# Patient Record
Sex: FEMALE | Race: WHITE | Employment: OTHER | URBAN - METROPOLITAN AREA
[De-identification: names, ages, dates, MRNs, and addresses within clinical notes are randomized per-mention and may not be internally consistent; named-entity substitution may affect disease eponyms.]

---

## 2017-01-04 ENCOUNTER — TELEPHONE (OUTPATIENT)
Dept: GASTROENTEROLOGY | Facility: CLINIC | Age: 72
End: 2017-01-04

## 2017-01-04 ENCOUNTER — NURSE ONLY (OUTPATIENT)
Dept: WOUND CARE | Facility: HOSPITAL | Age: 72
End: 2017-01-04
Attending: NURSE PRACTITIONER
Payer: MEDICARE

## 2017-01-04 DIAGNOSIS — L89.153 DECUBITUS ULCER OF COCCYGEAL REGION, STAGE 3 (HCC): Primary | ICD-10-CM

## 2017-01-04 PROCEDURE — 99212 OFFICE O/P EST SF 10 MIN: CPT

## 2017-01-04 NOTE — TELEPHONE ENCOUNTER
Pt is contacted and made aware of below results as well as need for F/U appt in 1-2 months with Dr. Italia Ragsdale; she verbalized understanding of results and states that she is feeling better on daily PPI; she will call back for appt.

## 2017-01-04 NOTE — TELEPHONE ENCOUNTER
OK to tell pt biopsy results as below. Please have her make routine appt for OV, 1-2 months, per Dr. Kerri Melgar.      Notes Recorded by Zhane Sutton MD on 12/27/2016 at 7:09 PM  Please inform the patient that her stomach biopsies show inflammation withou

## 2017-01-04 NOTE — PROGRESS NOTES
Chief Complaint  This information was obtained from the patient  The patient was seen today for follow up and management of difficult to heal wound(s). patient has had diarrhea since 5 am. she is here for her stockings.  11/30/16 patient has no complaints f and has received a status of Not Healed. Subsequent wound encounter measurements are 0.3cm length x 0.2cm width x 0.5cm depth, with an area of 0.06 sq cm and a volume of 0.03 cubic cm. There is a scant amount of serous drainage noted which has no odor.  The to hydroferra blue ready packed into the wound. Plan      Follow-Up Appointments:  A follow-up appointment should be scheduled.           Entered By: Catrina Ann on 01/04/2017 10:29:23 AM    Signature(s):  Date(s):    Treatment Notes Summary    Wou saline (1)   Applied Primary Wound Dressing. using Mepilex border 4x4 (1), Xeroform 2x2 (1)   Applied Secondary Wound Dressing.  using Gauze (sterile) 2x2 (1)   Dressing secured with non-allergenic tape/stockinet/wrap. using Kerlix (1), Silk tape (1)   Appl

## 2017-01-11 ENCOUNTER — NURSE ONLY (OUTPATIENT)
Dept: WOUND CARE | Facility: HOSPITAL | Age: 72
End: 2017-01-11
Attending: NURSE PRACTITIONER
Payer: MEDICARE

## 2017-01-11 DIAGNOSIS — L89.153 DECUBITUS ULCER OF COCCYGEAL REGION, STAGE 3 (HCC): Primary | ICD-10-CM

## 2017-01-11 PROCEDURE — 99212 OFFICE O/P EST SF 10 MIN: CPT

## 2017-01-11 NOTE — PROGRESS NOTES
Subjective    Allergies  Cymbalta (Reaction: itching, swelling), gabapentin (Reaction: rash), Gnp iodides decolorized, shellfish anaphylaxis, tetracycline base (Reaction: rash), Aleve (Reaction: rash), aspirin, bactrin (Reaction: rash), peanuts (Reaction: Wound #3 Sacral is a Stage 3 Pressure Injury Pressure Ulcer and has received a status of Not Healed. Subsequent wound encounter measurements are 0.5cm length x 0.3cm width x 0.5cm depth, with an area of 0.15 sq cm and a volume of 0.075 cubic cm.  No tunneli Continue wound care visits, advanced dressing and offloading until transitioned to her new shoes and inserts. Follow-Up Appointments:  A follow-up appointment is scheduled next week.             Entered By: Kayy Cowan on 01/11/2017 10:14:38 AM   Beny

## 2017-01-17 ENCOUNTER — APPOINTMENT (OUTPATIENT)
Dept: WOUND CARE | Facility: HOSPITAL | Age: 72
End: 2017-01-17
Payer: MEDICARE

## 2017-01-19 ENCOUNTER — NURSE ONLY (OUTPATIENT)
Dept: WOUND CARE | Facility: HOSPITAL | Age: 72
End: 2017-01-19
Attending: NURSE PRACTITIONER
Payer: MEDICARE

## 2017-01-19 DIAGNOSIS — L89.133 STAGE III PRESSURE ULCER OF RIGHT LOWER BACK (HCC): Primary | ICD-10-CM

## 2017-01-19 PROCEDURE — 99212 OFFICE O/P EST SF 10 MIN: CPT

## 2017-01-19 NOTE — PROGRESS NOTES
Header Image    Progress Note Details  Patient Name: Farhat Brown   Patient Number: [id number]  Patient YOB: 1945  Date: 1/19/2017  RN: Bubba Fernandez  Physician / Danitza Buenrostro: Magdalena Nguyen, [facility]: [facility]patient  Subjective    Chief Com bpm, Respiratory Rate: 14 breaths/min, Blood Pressure: 143/59 mmHg, Pulse Oximetry: 99 %. Integumentary (Hair, Skin)  Wound #3 Sacral is a Stage 3 Pressure Injury Pressure Ulcer and has received a status of Not Healed.  Subsequent wound encounter measur applied to ankle and legs bilaterally. Offloading shoes per  were applied. Per patient request offloading pads were applied to bilateral feet. Instructed may remove at any time due to the shoe offloading now.   Instructed to call with any questions hydrofera blue moistened with normal saline applied  Wound #8 (Right Foot)  . Wound Treatment Note  Limb cleansed using Soap and water (1)  Applied Primary Wound Dressing. using Xeroform 2x2 (1)  Applied Secondary Wound Dressing.  using Mepilex border 4x4 (1

## 2017-01-21 PROCEDURE — 87077 CULTURE AEROBIC IDENTIFY: CPT | Performed by: INTERNAL MEDICINE

## 2017-01-21 PROCEDURE — 87086 URINE CULTURE/COLONY COUNT: CPT | Performed by: INTERNAL MEDICINE

## 2017-01-21 PROCEDURE — 81001 URINALYSIS AUTO W/SCOPE: CPT | Performed by: INTERNAL MEDICINE

## 2017-01-21 PROCEDURE — 87186 SC STD MICRODIL/AGAR DIL: CPT | Performed by: INTERNAL MEDICINE

## 2017-01-24 ENCOUNTER — NURSE ONLY (OUTPATIENT)
Dept: WOUND CARE | Facility: HOSPITAL | Age: 72
End: 2017-01-24
Attending: NURSE PRACTITIONER
Payer: MEDICARE

## 2017-01-24 DIAGNOSIS — L97.511 RIGHT FOOT ULCER, LIMITED TO BREAKDOWN OF SKIN (HCC): ICD-10-CM

## 2017-01-24 DIAGNOSIS — L89.133 STAGE III PRESSURE ULCER OF RIGHT LOWER BACK (HCC): Primary | ICD-10-CM

## 2017-01-24 PROCEDURE — 99212 OFFICE O/P EST SF 10 MIN: CPT

## 2017-01-24 NOTE — PROGRESS NOTES
Subjective    Chief Complaint  This information was obtained from the patient  The patient was seen today for follow up and management of difficult to heal wound(s). no concerns for today's visit. she has her new shoes which are working out well.     Cleatrice Plate Wound #8 Right Foot is a Stage 2 Pressure Injury Pressure Ulcer and has received a status of Not Healed. Subsequent wound encounter measurements are 1cm length x 1cm width x 0.1cm depth, with an area of 1 sq cm and a volume of 0.1 cubic cm.  No tunneling ha Entered By: Ron Diaz on 01/24/2017 4:36:35 PM   Signature(s): Date(s):  Treatment Notes Summary  Wound #3 (Sacral)  . Wound Treatment Note  Assessed patient’s pain status and effectiveness of pain management plan.   Cleansed wound and periwound with non-

## 2017-01-25 ENCOUNTER — TELEPHONE (OUTPATIENT)
Dept: GASTROENTEROLOGY | Facility: CLINIC | Age: 72
End: 2017-01-25

## 2017-01-25 RX ORDER — BUDESONIDE 3 MG/1
3 CAPSULE, COATED PELLETS ORAL EVERY MORNING
Qty: 30 CAPSULE | Refills: 1 | Status: SHIPPED | OUTPATIENT
Start: 2017-01-25 | End: 2017-02-24

## 2017-01-26 ENCOUNTER — HOSPITAL ENCOUNTER (OUTPATIENT)
Facility: HOSPITAL | Age: 72
Setting detail: OBSERVATION
Discharge: HOME HEALTH | End: 2017-01-28
Attending: EMERGENCY MEDICINE | Admitting: INTERNAL MEDICINE
Payer: MEDICARE

## 2017-01-26 ENCOUNTER — APPOINTMENT (OUTPATIENT)
Dept: GENERAL RADIOLOGY | Facility: HOSPITAL | Age: 72
End: 2017-01-26
Attending: EMERGENCY MEDICINE
Payer: MEDICARE

## 2017-01-26 DIAGNOSIS — I49.1 PAC (PREMATURE ATRIAL CONTRACTION): ICD-10-CM

## 2017-01-26 DIAGNOSIS — J40 BRONCHITIS: Primary | ICD-10-CM

## 2017-01-26 DIAGNOSIS — E87.6 HYPOKALEMIA: ICD-10-CM

## 2017-01-26 LAB
ANION GAP SERPL CALC-SCNC: 8 MMOL/L (ref 0–18)
BASOPHILS # BLD: 0 K/UL (ref 0–0.2)
BASOPHILS NFR BLD: 0 %
BNP SERPL-MCNC: 308 PG/ML (ref 0–100)
BUN SERPL-MCNC: 20 MG/DL (ref 8–20)
BUN/CREAT SERPL: 32.8 (ref 10–20)
CALCIUM SERPL-MCNC: 8.8 MG/DL (ref 8.5–10.5)
CHLORIDE SERPL-SCNC: 106 MMOL/L (ref 95–110)
CO2 SERPL-SCNC: 25 MMOL/L (ref 22–32)
CREAT SERPL-MCNC: 0.61 MG/DL (ref 0.5–1.5)
EOSINOPHIL # BLD: 0 K/UL (ref 0–0.7)
EOSINOPHIL NFR BLD: 1 %
ERYTHROCYTE [DISTWIDTH] IN BLOOD BY AUTOMATED COUNT: 15.7 % (ref 11–15)
GLUCOSE SERPL-MCNC: 90 MG/DL (ref 70–99)
HCT VFR BLD AUTO: 38.3 % (ref 35–48)
HGB BLD-MCNC: 12.5 G/DL (ref 12–16)
LYMPHOCYTES # BLD: 1 K/UL (ref 1–4)
LYMPHOCYTES NFR BLD: 14 %
MAGNESIUM SERPL-MCNC: 1.5 MG/DL (ref 1.8–2.5)
MCH RBC QN AUTO: 26.2 PG (ref 27–32)
MCHC RBC AUTO-ENTMCNC: 32.6 G/DL (ref 32–37)
MCV RBC AUTO: 80.5 FL (ref 80–100)
MONOCYTES # BLD: 0.4 K/UL (ref 0–1)
MONOCYTES NFR BLD: 5 %
NEUTROPHILS # BLD AUTO: 5.7 K/UL (ref 1.8–7.7)
NEUTROPHILS NFR BLD: 80 %
OSMOLALITY UR CALC.SUM OF ELEC: 290 MOSM/KG (ref 275–295)
PLATELET # BLD AUTO: 167 K/UL (ref 140–400)
PMV BLD AUTO: 9.1 FL (ref 7.4–10.3)
POTASSIUM SERPL-SCNC: 2.5 MMOL/L (ref 3.3–5.1)
PROCALCITONIN SERPL-MCNC: <0.05 NG/ML (ref ?–0.11)
RBC # BLD AUTO: 4.76 M/UL (ref 3.7–5.4)
SODIUM SERPL-SCNC: 139 MMOL/L (ref 136–144)
TROPONIN I SERPL-MCNC: 0.03 NG/ML (ref ?–0.03)
WBC # BLD AUTO: 7.2 K/UL (ref 4–11)

## 2017-01-26 PROCEDURE — 93010 ELECTROCARDIOGRAM REPORT: CPT | Performed by: EMERGENCY MEDICINE

## 2017-01-26 PROCEDURE — 96375 TX/PRO/DX INJ NEW DRUG ADDON: CPT

## 2017-01-26 PROCEDURE — 96365 THER/PROPH/DIAG IV INF INIT: CPT

## 2017-01-26 PROCEDURE — 80048 BASIC METABOLIC PNL TOTAL CA: CPT | Performed by: EMERGENCY MEDICINE

## 2017-01-26 PROCEDURE — 93005 ELECTROCARDIOGRAM TRACING: CPT

## 2017-01-26 PROCEDURE — 96367 TX/PROPH/DG ADDL SEQ IV INF: CPT

## 2017-01-26 PROCEDURE — 87631 RESP VIRUS 3-5 TARGETS: CPT | Performed by: EMERGENCY MEDICINE

## 2017-01-26 PROCEDURE — 84145 PROCALCITONIN (PCT): CPT | Performed by: EMERGENCY MEDICINE

## 2017-01-26 PROCEDURE — 99285 EMERGENCY DEPT VISIT HI MDM: CPT

## 2017-01-26 PROCEDURE — 83735 ASSAY OF MAGNESIUM: CPT | Performed by: EMERGENCY MEDICINE

## 2017-01-26 PROCEDURE — 94640 AIRWAY INHALATION TREATMENT: CPT

## 2017-01-26 PROCEDURE — 85025 COMPLETE CBC W/AUTO DIFF WBC: CPT | Performed by: EMERGENCY MEDICINE

## 2017-01-26 PROCEDURE — 84484 ASSAY OF TROPONIN QUANT: CPT | Performed by: EMERGENCY MEDICINE

## 2017-01-26 PROCEDURE — 71010 XR CHEST AP PORTABLE  (CPT=71010): CPT

## 2017-01-26 PROCEDURE — 83880 ASSAY OF NATRIURETIC PEPTIDE: CPT | Performed by: EMERGENCY MEDICINE

## 2017-01-26 RX ORDER — IPRATROPIUM BROMIDE AND ALBUTEROL SULFATE 2.5; .5 MG/3ML; MG/3ML
3 SOLUTION RESPIRATORY (INHALATION) ONCE
Status: COMPLETED | OUTPATIENT
Start: 2017-01-26 | End: 2017-01-26

## 2017-01-26 RX ORDER — METHYLPREDNISOLONE SODIUM SUCCINATE 40 MG/ML
40 INJECTION, POWDER, LYOPHILIZED, FOR SOLUTION INTRAMUSCULAR; INTRAVENOUS ONCE
Status: COMPLETED | OUTPATIENT
Start: 2017-01-26 | End: 2017-01-26

## 2017-01-26 RX ORDER — POTASSIUM CHLORIDE 20 MEQ/1
40 TABLET, EXTENDED RELEASE ORAL ONCE
Status: COMPLETED | OUTPATIENT
Start: 2017-01-26 | End: 2017-01-26

## 2017-01-27 PROBLEM — E87.6 HYPOKALEMIA: Status: ACTIVE | Noted: 2017-01-27

## 2017-01-27 PROBLEM — I49.1 PAC (PREMATURE ATRIAL CONTRACTION): Status: ACTIVE | Noted: 2017-01-27

## 2017-01-27 LAB
FLUAV + FLUBV RNA SPEC NAA+PROBE: NEGATIVE
FLUAV + FLUBV RNA SPEC NAA+PROBE: NEGATIVE
FLUAV + FLUBV RNA SPEC NAA+PROBE: POSITIVE
MAGNESIUM SERPL-MCNC: 1.9 MG/DL (ref 1.8–2.5)
POTASSIUM SERPL-SCNC: 4.3 MMOL/L (ref 3.3–5.1)

## 2017-01-27 PROCEDURE — 84132 ASSAY OF SERUM POTASSIUM: CPT | Performed by: INTERNAL MEDICINE

## 2017-01-27 PROCEDURE — 83735 ASSAY OF MAGNESIUM: CPT | Performed by: INTERNAL MEDICINE

## 2017-01-27 PROCEDURE — 96372 THER/PROPH/DIAG INJ SC/IM: CPT

## 2017-01-27 PROCEDURE — 94640 AIRWAY INHALATION TREATMENT: CPT

## 2017-01-27 PROCEDURE — 96366 THER/PROPH/DIAG IV INF ADDON: CPT

## 2017-01-27 PROCEDURE — 96376 TX/PRO/DX INJ SAME DRUG ADON: CPT

## 2017-01-27 RX ORDER — METHYLPREDNISOLONE SODIUM SUCCINATE 40 MG/ML
40 INJECTION, POWDER, LYOPHILIZED, FOR SOLUTION INTRAMUSCULAR; INTRAVENOUS EVERY 12 HOURS
Status: DISCONTINUED | OUTPATIENT
Start: 2017-01-27 | End: 2017-01-28

## 2017-01-27 RX ORDER — ACETAMINOPHEN 325 MG/1
650 TABLET ORAL EVERY 4 HOURS PRN
Status: DISCONTINUED | OUTPATIENT
Start: 2017-01-27 | End: 2017-01-28

## 2017-01-27 RX ORDER — ENOXAPARIN SODIUM 100 MG/ML
40 INJECTION SUBCUTANEOUS DAILY
Status: DISCONTINUED | OUTPATIENT
Start: 2017-01-27 | End: 2017-01-28

## 2017-01-27 RX ORDER — OSELTAMIVIR PHOSPHATE 75 MG/1
75 CAPSULE ORAL 2 TIMES DAILY
Status: DISCONTINUED | OUTPATIENT
Start: 2017-01-27 | End: 2017-01-28

## 2017-01-27 RX ORDER — LOPERAMIDE HYDROCHLORIDE 2 MG/1
4 CAPSULE ORAL 4 TIMES DAILY PRN
Status: DISCONTINUED | OUTPATIENT
Start: 2017-01-27 | End: 2017-01-28

## 2017-01-27 RX ORDER — METOPROLOL SUCCINATE 50 MG/1
50 TABLET, EXTENDED RELEASE ORAL DAILY
Status: DISCONTINUED | OUTPATIENT
Start: 2017-01-27 | End: 2017-01-28

## 2017-01-27 RX ORDER — METRONIDAZOLE 250 MG/1
TABLET ORAL 3 TIMES DAILY
Status: ON HOLD | COMMUNITY
End: 2017-01-27

## 2017-01-27 RX ORDER — IPRATROPIUM BROMIDE AND ALBUTEROL SULFATE 2.5; .5 MG/3ML; MG/3ML
3 SOLUTION RESPIRATORY (INHALATION) EVERY 6 HOURS PRN
Status: DISCONTINUED | OUTPATIENT
Start: 2017-01-27 | End: 2017-01-28

## 2017-01-27 NOTE — ED INITIAL ASSESSMENT (HPI)
Pt c/o increasing SOB starting 2 days ago with left sided chest pressure. Pt admitted during ben for pneumonia, thinks it is back.

## 2017-01-27 NOTE — DISCHARGE PLANNING
1/27/17 CM Discharge planning   Pt resides with , family home, w/c bound, independent with transfers. Pt plans to return home when medically stable, tentative referral made to [facility], pending final orders.    Tereso Galvin X [phone]

## 2017-01-27 NOTE — ED PROVIDER NOTES
Patient Seen in: [facility] AND [facility] Emergency Department    History   Patient presents with:  Dyspnea TIMO SOB (respiratory)    Stated Complaint: SOB     HPI    65 yo F with PMH lymphocytic colitis, ulcerative colitis on chronic prednisone/mesalamine, HL, sp in office    INJECTION, W/WO CONTRAST, DX/THERAPEUTIC SUBSTANCE, EPIDURAL/SUBARACHNOID; CERVICAL/THORACIC N/A 10/27/2014    Comment Procedure: CERVICAL EPIDURAL;  Surgeon: Jia Mason MD;  Location: [address] & [facility] PROPHYLAXIS.  N/A 1/5/2015    Comment Procedure: ESOPHAGOGASTRODUODENOSCOPY W/ DILATION;  Surgeon: Levy Vasquez MD;  Location: [address] [place] [address]    PATIENT DOCUMENTED NOT TO HAVE EXPERIENCED ANY OF THE FOLLOWING EVENTS N/A 1/5/2015    Comment Proc breakfast.   Metoprolol Succinate ER 50 MG Oral Tablet 24 Hr,  Take 1 tablet (50 mg total) by mouth daily. furosemide 20 MG Oral Tab,  Take 1 tablet (20 mg total) by mouth daily.  As needed for LEs swelling   Venlafaxine HCl ER (EFFEXOR-XR) 75 MG Oral Cap Soft.   Musculoskeletal: No gross deformity. BLE with 3+ edema without calf tenderness or palpable cord. Neurological: Alert. Skin: Skin is warm. Psychiatric: Cooperative. Nursing note and vitals reviewed.         ROMAINE De La Rosa. INDICATIONS: Cough, shortness of breath anf left side chest pain X2 days. TECHNIQUE:   Single view. FINDINGS: CARDIAC/VASC: The heart is mildly enlarged. Unremarkable pulmonary vasculature.   MEDIAST/YENI: The aorta is elongated and tortuous with atheros contraction)    Disposition:  Admit    Follow-up:  No follow-up provider specified.     Medications Prescribed:  Current Discharge Medication List

## 2017-01-27 NOTE — CONSULTS
Pulmonary Consult     Assessment / Plan:  1. Hypoxia: due to influenza and bronchospasm  - wean O2 as able  - bronchodilator protocol  - IV steroids  2. Influenza:  - continue Tamiflu x5 days  3. PPx:  - lovenox  4.  Dispo:  - possible DC tomorrow    Thanks jamal filter in place           Past Surgical History    COLONOSCOPY  7/11/08    Comment 7/11/08 at [address] [place] [address] and negative for polyps, UC was quiet    CYSTOURETHROSCOPY  1/9/09    Comment in office    INJECTION, W/WO CONTRAST, DX/THERAPEUTIC SUBSTANCE, EPID ESOPHAGOGASTRODUODENOSCOPY W/ DILATION;  Surgeon: Ryan Dougherty MD;  Location: [address] [place] [address] IV ANTIBIOTIC SURGICAL SITE INFECTION PROPHYLAXIS.  N/A 1/5/2015    Comment Procedure: ESOPHAGOGASTRODUODENO MG Oral Cap DR Particles Take 1 capsule (3 mg total) by mouth every morning.  Take 3 capsules (9mg) every day in the morning for up to 8 weeks Disp: 30 capsule Rfl: 1   mesalamine (DELZICOL) 400 MG Oral Capsule Delayed Release Take 2 capsules (800 mg total) Daughter      spastic paraplegia         Exam:   01/27/17  0040 01/27/17  0127 01/27/17  0610 01/27/17  0939   BP: 113/56 107/46  117/51   Pulse: 75 73  58   Temp:  99.2 °F (37.3 °C)  98.5 °F (36.9 °C)   TempSrc:  Oral  Oral   Resp: 20 20  18   Height:   5

## 2017-01-27 NOTE — H&P
[facility]    PATIENT'S NAME: Alfreda Pierre   ATTENDING PHYSICIAN: Caridad Dodson MD   PATIENT ACCOUNT#:   [id number]    LOCATION:  [address] [place] [address] #:   [id number]       YOB: 1945  ADMISSION DATE:       01/ Normal S1, S2, no S3 or S4. ABDOMEN:  Soft, nontender. No guarding or rigidity. No mass. EXTREMITIES:  Mild chronic lymphedema. No significant pedal edema. No evidence of fluid retention. There is minimal onychomycosis.   NEUROLOGIC:  Cranial nerves

## 2017-01-27 NOTE — PLAN OF CARE
RESPIRATORY - ADULT    • Achieves optimal ventilation and oxygenation Progressing          Arrived to unit this shift. No c/o pain or SOB.

## 2017-01-28 VITALS
OXYGEN SATURATION: 96 % | SYSTOLIC BLOOD PRESSURE: 106 MMHG | RESPIRATION RATE: 18 BRPM | WEIGHT: 141.13 LBS | TEMPERATURE: 98 F | BODY MASS INDEX: 24.1 KG/M2 | HEIGHT: 64 IN | DIASTOLIC BLOOD PRESSURE: 44 MMHG | HEART RATE: 66 BPM

## 2017-01-28 LAB
ANION GAP SERPL CALC-SCNC: 6 MMOL/L (ref 0–18)
BUN SERPL-MCNC: 22 MG/DL (ref 8–20)
BUN/CREAT SERPL: 33.8 (ref 10–20)
CALCIUM SERPL-MCNC: 8.5 MG/DL (ref 8.5–10.5)
CHLORIDE SERPL-SCNC: 111 MMOL/L (ref 95–110)
CO2 SERPL-SCNC: 24 MMOL/L (ref 22–32)
CREAT SERPL-MCNC: 0.65 MG/DL (ref 0.5–1.5)
GLUCOSE SERPL-MCNC: 129 MG/DL (ref 70–99)
MAGNESIUM SERPL-MCNC: 2 MG/DL (ref 1.8–2.5)
OSMOLALITY UR CALC.SUM OF ELEC: 297 MOSM/KG (ref 275–295)
POTASSIUM SERPL-SCNC: 4.2 MMOL/L (ref 3.3–5.1)
SODIUM SERPL-SCNC: 141 MMOL/L (ref 136–144)

## 2017-01-28 PROCEDURE — 96372 THER/PROPH/DIAG INJ SC/IM: CPT

## 2017-01-28 PROCEDURE — 96376 TX/PRO/DX INJ SAME DRUG ADON: CPT

## 2017-01-28 PROCEDURE — 80048 BASIC METABOLIC PNL TOTAL CA: CPT | Performed by: INTERNAL MEDICINE

## 2017-01-28 PROCEDURE — 83735 ASSAY OF MAGNESIUM: CPT | Performed by: INTERNAL MEDICINE

## 2017-01-28 RX ORDER — ALBUTEROL SULFATE 90 UG/1
2 AEROSOL, METERED RESPIRATORY (INHALATION) EVERY 6 HOURS PRN
Qty: 1 INHALER | Refills: 0 | Status: SHIPPED | OUTPATIENT
Start: 2017-01-28 | End: 2017-11-03

## 2017-01-28 RX ORDER — PREDNISONE 20 MG/1
40 TABLET ORAL
Qty: 8 TABLET | Refills: 0 | Status: SHIPPED | OUTPATIENT
Start: 2017-01-28 | End: 2017-03-09

## 2017-01-28 RX ORDER — PREDNISONE 20 MG/1
40 TABLET ORAL
Status: DISCONTINUED | OUTPATIENT
Start: 2017-01-28 | End: 2017-01-28

## 2017-01-28 RX ORDER — SODIUM CHLORIDE 0.9 % (FLUSH) 0.9 %
SYRINGE (ML) INJECTION
Status: COMPLETED
Start: 2017-01-28 | End: 2017-01-28

## 2017-01-28 RX ORDER — OSELTAMIVIR PHOSPHATE 75 MG/1
75 CAPSULE ORAL 2 TIMES DAILY
Qty: 6 CAPSULE | Refills: 0 | Status: SHIPPED | OUTPATIENT
Start: 2017-01-28 | End: 2017-04-13

## 2017-01-28 NOTE — PLAN OF CARE
Patient/Family Goals    • Patient/Family Long Term Goal Adequate for Discharge    • Patient/Family Short Term Goal Adequate for Discharge        RESPIRATORY - ADULT    • Achieves optimal ventilation and oxygenation Adequate for Discharge        SKIN/TISSUE

## 2017-01-28 NOTE — PROGRESS NOTES
Pulmonary Progress Note     Assessment / Plan:  1. Hypoxia: due to influenza and bronchospasm  - placed on 1L O2 for comfort last night. Will place on room air this morning  - bronchodilator protocol  - IV steroids to po today  2.  Influenza:  - continue Ta

## 2017-01-28 NOTE — DISCHARGE PLANNING
1/28/17 CM Discharge planning /MDO [facility]    Advised Lucila NEIL [facility] [address] 76 pt will be discharging home later today, transport home arranged via [facility]. Medicar costs discussed with pt. Maged Yung X [id number].

## 2017-01-29 ENCOUNTER — TELEPHONE (OUTPATIENT)
Dept: CARDIOLOGY UNIT | Facility: HOSPITAL | Age: 72
End: 2017-01-29

## 2017-01-31 ENCOUNTER — NURSE ONLY (OUTPATIENT)
Dept: WOUND CARE | Facility: HOSPITAL | Age: 72
End: 2017-01-31
Attending: NURSE PRACTITIONER
Payer: MEDICARE

## 2017-01-31 DIAGNOSIS — L89.133 STAGE III PRESSURE ULCER OF RIGHT LOWER BACK (HCC): ICD-10-CM

## 2017-01-31 DIAGNOSIS — L97.511 RIGHT FOOT ULCER, LIMITED TO BREAKDOWN OF SKIN (HCC): Primary | ICD-10-CM

## 2017-01-31 PROCEDURE — 99212 OFFICE O/P EST SF 10 MIN: CPT

## 2017-01-31 NOTE — PROGRESS NOTES
Header Image    Progress Note Details  Patient Name: Ramsey Brannon Patient Number: [id number]  Patient YOB: 1945  Date: 1/31/2017  RN: Luis Manuel Dumont CNA/ANSELMO/ADINA:  Terence Clement  Physician / Extender: Waylon Palomino, [facility] [address] [facility] Corner: [facility] Skin)  Wound #3 Sacral is a Stage 3 Pressure Injury Pressure Ulcer and has received a status of Not Healed. Subsequent wound encounter measurements are 0.5cm length x 0.4cm width x 0.4cm depth, with an area of 0.2 sq cm and a volume of 0.08 cubic cm.  There Non-pressure chronic ulcer of other part of right foot limited to breakdown of skin  L97.521: Non-pressure chronic ulcer of other part of left foot limited to breakdown of skin    Wounds are stable and improved.   Patient states has been in hospital recentl

## 2017-02-02 ENCOUNTER — PRIOR ORIGINAL RECORDS (OUTPATIENT)
Dept: OTHER | Age: 72
End: 2017-02-02

## 2017-02-03 ENCOUNTER — PRIOR ORIGINAL RECORDS (OUTPATIENT)
Dept: OTHER | Age: 72
End: 2017-02-03

## 2017-02-07 ENCOUNTER — NURSE ONLY (OUTPATIENT)
Dept: WOUND CARE | Facility: HOSPITAL | Age: 72
End: 2017-02-07
Attending: NURSE PRACTITIONER
Payer: MEDICARE

## 2017-02-07 DIAGNOSIS — L89.133 STAGE III PRESSURE ULCER OF RIGHT LOWER BACK (HCC): ICD-10-CM

## 2017-02-07 DIAGNOSIS — L97.511 RIGHT FOOT ULCER, LIMITED TO BREAKDOWN OF SKIN (HCC): Primary | ICD-10-CM

## 2017-02-07 PROCEDURE — 99212 OFFICE O/P EST SF 10 MIN: CPT

## 2017-02-07 PROCEDURE — 99211 OFF/OP EST MAY X REQ PHY/QHP: CPT | Performed by: NURSE PRACTITIONER

## 2017-02-07 NOTE — PROGRESS NOTES
Chief Complaint  This information was obtained from the patient  The patient was seen today for follow up and management of difficult to heal wound(s). no concerns for today's visit. she has her new shoes which are working out well.     General Notes:  2/ Pressure Ulcer and has received a status of Not Healed. Subsequent wound encounter measurements are 0.5cm length x 0.3cm width x 0.3cm depth, with an area of 0.15 sq cm and a volume of 0.045 cubic cm. No tunneling has been noted.  No sinus tract has been no Non-pressure chronic ulcer of other part of right foot limited to breakdown of skin  (Encounter Diagnosis) L97.521 - Non-pressure chronic ulcer of other part of left foot limited to breakdown of skin    Diagnoses    ICD-10  L89.133: Pressure ulcer of right needs to implement. We will communicate with PCP and patient to assess their goal. We may consider more realistic Conservative goals such as managing wounds’ drainage, preventing local infection.   Plan      Follow-Up Appointments:  A follow-up appointment

## 2017-02-10 ENCOUNTER — MYAURORA ACCOUNT LINK (OUTPATIENT)
Dept: OTHER | Age: 72
End: 2017-02-10

## 2017-02-14 ENCOUNTER — NURSE ONLY (OUTPATIENT)
Dept: WOUND CARE | Facility: HOSPITAL | Age: 72
End: 2017-02-14
Attending: NURSE PRACTITIONER
Payer: MEDICARE

## 2017-02-14 ENCOUNTER — APPOINTMENT (OUTPATIENT)
Dept: WOUND CARE | Facility: HOSPITAL | Age: 72
End: 2017-02-14
Payer: MEDICARE

## 2017-02-14 DIAGNOSIS — L89.133 STAGE III PRESSURE ULCER OF RIGHT LOWER BACK (HCC): ICD-10-CM

## 2017-02-14 DIAGNOSIS — L97.521 ULCER OF TOE OF LEFT FOOT, LIMITED TO BREAKDOWN OF SKIN (HCC): Primary | ICD-10-CM

## 2017-02-14 DIAGNOSIS — L97.511 RIGHT FOOT ULCER, LIMITED TO BREAKDOWN OF SKIN (HCC): ICD-10-CM

## 2017-02-14 PROCEDURE — 99215 OFFICE O/P EST HI 40 MIN: CPT

## 2017-02-14 NOTE — PROGRESS NOTES
Chief Complaint  This information was obtained from the patient  The patient is new to the [address] [facility] here for an initial visit for the evaluation and management of non-healing wound.      Allergies  penicillin    HPI  This information was obtained from t Description & Treatment Plan is: X-ray / Scan. Local Pulse is Doppler. Wound #2 Right, Medial Fifth Toe is an acute Diabetic Ulcer and has received an outcome of Resolved. There is a scant amount of sero-sanguineous drainage noted which has no odor.  The cirilo-wound area avoiding wound base. using Zinc Paste (1)   Applied topical agent to base of wound bed. using Medihoney gel (1)   Applied antiseptic dressing:   Applied Primary Wound Dressing.  using Mepilex Foam 4x4 (1), Xeroform 2x2 (1)   Applied [employer]

## 2017-02-15 ENCOUNTER — PRIOR ORIGINAL RECORDS (OUTPATIENT)
Dept: OTHER | Age: 72
End: 2017-02-15

## 2017-02-21 ENCOUNTER — OFFICE VISIT (OUTPATIENT)
Dept: GASTROENTEROLOGY | Facility: CLINIC | Age: 72
End: 2017-02-21

## 2017-02-21 VITALS — HEART RATE: 62 BPM | SYSTOLIC BLOOD PRESSURE: 122 MMHG | DIASTOLIC BLOOD PRESSURE: 74 MMHG

## 2017-02-21 DIAGNOSIS — R15.9 FECAL INCONTINENCE DUE TO ANORECTAL DISORDER: ICD-10-CM

## 2017-02-21 DIAGNOSIS — G82.20 PARAPARESIS (HCC): ICD-10-CM

## 2017-02-21 DIAGNOSIS — K52.9 CHRONIC DIARRHEA: ICD-10-CM

## 2017-02-21 DIAGNOSIS — K62.9 FECAL INCONTINENCE DUE TO ANORECTAL DISORDER: ICD-10-CM

## 2017-02-21 DIAGNOSIS — K52.832 LYMPHOCYTIC COLITIS: Primary | ICD-10-CM

## 2017-02-21 PROCEDURE — 99214 OFFICE O/P EST MOD 30 MIN: CPT | Performed by: INTERNAL MEDICINE

## 2017-02-21 PROCEDURE — G0463 HOSPITAL OUTPT CLINIC VISIT: HCPCS | Performed by: INTERNAL MEDICINE

## 2017-02-21 RX ORDER — PANTOPRAZOLE SODIUM 40 MG/1
40 TABLET, DELAYED RELEASE ORAL DAILY
Status: ON HOLD | COMMUNITY
Start: 2017-02-05 | End: 2017-09-20 | Stop reason: ALTCHOICE

## 2017-02-21 RX ORDER — DIPHENOXYLATE HYDROCHLORIDE AND ATROPINE SULFATE 2.5; .025 MG/1; MG/1
2.5 TABLET ORAL DAILY
COMMUNITY
Start: 2017-01-29 | End: 2017-04-16

## 2017-02-21 RX ORDER — ATORVASTATIN CALCIUM 40 MG/1
40 TABLET, FILM COATED ORAL NIGHTLY
COMMUNITY
Start: 2017-02-05

## 2017-02-21 RX ORDER — CHOLESTYRAMINE LIGHT 4 G/5.7G
4 POWDER, FOR SUSPENSION ORAL 3 TIMES DAILY
Qty: 90 EACH | Refills: 0 | Status: SHIPPED | OUTPATIENT
Start: 2017-02-21 | End: 2017-03-23

## 2017-02-21 RX ORDER — LISINOPRIL 5 MG/1
5 TABLET ORAL DAILY
Status: ON HOLD | COMMUNITY
Start: 2017-02-06 | End: 2017-08-18

## 2017-02-21 RX ORDER — METOPROLOL SUCCINATE 25 MG/1
25 TABLET, EXTENDED RELEASE ORAL DAILY
Status: ON HOLD | COMMUNITY
Start: 2017-02-03 | End: 2017-08-18

## 2017-02-21 NOTE — PATIENT INSTRUCTIONS
1.  Cholestyramine as ordered    2. Schedule an appointment with Dr. Rosalva Ryan at [facility], consider colostomy or other options    3. Finish current course of budesonide, then stop.

## 2017-02-21 NOTE — H&P
History of present illness: This is a 26-year-old female well-known to me with a history of lymphocytic colitis, chronic diarrhea, spastic paraparesis, fecal incontinence, GERD who presents for follow-up.   The patient has been on medications including b

## 2017-02-21 NOTE — PROGRESS NOTES
HPI:    Patient ID: Salma Quintero is a 66year old female. HPI    Review of Systems   Constitutional: Negative for fever, chills, diaphoresis, activity change, appetite change, fatigue and unexpected weight change.    HENT: Negative for congestion, mouth 2 (two) times daily.  Disp: 6 capsule Rfl: 0   predniSONE 20 MG Oral Tab Take 2 tablets (40 mg total) by mouth daily with breakfast. Disp: 8 tablet Rfl: 0   Albuterol Sulfate  (90 Base) MCG/ACT Inhalation Aero Soln Inhale 2 puffs into the lungs light.   Neck: Normal range of motion. Neck supple. No JVD present. No tracheal deviation present. No thyromegaly present. Cardiovascular: Normal rate, regular rhythm and normal heart sounds.     Pulmonary/Chest: Effort normal and breath sounds normal. No

## 2017-02-22 ENCOUNTER — PRIOR ORIGINAL RECORDS (OUTPATIENT)
Dept: OTHER | Age: 72
End: 2017-02-22

## 2017-02-27 ENCOUNTER — PRIOR ORIGINAL RECORDS (OUTPATIENT)
Dept: OTHER | Age: 72
End: 2017-02-27

## 2017-02-28 ENCOUNTER — MYAURORA ACCOUNT LINK (OUTPATIENT)
Dept: OTHER | Age: 72
End: 2017-02-28

## 2017-02-28 ENCOUNTER — APPOINTMENT (OUTPATIENT)
Dept: WOUND CARE | Facility: HOSPITAL | Age: 72
End: 2017-02-28
Payer: MEDICARE

## 2017-03-02 ENCOUNTER — NURSE ONLY (OUTPATIENT)
Dept: WOUND CARE | Facility: HOSPITAL | Age: 72
End: 2017-03-02
Attending: NURSE PRACTITIONER
Payer: MEDICARE

## 2017-03-02 ENCOUNTER — PRIOR ORIGINAL RECORDS (OUTPATIENT)
Dept: OTHER | Age: 72
End: 2017-03-02

## 2017-03-02 DIAGNOSIS — L89.153 DECUBITUS ULCER OF COCCYGEAL REGION, STAGE 3 (HCC): ICD-10-CM

## 2017-03-02 DIAGNOSIS — L97.511 RIGHT FOOT ULCER, LIMITED TO BREAKDOWN OF SKIN (HCC): Primary | ICD-10-CM

## 2017-03-02 PROCEDURE — 99214 OFFICE O/P EST MOD 30 MIN: CPT

## 2017-03-02 NOTE — PROGRESS NOTES
Header Image    Progress Note Details  Patient Name: Pierre Petties   Patient Number: [id number]  Patient YOB: 1945  Date: 3/2/2017  RN: Radha Jackson CNA/ANSELMO/ADINA: Tia Cabral  Physician / Varun Ott: Colletta Leach: Aziza Back and has received a status of Not Healed. Subsequent wound encounter measurements are 0.5cm length x 0.3cm width x 0.5cm depth, with an area of 0.15 sq cm and a volume of 0.075 cubic cm. There is a scant amount of serous drainage noted which has no odor.  Courtney Mondragon Y01.813 - Non-pressure chronic ulcer of other part of left foot limited to breakdown of skin    Diagnoses    ICD-10  L89.133: Pressure ulcer of right lower back, stage 3  L97.511: Non-pressure chronic ulcer of other part of right foot limited to breakdown (1)  Applied Secondary Wound Dressing. using Gauze (sterile) 2x2 (1)  Applied Offloading device. using Other - see notes (1)  Offloading  Applied Offloading device.  using 1/4\" thick adhesive felt applied to periwound (3)  Notes  own velcro compression gar

## 2017-03-06 ENCOUNTER — TELEPHONE (OUTPATIENT)
Dept: GASTROENTEROLOGY | Facility: CLINIC | Age: 72
End: 2017-03-06

## 2017-03-06 NOTE — TELEPHONE ENCOUNTER
Pt called and stated that dr Fredie Bumpers had given her a name of a surgeon and she has misplaced the information. Please call thank you

## 2017-03-06 NOTE — TELEPHONE ENCOUNTER
Pt is contacted and provided with Dr. Nilam Dean phone no at [facility] for appt.; she will call back if clinical information needs to be faxed there first so that appt can be booked; pt is agreeable with plan.

## 2017-03-07 ENCOUNTER — NURSE ONLY (OUTPATIENT)
Dept: WOUND CARE | Facility: HOSPITAL | Age: 72
End: 2017-03-07
Attending: NURSE PRACTITIONER
Payer: MEDICARE

## 2017-03-07 DIAGNOSIS — L03.116 CELLULITIS OF LEFT LEG: Primary | ICD-10-CM

## 2017-03-07 DIAGNOSIS — L89.153 DECUBITUS ULCER OF COCCYGEAL REGION, STAGE 3 (HCC): ICD-10-CM

## 2017-03-07 PROCEDURE — 99214 OFFICE O/P EST MOD 30 MIN: CPT

## 2017-03-07 PROCEDURE — 99212 OFFICE O/P EST SF 10 MIN: CPT | Performed by: NURSE PRACTITIONER

## 2017-03-07 RX ORDER — DOXYCYCLINE HYCLATE 100 MG/1
100 CAPSULE ORAL 2 TIMES DAILY
Qty: 20 CAPSULE | Refills: 0 | Status: SHIPPED | OUTPATIENT
Start: 2017-03-07 | End: 2017-03-17

## 2017-03-07 NOTE — PROGRESS NOTES
Subjective    Chief Complaint  This information was obtained from the patient  The patient was seen today for follow up and management of difficult to heal wound(s). no concerns for today's visit. she has her new shoes which are working out well.  No comp Wound #3 Sacral is a Stage 3 Pressure Injury Pressure Ulcer and has received a status of Not Healed. Subsequent wound encounter measurements are 0.5cm length x 0.2cm width x 0.7cm depth, with an area of 0.1 sq cm and a volume of 0.07 cubic cm.  Undermining Left lower leg noted with erythema and is warm to touch, no open sore. OCTAVIANO Love examined the patient. See notes under physician documentation. Bilateral feet offloaded with adhesive felt pads to prevent re-ulceration.  Reminded patient to call Todd vela Applied topical product to cirilo-wound area avoiding wound base.  using Moisturizing Lotion (1)   Compression wrapping  Compression used: using Farrow wraps - ankle to tibial tubercle (1), Farrow wraps - foot piece (1)   Offloading  Applied Offloading device

## 2017-03-09 ENCOUNTER — LAB ENCOUNTER (OUTPATIENT)
Dept: LAB | Age: 72
End: 2017-03-09
Attending: INTERNAL MEDICINE
Payer: MEDICARE

## 2017-03-09 ENCOUNTER — PRIOR ORIGINAL RECORDS (OUTPATIENT)
Dept: OTHER | Age: 72
End: 2017-03-09

## 2017-03-09 DIAGNOSIS — E78.00 PURE HYPERCHOLESTEROLEMIA: Primary | ICD-10-CM

## 2017-03-09 LAB
ALT SERPL-CCNC: 23 U/L (ref 14–54)
AST SERPL-CCNC: 22 U/L (ref 15–41)
CHOLEST SMN-MCNC: 123 MG/DL (ref ?–200)
CK: 39 IU/L (ref 26–192)
HDLC SERPL-MCNC: 44 MG/DL (ref 45–?)
HDLC SERPL: 2.8 {RATIO} (ref ?–4.44)
LDLC SERPL CALC-MCNC: 54 MG/DL (ref ?–130)
NONHDLC SERPL-MCNC: 79 MG/DL (ref ?–130)
TRIGLYCERIDES: 123 MG/DL (ref ?–150)
VLDL: 25 MG/DL (ref 5–40)

## 2017-03-09 PROCEDURE — 84460 ALANINE AMINO (ALT) (SGPT): CPT

## 2017-03-09 PROCEDURE — 84450 TRANSFERASE (AST) (SGOT): CPT

## 2017-03-09 PROCEDURE — 82550 ASSAY OF CK (CPK): CPT

## 2017-03-09 PROCEDURE — 80061 LIPID PANEL: CPT

## 2017-03-10 LAB
ALT (SGPT): 23 U/L
AST (SGOT): 22 U/L
CHOLESTEROL, TOTAL: 123 MG/DL
CREATININE KINASE: 39 U/L
HDL CHOLESTEROL: 44 MG/DL
LDL CHOLESTEROL: 54 MG/DL
TRIGLYCERIDES: 123 MG/DL

## 2017-03-15 ENCOUNTER — TELEPHONE (OUTPATIENT)
Dept: GASTROENTEROLOGY | Facility: CLINIC | Age: 72
End: 2017-03-15

## 2017-03-15 ENCOUNTER — PRIOR ORIGINAL RECORDS (OUTPATIENT)
Dept: OTHER | Age: 72
End: 2017-03-15

## 2017-03-15 ENCOUNTER — APPOINTMENT (OUTPATIENT)
Dept: WOUND CARE | Facility: HOSPITAL | Age: 72
End: 2017-03-15
Payer: MEDICARE

## 2017-03-15 NOTE — TELEPHONE ENCOUNTER
Dr. Ernesto Blas- please see faxed office visit notes from Dr. Jennifer Ballesteros on your desk for review; thanks!

## 2017-03-16 ENCOUNTER — NURSE ONLY (OUTPATIENT)
Dept: WOUND CARE | Facility: HOSPITAL | Age: 72
End: 2017-03-16
Attending: NURSE PRACTITIONER
Payer: MEDICARE

## 2017-03-16 DIAGNOSIS — L89.133 STAGE III PRESSURE ULCER OF RIGHT LOWER BACK (HCC): ICD-10-CM

## 2017-03-16 DIAGNOSIS — L97.511 RIGHT FOOT ULCER, LIMITED TO BREAKDOWN OF SKIN (HCC): Primary | ICD-10-CM

## 2017-03-16 PROCEDURE — 99213 OFFICE O/P EST LOW 20 MIN: CPT

## 2017-03-16 NOTE — PROGRESS NOTES
Header Image    Progress Note Details  Patient Name: Grace Marin Patient Number: [id number]  Patient YOB: 1945  Date: 3/16/2017  RN: Eb Schrader CNA/ANSELMO/CMA:  Jeni Marques  Physician / Aleksandra Newby: Nayla Vasquez: Out patient reports a wound pain of level 0/10. The wound margin is attached to wound base. Wound bed is 1-25% slough, % bright red granulation. There is no change noted in the wound progression.    The periwound skin texture is normal. The periwound skin in 1 week. - 1-2 weeks    Wound Cleansing & Dressings  Hydrofera blue  Dry gauze  Other: - Apply offloading pads to bilateral plantar feet and apply compression of Farrow Wraps  Change dressing three times per week.           Electronic Signature(s)  Signed

## 2017-03-20 NOTE — TELEPHONE ENCOUNTER
Dr. Misha Yoder consultation from the department of surgery at [facility] was reviewed. He discussed the possibility of colostomy with the patient. Recommended per stool of correctable cause of her diarrhea to avoid a stoma.   Port sent for scanning

## 2017-03-21 ENCOUNTER — APPOINTMENT (OUTPATIENT)
Dept: WOUND CARE | Facility: HOSPITAL | Age: 72
End: 2017-03-21
Payer: MEDICARE

## 2017-03-23 ENCOUNTER — APPOINTMENT (OUTPATIENT)
Dept: WOUND CARE | Facility: HOSPITAL | Age: 72
End: 2017-03-23
Payer: MEDICARE

## 2017-03-28 ENCOUNTER — APPOINTMENT (OUTPATIENT)
Dept: WOUND CARE | Facility: HOSPITAL | Age: 72
End: 2017-03-28
Payer: MEDICARE

## 2017-03-30 ENCOUNTER — NURSE ONLY (OUTPATIENT)
Dept: WOUND CARE | Facility: HOSPITAL | Age: 72
End: 2017-03-30
Attending: NURSE PRACTITIONER
Payer: MEDICARE

## 2017-03-30 ENCOUNTER — APPOINTMENT (OUTPATIENT)
Dept: WOUND CARE | Facility: HOSPITAL | Age: 72
End: 2017-03-30
Payer: MEDICARE

## 2017-03-30 DIAGNOSIS — L97.511 RIGHT FOOT ULCER, LIMITED TO BREAKDOWN OF SKIN (HCC): Primary | ICD-10-CM

## 2017-03-30 DIAGNOSIS — L89.133 STAGE III PRESSURE ULCER OF RIGHT LOWER BACK (HCC): ICD-10-CM

## 2017-03-30 PROCEDURE — 99212 OFFICE O/P EST SF 10 MIN: CPT

## 2017-03-30 NOTE — PROGRESS NOTES
Header Image    Progress Note Details  Patient Name: Mayelin Mckeon Patient Number: [id number]  Patient YOB: 1945  Date: 3/30/2017  RN: Murray Khan CNA/CHT/CMA:  Jeanie Trimble  Physician / Extender: Montserrat Kline, [facility] [address] [facility] Corner: [facility] and has received a status of Not Healed. Subsequent wound encounter measurements are 0.5cm length x 0.3cm width x 0.7cm depth, with an area of 0.15 sq cm and a volume of 0.105 cubic cm. There is a scant amount of serous drainage noted which has no odor.  Floreen Babinski By: Date:  Kendal Tellez 03/30/2017 10:21:00 AM       Entered By: Kendal Tellez on 03/30/2017 10:20:44 AM   Treatment Notes Summary  Wound #1 (Right, Plantar Foot)  . Wound Treatment Note  Assessed patient’s pain status and effectiveness of pain managemen

## 2017-04-04 ENCOUNTER — APPOINTMENT (OUTPATIENT)
Dept: WOUND CARE | Facility: HOSPITAL | Age: 72
End: 2017-04-04
Payer: MEDICARE

## 2017-04-11 ENCOUNTER — NURSE ONLY (OUTPATIENT)
Dept: WOUND CARE | Facility: HOSPITAL | Age: 72
End: 2017-04-11
Attending: NURSE PRACTITIONER
Payer: MEDICARE

## 2017-04-11 DIAGNOSIS — L89.153 DECUBITUS ULCER OF COCCYGEAL REGION, STAGE 3 (HCC): Primary | ICD-10-CM

## 2017-04-11 PROCEDURE — 99212 OFFICE O/P EST SF 10 MIN: CPT

## 2017-04-11 NOTE — PROGRESS NOTES
Chief Complaint  This information was obtained from the patient  The patient was seen today for follow up and management of difficult to heal wound(s). no concerns for today's visit. she has her new shoes which are working out well.  No complaints for tod Wound #3 Sacral is a Stage 3 Pressure Injury Pressure Ulcer and has received a status of Not Healed. Subsequent wound encounter measurements are 0.5cm length x 0.2cm width x 0.5cm depth, with an area of 0.1 sq cm and a volume of 0.05 cubic cm.  Undermining Hay Mejia RN, BSN, [address] [facility] , [place], CCN 04/11/2017 11:23:03 AM      4/11/2017 11:21:04 AM Version Signed By: Date:   Hay Mejia 4/11/2017 11:21:29 AM     Entered By: Hay Mejia on 04/11/2017 11:22:45 AM      Treatment Notes Summary    Wound #3 (Sacr

## 2017-07-11 ENCOUNTER — APPOINTMENT (OUTPATIENT)
Dept: WOUND CARE | Facility: HOSPITAL | Age: 72
End: 2017-07-11
Attending: NURSE PRACTITIONER
Payer: MEDICARE

## 2017-07-11 DIAGNOSIS — L97.511 RIGHT FOOT ULCER, LIMITED TO BREAKDOWN OF SKIN (HCC): Primary | ICD-10-CM

## 2017-07-11 DIAGNOSIS — L89.153 DECUBITUS ULCER OF COCCYGEAL REGION, STAGE 3 (HCC): ICD-10-CM

## 2017-07-11 PROCEDURE — 97597 DBRDMT OPN WND 1ST 20 CM/<: CPT | Performed by: NURSE PRACTITIONER

## 2017-07-11 PROCEDURE — 99214 OFFICE O/P EST MOD 30 MIN: CPT | Performed by: CLINICAL NURSE SPECIALIST

## 2017-07-11 PROCEDURE — 97597 DBRDMT OPN WND 1ST 20 CM/<: CPT | Performed by: CLINICAL NURSE SPECIALIST

## 2017-07-11 NOTE — PROGRESS NOTES
Subjective    Chief Complaint  This information was obtained from the patient  The patient is new to the [address] [facility] here for an initial visit for the evaluation and management of non-healing wound(s).   1. sacral wound has not healed, patient states she h Cancer - Father, Diabetes - Mother, Sibling, Heart Disease - Father    Social History  This information was obtained from the patient  Never smoker, Marital Status - , Alcohol Use - no, Lives in - house, Lives with - , No Signs/Symptoms of Ab Temperature: 97.5 °F (36.39 °C), Pulse: 109 bpm, Respiratory Rate: 16 breaths/min, Blood Pressure: 98/46 mmHg, Pulse Oximetry: 98 %.      Integumentary (Hair, Skin)  Wound #3a Sacral is a Stage 3 Pressure Injury Pressure Ulcer and has received a status of N The periwound skin texture is normal. The periwound skin moisture is normal. The periwound skin color is normal. The temperature of the periwound skin is WNL. Periwound skin does not exhibit signs or symptoms of infection.  Local Pulse is Normal.    Lower E Patient last seen in outpatient wound clinic on 4/11/17. HX of spinal stenosis and paraplegic, scolosis, hyperlipidemia and mitral valve prolapse. No current labs or imaging noted.  Patient presents to the outpatient wound with reopening wounds to sacral a Wound #5a (Pressure Ulcer) is located on the right, lateral foot plantar. A selective debridement with a total area debrided of 0.3 sq cm was performed by Saud Campbell RN. to remove devitalized tissue: callus.  The following instrument(s) were used: blade Cleansed wound and periwound with non-cytotoxic agent. using Wound Cleanser Spray (1)  Applied topical product to cirilo-wound area avoiding wound base. using Zinc Paste (1)  Applied Primary Wound Dressing.  using Alginate sheet (1), Fibracol (1)  [employer]

## 2017-07-18 ENCOUNTER — NURSE ONLY (OUTPATIENT)
Dept: WOUND CARE | Facility: HOSPITAL | Age: 72
End: 2017-07-18
Attending: CLINICAL NURSE SPECIALIST
Payer: MEDICARE

## 2017-07-18 ENCOUNTER — HOSPITAL ENCOUNTER (OUTPATIENT)
Dept: GENERAL RADIOLOGY | Facility: HOSPITAL | Age: 72
Discharge: HOME | End: 2017-07-18
Attending: CLINICAL NURSE SPECIALIST
Payer: MEDICARE

## 2017-07-18 DIAGNOSIS — M86.60 OSTEOMYELITIS, CHRONIC (HCC): ICD-10-CM

## 2017-07-18 DIAGNOSIS — L89.153 DECUBITUS ULCER OF COCCYGEAL REGION, STAGE 3 (HCC): ICD-10-CM

## 2017-07-18 DIAGNOSIS — L97.511 RIGHT FOOT ULCER, LIMITED TO BREAKDOWN OF SKIN (HCC): ICD-10-CM

## 2017-07-18 DIAGNOSIS — M86.60 OSTEOMYELITIS, CHRONIC (HCC): Primary | ICD-10-CM

## 2017-07-18 PROCEDURE — 99212 OFFICE O/P EST SF 10 MIN: CPT | Performed by: CLINICAL NURSE SPECIALIST

## 2017-07-18 PROCEDURE — 73630 X-RAY EXAM OF FOOT: CPT | Performed by: CLINICAL NURSE SPECIALIST

## 2017-07-18 PROCEDURE — 99214 OFFICE O/P EST MOD 30 MIN: CPT

## 2017-07-18 PROCEDURE — 72220 X-RAY EXAM SACRUM TAILBONE: CPT | Performed by: CLINICAL NURSE SPECIALIST

## 2017-07-18 NOTE — PROGRESS NOTES
Subjective    Chief Complaint  This information was obtained from the patient  The patient was seen today for follow up and management of difficult to heal wound on her sacral area and right lateral foot.     Allergies  Cymbalta (Reaction: itching, swelling Wound #3a Sacral is a Stage 3 Pressure Injury Pressure Ulcer and has received a status of Not Healed. Subsequent wound encounter measurements are 1.5cm length x 2.5cm width x 1cm depth, with an area of 3.75 sq cm and a volume of 3.75 cubic cm.  No tunneling Wound #10 Right, Medial, Plantar Foot is an acute Stage 2 Pressure Injury Pressure Ulcer and has received a status of Not Healed.  Initial wound encounter measurements are 0.3cm length x 0.4cm width x 0.1cm depth, with an area of 0.12 sq cm and a volume of Wound #13 Proximal Sacral is an acute Deep Tissue Pressure Injury Persistent non-blanchable deep red, maroon or purple discoloration Pressure Ulcer and has received a status of Not Healed.  Initial wound encounter measurements are 1.5cm length x 0.5cm width Patient developed new wounds: DTI to right ischium, proximal sacral area and right foot. Patient had a donut cushion on her wheelchair. Instructed patient do not use Donut cushion due to it will increase pressure to sacro-coccygeal area.  Patient verbalized Wound #5a Right, Lateral Foot plantar    Wound Cleansing & Dressings  Clean wound with Normal Saline or Wound Cleanser. Honey gel - medihoney  Thick foam - mepilex foam  Non-adherent to wound bed. - xeroform  Cover dressing and wrap with kaylee.  Do not put

## 2017-07-26 PROBLEM — R15.9 FECAL INCONTINENCE DUE TO ANORECTAL DISORDER: Status: RESOLVED | Noted: 2017-02-21 | Resolved: 2017-07-26

## 2017-07-26 PROBLEM — J40 BRONCHITIS: Status: RESOLVED | Noted: 2017-01-26 | Resolved: 2017-07-26

## 2017-07-26 PROBLEM — G82.20 PARAPARESIS (HCC): Status: RESOLVED | Noted: 2017-02-21 | Resolved: 2017-07-26

## 2017-07-26 PROBLEM — E87.6 HYPOKALEMIA: Status: RESOLVED | Noted: 2017-01-27 | Resolved: 2017-07-26

## 2017-07-26 PROBLEM — K62.9 FECAL INCONTINENCE DUE TO ANORECTAL DISORDER: Status: RESOLVED | Noted: 2017-02-21 | Resolved: 2017-07-26

## 2017-07-27 ENCOUNTER — TELEPHONE (OUTPATIENT)
Dept: GASTROENTEROLOGY | Facility: CLINIC | Age: 72
End: 2017-07-27

## 2017-07-27 ENCOUNTER — NURSE ONLY (OUTPATIENT)
Dept: WOUND CARE | Facility: HOSPITAL | Age: 72
End: 2017-07-27
Attending: CLINICAL NURSE SPECIALIST
Payer: MEDICARE

## 2017-07-27 DIAGNOSIS — L89.153 DECUBITUS ULCER OF COCCYGEAL REGION, STAGE 3 (HCC): ICD-10-CM

## 2017-07-27 DIAGNOSIS — L97.511 RIGHT FOOT ULCER, LIMITED TO BREAKDOWN OF SKIN (HCC): Primary | ICD-10-CM

## 2017-07-27 DIAGNOSIS — L97.921 ULCER OF LEFT LOWER LEG, LIMITED TO BREAKDOWN OF SKIN (HCC): ICD-10-CM

## 2017-07-27 PROCEDURE — 97605 NEG PRS WND THER DME<=50SQCM: CPT

## 2017-07-27 PROCEDURE — 99214 OFFICE O/P EST MOD 30 MIN: CPT

## 2017-07-27 PROCEDURE — 99212 OFFICE O/P EST SF 10 MIN: CPT | Performed by: CLINICAL NURSE SPECIALIST

## 2017-07-27 NOTE — TELEPHONE ENCOUNTER
Yael/Jeromy(GI) is requesting clinical notes from [facility] regarding pts GI history to be faxed to [phone] ATTN: [place] for Samuel Guevara Thank You

## 2017-07-27 NOTE — PROGRESS NOTES
Subjective    Chief Complaint  This information was obtained from the patient  The patient was seen today for follow up and management of difficult to heal wound on her sacral area and right lateral foot.  Pt is here to have wound vac dressing     Allergies Wound #3a Sacral is a chronic Stage 3 Pressure Injury Pressure Ulcer and has received a status of Not Healed. Subsequent wound encounter measurements are 2cm length x 2cm width x 1.5cm depth, with an area of 4 sq cm and a volume of 6 cubic cm.  No tunneling Wound #10 Right, Medial, Plantar Foot is an acute Pressure Ulcer and has received an outcome of Resolved. Measurements are 0cm length x 0cm width x 0cm depth, with an area of 0 sq cm and a volume of 0 cubic cm. No tunneling has been noted.  No sinus tract h The periwound skin texture is normal. The periwound skin moisture is normal. The periwound skin color is normal. The temperature of the periwound skin is WNL. Periwound skin does not exhibit signs or symptoms of infection.     Wound #14 Left, Lateral Lower Plan: Patient to follow up in outpatient wound clinic weekly for wound vac dressing changes /wound care and monitoring of wound healing. [facility] to follow up with patient 2 x per week for wound vac dressing changes and wound care.    Plan    Wound O NPWT dressing cut to fit wound size: using Granuofoam black (1)   Applied periwound skin protectant(s) using Skin prep (1)   Dressing secured with drape / transparent film.    TRAC pad bridged to following: using Lateral hip (1)   Drain tubing attached: usi

## 2017-07-27 NOTE — TELEPHONE ENCOUNTER
Records faxed today to Dr Charissa Cornelius    Pt previously referred to Dr Johnny Gaytan and now seeing Dr Charissa Cornelius

## 2017-08-02 ENCOUNTER — NURSE ONLY (OUTPATIENT)
Dept: WOUND CARE | Facility: HOSPITAL | Age: 72
End: 2017-08-02
Attending: CLINICAL NURSE SPECIALIST
Payer: MEDICARE

## 2017-08-02 DIAGNOSIS — L97.511 RIGHT FOOT ULCER, LIMITED TO BREAKDOWN OF SKIN (HCC): ICD-10-CM

## 2017-08-02 DIAGNOSIS — L24.A9 DRAINAGE FROM WOUND: Primary | ICD-10-CM

## 2017-08-02 DIAGNOSIS — L89.153 DECUBITUS ULCER OF COCCYGEAL REGION, STAGE 3 (HCC): ICD-10-CM

## 2017-08-02 PROCEDURE — 99211 OFF/OP EST MAY X REQ PHY/QHP: CPT | Performed by: NURSE PRACTITIONER

## 2017-08-02 PROCEDURE — 99211 OFF/OP EST MAY X REQ PHY/QHP: CPT | Performed by: CLINICAL NURSE SPECIALIST

## 2017-08-02 PROCEDURE — 87070 CULTURE OTHR SPECIMN AEROBIC: CPT

## 2017-08-02 PROCEDURE — 99212 OFFICE O/P EST SF 10 MIN: CPT

## 2017-08-02 PROCEDURE — 87186 SC STD MICRODIL/AGAR DIL: CPT

## 2017-08-02 PROCEDURE — 97605 NEG PRS WND THER DME<=50SQCM: CPT

## 2017-08-02 PROCEDURE — 87205 SMEAR GRAM STAIN: CPT

## 2017-08-03 ENCOUNTER — TELEPHONE (OUTPATIENT)
Dept: GASTROENTEROLOGY | Facility: CLINIC | Age: 72
End: 2017-08-03

## 2017-08-03 NOTE — TELEPHONE ENCOUNTER
Pt is requesting a call from [facility] regarding new GI doctor Dr. Alyssa Power refusing to do CLN wants pt to take medication instead pt wants EBS opinion.  Pt also has additional questions Please Call Thank You

## 2017-08-03 NOTE — TELEPHONE ENCOUNTER
Pt aware Dr. Linda Rao is out of office for 2 weeks-    Spoke with patient and she wants to speak directly to Dr. Linda Rao b/c she was referred to [facility] to have a colostomy done by Dr. Lyudmila Rios.    Dr. Lyudmila Rios told her that she needed a colonoscopy and then th care.    Please call patient upon arrival to discuss plan of care, thank you.

## 2017-08-04 NOTE — PROGRESS NOTES
8/4/2017:   AEROBIC BACTERIAL CULTURE   Collected: 8/2/2017 11:47   Status: Preliminary result   Dx: Drainage from wound   Specimen Information: Sacrum;  Other, culture    Mixture of organisms suggestive of normal skin vikki  4+ growth Streptococcus milleri

## 2017-08-07 NOTE — TELEPHONE ENCOUNTER
I recommend that she follow up with Dr. Georgia Lynn at [facility]. . She needs tertiary referral. He has lymphocytic colitis, history of colon adenoma. She is on chronic steroid medications. She has paraparasis. Please have her make appt with him at [facility].

## 2017-08-08 ENCOUNTER — LAB REQUISITION (OUTPATIENT)
Dept: LAB | Facility: HOSPITAL | Age: 72
End: 2017-08-08
Payer: MEDICARE

## 2017-08-08 DIAGNOSIS — E11.40 TYPE 2 DIABETES MELLITUS WITH DIABETIC NEUROPATHY (HCC): ICD-10-CM

## 2017-08-08 LAB
BILIRUB UR QL: NEGATIVE
COLOR UR: YELLOW
GLUCOSE UR-MCNC: NEGATIVE MG/DL
NITRITE UR QL STRIP.AUTO: NEGATIVE
PH UR: 5 [PH] (ref 5–8)
PROT UR-MCNC: 30 MG/DL
RBC #/AREA URNS AUTO: 7 /HPF
SP GR UR STRIP: 1.03 (ref 1–1.03)
UROBILINOGEN UR STRIP-ACNC: 2
VIT C UR-MCNC: 40 MG/DL
WBC #/AREA URNS AUTO: 11 /HPF

## 2017-08-08 PROCEDURE — 87186 SC STD MICRODIL/AGAR DIL: CPT | Performed by: FAMILY MEDICINE

## 2017-08-08 PROCEDURE — 87086 URINE CULTURE/COLONY COUNT: CPT | Performed by: FAMILY MEDICINE

## 2017-08-08 PROCEDURE — 81001 URINALYSIS AUTO W/SCOPE: CPT | Performed by: FAMILY MEDICINE

## 2017-08-08 PROCEDURE — 87088 URINE BACTERIA CULTURE: CPT | Performed by: FAMILY MEDICINE

## 2017-08-09 ENCOUNTER — NURSE ONLY (OUTPATIENT)
Dept: WOUND CARE | Facility: HOSPITAL | Age: 72
End: 2017-08-09
Attending: CLINICAL NURSE SPECIALIST
Payer: MEDICARE

## 2017-08-09 DIAGNOSIS — L97.511 RIGHT FOOT ULCER, LIMITED TO BREAKDOWN OF SKIN (HCC): Primary | ICD-10-CM

## 2017-08-09 DIAGNOSIS — L89.153 DECUBITUS ULCER OF COCCYGEAL REGION, STAGE 3 (HCC): ICD-10-CM

## 2017-08-09 PROCEDURE — 99213 OFFICE O/P EST LOW 20 MIN: CPT

## 2017-08-09 PROCEDURE — 99211 OFF/OP EST MAY X REQ PHY/QHP: CPT | Performed by: NURSE PRACTITIONER

## 2017-08-09 NOTE — TELEPHONE ENCOUNTER
Contacted pt and reviewed Dr. Carlos Eduardo Zavaleta message below with the patient, she verbalized understanding. I provided her Dr. Johnny Gaytan contact information at [facility].      Phone:    [phone]  Address: [address] [facility], [address] [place] [address], [address] [facility]

## 2017-08-09 NOTE — TELEPHONE ENCOUNTER
Routed to on-call MD as FYI    Pt contacted and reviewed Dr. Merry Barber message below, she verbalized understanding and will f/u after seeing Dr. Pili Goodrich.     She states that since we last spoke she feels very sick with \"horrible gas pains\", unable to have

## 2017-08-09 NOTE — PROGRESS NOTES
Subjective    Chief Complaint  This information was obtained from the patient  The patient was seen today for follow up and management of difficult to heal wound on her sacral area and right lateral foot.  Patient states that her wound vac has started beepi Wound #5a Right, Lateral Foot plantar is a chronic Stage 3 Pressure Injury Pressure Ulcer and has received a status of Not Healed.  Subsequent wound encounter measurements are 0.5cm length x 0.5cm width x 0.7cm depth, with an area of 0.25 sq cm and a volume Sacral wound: The wound bed has dark stain slough. Lat week, we ordered packing the wound with Dakin's sloution mosit gauze. Patient stated she has had only one day of the twice a day Dakin packed gauze starting yesterday.   we will hold negative pressure w Applied Secondary Wound Dressing. using Mepilex Border 3x3 (1), Xeroform 2x2 (1)   Applied Offloading device.  using 1/4\" thick adhesive felt applied to periwound (1

## 2017-08-10 NOTE — TELEPHONE ENCOUNTER
Pt spoke to Dr Araceli Cifuentes this morning. She states she is feeling better and didn't have to go to the Er. The gas pain is gone and she has more energy now.      Pt has an appt with Dr Fito De La Paz on 08/24/17

## 2017-08-12 ENCOUNTER — HOSPITAL ENCOUNTER (INPATIENT)
Facility: HOSPITAL | Age: 72
LOS: 6 days | Discharge: NURSING, REHAB OR LONG-TERM CARE | DRG: 871 | End: 2017-08-18
Attending: EMERGENCY MEDICINE | Admitting: HOSPITALIST
Payer: MEDICARE

## 2017-08-12 DIAGNOSIS — L89.154 DECUBITUS ULCER OF SACRAL REGION, STAGE 4 (HCC): ICD-10-CM

## 2017-08-12 DIAGNOSIS — R50.9 FEVER, UNSPECIFIED FEVER CAUSE: ICD-10-CM

## 2017-08-12 DIAGNOSIS — N39.0 URINARY TRACT INFECTION WITHOUT HEMATURIA, SITE UNSPECIFIED: Primary | ICD-10-CM

## 2017-08-12 LAB
ANION GAP SERPL CALC-SCNC: 10 MMOL/L (ref 0–18)
BASOPHILS # BLD: 0 K/UL (ref 0–0.2)
BASOPHILS NFR BLD: 0 %
BILIRUB UR QL: NEGATIVE
BUN SERPL-MCNC: 15 MG/DL (ref 8–20)
BUN/CREAT SERPL: 30 (ref 10–20)
CALCIUM SERPL-MCNC: 8.7 MG/DL (ref 8.5–10.5)
CHLORIDE SERPL-SCNC: 101 MMOL/L (ref 95–110)
CO2 SERPL-SCNC: 23 MMOL/L (ref 22–32)
COLOR UR: YELLOW
CREAT SERPL-MCNC: 0.5 MG/DL (ref 0.5–1.5)
EOSINOPHIL # BLD: 0 K/UL (ref 0–0.7)
EOSINOPHIL NFR BLD: 0 %
ERYTHROCYTE [DISTWIDTH] IN BLOOD BY AUTOMATED COUNT: 16.2 % (ref 11–15)
GLUCOSE BLDC GLUCOMTR-MCNC: 94 MG/DL (ref 70–99)
GLUCOSE SERPL-MCNC: 84 MG/DL (ref 70–99)
GLUCOSE UR-MCNC: NEGATIVE MG/DL
HCT VFR BLD AUTO: 37.3 % (ref 35–48)
HGB BLD-MCNC: 12.2 G/DL (ref 12–16)
HGB UR QL STRIP.AUTO: NEGATIVE
KETONES UR-MCNC: NEGATIVE MG/DL
LYMPHOCYTES # BLD: 2.4 K/UL (ref 1–4)
LYMPHOCYTES NFR BLD: 21 %
MCH RBC QN AUTO: 25.7 PG (ref 27–32)
MCHC RBC AUTO-ENTMCNC: 32.8 G/DL (ref 32–37)
MCV RBC AUTO: 78.5 FL (ref 80–100)
MONOCYTES # BLD: 1 K/UL (ref 0–1)
MONOCYTES NFR BLD: 9 %
MRSA DNA SPEC QL NAA+PROBE: NEGATIVE
NEUTROPHILS # BLD AUTO: 8 K/UL (ref 1.8–7.7)
NEUTROPHILS NFR BLD: 70 %
NITRITE UR QL STRIP.AUTO: POSITIVE
OSMOLALITY UR CALC.SUM OF ELEC: 278 MOSM/KG (ref 275–295)
PH UR: 7 [PH] (ref 5–8)
PLATELET # BLD AUTO: 281 K/UL (ref 140–400)
PMV BLD AUTO: 8.1 FL (ref 7.4–10.3)
POTASSIUM SERPL-SCNC: 2.9 MMOL/L (ref 3.3–5.1)
PROT UR-MCNC: 30 MG/DL
RBC # BLD AUTO: 4.75 M/UL (ref 3.7–5.4)
RBC #/AREA URNS AUTO: 4 /HPF
SODIUM SERPL-SCNC: 134 MMOL/L (ref 136–144)
SP GR UR STRIP: 1.02 (ref 1–1.03)
UROBILINOGEN UR STRIP-ACNC: 2
VIT C UR-MCNC: 40 MG/DL
WBC # BLD AUTO: 11.5 K/UL (ref 4–11)
WBC #/AREA URNS AUTO: 84 /HPF

## 2017-08-12 PROCEDURE — 81001 URINALYSIS AUTO W/SCOPE: CPT | Performed by: EMERGENCY MEDICINE

## 2017-08-12 PROCEDURE — 87186 SC STD MICRODIL/AGAR DIL: CPT | Performed by: EMERGENCY MEDICINE

## 2017-08-12 PROCEDURE — 96360 HYDRATION IV INFUSION INIT: CPT

## 2017-08-12 PROCEDURE — 80048 BASIC METABOLIC PNL TOTAL CA: CPT | Performed by: EMERGENCY MEDICINE

## 2017-08-12 PROCEDURE — 85025 COMPLETE CBC W/AUTO DIFF WBC: CPT | Performed by: EMERGENCY MEDICINE

## 2017-08-12 PROCEDURE — 99285 EMERGENCY DEPT VISIT HI MDM: CPT

## 2017-08-12 PROCEDURE — 82962 GLUCOSE BLOOD TEST: CPT

## 2017-08-12 PROCEDURE — 36415 COLL VENOUS BLD VENIPUNCTURE: CPT

## 2017-08-12 PROCEDURE — 87040 BLOOD CULTURE FOR BACTERIA: CPT | Performed by: EMERGENCY MEDICINE

## 2017-08-12 PROCEDURE — 87641 MR-STAPH DNA AMP PROBE: CPT | Performed by: EMERGENCY MEDICINE

## 2017-08-12 PROCEDURE — 87086 URINE CULTURE/COLONY COUNT: CPT | Performed by: EMERGENCY MEDICINE

## 2017-08-12 PROCEDURE — 87077 CULTURE AEROBIC IDENTIFY: CPT | Performed by: EMERGENCY MEDICINE

## 2017-08-12 PROCEDURE — 87070 CULTURE OTHR SPECIMN AEROBIC: CPT | Performed by: EMERGENCY MEDICINE

## 2017-08-12 PROCEDURE — 87205 SMEAR GRAM STAIN: CPT | Performed by: EMERGENCY MEDICINE

## 2017-08-12 PROCEDURE — 96361 HYDRATE IV INFUSION ADD-ON: CPT

## 2017-08-12 PROCEDURE — 87147 CULTURE TYPE IMMUNOLOGIC: CPT | Performed by: EMERGENCY MEDICINE

## 2017-08-12 RX ORDER — SODIUM CHLORIDE 0.9 % (FLUSH) 0.9 %
3 SYRINGE (ML) INJECTION AS NEEDED
Status: DISCONTINUED | OUTPATIENT
Start: 2017-08-12 | End: 2017-08-18

## 2017-08-12 RX ORDER — VENLAFAXINE HYDROCHLORIDE 75 MG/1
75 CAPSULE, EXTENDED RELEASE ORAL
Status: DISCONTINUED | OUTPATIENT
Start: 2017-08-13 | End: 2017-08-18

## 2017-08-12 RX ORDER — LORATADINE 10 MG/1
10 TABLET ORAL DAILY
COMMUNITY
End: 2019-08-02

## 2017-08-12 RX ORDER — BISACODYL 10 MG
10 SUPPOSITORY, RECTAL RECTAL
Status: DISCONTINUED | OUTPATIENT
Start: 2017-08-12 | End: 2017-08-18

## 2017-08-12 RX ORDER — ONDANSETRON 2 MG/ML
4 INJECTION INTRAMUSCULAR; INTRAVENOUS EVERY 6 HOURS PRN
Status: DISCONTINUED | OUTPATIENT
Start: 2017-08-12 | End: 2017-08-18

## 2017-08-12 RX ORDER — ACETAMINOPHEN 500 MG
1000 TABLET ORAL ONCE
Status: COMPLETED | OUTPATIENT
Start: 2017-08-12 | End: 2017-08-12

## 2017-08-12 RX ORDER — ENOXAPARIN SODIUM 100 MG/ML
40 INJECTION SUBCUTANEOUS DAILY
Status: DISCONTINUED | OUTPATIENT
Start: 2017-08-12 | End: 2017-08-18

## 2017-08-12 RX ORDER — CHOLESTYRAMINE LIGHT 4 G/5.7G
4 POWDER, FOR SUSPENSION ORAL 4 TIMES DAILY PRN
Status: DISCONTINUED | OUTPATIENT
Start: 2017-08-12 | End: 2017-08-18

## 2017-08-12 RX ORDER — PREDNISONE 1 MG/1
5 TABLET ORAL
Status: DISCONTINUED | OUTPATIENT
Start: 2017-08-13 | End: 2017-08-18

## 2017-08-12 RX ORDER — CHOLESTYRAMINE LIGHT 4 G/5.7G
4 POWDER, FOR SUSPENSION ORAL 4 TIMES DAILY PRN
Status: ON HOLD | COMMUNITY
End: 2017-09-20 | Stop reason: ALTCHOICE

## 2017-08-12 RX ORDER — LISINOPRIL 5 MG/1
5 TABLET ORAL DAILY
Status: DISCONTINUED | OUTPATIENT
Start: 2017-08-13 | End: 2017-08-14

## 2017-08-12 RX ORDER — PANTOPRAZOLE SODIUM 40 MG/1
40 TABLET, DELAYED RELEASE ORAL DAILY
Status: DISCONTINUED | OUTPATIENT
Start: 2017-08-12 | End: 2017-08-18

## 2017-08-12 RX ORDER — DIPHENOXYLATE HYDROCHLORIDE AND ATROPINE SULFATE 2.5; .025 MG/1; MG/1
2 TABLET ORAL 4 TIMES DAILY PRN
Status: DISCONTINUED | OUTPATIENT
Start: 2017-08-12 | End: 2017-08-13

## 2017-08-12 RX ORDER — HYDROCODONE BITARTRATE AND ACETAMINOPHEN 5; 325 MG/1; MG/1
1 TABLET ORAL EVERY 4 HOURS PRN
Status: DISCONTINUED | OUTPATIENT
Start: 2017-08-12 | End: 2017-08-18

## 2017-08-12 RX ORDER — MESALAMINE 400 MG/1
800 CAPSULE, DELAYED RELEASE ORAL
Status: DISCONTINUED | OUTPATIENT
Start: 2017-08-13 | End: 2017-08-18

## 2017-08-12 RX ORDER — ACETAMINOPHEN 500 MG
TABLET ORAL
Status: DISPENSED
Start: 2017-08-12 | End: 2017-08-13

## 2017-08-12 RX ORDER — CETIRIZINE HYDROCHLORIDE 10 MG/1
10 TABLET ORAL DAILY
Status: DISCONTINUED | OUTPATIENT
Start: 2017-08-13 | End: 2017-08-18

## 2017-08-12 RX ORDER — FUROSEMIDE 20 MG/1
20 TABLET ORAL AS NEEDED
COMMUNITY
End: 2017-09-21

## 2017-08-12 RX ORDER — ASPIRIN 325 MG
325 TABLET ORAL
Status: DISCONTINUED | OUTPATIENT
Start: 2017-08-13 | End: 2017-08-18

## 2017-08-12 RX ORDER — SODIUM CHLORIDE 9 MG/ML
INJECTION, SOLUTION INTRAVENOUS CONTINUOUS
Status: DISCONTINUED | OUTPATIENT
Start: 2017-08-12 | End: 2017-08-17

## 2017-08-12 RX ORDER — POLYETHYLENE GLYCOL 3350 17 G/17G
17 POWDER, FOR SOLUTION ORAL DAILY PRN
Status: DISCONTINUED | OUTPATIENT
Start: 2017-08-12 | End: 2017-08-18

## 2017-08-12 RX ORDER — SPIRONOLACTONE 25 MG/1
25 TABLET ORAL DAILY
Status: DISCONTINUED | OUTPATIENT
Start: 2017-08-13 | End: 2017-08-14

## 2017-08-12 RX ORDER — POTASSIUM CHLORIDE 14.9 MG/ML
20 INJECTION INTRAVENOUS ONCE
Status: COMPLETED | OUTPATIENT
Start: 2017-08-13 | End: 2017-08-13

## 2017-08-12 RX ORDER — ATORVASTATIN CALCIUM 40 MG/1
40 TABLET, FILM COATED ORAL DAILY
Status: DISCONTINUED | OUTPATIENT
Start: 2017-08-12 | End: 2017-08-18

## 2017-08-12 RX ORDER — EZETIMIBE 10 MG/1
10 TABLET ORAL NIGHTLY
Status: DISCONTINUED | OUTPATIENT
Start: 2017-08-13 | End: 2017-08-18

## 2017-08-12 RX ORDER — MELATONIN
325
Status: DISCONTINUED | OUTPATIENT
Start: 2017-08-13 | End: 2017-08-18

## 2017-08-12 RX ORDER — ACETAMINOPHEN 325 MG/1
650 TABLET ORAL EVERY 4 HOURS PRN
Status: DISCONTINUED | OUTPATIENT
Start: 2017-08-12 | End: 2017-08-18

## 2017-08-12 RX ORDER — SODIUM CHLORIDE 9 MG/ML
INJECTION, SOLUTION INTRAVENOUS ONCE
Status: COMPLETED | OUTPATIENT
Start: 2017-08-12 | End: 2017-08-12

## 2017-08-12 RX ORDER — METOPROLOL SUCCINATE 25 MG/1
25 TABLET, EXTENDED RELEASE ORAL DAILY
Status: DISCONTINUED | OUTPATIENT
Start: 2017-08-13 | End: 2017-08-14

## 2017-08-12 RX ORDER — MELATONIN
325
COMMUNITY

## 2017-08-12 RX ORDER — HYDROCODONE BITARTRATE AND ACETAMINOPHEN 5; 325 MG/1; MG/1
2 TABLET ORAL EVERY 4 HOURS PRN
Status: DISCONTINUED | OUTPATIENT
Start: 2017-08-12 | End: 2017-08-18

## 2017-08-12 NOTE — ED NOTES
Per pt, does not want to be given abx until \"I speak to Dr. Hermelinda Akers". Informed Dr Coughlin.

## 2017-08-12 NOTE — ED NOTES
Patient complains of bilateral leg pain that has been \"going in for years\". Pt complains of coccyx pain from wound that has been there for about a year. Denies CP/SOB.

## 2017-08-13 LAB
ANION GAP SERPL CALC-SCNC: 3 MMOL/L (ref 0–18)
BASOPHILS # BLD: 0 K/UL (ref 0–0.2)
BASOPHILS NFR BLD: 0 %
BUN SERPL-MCNC: 12 MG/DL (ref 8–20)
BUN/CREAT SERPL: 31.6 (ref 10–20)
C DIFF TOX B STL QL: POSITIVE
CALCIUM SERPL-MCNC: 8.3 MG/DL (ref 8.5–10.5)
CHLORIDE SERPL-SCNC: 109 MMOL/L (ref 95–110)
CO2 SERPL-SCNC: 24 MMOL/L (ref 22–32)
CREAT SERPL-MCNC: 0.38 MG/DL (ref 0.5–1.5)
EOSINOPHIL # BLD: 0 K/UL (ref 0–0.7)
EOSINOPHIL NFR BLD: 0 %
ERYTHROCYTE [DISTWIDTH] IN BLOOD BY AUTOMATED COUNT: 16.5 % (ref 11–15)
GLUCOSE SERPL-MCNC: 93 MG/DL (ref 70–99)
HCT VFR BLD AUTO: 32.6 % (ref 35–48)
HGB BLD-MCNC: 10.6 G/DL (ref 12–16)
LYMPHOCYTES # BLD: 1.4 K/UL (ref 1–4)
LYMPHOCYTES NFR BLD: 19 %
MAGNESIUM SERPL-MCNC: 1.6 MG/DL (ref 1.8–2.5)
MCH RBC QN AUTO: 25.9 PG (ref 27–32)
MCHC RBC AUTO-ENTMCNC: 32.5 G/DL (ref 32–37)
MCV RBC AUTO: 79.6 FL (ref 80–100)
MONOCYTES # BLD: 0.6 K/UL (ref 0–1)
MONOCYTES NFR BLD: 9 %
NEUTROPHILS # BLD AUTO: 5.4 K/UL (ref 1.8–7.7)
NEUTROPHILS NFR BLD: 72 %
OSMOLALITY UR CALC.SUM OF ELEC: 281 MOSM/KG (ref 275–295)
PLATELET # BLD AUTO: 233 K/UL (ref 140–400)
PMV BLD AUTO: 7.7 FL (ref 7.4–10.3)
POTASSIUM SERPL-SCNC: 4.3 MMOL/L (ref 3.3–5.1)
POTASSIUM SERPL-SCNC: 4.3 MMOL/L (ref 3.3–5.1)
RBC # BLD AUTO: 4.1 M/UL (ref 3.7–5.4)
SODIUM SERPL-SCNC: 136 MMOL/L (ref 136–144)
WBC # BLD AUTO: 7.5 K/UL (ref 4–11)

## 2017-08-13 PROCEDURE — 80048 BASIC METABOLIC PNL TOTAL CA: CPT | Performed by: HOSPITALIST

## 2017-08-13 PROCEDURE — 83735 ASSAY OF MAGNESIUM: CPT | Performed by: HOSPITALIST

## 2017-08-13 PROCEDURE — 84132 ASSAY OF SERUM POTASSIUM: CPT | Performed by: HOSPITALIST

## 2017-08-13 PROCEDURE — 85025 COMPLETE CBC W/AUTO DIFF WBC: CPT | Performed by: HOSPITALIST

## 2017-08-13 PROCEDURE — 87493 C DIFF AMPLIFIED PROBE: CPT | Performed by: HOSPITALIST

## 2017-08-13 RX ORDER — MAGNESIUM OXIDE 400 MG (241.3 MG MAGNESIUM) TABLET
400 TABLET ONCE
Status: DISCONTINUED | OUTPATIENT
Start: 2017-08-13 | End: 2017-08-18

## 2017-08-13 NOTE — PLAN OF CARE
Problem: Patient/Family Goals  Goal: Patient/Family Long Term Goal  Patient's Long Term Goal: return home    Interventions:  - wound care  -antibiotics  -fluids    - See additional Care Plan goals for specific interventions   Outcome: Progressing  From ED Identify cognitive and physical deficits and behaviors that affect risk of falls.   - [place] fall precautions as indicated by assessment.  - Educate pt/family on patient safety including physical limitations  - Instruct pt to call for assistance with act

## 2017-08-13 NOTE — CONSULTS
Salvador Booker is a 74year old female.    Patient presents with:  Musculoskeletal Problem      HPI:    Diarrhea with hx microscopic colitis  Denies antibiotics but now c.diff positive and blood culture positive    REVIEW OF SYSTEMS:   A comprehensive renal collecting system   • OTHER DISEASES     uterine fibroids   • OTHER DISEASES     ulcerative colitis   • OTHER DISEASES     uti   • Pneumonia, organism unspecified(486)    • Reflux    • Self-catheterizes urinary bladder 1/11/2016   • Ulcerative coliti Location: [place] [address] MANAGEMENT  12/8/2014: PATIENT DOCUMENTED NOT TO HAVE EXPERIENCED ANY* N/A      Comment: Procedure: CERVICAL EPIDURAL;  Surgeon:                Balwinder Siddiqi MD;  Location: [place] [address] no masses, no lymphadenopathy. LUNGS:  Clear to auscultation b/l, no rhonchi, rales, or wheezes. CARDIO: RRR Y9/G4, no rubs, clicks, heaves, or murmurs. GI:  Soft NT/mild distension, BS present, No masses , rebound, no HSM.   EXTREMITIES:  No edema, no c 1.035   pH Urine 7.0 5.0 - 8.0   Protein Urine 30  (A) Negative mg/dL   Glucose Urine Negative Negative mg/dL   Ketones Urine Negative Negative mg/dL   Bilirubin Urine Negative Negative   Blood Urine Negative Negative   Nitrite Urine Positive (A) Negative Aerobic Smear No organisms seen    -ED/MRSA SCREEN BY PCR-CC   Result Value Ref Range   MRSA Screen By PCR Negative Negative   -C. DIFFICILE(TOXIGENIC)PCR   Result Value Ref Range   C.  Difficile Toxin B Gene Positive (A) Negative   -CBC W/ DIFFERENTIAL

## 2017-08-13 NOTE — ED PROVIDER NOTES
Patient Seen in: [facility] AND [facility] Emergency Department    History   Patient presents with:  Musculoskeletal Problem    Stated Complaint: bilateral leg pain    HPI    Patient is a 64-year-old female with an extensive and complex medical history who has h impairment     reading       Past Surgical History:  No date: BACK SURGERY      Comment: spinal fusion L1-5  7/11/08: COLONOSCOPY      Comment: 7/11/08 at [facility] and negative for polyps, UC was               quiet  5/12: COLONOSCOPY,DIAGNOSTIC      Comment: n PATIENT DOCUMENTED NOT TO HAVE EXPERIENCED ANY* N/A      Comment: Procedure: ESOPHAGOGASTRODUODENOSCOPY W/                DILATION;  Surgeon: Brendon Montenegro MD;                 Location: [address] [place] [address]  10/27/2014: PATIENT [facility] daily.   Metoprolol Succinate ER 25 MG Oral Tablet 24 Hr,  Take 25 mg by mouth daily. Atorvastatin Calcium 40 MG Oral Tab,  Take 40 mg by mouth daily.    Albuterol Sulfate  (90 Base) MCG/ACT Inhalation Aero Soln,  Inhale 2 puffs into the lungs ever 97.7 °F (36.5 °C) (Oral)   Resp 16   Ht 162.6 cm (5' 4\")   Wt 59 kg   SpO2 95%   BMI 22.31 kg/m²         Physical Exam    Somewhat cachectic 67-year-old female who is primarily bedbound.   HEENT: Normal  Neck: Supple and nontender  Heart: S1-S2 no S3-S4 mu CBC W/ DIFFERENTIAL - Abnormal; Notable for the following:     WBC 11.5 (*)     MCV 78.5 (*)     MCH 25.7 (*)     RDW 16.2 (*)     Neutrophil Absolute 8.0 (*)     All other components within normal limits   CBC W/ DIFFERENTIAL - Abnormal; Notable for the -----------         ------                     ED/MRSA SCREEN BY CUE-LM[[id number]]     Normal              Final result                 Please view results for these tests on the individual orders.       ====================================================

## 2017-08-13 NOTE — H&P
[facility] Hospitalist H&P       CC: Patient presents with:  Musculoskeletal Problem       PCP: Hilary Zhang MD    ASSESSMENT / PLAN:   Patient is a 74year old female with PMH sig for depression/anxiety, anemia, prior severe c.diff episode, dvt s/p IVC sacrum and l foot, both appeared non infected    Patient and/or patient's family given opportunity to ask questions and note understanding and agree with therapeutic plan as outlined    Flash Gonzalez DO    [facility] Hospitalist  Answering Service number: [phone] HYPERLIPIDEMIA    • Irritable bowel syndrome    • Lymphocytic colitis Colon= 5/7/12   • MENOPAUSE    • MITRAL VALVE PROLAPSE    • Neuropathy (HCC)     bilateral legs   • Osteoarthritis     legs, back, shoulders and knees   • OSTEOPENIA    • Other and unspe MANAGEMENT  12/8/2014: INJECTION, W/WO CONTRAST, DX/THERAPEUTIC SUBST* N/A      Comment: Procedure: CERVICAL EPIDURAL;  Surgeon:                Balwinder Siddiqi MD;  Location: [facility] FOR                PAIN MANAGEMENT  No date: KNEE REPLACEMENT SURGERY mitral valve prolapse     ALL:    Cymbalta [Duloxetin*    Itching, Swelling    Comment:Throat swelling and difficulty breathing  Gabapentin              Rash  Gnp Iodides Decolor*        Comment:Iodine based dyes  Cant swallow or breath             And hiv Breath. Disp: 1 Inhaler Rfl: 0   mesalamine (DELZICOL) 400 MG Oral Capsule Delayed Release Take 2 capsules (800 mg total) by mouth 3 (three) times daily before meals.  Disp: 540 capsule Rfl: 3   Loperamide HCl 2 MG Oral Cap  Disp:  Rfl:    ASPIRIN 325 MG OR no purulent drainage, L foot with stage 2 healing ulcer 1cm in diameter, b/l heals stage 1 ulceration   Neurologic: Hemiparesis of all extremities with mild contracture b/l ue and LE, speech wnl, AAOx3, able to move all extremities, gen weakness in all ext

## 2017-08-13 NOTE — CONSULTS
[facility]    PATIENT'S NAME: Ángel Garciaano   ATTENDING PHYSICIAN: Dustin Rivera. Jasbir Fuchs MD   CONSULTING PHYSICIAN: Oxana Kent MD   PATIENT ACCOUNT#:   [id number]    LOCATION:  [address] [place] [address] #:   [id number]       [facility] 1/6 to 2/6 systolic ejection murmur, upper right sternal border. LUNGS:  Diminished breath sounds, but clear. ABDOMEN:  Mild periumbilical discomfort with distention. No anna masses, rebound, or organomegaly appreciated. BACK:  Without CVA discomfort.

## 2017-08-13 NOTE — PLAN OF CARE
Notified doctor Adam Ulloa of patient refusal of antibiotics until she speaks with doctor. Also notified doctor of self cath, ok to do. Patient  to bring in DNR papers.  At the moment patient will remain full code per MD.

## 2017-08-13 NOTE — PLAN OF CARE
Problem: Patient/Family Goals  Goal: Patient/Family Long Term Goal  Patient's Long Term Goal: return home    Interventions:  - wound care  -antibiotics  -fluids    - See additional Care Plan goals for specific interventions    Outcome: Progressing    Goal: Consider OT/PT consult to assist with strengthening/mobility  - Encourage toileting schedule   Outcome: Progressing  Patient is wheelchair bound at home and makes no attempt to get out of bed unassisted.     Problem: DISCHARGE PLANNING  Goal: Discharge to h

## 2017-08-13 NOTE — PROGRESS NOTES
[facility] dosing service    Initial Pharmacokinetic Consult for Vancomycin Dosing     Eva Ruiz is a 74year old female admitted on 8/12 who is being treated for bacteremia. Pharmacy has been asked to dose Vancomycin by Dr. Ingrid Coker.     She is a PM   Result Value Ref Range   Blood Culture Result Pending (A) N/A   Blood Smear Positive Blood Culture N/A   Blood Smear Gram positive cocci in clusters N/A   2.  AEROBIC BACTERIAL CULTURE Status: None (Preliminary result)   Collection Time: 08/12/17 5:48

## 2017-08-14 LAB
ANION GAP SERPL CALC-SCNC: 4 MMOL/L (ref 0–18)
BASOPHILS # BLD: 0 K/UL (ref 0–0.2)
BASOPHILS NFR BLD: 0 %
BUN SERPL-MCNC: 10 MG/DL (ref 8–20)
BUN/CREAT SERPL: 31.3 (ref 10–20)
CALCIUM SERPL-MCNC: 8 MG/DL (ref 8.5–10.5)
CHLORIDE SERPL-SCNC: 111 MMOL/L (ref 95–110)
CO2 SERPL-SCNC: 22 MMOL/L (ref 22–32)
CREAT SERPL-MCNC: 0.32 MG/DL (ref 0.5–1.5)
EOSINOPHIL # BLD: 0.1 K/UL (ref 0–0.7)
EOSINOPHIL NFR BLD: 1 %
ERYTHROCYTE [DISTWIDTH] IN BLOOD BY AUTOMATED COUNT: 16.4 % (ref 11–15)
GLUCOSE SERPL-MCNC: 86 MG/DL (ref 70–99)
HCT VFR BLD AUTO: 29.1 % (ref 35–48)
HGB BLD-MCNC: 9.5 G/DL (ref 12–16)
LYMPHOCYTES # BLD: 1.5 K/UL (ref 1–4)
LYMPHOCYTES NFR BLD: 25 %
MAGNESIUM SERPL-MCNC: 1.5 MG/DL (ref 1.8–2.5)
MCH RBC QN AUTO: 25.8 PG (ref 27–32)
MCHC RBC AUTO-ENTMCNC: 32.8 G/DL (ref 32–37)
MCV RBC AUTO: 78.7 FL (ref 80–100)
MONOCYTES # BLD: 0.5 K/UL (ref 0–1)
MONOCYTES NFR BLD: 8 %
NEUTROPHILS # BLD AUTO: 4 K/UL (ref 1.8–7.7)
NEUTROPHILS NFR BLD: 65 %
OSMOLALITY UR CALC.SUM OF ELEC: 282 MOSM/KG (ref 275–295)
PLATELET # BLD AUTO: 203 K/UL (ref 140–400)
PMV BLD AUTO: 7.8 FL (ref 7.4–10.3)
POTASSIUM SERPL-SCNC: 3.1 MMOL/L (ref 3.3–5.1)
RBC # BLD AUTO: 3.7 M/UL (ref 3.7–5.4)
SODIUM SERPL-SCNC: 137 MMOL/L (ref 136–144)
WBC # BLD AUTO: 6.1 K/UL (ref 4–11)

## 2017-08-14 PROCEDURE — 99213 OFFICE O/P EST LOW 20 MIN: CPT

## 2017-08-14 PROCEDURE — 85025 COMPLETE CBC W/AUTO DIFF WBC: CPT | Performed by: HOSPITALIST

## 2017-08-14 PROCEDURE — 80048 BASIC METABOLIC PNL TOTAL CA: CPT | Performed by: HOSPITALIST

## 2017-08-14 PROCEDURE — 83735 ASSAY OF MAGNESIUM: CPT | Performed by: HOSPITALIST

## 2017-08-14 RX ORDER — POTASSIUM CHLORIDE 14.9 MG/ML
20 INJECTION INTRAVENOUS ONCE
Status: COMPLETED | OUTPATIENT
Start: 2017-08-14 | End: 2017-08-14

## 2017-08-14 RX ORDER — ZINC SULFATE 50(220)MG
220 CAPSULE ORAL DAILY
Status: DISCONTINUED | OUTPATIENT
Start: 2017-08-14 | End: 2017-08-18

## 2017-08-14 RX ORDER — DOXEPIN HYDROCHLORIDE 50 MG/1
1 CAPSULE ORAL DAILY
Status: DISCONTINUED | OUTPATIENT
Start: 2017-08-14 | End: 2017-08-18

## 2017-08-14 RX ORDER — SODIUM CHLORIDE 9 MG/ML
INJECTION, SOLUTION INTRAVENOUS CONTINUOUS
Status: DISCONTINUED | OUTPATIENT
Start: 2017-08-14 | End: 2017-08-17

## 2017-08-14 NOTE — CM/SW NOTE
Met with patient at bedside to explain the BPCI/Medicare program. Patient agreed with phone follow up for 3 months from [address] [place] [address] after discharge from [address] [place] [address]. Patient was enrolled under DRG   871  . BPCI/Medicare letter and brochure provided.

## 2017-08-14 NOTE — DIETARY NOTE
ADULT NUTRITION INITIAL ASSESSMENT    Pt is at high nutrition risk. Pt does not meet malnutrition criteria. RECOMMENDATIONS TO MD:   RD initiated Oral Nutritional Supplements to maximize po.      CRITERIA FOR MALNUTRITION DIAGNOSIS:  Criteria for gamaliel collaboration with other providers  - Discharge and transfer of nutrition care to new setting or provider:   Monitor Plans    ADMITTING DIAGNOSIS: UTI  PERTINENT PAST MEDICAL HISTORY: DM, , others see H&P.     ANTHROPOMETRICS:  HT: 162.6 cm (5' 4\")       W Monitor: wt and wt change  - Nutrition Goals: halt wt loss, gradual wt gain as able, PO and supplement greater than 75% of needs, intake to meet needs, labs WNL, support wound healing, compliance with diet and compliance with medical plan    DIETITIAN FOLL

## 2017-08-14 NOTE — WOUND PROGRESS NOTE
WOUND CARE NOTE      PLAN   Recommendations:  May consider surgical evaluation of the coccyx ulcer  Dr Ankush Temple is following the pt.    Dietary consult for recommendations for nutrition to optimize wound healing  Turn schedules  Specialty bed: 1st Step ove are intact, right medial 1st toe is red and blanching, Right lateral, plantar foot with small ulcer, Coccyx with unstageable pressure injury, Left lower leg with wound from trauma with her wheelchair.  See Wound documentation, The pt's nurse is at the bedsi

## 2017-08-14 NOTE — PROGRESS NOTES
[facility] AND [facility] Infectious Disease  Progress Note    Giovanna Skaggs Patient Status:  Inpatient    1945 MRN [id number]   Location [facility] 5SW/SE Attending Pino Hampton, DO   Hosp Day # 2 PCP Dave Torrez, MD Christopher Farley acknowledges frequent contamination issues with stool  - Hygiene habits reinforced  - Will not \"cheikh\" this current e.coli isolate    3. Single positive blood culture for CoNS  - No treatment indicated, c/w contaminant    4. Disposition - inpatient.   C

## 2017-08-14 NOTE — PLAN OF CARE
Problem: Patient/Family Goals  Goal: Patient/Family Long Term Goal  Patient's Long Term Goal: return home    Interventions:  - wound care  -antibiotics  -fluids    - See additional Care Plan goals for specific interventions    Outcome: Progressing    Goal: to assist with strengthening/mobility  - Encourage toileting schedule   Outcome: Progressing      Problem: DISCHARGE PLANNING  Goal: Discharge to home or other facility with appropriate resources  INTERVENTIONS:  - Identify barriers to discharge w/pt and c

## 2017-08-14 NOTE — DIABETES ED
[facility]    Diabetes Education  Note    Daksha Olson Patient Status:  Inpatient   1945 MRN [id number]  Location [facility] 5SW/SE Attending Sylvia Pablo, DO  Hosp Day # 2 PCP Edna Miller, Osmani Bowen MD    Message Margo Parker

## 2017-08-14 NOTE — PROGRESS NOTES
[facility] Hospitalist Progress Note     CC: Hospital Follow up    PCP: Byron Pitts MD       Assessment/Plan:   Patient is a 74year old female with PMH sig for depression/anxiety, anemia, prior severe c.diff episode, dvt s/p IVC filter,GERD, gout, her better, slightly less weak. Diarrhea and fecal incontinence persisting. Minimal appetite, but no n/v. No abd pain. +moderate pain at decub site. No CP, SOB.     OBJECTIVE:    Blood pressure 113/41, pulse 64, temperature 98 °F (36.7 °C), temperature source O 109  111*   CO2  23  24  22       No results for input(s): ALT, AST, ALB, AMYLASE, LIPASE, LDH in the last 72 hours.     Invalid input(s): ALPHOS, TBIL, DBIL, TPROT      Imaging:          Meds:     • potassium chloride  20 mEq Intravenous Once   • multivita

## 2017-08-15 LAB
ANION GAP SERPL CALC-SCNC: 2 MMOL/L (ref 0–18)
BASOPHILS # BLD: 0 K/UL (ref 0–0.2)
BASOPHILS NFR BLD: 0 %
BUN SERPL-MCNC: 8 MG/DL (ref 8–20)
BUN/CREAT SERPL: 19.5 (ref 10–20)
CALCIUM SERPL-MCNC: 7.8 MG/DL (ref 8.5–10.5)
CHLORIDE SERPL-SCNC: 113 MMOL/L (ref 95–110)
CO2 SERPL-SCNC: 24 MMOL/L (ref 22–32)
CREAT SERPL-MCNC: 0.41 MG/DL (ref 0.5–1.5)
EOSINOPHIL # BLD: 0 K/UL (ref 0–0.7)
EOSINOPHIL NFR BLD: 1 %
ERYTHROCYTE [DISTWIDTH] IN BLOOD BY AUTOMATED COUNT: 16.7 % (ref 11–15)
GLUCOSE SERPL-MCNC: 78 MG/DL (ref 70–99)
HCT VFR BLD AUTO: 29.3 % (ref 35–48)
HGB BLD-MCNC: 9.6 G/DL (ref 12–16)
LYMPHOCYTES # BLD: 1.7 K/UL (ref 1–4)
LYMPHOCYTES NFR BLD: 31 %
MAGNESIUM SERPL-MCNC: 1.8 MG/DL (ref 1.8–2.5)
MCH RBC QN AUTO: 25.8 PG (ref 27–32)
MCHC RBC AUTO-ENTMCNC: 32.7 G/DL (ref 32–37)
MCV RBC AUTO: 78.8 FL (ref 80–100)
MONOCYTES # BLD: 0.6 K/UL (ref 0–1)
MONOCYTES NFR BLD: 10 %
NEUTROPHILS # BLD AUTO: 3.2 K/UL (ref 1.8–7.7)
NEUTROPHILS NFR BLD: 58 %
OSMOLALITY UR CALC.SUM OF ELEC: 285 MOSM/KG (ref 275–295)
PLATELET # BLD AUTO: 221 K/UL (ref 140–400)
PMV BLD AUTO: 7.9 FL (ref 7.4–10.3)
POTASSIUM SERPL-SCNC: 3.7 MMOL/L (ref 3.3–5.1)
POTASSIUM SERPL-SCNC: 4.1 MMOL/L (ref 3.3–5.1)
RBC # BLD AUTO: 3.72 M/UL (ref 3.7–5.4)
SODIUM SERPL-SCNC: 139 MMOL/L (ref 136–144)
WBC # BLD AUTO: 5.6 K/UL (ref 4–11)

## 2017-08-15 PROCEDURE — 84132 ASSAY OF SERUM POTASSIUM: CPT | Performed by: HOSPITALIST

## 2017-08-15 PROCEDURE — 85025 COMPLETE CBC W/AUTO DIFF WBC: CPT | Performed by: HOSPITALIST

## 2017-08-15 PROCEDURE — 97163 PT EVAL HIGH COMPLEX 45 MIN: CPT

## 2017-08-15 PROCEDURE — 80048 BASIC METABOLIC PNL TOTAL CA: CPT | Performed by: HOSPITALIST

## 2017-08-15 PROCEDURE — 83735 ASSAY OF MAGNESIUM: CPT | Performed by: HOSPITALIST

## 2017-08-15 RX ORDER — MAGNESIUM SULFATE HEPTAHYDRATE 40 MG/ML
2 INJECTION, SOLUTION INTRAVENOUS ONCE
Status: COMPLETED | OUTPATIENT
Start: 2017-08-15 | End: 2017-08-15

## 2017-08-15 RX ORDER — POTASSIUM CHLORIDE 20 MEQ/1
40 TABLET, EXTENDED RELEASE ORAL ONCE
Status: COMPLETED | OUTPATIENT
Start: 2017-08-15 | End: 2017-08-15

## 2017-08-15 NOTE — PROGRESS NOTES
[facility] AND [facility]  [address] Infectious Disease  Progress Note    Ana Paula Sánchez Patient Status:  Inpatient    1945 MRN [id number]   Location [facility] 5SW/SE Attending Trino Cruz,    Hosp Day # 3 PCP Jr Eli, MD Herib[facility] \"cheikh\" this current e.coli isolate     3. Single positive blood culture for CoNS  - No treatment indicated, c/w contaminant     4. Disposition - inpatient. Continue p.o. Vancomycin alone as Rx.   Anticipate a taper will be needed given background eosi

## 2017-08-15 NOTE — PLAN OF CARE
Problem: Patient/Family Goals  Goal: Patient/Family Long Term Goal  Patient's Long Term Goal: return home    Interventions:  - wound care  -antibiotics  -fluids    - See additional Care Plan goals for specific interventions    Outcome: Progressing    Goal: assist with strengthening/mobility  - Encourage toileting schedule   Outcome: Progressing      Problem: DISCHARGE PLANNING  Goal: Discharge to home or other facility with appropriate resources  INTERVENTIONS:  - Identify barriers to discharge w/pt and care

## 2017-08-15 NOTE — PLAN OF CARE
Problem: Patient/Family Goals  Goal: Patient/Family Long Term Goal  Patient's Long Term Goal: return home    Interventions:  - wound care  -antibiotics  -fluids    - See additional Care Plan goals for specific interventions    Outcome: Progressing    Goal: risk of injury  - Provide assistive devices as appropriate  - Consider OT/PT consult to assist with strengthening/mobility  - Encourage toileting schedule   Outcome: Progressing  Pt calls appropriatety as needed, call light in reach.     Problem: DISCHARGE

## 2017-08-15 NOTE — PROGRESS NOTES
[facility] Hospitalist Progress Note     CC: Hospital Follow up    PCP: Ben Carranza MD       Assessment/Plan:   Patient is a 74year old female with PMH sig for depression/anxiety, anemia, prior severe c.diff episode, dvt s/p IVC filter,GERD, gout, her questions and note understanding and agree with therapeutic plan as outlined     Elkin Zavala DO     [facility] Hospitalist  Answering Service number: [phone]     Subjective:     Feeling better, slightly less weak. Diarrhea and fecal incontinence persisting. PLT  233  203  221         Recent Labs   Lab  08/13/17   0555  08/14/17   0618  08/15/17   0159  08/15/17   0625   GLU  93  86   --   78   BUN  12  10   --   8   CREATSERUM  0.38*  0.32*   --   0.41*   GFRAA  >60  >60   --   >60   GFRNAA  >60  >60   --

## 2017-08-15 NOTE — PHYSICAL THERAPY NOTE
PHYSICAL THERAPY EVALUATION - INPATIENT     Room Number: 627/214-L  Evaluation Date: 8/15/2017  Type of Evaluation: Initial  Physician Order: PT Eval and Treat    Presenting Problem: UTI, hypertonicity  Reason for Therapy: Mobility Dysfunction and Dis address these deficits in preparation for discharge. DISCHARGE RECOMMENDATIONS  PT Discharge Recommendations: Sub-acute rehabilitation (pt reports she may be transfered to [facility] )    PLAN  PT Treatment Plan: Bed mobility; Patient education;Range of unspecified(486)    • Reflux    • Self-catheterizes urinary bladder 1/11/2016   • Ulcerative colitis (RUSTca 75.)    • Visual impairment     reading       Past Surgical History  Past Surgical History:  No date: BACK SURGERY      Comment: spinal fusion L1-5  7/11/ Procedure: CERVICAL EPIDURAL;  Surgeon:                Ji Hernandez MD;  Location: [address] [place] [address] Drive MANAGEMENT  1/5/2015: PATIENT DOCUMENTED NOT TO HAVE EXPERIENCED ANY* N/A      Comment: Procedure: ESOPHAGOGASTRODUODENOSCOPY W/ transfer to me w/c and eventually return home.      PHYSICAL THERAPY EXAMINATION     OBJECTIVE  Precautions:  (stage III sacral decubiti with incontinence)  Fall Risk: High fall risk    WEIGHT BEARING RESTRICTION                   PAIN ASSESSMENT  Ratin of Impairment Score: 100%   Standardized Score (AM-PAC Scale): 23.55   CMS Modifier (G-Code): CN    FUNCTIONAL ABILITY STATUS  Gait Assessment   Gait Assistance: Not tested (not ambulatory used W/C per PLOF)  Distance (ft): 0  Assistive Device:  (w/c)

## 2017-08-15 NOTE — DISCHARGE PLANNING
SHERRELL confirmed the pt is current with [facility]. Pt will need orders to resume services at [facility]. PT recommendation is for subacute rehab. SW will f/u with the pt/family about options.     ALO santiago [id number]

## 2017-08-16 ENCOUNTER — APPOINTMENT (OUTPATIENT)
Dept: ULTRASOUND IMAGING | Facility: HOSPITAL | Age: 72
DRG: 871 | End: 2017-08-16
Attending: HOSPITALIST
Payer: MEDICARE

## 2017-08-16 ENCOUNTER — APPOINTMENT (OUTPATIENT)
Dept: WOUND CARE | Facility: HOSPITAL | Age: 72
End: 2017-08-16
Attending: CLINICAL NURSE SPECIALIST
Payer: MEDICARE

## 2017-08-16 LAB
ANION GAP SERPL CALC-SCNC: 4 MMOL/L (ref 0–18)
BASOPHILS # BLD: 0 K/UL (ref 0–0.2)
BASOPHILS NFR BLD: 0 %
BUN SERPL-MCNC: 7 MG/DL (ref 8–20)
BUN/CREAT SERPL: 16.3 (ref 10–20)
CALCIUM SERPL-MCNC: 7.8 MG/DL (ref 8.5–10.5)
CHLORIDE SERPL-SCNC: 109 MMOL/L (ref 95–110)
CO2 SERPL-SCNC: 24 MMOL/L (ref 22–32)
CREAT SERPL-MCNC: 0.43 MG/DL (ref 0.5–1.5)
EOSINOPHIL # BLD: 0.1 K/UL (ref 0–0.7)
EOSINOPHIL NFR BLD: 1 %
ERYTHROCYTE [DISTWIDTH] IN BLOOD BY AUTOMATED COUNT: 16.7 % (ref 11–15)
GLUCOSE SERPL-MCNC: 92 MG/DL (ref 70–99)
HCT VFR BLD AUTO: 29.6 % (ref 35–48)
HGB BLD-MCNC: 9.6 G/DL (ref 12–16)
LYMPHOCYTES # BLD: 1.5 K/UL (ref 1–4)
LYMPHOCYTES NFR BLD: 30 %
MAGNESIUM SERPL-MCNC: 1.7 MG/DL (ref 1.8–2.5)
MCH RBC QN AUTO: 25.8 PG (ref 27–32)
MCHC RBC AUTO-ENTMCNC: 32.4 G/DL (ref 32–37)
MCV RBC AUTO: 79.6 FL (ref 80–100)
MONOCYTES # BLD: 0.4 K/UL (ref 0–1)
MONOCYTES NFR BLD: 8 %
NEUTROPHILS # BLD AUTO: 3.2 K/UL (ref 1.8–7.7)
NEUTROPHILS NFR BLD: 61 %
OSMOLALITY UR CALC.SUM OF ELEC: 282 MOSM/KG (ref 275–295)
PLATELET # BLD AUTO: 229 K/UL (ref 140–400)
PMV BLD AUTO: 7.9 FL (ref 7.4–10.3)
POTASSIUM SERPL-SCNC: 3.7 MMOL/L (ref 3.3–5.1)
RBC # BLD AUTO: 3.72 M/UL (ref 3.7–5.4)
SODIUM SERPL-SCNC: 137 MMOL/L (ref 136–144)
WBC # BLD AUTO: 5.2 K/UL (ref 4–11)

## 2017-08-16 PROCEDURE — 93970 EXTREMITY STUDY: CPT | Performed by: HOSPITALIST

## 2017-08-16 PROCEDURE — 97166 OT EVAL MOD COMPLEX 45 MIN: CPT

## 2017-08-16 PROCEDURE — 85025 COMPLETE CBC W/AUTO DIFF WBC: CPT | Performed by: HOSPITALIST

## 2017-08-16 PROCEDURE — 83735 ASSAY OF MAGNESIUM: CPT | Performed by: HOSPITALIST

## 2017-08-16 PROCEDURE — 80048 BASIC METABOLIC PNL TOTAL CA: CPT | Performed by: HOSPITALIST

## 2017-08-16 RX ORDER — POTASSIUM CHLORIDE 20 MEQ/1
40 TABLET, EXTENDED RELEASE ORAL ONCE
Status: COMPLETED | OUTPATIENT
Start: 2017-08-16 | End: 2017-08-16

## 2017-08-16 RX ORDER — MAGNESIUM OXIDE 400 MG (241.3 MG MAGNESIUM) TABLET
400 TABLET ONCE
Status: COMPLETED | OUTPATIENT
Start: 2017-08-16 | End: 2017-08-16

## 2017-08-16 NOTE — CM/SW NOTE
Spoke with patient at bedside and given IM. Patients plan at time of dc is to go home with her . Patient states she has a caregiver and her  to help her at home. Per patient her house is handicapped accessible and she has equipment at home.  P

## 2017-08-16 NOTE — PLAN OF CARE
Problem: Patient/Family Goals  Goal: Patient/Family Long Term Goal  Patient's Long Term Goal: return home    Interventions:  - wound care  -antibiotics  -fluids    - See additional Care Plan goals for specific interventions    Outcome: Progressing    Goal: assist with strengthening/mobility  - Encourage toileting schedule   Outcome: Progressing      Problem: Patient Centered Care  Goal: Patient preferences are identified and integrated in the patient's plan of care  Interventions:  - What would you like us t

## 2017-08-16 NOTE — PROGRESS NOTES
[facility] Hospitalist Progress Note     CC: Hospital Follow up    PCP: Alton Wagner MD       Assessment/Plan:   Patient is a 74year old female with PMH sig for depression/anxiety, anemia, prior severe c.diff episode, dvt s/p IVC filter,GERD, gout, her site  -[place] prn    Hereditary spastic paraplegia  -PT/OT- recommend ASIA on d/c     FEN:  - IVF:NS @ 83/hr  - Diet: general  - Lytes:in am     DVT Prophy:lovenox   Dispo: pending clinical course,likely d/c in 1-2 days poss ASIA on d/c     Patient and/or pat 3. 70  3.72  3.72   HGB  9.5*  9.6*  9.6*   HCT  29.1*  29.3*  29.6*   MCV  78.7*  78.8*  79.6*   MCH  25.8*  25.8*  25.8*   MCHC  32.8  32.7  32.4   RDW  16.4*  16.7*  16.7*   WBC  6.1  5.6  5.2   PLT  203  221  229         Recent Labs   Lab  08/14/17   06

## 2017-08-16 NOTE — PROGRESS NOTES
[facility] AND [facility] Infectious Disease  Progress Note    Berna Cisneros Patient Status:  Inpatient    1945 MRN [id number]   Location [facility] 5SW/SE Attending Fili Prado, DO   Hosp Day # 4 PCP Hiren Ray MD     Jaxson Russian not \"cheikh\" this current e.coli isolate     3.  Single positive blood culture for CoNS  - No treatment indicated, c/w contaminant    4.   Sacral decubitus  - Stable, no gross s/s of infection  - Offloading, nutritional support     5.  Disposition - inpati

## 2017-08-16 NOTE — OCCUPATIONAL THERAPY NOTE
OCCUPATIONAL THERAPY EVALUATION - INPATIENT     Room Number: 935/087-Q  Evaluation Date: 8/16/2017  Type of Evaluation: Initial  Presenting Problem:  (Stage IV Sacral Wound)    Physician Order: IP Consult to Occupational Therapy  Reason for Therapy: ADL/IA Hypercholesterolemia    • HYPERLIPIDEMIA    • Irritable bowel syndrome    • Lymphocytic colitis Colon= 5/7/12   • MENOPAUSE    • MITRAL VALVE PROLAPSE    • Neuropathy (HCC)     bilateral legs   • Osteoarthritis     legs, back, shoulders and knees   • [facility]  12/8/2014: INJECTION, W/WO CONTRAST, DX/THERAPEUTIC SUBST* N/A      Comment: Procedure: CERVICAL EPIDURAL;  Surgeon:                Kirsty Sheikh MD;  Location: [address] date:  VALVE REPAIR      Comment: mitral valve prolapse    HOME SITUATION  Type of Home: House  Home Layout: One level  Lives With: Spouse     Toilet and Equipment: Standard height toilet;Grab bar  Shower/Tub and Equipment: Tub-shower combo;Grab bar;Tub tra clothing?: A Lot  -   Taking care of personal grooming such as brushing teeth?: A Little  -   Eating meals?: A Little    AM-PAC Score:  Score: 14  Approx Degree of Impairment: 59.67%  Standardized Score (AM-PAC Scale): 33.39  CMS Modifier (G-Code): ELIANE MEJIA

## 2017-08-16 NOTE — HOME CARE LIAISON
PATIENT IS ACTIVE WITH [facility] AND ORDERS RECEIVED TO [facility] UPON DISCHARGE. MET WITH PATIENT, WHO VERBALIZED AGREEMENT WITH PLANS. [facility] WILL FOLLOW UP WITH PATIENT AFTER DISCHARGE.

## 2017-08-17 LAB
ANION GAP SERPL CALC-SCNC: 3 MMOL/L (ref 0–18)
BASOPHILS # BLD: 0 K/UL (ref 0–0.2)
BASOPHILS NFR BLD: 0 %
BUN SERPL-MCNC: 6 MG/DL (ref 8–20)
BUN/CREAT SERPL: 15 (ref 10–20)
CALCIUM SERPL-MCNC: 8 MG/DL (ref 8.5–10.5)
CHLORIDE SERPL-SCNC: 109 MMOL/L (ref 95–110)
CO2 SERPL-SCNC: 27 MMOL/L (ref 22–32)
CREAT SERPL-MCNC: 0.4 MG/DL (ref 0.5–1.5)
EOSINOPHIL # BLD: 0.1 K/UL (ref 0–0.7)
EOSINOPHIL NFR BLD: 1 %
ERYTHROCYTE [DISTWIDTH] IN BLOOD BY AUTOMATED COUNT: 16.7 % (ref 11–15)
GLUCOSE SERPL-MCNC: 103 MG/DL (ref 70–99)
HCT VFR BLD AUTO: 31.3 % (ref 35–48)
HGB BLD-MCNC: 10.1 G/DL (ref 12–16)
LYMPHOCYTES # BLD: 1.8 K/UL (ref 1–4)
LYMPHOCYTES NFR BLD: 31 %
MAGNESIUM SERPL-MCNC: 1.6 MG/DL (ref 1.8–2.5)
MCH RBC QN AUTO: 25.7 PG (ref 27–32)
MCHC RBC AUTO-ENTMCNC: 32.2 G/DL (ref 32–37)
MCV RBC AUTO: 79.9 FL (ref 80–100)
MONOCYTES # BLD: 0.4 K/UL (ref 0–1)
MONOCYTES NFR BLD: 7 %
NEUTROPHILS # BLD AUTO: 3.6 K/UL (ref 1.8–7.7)
NEUTROPHILS NFR BLD: 60 %
OSMOLALITY UR CALC.SUM OF ELEC: 286 MOSM/KG (ref 275–295)
PLATELET # BLD AUTO: 238 K/UL (ref 140–400)
PMV BLD AUTO: 8.1 FL (ref 7.4–10.3)
POTASSIUM SERPL-SCNC: 4 MMOL/L (ref 3.3–5.1)
POTASSIUM SERPL-SCNC: 4 MMOL/L (ref 3.3–5.1)
RBC # BLD AUTO: 3.91 M/UL (ref 3.7–5.4)
SODIUM SERPL-SCNC: 139 MMOL/L (ref 136–144)
WBC # BLD AUTO: 5.9 K/UL (ref 4–11)

## 2017-08-17 PROCEDURE — 84132 ASSAY OF SERUM POTASSIUM: CPT | Performed by: HOSPITALIST

## 2017-08-17 PROCEDURE — 83735 ASSAY OF MAGNESIUM: CPT | Performed by: HOSPITALIST

## 2017-08-17 PROCEDURE — 97110 THERAPEUTIC EXERCISES: CPT

## 2017-08-17 PROCEDURE — 80048 BASIC METABOLIC PNL TOTAL CA: CPT | Performed by: HOSPITALIST

## 2017-08-17 PROCEDURE — 97530 THERAPEUTIC ACTIVITIES: CPT

## 2017-08-17 PROCEDURE — 85025 COMPLETE CBC W/AUTO DIFF WBC: CPT | Performed by: HOSPITALIST

## 2017-08-17 RX ORDER — MAGNESIUM OXIDE 400 MG (241.3 MG MAGNESIUM) TABLET
400 TABLET ONCE
Status: COMPLETED | OUTPATIENT
Start: 2017-08-17 | End: 2017-08-17

## 2017-08-17 NOTE — PHYSICAL THERAPY NOTE
PHYSICAL THERAPY TREATMENT NOTE - INPATIENT    Room Number: 001/380-W            Presenting Problem: UTI, hypertonicity    Problem List  Principal Problem:    Urinary tract infection without hematuria, site unspecified  Active Problems:    Urinary tract i colitis  1/9/09: CYSTOURETHROSCOPY      Comment: in office  12/24/2016: EGD N/A      Comment: Procedure: ESOPHAGOGASTRODUODENOSCOPY (EGD);                  Surgeon: Tanna Liriano MD;  Location:                [address] [place] [address] ENDOSCOPY  10/27/2014: Karen VALENZUELA & L Kassie Santos MD;  Location: [place] [address] MANAGEMENT  [facility]T* N/A      Comment: Procedure: CERVICAL EPIDURAL;  Surgeon:                Kassie Santos MD;  Location: help from another person does the patient currently need. ..   -   Moving to and from a bed to a chair (including a wheelchair)?: Total   -   Need to walk in hospital room?: Total   -   Climbing 3-5 steps with a railing?: Total       AM-PAC Score:  Raw Scor

## 2017-08-17 NOTE — PROGRESS NOTES
[facility] Hospitalist Progress Note     CC: Hospital Follow up    PCP: Juan Pierce MD       Assessment/Plan:   Patient is a 74year old female with PMH sig for depression/anxiety, anemia, prior severe c.diff episode, dvt s/p IVC filter,GERD, gout, her prn    Hereditary spastic paraplegia  -PT/OT- recommend ASIA on d/c, however, pt refuses this, wants to go home     FEN:  - IVF:d/c IVF as diarrhea slowing down  - Diet: general  - Lytes:in am     DVT Prophy:lovenox   Dispo: pending clinical course, probabl 08/15/17   0625  08/16/17   0635  08/17/17   0612   RBC  3.72  3.72  3.91   HGB  9.6*  9.6*  10.1*   HCT  29.3*  29.6*  31.3*   MCV  78.8*  79.6*  79.9*   MCH  25.8*  25.8*  25.7*   MCHC  32.7  32.4  32.2   RDW  16.7*  16.7*  16.7*   WBC  5.6  5.2  5.9   P

## 2017-08-17 NOTE — DISCHARGE PLANNING
Pt discussed during interdisciplinary rounds. RN states the pt may be ready for dc today. Pt is refusing rehab, and is requesting to continue home health with [facility]. [facility] liaison Charanjit Arthur is aware.  Pt is contracted and will need an ambulance

## 2017-08-17 NOTE — PROGRESS NOTES
Harjinder Dukes is a 74year old female. Patient presents with:  Musculoskeletal Problem      HPI:    Nontoxic without complaints; intermittent loose stools per pt.  But much improved    REVIEW OF SYSTEMS:   A comprehensive 11 point review of systems fibroids   • OTHER DISEASES     ulcerative colitis   • OTHER DISEASES     uti   • Pneumonia, organism unspecified(486)    • Reflux    • Self-catheterizes urinary bladder 1/11/2016   • Ulcerative [facility] ([facility]    • Visual impairment     reading      [facility] MANAGEMENT  12/8/2014: PATIENT DOCUMENTED NOT TO HAVE EXPERIENCED ANY* N/A      Comment: Procedure: CERVICAL EPIDURAL;  Surgeon:                Kirsty Sheikh MD;  Location: [place] [address] MANAGEMENT  1/5/2015: PATIENT DOCUMENTED NOT T auscultation b/l, no rhonchi, rales, or wheezes. CARDIO: RRR F8/I7, no rubs, clicks, heaves, or murmurs. GI:  Soft NT/ND, BS present, No masses , rebound, no HSM. EXTREMITIES:  No edema, no clubbing, no cyanosis. NEURO:  No focal neurologic deficits. BUN 15 8 - 20 mg/dL   Creatinine 0.50 0.50 - 1.50 mg/dL   Calcium, Total 8.7 8.5 - 10.5 mg/dL   BUN/CREA Ratio 30.0 (H) 10.0 - 20.0   Anion Gap 10 0 - 18 mmol/L   Calculated Osmolality 278 275 - 295 mOsm/kg   GFR, Non-African American >60 >=60   GFR, Afr mg/dL   BUN/CREA Ratio 31.3 (H) 10.0 - 20.0   Anion Gap 4 0 - 18 mmol/L   Calculated Osmolality 282 275 - 295 mOsm/kg   GFR, Non-African American >60 >=60   GFR, -American >60 >=60   -MAGNESIUM   Result Value Ref Range   Magnesium 1.8 1.8 - 2.5 mg/d Result Value Ref Range   Potassium 4.0 3.3 - 5.1 mmol/L   -POCT GLUCOSE   Result Value Ref Range   POC Glucose  94 70 - 99   -RAINBOW DRAW BLUE   Result Value Ref Range   Hold Blue Auto Resulted    -RAINBOW DRAW GOLD   Result Value Ref Range   Hold Gold Neutrophil Absolute 8.0 (H) 1.8 - 7.7 K/UL   Lymphocyte Absolute 2.4 1.0 - 4.0 K/UL   Monocyte Absolute 1.0 0.0 - 1.0 K/UL   Eosinophil Absolute 0.0 0.0 - 0.7 K/UL   Basophil Absolute 0.0 0.0 - 0.2 K/UL   -CBC W/ DIFFERENTIAL   Result Value Ref Range   W - 4.0 K/UL   Monocyte Absolute 0.6 0.0 - 1.0 K/UL   Eosinophil Absolute 0.0 0.0 - 0.7 K/UL   Basophil Absolute 0.0 0.0 - 0.2 K/UL   -CBC W/ DIFFERENTIAL   Result Value Ref Range   WBC 5.2 4.0 - 11.0 K/UL   RBC 3.72 3.70 - 5.40 M/UL   HGB 9.6 (L) 12.0 - 16.

## 2017-08-18 ENCOUNTER — APPOINTMENT (OUTPATIENT)
Dept: GENERAL RADIOLOGY | Facility: HOSPITAL | Age: 72
DRG: 871 | End: 2017-08-18
Attending: HOSPITALIST
Payer: MEDICARE

## 2017-08-18 VITALS
OXYGEN SATURATION: 99 % | BODY MASS INDEX: 22.2 KG/M2 | HEIGHT: 64 IN | RESPIRATION RATE: 18 BRPM | HEART RATE: 80 BPM | TEMPERATURE: 98 F | DIASTOLIC BLOOD PRESSURE: 65 MMHG | WEIGHT: 130 LBS | SYSTOLIC BLOOD PRESSURE: 129 MMHG

## 2017-08-18 LAB
ANION GAP SERPL CALC-SCNC: 7 MMOL/L (ref 0–18)
BASOPHILS # BLD: 0 K/UL (ref 0–0.2)
BASOPHILS NFR BLD: 0 %
BUN SERPL-MCNC: 9 MG/DL (ref 8–20)
BUN/CREAT SERPL: 20.5 (ref 10–20)
CALCIUM SERPL-MCNC: 8.4 MG/DL (ref 8.5–10.5)
CHLORIDE SERPL-SCNC: 103 MMOL/L (ref 95–110)
CO2 SERPL-SCNC: 25 MMOL/L (ref 22–32)
CREAT SERPL-MCNC: 0.44 MG/DL (ref 0.5–1.5)
EOSINOPHIL # BLD: 0 K/UL (ref 0–0.7)
EOSINOPHIL NFR BLD: 1 %
ERYTHROCYTE [DISTWIDTH] IN BLOOD BY AUTOMATED COUNT: 16.3 % (ref 11–15)
GLUCOSE SERPL-MCNC: 109 MG/DL (ref 70–99)
HCT VFR BLD AUTO: 33.9 % (ref 35–48)
HGB BLD-MCNC: 10.9 G/DL (ref 12–16)
LYMPHOCYTES # BLD: 1.9 K/UL (ref 1–4)
LYMPHOCYTES NFR BLD: 28 %
MAGNESIUM SERPL-MCNC: 1.4 MG/DL (ref 1.8–2.5)
MCH RBC QN AUTO: 25.8 PG (ref 27–32)
MCHC RBC AUTO-ENTMCNC: 32.2 G/DL (ref 32–37)
MCV RBC AUTO: 80.1 FL (ref 80–100)
MONOCYTES # BLD: 0.4 K/UL (ref 0–1)
MONOCYTES NFR BLD: 6 %
NEUTROPHILS # BLD AUTO: 4.6 K/UL (ref 1.8–7.7)
NEUTROPHILS NFR BLD: 65 %
OSMOLALITY UR CALC.SUM OF ELEC: 279 MOSM/KG (ref 275–295)
PLATELET # BLD AUTO: 272 K/UL (ref 140–400)
PMV BLD AUTO: 7.8 FL (ref 7.4–10.3)
POTASSIUM SERPL-SCNC: 3.3 MMOL/L (ref 3.3–5.1)
RBC # BLD AUTO: 4.23 M/UL (ref 3.7–5.4)
SODIUM SERPL-SCNC: 135 MMOL/L (ref 136–144)
WBC # BLD AUTO: 7 K/UL (ref 4–11)

## 2017-08-18 PROCEDURE — 85025 COMPLETE CBC W/AUTO DIFF WBC: CPT | Performed by: HOSPITALIST

## 2017-08-18 PROCEDURE — 80048 BASIC METABOLIC PNL TOTAL CA: CPT | Performed by: HOSPITALIST

## 2017-08-18 PROCEDURE — 74000 XR ABDOMEN (1 VIEW) (CPT=74000): CPT | Performed by: HOSPITALIST

## 2017-08-18 PROCEDURE — 83735 ASSAY OF MAGNESIUM: CPT | Performed by: HOSPITALIST

## 2017-08-18 PROCEDURE — 97110 THERAPEUTIC EXERCISES: CPT

## 2017-08-18 RX ORDER — HYDROCODONE BITARTRATE AND ACETAMINOPHEN 5; 325 MG/1; MG/1
1 TABLET ORAL EVERY 4 HOURS PRN
Qty: 40 TABLET | Refills: 0 | Status: SHIPPED | OUTPATIENT
Start: 2017-08-18 | End: 2017-09-21

## 2017-08-18 RX ORDER — POTASSIUM CHLORIDE 20 MEQ/1
40 TABLET, EXTENDED RELEASE ORAL EVERY 4 HOURS
Status: COMPLETED | OUTPATIENT
Start: 2017-08-18 | End: 2017-08-18

## 2017-08-18 RX ORDER — MAGNESIUM OXIDE 400 MG (241.3 MG MAGNESIUM) TABLET
800 TABLET ONCE
Status: COMPLETED | OUTPATIENT
Start: 2017-08-18 | End: 2017-08-18

## 2017-08-18 RX ORDER — ONDANSETRON 4 MG/1
4 TABLET, FILM COATED ORAL EVERY 8 HOURS PRN
Qty: 14 TABLET | Refills: 0 | Status: SHIPPED | OUTPATIENT
Start: 2017-08-18 | End: 2017-11-03

## 2017-08-18 RX ORDER — ZINC SULFATE 50(220)MG
220 CAPSULE ORAL DAILY
Qty: 30 CAPSULE | Refills: 0 | Status: SHIPPED | OUTPATIENT
Start: 2017-08-18 | End: 2017-11-03

## 2017-08-18 NOTE — PHYSICAL THERAPY NOTE
PHYSICAL THERAPY TREATMENT NOTE - INPATIENT    Room Number: 998/030-S       Presenting Problem: UTI, hypertonicity    Problem List  Principal Problem:    Urinary tract infection without hematuria, site unspecified  Active Problems:    Urinary tract infect '6-Clicks' INPATIENT SHORT FORM - BASIC MOBILITY  How much difficulty does the patient currently have. ..  -   Turning over in bed (including adjusting bedclothes, sheets and blankets)?: A Lot   -   Sitting down on and standing up from a chair with arms (e. activity/exercise instructions provided to patient in preparation for discharge.  LE therex and w/c mobility HEP program

## 2017-08-18 NOTE — PLAN OF CARE
Problem: Patient/Family Goals  Goal: Patient/Family Long Term Goal  Patient's Long Term Goal: return home    Interventions:  - wound care  -antibiotics  -fluids    - See additional Care Plan goals for specific interventions    Outcome: Progressing  Patient call for assistance with activity based on assessment  - Modify environment to reduce risk of injury  - Provide assistive devices as appropriate  - Consider OT/PT consult to assist with strengthening/mobility  - Encourage toileting schedule   Outcome: Prog kept up to date on plan of care.      Problem: SKIN/TISSUE INTEGRITY - ADULT  Goal: Skin integrity remains intact  INTERVENTIONS  - Assess and document risk factors for pressure ulcer development  - Assess and document skin integrity  - Monitor for areas of hydration with IV or PO as ordered and tolerated  - Evaluate effectiveness of GI medications  - Encourage mobilization and activity  - Obtain nutritional consult as needed  - Establish a toileting routine/schedule  - Consider collaborating with pharmacy to

## 2017-08-18 NOTE — DIETARY NOTE
ADULT NUTRITION  REASSESSMENT    Pt is at medium nutrition risk. Pt does not meet malnutrition criteria. RECOMMENDATIONS TO MD:   RD initiated Oral Nutritional Supplements to maximize po.      NUTRITION DIAGNOSIS/PROBLEM:  1) Increased nutrient needs, MEDICAL HISTORY: DM, , others see H&P. ANTHROPOMETRICS:  HT: 162.6 cm (5' 4\")       WT: 59 kg (130 lb)- 8/12/17  BMI: Body mass index is 22.31 kg/m².          BMI Classification: 19-24.9 kg/m2 - WNL  IBW: 120#         108% IBW       Usual Body Wt:  155# protein per kg/IBW)    MONITOR AND EVALUATE/NUTRITION GOALS:  - Food and Nutrient Intake:   Monitor: adequacy of PO intake and adequacy of supplement intake.   - Anthropometric Measurement:   Monitor: wt and wt change  - Nutrition Goals: halt wt loss, gradu

## 2017-08-18 NOTE — PROGRESS NOTES
[facility] AND CLINICS  [facility] Infectious Disease Progress Note    Cherie Silverio Patient Status:  Inpatient    1945 MRN [id number]   Location [facility] 5SW/SE Attending Nadia Preciado, [address] [facility] Day # 6 PCP Del Mackay [place], MD Riley Castillo ondansetron HCl (ZOFRAN) injection 4 mg, 4 mg, Intravenous, Q6H PRN  •  aspirin tab 325 mg, 325 mg, Oral, Daily  •  atorvastatin (LIPITOR) tab 40 mg, 40 mg, Oral, Daily  •  cholestyramine light (PREVALITE) powder packet 4 g, 4 g, Oral, QID PRN  •  ezetimib 33.9 08/18/2017    08/18/2017   CREATSERUM 0.44 08/18/2017   BUN 9 08/18/2017    08/18/2017   K 3.3 08/18/2017    08/18/2017   CO2 25 08/18/2017    08/18/2017   CA 8.4 08/18/2017   MG 1.4 08/18/2017       Radiology:  Reviewed

## 2017-08-18 NOTE — CONSULTS
[facility]    PATIENT'S NAME: Micheline Mccullough   ATTENDING PHYSICIAN: Fior Hwang DO   CONSULTING PHYSICIAN: Bong Pérez MD   PATIENT ACCOUNT#:   [id number]    LOCATION:  [address] [place] [address] RECORD #:   [id number]        or melena. She has developed a stage III sacral decubitus, and the patient is being planned for discharge to rehab, possibly today. The patient has a history of urinary retention and is on straight cath at home. She is currently on an indwelling.     PAS auscultation posteriorly. BACK:  No spinal or CVA tenderness. HEART:  S1, S2, nontachycardic. No S3, S4, or murmur. ABDOMEN:  Flat, soft, nondistended, nontender. No mass or hepatosplenomegaly.   RECTAL:  Decreased sphincter tone noted in the past.  No should sit up in chair for meal.  If she tolerates lunch, then I would approve her for transfer to rehab today. 2.   She should follow up with Dr. Coco Rose at [facility]. She has already met him while he was at [facility] during her last admission.

## 2017-08-18 NOTE — CONSULTS
GI CONSULTATION:  Available medical records reviewed. Patient interviewed and examined. Please see orders and transcription. ( Dictated [id number] ). D/W Dr. Mariza Britton. Thank you.

## 2017-08-18 NOTE — DISCHARGE SUMMARY
[facility] Hospitalist Discharge Summary   Patient ID:  Faisal Lyon  [id number]  18 year old  5/4/1945    Admit date: 8/12/2017  Discharge date: 8/18/2017  Primary Care Physician: Sneha Gill MD   Attending Physician: No att. providers found improving today  -on admit: fever to 38.2, tachycardia, fevers now resolved  -c.diff ++  -UA+, recent urine cx 8/8 with e.coli resistant to nitrofurantoin, repeat 8/12 with e.coli pans sens, per ID did not need to treat, likely colonization  -blood cx +1/2 Us Venous Doppler Leg Bilat - Diag Img (cpt=93970)    Result Date: 8/16/2017  CONCLUSION: There is no evidence of occlusive or nonocclusive thrombosis in the visualized portions  of the deep venous systems in both lower extremities.            Discharge 75 MG Cp24  Commonly known as:  EFFEXOR-XR  TAKE 1 CAPSULE BY MOUTH DAILY        STOP taking these medications    diphenoxylate-atropine 2.5-0.025 MG Tabs  Commonly known as:  LOMOTIL     lisinopril 5 MG Tabs  Commonly known as:  PRINIVIL,ZESTRIL     Ruben [phone]  8/18/2017

## 2017-08-18 NOTE — DISCHARGE PLANNING
Sw received MDO stating that pt is agreeable w/ SNF    SW met w/ pt who stated she is agreeable. Pt reported that she would like to stay in [place] area, in case she would need to come back to hospital. Pt requesting referral to 1) [facility] 2) [facility].  [facility] req

## 2017-08-23 ENCOUNTER — APPOINTMENT (OUTPATIENT)
Dept: WOUND CARE | Facility: HOSPITAL | Age: 72
End: 2017-08-23
Attending: CLINICAL NURSE SPECIALIST
Payer: MEDICARE

## 2017-08-23 DIAGNOSIS — L97.511 RIGHT FOOT ULCER, LIMITED TO BREAKDOWN OF SKIN (HCC): Primary | ICD-10-CM

## 2017-08-23 DIAGNOSIS — L89.153 DECUBITUS ULCER OF COCCYGEAL REGION, STAGE 3 (HCC): ICD-10-CM

## 2017-08-23 PROCEDURE — 99212 OFFICE O/P EST SF 10 MIN: CPT

## 2017-08-23 PROCEDURE — 99213 OFFICE O/P EST LOW 20 MIN: CPT

## 2017-08-23 PROCEDURE — 99212 OFFICE O/P EST SF 10 MIN: CPT | Performed by: CLINICAL NURSE SPECIALIST

## 2017-08-23 NOTE — PROGRESS NOTES
Subjective    Chief Complaint  This information was obtained from the patient  The patient was seen today for follow up and management of difficult to heal wound on her sacral area and right lateral foot.  Patient states that her wound vac has started beepi 8-14-17: Patient was admitted to the hospital for UTI, fever and sacral pressure wound/diarrhea. Patient diagnosed with Clostridium difficile and treated with antibiotics.  Patient [facility] The periwound skin texture is normal. The periwound skin color is normal. The periwound skin exhibited: Dry/Scaly. The temperature of the periwound skin is WNL. Periwound skin does not exhibit signs or symptoms of infection.  Local Pulse is Normal.        A Wound Orders:  Wound #3a Sacral     Wound Cleansing & Dressings  Clean wound with Normal Saline or Wound Cleanser. - Continue Dakin's solution to as previously  Dry gauze  ABD pad  Change dressing two times per day.  - then discontinue when wound vac can

## 2017-08-24 ENCOUNTER — PRIOR ORIGINAL RECORDS (OUTPATIENT)
Dept: OTHER | Age: 72
End: 2017-08-24

## 2017-08-29 ENCOUNTER — PRIOR ORIGINAL RECORDS (OUTPATIENT)
Dept: OTHER | Age: 72
End: 2017-08-29

## 2017-08-30 ENCOUNTER — NURSE ONLY (OUTPATIENT)
Dept: WOUND CARE | Facility: HOSPITAL | Age: 72
End: 2017-08-30
Attending: CLINICAL NURSE SPECIALIST
Payer: MEDICARE

## 2017-08-30 DIAGNOSIS — L89.133 STAGE III PRESSURE ULCER OF RIGHT LOWER BACK (HCC): ICD-10-CM

## 2017-08-30 DIAGNOSIS — L97.511 RIGHT FOOT ULCER, LIMITED TO BREAKDOWN OF SKIN (HCC): Primary | ICD-10-CM

## 2017-08-30 PROCEDURE — 99211 OFF/OP EST MAY X REQ PHY/QHP: CPT

## 2017-08-30 PROCEDURE — 97605 NEG PRS WND THER DME<=50SQCM: CPT

## 2017-08-30 NOTE — PROGRESS NOTES
Subjective    Chief Complaint  This information was obtained from the patient  The patient was seen today for follow up and management of difficult to heal wound on her sacral area and right lateral foot.  Patient states that her wound vac has started beepi 8-14-17: Patient was admitted to the hospital for UTI, fever and sacral pressure wound/diarrhea. Patient diagnosed with Clostridium difficile and treated with antibiotics.  Patient currently resides at [facility] and [facility] Wound #5a Right, Lateral Foot plantar is a chronic Stage 3 Pressure Injury Pressure Ulcer and has received a status of Not Healed.  Subsequent wound encounter measurements are 0.4cm length x 0.5cm width x 0.1cm depth, with an area of 0.2 sq cm and a volume Continue weekly wound care visits, NPWT dressing changes 3x/week, once/week at the wound center and twice a week at the nursing facility,debridement by the APN as indicated, and patient education.        Electronic Signature(s)   Signed By:  Date:    Nakia Lange, Total number of sponges used for this application:  2    Date Initiated:  7/27/2017    Dressing Change:  Yes    Canister Changed:  Yes    Days On NPWT:  35    Notes  [employer] vac applied   Electronic Signature(s)   Signed By:  Date:    Tiki Dang RN 0

## 2017-09-06 ENCOUNTER — APPOINTMENT (OUTPATIENT)
Dept: WOUND CARE | Facility: HOSPITAL | Age: 72
End: 2017-09-06
Attending: CLINICAL NURSE SPECIALIST
Payer: MEDICARE

## 2017-09-06 DIAGNOSIS — L97.511 RIGHT FOOT ULCER, LIMITED TO BREAKDOWN OF SKIN (HCC): Primary | ICD-10-CM

## 2017-09-06 DIAGNOSIS — L89.153 DECUBITUS ULCER OF COCCYGEAL REGION, STAGE 3 (HCC): ICD-10-CM

## 2017-09-06 PROCEDURE — 97605 NEG PRS WND THER DME<=50SQCM: CPT | Performed by: CLINICAL NURSE SPECIALIST

## 2017-09-06 PROCEDURE — 99211 OFF/OP EST MAY X REQ PHY/QHP: CPT | Performed by: CLINICAL NURSE SPECIALIST

## 2017-09-06 NOTE — PROGRESS NOTES
Subjective    Chief Complaint  This information was obtained from the patient  The patient was seen today for follow up and management of difficult to heal wound on her sacral area and right lateral foot.  Patient states that her wound vac has started beepi 7/11/17: Patient returns to the outpatient wound clinic with reopening of  sacral wound and right foot wound. Sacral stage 3 pressure ulcer, right foot ulcer with callus and blancheable erythema noted to metatarsal plantar area.     She has been wearing he Wound #5a Right, Lateral Foot plantar is a chronic Stage 3 Pressure Injury Pressure Ulcer and has received a status of Not Healed.  Subsequent wound encounter measurements are 0.3cm length x 0.3cm width x 0.1cm depth, with an area of 0.09 sq cm and a volume Patient's sacral wound granular and larger in comparison to previous wound measurements. Periwound intact. No odor noted.  Will continue wound vac as previously ordered using black granufoam. Wound vac maintained at 125mmHG negative pressure therapy to janina Patient tolerated treatment well without abnormal reactions  Notes  9/6/17: TRAC pad bridged to left lateral hip  Wound #5a (Right, Lateral Foot plantar)  . Wound Treatment Note  Assessed patient’s pain status and effectiveness of pain management plan.   Emiliano Clark

## 2017-09-12 ENCOUNTER — HOSPITAL ENCOUNTER (EMERGENCY)
Facility: HOSPITAL | Age: 72
Discharge: HOME | End: 2017-09-12
Attending: EMERGENCY MEDICINE
Payer: MEDICARE

## 2017-09-12 VITALS
TEMPERATURE: 98 F | BODY MASS INDEX: 21 KG/M2 | SYSTOLIC BLOOD PRESSURE: 128 MMHG | DIASTOLIC BLOOD PRESSURE: 65 MMHG | HEART RATE: 74 BPM | WEIGHT: 123 LBS | RESPIRATION RATE: 18 BRPM | OXYGEN SATURATION: 98 %

## 2017-09-12 DIAGNOSIS — T83.9XXA FOLEY CATHETER PROBLEM, INITIAL ENCOUNTER (HCC): ICD-10-CM

## 2017-09-12 DIAGNOSIS — M54.50 BACK PAIN, LUMBOSACRAL: Primary | ICD-10-CM

## 2017-09-12 LAB
BILIRUB UR QL: NEGATIVE
COLOR UR: YELLOW
GLUCOSE UR-MCNC: NEGATIVE MG/DL
HGB UR QL STRIP.AUTO: NEGATIVE
KETONES UR-MCNC: NEGATIVE MG/DL
NITRITE UR QL STRIP.AUTO: POSITIVE
PH UR: 8 [PH] (ref 5–8)
PROT UR-MCNC: 30 MG/DL
RBC #/AREA URNS AUTO: 2 /HPF
SP GR UR STRIP: 1.01 (ref 1–1.03)
UROBILINOGEN UR STRIP-ACNC: <2
VIT C UR-MCNC: NEGATIVE MG/DL
WBC #/AREA URNS AUTO: 29 /HPF

## 2017-09-12 PROCEDURE — 87077 CULTURE AEROBIC IDENTIFY: CPT | Performed by: EMERGENCY MEDICINE

## 2017-09-12 PROCEDURE — 51702 INSERT TEMP BLADDER CATH: CPT

## 2017-09-12 PROCEDURE — 87088 URINE BACTERIA CULTURE: CPT | Performed by: EMERGENCY MEDICINE

## 2017-09-12 PROCEDURE — 87186 SC STD MICRODIL/AGAR DIL: CPT | Performed by: EMERGENCY MEDICINE

## 2017-09-12 PROCEDURE — 81001 URINALYSIS AUTO W/SCOPE: CPT | Performed by: EMERGENCY MEDICINE

## 2017-09-12 PROCEDURE — 87086 URINE CULTURE/COLONY COUNT: CPT | Performed by: EMERGENCY MEDICINE

## 2017-09-12 PROCEDURE — 96372 THER/PROPH/DIAG INJ SC/IM: CPT

## 2017-09-12 PROCEDURE — 99283 EMERGENCY DEPT VISIT LOW MDM: CPT

## 2017-09-12 RX ORDER — PREDNISONE 10 MG/1
40 TABLET ORAL DAILY
Qty: 20 TABLET | Refills: 0 | Status: SHIPPED | OUTPATIENT
Start: 2017-09-12 | End: 2017-09-21

## 2017-09-12 RX ORDER — HYDROMORPHONE HYDROCHLORIDE 1 MG/ML
1 INJECTION, SOLUTION INTRAMUSCULAR; INTRAVENOUS; SUBCUTANEOUS ONCE
Status: COMPLETED | OUTPATIENT
Start: 2017-09-12 | End: 2017-09-12

## 2017-09-12 RX ORDER — DIAZEPAM 5 MG/1
5 TABLET ORAL NIGHTLY PRN
Qty: 10 TABLET | Refills: 0 | Status: SHIPPED | OUTPATIENT
Start: 2017-09-12 | End: 2017-11-03

## 2017-09-13 ENCOUNTER — APPOINTMENT (OUTPATIENT)
Dept: WOUND CARE | Facility: HOSPITAL | Age: 72
End: 2017-09-13
Attending: CLINICAL NURSE SPECIALIST
Payer: MEDICARE

## 2017-09-13 DIAGNOSIS — L89.153 SACRAL DECUBITUS ULCER, STAGE III (HCC): Primary | ICD-10-CM

## 2017-09-13 DIAGNOSIS — L97.511 SKIN ULCER OF RIGHT FOOT, LIMITED TO BREAKDOWN OF SKIN (HCC): ICD-10-CM

## 2017-09-13 PROCEDURE — 97605 NEG PRS WND THER DME<=50SQCM: CPT | Performed by: CLINICAL NURSE SPECIALIST

## 2017-09-13 NOTE — ED NOTES
Mayorga continuing to drain. Patient denies any pain or discomfort w/ new placement. Her pain has improved and she is comfortable going home. Urine culture in process. Patient has appt w/ Dr. Genevieve Hinds to FU on this. Prescriptions provided w/ education.  Patient

## 2017-09-13 NOTE — ED NOTES
Patients vitals stable. Dilaudid administered for pain. Patient toelrated well. She is already feeling relief of pain in both legs and back. Cowan catheter removed and new cowan placed. Patients urine collected and sent from port of new drain.  Awaiting res

## 2017-09-13 NOTE — PROGRESS NOTES
Subjective    Chief Complaint  This information was obtained from the patient  The patient was seen today for follow up and management of difficult to heal wound on her sacral area and right lateral foot.  Patient states that her wound vac has started beepi 7/11/17: Patient returns to the outpatient wound clinic with reopening of  sacral wound and right foot wound. Sacral stage 3 pressure ulcer, right foot ulcer with callus and blancheable erythema noted to metatarsal plantar area.     She has been wearing he Wound #5a Right, Lateral Foot plantar is a chronic Stage 3 Pressure Injury Pressure Ulcer and has received a status of Not Healed.  Subsequent wound encounter measurements are 0.5cm length x 0.5cm width x 0.1cm depth, with an area of 0.25 sq cm and a volume (Encounter Diagnosis) L97.519 - Non-pressure chronic ulcer of other part of right foot with unspecified severity  L89.159 - Pressure ulcer of sacral region, unspecified stage    Diagnoses    ICD-10  L89.153: Pressure ulcer of sacral region, stage 3  L97.51 Clean wound with Normal Saline or Wound Cleanser. Collagen - fibracol moisten with saline  Non-adherent to wound bed. - xeroform  Dry gauze  Cover dressing and wrap with kaylee. Do not put tape directly on the skin. Change dressing three times per week. Assessed patient’s pain status and effectiveness of pain management plan. Cleansed wound and periwound with non-cytotoxic agent. using 0.9% normal saline (1)  Applied Primary Wound Dressing.  using Fibracol (1), Xeroform 2x2 (1)  Applied Secondary Wound Dr

## 2017-09-15 PROBLEM — R50.9 FEVER, UNSPECIFIED FEVER CAUSE: Status: RESOLVED | Noted: 2017-08-12 | Resolved: 2017-09-15

## 2017-09-15 PROBLEM — I49.1 PAC (PREMATURE ATRIAL CONTRACTION): Status: RESOLVED | Noted: 2017-01-27 | Resolved: 2017-09-15

## 2017-09-16 NOTE — ED PROVIDER NOTES
Patient Seen in: [facility] AND [facility] Emergency Department    History   Patient presents with:  Back Pain (musculoskeletal)      HPI    Patient presents complaining of acute on chronic low back pain that radiates into the tops of both of her legs.   She also History:  No date: BACK SURGERY      Comment: spinal fusion L1-5  7/11/08: COLONOSCOPY      Comment: 7/11/08 at [facility] and negative for polyps, UC was               quiet  5/12: COLONOSCOPY,DIAGNOSTIC      Comment: normal, random colon bx showed findings c/w N/A      Comment: Procedure: ESOPHAGOGASTRODUODENOSCOPY W/                DILATION;  Surgeon: Jimmy Bush MD;                 Location: [address] [place] [address]  10/27/2014: PATIENT [facility] PREOPERATIVE ORDER FOR IV ANT* N/A      Comment: Procedure: C tobacco: Never Used                        Comment: pt smoked until 23 yrs old    Alcohol use: No              Drug use: No                Review of Systems   Constitutional: Negative for chills and fever. HENT: Negative for congestion and sore throat. patient's lower back, no overlying skin changes to the lower spine, no spinal tenderness or deformity. Stage IV decubitus ulcer with no starting erythema or foul drainage. Neurological: She is alert and oriented to person, place, and time.    Skin: [facility] back ([facility] Diabetes mellitus type 2 without retinopathy ([facility]  Drainage from wound; Urinary tract infection without hematuria; Urinary tract infection without hematuria, site unspecified; and Decubitus ulcer of sacral region, stage 4 ([facility] on her problem

## 2017-09-20 ENCOUNTER — APPOINTMENT (OUTPATIENT)
Dept: NUCLEAR MEDICINE | Facility: HOSPITAL | Age: 72
End: 2017-09-20
Attending: EMERGENCY MEDICINE
Payer: MEDICARE

## 2017-09-20 ENCOUNTER — APPOINTMENT (OUTPATIENT)
Dept: CT IMAGING | Facility: HOSPITAL | Age: 72
End: 2017-09-20
Attending: EMERGENCY MEDICINE
Payer: MEDICARE

## 2017-09-20 ENCOUNTER — APPOINTMENT (OUTPATIENT)
Dept: WOUND CARE | Facility: HOSPITAL | Age: 72
End: 2017-09-20
Attending: CLINICAL NURSE SPECIALIST
Payer: MEDICARE

## 2017-09-20 ENCOUNTER — APPOINTMENT (OUTPATIENT)
Dept: GENERAL RADIOLOGY | Facility: HOSPITAL | Age: 72
End: 2017-09-20
Attending: EMERGENCY MEDICINE
Payer: MEDICARE

## 2017-09-20 ENCOUNTER — HOSPITAL ENCOUNTER (OUTPATIENT)
Facility: HOSPITAL | Age: 72
Setting detail: OBSERVATION
Discharge: HOME HEALTH | End: 2017-09-21
Attending: EMERGENCY MEDICINE | Admitting: HOSPITALIST
Payer: MEDICARE

## 2017-09-20 DIAGNOSIS — R55 SYNCOPE, NEAR: Primary | ICD-10-CM

## 2017-09-20 DIAGNOSIS — L89.133 STAGE III PRESSURE ULCER OF RIGHT LOWER BACK (HCC): Primary | ICD-10-CM

## 2017-09-20 PROCEDURE — 99213 OFFICE O/P EST LOW 20 MIN: CPT | Performed by: CLINICAL NURSE SPECIALIST

## 2017-09-20 PROCEDURE — 99285 EMERGENCY DEPT VISIT HI MDM: CPT

## 2017-09-20 PROCEDURE — 85730 THROMBOPLASTIN TIME PARTIAL: CPT | Performed by: EMERGENCY MEDICINE

## 2017-09-20 PROCEDURE — 85025 COMPLETE CBC W/AUTO DIFF WBC: CPT | Performed by: EMERGENCY MEDICINE

## 2017-09-20 PROCEDURE — 80048 BASIC METABOLIC PNL TOTAL CA: CPT | Performed by: EMERGENCY MEDICINE

## 2017-09-20 PROCEDURE — 96360 HYDRATION IV INFUSION INIT: CPT

## 2017-09-20 PROCEDURE — 78582 LUNG VENTILAT&PERFUS IMAGING: CPT | Performed by: EMERGENCY MEDICINE

## 2017-09-20 PROCEDURE — 93010 ELECTROCARDIOGRAM REPORT: CPT | Performed by: EMERGENCY MEDICINE

## 2017-09-20 PROCEDURE — 96372 THER/PROPH/DIAG INJ SC/IM: CPT

## 2017-09-20 PROCEDURE — 71010 XR CHEST AP PORTABLE  (CPT=71010): CPT | Performed by: EMERGENCY MEDICINE

## 2017-09-20 PROCEDURE — 99211 OFF/OP EST MAY X REQ PHY/QHP: CPT | Performed by: CLINICAL NURSE SPECIALIST

## 2017-09-20 PROCEDURE — 87086 URINE CULTURE/COLONY COUNT: CPT | Performed by: EMERGENCY MEDICINE

## 2017-09-20 PROCEDURE — 84484 ASSAY OF TROPONIN QUANT: CPT | Performed by: HOSPITALIST

## 2017-09-20 PROCEDURE — 85610 PROTHROMBIN TIME: CPT | Performed by: EMERGENCY MEDICINE

## 2017-09-20 PROCEDURE — 96361 HYDRATE IV INFUSION ADD-ON: CPT

## 2017-09-20 PROCEDURE — 81001 URINALYSIS AUTO W/SCOPE: CPT | Performed by: EMERGENCY MEDICINE

## 2017-09-20 PROCEDURE — 85379 FIBRIN DEGRADATION QUANT: CPT | Performed by: EMERGENCY MEDICINE

## 2017-09-20 PROCEDURE — 93005 ELECTROCARDIOGRAM TRACING: CPT

## 2017-09-20 RX ORDER — VENLAFAXINE HYDROCHLORIDE 75 MG/1
75 CAPSULE, EXTENDED RELEASE ORAL
Status: DISCONTINUED | OUTPATIENT
Start: 2017-09-20 | End: 2017-09-21

## 2017-09-20 RX ORDER — ATORVASTATIN CALCIUM 40 MG/1
40 TABLET, FILM COATED ORAL NIGHTLY
Status: DISCONTINUED | OUTPATIENT
Start: 2017-09-20 | End: 2017-09-21

## 2017-09-20 RX ORDER — LOPERAMIDE HYDROCHLORIDE 2 MG/1
2 CAPSULE ORAL 4 TIMES DAILY PRN
Status: DISCONTINUED | OUTPATIENT
Start: 2017-09-20 | End: 2017-09-21

## 2017-09-20 RX ORDER — POTASSIUM CHLORIDE 20 MEQ/1
40 TABLET, EXTENDED RELEASE ORAL EVERY 4 HOURS
Status: COMPLETED | OUTPATIENT
Start: 2017-09-20 | End: 2017-09-20

## 2017-09-20 RX ORDER — ONDANSETRON 2 MG/ML
4 INJECTION INTRAMUSCULAR; INTRAVENOUS EVERY 6 HOURS PRN
Status: DISCONTINUED | OUTPATIENT
Start: 2017-09-20 | End: 2017-09-21

## 2017-09-20 RX ORDER — DIAZEPAM 5 MG/1
5 TABLET ORAL NIGHTLY PRN
Status: DISCONTINUED | OUTPATIENT
Start: 2017-09-20 | End: 2017-09-21

## 2017-09-20 RX ORDER — HEPARIN SODIUM 5000 [USP'U]/ML
5000 INJECTION, SOLUTION INTRAVENOUS; SUBCUTANEOUS EVERY 8 HOURS SCHEDULED
Status: DISCONTINUED | OUTPATIENT
Start: 2017-09-20 | End: 2017-09-21

## 2017-09-20 RX ORDER — METRONIDAZOLE 500 MG/1
500 TABLET ORAL 3 TIMES DAILY
Status: DISCONTINUED | OUTPATIENT
Start: 2017-09-20 | End: 2017-09-21

## 2017-09-20 RX ORDER — HYDROCODONE BITARTRATE AND ACETAMINOPHEN 5; 325 MG/1; MG/1
1 TABLET ORAL EVERY 4 HOURS PRN
Status: DISCONTINUED | OUTPATIENT
Start: 2017-09-20 | End: 2017-09-21

## 2017-09-20 RX ORDER — ALBUTEROL SULFATE 90 UG/1
2 AEROSOL, METERED RESPIRATORY (INHALATION) EVERY 6 HOURS PRN
Status: DISCONTINUED | OUTPATIENT
Start: 2017-09-20 | End: 2017-09-21

## 2017-09-20 RX ORDER — CETIRIZINE HYDROCHLORIDE 10 MG/1
10 TABLET ORAL DAILY
Status: DISCONTINUED | OUTPATIENT
Start: 2017-09-21 | End: 2017-09-21

## 2017-09-20 RX ORDER — SODIUM CHLORIDE 9 MG/ML
INJECTION, SOLUTION INTRAVENOUS CONTINUOUS
Status: DISCONTINUED | OUTPATIENT
Start: 2017-09-20 | End: 2017-09-20

## 2017-09-20 RX ORDER — METOCLOPRAMIDE HYDROCHLORIDE 5 MG/ML
10 INJECTION INTRAMUSCULAR; INTRAVENOUS EVERY 8 HOURS PRN
Status: DISCONTINUED | OUTPATIENT
Start: 2017-09-20 | End: 2017-09-21

## 2017-09-20 RX ORDER — SODIUM CHLORIDE 9 MG/ML
INJECTION, SOLUTION INTRAVENOUS CONTINUOUS
Status: ACTIVE | OUTPATIENT
Start: 2017-09-20 | End: 2017-09-21

## 2017-09-20 RX ORDER — MELATONIN
325
Status: DISCONTINUED | OUTPATIENT
Start: 2017-09-21 | End: 2017-09-21

## 2017-09-20 RX ORDER — PREDNISONE 10 MG/1
10 TABLET ORAL DAILY
Status: DISCONTINUED | OUTPATIENT
Start: 2017-09-21 | End: 2017-09-21

## 2017-09-20 RX ORDER — LISINOPRIL 5 MG/1
5 TABLET ORAL DAILY
COMMUNITY
End: 2017-09-21

## 2017-09-20 RX ORDER — SODIUM CHLORIDE 0.9 % (FLUSH) 0.9 %
3 SYRINGE (ML) INJECTION AS NEEDED
Status: DISCONTINUED | OUTPATIENT
Start: 2017-09-20 | End: 2017-09-21

## 2017-09-20 RX ORDER — ZINC SULFATE 50(220)MG
220 CAPSULE ORAL DAILY
Status: DISCONTINUED | OUTPATIENT
Start: 2017-09-20 | End: 2017-09-21

## 2017-09-20 RX ORDER — ACETAMINOPHEN 325 MG/1
650 TABLET ORAL EVERY 6 HOURS PRN
Status: DISCONTINUED | OUTPATIENT
Start: 2017-09-20 | End: 2017-09-21

## 2017-09-20 RX ORDER — EZETIMIBE 10 MG/1
10 TABLET ORAL NIGHTLY
Status: DISCONTINUED | OUTPATIENT
Start: 2017-09-20 | End: 2017-09-21

## 2017-09-20 RX ORDER — CIPROFLOXACIN 500 MG/1
500 TABLET, FILM COATED ORAL 2 TIMES DAILY
Status: DISCONTINUED | OUTPATIENT
Start: 2017-09-20 | End: 2017-09-21

## 2017-09-20 NOTE — ED INITIAL ASSESSMENT (HPI)
Pt was here for wound care at wound clinic and became weak and hypotensive pt had over medicated herself with her meds

## 2017-09-20 NOTE — H&P
[facility] Hospitalist H&P     CC: Patient presents with:  Hypotension (cardiovascular)       PCP: Cash Cooley MD      Assessment and Plan     Linda Alfaro is a 74year old female with history of hereditary spastic paraplegia, chronic straight HSQ    Dispo  Full code, anticipate d/c in AM     Outpatient records reviewed confirming patient's medical history and medications. Further recommendations pending patient's clinical course.   [facility] hospitalist to continue to follow patient while in house Esophageal reflux    • GERD    • GOUT    • Hereditary spastic paraplegia (HCC)    • High blood pressure    • High cholesterol    • Hypercholesterolemia    • HYPERLIPIDEMIA    • Irritable bowel syndrome    • Lymphocytic colitis Colon= 5/7/12   • MENOPAUSE MANAGEMENT  10/27/2014: INJECTION, W/WO CONTRAST, DX/THERAPEUTIC SUBST* N/A      Comment: Procedure: CERVICAL EPIDURAL;  Surgeon:                Chaitanya Tejeda MD;  Location: [address] [place] [address] Drive MANAGEMENT  12/8/2014: INJECTION, W/WO CONTRA gastric/SB Bx,                Procedure: ESOPHAGOGASTRODUODENOSCOPY W/                DILATION;  Surgeon: Jimmy Bush MD;                 Location: [address] [place] [address]  No date: VALVE REPAIR      Comment: mitral valve prolapse     ALL:    Cymbalta cooperative  Neuro: No focal deficits    Diagnostic Data:    CBC/Chem  Recent Labs   Lab  09/20/17   1112  09/20/17   1159   WBC  10.2   --    HGB  12.4   --    MCV  79.7*   --    PLT  287   --    INR   --   1.1       Recent Labs   Lab  09/20/17   1112   N

## 2017-09-20 NOTE — ED PROVIDER NOTES
Patient Seen in: [facility] AND [facility] Emergency Department     History   Patient presents with:  Hypotension (cardiovascular)    Stated Complaint: weakness     HPI    74year old female with a history of hereditary spastic paraplegia and chronic ongoing mild [facility] and negative for polyps, UC was               quiet  5/12: COLONOSCOPY,DIAGNOSTIC      Comment: normal, random colon bx showed findings c/w                lymphocytic colitis  1/9/09: CYSTOURETHROSCOPY      Comment: in office  12/24/2016: EGD N/A MD;                 Location: [address] [place] [address]  10/27/2014: PATIENT WITHOUGH PREOPERATIVE ORDER FOR IV ANT* N/A      Comment: Procedure: CERVICAL EPIDURAL;  Surgeon:                Sarai Parada MD;  Location: [place] [address] Ondansetron HCl (ZOFRAN) 4 mg tablet,  Take 1 tablet (4 mg total) by mouth every 8 (eight) hours as needed for Nausea.   loratadine 10 MG Oral Tab,  Take 10 mg by mouth daily.    ferrous sulfate 325 (65 FE) MG Oral Tab EC,  Take 325 mg by mouth daily with b Disorder Father    • Eye Problems Father      glaucoma   • Diabetes Mother    • Diabetes Sister    • Eye Problems Sister      glaucoma   • Other[other] Lina Tenorio Sister    • Eye Problems Other      aunts/ uncles glaucoma   • Other[other] [OTHER] Other    • O Cardiovascular: Normal rate and regular rhythm. Pulmonary/Chest: Effort normal. No accessory muscle usage or stridor. No apnea and no tachypnea. No respiratory distress. She exhibits no retraction. Abdominal: Soft. There is no tenderness.  There is n ---------                               -----------         ------                     CBC W/ DIFFERENTIAL[[id number]]          Abnormal            Final result                 Please view results for these tests on the individual orders bolus 1,000 mL (0 mL Intravenous Stopped 9/20/17 1409)         Procedures: None    Re-Evaluation: 230p- remains hemodynamically stable     09/20/17  1248 09/20/17  1315 09/20/17  1326 09/20/17  1353   BP: 90/53 94/47  100/50   Pulse: 62 63  59   Resp: 20 1 workup, the patient was found to have Syncope, near  (primary encounter diagnosis)    Plan: Admit for observation, V/Q to exclude PE since d-dimer elevated. Further Inpatient evaluation and treatment will be required.  I personally discussed the results of

## 2017-09-20 NOTE — DISCHARGE PLANNING
[facility] contacted and notified of patient's admission- patient had appointment for tomorrow that was cancelled by the writer. RN to call with discharge date to re-start services.  [phone]

## 2017-09-20 NOTE — PROGRESS NOTES
Subjective    Chief Complaint  This information was obtained from the patient  The patient was seen today for follow up and management of difficult to heal wound on her sacral area and right lateral foot.  Patient states that her wound vac has started beepi stage 3 pressure ulcer, right foot ulcer with callus and blancheable erythema noted to metatarsal plantar area. She has been wearing her Juxta velcro garments and bilateral foot braces. Patient does transfer to wheelchair with 2 assists.    [id number] Terrell Diagnosis) L89.153 - Pressure ulcer of sacral region, stage 3  (Encounter Diagnosis) L97.519 - Non-pressure chronic ulcer of other part of right foot with unspecified severity  L89.159 - Pressure ulcer of sacral region, unspecified stage    Diagnoses    IC

## 2017-09-21 VITALS
TEMPERATURE: 97 F | RESPIRATION RATE: 18 BRPM | HEART RATE: 63 BPM | DIASTOLIC BLOOD PRESSURE: 61 MMHG | BODY MASS INDEX: 20.04 KG/M2 | SYSTOLIC BLOOD PRESSURE: 119 MMHG | WEIGHT: 117.38 LBS | OXYGEN SATURATION: 100 % | HEIGHT: 64 IN

## 2017-09-21 PROCEDURE — 85025 COMPLETE CBC W/AUTO DIFF WBC: CPT | Performed by: HOSPITALIST

## 2017-09-21 PROCEDURE — 96372 THER/PROPH/DIAG INJ SC/IM: CPT

## 2017-09-21 PROCEDURE — 84484 ASSAY OF TROPONIN QUANT: CPT | Performed by: HOSPITALIST

## 2017-09-21 PROCEDURE — 96361 HYDRATE IV INFUSION ADD-ON: CPT

## 2017-09-21 PROCEDURE — 84132 ASSAY OF SERUM POTASSIUM: CPT | Performed by: HOSPITALIST

## 2017-09-21 PROCEDURE — 80048 BASIC METABOLIC PNL TOTAL CA: CPT | Performed by: HOSPITALIST

## 2017-09-21 RX ORDER — LOPERAMIDE HYDROCHLORIDE 2 MG/1
2 CAPSULE ORAL 4 TIMES DAILY PRN
Qty: 30 CAPSULE | Refills: 0 | Status: SHIPPED | OUTPATIENT
Start: 2017-09-21 | End: 2017-11-03

## 2017-09-21 RX ORDER — PREDNISONE 1 MG/1
5 TABLET ORAL DAILY
Qty: 30 TABLET | Refills: 0 | Status: SHIPPED | OUTPATIENT
Start: 2017-09-21 | End: 2018-06-07 | Stop reason: ALTCHOICE

## 2017-09-21 NOTE — OCCUPATIONAL THERAPY NOTE
Per RNDebbie, pt scheduled to discharge home later today and has no current therapy needs.  Eval deferred

## 2017-09-21 NOTE — PLAN OF CARE
Problem: Patient/Family Goals  Goal: Patient/Family Long Term Goal  Patient's Long Term Goal: to go home  Interventions:  - plan to go home today  - See additional Care Plan goals for specific interventions   Outcome: Adequate for Discharge    Goal: Alejandra

## 2017-09-21 NOTE — DISCHARGE PLANNING
Per notes, pt is current w/ [facility]. Pt is anticipated to discharge from hospital shortly. Pt was here less then 24hrs, does not need HHC order to resume services.     Jaswinder Estes, [address]

## 2017-09-21 NOTE — DISCHARGE SUMMARY
[facility] Internal Medicine Discharge Summary   Patient ID:  Harjinder Dukes  [id number]  86 year old  5/4/1945    Admit date: 9/20/2017    Discharge date and time: 9/21/17    Attending Physician: Moriah Casey MD     Primary Care Physician: JUAN Noel as: CIPRO     Dakins 0.25 % Soln  Pour 10ml of the Dakin's solution to the gauze and apply over the wound as wet to moist dressing for 20 min twice a day for one week.      diazepam 5 MG Tabs  Commonly known as:  VALIUM  Take 1 tablet (5 mg total) by mouth back pain, history of DVT s/p IVC filter, anxiety/depression, GERD, gout, HTN, HL, and healing sacral decub who was sent over from wound clinic after becoming weak and hypotensive after taking lomotil. symtoms now resolved s/p fluids.   She feels at [facility] blood pressure today  TECHNIQUE:   Single AP view was performed. FINDINGS:   CARDIAC/MEDIASTINUM:   The cardiac silhouette is enlarged and unchanged. There is atherosclerotic calcification of the thoracic aorta.  LUNGS:    Increased reticular markings are significant change has occurred.              Operative Procedures:      Day of discharge feels well, no more episodes of weakness, no syncope, no CP or SOB    Exam   09/21/17  0429   BP: 107/55   Pulse: 59   Resp: 18   Temp: (!) 96.7 °F (35.9 °C)     No ac

## 2017-09-21 NOTE — PROGRESS NOTES
09/20/17 2007   Clinical Encounter Type   Visited With Patient and family together   Routine Visit Follow-up   Continue Visiting Yes   Referral From Patient   Jain Encounters   Spiritual Requests During Visit / [facility]

## 2017-09-22 ENCOUNTER — TELEPHONE (OUTPATIENT)
Dept: MEDSURG UNIT | Facility: HOSPITAL | Age: 72
End: 2017-09-22

## 2017-10-03 ENCOUNTER — APPOINTMENT (OUTPATIENT)
Dept: WOUND CARE | Facility: HOSPITAL | Age: 72
End: 2017-10-03
Attending: CLINICAL NURSE SPECIALIST
Payer: MEDICARE

## 2017-10-04 ENCOUNTER — APPOINTMENT (OUTPATIENT)
Dept: WOUND CARE | Facility: HOSPITAL | Age: 72
End: 2017-10-04
Attending: CLINICAL NURSE SPECIALIST
Payer: MEDICARE

## 2017-10-05 ENCOUNTER — APPOINTMENT (OUTPATIENT)
Dept: WOUND CARE | Facility: HOSPITAL | Age: 72
End: 2017-10-05
Attending: CLINICAL NURSE SPECIALIST
Payer: MEDICARE

## 2017-10-05 DIAGNOSIS — L97.511 RIGHT FOOT ULCER, LIMITED TO BREAKDOWN OF SKIN (HCC): ICD-10-CM

## 2017-10-05 DIAGNOSIS — L89.153 DECUBITUS ULCER OF COCCYGEAL REGION, STAGE 3 (HCC): Primary | ICD-10-CM

## 2017-10-05 PROCEDURE — 97605 NEG PRS WND THER DME<=50SQCM: CPT | Performed by: CLINICAL NURSE SPECIALIST

## 2017-10-10 PROBLEM — M54.16 LUMBAR RADICULITIS: Status: ACTIVE | Noted: 2017-10-10

## 2017-10-10 PROBLEM — Z98.1 HISTORY OF LUMBAR FUSION: Status: ACTIVE | Noted: 2017-10-10

## 2017-10-11 ENCOUNTER — APPOINTMENT (OUTPATIENT)
Dept: WOUND CARE | Facility: HOSPITAL | Age: 72
End: 2017-10-11
Attending: CLINICAL NURSE SPECIALIST
Payer: MEDICARE

## 2017-10-16 ENCOUNTER — LAB REQUISITION (OUTPATIENT)
Dept: LAB | Facility: HOSPITAL | Age: 72
End: 2017-10-16
Payer: MEDICARE

## 2017-10-16 DIAGNOSIS — Z93.6: ICD-10-CM

## 2017-10-16 PROCEDURE — 87077 CULTURE AEROBIC IDENTIFY: CPT | Performed by: INTERNAL MEDICINE

## 2017-10-16 PROCEDURE — 87086 URINE CULTURE/COLONY COUNT: CPT | Performed by: INTERNAL MEDICINE

## 2017-10-16 PROCEDURE — 81001 URINALYSIS AUTO W/SCOPE: CPT | Performed by: INTERNAL MEDICINE

## 2017-10-16 PROCEDURE — 87186 SC STD MICRODIL/AGAR DIL: CPT | Performed by: INTERNAL MEDICINE

## 2017-10-18 ENCOUNTER — NURSE ONLY (OUTPATIENT)
Dept: WOUND CARE | Facility: HOSPITAL | Age: 72
End: 2017-10-18
Attending: CLINICAL NURSE SPECIALIST
Payer: MEDICARE

## 2017-10-18 DIAGNOSIS — L89.153 DECUBITUS ULCER OF COCCYGEAL REGION, STAGE 3 (HCC): Primary | ICD-10-CM

## 2017-10-18 PROCEDURE — 97605 NEG PRS WND THER DME<=50SQCM: CPT

## 2017-10-18 NOTE — PROGRESS NOTES
Subjective    Chief Complaint  This information was obtained from the patient  The patient was seen today for follow up and management of difficult to heal wound on her sacral area and right lateral foot.    10/5/17 patient is here for a wound vac applicati 8-14-17:  Patient was admitted to the hospital for UTI, fever and sacral pressure wound/diarrhea. Patient diagnosed with Clostridium difficile and treated with antibiotics.    Patient currently resides at [facility] and transported to  Z51.89 - Encounter for other specified aftercare  (Encounter Diagnosis) L89.153 - Pressure ulcer of sacral region, stage 3  (Encounter Diagnosis) L97.519 - Non-pressure chronic ulcer of other part of right foot with unspecified severity  L89.159 - Pressure Coverage Size (sq cm): 7.5  Pressure Type: Constant  Pressure Settin mmHG  Drain Type: Channel  Sponge Type: Black  Sponge Size: Small  Total number of sponges used for this application: 1  Date Initiated: 2017  Date Suspended: 2017  Date Re

## 2017-10-19 ENCOUNTER — TELEPHONE (OUTPATIENT)
Dept: PAIN CLINIC | Facility: HOSPITAL | Age: 72
End: 2017-10-19

## 2017-10-25 ENCOUNTER — APPOINTMENT (OUTPATIENT)
Dept: WOUND CARE | Facility: HOSPITAL | Age: 72
End: 2017-10-25
Attending: CLINICAL NURSE SPECIALIST
Payer: MEDICARE

## 2017-10-25 DIAGNOSIS — L89.153 DECUBITUS ULCER OF COCCYGEAL REGION, STAGE 3 (HCC): Primary | ICD-10-CM

## 2017-10-25 PROCEDURE — 97605 NEG PRS WND THER DME<=50SQCM: CPT | Performed by: CLINICAL NURSE SPECIALIST

## 2017-10-25 NOTE — PROGRESS NOTES
Subjective    Chief Complaint  This information was obtained from the patient  The patient was seen today for follow up and management of difficult to heal wound on her sacral area and right lateral foot.    10/5/17 patient is here for a wound vac applicati 8-14-17:  Patient was admitted to the hospital for UTI, fever and sacral pressure wound/diarrhea. Patient diagnosed with Clostridium difficile and treated with antibiotics.    Patient currently resides at [facility] and transported to  Patient's wound granular with depth healing and smaller in comparison to previous wound measurements. Maceration noted to wound edges. Skin prep to protect skin and repel moisture off skin.   Continue wound vac at 125mmHG continous negative presure therapy Negative Pressure Wound Therapy Maintenance (NPWT) Details  Patient Name: Arlette Oppenheim   Patient Number: [id number]  Patient YOB: 1945  Date: 10/25/2017  RN: Nasra Muñoz CNA/ANSELMO/CMA: [place]  Physician / Ashley Motley: Nasra Muñoz

## 2017-11-01 ENCOUNTER — APPOINTMENT (OUTPATIENT)
Dept: WOUND CARE | Facility: HOSPITAL | Age: 72
End: 2017-11-01
Attending: CLINICAL NURSE SPECIALIST
Payer: MEDICARE

## 2017-11-01 DIAGNOSIS — L89.153 DECUBITUS ULCER OF COCCYGEAL REGION, STAGE 3 (HCC): Primary | ICD-10-CM

## 2017-11-01 PROCEDURE — 97605 NEG PRS WND THER DME<=50SQCM: CPT | Performed by: CLINICAL NURSE SPECIALIST

## 2017-11-01 NOTE — PROGRESS NOTES
Subjective    Chief Complaint  This information was obtained from the patient  The patient was seen today for follow up and management of difficult to heal wound on her sacral area and right lateral foot.    10/5/17 patient is here for a wound vac applicati 8-14-17:  Patient was admitted to the hospital for UTI, fever and sacral pressure wound/diarrhea. Patient diagnosed with Clostridium difficile and treated with antibiotics.    Patient currently resides at [facility] and transported to  L97.519: Non-pressure chronic ulcer of other part of right foot with unspecified severity        Patient's sacral wound granular. Periwound macerated. Undermining stalled.  Fibracol to enhance collagen  production and  placed at 12 0'clock  with black granu Dressing secured with drape / transparent film.   TRAC pad bridged to following: using Lateral hip (1)  Drain tubing attached: using TRAC pad (1)  Negative pressure setting: using 125 mmHg (1)  NPWT pressure type: using Continuous (1)  Patient tolerated eduardo

## 2017-11-02 ENCOUNTER — TELEPHONE (OUTPATIENT)
Dept: GASTROENTEROLOGY | Facility: CLINIC | Age: 72
End: 2017-11-02

## 2017-11-02 NOTE — TELEPHONE ENCOUNTER
Pt had colostomy done at [facility] by Dr. Sandra Pierson on 9/24/17 and was told that for the first 2 weeks she was supposed to be on a low residue diet. She states during her last check up, her colostomy \"was good\" and she hasn't had any problems with it at all.

## 2017-11-02 NOTE — TELEPHONE ENCOUNTER
Pt requesting to speak to EBS regarding what foods she can eat. Pt states she used to have UC and recently had CLN done in August at [place]. Pt would also like to know about medications as well.  Please call thank you

## 2017-11-03 NOTE — TELEPHONE ENCOUNTER
I left a message for patient to call back. I left a message on her voicemail per her request.  She should resume a general diet. She should not need budesonide, loperamide, or Delzicol anymore.   If she has further questions she should call me back tomorr

## 2017-11-09 ENCOUNTER — HOSPITAL ENCOUNTER (INPATIENT)
Facility: HOSPITAL | Age: 72
LOS: 5 days | Discharge: HOME HEALTH | DRG: 698 | End: 2017-11-14
Attending: EMERGENCY MEDICINE | Admitting: HOSPITALIST
Payer: MEDICARE

## 2017-11-09 DIAGNOSIS — L89.153 DECUBITUS ULCER OF SACRAL REGION, STAGE 3 (HCC): ICD-10-CM

## 2017-11-09 DIAGNOSIS — E83.42 HYPOMAGNESEMIA: ICD-10-CM

## 2017-11-09 DIAGNOSIS — A41.9 SEPSIS, DUE TO UNSPECIFIED ORGANISM: ICD-10-CM

## 2017-11-09 DIAGNOSIS — N39.0 URINARY TRACT INFECTION ASSOCIATED WITH CATHETERIZATION OF URINARY TRACT, UNSPECIFIED INDWELLING URINARY CATHETER TYPE, INITIAL ENCOUNTER (HCC): Primary | ICD-10-CM

## 2017-11-09 DIAGNOSIS — T83.511A URINARY TRACT INFECTION ASSOCIATED WITH CATHETERIZATION OF URINARY TRACT, UNSPECIFIED INDWELLING URINARY CATHETER TYPE, INITIAL ENCOUNTER (HCC): Primary | ICD-10-CM

## 2017-11-09 PROBLEM — T83.518A INFECTION DUE TO URETHRAL CATHETER (HCC): Status: ACTIVE | Noted: 2017-11-09

## 2017-11-09 PROBLEM — T83.518A INFECTION DUE TO URETHRAL CATHETER: Status: ACTIVE | Noted: 2017-11-09

## 2017-11-09 PROCEDURE — 87086 URINE CULTURE/COLONY COUNT: CPT | Performed by: EMERGENCY MEDICINE

## 2017-11-09 PROCEDURE — 87040 BLOOD CULTURE FOR BACTERIA: CPT | Performed by: EMERGENCY MEDICINE

## 2017-11-09 PROCEDURE — 96365 THER/PROPH/DIAG IV INF INIT: CPT

## 2017-11-09 PROCEDURE — 84145 PROCALCITONIN (PCT): CPT | Performed by: INTERNAL MEDICINE

## 2017-11-09 PROCEDURE — 83605 ASSAY OF LACTIC ACID: CPT | Performed by: EMERGENCY MEDICINE

## 2017-11-09 PROCEDURE — 87077 CULTURE AEROBIC IDENTIFY: CPT | Performed by: EMERGENCY MEDICINE

## 2017-11-09 PROCEDURE — 83735 ASSAY OF MAGNESIUM: CPT | Performed by: EMERGENCY MEDICINE

## 2017-11-09 PROCEDURE — 87147 CULTURE TYPE IMMUNOLOGIC: CPT | Performed by: EMERGENCY MEDICINE

## 2017-11-09 PROCEDURE — 87186 SC STD MICRODIL/AGAR DIL: CPT | Performed by: EMERGENCY MEDICINE

## 2017-11-09 PROCEDURE — 99285 EMERGENCY DEPT VISIT HI MDM: CPT

## 2017-11-09 PROCEDURE — 80048 BASIC METABOLIC PNL TOTAL CA: CPT | Performed by: EMERGENCY MEDICINE

## 2017-11-09 PROCEDURE — 85025 COMPLETE CBC W/AUTO DIFF WBC: CPT | Performed by: EMERGENCY MEDICINE

## 2017-11-09 PROCEDURE — 87070 CULTURE OTHR SPECIMN AEROBIC: CPT | Performed by: EMERGENCY MEDICINE

## 2017-11-09 PROCEDURE — 81001 URINALYSIS AUTO W/SCOPE: CPT | Performed by: EMERGENCY MEDICINE

## 2017-11-09 PROCEDURE — 36415 COLL VENOUS BLD VENIPUNCTURE: CPT

## 2017-11-09 RX ORDER — PREGABALIN 50 MG/1
50 CAPSULE ORAL 2 TIMES DAILY
Status: DISCONTINUED | OUTPATIENT
Start: 2017-11-09 | End: 2017-11-14

## 2017-11-09 RX ORDER — ACETAMINOPHEN 325 MG/1
650 TABLET ORAL EVERY 6 HOURS PRN
Status: DISCONTINUED | OUTPATIENT
Start: 2017-11-09 | End: 2017-11-14

## 2017-11-09 RX ORDER — POTASSIUM CHLORIDE 20 MEQ/1
40 TABLET, EXTENDED RELEASE ORAL ONCE
Status: COMPLETED | OUTPATIENT
Start: 2017-11-09 | End: 2017-11-09

## 2017-11-09 RX ORDER — ONDANSETRON 2 MG/ML
4 INJECTION INTRAMUSCULAR; INTRAVENOUS EVERY 6 HOURS PRN
Status: DISCONTINUED | OUTPATIENT
Start: 2017-11-09 | End: 2017-11-14

## 2017-11-09 RX ORDER — SODIUM CHLORIDE 9 MG/ML
INJECTION, SOLUTION INTRAVENOUS CONTINUOUS
Status: DISCONTINUED | OUTPATIENT
Start: 2017-11-09 | End: 2017-11-12

## 2017-11-09 RX ORDER — HEPARIN SODIUM 5000 [USP'U]/ML
5000 INJECTION, SOLUTION INTRAVENOUS; SUBCUTANEOUS EVERY 12 HOURS SCHEDULED
Status: DISCONTINUED | OUTPATIENT
Start: 2017-11-09 | End: 2017-11-14

## 2017-11-09 RX ORDER — METOPROLOL SUCCINATE 25 MG/1
25 TABLET, EXTENDED RELEASE ORAL
Status: DISCONTINUED | OUTPATIENT
Start: 2017-11-10 | End: 2017-11-14

## 2017-11-09 RX ORDER — VENLAFAXINE HYDROCHLORIDE 75 MG/1
75 CAPSULE, EXTENDED RELEASE ORAL
Status: DISCONTINUED | OUTPATIENT
Start: 2017-11-10 | End: 2017-11-14

## 2017-11-09 RX ORDER — HYDROCODONE BITARTRATE AND ACETAMINOPHEN 5; 325 MG/1; MG/1
1 TABLET ORAL EVERY 6 HOURS PRN
Status: DISCONTINUED | OUTPATIENT
Start: 2017-11-09 | End: 2017-11-14

## 2017-11-09 RX ORDER — EZETIMIBE 10 MG/1
10 TABLET ORAL NIGHTLY
Status: DISCONTINUED | OUTPATIENT
Start: 2017-11-09 | End: 2017-11-14

## 2017-11-09 RX ORDER — ATORVASTATIN CALCIUM 40 MG/1
40 TABLET, FILM COATED ORAL NIGHTLY
Status: DISCONTINUED | OUTPATIENT
Start: 2017-11-09 | End: 2017-11-14

## 2017-11-09 RX ORDER — MAGNESIUM SULFATE HEPTAHYDRATE 40 MG/ML
2 INJECTION, SOLUTION INTRAVENOUS ONCE
Status: COMPLETED | OUTPATIENT
Start: 2017-11-09 | End: 2017-11-09

## 2017-11-09 RX ORDER — PREDNISONE 1 MG/1
5 TABLET ORAL DAILY
Status: DISCONTINUED | OUTPATIENT
Start: 2017-11-10 | End: 2017-11-14

## 2017-11-09 RX ORDER — SODIUM CHLORIDE 0.9 % (FLUSH) 0.9 %
3 SYRINGE (ML) INJECTION AS NEEDED
Status: DISCONTINUED | OUTPATIENT
Start: 2017-11-09 | End: 2017-11-14

## 2017-11-09 RX ORDER — FLUTICASONE PROPIONATE 50 MCG
1 SPRAY, SUSPENSION (ML) NASAL DAILY
Status: DISCONTINUED | OUTPATIENT
Start: 2017-11-09 | End: 2017-11-14

## 2017-11-09 RX ORDER — MELATONIN
325
Status: DISCONTINUED | OUTPATIENT
Start: 2017-11-10 | End: 2017-11-14

## 2017-11-09 NOTE — PROGRESS NOTES
Pharmacy was consulted to dose Merrem (meropenem) for treatment of UTI by Dr. Jesus Alberto Byrd. Body mass index is 19.97 kg/m².   Wt Readings from Last 6 Encounters:  11/09/17 : 120 lb  09/22/17 : 118 lb  09/21/17 : 117 lb 6.4 oz  09/18/17 : 123 lb  09/15/17 :

## 2017-11-09 NOTE — ED PROVIDER NOTES
Patient Seen in: [facility] AND [facility] Emergency Department    History   Patient presents with:  Wound    Stated Complaint: wound     HPI    66-year-old female presents for wound evaluation. Patient has a sacral decubitus ulcer.   She had an appointment with • Reflux    • Self-catheterizes urinary bladder 1/11/2016   • Ulcerative colitis ([address]    • Visual impairment     reading       Past Surgical History:  No date: BACK SURGERY      Comment: spinal fusion L1-5  7/11/08: COLONOSCOPY      Comment: 7/11/08 at EM Efe Garvin MD;  Location: [place] [address] MANAGEMENT  1/5/2015: PATIENT DOCUMENTED NOT TO HAVE EXPERIENCED ANY* N/A      Comment: Procedure: ESOPHAGOGASTRODUODENOSCOPY W/                DILATION;  Surgeon: Luis Orellana Other systems are as noted in HPI. Constitutional and vital signs reviewed. All other systems reviewed and negative except as noted above.     Physical Exam   ED Triage Vitals [11/09/17 1124]  BP: 112/70  Pulse: 120  Resp: 20  Temp: 98.3 °F (36.8 °C) All other components within normal limits   CBC W/ DIFFERENTIAL - Abnormal; Notable for the following:     HGB 11.3 (*)     MCV 79.6 (*)     MCH 25.6 (*)     RDW 17.2 (*)     All other components within normal limits   LACTIC ACID, PLASMA - Normal   CBC W Total Critical Care Time: 35 minutes including time spent examining and re-evaluating the patient, ordering and reviewing laboratory tests, documenting, reviewing previous records, obtaining information from the family, and speaking with consultants, admit

## 2017-11-09 NOTE — ED INITIAL ASSESSMENT (HPI)
Pt stats chronic wound to sacral area- went to wound clinic apt and was late and was unable to be seen. Pt states Adolfo ISBELL told her to come to ED for evaluation.

## 2017-11-09 NOTE — ED NOTES
Patient with wound to sacral area, appears deep. Patient usually wears wound vac, sees wound clinic but missed her appt today by 15 minutes so was told to come to ED. Patient feels chills, no fever in ED. IV established to left AC, #20, labs sent.  Awaiting

## 2017-11-09 NOTE — SEPSIS REASSESSMENT
No fluid resuscitation given because patient does not meet severe sepsis criteria.       [facility]      Sepsis Reassessment Note    /75   Pulse 108   Temp 98.3 °F (36.8 °C) (Oral)   Resp 18   Ht 165.1 cm (5' 5\")   Wt 54.4 kg   SpO2 9

## 2017-11-09 NOTE — H&P
[facility] Hospitalist H&P       CC: Patient presents with:  Wound       PCP: Ariel Solomon MD      ASSESSMENT / PLAN:       Marylen Shorter is a 74year old female with history of hereditary spastic paraplegia, chronic straight cathing, baclofen p patient while in house    Patient and/or patient's family given opportunity to ask questions and note understanding and agreeing with therapeutic plan as outlined    Mika Fontanez MD  [facility] Hospitalist  Answering Service number: [phone]    Date of s ulcerative colitis   • OTHER DISEASES     uti   • Pneumonia, organism unspecified(486)    • Reflux    • Self-catheterizes urinary bladder 1/11/2016   • Ulcerative colitis ([facility]    • Visual impairment     reading        [address]  Past Surgical History:  No date: NOT TO HAVE EXPERIENCED ANY* N/A      Comment: Procedure: CERVICAL EPIDURAL;  Surgeon:                Daysi Christian MD;  Location: [place] [address] MANAGEMENT  1/5/2015: PATIENT DOCUMENTED NOT TO HAVE EXPERIENCED ANY* N/A      Comment: Medications:    Outpatient Prescriptions Marked as Taking for the 11/9/17 encounter [facility] Encounter):   HYDROcodone-acetaminophen 5-325 MG Oral Tab Take one tablet twice daily Disp: 60 tablet Rfl: 0   Metoprolol Succinate ER 25 MG Oral Tablet 24 Hr  Dis Grossly normal, CN intact, sensory intact  Psych: Affect- normal  SKIN: warm, dry, sacral decubitus ulcer, midline, circular, about 2.4cm x 2.4cm; no erythema or purulent discharge  EXT: no edema    Diagnostic Data:    CBC/Chem    Recent Labs   Lab  11/09/

## 2017-11-09 NOTE — ED NOTES
Wet to dry applied to sacral wound. Bacterial swab obtained. Patient straight cathed for urine specimen.

## 2017-11-10 PROCEDURE — 87070 CULTURE OTHR SPECIMN AEROBIC: CPT | Performed by: HOSPITALIST

## 2017-11-10 PROCEDURE — 87040 BLOOD CULTURE FOR BACTERIA: CPT | Performed by: HOSPITALIST

## 2017-11-10 PROCEDURE — 80048 BASIC METABOLIC PNL TOTAL CA: CPT | Performed by: HOSPITALIST

## 2017-11-10 PROCEDURE — 84132 ASSAY OF SERUM POTASSIUM: CPT | Performed by: HOSPITALIST

## 2017-11-10 PROCEDURE — 87486 CHLMYD PNEUM DNA AMP PROBE: CPT | Performed by: INTERNAL MEDICINE

## 2017-11-10 PROCEDURE — 87075 CULTR BACTERIA EXCEPT BLOOD: CPT | Performed by: HOSPITALIST

## 2017-11-10 PROCEDURE — 87798 DETECT AGENT NOS DNA AMP: CPT | Performed by: INTERNAL MEDICINE

## 2017-11-10 PROCEDURE — 87205 SMEAR GRAM STAIN: CPT | Performed by: HOSPITALIST

## 2017-11-10 PROCEDURE — 87633 RESP VIRUS 12-25 TARGETS: CPT | Performed by: INTERNAL MEDICINE

## 2017-11-10 PROCEDURE — 85025 COMPLETE CBC W/AUTO DIFF WBC: CPT | Performed by: HOSPITALIST

## 2017-11-10 PROCEDURE — 97607 NEG PRS WND THR NDME<=50SQCM: CPT

## 2017-11-10 PROCEDURE — 87581 M.PNEUMON DNA AMP PROBE: CPT | Performed by: INTERNAL MEDICINE

## 2017-11-10 PROCEDURE — 87147 CULTURE TYPE IMMUNOLOGIC: CPT | Performed by: HOSPITALIST

## 2017-11-10 PROCEDURE — 87186 SC STD MICRODIL/AGAR DIL: CPT | Performed by: HOSPITALIST

## 2017-11-10 NOTE — CM/SW NOTE
Patient is currently a [address] readmission. Pt active   8/18/17   Thru  11/15/17   Under   . Pt with     5    Days left.

## 2017-11-10 NOTE — CM/SW NOTE
SW attempted to meet w/ pt to discuss discharge planning - pt was on the phone during this time. SW confirmed that pt is current w/ [facility]. Pt goes to Wound Clinic 1x/wk due to pt having wound vac.  If plan is to return home, will need HHC order prior to d/

## 2017-11-10 NOTE — CONSULTS
[facility] AND [facility] Infectious Disease  Report of Consultation    Millie Bamberger Patient Status:  Inpatient    1945 MRN [id number]   Location [facility] 5SW/SE Attending Calos Kelly MD   Hosp Day # 0 PCP Claudean Palmer bilateral legs   • Osteoarthritis     legs, back, shoulders and knees   • OSTEOPENIA    • Other and unspecified hyperlipidemia    • OTHER DISEASES     spastic paraparesis   • OTHER DISEASES     cataracts   • OTHER DISEASES     secondary poycythemia   • Location: [facility] FOR                PAIN MANAGEMENT  No date: KNEE REPLACEMENT SURGERY      Comment: left  12/05 8-24-12: OTHER SURGICAL HISTORY      Comment: subhash Stevenson  10/27/2014: PATIENT DOCUMENTED NOT TO HAVE EXPERIENCED ANY* N/A      Comment: Diabetes Mother    • Diabetes Sister    • Eye Problems Sister      glaucoma   • Other Wilhemena Fuel Sister    • Other Wilhemena Fuel Daughter      spastic paraplegia   • Eye Problems Other      aunts/ uncles glaucoma   • Other [OTHER] Other       reports that she has q 24 hr cap 75 mg, 75 mg, Oral, Daily  •  [START ON 11/10/2017] predniSONE (DELTASONE) tab 5 mg, 5 mg, Oral, Daily  •  Fluticasone Propionate (FLONASE) 50 MCG/ACT nasal spray 1 spray, 1 spray, Each Nare, Daily  •  meropenem (MERREM) IVPB 500 mg/100 ml in 0.9 hemorrhage. Nose: Nares normal.   Throat:  Oropharynx clear, MMs moist.   Neck: Trachea ML, no masses. Lungs: CTA b/l no rhonchi, rales, wheezes. Chest wall: No tenderness or deformity. Heart: Regular rate and rhythm, normal S1S2, no murmurs. Kavya Lange  [facility] Infectious Disease  [phone]    11/9/2017  7:29 PM

## 2017-11-10 NOTE — PLAN OF CARE
Problem: Patient/Family Goals  Goal: Patient/Family Long Term Goal  Patient's Long Term Goal: To return home  Interventions:  - monitor vs  -administer meds  -follow poc and inform pt/  - See additional Care Plan goals for specific interventions monitor pain and request assistance  - Assess pain using appropriate pain scale  - Administer analgesics based on type and severity of pain and evaluate response  - Implement non-pharmacological measures as appropriate and evaluate response  - Consider cul precautions for hx of Cdiff and hx of ESBL of urine, continues on IV fluids/vanco po/Meropenem IV, aerobic/anaerobic wound cultures swabbed today and wound vac placed by wound care RN this morning, on remote tele-no calls at this time, LLQ colostomy in [facility]

## 2017-11-10 NOTE — PROGRESS NOTES
[facility] dosing service    Initial Pharmacokinetic Consult for Vancomycin Dosing     Norma Scott is a 74year old female admitted on 11/9 who is being treated for bacteremia. Pharmacy has been asked to dose Vancomycin by Dr. Jose Alejandro Aragon.      She 11/10/17 8:19 AM   Result Value Ref Range   Aerobic Smear 1+ Gram positive cocci in clusters N/A   Aerobic Smear 2+ WBCs seen N/A   2.  BLOOD CULTURE Status: None (Preliminary result)   Collection Time: 11/09/17 12:54 PM   Result Value Ref Range   Blood Cul

## 2017-11-10 NOTE — CDS QUERY
Clarification – Condition Associated with Device, Implant or Graft  CLINICAL DOCUMENTATION CLARIFICATION FORM  Dear Doctor: Jazzy Alcantara information (provided below) suggests involvement of a device.  For accurate ICD-10-CM code assignment to reflect

## 2017-11-10 NOTE — WOUND PROGRESS NOTE
WOUND CARE NOTE      PLAN   Recommendations:  Dietary consult for recommendations for nutrition to optimize wound healing  VAC standing orders  VAC troubleshooting instructions on the machine  Staff to monitor VAC seal and repair seal if indicated.   Jethro Saldaña

## 2017-11-10 NOTE — PROGRESS NOTES
[facility] Hospitalist Progress Note     CC: Hospital Follow up    PCP: Fatimah Pruitt MD       Assessment/Plan:     Principal Problem:    Urinary tract infection associated with catheterization of urinary tract, unspecified indwelling urinary catheter ty 2011, moderate MR ,HTN, HL,  -50-60% in LAD  -cont statin, zetia  -appears off all BP meds  -monitor      Chronic back pain  -follow up with ortho spine     Depression/anxiety  -Venlafaxine ER 75 daily     FN:  - IVF: none  - Diet: general     DVT Prophy: 2.4cm x 2.4cm; no erythema or purulent discharge  EXT: no edema    Data Review:       Labs:     Recent Labs   Lab  11/09/17   1141  11/10/17   0528   RBC  4.44  3.65*   HGB  11.3*  9.5*   HCT  35.3  29.0*   MCV  79.6*  79.5*   MCH  25.6*  25.9*   MCHC  32.

## 2017-11-10 NOTE — PLAN OF CARE
Patient/Family Goals    • Patient/Family Long Term Goal Not Progressing    • Patient/Family Short Term Goal Not Progressing          Patient admitted for ED. Sacral dressing changed. Potassium covered. Fall and Enteric precautions maintained.  Patient self

## 2017-11-10 NOTE — PROGRESS NOTES
Eva Ruiz is a 74year old female. Patient presents with:  Wound      HPI:    No new complaints  REVIEW OF SYSTEMS:   A comprehensive 11 point review of systems was completed. Pertinent positives and negatives noted in the the HPI.     Allergie organism unspecified(486)    • Reflux    • Self-catheterizes urinary bladder 1/11/2016   • Ul[facility] ([facility]    • Visual impairment     reading      Past Surgical History:  No date: BACK SURGERY      Comment: spinal fusion L1-5  7/11/08: COLONOSCOPY EPIDURAL;  Surgeon:                Bi Cox MD;  Location:  [address] MANAGEMENT  1/5/2015: PATIENT DOCUMENTED NOT TO HAVE EXPERIENCED ANY* N/A      Comment: Procedure: ESOPHAGOGASTRODUODENOSCOPY W/                DILATION; rebound, no HSM. EXTREMITIES:  No edema, no clubbing, no cyanosis. NEURO:  No focal neurologic deficits. DERM:  Warm, dry, no rashes. wound not viewed todayIV Site: ok      IMPRESSION/PLAN:      1.   Acute fevers with numerous possible sources  - Concern Result Value Ref Range   Glucose 155 (H) 70 - 99 mg/dL   Sodium 139 136 - 144 mmol/L   Potassium 3.9 3.3 - 5.1 mmol/L   Chloride 105 95 - 110 mmol/L   CO2 23 22 - 32 mmol/L   BUN 22 (H) 8 - 20 mg/dL   Creatinine 0.41 (L) 0.50 - 1.50 mg/dL   Calcium, Tota - 32 mmol/L   BUN 14 8 - 20 mg/dL   Creatinine 0.37 (L) 0.50 - 1.50 mg/dL   Calcium, Total 8.2 (L) 8.5 - 10.5 mg/dL   BUN/CREA Ratio 37.8 (H) 10.0 - 20.0   Anion Gap 5 0 - 18 mmol/L   Calculated Osmolality 288 275 - 295 mOsm/kg   GFR, Non-African American (L) 80.0 - 100.0 fL   MCH 25.6 (L) 27.0 - 32.0 pg   MCHC 32.1 32.0 - 37.0 g/dl   RDW 17.2 (H) 11.0 - 15.0 %    140 - 400 K/UL   MPV 9.6 7.4 - 10.3 fL   Neutrophil % 74 %   Lymphocyte % 18 %   Monocyte % 5 %   Eosinophil % 3 %   Basophil % 0 %   Neut

## 2017-11-10 NOTE — PLAN OF CARE
Problem: Patient/Family Goals  Goal: Patient/Family Long Term Goal  Patient's Long Term Goal: To return home  Interventions:  - monitor vs  -administer meds  -follow poc and inform pt/  - See additional Care Plan goals for specific interventions to sacral area w/ dressing intact, wound care to see pt, turned/repositioned pt q 2hrs, heels elevated off bed    Problem: PAIN - ADULT  Goal: Verbalizes/displays adequate comfort level or patient's stated pain goal  INTERVENTIONS:  - Encourage pt to monit beliefs, and cultural backgrounds into the planning and delivery of care  - Encourage patient/family to participate in care and decision-making at the level they choose  - Honor patient and family perspectives and choices   Outcome: Progressing  Comments:

## 2017-11-11 PROCEDURE — 85027 COMPLETE CBC AUTOMATED: CPT | Performed by: HOSPITALIST

## 2017-11-11 PROCEDURE — 80048 BASIC METABOLIC PNL TOTAL CA: CPT | Performed by: HOSPITALIST

## 2017-11-11 RX ORDER — DIPHENHYDRAMINE HCL 25 MG
25 CAPSULE ORAL EVERY 6 HOURS PRN
Status: DISCONTINUED | OUTPATIENT
Start: 2017-11-11 | End: 2017-11-11

## 2017-11-11 RX ORDER — POTASSIUM CHLORIDE 20 MEQ/1
40 TABLET, EXTENDED RELEASE ORAL ONCE
Status: COMPLETED | OUTPATIENT
Start: 2017-11-11 | End: 2017-11-11

## 2017-11-11 RX ORDER — DIPHENHYDRAMINE HCL 50 MG
50 CAPSULE ORAL EVERY 6 HOURS PRN
Status: DISCONTINUED | OUTPATIENT
Start: 2017-11-11 | End: 2017-11-14

## 2017-11-11 RX ORDER — DIAPER,BRIEF,INFANT-TODD,DISP
EACH MISCELLANEOUS 2 TIMES DAILY
Status: DISCONTINUED | OUTPATIENT
Start: 2017-11-11 | End: 2017-11-14

## 2017-11-11 RX ORDER — CETIRIZINE HYDROCHLORIDE 10 MG/1
10 TABLET ORAL DAILY
Status: DISCONTINUED | OUTPATIENT
Start: 2017-11-11 | End: 2017-11-14

## 2017-11-11 RX ORDER — POLYVINYL ALCOHOL 14 MG/ML
1 SOLUTION/ DROPS OPHTHALMIC 4 TIMES DAILY PRN
Status: DISCONTINUED | OUTPATIENT
Start: 2017-11-11 | End: 2017-11-14

## 2017-11-11 NOTE — PLAN OF CARE
Problem: Patient/Family Goals  Goal: Patient/Family Long Term Goal  Patient's Long Term Goal: To return home  Interventions:  - monitor vs  -administer meds  -follow poc and inform pt/  - See additional Care Plan goals for specific interventions monitor pain and request assistance  - Assess pain using appropriate pain scale  - Administer analgesics based on type and severity of pain and evaluate response  - Implement non-pharmacological measures as appropriate and evaluate response  - Consider cul sacrum with continuous suction, on enteric contact precautions for hx of Cdiff and current ESBL of urine, on remote tele-no calls, pt still straight caths self with staff assistance, colostomy to LLQ abdomen, potassium of 3.8 covered per PO electrolyte pro

## 2017-11-11 NOTE — PROGRESS NOTES
[facility] Hospitalist Progress Note     CC: Hospital Follow up    PCP: Robin Bates MD       Assessment/Plan:     Principal Problem:    Urinary tract infection associated with catheterization of urinary tract, unspecified indwelling urinary catheter ty pt at baseline     Known CAD per cath in 2011, moderate MR ,HTN, HL,  -50-60% in LAD  -cont statin, zetia  -appears off all BP meds  -monitor      Chronic back pain  -follow up with ortho spine     Depression/anxiety  -Venlafaxine ER 75 daily     FN:  - IV warm, dry, sacral decubitus ulcer, midline, circular, about 2.4cm x 2.4cm; no erythema or purulent discharge  EXT: no edema    Data Review:       Labs:     Recent Labs   Lab  11/09/17   1141  11/10/17   0528  11/11/17   0728   RBC  4.44  3.65*  3.98   HGB

## 2017-11-11 NOTE — PROGRESS NOTES
Berna Cisneros is a 74year old female. Patient presents with:  Wound      HPI:    Good spirits; asking about going home    REVIEW OF SYSTEMS:   A comprehensive 11 point review of systems was completed.   Pertinent positives and negatives noted in th uti   • Pneumonia, organism unspecified(486)    • Reflux    • Self-catheterizes urinary bladder 1/11/2016   • Ulcerative colitis ([facility] [address]    • Visual impairment     reading      Past Surgical History:  No date: BACK SURGERY      Comment: spinal fusion L1-5  7 Procedure: CERVICAL EPIDURAL;  Surgeon:                Bi Cox MD;  Location: [address] [place] [address] Drive MANAGEMENT  1/5/2015: PATIENT DOCUMENTED NOT TO HAVE EXPERIENCED ANY* N/A      Comment: Procedure: ESOPHAGOGASTRODUODENOSCOPY W/ Soft NT/ND, BS present, No masses , rebound, no HSM. EXTREMITIES:  No edema, no clubbing, no cyanosis. NEURO:  No focal neurologic deficits. DERM:  Warm, dry, no rashes. wounds not viewed  IV Site: ok      IMPRESSION/PLAN:   1.  Acute fevers with numerou of 11/09/17  -LACTIC ACID, PLASMA   Result Value Ref Range   Lactic Acid 1.6 0.5 - 2.2 mmol/L   -BASIC METABOLIC PANEL (8)   Result Value Ref Range   Glucose 155 (H) 70 - 99 mg/dL   Sodium 139 136 - 144 mmol/L   Potassium 3.9 3.3 - 5.1 mmol/L   Chloride 10 85 70 - 99 mg/dL   Sodium 139 136 - 144 mmol/L   Potassium 4.1 3.3 - 5.1 mmol/L   Chloride 110 95 - 110 mmol/L   CO2 24 22 - 32 mmol/L   BUN 14 8 - 20 mg/dL   Creatinine 0.37 (L) 0.50 - 1.50 mg/dL   Calcium, Total 8.2 (L) 8.5 - 10.5 mg/dL   BUN/CREA Ratio clusters    -BLOOD CULTURE   Result Value Ref Range   Blood Culture Result No Growth 2 Days    -URINE CULTURE, ROUTINE   Result Value Ref Range   Urine Culture >100,000 CFU/ML Klebsiella pneumoniae ESBL Pos (A)    Urine Culture >100,000 CFU/ML Klebsiella p Value Ref Range   WBC 5.6 4.0 - 11.0 K/UL   RBC 3.65 (L) 3.70 - 5.40 M/UL   HGB 9.5 (L) 12.0 - 16.0 g/dL   HCT 29.0 (L) 35.0 - 48.0 %   MCV 79.5 (L) 80.0 - 100.0 fL   MCH 25.9 (L) 27.0 - 32.0 pg   MCHC 32.6 32.0 - 37.0 g/dl   RDW 17.0 (H) 11.0 - 15.0 %   P

## 2017-11-11 NOTE — PLAN OF CARE
Problem: Patient/Family Goals  Goal: Patient/Family Long Term Goal  Patient's Long Term Goal: To return home  Interventions:  - monitor vs  -administer meds  -follow poc and inform pt/  - See additional Care Plan goals for specific interventions wound care per orders  - Initiate isolation precautions as appropriate  - Initiate Pressure Ulcer prevention bundle as indicated   Outcome: Progressing  Wound vac dressing to sacrum intact.     Problem: PAIN - ADULT  Goal: Verbalizes/displays adequate comfo accurate information to patient/family  - Incorporate patient and family knowledge, values, beliefs, and cultural backgrounds into the planning and delivery of care  - Encourage patient/family to participate in care and decision-making at the level they ch

## 2017-11-12 PROCEDURE — 80048 BASIC METABOLIC PNL TOTAL CA: CPT | Performed by: HOSPITALIST

## 2017-11-12 PROCEDURE — 80202 ASSAY OF VANCOMYCIN: CPT | Performed by: HOSPITALIST

## 2017-11-12 PROCEDURE — 84132 ASSAY OF SERUM POTASSIUM: CPT | Performed by: HOSPITALIST

## 2017-11-12 PROCEDURE — 83735 ASSAY OF MAGNESIUM: CPT | Performed by: INTERNAL MEDICINE

## 2017-11-12 PROCEDURE — A4216 STERILE WATER/SALINE, 10 ML: HCPCS | Performed by: INTERNAL MEDICINE

## 2017-11-12 PROCEDURE — 85027 COMPLETE CBC AUTOMATED: CPT | Performed by: HOSPITALIST

## 2017-11-12 RX ORDER — MAGNESIUM OXIDE 400 MG (241.3 MG MAGNESIUM) TABLET
400 TABLET ONCE
Status: COMPLETED | OUTPATIENT
Start: 2017-11-12 | End: 2017-11-12

## 2017-11-12 RX ORDER — KETOTIFEN FUMARATE 0.35 MG/ML
1 SOLUTION/ DROPS OPHTHALMIC 2 TIMES DAILY
Status: DISCONTINUED | OUTPATIENT
Start: 2017-11-12 | End: 2017-11-14

## 2017-11-12 NOTE — PLAN OF CARE
Absence of urinary retention Progressing    Patient continues to self cath q 4 hours prn, urine output approx 400cc clear yellow with each self cath. Iv meropenum, iv daptomycin, ivf at 75 cc/h. Continue to monitor  status.

## 2017-11-12 NOTE — PROGRESS NOTES
[facility] Hospitalist Progress Note     CC: Hospital Follow up    PCP: Thom Boggs MD       Assessment/Plan:     Principal Problem:    Urinary tract infection associated with catheterization of urinary tract, unspecified indwelling urinary catheter ty fecal incontinence  -baseline minimal movements of LE and poor tone  - s/p colostomy at [facility] 10/2017  -per pt at baseline     Known CAD per cath in 2011, moderate MR ,HTN, HL,  -50-60% in LAD  -cont statin, zetia  -appears off all BP meds  -monitor      Ch stool in ostomy bag  Neuro: Grossly normal, CN intact, sensory intact  Psych: Affect- normal  SKIN: warm, dry, sacral decubitus ulcer, midline, circular, about 2.4cm x 2.4cm; no erythema or purulent discharge  EXT: no edema    Data Review:       Labs:

## 2017-11-12 NOTE — PROGRESS NOTES
Valentina Rodriguez is a 74year old female. Patient presents with:  Wound    HPI:    Pruritis eye swelling better with vanc d/c    REVIEW OF SYSTEMS:   A comprehensive 10 point review of systems was completed.   Pertinent positives and negatives noted in uti   • Pneumonia, organism unspecified(486)    • Reflux    • Self-catheterizes urinary bladder 1/11/2016   • Ulcerative colitis ([facility] [address]    • Visual impairment     reading      Past Surgical History:  No date: BACK SURGERY      Comment: spinal fusion L1-5 Procedure: CERVICAL EPIDURAL;  Surgeon:                Balwinder Siddiqi MD;  Location: [address] [place] [address] Drive MANAGEMENT  1/5/2015: PATIENT DOCUMENTED NOT TO HAVE EXPERIENCED ANY* N/A      Comment: Procedure: ESOPHAGOGASTRODUODENOSCOPY W/ American >60 >=60   GFR, -American >60 >=72   -BASIC METABOLIC PANEL (8)   Result Value Ref Range   Glucose 158 (H) 70 - 99 mg/dL   Sodium 138 136 - 144 mmol/L   Potassium 3.7 3.3 - 5.1 mmol/L   Chloride 106 95 - 110 mmol/L   CO2 22 22 - 32 mmol/L Value Ref Range   WBC 5.2 4.0 - 11.0 K/UL   RBC 3.98 3.70 - 5.40 M/UL   HGB 10.2 (L) 12.0 - 16.0 g/dL   HCT 31.8 (L) 35.0 - 48.0 %   MCV 79.9 (L) 80.0 - 100.0 fL   MCH 25.6 (L) 27.0 - 32.0 pg   MCHC 32.1 32.0 - 37.0 g/dl   RDW 17.1 (H) 11.0 - 15.0 %   PLT Range   Hold Lt Green Auto Resulted    -RAINBOW DRAW LIGHT GREEN   Result Value Ref Range   Hold Lt Green Auto Resulted    -RAINBOW DRAW GOLD   Result Value Ref Range   Hold Gold Auto Resulted    -RAINBOW DRAW LAVENDER TALL (BNP)   Result Value Ref Range W/ DIFFERENTIAL   Result Value Ref Range   WBC 8.0 4.0 - 11.0 K/UL   RBC 4.44 3.70 - 5.40 M/UL   HGB 11.3 (L) 12.0 - 16.0 g/dL   HCT 35.3 35.0 - 48.0 %   MCV 79.6 (L) 80.0 - 100.0 fL   MCH 25.6 (L) 27.0 - 32.0 pg   MCHC 32.1 32.0 - 37.0 g/dl   RDW 17.2 (H) no rhonchi, rales, or wheezes. CARDIO: RRR O1/T6, no rubs, clicks, heaves, or murmurs. GI:  Soft NT/ND, BS present x4 quadrants, no HSM. EXTREMITIES:  No edema, no clubbing, no cyanosis. NEURO:  No focal neurologic deficits.   DERM:  Warm, dry, no rashe

## 2017-11-13 ENCOUNTER — APPOINTMENT (OUTPATIENT)
Dept: CV DIAGNOSTICS | Facility: HOSPITAL | Age: 72
DRG: 698 | End: 2017-11-13
Attending: INTERNAL MEDICINE
Payer: MEDICARE

## 2017-11-13 PROCEDURE — 85027 COMPLETE CBC AUTOMATED: CPT | Performed by: HOSPITALIST

## 2017-11-13 PROCEDURE — 93306 TTE W/DOPPLER COMPLETE: CPT | Performed by: INTERNAL MEDICINE

## 2017-11-13 PROCEDURE — 80048 BASIC METABOLIC PNL TOTAL CA: CPT | Performed by: HOSPITALIST

## 2017-11-13 PROCEDURE — A4216 STERILE WATER/SALINE, 10 ML: HCPCS | Performed by: INTERNAL MEDICINE

## 2017-11-13 PROCEDURE — 97607 NEG PRS WND THR NDME<=50SQCM: CPT

## 2017-11-13 PROCEDURE — 83735 ASSAY OF MAGNESIUM: CPT | Performed by: HOSPITALIST

## 2017-11-13 RX ORDER — MAGNESIUM OXIDE 400 MG (241.3 MG MAGNESIUM) TABLET
400 TABLET ONCE
Status: COMPLETED | OUTPATIENT
Start: 2017-11-13 | End: 2017-11-13

## 2017-11-13 RX ORDER — SODIUM CHLORIDE 9 MG/ML
INJECTION, SOLUTION INTRAVENOUS
Status: COMPLETED
Start: 2017-11-13 | End: 2017-11-13

## 2017-11-13 NOTE — CM/SW NOTE
SW attempted to meet w/ pt to follow up on discharge plans. Pt down for echo at this time. SW to follow up when appropriate.      Jaswinder Estes, [address]

## 2017-11-13 NOTE — PROGRESS NOTES
[facility] AND [facility] Infectious Disease  Progress Note    Ursual Jacinto Patient Status:  Inpatient    1945 MRN [id number]   Location [facility] 5SW/SE Attending Edu Sequeira MD   Hosp Day # 4 PCP MD Ira Lynne in a patient that straight caths herself - urine more clear but cultures with ESBL klebsiella  - Possible viral issue - RVP negative  - Single positive blood culture for CoNS, repeats negative - c/w contaminant  - IV meropenem ongoing, will d/c cubicin

## 2017-11-13 NOTE — PLAN OF CARE
Problem: Patient/Family Goals  Goal: Patient/Family Long Term Goal  Patient's Long Term Goal: To return home  Interventions:  - monitor vs  -administer meds  -follow poc and inform pt/  - See additional Care Plan goals for specific interventions goal  INTERVENTIONS:  - Encourage pt to monitor pain and request assistance  - Assess pain using appropriate pain scale  - Administer analgesics based on type and severity of pain and evaluate response  - Implement non-pharmacological measures as appropria

## 2017-11-13 NOTE — PLAN OF CARE
Problem: Patient/Family Goals  Goal: Patient/Family Long Term Goal  Patient's Long Term Goal: To return home  Interventions:  - monitor vs  -administer meds  -follow poc and inform pt/  - See additional Care Plan goals for specific interventions monitor pain and request assistance  - Assess pain using appropriate pain scale  - Administer analgesics based on type and severity of pain and evaluate response  - Implement non-pharmacological measures as appropriate and evaluate response  - Consider cul

## 2017-11-13 NOTE — PROGRESS NOTES
[facility] Hospitalist Progress Note     CC: Hospital Follow up    PCP: Hilary Zhang MD       Assessment/Plan:     Principal Problem:    Urinary tract infection associated with catheterization of urinary tract, unspecified indwelling urinary catheter ty done     Hereditary spastic paraplegia, chronic straight cathing, baclofen pump, and fecal incontinence  -baseline minimal movements of LE and poor tone  - s/p colostomy at [facility] 10/2017  -per pt at baseline     Known CAD per cath in 2011, moderate MR ,HTN, ABD: Soft, non-tender, non-distended, +BS, stool in ostomy bag  Neuro: Grossly normal, CN intact, sensory intact  Psych: Affect- normal  SKIN: warm, dry, sacral decubitus ulcer, midline, circular, about 2.4cm x 2.4cm; no erythema or purulent discharge  E

## 2017-11-13 NOTE — WOUND PROGRESS NOTE
WOUND CARE NOTE      PLAN   Recommendations:  Dietary consult for recommendations for nutrition to optimize wound healing  VAC standing orders  VAC troubleshooting instructions on the machine  Staff to monitor VAC seal and repair seal if indicated.   Griffin Saleem 11/03/2017   ALKPHO 98 11/03/2017   BILT 0.43 11/03/2017   TP 6.4 11/03/2017   AST 20 11/03/2017   ALT 26 11/03/2017   LIP 48 (L) 12/19/2016   MG 1.6 (L) 11/13/2017

## 2017-11-14 VITALS
DIASTOLIC BLOOD PRESSURE: 59 MMHG | WEIGHT: 120 LBS | HEIGHT: 65 IN | HEART RATE: 84 BPM | BODY MASS INDEX: 19.99 KG/M2 | RESPIRATION RATE: 20 BRPM | TEMPERATURE: 98 F | SYSTOLIC BLOOD PRESSURE: 104 MMHG | OXYGEN SATURATION: 92 %

## 2017-11-14 PROCEDURE — 83735 ASSAY OF MAGNESIUM: CPT | Performed by: HOSPITALIST

## 2017-11-14 PROCEDURE — 99211 OFF/OP EST MAY X REQ PHY/QHP: CPT

## 2017-11-14 PROCEDURE — 80048 BASIC METABOLIC PNL TOTAL CA: CPT | Performed by: HOSPITALIST

## 2017-11-14 PROCEDURE — 85027 COMPLETE CBC AUTOMATED: CPT | Performed by: HOSPITALIST

## 2017-11-14 RX ORDER — LEVOFLOXACIN 500 MG/1
500 TABLET, FILM COATED ORAL DAILY
Qty: 7 TABLET | Refills: 0 | Status: SHIPPED | OUTPATIENT
Start: 2017-11-14 | End: 2018-06-07 | Stop reason: ALTCHOICE

## 2017-11-14 RX ORDER — MAGNESIUM OXIDE 400 MG (241.3 MG MAGNESIUM) TABLET
400 TABLET ONCE
Status: COMPLETED | OUTPATIENT
Start: 2017-11-14 | End: 2017-11-14

## 2017-11-14 NOTE — PROGRESS NOTES
[facility] AND [facility] Infectious Disease  Progress Note    Berna Cisneros Patient Status:  Inpatient    1945 MRN [id number]   Location [facility] 5SW/SE Attending Jacob Kayser, MD   Hosp Day # 5 PCP MD Shweta Hernandez more clear but cultures with ESBL klebsiella  - Possible viral issue - RVP negative  - Single positive blood culture for CoNS, repeats negative - c/w contaminant  - IV meropenem ongoing     2.  Deep sacral decubitus at baseline  - Some increased output fro

## 2017-11-14 NOTE — DISCHARGE SUMMARY
General Medicine Discharge Summary     Patient ID:  Mini Rush  74year old  5/4/1945    Admit date: 11/9/2017    Discharge date and time: 11/14/17    Attending Physician: Evelyn Whalen MD     Primary Care Physician: Rachel Contreras MD 500 mg daily x 7 days with PO vancomycin BID for c.diff prophylaxis.  Follow-up PCP     UTI, hx ESBL  - straight caths due to spastic paraplegia  - continue meropenem  - ID consulted, appreciate recs     Bacteremia  - blood culture from admission 11/9, 1/2 Medication List      START taking these medications    levofloxacin 500 MG Tabs  Commonly known as:  LEVAQUIN  Take 1 tablet (500 mg total) by mouth daily. vancomycin 50 MG/ML Soln  Take 2.5 mL (125 mg total) by mouth 2 (two) times daily.         June Garcia

## 2017-11-14 NOTE — WOUND PROGRESS NOTE
Wound Care Services  Follow up on the pt's sacral vac dressing, Enteric Isolation maintained, she is resting in bed, vac dressing is on with a seal at 125 mmhg cont suction , Next vac dressing change is tomorrow.  Per the pt. her family will bring the home

## 2017-11-14 NOTE — PLAN OF CARE
Problem: Patient/Family Goals  Goal: Patient/Family Long Term Goal  Patient's Long Term Goal: To return home  Interventions:  - monitor vs  -administer meds  -follow poc and inform pt/  - See additional Care Plan goals for specific interventions as indicated   Outcome: Adequate for Discharge      Problem: PAIN - ADULT  Goal: Verbalizes/displays adequate comfort level or patient's stated pain goal  INTERVENTIONS:  - Encourage pt to monitor pain and request assistance  - Assess pain using appropriat the planning and delivery of care  - Encourage patient/family to participate in care and decision-making at the level they choose  - Honor patient and family perspectives and choices   Outcome: Adequate for Discharge  Lives at home with .  Able to do

## 2017-11-14 NOTE — PLAN OF CARE
Problem: GENITOURINARY - ADULT  Goal: Absence of urinary retention  INTERVENTIONS:  - Assess patient’s ability to void and empty bladder  - Monitor intake/output and perform bladder scan as needed  - Follow urinary retention protocol/standard of care  - Co assistance with activity based on assessment  - Modify environment to reduce risk of injury  - Provide assistive devices as appropriate  - Consider OT/PT consult to assist with strengthening/mobility  - Encourage toileting schedule   Outcome: Not Progressi

## 2017-11-14 NOTE — CM/SW NOTE
[facility] ambulance arranged for 5 pm today.  Pt bed bound unable to sit in medi car- ambulance will transport  Rn notified    Damien Anna [id number]

## 2017-11-14 NOTE — HOME CARE LIAISON
ORDERS RECEIVED TO [address] [place] [address]. PATIENT IS ACTIVE WITH [facility]. MET WITH PATIENT, WHO VERBALIZED AGREEMENT WITH PLANS.

## 2017-11-14 NOTE — CM/SW NOTE
SW was informed that pt is anticipated to d/c today. [facility] aware of d/c and [facility] order is in. Per RN, family to bring pt's wound vac in today. Pt may need transportation set up for home.     [facility] # [phone]    Nlelie Share, [address]

## 2017-11-15 ENCOUNTER — APPOINTMENT (OUTPATIENT)
Dept: WOUND CARE | Facility: HOSPITAL | Age: 72
End: 2017-11-15
Attending: CLINICAL NURSE SPECIALIST
Payer: MEDICARE

## 2017-11-22 ENCOUNTER — APPOINTMENT (OUTPATIENT)
Dept: WOUND CARE | Facility: HOSPITAL | Age: 72
End: 2017-11-22
Attending: CLINICAL NURSE SPECIALIST
Payer: MEDICARE

## 2017-11-29 ENCOUNTER — APPOINTMENT (OUTPATIENT)
Dept: WOUND CARE | Facility: HOSPITAL | Age: 72
End: 2017-11-29
Attending: CLINICAL NURSE SPECIALIST
Payer: MEDICARE

## 2017-12-09 ENCOUNTER — TELEPHONE (OUTPATIENT)
Dept: GASTROENTEROLOGY | Facility: CLINIC | Age: 72
End: 2017-12-09

## 2017-12-09 NOTE — TELEPHONE ENCOUNTER
Ms Camilo Gonzalez called and had me paged as the physician on call today Saturday to discuss problems with her colostomy. She states that the colostomy is \"going into the hole\" and she has had to \"pull it out. \"    Still \"working\", still normal stool ou renal insufficiency, stroke, or DM. She was taking 5mg prednisone daily prior to admission, having recently tapered down from 40mg daily for back pain. She has been on steroids chronically for many years.  She also takes Norco for lower extremity pain assoc

## 2017-12-13 ENCOUNTER — APPOINTMENT (OUTPATIENT)
Dept: WOUND CARE | Facility: HOSPITAL | Age: 72
End: 2017-12-13
Attending: CLINICAL NURSE SPECIALIST
Payer: MEDICARE

## 2017-12-20 ENCOUNTER — NURSE ONLY (OUTPATIENT)
Dept: WOUND CARE | Facility: HOSPITAL | Age: 72
End: 2017-12-20
Attending: CLINICAL NURSE SPECIALIST
Payer: MEDICARE

## 2017-12-20 DIAGNOSIS — L89.153 DECUBITUS ULCER OF COCCYGEAL REGION, STAGE 3 (HCC): Primary | ICD-10-CM

## 2017-12-20 PROCEDURE — 97605 NEG PRS WND THER DME<=50SQCM: CPT

## 2017-12-20 NOTE — PROGRESS NOTES
Subjective    Chief Complaint  This information was obtained from the patient  The patient was seen today for follow up and management of difficult to heal wound on her sacral area. Patient states that she is being seen by a home health nurse 3x/week.     A 8-14-17:  Patient was admitted to the hospital for UTI, fever and sacral pressure wound/diarrhea. Patient diagnosed with Clostridium difficile and treated with antibiotics.    Patient currently resides at [facility] and transported to  L97.519: Non-pressure chronic ulcer of other part of right foot with unspecified severity       Patient came in via wheelchair, alert and oriented accompanied by her . Sacral wound is noted with red granulation tissue, no signs of infection.  Equilla Olszewski

## 2018-01-03 ENCOUNTER — APPOINTMENT (OUTPATIENT)
Dept: WOUND CARE | Facility: HOSPITAL | Age: 73
End: 2018-01-03
Attending: CLINICAL NURSE SPECIALIST
Payer: MEDICARE

## 2018-01-05 ENCOUNTER — APPOINTMENT (OUTPATIENT)
Dept: CT IMAGING | Facility: HOSPITAL | Age: 73
End: 2018-01-05
Attending: EMERGENCY MEDICINE
Payer: MEDICARE

## 2018-01-05 ENCOUNTER — HOSPITAL ENCOUNTER (EMERGENCY)
Facility: HOSPITAL | Age: 73
Discharge: HOME | End: 2018-01-05
Attending: EMERGENCY MEDICINE
Payer: MEDICARE

## 2018-01-05 VITALS
RESPIRATION RATE: 16 BRPM | SYSTOLIC BLOOD PRESSURE: 113 MMHG | DIASTOLIC BLOOD PRESSURE: 93 MMHG | WEIGHT: 125 LBS | HEART RATE: 88 BPM | HEIGHT: 64 IN | OXYGEN SATURATION: 95 % | BODY MASS INDEX: 21.34 KG/M2 | TEMPERATURE: 98 F

## 2018-01-05 DIAGNOSIS — K56.41 FECAL IMPACTION (HCC): ICD-10-CM

## 2018-01-05 DIAGNOSIS — Z43.3 ENCOUNTER FOR ATTENTION TO COLOSTOMY (HCC): Primary | ICD-10-CM

## 2018-01-05 LAB
ANION GAP SERPL CALC-SCNC: 8 MMOL/L (ref 0–18)
BASOPHILS # BLD: 0 K/UL (ref 0–0.2)
BASOPHILS NFR BLD: 1 %
BILIRUB UR QL: NEGATIVE
BUN SERPL-MCNC: 29 MG/DL (ref 8–20)
BUN/CREAT SERPL: 59.2 (ref 10–20)
CALCIUM SERPL-MCNC: 8.8 MG/DL (ref 8.5–10.5)
CHLORIDE SERPL-SCNC: 104 MMOL/L (ref 95–110)
CO2 SERPL-SCNC: 24 MMOL/L (ref 22–32)
COLOR UR: YELLOW
CREAT SERPL-MCNC: 0.49 MG/DL (ref 0.5–1.5)
EOSINOPHIL # BLD: 0.3 K/UL (ref 0–0.7)
EOSINOPHIL NFR BLD: 4 %
ERYTHROCYTE [DISTWIDTH] IN BLOOD BY AUTOMATED COUNT: 17.2 % (ref 11–15)
GLUCOSE SERPL-MCNC: 96 MG/DL (ref 70–99)
GLUCOSE UR-MCNC: NEGATIVE MG/DL
HCT VFR BLD AUTO: 39.9 % (ref 35–48)
HGB BLD-MCNC: 12.9 G/DL (ref 12–16)
KETONES UR-MCNC: NEGATIVE MG/DL
LYMPHOCYTES # BLD: 2.1 K/UL (ref 1–4)
LYMPHOCYTES NFR BLD: 27 %
MCH RBC QN AUTO: 25.1 PG (ref 27–32)
MCHC RBC AUTO-ENTMCNC: 32.3 G/DL (ref 32–37)
MCV RBC AUTO: 77.7 FL (ref 80–100)
MONOCYTES # BLD: 0.6 K/UL (ref 0–1)
MONOCYTES NFR BLD: 8 %
NEUTROPHILS # BLD AUTO: 4.6 K/UL (ref 1.8–7.7)
NEUTROPHILS NFR BLD: 60 %
NITRITE UR QL STRIP.AUTO: NEGATIVE
OSMOLALITY UR CALC.SUM OF ELEC: 288 MOSM/KG (ref 275–295)
PH UR: 7 [PH] (ref 5–8)
PLATELET # BLD AUTO: 206 K/UL (ref 140–400)
PMV BLD AUTO: 9.3 FL (ref 7.4–10.3)
POTASSIUM SERPL-SCNC: 4.1 MMOL/L (ref 3.3–5.1)
PROT UR-MCNC: NEGATIVE MG/DL
RBC # BLD AUTO: 5.13 M/UL (ref 3.7–5.4)
RBC #/AREA URNS AUTO: 1 /HPF
SODIUM SERPL-SCNC: 136 MMOL/L (ref 136–144)
SP GR UR STRIP: 1.02 (ref 1–1.03)
UROBILINOGEN UR STRIP-ACNC: 2
VIT C UR-MCNC: NEGATIVE MG/DL
WBC # BLD AUTO: 7.6 K/UL (ref 4–11)
WBC #/AREA URNS AUTO: 10 /HPF

## 2018-01-05 PROCEDURE — 87086 URINE CULTURE/COLONY COUNT: CPT | Performed by: EMERGENCY MEDICINE

## 2018-01-05 PROCEDURE — 87186 SC STD MICRODIL/AGAR DIL: CPT | Performed by: EMERGENCY MEDICINE

## 2018-01-05 PROCEDURE — 96360 HYDRATION IV INFUSION INIT: CPT

## 2018-01-05 PROCEDURE — 74176 CT ABD & PELVIS W/O CONTRAST: CPT | Performed by: EMERGENCY MEDICINE

## 2018-01-05 PROCEDURE — 85025 COMPLETE CBC W/AUTO DIFF WBC: CPT | Performed by: EMERGENCY MEDICINE

## 2018-01-05 PROCEDURE — 80048 BASIC METABOLIC PNL TOTAL CA: CPT | Performed by: EMERGENCY MEDICINE

## 2018-01-05 PROCEDURE — 99284 EMERGENCY DEPT VISIT MOD MDM: CPT

## 2018-01-05 PROCEDURE — 81001 URINALYSIS AUTO W/SCOPE: CPT | Performed by: EMERGENCY MEDICINE

## 2018-01-05 PROCEDURE — 87184 SC STD DISK METHOD PER PLATE: CPT | Performed by: EMERGENCY MEDICINE

## 2018-01-05 PROCEDURE — 96361 HYDRATE IV INFUSION ADD-ON: CPT

## 2018-01-05 PROCEDURE — 87088 URINE BACTERIA CULTURE: CPT | Performed by: EMERGENCY MEDICINE

## 2018-01-05 NOTE — ED INITIAL ASSESSMENT (HPI)
Presents from home with fever In addition states + loose stool via rectum with no colostomy output Pt was initially having hard stool and took miralax single episode of vomiting

## 2018-01-06 NOTE — ED NOTES
Care assumed from EMS presents to ED on referral from home health RN secondary to fever Pt verbalizes 3 day hx minimal colostomy output Took Miralax/Colace with no stool via colostomy + smear rectal stool X several episodes + nausea and emesis X2 48 hrs ag

## 2018-01-06 NOTE — ED PROVIDER NOTES
Patient Seen in: [facility] AND [facility] Emergency Department    History   Patient presents with:  Fever (infectious)    Stated Complaint: Fever and colostomy issues    HPI    38-year-old female with history of anemia, anxiety, C. difficile, DVT, hypertension, uterine fibroids   • OTHER DISEASES     ulcerative colitis   • OTHER DISEASES     uti   • Pneumonia, organism unspecified(486)    • Reflux    • Self-catheterizes urinary bladder 1/11/2016   • [facility]    • Visual impairment     reading MANAGEMENT  12/8/2014: PATIENT DOCUMENTED NOT TO HAVE EXPERIENCED ANY* N/A      Comment: Procedure: CERVICAL EPIDURAL;  Surgeon:                Lennice Felty, MD;  Location: [place] [address] MANAGEMENT  1/5/2015: PATIENT DOCUMENTED NOT T wheezing. Cardiovascular: Negative for chest pain. Gastrointestinal: Positive for constipation. Negative for diarrhea, nausea and vomiting. Genitourinary: Negative for dysuria, flank pain and frequency. Musculoskeletal: Negative for back pain.    Christina Sas time.   5/5 strength in b/l UEs and LEs, normal sensation in all extremities, normal finger to nose b/l, normal gait, no facial asymmetry, normal speech     Skin: Skin is warm and dry. No rash noted. She is not diaphoretic.    Psychiatric: She has a normal Negative Negative   Blood Urine Small (A) Negative   Nitrite Urine Negative Negative   Urobilinogen Urine 2.0 (A) <2.0   Leukocyte Esterase Urine Small (A) Negative   Ascorbic Acid Urine Negative Negative mg/dL   Squamous Epi.  Cells Few /HPF   WBC Urine 10 PCP to have their blood pressure re-checked within 1 week was provided. Medical Record Review: I personally reviewed available prior medical records for any recent pertinent discharge summaries, testing, and procedures, and reviewed those reports.     C appointment as soon as possible for a visit in 2 days  As needed    We recommend that you schedule follow up care with a primary care provider within the next three months to obtain basic health screening including reassessment of your blood pressure.     Antoine Lmoas

## 2018-01-31 ENCOUNTER — NURSE ONLY (OUTPATIENT)
Dept: WOUND CARE | Facility: HOSPITAL | Age: 73
End: 2018-01-31
Attending: CLINICAL NURSE SPECIALIST
Payer: MEDICARE

## 2018-01-31 DIAGNOSIS — L89.153 DECUBITUS ULCER OF COCCYGEAL REGION, STAGE 3 (HCC): Primary | ICD-10-CM

## 2018-01-31 PROCEDURE — 99212 OFFICE O/P EST SF 10 MIN: CPT | Performed by: CLINICAL NURSE SPECIALIST

## 2018-01-31 PROCEDURE — 97605 NEG PRS WND THER DME<=50SQCM: CPT

## 2018-01-31 NOTE — PROGRESS NOTES
Subjective    Chief Complaint  This information was obtained from the patient  The patient was seen today for follow up and management of difficult to heal wound on her sacral area. Patient states that she is being seen by a home health nurse 3x/week.     A 8-14-17: Patient was admitted to the hospital for UTI, fever and sacral pressure wound/diarrhea. Patient diagnosed with Clostridium difficile and treated with antibiotics.  Patient currently resides at [facility] Patient's sacral wound granular and healing well with depth of wound smaller than previous wound measurements. Fibracol to enhance collagen production to promote wound healing.  Tegaderm hydrocolloid dressing to apply to wound edges to help secure seal for Cleansed wound and periwound with non-cytotoxic agent. using 0.9% normal saline (1)   Applied Primary Wound Dressing. using Fibracol (1)   Applied Secondary Wound Dressing.  using Tegaderm hydrocolloid (1)   Negative pressure wound therapy  Negative pressur

## 2018-02-17 ENCOUNTER — PRIOR ORIGINAL RECORDS (OUTPATIENT)
Dept: OTHER | Age: 73
End: 2018-02-17

## 2018-02-17 ENCOUNTER — LAB REQUISITION (OUTPATIENT)
Dept: LAB | Facility: HOSPITAL | Age: 73
End: 2018-02-17
Payer: MEDICARE

## 2018-02-17 DIAGNOSIS — E78.00 PURE HYPERCHOLESTEROLEMIA: ICD-10-CM

## 2018-02-17 LAB
ALT SERPL-CCNC: 16 U/L (ref 14–54)
ANION GAP SERPL CALC-SCNC: 6 MMOL/L (ref 0–18)
AST SERPL-CCNC: 21 U/L (ref 15–41)
BUN SERPL-MCNC: 15 MG/DL (ref 8–20)
BUN/CREAT SERPL: 32.6 (ref 10–20)
CALCIUM SERPL-MCNC: 9.2 MG/DL (ref 8.5–10.5)
CHLORIDE SERPL-SCNC: 110 MMOL/L (ref 95–110)
CHOLEST SERPL-MCNC: 154 MG/DL (ref 110–200)
CK SERPL-CCNC: 34 U/L (ref 38–234)
CO2 SERPL-SCNC: 24 MMOL/L (ref 22–32)
CREAT SERPL-MCNC: 0.46 MG/DL (ref 0.5–1.5)
GLUCOSE SERPL-MCNC: 78 MG/DL (ref 70–99)
HDLC SERPL-MCNC: 36 MG/DL
LDLC SERPL CALC-MCNC: 97 MG/DL (ref 0–99)
NONHDLC SERPL-MCNC: 118 MG/DL
OSMOLALITY UR CALC.SUM OF ELEC: 290 MOSM/KG (ref 275–295)
POTASSIUM SERPL-SCNC: 3.7 MMOL/L (ref 3.3–5.1)
SODIUM SERPL-SCNC: 140 MMOL/L (ref 136–144)
TRIGL SERPL-MCNC: 104 MG/DL (ref 1–149)

## 2018-02-17 PROCEDURE — 80048 BASIC METABOLIC PNL TOTAL CA: CPT | Performed by: INTERNAL MEDICINE

## 2018-02-17 PROCEDURE — 80061 LIPID PANEL: CPT | Performed by: INTERNAL MEDICINE

## 2018-02-17 PROCEDURE — 84460 ALANINE AMINO (ALT) (SGPT): CPT | Performed by: INTERNAL MEDICINE

## 2018-02-17 PROCEDURE — 82550 ASSAY OF CK (CPK): CPT | Performed by: INTERNAL MEDICINE

## 2018-02-17 PROCEDURE — 84450 TRANSFERASE (AST) (SGOT): CPT | Performed by: INTERNAL MEDICINE

## 2018-02-19 LAB
ALT (SGPT): 16 U/L
AST (SGOT): 21 U/L
BUN: 15 MG/DL
CALCIUM: 9.2 MG/DL
CHLORIDE: 110 MEQ/L
CHOLESTEROL, TOTAL: 154 MG/DL
CREATININE KINASE: 34 U/L
CREATININE, SERUM: 0.46 MG/DL
GLUCOSE: 78 MG/DL
GLUCOSE: 78 MG/DL
HDL CHOLESTEROL: 36 MG/DL
LDL CHOLESTEROL: 97 MG/DL
POTASSIUM, SERUM: 3.7 MEQ/L
SGOT (AST): 21 IU/L
SGPT (ALT): 16 IU/L
SODIUM: 140 MEQ/L
TRIGLYCERIDES: 104 MG/DL

## 2018-02-20 ENCOUNTER — PRIOR ORIGINAL RECORDS (OUTPATIENT)
Dept: OTHER | Age: 73
End: 2018-02-20

## 2018-02-23 ENCOUNTER — PRIOR ORIGINAL RECORDS (OUTPATIENT)
Dept: OTHER | Age: 73
End: 2018-02-23

## 2018-02-27 ENCOUNTER — TELEPHONE (OUTPATIENT)
Dept: GASTROENTEROLOGY | Facility: CLINIC | Age: 73
End: 2018-02-27

## 2018-02-27 DIAGNOSIS — R19.7 ACUTE DIARRHEA: Primary | ICD-10-CM

## 2018-02-27 NOTE — TELEPHONE ENCOUNTER
Spoke to pt. She has had diarrhea on and off for the past 3 weeks. She states the stool is filling her colostomy bag and coming out her rectum. No bloody stool    She is experiencing abdominal cramping    No N/V, fever or chills.     She feels full q

## 2018-02-27 NOTE — TELEPHONE ENCOUNTER
Pt states that she had colostomy done in September and she is now having problems with diarrhea (for last 3 wks). She states stool is coming from rectum. She would like to know if she can take Imodium. Please call.

## 2018-02-28 ENCOUNTER — APPOINTMENT (OUTPATIENT)
Dept: WOUND CARE | Facility: HOSPITAL | Age: 73
End: 2018-02-28
Attending: CLINICAL NURSE SPECIALIST
Payer: MEDICARE

## 2018-02-28 DIAGNOSIS — L89.153 DECUBITUS ULCER OF COCCYGEAL REGION, STAGE 3 (HCC): Primary | ICD-10-CM

## 2018-02-28 PROCEDURE — 99212 OFFICE O/P EST SF 10 MIN: CPT | Performed by: CLINICAL NURSE SPECIALIST

## 2018-02-28 PROCEDURE — 97605 NEG PRS WND THER DME<=50SQCM: CPT

## 2018-02-28 PROCEDURE — 97607 NEG PRS WND THR NDME<=50SQCM: CPT | Performed by: CLINICAL NURSE SPECIALIST

## 2018-02-28 NOTE — TELEPHONE ENCOUNTER
I spoke to the pt again to tell her about the c diff. She advised me she has been taking NSAIDs 2 tablets 3 times a day for the past 3 months. She stopped taking them yesterday. Her stool is not bloody but is does experience abdominal pain.     I told

## 2018-02-28 NOTE — PROGRESS NOTES
Subjective    Chief Complaint  This information was obtained from the patient  The patient was seen today for follow up and management of difficult to heal wound on her sacral area. Patient states that she is being seen by a home health nurse 3x/week.  02/2 8-14-17:  Patient was admitted to the hospital for UTI, fever and sacral pressure wound/diarrhea. Patient diagnosed with Clostridium difficile and treated with antibiotics.    Patient currently resides at [facility] and transported to  Patient's sacral wound granular and healing well in comparison to previous wound measurements. Fibracol to enhance collagen production to promote wound healing. Hydrocolloid strips to protect periwound area from skin breakdown.  Wound vac applied and set at 2/28/18: TRAC pad bridged to left lateral hip. Applied a thin hydrocolloid (a sample) over the wound edges to protect the skin. Header Image    Negative Pressure Wound Therapy Maintenance (NPWT) Details  Patient Name: Sherlyn Dunne   Patient Numb

## 2018-03-01 ENCOUNTER — APPOINTMENT (OUTPATIENT)
Dept: LAB | Facility: HOSPITAL | Age: 73
End: 2018-03-01
Attending: INTERNAL MEDICINE
Payer: MEDICARE

## 2018-03-01 DIAGNOSIS — R19.7 ACUTE DIARRHEA: ICD-10-CM

## 2018-03-01 LAB — C DIFF TOX B STL QL: NEGATIVE

## 2018-03-01 PROCEDURE — 87493 C DIFF AMPLIFIED PROBE: CPT

## 2018-03-01 NOTE — TELEPHONE ENCOUNTER
Pt contacted and given Dr Dale Expose message, including orders about NSAIDS and imodium below. I instructed that if symptoms did not improve to call back.

## 2018-03-28 ENCOUNTER — APPOINTMENT (OUTPATIENT)
Dept: WOUND CARE | Facility: HOSPITAL | Age: 73
End: 2018-03-28
Attending: CLINICAL NURSE SPECIALIST
Payer: MEDICARE

## 2018-03-30 ENCOUNTER — LAB REQUISITION (OUTPATIENT)
Dept: LAB | Facility: HOSPITAL | Age: 73
End: 2018-03-30
Payer: MEDICARE

## 2018-03-30 DIAGNOSIS — K52.831 COLLAGENOUS COLITIS: ICD-10-CM

## 2018-03-30 PROCEDURE — 87493 C DIFF AMPLIFIED PROBE: CPT | Performed by: INTERNAL MEDICINE

## 2018-04-11 ENCOUNTER — APPOINTMENT (OUTPATIENT)
Dept: WOUND CARE | Facility: HOSPITAL | Age: 73
End: 2018-04-11
Attending: CLINICAL NURSE SPECIALIST
Payer: MEDICARE

## 2018-04-11 DIAGNOSIS — L89.153 DECUBITUS ULCER OF COCCYGEAL REGION, STAGE 3 (HCC): Primary | ICD-10-CM

## 2018-04-11 PROCEDURE — 99212 OFFICE O/P EST SF 10 MIN: CPT | Performed by: CLINICAL NURSE SPECIALIST

## 2018-04-11 NOTE — PROGRESS NOTES
Subjective    Chief Complaint  This information was obtained from the patient  The patient was seen today for follow up and management of difficult to heal wound on her sacral area. Patient states that she is being seen by a home health nurse 3x/week.  02/2 8-14-17:  Patient was admitted to the hospital for UTI, fever and sacral pressure wound/diarrhea. Patient diagnosed with Clostridium difficile and treated with antibiotics.    [facility]  Patient sacral wound Pressure ulcer stage III 75% wound healing noted. Tunneling at 12:00 has decreased. Visible wound granular. Wound VAC to be placed on hold due to opening of wound smaller. Fibracol to enhance collagen production for wound healing. Entered By: Jazzy Smith on 04/11/2018 4:47:19 PM   Treatment Notes Summary  Wound #3a (Sacral)  . Wound Treatment Note  Assessed patient’s pain status and effectiveness of pain management plan. Cleansed wound and periwound with non-cytotoxic agent.  using

## 2018-04-13 PROCEDURE — 87086 URINE CULTURE/COLONY COUNT: CPT | Performed by: INTERNAL MEDICINE

## 2018-04-13 PROCEDURE — 87077 CULTURE AEROBIC IDENTIFY: CPT | Performed by: INTERNAL MEDICINE

## 2018-04-13 PROCEDURE — 81001 URINALYSIS AUTO W/SCOPE: CPT | Performed by: INTERNAL MEDICINE

## 2018-04-13 PROCEDURE — 87186 SC STD MICRODIL/AGAR DIL: CPT | Performed by: INTERNAL MEDICINE

## 2018-04-25 ENCOUNTER — APPOINTMENT (OUTPATIENT)
Dept: WOUND CARE | Facility: HOSPITAL | Age: 73
End: 2018-04-25
Attending: CLINICAL NURSE SPECIALIST
Payer: MEDICARE

## 2018-04-25 DIAGNOSIS — L89.153 DECUBITUS ULCER OF COCCYGEAL REGION, STAGE 3 (HCC): Primary | ICD-10-CM

## 2018-04-25 PROCEDURE — 99212 OFFICE O/P EST SF 10 MIN: CPT | Performed by: CLINICAL NURSE SPECIALIST

## 2018-04-25 NOTE — PROGRESS NOTES
Subjective    Chief Complaint  This information was obtained from the patient  The patient was seen today for follow up and management of difficult to heal wound on her sacral area. Patient states that she is being seen by a home health nurse 3x/week.  02/2 8-14-17:  Patient was admitted to the hospital for UTI, fever and sacral pressure wound/diarrhea. Patient diagnosed with Clostridium difficile and treated with antibiotics.    Patient currently resides at [facility] and transported to  Patient sacral wound tunneling has increased to 2 cm. Small amount of bleeding noted. Applied pressure to wound site with bleeding resolved.   Instructed patient and spouse that Esme Naval should be applied at each dressing change 2 times per week by home alexandro Patient to follow-up in outpatient wound clinic in 2 weeks for wound care dressing changes, monitoring for signs and symptoms of infection, monitoring for wound healing.   Patient verbalized she did not send wound VAC back to [facility]  and wound VAC was still pl

## 2018-05-17 ENCOUNTER — NURSE ONLY (OUTPATIENT)
Dept: WOUND CARE | Facility: HOSPITAL | Age: 73
End: 2018-05-17
Attending: CLINICAL NURSE SPECIALIST
Payer: MEDICARE

## 2018-05-17 DIAGNOSIS — L89.153 DECUBITUS ULCER OF COCCYGEAL REGION, STAGE 3 (HCC): Primary | ICD-10-CM

## 2018-05-17 PROCEDURE — 99212 OFFICE O/P EST SF 10 MIN: CPT | Performed by: CLINICAL NURSE SPECIALIST

## 2018-05-17 PROCEDURE — 97605 NEG PRS WND THER DME<=50SQCM: CPT

## 2018-05-17 NOTE — PROGRESS NOTES
Subjective    Chief Complaint  This information was obtained from the patient  The patient was seen today for follow up and management of difficult to heal wound on her sacral area.  05/17/2018 no concerns for today    Allergies  Cymbalta (Reaction: itching 8-14-17: Patient was admitted to the hospital for UTI, fever and sacral pressure wound/diarrhea. Patient diagnosed with Clostridium difficile and treated with antibiotics.  Patient currently resides at [facility] and [facility] X75.376: Pressure ulcer of sacral region, stage 3  L97.519: Non-pressure chronic ulcer of other part of right foot with unspecified severity        Patient's sacral wound granular and tunneling. Fibracol to enhance collagen production for wound healing.  Pa Applied topical product to cirilo-wound area avoiding wound base. using Skin prep (1)   Applied Primary Wound Dressing.  using Fibracol (1)   Negative pressure wound therapy  Negative pressure system used: using [employer] Acti-VAC (1)   Number of dressings removed

## 2018-05-22 ENCOUNTER — TELEPHONE (OUTPATIENT)
Dept: GASTROENTEROLOGY | Facility: CLINIC | Age: 73
End: 2018-05-22

## 2018-05-22 NOTE — TELEPHONE ENCOUNTER
Please have patient increase MiraLAX twice daily for 1 week or until she has a good bowel movement. Thank you.

## 2018-05-22 NOTE — TELEPHONE ENCOUNTER
I spoke to the pt who is having issues with constipation    She states she has small pellets of stool in her colostomy bag which  the bag is mostly empty,  and large hard stool that is difficult to pass out of her rectum    She states she has been constipa

## 2018-05-22 NOTE — TELEPHONE ENCOUNTER
Pt states she is experiencing constipation, hard stool, burning sensation in the abdomen. Pt states miralax is not working.  Please call thank you [phone]

## 2018-05-23 NOTE — TELEPHONE ENCOUNTER
The pt was given Dr Ofelia Barnhart recommendation to increase the Miralax to BID for a week.  She will call me back to let me know how she is

## 2018-06-07 ENCOUNTER — APPOINTMENT (OUTPATIENT)
Dept: NUCLEAR MEDICINE | Facility: HOSPITAL | Age: 73
End: 2018-06-07
Attending: EMERGENCY MEDICINE
Payer: MEDICARE

## 2018-06-07 ENCOUNTER — APPOINTMENT (OUTPATIENT)
Dept: GENERAL RADIOLOGY | Facility: HOSPITAL | Age: 73
End: 2018-06-07
Attending: EMERGENCY MEDICINE
Payer: MEDICARE

## 2018-06-07 ENCOUNTER — APPOINTMENT (OUTPATIENT)
Dept: CT IMAGING | Facility: HOSPITAL | Age: 73
End: 2018-06-07
Attending: EMERGENCY MEDICINE
Payer: MEDICARE

## 2018-06-07 ENCOUNTER — HOSPITAL ENCOUNTER (OUTPATIENT)
Facility: HOSPITAL | Age: 73
Setting detail: OBSERVATION
LOS: 1 days | Discharge: HOME HEALTH | End: 2018-06-09
Attending: EMERGENCY MEDICINE | Admitting: INTERNAL MEDICINE
Payer: MEDICARE

## 2018-06-07 DIAGNOSIS — I48.91 ATRIAL FIBRILLATION, NEW ONSET (HCC): ICD-10-CM

## 2018-06-07 DIAGNOSIS — R06.00 DYSPNEA, UNSPECIFIED TYPE: Primary | ICD-10-CM

## 2018-06-07 PROBLEM — N39.0 INFECTION DUE TO URETHRAL CATHETER (HCC): Status: RESOLVED | Noted: 2017-11-09 | Resolved: 2018-06-07

## 2018-06-07 PROBLEM — T83.511A URINARY TRACT INFECTION ASSOCIATED WITH CATHETERIZATION OF URINARY TRACT, UNSPECIFIED INDWELLING URINARY CATHETER TYPE, INITIAL ENCOUNTER (HCC): Status: RESOLVED | Noted: 2017-11-09 | Resolved: 2018-06-07

## 2018-06-07 PROBLEM — N39.0 URINARY TRACT INFECTION ASSOCIATED WITH CATHETERIZATION OF URINARY TRACT, UNSPECIFIED INDWELLING URINARY CATHETER TYPE, INITIAL ENCOUNTER (HCC): Status: RESOLVED | Noted: 2017-11-09 | Resolved: 2018-06-07

## 2018-06-07 PROBLEM — L24.A9 DRAINAGE FROM WOUND: Status: RESOLVED | Noted: 2017-08-02 | Resolved: 2018-06-07

## 2018-06-07 PROBLEM — L89.154 DECUBITUS ULCER OF SACRAL REGION, STAGE 4 (HCC): Status: RESOLVED | Noted: 2017-08-12 | Resolved: 2018-06-07

## 2018-06-07 PROBLEM — E83.42 HYPOMAGNESEMIA: Status: RESOLVED | Noted: 2017-11-09 | Resolved: 2018-06-07

## 2018-06-07 PROBLEM — T83.518A INFECTION DUE TO URETHRAL CATHETER: Status: RESOLVED | Noted: 2017-11-09 | Resolved: 2018-06-07

## 2018-06-07 PROBLEM — R55 SYNCOPE, NEAR: Status: RESOLVED | Noted: 2017-09-20 | Resolved: 2018-06-07

## 2018-06-07 PROBLEM — A41.9 SEPSIS, DUE TO UNSPECIFIED ORGANISM: Status: RESOLVED | Noted: 2017-11-09 | Resolved: 2018-06-07

## 2018-06-07 PROBLEM — N39.0 URINARY TRACT INFECTION WITHOUT HEMATURIA: Status: RESOLVED | Noted: 2017-08-12 | Resolved: 2018-06-07

## 2018-06-07 PROBLEM — T83.511A URINARY TRACT INFECTION ASSOCIATED WITH CATHETERIZATION OF URINARY TRACT, UNSPECIFIED INDWELLING URINARY CATHETER TYPE, INITIAL ENCOUNTER: Status: RESOLVED | Noted: 2017-11-09 | Resolved: 2018-06-07

## 2018-06-07 PROBLEM — N39.0 URINARY TRACT INFECTION WITHOUT HEMATURIA, SITE UNSPECIFIED: Status: RESOLVED | Noted: 2017-08-12 | Resolved: 2018-06-07

## 2018-06-07 PROBLEM — M54.16 LUMBAR RADICULITIS: Status: RESOLVED | Noted: 2017-10-10 | Resolved: 2018-06-07

## 2018-06-07 PROBLEM — T83.518A INFECTION DUE TO URETHRAL CATHETER (HCC): Status: RESOLVED | Noted: 2017-11-09 | Resolved: 2018-06-07

## 2018-06-07 PROBLEM — N39.0 URINARY TRACT INFECTION ASSOCIATED WITH CATHETERIZATION OF URINARY TRACT, UNSPECIFIED INDWELLING URINARY CATHETER TYPE, INITIAL ENCOUNTER: Status: RESOLVED | Noted: 2017-11-09 | Resolved: 2018-06-07

## 2018-06-07 PROBLEM — L89.153 DECUBITUS ULCER OF SACRAL REGION, STAGE 3 (HCC): Status: RESOLVED | Noted: 2017-11-09 | Resolved: 2018-06-07

## 2018-06-07 PROBLEM — N39.0 INFECTION DUE TO URETHRAL CATHETER: Status: RESOLVED | Noted: 2017-11-09 | Resolved: 2018-06-07

## 2018-06-07 LAB
ANION GAP SERPL CALC-SCNC: 8 MMOL/L (ref 0–18)
BASOPHILS # BLD: 0 K/UL (ref 0–0.2)
BASOPHILS NFR BLD: 1 %
BILIRUB UR QL: NEGATIVE
BNP SERPL-MCNC: 118 PG/ML (ref 0–100)
BUN SERPL-MCNC: 23 MG/DL (ref 8–20)
BUN/CREAT SERPL: 34.8 (ref 10–20)
CALCIUM SERPL-MCNC: 9.5 MG/DL (ref 8.5–10.5)
CHLORIDE SERPL-SCNC: 108 MMOL/L (ref 95–110)
CO2 SERPL-SCNC: 26 MMOL/L (ref 22–32)
COLOR UR: YELLOW
CREAT SERPL-MCNC: 0.66 MG/DL (ref 0.5–1.5)
D DIMER PPP FEU-MCNC: 0.79 MCG/ML (ref ?–0.75)
EOSINOPHIL # BLD: 0.1 K/UL (ref 0–0.7)
EOSINOPHIL NFR BLD: 2 %
ERYTHROCYTE [DISTWIDTH] IN BLOOD BY AUTOMATED COUNT: 16.4 % (ref 11–15)
GLUCOSE SERPL-MCNC: 207 MG/DL (ref 70–99)
GLUCOSE UR-MCNC: NEGATIVE MG/DL
HCT VFR BLD AUTO: 42.4 % (ref 35–48)
HGB BLD-MCNC: 13.8 G/DL (ref 12–16)
LYMPHOCYTES # BLD: 1.6 K/UL (ref 1–4)
LYMPHOCYTES NFR BLD: 26 %
MCH RBC QN AUTO: 25.8 PG (ref 27–32)
MCHC RBC AUTO-ENTMCNC: 32.6 G/DL (ref 32–37)
MCV RBC AUTO: 79 FL (ref 80–100)
MONOCYTES # BLD: 0.2 K/UL (ref 0–1)
MONOCYTES NFR BLD: 4 %
NEUTROPHILS # BLD AUTO: 4.3 K/UL (ref 1.8–7.7)
NEUTROPHILS NFR BLD: 69 %
NITRITE UR QL STRIP.AUTO: POSITIVE
OSMOLALITY UR CALC.SUM OF ELEC: 304 MOSM/KG (ref 275–295)
PH UR: 6 [PH] (ref 5–8)
PLATELET # BLD AUTO: 194 K/UL (ref 140–400)
PMV BLD AUTO: 9.1 FL (ref 7.4–10.3)
POTASSIUM SERPL-SCNC: 4.8 MMOL/L (ref 3.3–5.1)
PROT UR-MCNC: 30 MG/DL
RBC # BLD AUTO: 5.36 M/UL (ref 3.7–5.4)
RBC #/AREA URNS AUTO: 4 /HPF
SODIUM SERPL-SCNC: 142 MMOL/L (ref 136–144)
SP GR UR STRIP: 1.02 (ref 1–1.03)
TROPONIN I SERPL-MCNC: 0.01 NG/ML (ref ?–0.03)
UROBILINOGEN UR STRIP-ACNC: <2
VIT C UR-MCNC: 20 MG/DL
WBC # BLD AUTO: 6.2 K/UL (ref 4–11)
WBC #/AREA URNS AUTO: 23 /HPF

## 2018-06-07 PROCEDURE — 85379 FIBRIN DEGRADATION QUANT: CPT | Performed by: EMERGENCY MEDICINE

## 2018-06-07 PROCEDURE — 81001 URINALYSIS AUTO W/SCOPE: CPT | Performed by: EMERGENCY MEDICINE

## 2018-06-07 PROCEDURE — 84484 ASSAY OF TROPONIN QUANT: CPT | Performed by: EMERGENCY MEDICINE

## 2018-06-07 PROCEDURE — 93005 ELECTROCARDIOGRAM TRACING: CPT

## 2018-06-07 PROCEDURE — 71045 X-RAY EXAM CHEST 1 VIEW: CPT | Performed by: EMERGENCY MEDICINE

## 2018-06-07 PROCEDURE — 80048 BASIC METABOLIC PNL TOTAL CA: CPT

## 2018-06-07 PROCEDURE — 85025 COMPLETE CBC W/AUTO DIFF WBC: CPT | Performed by: EMERGENCY MEDICINE

## 2018-06-07 PROCEDURE — 85025 COMPLETE CBC W/AUTO DIFF WBC: CPT

## 2018-06-07 PROCEDURE — 99285 EMERGENCY DEPT VISIT HI MDM: CPT

## 2018-06-07 PROCEDURE — 93010 ELECTROCARDIOGRAM REPORT: CPT | Performed by: EMERGENCY MEDICINE

## 2018-06-07 PROCEDURE — 36415 COLL VENOUS BLD VENIPUNCTURE: CPT

## 2018-06-07 PROCEDURE — 78582 LUNG VENTILAT&PERFUS IMAGING: CPT | Performed by: EMERGENCY MEDICINE

## 2018-06-07 PROCEDURE — 87086 URINE CULTURE/COLONY COUNT: CPT | Performed by: EMERGENCY MEDICINE

## 2018-06-07 PROCEDURE — 83880 ASSAY OF NATRIURETIC PEPTIDE: CPT | Performed by: EMERGENCY MEDICINE

## 2018-06-07 PROCEDURE — 84484 ASSAY OF TROPONIN QUANT: CPT

## 2018-06-07 PROCEDURE — 80048 BASIC METABOLIC PNL TOTAL CA: CPT | Performed by: EMERGENCY MEDICINE

## 2018-06-07 NOTE — ED INITIAL ASSESSMENT (HPI)
c/o some mild dyspnea and L sided chest discomfort for approx 2 months - examined by Dr Ace Ardon today and EKG noted new onset atrial fibrillation

## 2018-06-07 NOTE — ED PROVIDER NOTES
Patient Seen in: [facility] AND [facility] Emergency Department    History   Patient presents with:  Arrythmia/Palpitations (cardiovascular)    Stated Complaint: new onset afib    HPI    Patient presents to the emergency department with her  with signific OTHER DISEASES     cataracts   • OTHER DISEASES     secondary poycythemia   • OTHER DISEASES     lumbar spinal stenosis   • OTHER DISEASES     duplicated left renal collecting system   • OTHER DISEASES     uterine fibroids   • OTHER DISEASES     ulcerative Aline  10/27/2014: PATIENT DOCUMENTED NOT TO HAVE EXPERIENCED ANY* N/A      Comment: Procedure: CERVICAL EPIDURAL;  Surgeon:                Bre Chirinos MD;  Location: [place] [address] MANAGEMENT  12/8/2014: PATIENT DOCUMENTED NOT TO HAV Calcium 40 MG Oral Tab,  Take 40 mg by mouth nightly. betamethasone dipropionate 0.05 % External Cream,  Apply one application to lower back QD, BID to PRN. Only use for two weeks then take a one week break.  Reapply if neccessary   pregabalin (LYRICA) Device: None (Room air)    Current:BP 95/73   Pulse 79   Temp 97.9 °F (36.6 °C) (Oral)   Resp 18   Ht 162.6 cm (5' 4\")   Wt 59 kg   SpO2 95%   BMI 22.31 kg/m²         Physical Exam  Constitutional:  Alert, well nourished adult lying in bed in no distress. urine infection she states that actually she always has white cells in her urine and she was told by her urologist not to get antibiotics and this is clearly infected or if culture is positive. We will hold off on the antibiotics at this time.   She also t Abnormal            Final result                 Please view results for these tests on the individual orders.    RAINBOW DRAW BLUE   RAINBOW DRAW LAVENDER   RAINBOW DRAW DARK GREEN   RAINBOW DRAW LIGHT GREEN   RAINBOW DRAW GOLD   RAINBOW DRAW LAVENDER TALL

## 2018-06-07 NOTE — ED NOTES
Care assumed from triage. Patient presents with c/o SOB and L-sided chest discomfort x 2 months. Pt seen by PCP today and was told she was in afib, sent to ER. Cardiac monitoring and cont pulse ox initiated. MD at bedside.

## 2018-06-08 ENCOUNTER — APPOINTMENT (OUTPATIENT)
Dept: CV DIAGNOSTICS | Facility: HOSPITAL | Age: 73
End: 2018-06-08
Attending: INTERNAL MEDICINE
Payer: MEDICARE

## 2018-06-08 ENCOUNTER — PRIOR ORIGINAL RECORDS (OUTPATIENT)
Dept: OTHER | Age: 73
End: 2018-06-08

## 2018-06-08 PROBLEM — I48.91 ATRIAL FIBRILLATION, NEW ONSET (HCC): Status: ACTIVE | Noted: 2018-06-08

## 2018-06-08 PROBLEM — R06.00 DYSPNEA, UNSPECIFIED TYPE: Status: ACTIVE | Noted: 2018-06-08

## 2018-06-08 LAB
ANION GAP SERPL CALC-SCNC: 5 MMOL/L (ref 0–18)
BASOPHILS # BLD: 0 K/UL (ref 0–0.2)
BASOPHILS NFR BLD: 1 %
BUN SERPL-MCNC: 27 MG/DL (ref 8–20)
BUN/CREAT SERPL: 42.9 (ref 10–20)
CALCIUM SERPL-MCNC: 9 MG/DL (ref 8.5–10.5)
CHLORIDE SERPL-SCNC: 110 MMOL/L (ref 95–110)
CO2 SERPL-SCNC: 25 MMOL/L (ref 22–32)
CREAT SERPL-MCNC: 0.63 MG/DL (ref 0.5–1.5)
EOSINOPHIL # BLD: 0.3 K/UL (ref 0–0.7)
EOSINOPHIL NFR BLD: 5 %
ERYTHROCYTE [DISTWIDTH] IN BLOOD BY AUTOMATED COUNT: 16 % (ref 11–15)
GLUCOSE SERPL-MCNC: 72 MG/DL (ref 70–99)
HCT VFR BLD AUTO: 37.5 % (ref 35–48)
HGB BLD-MCNC: 12.4 G/DL (ref 12–16)
LYMPHOCYTES # BLD: 3 K/UL (ref 1–4)
LYMPHOCYTES NFR BLD: 45 %
MCH RBC QN AUTO: 25.8 PG (ref 27–32)
MCHC RBC AUTO-ENTMCNC: 33 G/DL (ref 32–37)
MCV RBC AUTO: 78.1 FL (ref 80–100)
MONOCYTES # BLD: 0.6 K/UL (ref 0–1)
MONOCYTES NFR BLD: 9 %
NEUTROPHILS # BLD AUTO: 2.8 K/UL (ref 1.8–7.7)
NEUTROPHILS NFR BLD: 41 %
OSMOLALITY UR CALC.SUM OF ELEC: 294 MOSM/KG (ref 275–295)
PLATELET # BLD AUTO: 173 K/UL (ref 140–400)
PMV BLD AUTO: 9.2 FL (ref 7.4–10.3)
POTASSIUM SERPL-SCNC: 4.3 MMOL/L (ref 3.3–5.1)
RBC # BLD AUTO: 4.81 M/UL (ref 3.7–5.4)
SODIUM SERPL-SCNC: 140 MMOL/L (ref 136–144)
TSH SERPL-ACNC: 2.55 UIU/ML (ref 0.45–5.33)
WBC # BLD AUTO: 6.8 K/UL (ref 4–11)

## 2018-06-08 PROCEDURE — 93306 TTE W/DOPPLER COMPLETE: CPT | Performed by: INTERNAL MEDICINE

## 2018-06-08 PROCEDURE — 85025 COMPLETE CBC W/AUTO DIFF WBC: CPT | Performed by: INTERNAL MEDICINE

## 2018-06-08 PROCEDURE — 97607 NEG PRS WND THR NDME<=50SQCM: CPT

## 2018-06-08 PROCEDURE — 84443 ASSAY THYROID STIM HORMONE: CPT | Performed by: INTERNAL MEDICINE

## 2018-06-08 PROCEDURE — 96365 THER/PROPH/DIAG IV INF INIT: CPT

## 2018-06-08 PROCEDURE — 80048 BASIC METABOLIC PNL TOTAL CA: CPT | Performed by: INTERNAL MEDICINE

## 2018-06-08 RX ORDER — IBUPROFEN 600 MG/1
600 TABLET ORAL EVERY 8 HOURS PRN
Status: ON HOLD | COMMUNITY
End: 2018-06-09

## 2018-06-08 RX ORDER — METOPROLOL SUCCINATE 25 MG/1
25 TABLET, EXTENDED RELEASE ORAL DAILY
Status: DISCONTINUED | OUTPATIENT
Start: 2018-06-08 | End: 2018-06-09

## 2018-06-08 RX ORDER — HYDROCODONE BITARTRATE AND ACETAMINOPHEN 5; 325 MG/1; MG/1
1 TABLET ORAL EVERY 4 HOURS PRN
Status: DISCONTINUED | OUTPATIENT
Start: 2018-06-08 | End: 2018-06-09

## 2018-06-08 RX ORDER — SODIUM CHLORIDE 0.9 % (FLUSH) 0.9 %
3 SYRINGE (ML) INJECTION AS NEEDED
Status: DISCONTINUED | OUTPATIENT
Start: 2018-06-08 | End: 2018-06-09

## 2018-06-08 RX ORDER — PREGABALIN 50 MG/1
50 CAPSULE ORAL 2 TIMES DAILY
Status: DISCONTINUED | OUTPATIENT
Start: 2018-06-08 | End: 2018-06-09

## 2018-06-08 RX ORDER — ONDANSETRON 2 MG/ML
4 INJECTION INTRAMUSCULAR; INTRAVENOUS EVERY 6 HOURS PRN
Status: DISCONTINUED | OUTPATIENT
Start: 2018-06-08 | End: 2018-06-09

## 2018-06-08 RX ORDER — CETIRIZINE HYDROCHLORIDE 10 MG/1
10 TABLET ORAL DAILY
Status: DISCONTINUED | OUTPATIENT
Start: 2018-06-08 | End: 2018-06-09

## 2018-06-08 RX ORDER — ASPIRIN 325 MG
325 TABLET ORAL DAILY
Status: ON HOLD | COMMUNITY
End: 2018-06-09

## 2018-06-08 RX ORDER — ACETAMINOPHEN 325 MG/1
650 TABLET ORAL EVERY 4 HOURS PRN
Status: DISCONTINUED | OUTPATIENT
Start: 2018-06-08 | End: 2018-06-09

## 2018-06-08 RX ORDER — PREGABALIN 75 MG/1
75 CAPSULE ORAL 2 TIMES DAILY
Status: DISCONTINUED | OUTPATIENT
Start: 2018-06-08 | End: 2018-06-09

## 2018-06-08 RX ORDER — NITROGLYCERIN 0.4 MG/1
0.4 TABLET SUBLINGUAL EVERY 5 MIN PRN
Status: DISCONTINUED | OUTPATIENT
Start: 2018-06-08 | End: 2018-06-09

## 2018-06-08 RX ORDER — METOCLOPRAMIDE HYDROCHLORIDE 5 MG/ML
10 INJECTION INTRAMUSCULAR; INTRAVENOUS EVERY 8 HOURS PRN
Status: DISCONTINUED | OUTPATIENT
Start: 2018-06-08 | End: 2018-06-09

## 2018-06-08 RX ORDER — IBUPROFEN 400 MG/1
600 TABLET ORAL EVERY 8 HOURS PRN
Status: DISCONTINUED | OUTPATIENT
Start: 2018-06-08 | End: 2018-06-08

## 2018-06-08 RX ORDER — SODIUM CHLORIDE 9 MG/ML
INJECTION, SOLUTION INTRAVENOUS
Status: COMPLETED
Start: 2018-06-08 | End: 2018-06-08

## 2018-06-08 RX ORDER — OXYBUTYNIN CHLORIDE 10 MG/1
10 TABLET, EXTENDED RELEASE ORAL DAILY
Status: DISCONTINUED | OUTPATIENT
Start: 2018-06-08 | End: 2018-06-08

## 2018-06-08 RX ORDER — MELATONIN
325
Status: DISCONTINUED | OUTPATIENT
Start: 2018-06-08 | End: 2018-06-09

## 2018-06-08 RX ORDER — ATORVASTATIN CALCIUM 40 MG/1
40 TABLET, FILM COATED ORAL NIGHTLY
Status: DISCONTINUED | OUTPATIENT
Start: 2018-06-08 | End: 2018-06-09

## 2018-06-08 RX ORDER — POLYETHYLENE GLYCOL 3350 17 G/17G
17 POWDER, FOR SOLUTION ORAL DAILY PRN
Status: DISCONTINUED | OUTPATIENT
Start: 2018-06-08 | End: 2018-06-09

## 2018-06-08 RX ORDER — VENLAFAXINE HYDROCHLORIDE 75 MG/1
75 CAPSULE, EXTENDED RELEASE ORAL
Status: DISCONTINUED | OUTPATIENT
Start: 2018-06-08 | End: 2018-06-09

## 2018-06-08 RX ORDER — ACETAMINOPHEN 325 MG/1
650 TABLET ORAL EVERY 6 HOURS PRN
Status: DISCONTINUED | OUTPATIENT
Start: 2018-06-08 | End: 2018-06-09

## 2018-06-08 RX ORDER — SODIUM PHOSPHATE, DIBASIC AND SODIUM PHOSPHATE, MONOBASIC 7; 19 G/133ML; G/133ML
1 ENEMA RECTAL ONCE AS NEEDED
Status: DISCONTINUED | OUTPATIENT
Start: 2018-06-08 | End: 2018-06-09

## 2018-06-08 RX ORDER — HYDROCODONE BITARTRATE AND ACETAMINOPHEN 5; 325 MG/1; MG/1
2 TABLET ORAL EVERY 4 HOURS PRN
Status: DISCONTINUED | OUTPATIENT
Start: 2018-06-08 | End: 2018-06-09

## 2018-06-08 RX ORDER — ASPIRIN 325 MG
325 TABLET ORAL DAILY
Status: DISCONTINUED | OUTPATIENT
Start: 2018-06-08 | End: 2018-06-08

## 2018-06-08 RX ORDER — ACETAMINOPHEN 500 MG
1000 TABLET ORAL EVERY 8 HOURS
Status: DISCONTINUED | OUTPATIENT
Start: 2018-06-08 | End: 2018-06-09

## 2018-06-08 RX ORDER — BISACODYL 10 MG
10 SUPPOSITORY, RECTAL RECTAL
Status: DISCONTINUED | OUTPATIENT
Start: 2018-06-08 | End: 2018-06-09

## 2018-06-08 RX ORDER — PREDNISONE 1 MG/1
5 TABLET ORAL
Status: DISCONTINUED | OUTPATIENT
Start: 2018-06-08 | End: 2018-06-09

## 2018-06-08 RX ORDER — EZETIMIBE 10 MG/1
10 TABLET ORAL NIGHTLY
Status: DISCONTINUED | OUTPATIENT
Start: 2018-06-08 | End: 2018-06-09

## 2018-06-08 RX ORDER — DOCUSATE SODIUM 100 MG/1
100 CAPSULE, LIQUID FILLED ORAL 2 TIMES DAILY
Status: DISCONTINUED | OUTPATIENT
Start: 2018-06-08 | End: 2018-06-09

## 2018-06-08 NOTE — PROGRESS NOTES
06/08/18 1331   Clinical Encounter Type   Visited With Patient   Routine Visit Introduction  ([facility])   Continue Visiting Yes   Referral From Patient   Referral To    [facility] Encounters   Spiritual Requests During Visit / Minh Melendez

## 2018-06-08 NOTE — HISTORICAL OFFICE NOTE
Masha Santos  : 1945  ACCOUNT:  [id number]  [phone]  PCP: Dr. Jah Alvarez) Saw Carlin DATE: 2018  DICTATED BY:  [Dr. Leighton Sanchez: [Followup of .  CAD, of native vessels, nonobstructive.]    HPI:    [On  but quit, years ago and occasionally. CAFFEINE: >5 cups daily and tea and soda. ALCOHOL: denies drinking. EXERCISE: active. DIET: no special diet. MARITAL STATUS: . RESIDENCE: lives in home with .      ALLERGIES: Iodides - CLASS and Penicillin 40MG      1 TABLET AT BEDTIME.                     02/03/17 *Metoprolol Succinate 25MG      1 TABLET DAILY.                          09/27/16 Ibuprofen             200MG     1 capsule as needed for pain             09/27/16 Lomotil               2.5-0.02

## 2018-06-08 NOTE — PLAN OF CARE
CARDIOVASCULAR - ADULT    • Maintains optimal cardiac output and hemodynamic stability Progressing    • Absence of cardiac arrhythmias or at baseline Progressing        GENITOURINARY - ADULT    • Absence of urinary retention Emery Nieto

## 2018-06-08 NOTE — HOME CARE LIAISON
Patient is current with [facility]. Met with patient at the bedside. Patient agreeable to resume home health services. Patient's  to bring home vac, to be applied upon discharge home.

## 2018-06-08 NOTE — CONSULTS
[facility]    Report of Consultation    Emery Baugh Patient Status:  Inpatient    1945 MRN [id number]   Location [facility] 3W/SW Attending Xuan Harding, DO   Hosp Day # 0 PCP Ricarda Landeros MD     Date of Admis LAD disease with the other coronaries normal.  Her troponin was normal.  VQ scan negative. Chest x-ray cardiomegaly with no heart failure. This morning she is feeling better. She also reports a history of bleeding and bruising easily.   Past Medical Hist lymphocytic colitis  1/9/09: CYSTOURETHROSCOPY      Comment: in office  12/24/2016: EGD N/A      Comment: Procedure: ESOPHAGOGASTRODUODENOSCOPY (EGD); Surgeon: Parish Pierce MD;  Location:                [address] [place] [address] ENDOSCOPY  10/27/2014:  Feli Taveras EPIDURAL;  Surgeon:                Vinh Galicia MD;  Location: [place] [address] MANAGEMENT  12/8/2014: PATIENT Chiqui Peed PREOPERATIVE ORDER FOR IV ANT* N/A      Comment: Procedure: CERVICAL EPIDURAL;  Surgeon:                Antoni Salvador Oral Q4H PRN   docusate sodium (COLACE) cap 100 mg 100 mg Oral BID   PEG 3350 (MIRALAX) powder packet 17 g 17 g Oral Daily PRN   bisacodyl (DULCOLAX) rectal suppository 10 mg 10 mg Rectal Daily PRN   FLEET ENEMA (FLEET) 7-19 GM/118ML enema 133 mL 1 enema R pregabalin (LYRICA) 50 MG Oral Cap Take 1 capsule (50 mg total) by mouth 2 (two) times daily.  for 30 days   EZETIMIBE 10 MG Oral Tab TAKE 1 TABLET ONCE DAILY (Patient taking differently: TAKE 1 TABLET ONCE nightly)       Allergies          Cymbalta [Dulo General:  Alert and oriented x 3. No apparent distress. No respiratory or constitutional distress. HEENT:  Normocephalic, anicteric sclera, neck supple, no thyromegaly or adenopathy. Neck:  No JVD, carotids 2+, no bruits.   Cardiac:  Regular rate and The study is grossly unchanged the previous study dated 9/20/2017     Dictated by (CST): Homero Aguilar MD on 6/07/2018 at 21:19     Approved by (CST): Homero Aguilar MD on 6/07/2018 at 21:44            Thank you for allowing me to participate in t

## 2018-06-08 NOTE — PLAN OF CARE
Problem: Patient Centered Care  Goal: Patient preferences are identified and integrated in the patient's plan of care  Interventions:  - What would you like us to know as we care for you?  I can do my own self cath  - Provide timely, complete, and accurate Cessation handout, if applicable  - Encourage broncho-pulmonary hygiene including cough, deep breathe, Incentive Spirometry  - Assess the need for suctioning and perform as needed  - Assess and instruct to report SOB or any respiratory difficulty  - Respir Patient/Family Short Term Goal  Patient's Short Term Goal: No SOB    Interventions:   - cough and deep breath  - See additional Care Plan goals for specific interventions   Outcome: Progressing  Patient in a fib, rates controlled, started on xarelto,  [place]

## 2018-06-08 NOTE — DIETARY NOTE
ADULT NUTRITION INITIAL ASSESSMENT    Pt is at moderate nutrition risk. Pt does not meet malnutrition criteria.       RECOMMENDATIONS TO MD:  See Nutrition Intervention     NUTRITION DIAGNOSIS/PROBLEM:  Increased nutrient needs related to increased demand • OTHER DISEASES     spastic paraparesis   • OTHER DISEASES     cataracts   • OTHER DISEASES     secondary poycythemia   • OTHER DISEASES     lumbar spinal stenosis   • OTHER DISEASES     duplicated left renal collecting system   • OTHER DISEASES     ut Accumulation: none per RN documentation   - Skin Integrity: breakdown and RN documentation reviewed.   - Roscoe score 14    NUTRITION PRESCRIPTION:  Diet: Cardiac  Oral Supplements: daily  Adds 350 kcal & 20 gm protein  ESTIMATED NUTRITION NEEDS:  Calories:

## 2018-06-08 NOTE — WOUND PROGRESS NOTE
WOUND CARE NOTE      PLAN   Recommendations:  Dietary consult for recommendations for nutrition to optimize wound healing  VAC standing orders  VAC troubleshooting instructions on the machine  Staff to monitor VAC seal and repair seal if indicated.   Sury Wilkinson 06/08/2018    06/08/2018   K 4.3 06/08/2018    06/08/2018   CO2 25 06/08/2018   GLU 72 06/08/2018   CA 9.0 06/08/2018   ALB 2.9 (L) 11/03/2017   ALKPHO 98 11/03/2017   BILT 0.43 11/03/2017   TP 6.4 11/03/2017   AST 21 02/17/2018   ALT 16 02/17/

## 2018-06-08 NOTE — CM/SW NOTE
06/08/18 1100   CM/SW Screening   Patient's [address]   Patient lives with Spouse   Patient Status Prior to Admission   Independent with ADLs and Mobility No   Services in place prior to admission [address] Provider Inf

## 2018-06-08 NOTE — CM/SW NOTE
6/8CM-MD orders received in regards to discharge planning-please verify coverage of NOAC for pt prior to d/c. This Writer spoke with Gretchen BRUMFIELD in regards to the above.  Gretchen Nelson informed this Writer that she has sent the prescriptions down to [facility] and doesn

## 2018-06-09 VITALS
OXYGEN SATURATION: 98 % | RESPIRATION RATE: 16 BRPM | TEMPERATURE: 98 F | HEIGHT: 64 IN | HEART RATE: 85 BPM | WEIGHT: 136.31 LBS | SYSTOLIC BLOOD PRESSURE: 105 MMHG | BODY MASS INDEX: 23.27 KG/M2 | DIASTOLIC BLOOD PRESSURE: 60 MMHG

## 2018-06-09 LAB
BACTERIA UR QL AUTO: NEGATIVE /HPF
BILIRUB UR QL: NEGATIVE
COLOR UR: YELLOW
GLUCOSE UR-MCNC: NEGATIVE MG/DL
KETONES UR-MCNC: NEGATIVE MG/DL
NITRITE UR QL STRIP.AUTO: NEGATIVE
PH UR: 5 [PH] (ref 5–8)
PROT UR-MCNC: NEGATIVE MG/DL
RBC #/AREA URNS AUTO: 35 /HPF
SP GR UR STRIP: 1.01 (ref 1–1.03)
UROBILINOGEN UR STRIP-ACNC: <2
VIT C UR-MCNC: NEGATIVE MG/DL
WBC #/AREA URNS AUTO: 2 /HPF

## 2018-06-09 PROCEDURE — 81001 URINALYSIS AUTO W/SCOPE: CPT | Performed by: NURSE PRACTITIONER

## 2018-06-09 NOTE — PROGRESS NOTES
[facility] Hospitalist Progress Note     PCP: Deidre Nicolas MD    Chief Complaint: follow-up    Overnight/Interim Events:      SUBJECTIVE:  Pt laying in bed, no cp/palpitations. About to straight cath. Blood in urine.     OBJECTIVE:  Temp:  [97.7 °F (36 Oral Daily   • pregabalin  75 mg Oral BID   • Venlafaxine HCl ER  75 mg Oral Daily   • cetirizine  10 mg Oral Daily   • atorvastatin  40 mg Oral Nightly   • pregabalin  50 mg Oral BID   • acetaminophen  1,000 mg Oral Q8H   • rivaroxaban  20 mg Oral Daily w -echo to assess MR and WM  -per cards if symptoms or echo is abnormal  a  Lexiscan stress test should be ordered. .     Abnl UA, hematuria  -UA with nitrite positive, mod LE, wbc23, few bacteria.   Cx pending  -per pt she was told by Dr Parish Gutierrez never to take a

## 2018-06-09 NOTE — PLAN OF CARE
Problem: Patient Centered Care  Goal: Patient preferences are identified and integrated in the patient's plan of care  Interventions:  - What would you like us to know as we care for you?  I can do my own self cath  - Provide timely, complete, and accurate Cessation handout, if applicable  - Encourage broncho-pulmonary hygiene including cough, deep breathe, Incentive Spirometry  - Assess the need for suctioning and perform as needed  - Assess and instruct to report SOB or any respiratory difficulty  - Respir Patient/Family Short Term Goal  Patient's Short Term Goal: No SOB    Interventions:   - cough and deep breath  - See additional Care Plan goals for specific interventions   Outcome: Progressing

## 2018-06-09 NOTE — PLAN OF CARE
CARDIOVASCULAR - ADULT    • Maintains optimal cardiac output and hemodynamic stability Adequate for Discharge    • Absence of cardiac arrhythmias or at baseline Adequate for Discharge        GENITOURINARY - ADULT    • Absence of urinary retention Adequate

## 2018-06-09 NOTE — PROGRESS NOTES
[facility]    Progress Note    Vickie Beltre Patient Status:  Observation    1945 MRN [id number]   Location [facility] 3W/SW Attending Dilcia Tobias MD   Hosp Day # 1 PCP Urban Snow MD     Subjective 2017. 2. Otherwise, negative chest. No acute congestive failure or pulmonary edema.  No acute cardiopulmonary disease     Dictated by (CST): Sandra Ash MD on 6/07/2018 at 17:51     Approved by (CST): Sandra Ash MD on 6/07/2018 at 17:54

## 2018-06-09 NOTE — PLAN OF CARE
CARDIOVASCULAR - ADULT    • Maintains optimal cardiac output and hemodynamic stability Progressing    • Absence of cardiac arrhythmias or at baseline Progressing        GENITOURINARY - ADULT    • Absence of urinary retention Jerry Alvarado

## 2018-06-11 ENCOUNTER — TELEPHONE (OUTPATIENT)
Dept: CARDIOLOGY UNIT | Facility: HOSPITAL | Age: 73
End: 2018-06-11

## 2018-06-11 NOTE — TRANSITION NOTE
Dyspnea, unspecified type           resolved   vq neg for pe/ cxr no acute findings       Atrial fibrillation, new onset ([address]                Rates controlled with metoprolol                xarelto started 6/8- pt self caths- states she has some blood in capsule  Refills:  0     Venlafaxine HCl ER 75 MG Cp24  Commonly known as:  EFFEXOR-XR      TAKE 1 CAPSULE BY MOUTH DAILY   Quantity:  30 capsule  Refills:  9        STOP taking these medications    aspirin 325 MG Tabs        ibuprofen 600 MG Tabs  Commonl

## 2018-06-12 ENCOUNTER — OFFICE VISIT (OUTPATIENT)
Dept: CARDIOLOGY CLINIC | Facility: HOSPITAL | Age: 73
End: 2018-06-12
Attending: INTERNAL MEDICINE
Payer: MEDICARE

## 2018-06-12 VITALS — SYSTOLIC BLOOD PRESSURE: 113 MMHG | OXYGEN SATURATION: 94 % | HEART RATE: 84 BPM | DIASTOLIC BLOOD PRESSURE: 70 MMHG

## 2018-06-12 DIAGNOSIS — I48.91 ATRIAL FIBRILLATION (HCC): Primary | ICD-10-CM

## 2018-06-12 DIAGNOSIS — I48.91 ATRIAL FIBRILLATION, NEW ONSET (HCC): ICD-10-CM

## 2018-06-12 PROBLEM — I34.0 NON-RHEUMATIC MITRAL REGURGITATION: Status: ACTIVE | Noted: 2018-06-12

## 2018-06-12 PROBLEM — I10 ESSENTIAL HYPERTENSION: Status: ACTIVE | Noted: 2018-06-12

## 2018-06-12 PROCEDURE — 93010 ELECTROCARDIOGRAM REPORT: CPT | Performed by: CLINICAL NURSE SPECIALIST

## 2018-06-12 PROCEDURE — 93005 ELECTROCARDIOGRAM TRACING: CPT

## 2018-06-12 PROCEDURE — 99211 OFF/OP EST MAY X REQ PHY/QHP: CPT | Performed by: CLINICAL NURSE SPECIALIST

## 2018-06-12 PROCEDURE — 99214 OFFICE O/P EST MOD 30 MIN: CPT | Performed by: CLINICAL NURSE SPECIALIST

## 2018-06-12 NOTE — PATIENT INSTRUCTIONS
Continue current medications. Please call Atrial Fibrillation clinic if you experience palpitations, shortness of breath or elevated heart rates. Less than 2000 mg sodium/salt diet.  Common high sodium foods include frozen dinners, soups (not homema

## 2018-06-12 NOTE — PROGRESS NOTES
[address] [facility] Patient Status:  Outpatient    1945 MRN [id number]   Location MD Dr Armando Simpson is a 74year old female who presents to NA    /70   Pulse 84   SpO2 94%     Clinic weights: 1) unable to weigh    General appearance: alert, appears stated age and cooperative  Neck: no adenopathy, no carotid bruit, no JVD, supple, symmetrical, trachea midline and thyroid not enlarged, sym hospital, however was started on Xarelto. Since discharge, she did experience hematuria briefly and bleeding to her wound, but that has since stopped. She denies blood in her stool.  Reviewed purpose of Xarelto, risks of bleeding, stroke prevention and dise

## 2018-06-20 ENCOUNTER — APPOINTMENT (OUTPATIENT)
Dept: WOUND CARE | Facility: HOSPITAL | Age: 73
End: 2018-06-20
Payer: MEDICARE

## 2018-06-22 ENCOUNTER — PRIOR ORIGINAL RECORDS (OUTPATIENT)
Dept: OTHER | Age: 73
End: 2018-06-22

## 2018-06-22 ENCOUNTER — MYAURORA ACCOUNT LINK (OUTPATIENT)
Dept: OTHER | Age: 73
End: 2018-06-22

## 2018-06-22 LAB
BNP: 118 PMOL/L
BUN: 23 MG/DL
BUN: 27 MG/DL
CALCIUM: 9 MG/DL
CALCIUM: 9.5 MG/DL
CHLORIDE: 108 MEQ/L
CHLORIDE: 110 MEQ/L
CREATININE, SERUM: 0.63 MG/DL
CREATININE, SERUM: 0.66 MG/DL
GLUCOSE: 207 MG/DL
GLUCOSE: 72 MG/DL
GLUCOSE: 72 MG/DL
POTASSIUM, SERUM: 4.3 MEQ/L
POTASSIUM, SERUM: 4.8 MEQ/L
SODIUM: 140 MEQ/L
SODIUM: 142 MEQ/L
THYROID STIMULATING HORMONE: 2.55 MLU/L

## 2018-06-27 ENCOUNTER — APPOINTMENT (OUTPATIENT)
Dept: WOUND CARE | Facility: HOSPITAL | Age: 73
End: 2018-06-27
Attending: CLINICAL NURSE SPECIALIST
Payer: MEDICARE

## 2018-07-03 ENCOUNTER — PRIOR ORIGINAL RECORDS (OUTPATIENT)
Dept: OTHER | Age: 73
End: 2018-07-03

## 2018-07-05 ENCOUNTER — LAB REQUISITION (OUTPATIENT)
Dept: LAB | Facility: HOSPITAL | Age: 73
End: 2018-07-05
Payer: MEDICARE

## 2018-07-05 DIAGNOSIS — Z87.440 PERSONAL HISTORY OF URINARY INFECTION: ICD-10-CM

## 2018-07-05 LAB
BILIRUB UR QL: NEGATIVE
GLUCOSE UR-MCNC: NEGATIVE MG/DL
HGB UR QL STRIP.AUTO: NEGATIVE
HYALINE CASTS #/AREA URNS AUTO: 2 /LPF
KETONES UR-MCNC: NEGATIVE MG/DL
NITRITE UR QL STRIP.AUTO: NEGATIVE
PH UR: 7 [PH] (ref 5–8)
PROT UR-MCNC: 30 MG/DL
RBC #/AREA URNS AUTO: 2 /HPF
SP GR UR STRIP: 1.02 (ref 1–1.03)
UROBILINOGEN UR STRIP-ACNC: <2
VIT C UR-MCNC: NEGATIVE MG/DL
WBC #/AREA URNS AUTO: 9 /HPF

## 2018-07-05 PROCEDURE — 87086 URINE CULTURE/COLONY COUNT: CPT | Performed by: INTERNAL MEDICINE

## 2018-07-05 PROCEDURE — 87077 CULTURE AEROBIC IDENTIFY: CPT | Performed by: INTERNAL MEDICINE

## 2018-07-05 PROCEDURE — 87088 URINE BACTERIA CULTURE: CPT | Performed by: INTERNAL MEDICINE

## 2018-07-05 PROCEDURE — 87186 SC STD MICRODIL/AGAR DIL: CPT | Performed by: INTERNAL MEDICINE

## 2018-07-05 PROCEDURE — 81001 URINALYSIS AUTO W/SCOPE: CPT | Performed by: INTERNAL MEDICINE

## 2018-07-06 ENCOUNTER — APPOINTMENT (OUTPATIENT)
Dept: WOUND CARE | Facility: HOSPITAL | Age: 73
End: 2018-07-06
Attending: CLINICAL NURSE SPECIALIST
Payer: MEDICARE

## 2018-07-06 DIAGNOSIS — L89.153 DECUBITUS ULCER OF COCCYGEAL REGION, STAGE 3 (HCC): Primary | ICD-10-CM

## 2018-07-06 PROCEDURE — 99212 OFFICE O/P EST SF 10 MIN: CPT | Performed by: CLINICAL NURSE SPECIALIST

## 2018-07-06 PROCEDURE — 99213 OFFICE O/P EST LOW 20 MIN: CPT

## 2018-07-06 NOTE — PROGRESS NOTES
Subjective    Chief Complaint  This information was obtained from the patient  The patient was seen today for follow up and management of difficult to heal wound on her sacral area.  07/06/2018 no concerns for today    Allergies  Cymbalta (Reaction: itching 8-14-17:  Patient was admitted to the hospital for UTI, fever and sacral pressure wound/diarrhea. Patient diagnosed with Clostridium difficile and treated with antibiotics.    Patient currently resides at [facility] and transported to  Wound #3a Sacral is a chronic Stage 3 Pressure Injury Pressure Ulcer and has received a status of Not Healed. Subsequent wound encounter measurements are 0.5cm length x 0.4cm width x 0.5cm depth, with an area of 0.2 sq cm and a volume of 0.1 cubic cm.  Ron Valle Continue protein in diet to enhance wound healing. Instructed patient and spouse to purchase antifungal cream to apply to patient's back wound every 12 hours to treat  fungal rash. Patient and spouse verbalized understanding of instructions.      Plan    Wo Entered By: Jade Morrow RN on 07/06/2018 2:41:26 PM

## 2018-07-24 ENCOUNTER — PRIOR ORIGINAL RECORDS (OUTPATIENT)
Dept: OTHER | Age: 73
End: 2018-07-24

## 2018-07-24 PROBLEM — I10 ESSENTIAL HYPERTENSION: Status: RESOLVED | Noted: 2018-06-12 | Resolved: 2018-07-24

## 2018-07-24 PROBLEM — R06.00 DYSPNEA: Status: RESOLVED | Noted: 2018-06-07 | Resolved: 2018-07-24

## 2018-07-24 PROBLEM — R06.00 DYSPNEA, UNSPECIFIED TYPE: Status: RESOLVED | Noted: 2018-06-08 | Resolved: 2018-07-24

## 2018-07-24 PROCEDURE — 87086 URINE CULTURE/COLONY COUNT: CPT | Performed by: INTERNAL MEDICINE

## 2018-07-24 PROCEDURE — 87077 CULTURE AEROBIC IDENTIFY: CPT | Performed by: INTERNAL MEDICINE

## 2018-07-24 PROCEDURE — 87186 SC STD MICRODIL/AGAR DIL: CPT | Performed by: INTERNAL MEDICINE

## 2018-08-01 ENCOUNTER — APPOINTMENT (OUTPATIENT)
Dept: WOUND CARE | Facility: HOSPITAL | Age: 73
End: 2018-08-01
Attending: CLINICAL NURSE SPECIALIST
Payer: MEDICARE

## 2018-08-01 DIAGNOSIS — L89.153 DECUBITUS ULCER OF COCCYGEAL REGION, STAGE 3 (HCC): Primary | ICD-10-CM

## 2018-08-01 PROCEDURE — 99213 OFFICE O/P EST LOW 20 MIN: CPT

## 2018-08-01 PROCEDURE — 99212 OFFICE O/P EST SF 10 MIN: CPT | Performed by: CLINICAL NURSE SPECIALIST

## 2018-08-06 ENCOUNTER — APPOINTMENT (OUTPATIENT)
Dept: GENERAL RADIOLOGY | Facility: HOSPITAL | Age: 73
End: 2018-08-06
Attending: EMERGENCY MEDICINE
Payer: MEDICARE

## 2018-08-06 ENCOUNTER — HOSPITAL ENCOUNTER (OUTPATIENT)
Facility: HOSPITAL | Age: 73
Setting detail: OBSERVATION
Discharge: HOME HEALTH | End: 2018-08-09
Attending: EMERGENCY MEDICINE | Admitting: HOSPITALIST
Payer: MEDICARE

## 2018-08-06 DIAGNOSIS — J81.0 ACUTE PULMONARY EDEMA (HCC): Primary | ICD-10-CM

## 2018-08-06 LAB
ANION GAP SERPL CALC-SCNC: 5 MMOL/L (ref 0–18)
BASE EXCESS BLD CALC-SCNC: -0.2 MMOL/L (ref ?–2)
BASOPHILS # BLD: 0 K/UL (ref 0–0.2)
BASOPHILS NFR BLD: 0 %
BILIRUB UR QL: NEGATIVE
BNP SERPL-MCNC: 229 PG/ML (ref 0–100)
BUN SERPL-MCNC: 15 MG/DL (ref 8–20)
BUN/CREAT SERPL: 31.3 (ref 10–20)
CALCIUM SERPL-MCNC: 8.5 MG/DL (ref 8.5–10.5)
CHLORIDE SERPL-SCNC: 107 MMOL/L (ref 95–110)
CLARITY UR: CLEAR
CO2 SERPL-SCNC: 25 MMOL/L (ref 22–32)
COLOR UR: YELLOW
CREAT SERPL-MCNC: 0.48 MG/DL (ref 0.5–1.5)
EOSINOPHIL # BLD: 0.1 K/UL (ref 0–0.7)
EOSINOPHIL NFR BLD: 2 %
ERYTHROCYTE [DISTWIDTH] IN BLOOD BY AUTOMATED COUNT: 17.3 % (ref 11–15)
GLUCOSE SERPL-MCNC: 86 MG/DL (ref 70–99)
GLUCOSE UR-MCNC: NEGATIVE MG/DL
HCO3 BLDV-SCNC: 23 MEQ/L (ref 22–26)
HCT VFR BLD AUTO: 40.2 % (ref 35–48)
HGB BLD-MCNC: 13.2 G/DL (ref 12–16)
KETONES UR-MCNC: NEGATIVE MG/DL
LEUKOCYTE ESTERASE UR QL STRIP.AUTO: NEGATIVE
LYMPHOCYTES # BLD: 1.9 K/UL (ref 1–4)
LYMPHOCYTES NFR BLD: 36 %
MAGNESIUM SERPL-MCNC: 1.7 MG/DL (ref 1.8–2.5)
MCH RBC QN AUTO: 26.3 PG (ref 27–32)
MCHC RBC AUTO-ENTMCNC: 32.7 G/DL (ref 32–37)
MCV RBC AUTO: 80.4 FL (ref 80–100)
MONOCYTES # BLD: 0.5 K/UL (ref 0–1)
MONOCYTES NFR BLD: 10 %
NEUTROPHILS # BLD AUTO: 2.8 K/UL (ref 1.8–7.7)
NEUTROPHILS NFR BLD: 52 %
NITRITE UR QL STRIP.AUTO: NEGATIVE
O2 CT BLD-SCNC: 16.2 VOL% (ref 15–23)
OSMOLALITY UR CALC.SUM OF ELEC: 284 MOSM/KG (ref 275–295)
PCO2 BLDV: 35 MM HG (ref 38–50)
PH BLDV: 7.44 [PH] (ref 7.32–7.43)
PH UR: 7 [PH] (ref 5–8)
PLATELET # BLD AUTO: 161 K/UL (ref 140–400)
PMV BLD AUTO: 9.9 FL (ref 7.4–10.3)
PO2 BLDV: 56 MM HG (ref 35–40)
PO2 SATN ADJ TO 0.5 BLD: 24 MMHG (ref 24–28)
POTASSIUM SERPL-SCNC: 4.1 MMOL/L (ref 3.3–5.1)
PROT UR-MCNC: NEGATIVE MG/DL
PUNCTURE CHARGE: NO
RBC # BLD AUTO: 5 M/UL (ref 3.7–5.4)
RBC #/AREA URNS AUTO: 1 /HPF
SAO2 % BLDV: 91 % (ref 60–85)
SODIUM SERPL-SCNC: 137 MMOL/L (ref 136–144)
SP GR UR STRIP: 1.01 (ref 1–1.03)
TROPONIN I SERPL-MCNC: 0.01 NG/ML (ref ?–0.03)
UROBILINOGEN UR STRIP-ACNC: <2
VIT C UR-MCNC: NEGATIVE MG/DL
WBC # BLD AUTO: 5.3 K/UL (ref 4–11)
WBC #/AREA URNS AUTO: 0 /HPF

## 2018-08-06 PROCEDURE — 99285 EMERGENCY DEPT VISIT HI MDM: CPT

## 2018-08-06 PROCEDURE — 93005 ELECTROCARDIOGRAM TRACING: CPT

## 2018-08-06 PROCEDURE — 80048 BASIC METABOLIC PNL TOTAL CA: CPT | Performed by: EMERGENCY MEDICINE

## 2018-08-06 PROCEDURE — 83880 ASSAY OF NATRIURETIC PEPTIDE: CPT | Performed by: EMERGENCY MEDICINE

## 2018-08-06 PROCEDURE — 93010 ELECTROCARDIOGRAM REPORT: CPT | Performed by: EMERGENCY MEDICINE

## 2018-08-06 PROCEDURE — 84484 ASSAY OF TROPONIN QUANT: CPT | Performed by: EMERGENCY MEDICINE

## 2018-08-06 PROCEDURE — 85025 COMPLETE CBC W/AUTO DIFF WBC: CPT | Performed by: EMERGENCY MEDICINE

## 2018-08-06 PROCEDURE — 96374 THER/PROPH/DIAG INJ IV PUSH: CPT

## 2018-08-06 PROCEDURE — 83735 ASSAY OF MAGNESIUM: CPT | Performed by: EMERGENCY MEDICINE

## 2018-08-06 PROCEDURE — 71045 X-RAY EXAM CHEST 1 VIEW: CPT | Performed by: EMERGENCY MEDICINE

## 2018-08-06 PROCEDURE — 82805 BLOOD GASES W/O2 SATURATION: CPT | Performed by: EMERGENCY MEDICINE

## 2018-08-06 PROCEDURE — 81001 URINALYSIS AUTO W/SCOPE: CPT | Performed by: EMERGENCY MEDICINE

## 2018-08-06 RX ORDER — FUROSEMIDE 10 MG/ML
20 INJECTION INTRAMUSCULAR; INTRAVENOUS ONCE
Status: COMPLETED | OUTPATIENT
Start: 2018-08-06 | End: 2018-08-06

## 2018-08-06 RX ORDER — ONDANSETRON 2 MG/ML
4 INJECTION INTRAMUSCULAR; INTRAVENOUS EVERY 6 HOURS PRN
Status: DISCONTINUED | OUTPATIENT
Start: 2018-08-06 | End: 2018-08-09

## 2018-08-06 RX ORDER — ACETAMINOPHEN 325 MG/1
650 TABLET ORAL EVERY 6 HOURS PRN
Status: DISCONTINUED | OUTPATIENT
Start: 2018-08-06 | End: 2018-08-09

## 2018-08-06 RX ORDER — SODIUM CHLORIDE 0.9 % (FLUSH) 0.9 %
3 SYRINGE (ML) INJECTION AS NEEDED
Status: DISCONTINUED | OUTPATIENT
Start: 2018-08-06 | End: 2018-08-09

## 2018-08-06 RX ORDER — DOCUSATE SODIUM 100 MG/1
100 CAPSULE, LIQUID FILLED ORAL 2 TIMES DAILY PRN
COMMUNITY
End: 2018-11-13

## 2018-08-06 RX ORDER — METOCLOPRAMIDE HYDROCHLORIDE 5 MG/ML
10 INJECTION INTRAMUSCULAR; INTRAVENOUS EVERY 8 HOURS PRN
Status: DISCONTINUED | OUTPATIENT
Start: 2018-08-06 | End: 2018-08-09

## 2018-08-06 RX ORDER — FAMOTIDINE 20 MG/1
20 TABLET ORAL 2 TIMES DAILY PRN
COMMUNITY
End: 2018-11-13

## 2018-08-07 ENCOUNTER — PRIOR ORIGINAL RECORDS (OUTPATIENT)
Dept: OTHER | Age: 73
End: 2018-08-07

## 2018-08-07 LAB
ANION GAP SERPL CALC-SCNC: 7 MMOL/L (ref 0–18)
BASOPHILS # BLD: 0 K/UL (ref 0–0.2)
BASOPHILS NFR BLD: 0 %
BUN SERPL-MCNC: 12 MG/DL (ref 8–20)
BUN/CREAT SERPL: 19 (ref 10–20)
CALCIUM SERPL-MCNC: 9 MG/DL (ref 8.5–10.5)
CHLORIDE SERPL-SCNC: 104 MMOL/L (ref 95–110)
CO2 SERPL-SCNC: 28 MMOL/L (ref 22–32)
CREAT SERPL-MCNC: 0.63 MG/DL (ref 0.5–1.5)
EOSINOPHIL # BLD: 0.2 K/UL (ref 0–0.7)
EOSINOPHIL NFR BLD: 3 %
ERYTHROCYTE [DISTWIDTH] IN BLOOD BY AUTOMATED COUNT: 16.8 % (ref 11–15)
GLUCOSE SERPL-MCNC: 81 MG/DL (ref 70–99)
HCT VFR BLD AUTO: 43.5 % (ref 35–48)
HGB BLD-MCNC: 14.2 G/DL (ref 12–16)
LYMPHOCYTES # BLD: 3 K/UL (ref 1–4)
LYMPHOCYTES NFR BLD: 47 %
MAGNESIUM SERPL-MCNC: 1.7 MG/DL (ref 1.8–2.5)
MCH RBC QN AUTO: 26.3 PG (ref 27–32)
MCHC RBC AUTO-ENTMCNC: 32.7 G/DL (ref 32–37)
MCV RBC AUTO: 80.5 FL (ref 80–100)
MONOCYTES # BLD: 0.7 K/UL (ref 0–1)
MONOCYTES NFR BLD: 11 %
NEUTROPHILS # BLD AUTO: 2.5 K/UL (ref 1.8–7.7)
NEUTROPHILS NFR BLD: 39 %
OSMOLALITY UR CALC.SUM OF ELEC: 287 MOSM/KG (ref 275–295)
PLATELET # BLD AUTO: 158 K/UL (ref 140–400)
PMV BLD AUTO: 9.6 FL (ref 7.4–10.3)
POTASSIUM SERPL-SCNC: 4 MMOL/L (ref 3.3–5.1)
RBC # BLD AUTO: 5.41 M/UL (ref 3.7–5.4)
SODIUM SERPL-SCNC: 139 MMOL/L (ref 136–144)
WBC # BLD AUTO: 6.4 K/UL (ref 4–11)

## 2018-08-07 PROCEDURE — 83735 ASSAY OF MAGNESIUM: CPT | Performed by: HOSPITALIST

## 2018-08-07 PROCEDURE — A4216 STERILE WATER/SALINE, 10 ML: HCPCS | Performed by: HOSPITALIST

## 2018-08-07 PROCEDURE — 80048 BASIC METABOLIC PNL TOTAL CA: CPT | Performed by: HOSPITALIST

## 2018-08-07 PROCEDURE — 96376 TX/PRO/DX INJ SAME DRUG ADON: CPT

## 2018-08-07 PROCEDURE — 99211 OFF/OP EST MAY X REQ PHY/QHP: CPT

## 2018-08-07 PROCEDURE — 97166 OT EVAL MOD COMPLEX 45 MIN: CPT

## 2018-08-07 PROCEDURE — 97162 PT EVAL MOD COMPLEX 30 MIN: CPT

## 2018-08-07 PROCEDURE — 85025 COMPLETE CBC W/AUTO DIFF WBC: CPT | Performed by: HOSPITALIST

## 2018-08-07 RX ORDER — FAMOTIDINE 20 MG/1
20 TABLET ORAL 2 TIMES DAILY PRN
Status: DISCONTINUED | OUTPATIENT
Start: 2018-08-07 | End: 2018-08-09

## 2018-08-07 RX ORDER — VENLAFAXINE HYDROCHLORIDE 75 MG/1
75 CAPSULE, EXTENDED RELEASE ORAL
Status: DISCONTINUED | OUTPATIENT
Start: 2018-08-07 | End: 2018-08-09

## 2018-08-07 RX ORDER — ATORVASTATIN CALCIUM 40 MG/1
40 TABLET, FILM COATED ORAL NIGHTLY
Status: DISCONTINUED | OUTPATIENT
Start: 2018-08-07 | End: 2018-08-09

## 2018-08-07 RX ORDER — MELATONIN
325
Status: DISCONTINUED | OUTPATIENT
Start: 2018-08-07 | End: 2018-08-09

## 2018-08-07 RX ORDER — CETIRIZINE HYDROCHLORIDE 10 MG/1
10 TABLET ORAL DAILY
Status: DISCONTINUED | OUTPATIENT
Start: 2018-08-08 | End: 2018-08-09

## 2018-08-07 RX ORDER — MAGNESIUM OXIDE 400 MG (241.3 MG MAGNESIUM) TABLET
400 TABLET ONCE
Status: COMPLETED | OUTPATIENT
Start: 2018-08-07 | End: 2018-08-07

## 2018-08-07 RX ORDER — EZETIMIBE 10 MG/1
10 TABLET ORAL NIGHTLY
Status: DISCONTINUED | OUTPATIENT
Start: 2018-08-07 | End: 2018-08-09

## 2018-08-07 RX ORDER — DOCUSATE SODIUM 100 MG/1
100 CAPSULE, LIQUID FILLED ORAL 2 TIMES DAILY PRN
Status: DISCONTINUED | OUTPATIENT
Start: 2018-08-07 | End: 2018-08-09

## 2018-08-07 RX ORDER — METOPROLOL SUCCINATE 25 MG/1
25 TABLET, EXTENDED RELEASE ORAL DAILY
Status: DISCONTINUED | OUTPATIENT
Start: 2018-08-08 | End: 2018-08-09

## 2018-08-07 RX ORDER — PREGABALIN 75 MG/1
75 CAPSULE ORAL 2 TIMES DAILY
Status: DISCONTINUED | OUTPATIENT
Start: 2018-08-07 | End: 2018-08-09

## 2018-08-07 RX ORDER — PREDNISONE 1 MG/1
5 TABLET ORAL
Status: DISCONTINUED | OUTPATIENT
Start: 2018-08-07 | End: 2018-08-09

## 2018-08-07 RX ORDER — FUROSEMIDE 10 MG/ML
20 INJECTION INTRAMUSCULAR; INTRAVENOUS ONCE
Status: COMPLETED | OUTPATIENT
Start: 2018-08-07 | End: 2018-08-07

## 2018-08-07 NOTE — ED PROVIDER NOTES
Patient Seen in: [facility] AND [facility] Emergency Department    History   Patient presents with:  Dyspnea TIMO SOB (respiratory)    Stated Complaint: sob     HPI    73 yo F with PMH DVT s/p IVC filter, recent afib diagnosis two months ago on xarelto therapy, HTN Surgical History:  No date: BACK SURGERY      Comment: spinal fusion L1-5  7/11/08: COLONOSCOPY      Comment: 7/11/08 at [address] [place] [address] and negative for polyps, UC was               quiet  5/12: COLONOSCOPY,DIAGNOSTIC      Comment: normal, random colon bx showed find EXPERIENCED ANY* N/A      Comment: Procedure: ESOPHAGOGASTRODUODENOSCOPY W/                DILATION;  Surgeon: Usman Kennedy MD;                 Location: [address] [place] [address]  10/27/2014: PATIENT [facility] PREOPERATIVE ORDER FOR IV ANT* N/A      Comm MG Oral Capsule SR 24 Hr,  TAKE 1 CAPSULE BY MOUTH DAILY   Metoprolol Succinate ER 25 MG Oral Tablet 24 Hr,  Take 25 mg by mouth daily. loratadine 10 MG Oral Tab,  Take 10 mg by mouth daily.    ferrous sulfate 325 (65 FE) MG Oral Tab EC,  Take 325 mg by Irregularly irregular. Pulmonary/Chest: Effort normal. Bibasilar crackles. Abdominal: Soft. Nontender. Musculoskeletal: No gross deformity. BLE with trace edema. Neurological: Alert. Skin: Skin is warm. Psychiatric: Cooperative.   Nursing note and cardiomediastinal silhouette is mildly enlarged. There is mild tortuosity of the thoracic aorta with peripheral atherosclerotic vascular calcification. The pulmonary vascularity is increased centrally. MEDIAST/YENI: No visible mass or adenopathy.  LUNGS/PLE

## 2018-08-07 NOTE — CONSULTS
Nba Tee  5/4/1945    Reason for consult: CHF      HPI:   73 yo F with PMH DVT s/p IVC filter, recent afib diagnosis two months ago on xarelto therapy, HTN, HL, hereditary spastic paraplegia c/b neurogenic bladder and chronic self-cath, ulcerati Valvular disease     mitral valve prolapse   • Visual impairment     reading         Family History   Problem Relation Age of Onset   • Cancer Father    • Heart Disorder Father    • Eye Problems Father      glaucoma   • DVT/VTE Father    • Diabetes Mother Weight:  135 lb 9.6 oz (61.5 kg)  134 lb 3.2 oz (60.9 kg)   Height:  162.6 cm (5' 4\")           Intake/Output Summary (Last 24 hours) at 08/07/18 0720  Last data filed at 08/06/18 2302   Gross per 24 hour   Intake              100 ml   Output increased increased in a pattern of mild to moderate LVH.    Hypertrophy was noted. Systolic function was normal. The     estimated ejection fraction was 65%. Wall motion was normal;     there were no regional wall motion abnormalities.  The study is     n MD, [facility]

## 2018-08-07 NOTE — PLAN OF CARE
Problem: Patient/Family Goals  Goal: Patient/Family Long Term Goal  Patient's Long Term Goal: I want to start moving around more like before    Interventions:  - physical/occupational therapy  - follow plan of care  - See additional Care Plan goals for spe effectiveness of antiarrhythmic and heart rate control medications as ordered  - Initiate emergency measures for life threatening arrhythmias  - Monitor electrolytes and administer replacement therapy as ordered  Outcome: Progressing      Problem: Alec Rivero or drain site(s) healing without S/S of infection  INTERVENTIONS:  - Assess and document risk factors for pressure ulcer development  - Assess and document skin integrity  - Assess and document dressing/incision, wound bed, drain sites and surrounding tiss

## 2018-08-07 NOTE — ED INITIAL ASSESSMENT (HPI)
Patient woke this morning feeling sob with dry cough, denies fever or chest pain.  Recent afib diagnosis

## 2018-08-07 NOTE — PHYSICAL THERAPY NOTE
PHYSICAL THERAPY EVALUATION - INPATIENT     Room Number: 313/313-A  Evaluation Date: 8/7/2018  Type of Evaluation: Initial   Physician Order: PT Eval and Treat    Presenting Problem: acute pulmonary edema  Reason for Therapy: Mobility Dysfunction and Disc History  Past Medical History:   Diagnosis Date   • Anemia    • Anxiety state, unspecified    • BACK PAIN     scoliosis   • Back problem    • C. difficile colitis 5/12   • Cataract    • Colitis    • Deep vein thrombosis (HCC)     jamal filter in place LOCLZJ NDL/CATH SPI DX/THER OKS N/A      Comment: Procedure: CERVICAL EPIDURAL;  Surgeon:                Chantell Jaimes MD;  Location: [address] [place] [address] Drive MANAGEMENT  12/8/2014: Danish VALENZUELA & Kelly Grover NDL/CATH SPI DX/THER SCI N/A      Comment: Location: [place] [address] MANAGEMENT  1/5/2015: PATIENT [facility] PREOPERATIVE ORDER FOR IV ANT* N/A      Comment: Procedure: ESOPHAGOGASTRODUODENOSCOPY W/                DILATION;  Surgeon: Margo Jennings MD;                 Location:  Moving from lying on back to sitting on the side of the bed?: Unable   How much help from another person does the patient currently need. ..   -   Moving to and from a bed to a chair (including a wheelchair)?: Total   -   Need to walk in hospital room?: Tot

## 2018-08-07 NOTE — WOUND PROGRESS NOTE
Pt was seen for sacral wound which had a TEJ pressure dressing applied in the outpatient wound clinic. Sacral area was assessed. The wound has completely epithelialized, no opening noted. A bordered foam dressing was applied for protection.  Pt's heels ass

## 2018-08-07 NOTE — OCCUPATIONAL THERAPY NOTE
OCCUPATIONAL THERAPY EVALUATION - INPATIENT     Room Number: 313/313-A  Evaluation Date: 8/7/2018  Type of Evaluation: Initial  Presenting Problem: acute on chronic diastolic CHF exacerbation    Physician Order: IP Consult to Occupational Therapy  Reason to hold off on PT services due to wound healing. Pt reports no concerns about return home. Recommend pt dc home to previous living environment. Egg crate cushion ordered for pt to sit on when sitting at bedside.        DISCHARGE RECOMMENDATIONS  OT Disch bladder 1/11/2016   • Ulcerative colitis ([facility] [address]    • Valvular disease     mitral valve prolapse   • Visual impairment     reading       Past Surgical History  Past Surgical History:  No date: BACK SURGERY      Comment: spinal fusion L1-5  7/11/08: Gopal Roberson EPIDURAL;  Surgeon:                Leno Nelson MD;  Location: [place] [address] MANAGEMENT  1/5/2015: PATIENT DOCUMENTED NOT TO HAVE EXPERIENCED ANY* N/A      Comment: Procedure: ESOPHAGOGASTRODUODENOSCOPY W/                DILATION; in [facility], pt propels self with UE. Prior to bedbound status, pt reports was vacuuming and sweeping; getting into bathtub with assist from ; completing slide board t/fs with husbands standing by.        SUBJECTIVE  Agreeable to session    OCCUPATIONAL TH in supported sitting  Bathing: NT  Toileting:  set-up in supine  Upper Extremity Dressing: set-up  Lower Extremity Dressing: total A    Education Provided: role of OT, activity promotion   Patient End of Session: Up in chair;Needs met;Call light within triston

## 2018-08-07 NOTE — ED NOTES
Patient has a wound vac to her coccyx which was replaced 1 week ago that she states is not working properly

## 2018-08-07 NOTE — PROGRESS NOTES
Follow up note from night call. Pt duriesing well. Symptoms of shortness of breath resolved. Renal function normal   Tele shows Afib with ventricular rates in the 80's. Echo from 6/12/18 results noted.   HFpEF    Two doses of IV lasix given  Reasses

## 2018-08-07 NOTE — HISTORICAL OFFICE NOTE
uSzan Ramirez  : 1945  ACCOUNT:  [id number]  [phone]  PCP: Dr. Kayce Brady Mention DATE: 2018  DICTATED BY:  [Dr. Bessie Marques: [Followup of .  CAD, of native vessels, nonobstructive.]    HPI:    [On  colitis    PAST CV HISTORY: dyslipidemia    FAMILY HISTORY: Negative for premature CAD. Negative for AAA. SOCIAL HISTORY: SMOKING: Former tobacco use. used to smoke but quit, years ago and occasionally. CAFFEINE: >5 cups daily and tea and soda.  ALCOHOL: d anticoagulation in the future then we refer her for a watchman device. []    ASSESSMENT:  1. Atrial fibrillation  2. Mitral regurgitation  3. . CAD, of native vessels, nonobstructive  4. DM, Type II  5. Hypercholesteremia, pure  6.  Hypertension, benign

## 2018-08-08 LAB
ANION GAP SERPL CALC-SCNC: 8 MMOL/L (ref 0–18)
BUN SERPL-MCNC: 14 MG/DL (ref 8–20)
BUN/CREAT SERPL: 31.8 (ref 10–20)
CALCIUM SERPL-MCNC: 9 MG/DL (ref 8.5–10.5)
CHLORIDE SERPL-SCNC: 107 MMOL/L (ref 95–110)
CO2 SERPL-SCNC: 24 MMOL/L (ref 22–32)
CREAT SERPL-MCNC: 0.44 MG/DL (ref 0.5–1.5)
GLUCOSE SERPL-MCNC: 89 MG/DL (ref 70–99)
MAGNESIUM SERPL-MCNC: 1.8 MG/DL (ref 1.8–2.5)
OSMOLALITY UR CALC.SUM OF ELEC: 288 MOSM/KG (ref 275–295)
POTASSIUM SERPL-SCNC: 3.8 MMOL/L (ref 3.3–5.1)
SODIUM SERPL-SCNC: 139 MMOL/L (ref 136–144)

## 2018-08-08 PROCEDURE — A4216 STERILE WATER/SALINE, 10 ML: HCPCS | Performed by: HOSPITALIST

## 2018-08-08 PROCEDURE — 80048 BASIC METABOLIC PNL TOTAL CA: CPT | Performed by: HOSPITALIST

## 2018-08-08 PROCEDURE — 83735 ASSAY OF MAGNESIUM: CPT | Performed by: HOSPITALIST

## 2018-08-08 PROCEDURE — 96376 TX/PRO/DX INJ SAME DRUG ADON: CPT

## 2018-08-08 RX ORDER — MAGNESIUM OXIDE 400 MG (241.3 MG MAGNESIUM) TABLET
400 TABLET ONCE
Status: COMPLETED | OUTPATIENT
Start: 2018-08-08 | End: 2018-08-08

## 2018-08-08 RX ORDER — POTASSIUM CHLORIDE 20 MEQ/1
40 TABLET, EXTENDED RELEASE ORAL ONCE
Status: COMPLETED | OUTPATIENT
Start: 2018-08-08 | End: 2018-08-08

## 2018-08-08 RX ORDER — FUROSEMIDE 10 MG/ML
20 INJECTION INTRAMUSCULAR; INTRAVENOUS ONCE
Status: COMPLETED | OUTPATIENT
Start: 2018-08-08 | End: 2018-08-08

## 2018-08-08 NOTE — PROGRESS NOTES
[facility] AND CLINICS  Progress Note    Emeryjoao Baugh Patient Status:  Observation    1945 MRN [id number]   Location [facility] 3W/SW Attending Hamilton Kelly MD   Hosp Day # 0 PCP Ricarda Landeros MD     Assessment:    1.   Acute CHF, imp Results  Component Value Date    08/08/2018   K 3.8 08/08/2018    08/08/2018   CO2 24 08/08/2018   BUN 14 08/08/2018   CREATSERUM 0.44 08/08/2018   GLU 89 08/08/2018   MG 1.8 08/08/2018   CA 9.0 08/08/2018          Lab Results  Component Value

## 2018-08-08 NOTE — HOME CARE LIAISON
Patient is current with [address] [place] [address]  Met with patient at the bedside. Patient agreeable to resume home health services.

## 2018-08-08 NOTE — PROGRESS NOTES
[facility] Hospitalist Progress Note     CC: Hospital Follow up    PCP: Ben Carranza MD       Assessment/Plan:     Principal Problem:    Acute pulmonary edema ([address]    Dmitriy Martin is a 74year old female with history of hereditary spastic parapl discussed with patient and/or family by bedside. Thank Donna Davis MD    [facility] Hospitalist     Subjective:     No CP, SOB, or N/V.   Feeling better  OBJECTIVE:    Blood pressure 119/56, pulse 104, temperature 98.2 °F (36.8 °C), temperature source Oral, input(s): ALT, AST, ALB, AMYLASE, LIPASE, LDH in the last 168 hours.     Invalid input(s): ALPHOS, TBIL, DBIL, TPROT      Imaging:  Xr Chest Ap Portable  (cpt=71045)    Result Date: 8/6/2018  CONCLUSION:   Cardiomegaly with mild pulmonary vascular congestio

## 2018-08-08 NOTE — PLAN OF CARE
Problem: Patient/Family Goals  Goal: Patient/Family Long Term Goal  Patient's Long Term Goal: I want to start moving around more like before    Interventions:  - physical/occupational therapy  - follow plan of care  - See additional Care Plan goals for spe effectiveness of antiarrhythmic and heart rate control medications as ordered  - Initiate emergency measures for life threatening arrhythmias  - Monitor electrolytes and administer replacement therapy as ordered   Outcome: Progressing      Problem: Zuri Tyler wounds(s) or drain site(s) healing without S/S of infection  INTERVENTIONS:  - Assess and document risk factors for pressure ulcer development  - Assess and document skin integrity  - Assess and document dressing/incision, wound bed, drain sites and surrou

## 2018-08-09 VITALS
DIASTOLIC BLOOD PRESSURE: 67 MMHG | WEIGHT: 132.13 LBS | SYSTOLIC BLOOD PRESSURE: 115 MMHG | HEART RATE: 87 BPM | RESPIRATION RATE: 18 BRPM | BODY MASS INDEX: 22.56 KG/M2 | OXYGEN SATURATION: 94 % | TEMPERATURE: 98 F | HEIGHT: 64 IN

## 2018-08-09 LAB
ANION GAP SERPL CALC-SCNC: 6 MMOL/L (ref 0–18)
BUN SERPL-MCNC: 23 MG/DL (ref 8–20)
BUN/CREAT SERPL: 44.2 (ref 10–20)
CALCIUM SERPL-MCNC: 9.1 MG/DL (ref 8.5–10.5)
CHLORIDE SERPL-SCNC: 107 MMOL/L (ref 95–110)
CO2 SERPL-SCNC: 24 MMOL/L (ref 22–32)
CREAT SERPL-MCNC: 0.52 MG/DL (ref 0.5–1.5)
GLUCOSE SERPL-MCNC: 91 MG/DL (ref 70–99)
MAGNESIUM SERPL-MCNC: 1.8 MG/DL (ref 1.8–2.5)
OSMOLALITY UR CALC.SUM OF ELEC: 287 MOSM/KG (ref 275–295)
POTASSIUM SERPL-SCNC: 3.7 MMOL/L (ref 3.3–5.1)
SODIUM SERPL-SCNC: 137 MMOL/L (ref 136–144)

## 2018-08-09 PROCEDURE — 80048 BASIC METABOLIC PNL TOTAL CA: CPT | Performed by: INTERNAL MEDICINE

## 2018-08-09 PROCEDURE — 83735 ASSAY OF MAGNESIUM: CPT | Performed by: HOSPITALIST

## 2018-08-09 RX ORDER — MAGNESIUM OXIDE 400 MG (241.3 MG MAGNESIUM) TABLET
400 TABLET ONCE
Status: COMPLETED | OUTPATIENT
Start: 2018-08-09 | End: 2018-08-09

## 2018-08-09 RX ORDER — FUROSEMIDE 20 MG/1
20 TABLET ORAL DAILY
Status: DISCONTINUED | OUTPATIENT
Start: 2018-08-10 | End: 2018-08-09

## 2018-08-09 RX ORDER — FUROSEMIDE 20 MG/1
20 TABLET ORAL DAILY
Qty: 30 TABLET | Refills: 0 | Status: SHIPPED | OUTPATIENT
Start: 2018-08-10 | End: 2018-08-09

## 2018-08-09 RX ORDER — FUROSEMIDE 20 MG/1
20 TABLET ORAL DAILY
Qty: 30 TABLET | Refills: 0 | Status: SHIPPED | OUTPATIENT
Start: 2018-08-10 | End: 2018-09-11

## 2018-08-09 RX ORDER — POTASSIUM CHLORIDE 20 MEQ/1
40 TABLET, EXTENDED RELEASE ORAL ONCE
Status: COMPLETED | OUTPATIENT
Start: 2018-08-09 | End: 2018-08-09

## 2018-08-09 NOTE — DISCHARGE SUMMARY
[address]    Discharge Summary    Eva Ruiz Patient Status:  Observation    1945 MRN [id number]   Location [facility] 3W/SW Attending Fabrizio Zee MD   Hosp Day # 0 PCP Courtney Donahue MD with 50-60% in LAD, moderate MR with MVP, chronic back pain, history of DVT s/p IVC filter, anxiety/depression, GERD, gout, HTN, HL, and sacral decub who presents with 24 hours of sob.  Patient states yesterday morning she began feeling sob, worse with layi colostomy at [facility] 10/2017  -per pt at baseline     Known CAD per cath in 2011, moderate MR ,HTN, HL,  -50-60% in LAD  -cont statin, zetia  -appears off all BP meds  -monitor      Chronic back pain  -follow up with ortho spine     Depression/anxiety  -Tanya Carvajal CLARITIN      Take 10 mg by mouth daily. Refills:  0     Metoprolol Succinate ER 25 MG Tb24  Commonly known as: Toprol XL      Take 25 mg by mouth daily.    Refills:  0     predniSONE 5 MG Tabs  Commonly known as:  DELTASONE      TAKE 1 TABLET BY MOUTH D

## 2018-08-09 NOTE — TRANSITION NOTE
55-year-old female admitted to [facility] on the evening of 8/6/2018 when she presented with dyspnea and congestive heart failure. She had a good response to IV Lasix, diuresis, and the CHF resolved.   She has a history of mitral regurgitation, whic pls approve the OT

## 2018-08-09 NOTE — PROGRESS NOTES
[facility]  Progress Note    Nba Tee Patient Status:  Observation    1945 MRN [id number]   Location [facility] 3W/SW Attending Lauryn Miller MD   Hosp Day # 0 PCP Celine Barrera MD     Assessment:    1.   Acute CHF, res Labs:       Lab Results  Component Value Date   INR 1.1 09/20/2017       Lab Results  Component Value Date    08/09/2018   K 3.7 08/09/2018    08/09/2018   CO2 24 08/09/2018   BUN 23 08/09/2018   CREATSERUM 0.52 08/09/2018   GLU 91 08/09/20

## 2018-08-10 ENCOUNTER — TELEPHONE (OUTPATIENT)
Dept: CARDIOLOGY UNIT | Facility: HOSPITAL | Age: 73
End: 2018-08-10

## 2018-08-16 ENCOUNTER — LAB REQUISITION (OUTPATIENT)
Dept: LAB | Facility: HOSPITAL | Age: 73
End: 2018-08-16
Payer: MEDICARE

## 2018-08-16 DIAGNOSIS — N39.0 URINARY TRACT INFECTION: ICD-10-CM

## 2018-08-16 LAB
BILIRUB UR QL: NEGATIVE
COLOR UR: YELLOW
GLUCOSE UR-MCNC: NEGATIVE MG/DL
HGB UR QL STRIP.AUTO: NEGATIVE
KETONES UR-MCNC: NEGATIVE MG/DL
LEUKOCYTE ESTERASE UR QL STRIP.AUTO: NEGATIVE
NITRITE UR QL STRIP.AUTO: POSITIVE
PH UR: 8 [PH] (ref 5–8)
PROT UR-MCNC: 100 MG/DL
RBC #/AREA URNS AUTO: 1 /HPF
SP GR UR STRIP: 1.02 (ref 1–1.03)
UROBILINOGEN UR STRIP-ACNC: <2
VIT C UR-MCNC: NEGATIVE MG/DL
WBC #/AREA URNS AUTO: 4 /HPF

## 2018-08-16 PROCEDURE — 87086 URINE CULTURE/COLONY COUNT: CPT | Performed by: INTERNAL MEDICINE

## 2018-08-16 PROCEDURE — 81001 URINALYSIS AUTO W/SCOPE: CPT | Performed by: INTERNAL MEDICINE

## 2018-08-22 ENCOUNTER — OFFICE VISIT (OUTPATIENT)
Dept: CARDIOLOGY CLINIC | Facility: HOSPITAL | Age: 73
End: 2018-08-22
Attending: CLINICAL NURSE SPECIALIST
Payer: MEDICARE

## 2018-08-22 VITALS — OXYGEN SATURATION: 97 % | DIASTOLIC BLOOD PRESSURE: 64 MMHG | HEART RATE: 50 BPM | SYSTOLIC BLOOD PRESSURE: 126 MMHG

## 2018-08-22 DIAGNOSIS — I50.9 HEART FAILURE, UNSPECIFIED (HCC): Primary | ICD-10-CM

## 2018-08-22 DIAGNOSIS — I50.30 (HFPEF) HEART FAILURE WITH PRESERVED EJECTION FRACTION (HCC): ICD-10-CM

## 2018-08-22 LAB
ANION GAP SERPL CALC-SCNC: 8 MMOL/L (ref 0–18)
BNP SERPL-MCNC: 128 PG/ML (ref 0–100)
BUN SERPL-MCNC: 16 MG/DL (ref 8–20)
BUN/CREAT SERPL: 30.2 (ref 10–20)
CALCIUM SERPL-MCNC: 9.1 MG/DL (ref 8.5–10.5)
CHLORIDE SERPL-SCNC: 105 MMOL/L (ref 95–110)
CO2 SERPL-SCNC: 25 MMOL/L (ref 22–32)
CREAT SERPL-MCNC: 0.53 MG/DL (ref 0.5–1.5)
GLUCOSE SERPL-MCNC: 178 MG/DL (ref 70–99)
OSMOLALITY UR CALC.SUM OF ELEC: 292 MOSM/KG (ref 275–295)
POTASSIUM SERPL-SCNC: 3 MMOL/L (ref 3.3–5.1)
SODIUM SERPL-SCNC: 138 MMOL/L (ref 136–144)

## 2018-08-22 PROCEDURE — 80048 BASIC METABOLIC PNL TOTAL CA: CPT | Performed by: CLINICAL NURSE SPECIALIST

## 2018-08-22 PROCEDURE — 83880 ASSAY OF NATRIURETIC PEPTIDE: CPT | Performed by: CLINICAL NURSE SPECIALIST

## 2018-08-22 PROCEDURE — 99214 OFFICE O/P EST MOD 30 MIN: CPT | Performed by: CLINICAL NURSE SPECIALIST

## 2018-08-22 PROCEDURE — 99212 OFFICE O/P EST SF 10 MIN: CPT | Performed by: CLINICAL NURSE SPECIALIST

## 2018-08-22 PROCEDURE — 36415 COLL VENOUS BLD VENIPUNCTURE: CPT | Performed by: CLINICAL NURSE SPECIALIST

## 2018-08-22 RX ORDER — POTASSIUM CHLORIDE 20 MEQ/1
TABLET, EXTENDED RELEASE ORAL
Status: COMPLETED
Start: 2018-08-22 | End: 2018-08-22

## 2018-08-22 RX ORDER — POTASSIUM CHLORIDE 20 MEQ/1
20 TABLET, EXTENDED RELEASE ORAL DAILY
Qty: 30 TABLET | Refills: 0 | Status: SHIPPED | OUTPATIENT
Start: 2018-08-22 | End: 2018-09-11

## 2018-08-22 RX ADMIN — POTASSIUM CHLORIDE 40 MEQ: 20 TABLET, EXTENDED RELEASE ORAL at 11:14:00

## 2018-08-22 NOTE — PROGRESS NOTES
[address] [facility] Patient Status:  Outpatient    1945 MRN [id number]   Location MD Dr Kenzie Kaminski is a 74year old female who presents to 10:15 AM   ALT 16 02/17/2018 10:15 AM   PTT 28.1 09/20/2017 11:59 AM   INR 1.1 09/20/2017 11:59 AM   PTP 13.5 09/20/2017 11:59 AM   T4F 0.88 02/10/2009 09:53 AM   TSH 2.55 06/08/2018 05:38 AM   LIP 48 (L) 12/19/2016 11:34 AM   DDIMER 0.79 (H) 06/07/2018 04 dilated. Compared to the previous study these findings represent  improvement. Compared to 2017 mitral regurgitation improved from  moderate to severe to now moderate.       Education:  Reviewed disease process, sodium/fluid restriction, and medications wi soy sauce, pre-packaged rice or potatoes. Please remember to read nutrition labels for sodium content.      Schedule hospital follow up with Dr Shawanda Hernandez within the next two weeks    Return to clinic 9/10        I spent greater than 50 minutes with this patien

## 2018-08-22 NOTE — PATIENT INSTRUCTIONS
Please begin potassium 20 meq daily    Home health to repeat labs on Monday     32-52 oz fluid/day    Less than 2000 mg sodium/salt diet.  Common high sodium foods include frozen dinners, soups (not homemade), some cereal, vegetable juice, canned vegetables

## 2018-08-27 ENCOUNTER — LAB REQUISITION (OUTPATIENT)
Dept: LAB | Facility: HOSPITAL | Age: 73
End: 2018-08-27
Payer: MEDICARE

## 2018-08-27 ENCOUNTER — PRIOR ORIGINAL RECORDS (OUTPATIENT)
Dept: OTHER | Age: 73
End: 2018-08-27

## 2018-08-27 DIAGNOSIS — I50.9 HEART FAILURE (HCC): ICD-10-CM

## 2018-08-27 LAB
ANION GAP SERPL CALC-SCNC: 9 MMOL/L (ref 0–18)
BUN SERPL-MCNC: 22 MG/DL (ref 8–20)
BUN/CREAT SERPL: 34.4 (ref 10–20)
BUN: 22 MG/DL
CALCIUM SERPL-MCNC: 8.7 MG/DL (ref 8.5–10.5)
CALCIUM: 8.7 MG/DL
CHLORIDE SERPL-SCNC: 101 MMOL/L (ref 95–110)
CHLORIDE: 101 MEQ/L
CHOLESTEROL, TOTAL: 155 MG/DL
CO2 SERPL-SCNC: 28 MMOL/L (ref 22–32)
CREAT SERPL-MCNC: 0.64 MG/DL (ref 0.5–1.5)
CREATININE, SERUM: 0.64 MG/DL
GLUCOSE SERPL-MCNC: 99 MG/DL (ref 70–99)
GLUCOSE: 99 MG/DL
HDL CHOLESTEROL: 45 MG/DL
HEMATOCRIT: 43.5 %
HEMOGLOBIN: 14.2 G/DL
LDL CHOLESTEROL: 89 MG/DL
OSMOLALITY UR CALC.SUM OF ELEC: 289 MOSM/KG (ref 275–295)
PLATELETS: 158 K/UL
POTASSIUM SERPL-SCNC: 2.8 MMOL/L (ref 3.3–5.1)
POTASSIUM, SERUM: 2.8 MEQ/L
RED BLOOD COUNT: 5.41 X 10-6/U
SODIUM SERPL-SCNC: 138 MMOL/L (ref 136–144)
SODIUM: 138 MEQ/L
TRIGLYCERIDES: 107 MG/DL
WHITE BLOOD COUNT: 6.4 X 10-3/U

## 2018-08-27 PROCEDURE — 80048 BASIC METABOLIC PNL TOTAL CA: CPT | Performed by: INTERNAL MEDICINE

## 2018-08-28 ENCOUNTER — PRIOR ORIGINAL RECORDS (OUTPATIENT)
Dept: OTHER | Age: 73
End: 2018-08-28

## 2018-08-28 ENCOUNTER — MYAURORA ACCOUNT LINK (OUTPATIENT)
Dept: OTHER | Age: 73
End: 2018-08-28

## 2018-08-28 ENCOUNTER — TELEPHONE (OUTPATIENT)
Dept: CARDIOLOGY CLINIC | Facility: HOSPITAL | Age: 73
End: 2018-08-28

## 2018-08-28 NOTE — TELEPHONE ENCOUNTER
Spoke with patient regarding BMP results - K+ 2.8. Per patient, she spoke with per PCP regarding these labs and was told she may need to increase her potassium and repeat labs soon, but no order for labs has been placed.  I recommend she increase her potass

## 2018-08-29 ENCOUNTER — APPOINTMENT (OUTPATIENT)
Dept: WOUND CARE | Facility: HOSPITAL | Age: 73
End: 2018-08-29
Attending: CLINICAL NURSE SPECIALIST
Payer: MEDICARE

## 2018-08-29 DIAGNOSIS — L89.153 DECUBITUS ULCER OF COCCYGEAL REGION, STAGE 3 (HCC): Primary | ICD-10-CM

## 2018-08-29 PROCEDURE — 99212 OFFICE O/P EST SF 10 MIN: CPT | Performed by: CLINICAL NURSE SPECIALIST

## 2018-08-29 PROCEDURE — 99212 OFFICE O/P EST SF 10 MIN: CPT

## 2018-08-29 NOTE — PROGRESS NOTES
Subjective    Chief Complaint  This information was obtained from the patient  The patient was seen today for follow up and management of difficult to heal wound on her sacral area. 8/29/2018 patient has no concerns for today.     Allergies  Cymbalta (React 8-14-17:  Patient was admitted to the hospital for UTI, fever and sacral pressure wound/diarrhea. Patient diagnosed with Clostridium difficile and treated with antibiotics.    Patient currently resides at [facility] and transported to  The periwound skin did not exhibit: Brawny Induration, Edema, Excoriation, Induration, Callus, Crepitus, Fluctuance, Friable, Rash, Dry/Scaly, Moist, Maceration, Atrophie Simin, Cyanosis, Ecchymosis, Erythema, Hemosiderosis, Pallor, Rubor.  The temperatur

## 2018-08-30 ENCOUNTER — PRIOR ORIGINAL RECORDS (OUTPATIENT)
Dept: OTHER | Age: 73
End: 2018-08-30

## 2018-09-05 ENCOUNTER — PRIOR ORIGINAL RECORDS (OUTPATIENT)
Dept: OTHER | Age: 73
End: 2018-09-05

## 2018-09-05 ENCOUNTER — HOSPITAL ENCOUNTER (OUTPATIENT)
Dept: INTERVENTIONAL RADIOLOGY/VASCULAR | Facility: HOSPITAL | Age: 73
Discharge: HOME | End: 2018-09-05
Attending: INTERNAL MEDICINE | Admitting: INTERNAL MEDICINE
Payer: MEDICARE

## 2018-09-05 ENCOUNTER — HOSPITAL ENCOUNTER (OUTPATIENT)
Dept: CV DIAGNOSTICS | Facility: HOSPITAL | Age: 73
Discharge: HOME | End: 2018-09-05
Attending: INTERNAL MEDICINE
Payer: MEDICARE

## 2018-09-05 VITALS
SYSTOLIC BLOOD PRESSURE: 129 MMHG | HEIGHT: 64 IN | WEIGHT: 139 LBS | BODY MASS INDEX: 23.73 KG/M2 | HEART RATE: 106 BPM | DIASTOLIC BLOOD PRESSURE: 74 MMHG | RESPIRATION RATE: 13 BRPM | OXYGEN SATURATION: 98 %

## 2018-09-05 DIAGNOSIS — I34.0 MITRAL REGURGITATION: ICD-10-CM

## 2018-09-05 LAB — POTASSIUM SERPL-SCNC: 3.8 MMOL/L (ref 3.3–5.1)

## 2018-09-05 PROCEDURE — 93325 DOPPLER ECHO COLOR FLOW MAPG: CPT | Performed by: INTERNAL MEDICINE

## 2018-09-05 PROCEDURE — 93312 ECHO TRANSESOPHAGEAL: CPT

## 2018-09-05 PROCEDURE — B246ZZ4 ULTRASONOGRAPHY OF RIGHT AND LEFT HEART, TRANSESOPHAGEAL: ICD-10-PCS | Performed by: INTERNAL MEDICINE

## 2018-09-05 PROCEDURE — 99152 MOD SED SAME PHYS/QHP 5/>YRS: CPT

## 2018-09-05 PROCEDURE — 93320 DOPPLER ECHO COMPLETE: CPT | Performed by: INTERNAL MEDICINE

## 2018-09-05 PROCEDURE — 84132 ASSAY OF SERUM POTASSIUM: CPT | Performed by: INTERNAL MEDICINE

## 2018-09-05 RX ORDER — MIDAZOLAM HYDROCHLORIDE 1 MG/ML
2 INJECTION INTRAMUSCULAR; INTRAVENOUS ONCE
Status: COMPLETED | OUTPATIENT
Start: 2018-09-05 | End: 2018-09-05

## 2018-09-05 RX ORDER — MIDAZOLAM HYDROCHLORIDE 1 MG/ML
INJECTION INTRAMUSCULAR; INTRAVENOUS
Status: COMPLETED
Start: 2018-09-05 | End: 2018-09-05

## 2018-09-05 RX ORDER — SODIUM CHLORIDE 9 MG/ML
INJECTION, SOLUTION INTRAVENOUS CONTINUOUS
Status: DISCONTINUED | OUTPATIENT
Start: 2018-09-05 | End: 2018-09-05

## 2018-09-05 RX ORDER — SODIUM CHLORIDE 9 MG/ML
INJECTION, SOLUTION INTRAVENOUS
Status: DISCONTINUED
Start: 2018-09-05 | End: 2018-09-05

## 2018-09-05 RX ADMIN — MIDAZOLAM HYDROCHLORIDE 2 MG: 1 INJECTION INTRAMUSCULAR; INTRAVENOUS at 08:00:00

## 2018-09-05 NOTE — PRE-SEDATION ASSESSMENT
Pre-Procedure Sedation Assessment    Chief Complaint/Indication for procedure: Mitral regurgitation    History of snoring or sleep or apnea?    No    History of previous problems with anesthesia or sedation  No    Physical Findings:  Neck: nl ROM  CV: HSM

## 2018-09-05 NOTE — DISCHARGE SUMMARY
[facility]    PATIENT'S NAME: [address] [place] [address] PHYSICIAN: Nallely Sow.  Buddy Skaggs MD   PATIENT ACCOUNT#:   [id number]    LOCATION:  [place] [address]  MEDICAL RECORD #:   [id number]       YOB: 1945  ADMISSION DATE:

## 2018-09-05 NOTE — INTERVAL H&P NOTE
Pre-op Diagnosis: * No pre-op diagnosis entered *    The above referenced H&P was reviewed by Kirsty Stone MD on 9/5/2018, the patient was examined and no significant changes have occurred in the patient's condition since the H&P was performed.   I discus

## 2018-09-05 NOTE — PROCEDURES
Pre-op: MR  Post-op: MR  Procedure: JYOTI    Findings: MVP with severe MR. Normal LV    Sedation: Versed and Fentanyl monitored for 15 minutes  EBL: 0 cc  Specimen: none  Complications: none  Condition: stable.

## 2018-09-05 NOTE — PROGRESS NOTES
Procedure hand off report given to [facility]. Pt's vital signs are stable.    Pt asleep but arousable

## 2018-09-05 NOTE — PROGRESS NOTES
Wileysilvestre Robledo  [id number]  9/5/2018    Pt is able to sit up without difficulty. She is awake and responsive. Pt tolerated fluids. Pt's vital signs are stable. Instructions provided and pt/family verbalized understanding.  Dr Laine Cheema spoke with pt and f

## 2018-09-05 NOTE — HISTORICAL OFFICE NOTE
ERIC ADAIR  : 1945  ACCOUNT:  [id number]  [phone]  PCP: Dr. David Momin) Ruba Montanez     TODAY'S DATE: 2018  DICTATED BY:  OCTAVIANO Alonso]      CHIEF COMPLAINT: [Followup of Atrial fibrillation, Followup of Heart failure, diastolic, acu Negative for premature CAD. Negative for AAA. SOCIAL HISTORY: SMOKING: Former tobacco use. used to smoke but quit, years ago and occasionally. CAFFEINE: >5 cups daily and tea and soda. ALCOHOL: denies drinking. EXERCISE: active. DIET: no special diet.  MAR Xarelto. []    ASSESSMENT:  1. Heart failure, diastolic, acute on chronic left  2. Mitral regurgitation  3. . CAD, of native vessels, nonobstructive  4. Atrial fibrillation  5. DM, Type II  6. Hypercholesteremia, pure  7.  Hypertension, benign      PLAN: nonobstructive.]    HPI:    [On 06/22/2018, Eldon Brown, a 74year old female, presented with no interim cardiac complaints.]  This patient is here for hospital discharge follow-up. We have been following her over the years for mitral regurgitation. and tea and soda. ALCOHOL: denies drinking. EXERCISE: active. DIET: no special diet. MARITAL STATUS: . RESIDENCE: lives in home with .      ALLERGIES: Iodides - CLASS and Penicillins - CLASS    MEDICATIONS: Selected prescriptions see below pure  6. Hypertension, benign      PLAN:  [1.  Continue same medicine for now  2.  24-hour Holter monitor to gauge heart rate control  3. Follow-up with me in 3 months]    PRESCRIPTIONS:   03/15/18 *Metoprolol Succinate 25MG      1 TABLET DAILY.

## 2018-09-05 NOTE — TRANSITION NOTE
This is a 68-year-old female who has had 2 admissions over the past 3 months for heart failure the first was involved with new atrial fibrillation and the second about a month ago was pulmonary edema.   She has recovered from these admissions and is stable

## 2018-09-07 ENCOUNTER — TELEPHONE (OUTPATIENT)
Dept: INTERVENTIONAL RADIOLOGY/VASCULAR | Facility: HOSPITAL | Age: 73
End: 2018-09-07

## 2018-09-10 ENCOUNTER — OFFICE VISIT (OUTPATIENT)
Dept: CARDIOLOGY CLINIC | Facility: HOSPITAL | Age: 73
End: 2018-09-10
Attending: INTERNAL MEDICINE
Payer: MEDICARE

## 2018-09-10 ENCOUNTER — HOSPITAL ENCOUNTER (OUTPATIENT)
Dept: ULTRASOUND IMAGING | Facility: HOSPITAL | Age: 73
Discharge: HOME | End: 2018-09-10
Attending: CLINICAL NURSE SPECIALIST
Payer: MEDICARE

## 2018-09-10 VITALS — SYSTOLIC BLOOD PRESSURE: 99 MMHG | HEART RATE: 91 BPM | OXYGEN SATURATION: 100 % | DIASTOLIC BLOOD PRESSURE: 56 MMHG

## 2018-09-10 DIAGNOSIS — I82.402 ACUTE EMBOLISM AND THROMBOSIS OF DEEP VEIN OF LEFT LOWER EXTREMITY (HCC): ICD-10-CM

## 2018-09-10 DIAGNOSIS — I50.9 HEART FAILURE, UNSPECIFIED (HCC): Primary | ICD-10-CM

## 2018-09-10 DIAGNOSIS — I50.30 (HFPEF) HEART FAILURE WITH PRESERVED EJECTION FRACTION (HCC): ICD-10-CM

## 2018-09-10 DIAGNOSIS — I50.9 HEART FAILURE, UNSPECIFIED (HCC): ICD-10-CM

## 2018-09-10 LAB
ANION GAP SERPL CALC-SCNC: 8 MMOL/L (ref 0–18)
BNP SERPL-MCNC: 128 PG/ML (ref 0–100)
BUN SERPL-MCNC: 18 MG/DL (ref 8–20)
BUN/CREAT SERPL: 31 (ref 10–20)
CALCIUM SERPL-MCNC: 9.3 MG/DL (ref 8.5–10.5)
CHLORIDE SERPL-SCNC: 108 MMOL/L (ref 95–110)
CO2 SERPL-SCNC: 25 MMOL/L (ref 22–32)
CREAT SERPL-MCNC: 0.58 MG/DL (ref 0.5–1.5)
GLUCOSE SERPL-MCNC: 144 MG/DL (ref 70–99)
OSMOLALITY UR CALC.SUM OF ELEC: 296 MOSM/KG (ref 275–295)
POTASSIUM SERPL-SCNC: 4 MMOL/L (ref 3.3–5.1)
SODIUM SERPL-SCNC: 141 MMOL/L (ref 136–144)

## 2018-09-10 PROCEDURE — 99214 OFFICE O/P EST MOD 30 MIN: CPT | Performed by: CLINICAL NURSE SPECIALIST

## 2018-09-10 PROCEDURE — 93971 EXTREMITY STUDY: CPT | Performed by: CLINICAL NURSE SPECIALIST

## 2018-09-10 PROCEDURE — 80048 BASIC METABOLIC PNL TOTAL CA: CPT | Performed by: CLINICAL NURSE SPECIALIST

## 2018-09-10 PROCEDURE — 99212 OFFICE O/P EST SF 10 MIN: CPT | Performed by: CLINICAL NURSE SPECIALIST

## 2018-09-10 PROCEDURE — 83880 ASSAY OF NATRIURETIC PEPTIDE: CPT | Performed by: CLINICAL NURSE SPECIALIST

## 2018-09-10 PROCEDURE — 36415 COLL VENOUS BLD VENIPUNCTURE: CPT | Performed by: CLINICAL NURSE SPECIALIST

## 2018-09-10 NOTE — PATIENT INSTRUCTIONS
Continue current medications    32-52 oz fluid/day    Less than 2000 mg sodium/salt diet.  Common high sodium foods include frozen dinners, soups (not homemade), some cereal, vegetable juice, canned vegetables, lunch meats, processed meats like hotdogs, peng

## 2018-09-10 NOTE — PROGRESS NOTES
[address] Patient Status:  Outpatient    1945 MRN [id number]   Location MD Dr Piyush Johnson is a 74year old female who presents to 11/03/2017 01:03 PM    BILT 0.43 11/03/2017 01:03 PM    TP 6.4 11/03/2017 01:03 PM    AST 21 02/17/2018 10:15 AM    ALT 16 02/17/2018 10:15 AM    PTT 28.1 09/20/2017 11:59 AM    INR 1.1 09/20/2017 11:59 AM    PTP 13.5 09/20/2017 11:59 AM    T4F 0.88 02/10/ Trivial regurgitation. 4. Mitral valve: Posterior leaflet Prolapse. Moderate     regurgitation. 5. Left atrium: The atrium was severely dilated. Compared to the previous study these findings represent  improvement.  Compared to 2017 mitral regurgitation been going on for a week. Pain 6/10. Does not think she bumped/bruised her leg. No signs of bruising. On Xarelto; compliant with medication. Will order ultrasound of leg to r/o DVT. Volume status stable. Renal function stable on BMP. K+ 4.0.  Return to c

## 2018-09-11 PROBLEM — J81.0 ACUTE PULMONARY EDEMA (HCC): Status: RESOLVED | Noted: 2018-08-06 | Resolved: 2018-09-11

## 2018-10-05 PROCEDURE — 87040 BLOOD CULTURE FOR BACTERIA: CPT | Performed by: EMERGENCY MEDICINE

## 2018-10-08 ENCOUNTER — APPOINTMENT (OUTPATIENT)
Dept: GENERAL RADIOLOGY | Facility: HOSPITAL | Age: 73
End: 2018-10-08
Attending: EMERGENCY MEDICINE
Payer: MEDICARE

## 2018-10-08 ENCOUNTER — HOSPITAL ENCOUNTER (EMERGENCY)
Facility: HOSPITAL | Age: 73
Discharge: HOME | End: 2018-10-08
Attending: EMERGENCY MEDICINE
Payer: MEDICARE

## 2018-10-08 VITALS
DIASTOLIC BLOOD PRESSURE: 79 MMHG | SYSTOLIC BLOOD PRESSURE: 128 MMHG | HEIGHT: 63 IN | OXYGEN SATURATION: 99 % | HEART RATE: 87 BPM | TEMPERATURE: 98 F | RESPIRATION RATE: 20 BRPM | WEIGHT: 130 LBS | BODY MASS INDEX: 23.04 KG/M2

## 2018-10-08 DIAGNOSIS — R19.7 DIARRHEA, UNSPECIFIED TYPE: Primary | ICD-10-CM

## 2018-10-08 DIAGNOSIS — I48.20 ATRIAL FIBRILLATION, CHRONIC (HCC): ICD-10-CM

## 2018-10-08 PROCEDURE — 85025 COMPLETE CBC W/AUTO DIFF WBC: CPT | Performed by: EMERGENCY MEDICINE

## 2018-10-08 PROCEDURE — 87186 SC STD MICRODIL/AGAR DIL: CPT | Performed by: EMERGENCY MEDICINE

## 2018-10-08 PROCEDURE — 87077 CULTURE AEROBIC IDENTIFY: CPT | Performed by: EMERGENCY MEDICINE

## 2018-10-08 PROCEDURE — 71045 X-RAY EXAM CHEST 1 VIEW: CPT | Performed by: EMERGENCY MEDICINE

## 2018-10-08 PROCEDURE — 96360 HYDRATION IV INFUSION INIT: CPT

## 2018-10-08 PROCEDURE — 93005 ELECTROCARDIOGRAM TRACING: CPT

## 2018-10-08 PROCEDURE — 81001 URINALYSIS AUTO W/SCOPE: CPT | Performed by: EMERGENCY MEDICINE

## 2018-10-08 PROCEDURE — 93010 ELECTROCARDIOGRAM REPORT: CPT | Performed by: EMERGENCY MEDICINE

## 2018-10-08 PROCEDURE — 87086 URINE CULTURE/COLONY COUNT: CPT | Performed by: EMERGENCY MEDICINE

## 2018-10-08 PROCEDURE — 80048 BASIC METABOLIC PNL TOTAL CA: CPT | Performed by: EMERGENCY MEDICINE

## 2018-10-08 PROCEDURE — 96361 HYDRATE IV INFUSION ADD-ON: CPT

## 2018-10-08 PROCEDURE — 99285 EMERGENCY DEPT VISIT HI MDM: CPT

## 2018-10-08 PROCEDURE — 83880 ASSAY OF NATRIURETIC PEPTIDE: CPT | Performed by: EMERGENCY MEDICINE

## 2018-10-08 PROCEDURE — 87493 C DIFF AMPLIFIED PROBE: CPT | Performed by: EMERGENCY MEDICINE

## 2018-10-08 PROCEDURE — 84484 ASSAY OF TROPONIN QUANT: CPT | Performed by: EMERGENCY MEDICINE

## 2018-10-08 RX ORDER — SODIUM CHLORIDE 9 MG/ML
125 INJECTION, SOLUTION INTRAVENOUS CONTINUOUS
Status: DISCONTINUED | OUTPATIENT
Start: 2018-10-08 | End: 2018-10-08

## 2018-10-08 RX ORDER — ACETAMINOPHEN 325 MG/1
650 TABLET ORAL ONCE
Status: COMPLETED | OUTPATIENT
Start: 2018-10-08 | End: 2018-10-08

## 2018-10-08 NOTE — ED INITIAL ASSESSMENT (HPI)
Pt presents to ED with a c/o feeling dehydrated. States noting diarrhea in colostomy since yesterday.

## 2018-10-08 NOTE — ED PROVIDER NOTES
Patient Seen in: [facility] AND [facility] Emergency Department    History   Patient presents with:  Dehydration (metabolic/constitutional)    Stated Complaint: weakness    HPI    74year old female with multiple medical problems including UC and c diff colitis Osteoarthritis     legs, back, shoulders and knees   • OSTEOPENIA    • Other and unspecified hyperlipidemia    • OTHER DISEASES     spastic paraparesis   • OTHER DISEASES     cataracts   • OTHER DISEASES     secondary poycythemia   • OTHER DISEASES     lum FOR PAIN MANAGEMENT   • FLUOR GID & Garrison Verónica NDL/CATH SPI DX/THER NJX N/A 12/8/2014    Procedure: CERVICAL EPIDURAL;  Surgeon: Maria Fernanda Flores MD;  Location: [facility] FOR PAIN MANAGEMENT   • INJECTION, W/WO CONTRAST, DX/THERAPEUTIC SUBSTANCE, EPIDURAL/SUBA [address] [place] [address]   • TONSILLECTOMY     • UPPER GI ENDOSCOPY,BIOPSY N/A 1/5/2015    Hiatal hernia.   Normal gastric/SB Bx, Procedure: ESOPHAGOGASTRODUODENOSCOPY W/ DILATION;  Surgeon: Alex Willingham MD;  Location: [address] [place] [address] Neurological: She is alert and oriented to person, place, and time. She is not disoriented. She displays no tremor. No cranial nerve deficit or sensory deficit. No facial droop. Minimal movement of BLE. Pt moving BUE well.     Skin: Skin is warm and dry RAINBOW DRAW DARK GREEN   RAINBOW DRAW LIGHT GREEN   RAINBOW DRAW GOLD   RAINBOW DRAW LAVENDER TALL (BNP)     EKG    Rate, intervals and axes as noted on EKG Report.   Rate: 131  Rhythm: Atrial Fibrillation  Reading: atrial fibrillation with RVR, + PVC within the next three months to obtain basic health screening including reassessment of your blood pressure.     Medications Prescribed:  Discharge Medication List as of 10/8/2018  5:59 PM

## 2018-10-16 ENCOUNTER — PRIOR ORIGINAL RECORDS (OUTPATIENT)
Dept: OTHER | Age: 73
End: 2018-10-16

## 2018-10-16 ENCOUNTER — MYAURORA ACCOUNT LINK (OUTPATIENT)
Dept: OTHER | Age: 73
End: 2018-10-16

## 2018-10-17 ENCOUNTER — TELEPHONE (OUTPATIENT)
Dept: INTERVENTIONAL RADIOLOGY/VASCULAR | Facility: HOSPITAL | Age: 73
End: 2018-10-17

## 2018-10-24 ENCOUNTER — APPOINTMENT (OUTPATIENT)
Dept: WOUND CARE | Facility: HOSPITAL | Age: 73
End: 2018-10-24
Attending: CLINICAL NURSE SPECIALIST
Payer: MEDICARE

## 2018-10-25 ENCOUNTER — HOSPITAL ENCOUNTER (OUTPATIENT)
Dept: INTERVENTIONAL RADIOLOGY/VASCULAR | Facility: HOSPITAL | Age: 73
Discharge: HOME | End: 2018-10-25
Attending: INTERNAL MEDICINE | Admitting: INTERNAL MEDICINE
Payer: MEDICARE

## 2018-10-25 ENCOUNTER — APPOINTMENT (OUTPATIENT)
Dept: GENERAL RADIOLOGY | Facility: HOSPITAL | Age: 73
End: 2018-10-25
Attending: INTERNAL MEDICINE
Payer: MEDICARE

## 2018-10-25 ENCOUNTER — PRIOR ORIGINAL RECORDS (OUTPATIENT)
Dept: OTHER | Age: 73
End: 2018-10-25

## 2018-10-25 PROCEDURE — 86850 RBC ANTIBODY SCREEN: CPT | Performed by: INTERNAL MEDICINE

## 2018-10-25 PROCEDURE — 93010 ELECTROCARDIOGRAM REPORT: CPT | Performed by: INTERNAL MEDICINE

## 2018-10-25 PROCEDURE — 80048 BASIC METABOLIC PNL TOTAL CA: CPT | Performed by: INTERNAL MEDICINE

## 2018-10-25 PROCEDURE — 86901 BLOOD TYPING SEROLOGIC RH(D): CPT | Performed by: INTERNAL MEDICINE

## 2018-10-25 PROCEDURE — 71045 X-RAY EXAM CHEST 1 VIEW: CPT | Performed by: INTERNAL MEDICINE

## 2018-10-25 PROCEDURE — 93005 ELECTROCARDIOGRAM TRACING: CPT

## 2018-10-25 PROCEDURE — 83880 ASSAY OF NATRIURETIC PEPTIDE: CPT | Performed by: INTERNAL MEDICINE

## 2018-10-25 PROCEDURE — 86900 BLOOD TYPING SEROLOGIC ABO: CPT | Performed by: INTERNAL MEDICINE

## 2018-10-25 PROCEDURE — 99212 OFFICE O/P EST SF 10 MIN: CPT | Performed by: NURSE PRACTITIONER

## 2018-10-25 PROCEDURE — 85027 COMPLETE CBC AUTOMATED: CPT | Performed by: INTERNAL MEDICINE

## 2018-10-25 NOTE — H&P
[facility]  [facility]    Simona Bravo Patient Status:  Outpatient in a Bed    1945 MRN [id number]   Location [address] [facility] Attending Booker Poe MD   [facility] Day # 0 PCP [address] [place], [place] OTHER DISEASES     uterine fibroids   • OTHER DISEASES     ulcerative colitis   • OTHER DISEASES     uti   • Pneumonia, organism unspecified(486)    • Reflux    • Self-catheterizes urinary bladder 1/11/2016   • Ulcerative colitis ([facility]    • Valvular diseas Jethro Malik MD;  Location: [address] [place] [address]   • INJECTION, W/WO CONTRAST, DX/THERAPEUTIC SUBSTANCE, EPIDURAL/SUBARACHNOID; CERVICAL/THORACIC N/A 12/8/2014    Procedure: CERVICAL EPIDURAL;  Surgeon: Jethro Malik MD;  Location: [facility] Spouse name: Not on file      Number of children: Not on file      Years of education: Not on file      Highest education level: Not on file    Social Needs      Financial resource strain: Not on file      Food insecurity - worry: Not on file      Food daily as needed for constipation. Disp:  Rfl:  Taking   famoTIDine 20 MG Oral Tab Take 20 mg by mouth 2 (two) times daily as needed for Heartburn.  Disp:  Rfl:  Taking   PREDNISONE 5 MG Oral Tab TAKE 1 TABLET BY MOUTH DAILY Disp: 90 tablet Rfl: 0 Taking   E involving the     anterior leaflet and the posterior leaflet. Severe     regurgitation. 2. Left ventricle: The cavity size was normal. Wall thickness was     normal. Systolic function was normal. The estimated ejection     fraction was 55-60%.  Wall motion

## 2018-10-25 NOTE — PROGRESS NOTES
Patient came for PAT visit for TMVR, vitals are stable, can't walk, labs drawn, ekg and chest X-ray done, Jacky Hernandez visited patient Dr Downey Shock is not available today, will see the patient on the day of procedure. pre admission discharge instruction

## 2018-10-26 ENCOUNTER — TELEPHONE (OUTPATIENT)
Dept: INTERVENTIONAL RADIOLOGY/VASCULAR | Facility: HOSPITAL | Age: 73
End: 2018-10-26

## 2018-10-29 LAB
BNP: 128 PMOL/L
BUN: 21 MG/DL
CALCIUM: 9.5 MG/DL
CHLORIDE: 110 MEQ/L
CREATININE, SERUM: 0.62 MG/DL
GLUCOSE: 81 MG/DL
HEMATOCRIT: 44.6 %
HEMOGLOBIN: 14.3 G/DL
PLATELETS: 200 K/UL
POTASSIUM, SERUM: 3.9 MEQ/L
RED BLOOD COUNT: 5.28 X 10-6/U
SODIUM: 144 MEQ/L
WHITE BLOOD COUNT: 6.5 X 10-3/U

## 2018-10-30 NOTE — HISTORICAL OFFICE NOTE
Maria Bravo  : 1945  ACCOUNT:  [id number]  [phone]  PCP: Dr. Barry Ren DATE: 10/16/2018  DICTATED BY:  [Dr. Ruiz Reveal: [Followup of Mitral regurgitation.]    HPI:    [On 10/16/2018, Miguel Vaughan jugular venous pressure not elevated. RESP: respirations with normal rate and rhythm, clear to auscultation. MS: pt. in wheel chair. 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 EXT: no edema. SKIN: no rashes, lesions, ulcers.   NEURO/PSYCH: jeanine 09/13/13 Zetia                 10MG      daily                                    05/20/11 Claritin              10MG      daily                                    05/20/11 Cranberry             300MG     3 daily

## 2018-10-30 NOTE — CONSULTS
Cardiothoracic Surgery  Mitraclip Consult       Referring: Dr Chay Mendoza   PCP: Dr Carlos Damon  Reason for consult: severe symptomatic mitral regurgitation  74year old with symptomatic mitral regurgitation with increased SOB, LIRIANO, fatigue and recent a-fib  PMH: HTN ventricle: The cavity size was normal. Wall thickness was     normal. Systolic function was normal. The estimated ejection     fraction was 55-60%. Wall motion was normal; there were no     regional wall motion abnormalities.   3. Aortic valve: Mild regurgi

## 2018-10-31 ENCOUNTER — APPOINTMENT (OUTPATIENT)
Dept: CV DIAGNOSTICS | Facility: HOSPITAL | Age: 73
DRG: 229 | End: 2018-10-31
Attending: NURSE PRACTITIONER
Payer: MEDICARE

## 2018-10-31 ENCOUNTER — APPOINTMENT (OUTPATIENT)
Dept: GENERAL RADIOLOGY | Facility: HOSPITAL | Age: 73
DRG: 229 | End: 2018-10-31
Attending: NURSE PRACTITIONER
Payer: MEDICARE

## 2018-10-31 ENCOUNTER — ANESTHESIA EVENT (OUTPATIENT)
Dept: CARDIAC SURGERY | Facility: HOSPITAL | Age: 73
DRG: 229 | End: 2018-10-31
Payer: MEDICARE

## 2018-10-31 ENCOUNTER — HOSPITAL ENCOUNTER (INPATIENT)
Facility: HOSPITAL | Age: 73
LOS: 1 days | Discharge: HOME HEALTH | DRG: 229 | End: 2018-11-01
Attending: INTERNAL MEDICINE | Admitting: INTERNAL MEDICINE
Payer: MEDICARE

## 2018-10-31 ENCOUNTER — ANESTHESIA (OUTPATIENT)
Dept: CARDIAC SURGERY | Facility: HOSPITAL | Age: 73
DRG: 229 | End: 2018-10-31
Payer: MEDICARE

## 2018-10-31 PROBLEM — Z95.818 STATUS POST IMPLANTATION OF MITRAL VALVE LEAFLET CLIP: Status: ACTIVE | Noted: 2018-10-31

## 2018-10-31 PROBLEM — I34.0 MITRAL REGURGITATION: Status: ACTIVE | Noted: 2018-10-31

## 2018-10-31 PROBLEM — Z98.890 STATUS POST IMPLANTATION OF MITRAL VALVE LEAFLET CLIP: Status: ACTIVE | Noted: 2018-10-31

## 2018-10-31 PROCEDURE — 86920 COMPATIBILITY TEST SPIN: CPT

## 2018-10-31 PROCEDURE — 85347 COAGULATION TIME ACTIVATED: CPT

## 2018-10-31 PROCEDURE — 82803 BLOOD GASES ANY COMBINATION: CPT

## 2018-10-31 PROCEDURE — 85014 HEMATOCRIT: CPT

## 2018-10-31 PROCEDURE — B519ZZZ FLUOROSCOPY OF INFERIOR VENA CAVA: ICD-10-PCS | Performed by: INTERNAL MEDICINE

## 2018-10-31 PROCEDURE — 84295 ASSAY OF SERUM SODIUM: CPT

## 2018-10-31 PROCEDURE — 93005 ELECTROCARDIOGRAM TRACING: CPT

## 2018-10-31 PROCEDURE — 82330 ASSAY OF CALCIUM: CPT

## 2018-10-31 PROCEDURE — 85025 COMPLETE CBC W/AUTO DIFF WBC: CPT | Performed by: NURSE PRACTITIONER

## 2018-10-31 PROCEDURE — 93010 ELECTROCARDIOGRAM REPORT: CPT | Performed by: INTERNAL MEDICINE

## 2018-10-31 PROCEDURE — 93355 ECHO TRANSESOPHAGEAL (TEE): CPT | Performed by: NURSE PRACTITIONER

## 2018-10-31 PROCEDURE — 84132 ASSAY OF SERUM POTASSIUM: CPT

## 2018-10-31 PROCEDURE — 85610 PROTHROMBIN TIME: CPT | Performed by: NURSE PRACTITIONER

## 2018-10-31 PROCEDURE — B245ZZ4 ULTRASONOGRAPHY OF LEFT HEART, TRANSESOPHAGEAL: ICD-10-PCS | Performed by: INTERNAL MEDICINE

## 2018-10-31 PROCEDURE — 87081 CULTURE SCREEN ONLY: CPT | Performed by: INTERNAL MEDICINE

## 2018-10-31 PROCEDURE — 02UG3JZ SUPPLEMENT MITRAL VALVE WITH SYNTHETIC SUBSTITUTE, PERCUTANEOUS APPROACH: ICD-10-PCS | Performed by: INTERNAL MEDICINE

## 2018-10-31 PROCEDURE — 85384 FIBRINOGEN ACTIVITY: CPT | Performed by: NURSE PRACTITIONER

## 2018-10-31 PROCEDURE — 71045 X-RAY EXAM CHEST 1 VIEW: CPT | Performed by: NURSE PRACTITIONER

## 2018-10-31 PROCEDURE — 83735 ASSAY OF MAGNESIUM: CPT | Performed by: NURSE PRACTITIONER

## 2018-10-31 PROCEDURE — 80048 BASIC METABOLIC PNL TOTAL CA: CPT | Performed by: NURSE PRACTITIONER

## 2018-10-31 PROCEDURE — 85730 THROMBOPLASTIN TIME PARTIAL: CPT | Performed by: NURSE PRACTITIONER

## 2018-10-31 RX ORDER — SODIUM CHLORIDE 9 MG/ML
INJECTION, SOLUTION INTRAVENOUS CONTINUOUS
Status: DISCONTINUED | OUTPATIENT
Start: 2018-10-31 | End: 2018-11-01

## 2018-10-31 RX ORDER — POTASSIUM CHLORIDE 20 MEQ/1
40 TABLET, EXTENDED RELEASE ORAL ONCE
Status: COMPLETED | OUTPATIENT
Start: 2018-10-31 | End: 2018-10-31

## 2018-10-31 RX ORDER — FAMOTIDINE 10 MG/ML
20 INJECTION, SOLUTION INTRAVENOUS 2 TIMES DAILY
Status: DISCONTINUED | OUTPATIENT
Start: 2018-10-31 | End: 2018-10-31

## 2018-10-31 RX ORDER — MAGNESIUM OXIDE 400 MG (241.3 MG MAGNESIUM) TABLET
400 TABLET ONCE
Status: COMPLETED | OUTPATIENT
Start: 2018-10-31 | End: 2018-10-31

## 2018-10-31 RX ORDER — DILTIAZEM HYDROCHLORIDE 5 MG/ML
10 INJECTION INTRAVENOUS EVERY 2 HOUR PRN
Status: DISCONTINUED | OUTPATIENT
Start: 2018-10-31 | End: 2018-11-01

## 2018-10-31 RX ORDER — FAMOTIDINE 20 MG/1
20 TABLET ORAL 2 TIMES DAILY
Status: DISCONTINUED | OUTPATIENT
Start: 2018-10-31 | End: 2018-11-01

## 2018-10-31 RX ORDER — SODIUM CHLORIDE 9 MG/ML
INJECTION, SOLUTION INTRAVENOUS CONTINUOUS
Status: DISCONTINUED | OUTPATIENT
Start: 2018-10-31 | End: 2018-10-31

## 2018-10-31 RX ORDER — DIPHENHYDRAMINE HCL 50 MG
CAPSULE ORAL
Status: COMPLETED
Start: 2018-10-31 | End: 2018-10-31

## 2018-10-31 RX ORDER — CETIRIZINE HYDROCHLORIDE 10 MG/1
10 TABLET ORAL DAILY
Status: DISCONTINUED | OUTPATIENT
Start: 2018-10-31 | End: 2018-11-01

## 2018-10-31 RX ORDER — METOPROLOL SUCCINATE 25 MG/1
25 TABLET, EXTENDED RELEASE ORAL DAILY
Status: DISCONTINUED | OUTPATIENT
Start: 2018-10-31 | End: 2018-11-01

## 2018-10-31 RX ORDER — CLINDAMYCIN PHOSPHATE 600 MG/50ML
INJECTION INTRAVENOUS
Status: DISCONTINUED | OUTPATIENT
Start: 2018-10-31 | End: 2018-11-01

## 2018-10-31 RX ORDER — SODIUM CHLORIDE, SODIUM LACTATE, POTASSIUM CHLORIDE, CALCIUM CHLORIDE 600; 310; 30; 20 MG/100ML; MG/100ML; MG/100ML; MG/100ML
INJECTION, SOLUTION INTRAVENOUS CONTINUOUS
Status: DISCONTINUED | OUTPATIENT
Start: 2018-10-31 | End: 2018-10-31

## 2018-10-31 RX ORDER — ACETAMINOPHEN 325 MG/1
650 TABLET ORAL EVERY 4 HOURS PRN
Status: DISCONTINUED | OUTPATIENT
Start: 2018-10-31 | End: 2018-11-01

## 2018-10-31 RX ORDER — ONDANSETRON 2 MG/ML
4 INJECTION INTRAMUSCULAR; INTRAVENOUS AS NEEDED
Status: DISCONTINUED | OUTPATIENT
Start: 2018-10-31 | End: 2018-10-31

## 2018-10-31 RX ORDER — PREGABALIN 75 MG/1
75 CAPSULE ORAL 2 TIMES DAILY
Status: DISCONTINUED | OUTPATIENT
Start: 2018-10-31 | End: 2018-11-01

## 2018-10-31 RX ORDER — CEFAZOLIN SODIUM/WATER 2 G/20 ML
2 SYRINGE (ML) INTRAVENOUS EVERY 8 HOURS
Status: COMPLETED | OUTPATIENT
Start: 2018-10-31 | End: 2018-11-01

## 2018-10-31 RX ORDER — ACETAMINOPHEN 10 MG/ML
INJECTION, SOLUTION INTRAVENOUS
Status: DISCONTINUED | OUTPATIENT
Start: 2018-10-31 | End: 2018-10-31 | Stop reason: ALTCHOICE

## 2018-10-31 RX ORDER — METOCLOPRAMIDE HYDROCHLORIDE 5 MG/ML
10 INJECTION INTRAMUSCULAR; INTRAVENOUS AS NEEDED
Status: ACTIVE | OUTPATIENT
Start: 2018-10-31 | End: 2018-10-31

## 2018-10-31 RX ORDER — PREDNISONE 1 MG/1
5 TABLET ORAL
Status: DISCONTINUED | OUTPATIENT
Start: 2018-10-31 | End: 2018-11-01

## 2018-10-31 RX ORDER — DOCUSATE SODIUM 100 MG/1
100 CAPSULE, LIQUID FILLED ORAL 2 TIMES DAILY
Status: DISCONTINUED | OUTPATIENT
Start: 2018-10-31 | End: 2018-11-01

## 2018-10-31 RX ORDER — ACETAMINOPHEN 325 MG/1
650 TABLET ORAL EVERY 4 HOURS PRN
Status: DISCONTINUED | OUTPATIENT
Start: 2018-10-31 | End: 2018-10-31 | Stop reason: HOSPADM

## 2018-10-31 RX ORDER — DIPHENHYDRAMINE HCL 50 MG
50 CAPSULE ORAL ONCE
Status: COMPLETED | OUTPATIENT
Start: 2018-10-31 | End: 2018-10-31

## 2018-10-31 RX ORDER — ATORVASTATIN CALCIUM 40 MG/1
40 TABLET, FILM COATED ORAL NIGHTLY
Status: DISCONTINUED | OUTPATIENT
Start: 2018-10-31 | End: 2018-11-01

## 2018-10-31 RX ORDER — CEFAZOLIN SODIUM/WATER 2 G/20 ML
2 SYRINGE (ML) INTRAVENOUS EVERY 8 HOURS
Status: DISCONTINUED | OUTPATIENT
Start: 2018-10-31 | End: 2018-10-31 | Stop reason: HOSPADM

## 2018-10-31 RX ORDER — DIPHENHYDRAMINE HYDROCHLORIDE 50 MG/ML
50 INJECTION INTRAMUSCULAR; INTRAVENOUS ONCE
Status: DISCONTINUED | OUTPATIENT
Start: 2018-10-31 | End: 2018-10-31

## 2018-10-31 RX ORDER — ONDANSETRON 2 MG/ML
4 INJECTION INTRAMUSCULAR; INTRAVENOUS EVERY 6 HOURS PRN
Status: DISCONTINUED | OUTPATIENT
Start: 2018-10-31 | End: 2018-11-01

## 2018-10-31 RX ORDER — ONDANSETRON 2 MG/ML
4 INJECTION INTRAMUSCULAR; INTRAVENOUS EVERY 6 HOURS PRN
Status: DISCONTINUED | OUTPATIENT
Start: 2018-10-31 | End: 2018-10-31 | Stop reason: HOSPADM

## 2018-10-31 RX ORDER — DOCUSATE SODIUM 100 MG/1
100 CAPSULE, LIQUID FILLED ORAL 2 TIMES DAILY
Status: DISCONTINUED | OUTPATIENT
Start: 2018-10-31 | End: 2018-10-31

## 2018-10-31 RX ORDER — NALOXONE HYDROCHLORIDE 0.4 MG/ML
80 INJECTION, SOLUTION INTRAMUSCULAR; INTRAVENOUS; SUBCUTANEOUS AS NEEDED
Status: ACTIVE | OUTPATIENT
Start: 2018-10-31 | End: 2018-10-31

## 2018-10-31 NOTE — ANESTHESIA POSTPROCEDURE EVALUATION
[address] Patient Status:  Inpatient   Age/Gender 74year old female MRN [id number]   [facility] 6NE-A Attending Nila Fonseca MD   Hosp Day # 0 PCP Guzman Krause MD       Anesthesia Post-op Note    Proce

## 2018-10-31 NOTE — PROCEDURES
Cardiology Transesophageal Echo Note    PRE and POST PROCEDURE DIAGNOSIS:   1.  Mitral regurgitation    PROCEDURE: Intra-operative transesophageal echocardiogram (JYOTI) for zyuv-wa-goen mitral clip procedure    The patient was already intubated and sedated b mitral regurgitation from moderate-severe to trivial-mild depending on blood pressure    Mitral clip deployment:  - Reduction of mitral insufficiency from moderate-severe to trivial-mild  - Mean gradient across mitral valve was 2 mmHg  - Blunted but no sys

## 2018-10-31 NOTE — PROGRESS NOTES
Patient stated she took her Prednisone as prescribed; 50 mg @ 1830 on 10/30/18; 50 mg @ 0030 this morning, and arrived with 50 mg that she took at 0615.   Benadryl 50 mg PO was also administered this morning as ordered by Winferd Goltz APN for pre-medication pr

## 2018-10-31 NOTE — PROGRESS NOTES
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

## 2018-10-31 NOTE — H&P
History & Physical Examination    Patient Name: Ursula Jacinto  MRN: [id number]  [facility]: [id number]  YOB: 1945    Diagnosis: Severe, symptomaticmitral regurgitation    Present Illness:   Ursula Jacinto is a pleasant 74 year at 0800   loratadine 10 MG Oral Tab Take 10 mg by mouth daily.  Disp:  Rfl:  10/30/2018 at 0800   ferrous sulfate 325 (65 FE) MG Oral Tab EC Take 325 mg by mouth daily with breakfast. Disp:  Rfl:  10/30/2018 at 0800   Atorvastatin Calcium 40 MG Oral Tab [facility] paraplegia (HCC)    • High blood pressure    • High cholesterol    • History of blood transfusion    • Hypercholesterolemia    • HYPERLIPIDEMIA    • Irritable bowel syndrome    • Lymphocytic colitis Colon= 5/7/12   • MENOPAUSE    • MITRAL VALVE PROLAPSE Omayra Echeverria MD at [address] [facility]   • EYE CATARACT WITH INTRAOCULAR LENS Right 2/8/2016    Performed by Sunday Dunaway MD at [address]   • FLUOR GID & Seblord Chiuet NDL/CATH SPI DX/THER [address] N/A 10/27/2014    Procedure: CERVICAL EPIDURAL;  Surgeon: Tal Knight, CERVICAL EPIDURAL;  Surgeon: Tri Liu MD;  Location: [facility] FOR PAIN MANAGEMENT   • PATIENT [address] FOR IV ANTIBIOTIC SURGICAL SITE INFECTION PROPHYLAXIS.  N/A 1/5/2015    Procedure: ESOPHAGOGASTRODUODENOSCOPY W/ DILATION;  Muniz AM    History, imaging and laboratories reviewed. Discussed with Dr. Asha Mayo. Will proceed with attempt at mitral valve repair as planned.

## 2018-10-31 NOTE — ANESTHESIA PREPROCEDURE EVALUATION
PRE-OP EVALUATION    Patient Name: Brodie Ortiz    Pre-op Diagnosis: mitral regurgitation    Procedure(s):  Mitraclip    Surgeon(s) and Role:     Melody Navarro MD - Primary    Pre-op vitals reviewed.   Temp: 97.6 °F (36.4 °C)  Pulse: 89  Resp: 13 Gabapentin; Gnp Iodides Decolorized; Shellfish; Shellfish-Derived Products; Tetracycline Base; Aleve [Naprelan]; Aspirin; Bactrim      Anesthesia Evaluation        Anesthetic Complications           GI/Hepatic/Renal    Negative GI/hepatic/renal ROS. Location: [facility] FOR PAIN MANAGEMENT   • FLUOR GID & Mar  NDL/CATH SPI DX/THER NJX N/A 12/8/2014    Procedure: CERVICAL EPIDURAL;  Surgeon: Daysi Christian MD;  Location: [facility] FOR PAIN MANAGEMENT   • INJECTION, W/WO CONTRAST, DX/THERAPEUTIC SUB Bronson Vasquez MD;  Location: [address] [place] [address]   • TONSILLECTOMY     • UPPER GI ENDOSCOPY,BIOPSY N/A 1/5/2015    Hiatal hernia.   Normal gastric/SB Bx, Procedure: ESOPHAGOGASTRODUODENOSCOPY W/ DILATION;  Surgeon: Sarai Jiang MD;  Location: [facility] SURGICAL spouse                Present on Admission:  **None**

## 2018-10-31 NOTE — OPERATIVE REPORT
[facility]    PATIENT'S NAME: Aman Martin   ATTENDING PHYSICIAN: Jose Luis Mosley M.D. OPERATING PHYSICIAN: Cass Velasco M.D.    PATIENT ACCOUNT#:   [de-identified]    LOCATION:  [address] [place] [address]  MEDICAL RECORD #:   [id number]       DATE OF BI Dr. Cheyenne Rodríguez. We placed a single MitraClip XTR device on the lateral aspect of A2/P2. This gave a beautiful acute result.   There was trivial regurgitation present with a blood pressure of 120 and mild regurgitation present with a blood pressure of approxima

## 2018-11-01 ENCOUNTER — APPOINTMENT (OUTPATIENT)
Dept: GENERAL RADIOLOGY | Facility: HOSPITAL | Age: 73
DRG: 229 | End: 2018-11-01
Attending: NURSE PRACTITIONER
Payer: MEDICARE

## 2018-11-01 ENCOUNTER — APPOINTMENT (OUTPATIENT)
Dept: CV DIAGNOSTICS | Facility: HOSPITAL | Age: 73
DRG: 229 | End: 2018-11-01
Attending: NURSE PRACTITIONER
Payer: MEDICARE

## 2018-11-01 VITALS
OXYGEN SATURATION: 90 % | TEMPERATURE: 99 F | SYSTOLIC BLOOD PRESSURE: 104 MMHG | HEART RATE: 80 BPM | DIASTOLIC BLOOD PRESSURE: 66 MMHG | RESPIRATION RATE: 20 BRPM | WEIGHT: 136.88 LBS | BODY MASS INDEX: 24 KG/M2

## 2018-11-01 PROCEDURE — 97535 SELF CARE MNGMENT TRAINING: CPT

## 2018-11-01 PROCEDURE — 85025 COMPLETE CBC W/AUTO DIFF WBC: CPT | Performed by: NURSE PRACTITIONER

## 2018-11-01 PROCEDURE — 80048 BASIC METABOLIC PNL TOTAL CA: CPT | Performed by: NURSE PRACTITIONER

## 2018-11-01 PROCEDURE — 97165 OT EVAL LOW COMPLEX 30 MIN: CPT

## 2018-11-01 PROCEDURE — 71045 X-RAY EXAM CHEST 1 VIEW: CPT | Performed by: NURSE PRACTITIONER

## 2018-11-01 PROCEDURE — 84132 ASSAY OF SERUM POTASSIUM: CPT | Performed by: NURSE PRACTITIONER

## 2018-11-01 PROCEDURE — 97530 THERAPEUTIC ACTIVITIES: CPT

## 2018-11-01 PROCEDURE — 93306 TTE W/DOPPLER COMPLETE: CPT | Performed by: NURSE PRACTITIONER

## 2018-11-01 PROCEDURE — 83735 ASSAY OF MAGNESIUM: CPT | Performed by: NURSE PRACTITIONER

## 2018-11-01 PROCEDURE — 97163 PT EVAL HIGH COMPLEX 45 MIN: CPT

## 2018-11-01 PROCEDURE — 85610 PROTHROMBIN TIME: CPT | Performed by: NURSE PRACTITIONER

## 2018-11-01 RX ORDER — ASPIRIN 81 MG/1
81 TABLET ORAL DAILY
Status: DISCONTINUED | OUTPATIENT
Start: 2018-11-01 | End: 2018-11-01

## 2018-11-01 RX ORDER — ASPIRIN 81 MG/1
81 TABLET ORAL DAILY
Refills: 0 | Status: SHIPPED | COMMUNITY
Start: 2018-11-02 | End: 2020-07-16

## 2018-11-01 NOTE — PROGRESS NOTES
11/01/18 1052   Clinical Encounter Type   Visited With Patient   Sacramental Encounters   Sacrament of Sick-Anointing Anointed   The patient was seen by Shari Ortiz. Received prayer, Scripture, support and Sacrament of the Sick.

## 2018-11-01 NOTE — DISCHARGE SUMMARY
[facility]  Discharge Summary    Wilmer Lara Patient Status:  Inpatient    1945 MRN [id number]   [facility] 6NE-A Attending No att. providers found   Hosp Day # 1 PCP Tyesha Levin MD         Admit date: 10/31/2018 Dr.'s James Barrera      Disposition:  Stable - D/c Home    Discharge Medications:   Discharge Medication List as of 11/1/2018  2:29 PM    START taking these medications    aspirin 81 MG Oral Tab EC  Take 1 tablet (81 mg total) by mouth daily. , Evan Egan 5-10 minutes at a time. Increase your activity gradually. · Keep legs elevated while sitting. · No tight fitting underwear. HYGIENE:  · Wash incisions with mild soap and water daily. Showering is allowed. No tub baths until totally healed.   · [facility] (Dr. Shira Chambers) [phone]  · [facility] (Dr. Cort Galeazzi and Dr. Bianca Corona) [phone]       Signed:  OCTAVIANO Palomino  11/1/2018

## 2018-11-01 NOTE — PLAN OF CARE
Assumed care of patient around 0730, alert and oriented X4, no c/o CP/SOB, SpO2 95 % on RA  Rhythm/HR: afib 80s- xarelto restarted today, ASA 81mg added    Right groin- soft, no hematoma- applied dressing- perclose was used, pedal pulse +2    Pt has heredi

## 2018-11-01 NOTE — PLAN OF CARE
Received pt from [facility] around 1200, drowsy but arousable. VSS, afebrile, colostomy intact, baclofen pump in RLQ of ABD. Right groin c/d/i, see flowsheet. Pt titrated to room air, Neuro intact. Remains on hover mat in bed, pt is a parapalegic.    in

## 2018-11-01 NOTE — PHYSICAL THERAPY NOTE
PHYSICAL THERAPY QUICK EVALUATION - INPATIENT    Room Number: 4940/7328-I  Evaluation Date: 11/1/2018  Presenting Problem: severe mitral regurgitation s/p mitraclip 10/31/18   Physician Order: PT Eval and Treat    Problem List  Principal Problem:    Stat Performed by Chantell Jaimes MD at [address] [facility] N/A 12/8/2014    Performed by Chantell Jaimes MD at [address] [facility] N/A 10/27/2014    Performed by Chantell Jaimes MD at [address] [facility] EVENTS N/A 10/27/2014    Procedure: CERVICAL EPIDURAL;  Surgeon: Simon Ivey MD;  Location: [address] [place] [address]   • PATIENT DOCUMENTED NOT TO HAVE EXPERIENCED ANY OF THE FOLLOWING EVENTS N/A 12/8/2014    Procedure: CERVICAL EPIDURAL;  Bonnie Hind risk    WEIGHT BEARING RESTRICTION  Weight Bearing Restriction: None                PAIN ASSESSMENT  Ratin         RANGE OF MOTION AND STRENGTH ASSESSMENT  Upper extremity ROM and strength are within functional limits; see OT eval for details     Lower close SBA. Max A for placement of slideboard, then min A x2 provided with slideboard transfer from bed to w/c- PT in front managing LEs, OT behind Pt assisting with balance and scooting. Pt remained sitting in chair at end of session.  Pearl sling placed un

## 2018-11-01 NOTE — PROGRESS NOTES
[facility]  Progress Note    Aletha Mathias Patient Status:  Inpatient    1945 MRN [id number]   [facility] 6NE-A Attending Femi Olivia MD   Hosp Day # 1 PCP Fatimah Pruitt MD       Subjective:  Sitting up, eating br rivaroxaban  20 mg Oral Daily with food   • docusate sodium  100 mg Oral BID     • sodium chloride     • Clindamycin Phosphate in D5W     • sodium chloride         Assessment:  · S/p Mitraclip x1 d/t Severe, symptomatic mitral regurgitation 10/31/18  ?  Imp

## 2018-11-01 NOTE — OCCUPATIONAL THERAPY NOTE
OCCUPATIONAL THERAPY QUICK EVALUATION - INPATIENT    Room Number: 3008/4460-U  Evaluation Date: 11/1/2018     Type of Evaluation: Quick Eval  Presenting Problem: 10/31 mitral valve clip procedure    Physician Order: IP Consult to Occupational Therapy  Reas left renal collecting system   • OTHER DISEASES     uterine fibroids   • OTHER DISEASES     ulcerative colitis   • OTHER DISEASES     uti   • Pneumonia, organism unspecified(486)    • Reflux    • Self-catheterizes urinary bladder 1/11/2016   • Ulcerative c 10/27/2014    Procedure: CERVICAL EPIDURAL;  Surgeon: Efe Garvin MD;  Location: [facility] FOR PAIN MANAG[facility]   • INJECTION, W/WO CONTRAST, DX/THERAPEUTIC SUBSTANCE, EPIDURAL/SUBARACHNOID; CERVICAL/THORACIC N/A 12/8/2014    Procedure: CERVICAL EPIDUR SITUATION  Type of Home: House  Home Layout: One level  Lives With: Spouse    Toilet and Equipment: (straight cath, ostomy pouch)     Other Equipment: (wheelchair, slide board, kathya lift)    Occupation/Status: retired nurse  Hand Dominance: Right  Drives: (G-Code): CK    FUNCTIONAL TRANSFER ASSESSMENT  Supine to Sit : Maximum assistance  Sit to Stand: Not tested    Skilled Therapy Provided: Pt is pleasant and motivated to work with OT. Educated the pt about cardiac precautions. Verbalized understanding.   Anthony Saavedra presents with no skilled Occupational Therapy needs at this time. Patient discharged from Occupational Therapy services. Please re-order if a new functional limitation presents during this admission.     Patient was able to achieve the following goals:  P

## 2018-11-01 NOTE — CM/SW NOTE
11/01/18 1300   CM/SW Referral Data   Referral Source Physician   Reason for Referral Discharge planning   Informant Patient;Spouse   Patient Info   Patient's Mental Status Alert;Oriented   Number of Levels in Home 1   Patient lives with Spouse   Andie

## 2018-11-05 ENCOUNTER — TELEPHONE (OUTPATIENT)
Dept: INTERVENTIONAL RADIOLOGY/VASCULAR | Facility: HOSPITAL | Age: 73
End: 2018-11-05

## 2018-11-08 ENCOUNTER — MYAURORA ACCOUNT LINK (OUTPATIENT)
Dept: OTHER | Age: 73
End: 2018-11-08

## 2018-11-08 ENCOUNTER — PRIOR ORIGINAL RECORDS (OUTPATIENT)
Dept: OTHER | Age: 73
End: 2018-11-08

## 2018-11-09 PROBLEM — H43.813 PVD (POSTERIOR VITREOUS DETACHMENT), BILATERAL: Status: ACTIVE | Noted: 2018-11-09

## 2018-11-13 ENCOUNTER — APPOINTMENT (OUTPATIENT)
Dept: CV DIAGNOSTICS | Facility: HOSPITAL | Age: 73
DRG: 292 | End: 2018-11-13
Attending: NURSE PRACTITIONER
Payer: MEDICARE

## 2018-11-13 ENCOUNTER — HOSPITAL ENCOUNTER (INPATIENT)
Facility: HOSPITAL | Age: 73
LOS: 4 days | Discharge: HOME HEALTH | DRG: 292 | End: 2018-11-17
Admitting: HOSPITALIST
Payer: MEDICARE

## 2018-11-13 ENCOUNTER — APPOINTMENT (OUTPATIENT)
Dept: GENERAL RADIOLOGY | Facility: HOSPITAL | Age: 73
DRG: 292 | End: 2018-11-13
Payer: MEDICARE

## 2018-11-13 DIAGNOSIS — N30.00 ACUTE CYSTITIS WITHOUT HEMATURIA: ICD-10-CM

## 2018-11-13 DIAGNOSIS — I50.9 ACUTE ON CHRONIC CONGESTIVE HEART FAILURE, UNSPECIFIED HEART FAILURE TYPE (HCC): Primary | ICD-10-CM

## 2018-11-13 PROCEDURE — 93010 ELECTROCARDIOGRAM REPORT: CPT | Performed by: INTERNAL MEDICINE

## 2018-11-13 PROCEDURE — 84484 ASSAY OF TROPONIN QUANT: CPT | Performed by: EMERGENCY MEDICINE

## 2018-11-13 PROCEDURE — 71046 X-RAY EXAM CHEST 2 VIEWS: CPT

## 2018-11-13 PROCEDURE — 96375 TX/PRO/DX INJ NEW DRUG ADDON: CPT

## 2018-11-13 PROCEDURE — 99291 CRITICAL CARE FIRST HOUR: CPT

## 2018-11-13 PROCEDURE — 87077 CULTURE AEROBIC IDENTIFY: CPT | Performed by: EMERGENCY MEDICINE

## 2018-11-13 PROCEDURE — 80048 BASIC METABOLIC PNL TOTAL CA: CPT | Performed by: EMERGENCY MEDICINE

## 2018-11-13 PROCEDURE — 83880 ASSAY OF NATRIURETIC PEPTIDE: CPT | Performed by: EMERGENCY MEDICINE

## 2018-11-13 PROCEDURE — 96365 THER/PROPH/DIAG IV INF INIT: CPT

## 2018-11-13 PROCEDURE — 51702 INSERT TEMP BLADDER CATH: CPT

## 2018-11-13 PROCEDURE — 85025 COMPLETE CBC W/AUTO DIFF WBC: CPT | Performed by: EMERGENCY MEDICINE

## 2018-11-13 PROCEDURE — 93306 TTE W/DOPPLER COMPLETE: CPT | Performed by: NURSE PRACTITIONER

## 2018-11-13 PROCEDURE — 81001 URINALYSIS AUTO W/SCOPE: CPT | Performed by: EMERGENCY MEDICINE

## 2018-11-13 PROCEDURE — 93005 ELECTROCARDIOGRAM TRACING: CPT

## 2018-11-13 PROCEDURE — 87086 URINE CULTURE/COLONY COUNT: CPT | Performed by: EMERGENCY MEDICINE

## 2018-11-13 PROCEDURE — 87088 URINE BACTERIA CULTURE: CPT | Performed by: EMERGENCY MEDICINE

## 2018-11-13 PROCEDURE — 87186 SC STD MICRODIL/AGAR DIL: CPT | Performed by: EMERGENCY MEDICINE

## 2018-11-13 RX ORDER — SODIUM CHLORIDE 0.9 % (FLUSH) 0.9 %
3 SYRINGE (ML) INJECTION AS NEEDED
Status: DISCONTINUED | OUTPATIENT
Start: 2018-11-13 | End: 2018-11-17

## 2018-11-13 RX ORDER — ACETAMINOPHEN 325 MG/1
650 TABLET ORAL EVERY 6 HOURS PRN
Status: DISCONTINUED | OUTPATIENT
Start: 2018-11-13 | End: 2018-11-17

## 2018-11-13 RX ORDER — POTASSIUM CHLORIDE 20 MEQ/1
40 TABLET, EXTENDED RELEASE ORAL EVERY 4 HOURS
Status: COMPLETED | OUTPATIENT
Start: 2018-11-13 | End: 2018-11-13

## 2018-11-13 RX ORDER — METOPROLOL SUCCINATE 25 MG/1
25 TABLET, EXTENDED RELEASE ORAL
Status: DISCONTINUED | OUTPATIENT
Start: 2018-11-13 | End: 2018-11-15

## 2018-11-13 RX ORDER — FUROSEMIDE 10 MG/ML
40 INJECTION INTRAMUSCULAR; INTRAVENOUS
Status: DISCONTINUED | OUTPATIENT
Start: 2018-11-14 | End: 2018-11-15

## 2018-11-13 RX ORDER — METOCLOPRAMIDE HYDROCHLORIDE 5 MG/ML
10 INJECTION INTRAMUSCULAR; INTRAVENOUS EVERY 8 HOURS PRN
Status: DISCONTINUED | OUTPATIENT
Start: 2018-11-13 | End: 2018-11-17

## 2018-11-13 RX ORDER — ASPIRIN 81 MG/1
81 TABLET ORAL DAILY
Status: DISCONTINUED | OUTPATIENT
Start: 2018-11-14 | End: 2018-11-17

## 2018-11-13 RX ORDER — FUROSEMIDE 10 MG/ML
40 INJECTION INTRAMUSCULAR; INTRAVENOUS ONCE
Status: COMPLETED | OUTPATIENT
Start: 2018-11-13 | End: 2018-11-13

## 2018-11-13 RX ORDER — ATORVASTATIN CALCIUM 40 MG/1
40 TABLET, FILM COATED ORAL NIGHTLY
Status: DISCONTINUED | OUTPATIENT
Start: 2018-11-13 | End: 2018-11-17

## 2018-11-13 RX ORDER — ONDANSETRON 2 MG/ML
4 INJECTION INTRAMUSCULAR; INTRAVENOUS EVERY 6 HOURS PRN
Status: DISCONTINUED | OUTPATIENT
Start: 2018-11-13 | End: 2018-11-17

## 2018-11-13 RX ORDER — EZETIMIBE 10 MG/1
10 TABLET ORAL NIGHTLY
Status: DISCONTINUED | OUTPATIENT
Start: 2018-11-13 | End: 2018-11-17

## 2018-11-13 NOTE — ED INITIAL ASSESSMENT (HPI)
Pt here with c/o productive cough with green sputum and weakness. Pt had MVP clipping last week.  Denies chest pain or sob

## 2018-11-13 NOTE — ED NOTES
Pt to ER with c/o moist productive cough and dyspnea with exertion. Pt states she had Mitral Valve Clipping done recently. Pt with increased work of breathing noted. Pt is alert and oriented x4. Pt denies chest pain.  Cardiac monitor and continuous pulse ox

## 2018-11-13 NOTE — ED NOTES
Pt with allergy to Iodine. 3 Castile soap towelettes used to prep. Sterile technique used to insert cowan catheter x1 attempt. Pt tolerated well. Pt repositioned in bed off buttocks.

## 2018-11-13 NOTE — HISTORICAL OFFICE NOTE
Yogi Munoz  : 1945  ACCOUNT:  [id number]  [phone]  PCP: Dr. Hunter Folds) Tejas Oropeza DATE: 2018  DICTATED BY:  OCTAVIANO Felix]      CHIEF COMPLAINT: [hospital follow up (Mitral Clip).]    HPI:    [On 2018, Francie Cooney and occasionally. CAFFEINE: >5 cups daily and tea and soda. ALCOHOL: denies drinking. EXERCISE: active. DIET: no special diet. MARITAL STATUS: . RESIDENCE: lives in home with .      ALLERGIES: Iodides - CLASS and Penicillins - CLASS    MEDICAT followed by Dr. Silvia Lugo in January 0]    PRESCRIPTIONS:   03/15/18 *Metoprolol Succinate 25MG      1 TABLET DAILY.                           12/07/17 *Atorvastatin Calcium 40MG      1 TABLET AT BEDTIME.                     11/08/18 Aspirin Adult Low [id number] hematuria. MS: no trauma and Hereditary spastic paraparesis. NEURO: denies lightheadedness or dizziness. HEM/LYMPH: denies easy bruising. ALL: no new food or enviornmental allergies.       PAST HISTORY: deep venous thrombosis (DVT) and colitis    PAST CV HI bleeding or falls related to her Xarelto that I would put her on duration for watchman. Risks benefits alternatives were discussed with patient and her . ASSESSMENT:  1. Heart failure, diastolic, acute on chronic left  2.  Mitral regurgitation  3 normal and essentially asymptomatic. She does have mild nonobstructive CAD found at a cardiac cath in 2011. She has had a normal nuclear stress test about 2-1/2 years ago and no cardiac ischemic symptoms. Her mitral regurgitation is due to prolapse.   PA 139, Height -   64 , BMI - 23.9 ]    CONS: well developed, well nourished. HEAD/FACE: no trauma and normocephalic. EYES: conjunctivae not injected and no xanthelasma. ENT: mucosa pink and moist. NECK: jugular venous pressure not elevated.  RESP: respiration 1 TABLET AT BEDTIME.                     06/22/18 Lyrica                75MG      two tablets daily                        06/22/18 Xarelto               20MG      1 tablet daily                           09/27/16 Lomotil               2.5-0.02  PRN

## 2018-11-13 NOTE — CONSULTS
[facility] AND [facility]  [facility] Cardiology Consult Note    Rennylin Pontiff Patient Status:  Emergency    1945 MRN [id number]   Location [address] Attending Angélica Cortez MD   [facility] Day # 0 PCP [facility] titrating BB    spastic paraplegia on baclofen  - per primary      Gerardo GarciarOCTAVIANO  S Cardiology  11/13/18       =======================================================  Patient seen and examined independently.   Note reviewed and labs revie left renal collecting system   • OTHER DISEASES     uterine fibroids   • OTHER DISEASES     ulcerative colitis   • OTHER DISEASES     uti   • Pneumonia, organism unspecified(486)    • Reflux    • Self-catheterizes urinary bladder 1/11/2016   • Ulcerative c CERVICAL EPIDURAL;  Surgeon: Lennice Felty, MD;  Location: [facility] FOR PAIN MANAGEMENT   • INJECTION, W/WO CONTRAST, DX/THERAPEUTIC SUBSTANCE, EPIDURAL/SUBARACHNOID; CERVICAL/THORACIC N/A 12/8/2014    Procedure: CERVICAL EPIDURAL;  Surgeon: Chetan Kilpatrick Miguel A     Family History   Problem Relation Age of Onset   • Cancer Father    • Heart Disorder Father    • Eye Problems Father         glaucoma   • DVT/VTE Father    • Diabetes Mother    • Diabetes Sister    • Eye Problems Sister         glaucoma   •

## 2018-11-13 NOTE — H&P
[facility] Hospitalist H&P     CC: Patient presents with:  Weakness  Cough/URI       PCP: Celine Barrera MD      Assessment and Plan     Nba Tee is a 74year old female with PMH sig for DVT s/p permanent IVC filter, recurrent cdif colitis an house    Patient and/or patient's family given opportunity to ask questions and note understanding and agreeing with therapeutic plan as outlined    Thank You,  Kimmy Luis MD  [facility] Hospitalist    Answering Service number: [phone]    TASHI Johnson DISEASES     spastic paraparesis   • OTHER DISEASES     cataracts   • OTHER DISEASES     secondary poycythemia   • OTHER DISEASES     lumbar spinal stenosis   • OTHER DISEASES     duplicated left renal collecting system   • OTHER DISEASES     uterine fibro Surgeon: Bre Chirinos MD;  Location: [facility] FOR PAIN MANAGEMENT   • INJECTION, W/WO CONTRAST, DX/THERAPEUTIC SUBSTANCE, EPIDURAL/SUBARACHNOID; CERVICAL/THORACIC N/A 10/27/2014    Procedure: CERVICAL EPIDURAL;  Surgeon: Bre Chirinos MD;  Location: • UPPER GI ENDOSCOPY,BIOPSY N/A 1/5/2015    Hiatal hernia.   Normal gastric/SB Bx, Procedure: ESOPHAGOGASTRODUODENOSCOPY W/ DILATION;  Surgeon: Eddi Arnold MD;  Location: [address] [place] [address]        ALL:    Cymbalta [Duloxetin*    Nahum Simmons or lesions, well perfused  Psych: mood stable, cooperative  Neuro: known  hereditary spastic paraplegia (wheel chair bound, baclofen pulm and straight cath at baseline)    Diagnostic Data:    CBC/Chem  Recent Labs   Lab  11/13/18   1406   WBC  10.4   HGB

## 2018-11-13 NOTE — ED NOTES
Ok per Md verbal order to insert cowan catheter. Pt states she self caths at home- pt with hx of hereditary spastic paraplegia.

## 2018-11-13 NOTE — ED PROVIDER NOTES
Patient Seen in: [facility] AND [facility] Emergency Department    History   Patient presents with:  Weakness  Cough/URI    Stated Complaint:     HPI    Patient presents to the emergency department after a mitral valve repair done 13 days ago.   She had history o reading       Past Surgical History:   Procedure Laterality Date   • BACK SURGERY      spinal fusion L1-5   • CAUDAL N/A 10/10/2017    Performed by Dave Wray MD at [address] N/A 12/8/2014    Performed by Ji Emerson left  12/05   • MITRACLIP N/A 10/31/2018    Performed by Walker Khan MD at [facility] 1615  8-24-12    cysto Dr. Corey Lanier   • PATIENT DOCUMENTED NOT TO [address] N/A 10/27/2014    Procedure: CERVICAL EPID Cap,  Take 1 capsule (75 mg total) by mouth 2 (two) times daily. docusate sodium 100 MG Oral Cap,  Take 100 mg by mouth 2 (two) times daily as needed for constipation.    famoTIDine 20 MG Oral Tab,  Take 20 mg by mouth 2 (two) times daily as needed for He Current:/85   Pulse 110   Temp 98.3 °F (36.8 °C) (Oral)   Resp 20   Wt 61.2 kg   SpO2 95%   BMI 23.91 kg/m²         Physical Exam  Constitutional:  Alert, well nourished adult lying in bed in moderate distress. Vital signs noted.   Eye:  No scl including time spent performing separately billable procedures. ED Course     Labs Reviewed   BASIC METABOLIC PANEL (8) - Abnormal; Notable for the following components:       Result Value    BUN 23 (*)     Creatinine 0.47 (*)     BUN/CREA Ratio 48. fibrillation with rapid response    Disposition and Plan     We recommend that you schedule follow up care with a primary care provider within the next three months to obtain basic health screening including reassessment of your blood pressure.       Clinic

## 2018-11-14 PROCEDURE — 85025 COMPLETE CBC W/AUTO DIFF WBC: CPT | Performed by: HOSPITALIST

## 2018-11-14 PROCEDURE — 80048 BASIC METABOLIC PNL TOTAL CA: CPT | Performed by: HOSPITALIST

## 2018-11-14 PROCEDURE — A4216 STERILE WATER/SALINE, 10 ML: HCPCS | Performed by: HOSPITALIST

## 2018-11-14 PROCEDURE — 84132 ASSAY OF SERUM POTASSIUM: CPT | Performed by: HOSPITALIST

## 2018-11-14 RX ORDER — POTASSIUM CHLORIDE 20 MEQ/1
40 TABLET, EXTENDED RELEASE ORAL ONCE
Status: COMPLETED | OUTPATIENT
Start: 2018-11-14 | End: 2018-11-14

## 2018-11-14 RX ORDER — SODIUM CHLORIDE 9 MG/ML
INJECTION, SOLUTION INTRAVENOUS
Status: COMPLETED
Start: 2018-11-14 | End: 2018-11-14

## 2018-11-14 NOTE — CM/SW NOTE
SW received MD order for discharge planning. SW met w/ pt to discuss eventual discharge needs. Pt lives at home w/ her supportive  in [place]. Pt lives in a 1 level house w/ no stairs.  Pt has a ramp to enter the home and a stair lift that goes ji

## 2018-11-14 NOTE — PLAN OF CARE
Problem: Patient Centered Care  Goal: Patient preferences are identified and integrated in the patient's plan of care  Interventions:  - What would you like us to know as we care for you? 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  I have hereditary spastic paraplegia for 2

## 2018-11-15 PROCEDURE — 83735 ASSAY OF MAGNESIUM: CPT | Performed by: HOSPITALIST

## 2018-11-15 PROCEDURE — A4216 STERILE WATER/SALINE, 10 ML: HCPCS | Performed by: HOSPITALIST

## 2018-11-15 PROCEDURE — 80048 BASIC METABOLIC PNL TOTAL CA: CPT | Performed by: HOSPITALIST

## 2018-11-15 PROCEDURE — 85025 COMPLETE CBC W/AUTO DIFF WBC: CPT | Performed by: HOSPITALIST

## 2018-11-15 RX ORDER — METOPROLOL SUCCINATE 25 MG/1
37.5 TABLET, EXTENDED RELEASE ORAL
Status: DISCONTINUED | OUTPATIENT
Start: 2018-11-15 | End: 2018-11-17

## 2018-11-15 RX ORDER — FUROSEMIDE 40 MG/1
40 TABLET ORAL DAILY
Status: DISCONTINUED | OUTPATIENT
Start: 2018-11-16 | End: 2018-11-17

## 2018-11-15 RX ORDER — DIPHENHYDRAMINE HCL 25 MG
25 CAPSULE ORAL ONCE
Status: COMPLETED | OUTPATIENT
Start: 2018-11-15 | End: 2018-11-15

## 2018-11-15 RX ORDER — DILTIAZEM HYDROCHLORIDE 5 MG/ML
INJECTION INTRAVENOUS
Status: DISPENSED
Start: 2018-11-15 | End: 2018-11-15

## 2018-11-15 RX ORDER — MAGNESIUM OXIDE 400 MG (241.3 MG MAGNESIUM) TABLET
400 TABLET ONCE
Status: COMPLETED | OUTPATIENT
Start: 2018-11-15 | End: 2018-11-15

## 2018-11-15 RX ORDER — GUAIFENESIN 600 MG
600 TABLET, EXTENDED RELEASE 12 HR ORAL 2 TIMES DAILY
Status: DISCONTINUED | OUTPATIENT
Start: 2018-11-15 | End: 2018-11-16

## 2018-11-15 RX ORDER — DILTIAZEM HYDROCHLORIDE 5 MG/ML
10 INJECTION INTRAVENOUS ONCE
Status: COMPLETED | OUTPATIENT
Start: 2018-11-15 | End: 2018-11-15

## 2018-11-15 NOTE — PROGRESS NOTES
[facility] Hospitalist Progress Note     CC: Hospital Follow up    PCP: Celine Barrera MD       Assessment/Plan:     Principal Problem:    Acute on chronic congestive heart failure, unspecified heart failure type [facility])  Active Problems:    Acute on chroni AM  -cardiac diet    PPX; On xarelto     Dispo pending course, tele     Questions/concerns were discussed with patient and/or family by bedside.     Thank Yamilex Ray MD    [facility] Hospitalist     Subjective:     No CP, breathing improved, feels better, no >60  >60   GFRNAA  >60  >60  >60   CA  9.2  9.0  9.1   NA  141  139  138   K  3.6  3.8  3.8  4.0   CL  110  106  102   CO2  24  24  26       No results for input(s): ALT, AST, ALB, AMYLASE, LIPASE, LDH in the last 168 hours.     Invalid input(s): ALPHOS, TB

## 2018-11-15 NOTE — PROGRESS NOTES
[facility]  Progress Note    Rosalio Gannon Patient Status:  Inpatient    1945 MRN [id number]   Location [facility] 3W/SW Attending Jadon Metcalf MD   Hosp Day # 1 PCP Clovis Cohn MD     Assessment:    1.   Congestive heart Value Date    TROP 0.03 11/13/2018    TROP 0.01 10/08/2018    TROP <0.017 10/05/2018        Medications:    • aspirin  81 mg Oral Daily   • atorvastatin  40 mg Oral Nightly   • ezetimibe  10 mg Oral Nightly   • Rivaroxaban  20 mg Oral Daily with food   • M

## 2018-11-15 NOTE — CM/SW NOTE
C orders have been entered. SW notified Darrell Haider from [facility]. Per RN, no discharge.      Omer Soler

## 2018-11-15 NOTE — PLAN OF CARE
Problem: Patient Centered Care  Goal: Patient preferences are identified and integrated in the patient's plan of care  Interventions:  - What would you like us to know as we care for you? 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

## 2018-11-15 NOTE — PROGRESS NOTES
[facility] AND [facility]  Progress Note    Rosalio Gannon Patient Status:  Inpatient    1945 MRN [id number]   Location [facility] 3W/SW Attending Jadon Metcalf MD   Hosp Day # 2 PCP Clovis Cohn MD     Assessment:    1.   Congestive heart 42.1 11/15/2018     11/15/2018     Lab Results   Component Value Date    INR 1.02 11/01/2018     Lab Results   Component Value Date     11/15/2018    K 4.0 11/15/2018     11/15/2018    CO2 26 11/15/2018    BUN 19 11/15/2018    CREATSERUM

## 2018-11-16 ENCOUNTER — APPOINTMENT (OUTPATIENT)
Dept: GENERAL RADIOLOGY | Facility: HOSPITAL | Age: 73
DRG: 292 | End: 2018-11-16
Attending: INTERNAL MEDICINE
Payer: MEDICARE

## 2018-11-16 PROCEDURE — 80048 BASIC METABOLIC PNL TOTAL CA: CPT | Performed by: HOSPITALIST

## 2018-11-16 PROCEDURE — 87633 RESP VIRUS 12-25 TARGETS: CPT | Performed by: HOSPITALIST

## 2018-11-16 PROCEDURE — 71045 X-RAY EXAM CHEST 1 VIEW: CPT | Performed by: INTERNAL MEDICINE

## 2018-11-16 PROCEDURE — 87798 DETECT AGENT NOS DNA AMP: CPT | Performed by: HOSPITALIST

## 2018-11-16 PROCEDURE — 83735 ASSAY OF MAGNESIUM: CPT | Performed by: HOSPITALIST

## 2018-11-16 PROCEDURE — 87486 CHLMYD PNEUM DNA AMP PROBE: CPT | Performed by: HOSPITALIST

## 2018-11-16 PROCEDURE — 87581 M.PNEUMON DNA AMP PROBE: CPT | Performed by: HOSPITALIST

## 2018-11-16 PROCEDURE — A4216 STERILE WATER/SALINE, 10 ML: HCPCS | Performed by: HOSPITALIST

## 2018-11-16 RX ORDER — IPRATROPIUM BROMIDE AND ALBUTEROL SULFATE 2.5; .5 MG/3ML; MG/3ML
3 SOLUTION RESPIRATORY (INHALATION) EVERY 6 HOURS PRN
Status: DISCONTINUED | OUTPATIENT
Start: 2018-11-16 | End: 2018-11-17

## 2018-11-16 RX ORDER — BENZONATATE 100 MG/1
100 CAPSULE ORAL 3 TIMES DAILY
Status: DISCONTINUED | OUTPATIENT
Start: 2018-11-16 | End: 2018-11-17

## 2018-11-16 RX ORDER — DIGOXIN 125 MCG
125 TABLET ORAL DAILY
Status: DISCONTINUED | OUTPATIENT
Start: 2018-11-16 | End: 2018-11-17

## 2018-11-16 RX ORDER — MAGNESIUM OXIDE 400 MG (241.3 MG MAGNESIUM) TABLET
400 TABLET ONCE
Status: COMPLETED | OUTPATIENT
Start: 2018-11-16 | End: 2018-11-16

## 2018-11-16 RX ORDER — GUAIFENESIN 100 MG/5ML
100 SOLUTION ORAL EVERY 4 HOURS PRN
Status: DISCONTINUED | OUTPATIENT
Start: 2018-11-16 | End: 2018-11-17

## 2018-11-16 RX ORDER — BENZONATATE 100 MG/1
100 CAPSULE ORAL 3 TIMES DAILY PRN
Status: DISCONTINUED | OUTPATIENT
Start: 2018-11-16 | End: 2018-11-16

## 2018-11-16 NOTE — PLAN OF CARE
Problem: Patient Centered Care  Goal: Patient preferences are identified and integrated in the patient's plan of care  Interventions:  - What would you like us to know as we care for you? 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

## 2018-11-16 NOTE — PROGRESS NOTES
[facility] Hospitalist Progress Note     CC: Hospital Follow up    PCP: Celine Barrera MD       Assessment/Plan:     Principal Problem:    Acute on chronic congestive heart failure, unspecified heart failure type [facility])  Active Problems:    Acute on chroni does not appear to have colectomy per chart review     DVT history  -on xarelto, has permanent IVC filter     Depression/anxiety  -cont home meds     FEN  -no IVF  -lytes in AM  -cardiac diet    PPX;  On xarelto     Dispo pending course, tele     Questions/ 32.8   RDW  15.5*  15.4*  15.6*   WBC  10.4  6.0  7.4   PLT  170  162  181         Recent Labs   Lab  11/14/18   0937  11/15/18   0823  11/16/18   0703   GLU  170*  190*  107*   BUN  21*  19  19   CREATSERUM  0.66  0.60  0.76   GFRAA  >60  >60  >60   GFRNA

## 2018-11-17 VITALS
SYSTOLIC BLOOD PRESSURE: 116 MMHG | WEIGHT: 128.13 LBS | BODY MASS INDEX: 21.88 KG/M2 | TEMPERATURE: 98 F | OXYGEN SATURATION: 96 % | HEIGHT: 64 IN | RESPIRATION RATE: 18 BRPM | DIASTOLIC BLOOD PRESSURE: 66 MMHG | HEART RATE: 110 BPM

## 2018-11-17 PROCEDURE — 85025 COMPLETE CBC W/AUTO DIFF WBC: CPT | Performed by: HOSPITALIST

## 2018-11-17 PROCEDURE — 80048 BASIC METABOLIC PNL TOTAL CA: CPT | Performed by: HOSPITALIST

## 2018-11-17 PROCEDURE — 83735 ASSAY OF MAGNESIUM: CPT | Performed by: HOSPITALIST

## 2018-11-17 PROCEDURE — A4216 STERILE WATER/SALINE, 10 ML: HCPCS | Performed by: HOSPITALIST

## 2018-11-17 PROCEDURE — 84132 ASSAY OF SERUM POTASSIUM: CPT | Performed by: HOSPITALIST

## 2018-11-17 RX ORDER — METOPROLOL SUCCINATE 50 MG/1
50 TABLET, EXTENDED RELEASE ORAL
Qty: 60 TABLET | Refills: 0 | Status: SHIPPED | OUTPATIENT
Start: 2018-11-17 | End: 2018-12-17

## 2018-11-17 RX ORDER — POTASSIUM CHLORIDE 20 MEQ/1
40 TABLET, EXTENDED RELEASE ORAL EVERY 4 HOURS
Status: COMPLETED | OUTPATIENT
Start: 2018-11-17 | End: 2018-11-17

## 2018-11-17 RX ORDER — VANCOMYCIN HYDROCHLORIDE 125 MG/1
125 CAPSULE ORAL 2 TIMES DAILY
Qty: 20 CAPSULE | Refills: 0 | Status: SHIPPED | OUTPATIENT
Start: 2018-11-17 | End: 2018-11-27

## 2018-11-17 RX ORDER — BENZONATATE 100 MG/1
100 CAPSULE ORAL 3 TIMES DAILY PRN
Qty: 10 CAPSULE | Refills: 0 | Status: SHIPPED | OUTPATIENT
Start: 2018-11-17 | End: 2019-04-13 | Stop reason: ALTCHOICE

## 2018-11-17 RX ORDER — MAGNESIUM OXIDE 400 MG (241.3 MG MAGNESIUM) TABLET
400 TABLET ONCE
Status: COMPLETED | OUTPATIENT
Start: 2018-11-17 | End: 2018-11-17

## 2018-11-17 RX ORDER — SACCHAROMYCES BOULARDII 250 MG
250 CAPSULE ORAL 2 TIMES DAILY
Qty: 28 CAPSULE | Refills: 0 | Status: SHIPPED | OUTPATIENT
Start: 2018-11-17 | End: 2018-12-01

## 2018-11-17 RX ORDER — DIGOXIN 125 MCG
125 TABLET ORAL DAILY
Qty: 30 TABLET | Refills: 0 | Status: ON HOLD | OUTPATIENT
Start: 2018-11-18 | End: 2019-04-14

## 2018-11-17 RX ORDER — METOPROLOL SUCCINATE 50 MG/1
50 TABLET, EXTENDED RELEASE ORAL
Status: DISCONTINUED | OUTPATIENT
Start: 2018-11-17 | End: 2018-11-17

## 2018-11-17 RX ORDER — CEFUROXIME AXETIL 500 MG/1
500 TABLET ORAL 2 TIMES DAILY
Qty: 4 TABLET | Refills: 0 | Status: SHIPPED | OUTPATIENT
Start: 2018-11-18 | End: 2018-11-20

## 2018-11-17 RX ORDER — FUROSEMIDE 40 MG/1
40 TABLET ORAL DAILY
Qty: 30 TABLET | Refills: 0 | Status: SHIPPED | OUTPATIENT
Start: 2018-11-18 | End: 2018-12-13

## 2018-11-17 NOTE — PROGRESS NOTES
[facility]    Progress Note    Cherie Silverio Patient Status:  Inpatient    1945 MRN [id number]   Location [facility] 3W/SW Attending Naima Perez MD   Hosp Day # 4 PCP Jordin Parnell MD       Assessment and Plan: ,no murmur  Abd: Abdomen soft, nontender, nondistended,   Ext:  no clubbing, no cyanosis,no edema  Neuro: no focal deficits  Skin: no rashes or lesions    Scheduled Meds:   • Potassium Chloride ER  40 mEq Oral Q4H   • Metoprolol Succinate ER  50 mg Oral 2x

## 2018-11-17 NOTE — DISCHARGE SUMMARY
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   What changed:    · medication strength  · how much to take  · when to take this        CONTINUE taking these medications    aspirin 81 MG Tbec     atorvastatin 40 MG Tabs  Commonly known as:  LIPITOR     ferrous sulfate 325 ( mouth 3 (three) times daily as needed for cough. Cefuroxime Axetil 500 MG Tabs  Commonly known as:  CEFTIN  Take 1 tablet (500 mg total) by mouth 2 (two) times daily for 2 days.   Start taking on:  11/18/2018     digoxin 0.125 MG Tabs  Commonly known as Tabs  · Metoprolol Succinate ER 50 MG Tb24  · saccharomyces boulardii 250 MG Caps  · Vancomycin HCl 125 MG Caps                 Total Time Coordinating Care: Greater than 30 minutes    Patient had opportunity to ask questions and state understand and agree

## 2018-11-17 NOTE — PROGRESS NOTES
[facility] Hospitalist Progress Note     CC: Hospital Follow up    PCP: Ricarda Landeros MD       Assessment/Plan:     Principal Problem:    Acute on chronic congestive heart failure, unspecified heart failure type [facility])  Active Problems:    Acute on chroni cards     UTI / had some burning  -folye place given need for I's and O's  -?  Due to straight cathing, ceftriaxone started, added on po vanc for ppx  - culture, Ecoli++/ and proteus  - ceftin on DC  -ppx oral vanco for > 1 week after completion of ab's    No acute distress, alert and oriented x3   Heent: NC AT, mucous memb clear   Pulm: Lungs diminished at bases, no wheezing, no crackles  CV: irreg irreg   Abd: Abdomen soft, nontender, nondistended, ostomy in place   MSK: Full range of motion in extremities

## 2018-11-17 NOTE — PLAN OF CARE
Absence of cardiac arrhythmias or at baseline Progressing    Control HR. Pt coughing and heart rate increases to 130-140's. PRN NEBS and cough meds in place.     Electrolytes maintained within normal limits Progressing      Patient preferences are identifie

## 2018-11-18 NOTE — PLAN OF CARE
CARDIOVASCULAR - ADULT    • Absence of cardiac arrhythmias or at baseline Adequate for Discharge        METABOLIC/FLUID AND ELECTROLYTES - ADULT    • Electrolytes maintained within normal limits Adequate for Discharge        Patient Centered Care    • [facility]

## 2018-11-19 ENCOUNTER — TELEPHONE (OUTPATIENT)
Dept: CARDIOLOGY UNIT | Facility: HOSPITAL | Age: 73
End: 2018-11-19

## 2018-11-19 ENCOUNTER — TELEPHONE (OUTPATIENT)
Dept: CARDIOLOGY CLINIC | Facility: HOSPITAL | Age: 73
End: 2018-11-19

## 2018-11-19 NOTE — TELEPHONE ENCOUNTER
Spoke with patient, she accidentally skipped her pm meds that were all set up for her by family nurse friend, then she took her pm meds this am and wanting to know what meds she should not take this am. She is on toprol 50 mg bid and cefuroxime bid.  I inst

## 2018-11-23 ENCOUNTER — PRIOR ORIGINAL RECORDS (OUTPATIENT)
Dept: OTHER | Age: 73
End: 2018-11-23

## 2018-11-23 PROBLEM — N30.00 ACUTE CYSTITIS WITHOUT HEMATURIA: Status: RESOLVED | Noted: 2018-11-13 | Resolved: 2018-11-23

## 2018-11-28 ENCOUNTER — OFFICE VISIT (OUTPATIENT)
Dept: CARDIOLOGY CLINIC | Facility: HOSPITAL | Age: 73
End: 2018-11-28
Attending: INTERNAL MEDICINE
Payer: MEDICARE

## 2018-11-28 ENCOUNTER — TELEPHONE (OUTPATIENT)
Dept: GASTROENTEROLOGY | Facility: CLINIC | Age: 73
End: 2018-11-28

## 2018-11-28 VITALS — HEART RATE: 64 BPM | SYSTOLIC BLOOD PRESSURE: 134 MMHG | DIASTOLIC BLOOD PRESSURE: 65 MMHG | OXYGEN SATURATION: 98 %

## 2018-11-28 DIAGNOSIS — I05.1 RHEUMATIC MITRAL REGURGITATION: ICD-10-CM

## 2018-11-28 DIAGNOSIS — G11.4 HEREDITARY SPASTIC PARAPLEGIA (HCC): ICD-10-CM

## 2018-11-28 DIAGNOSIS — R19.7 DIARRHEA, UNSPECIFIED TYPE: Primary | ICD-10-CM

## 2018-11-28 DIAGNOSIS — I50.30 (HFPEF) HEART FAILURE WITH PRESERVED EJECTION FRACTION (HCC): ICD-10-CM

## 2018-11-28 DIAGNOSIS — I50.9 HEART FAILURE, UNSPECIFIED (HCC): ICD-10-CM

## 2018-11-28 DIAGNOSIS — R10.10 PAIN OF UPPER ABDOMEN: ICD-10-CM

## 2018-11-28 DIAGNOSIS — I50.9 ACUTE ON CHRONIC CONGESTIVE HEART FAILURE, UNSPECIFIED HEART FAILURE TYPE (HCC): Primary | ICD-10-CM

## 2018-11-28 DIAGNOSIS — I48.19 ATRIAL FIBRILLATION, PERSISTENT (HCC): ICD-10-CM

## 2018-11-28 PROBLEM — I50.23 ACUTE ON CHRONIC HFREF (HEART FAILURE WITH REDUCED EJECTION FRACTION) (HCC): Chronic | Status: ACTIVE | Noted: 2018-11-28

## 2018-11-28 PROCEDURE — 99214 OFFICE O/P EST MOD 30 MIN: CPT | Performed by: NURSE PRACTITIONER

## 2018-11-28 PROCEDURE — 80048 BASIC METABOLIC PNL TOTAL CA: CPT | Performed by: NURSE PRACTITIONER

## 2018-11-28 PROCEDURE — 36415 COLL VENOUS BLD VENIPUNCTURE: CPT | Performed by: NURSE PRACTITIONER

## 2018-11-28 PROCEDURE — 83880 ASSAY OF NATRIURETIC PEPTIDE: CPT | Performed by: NURSE PRACTITIONER

## 2018-11-28 PROCEDURE — 83735 ASSAY OF MAGNESIUM: CPT | Performed by: NURSE PRACTITIONER

## 2018-11-28 PROCEDURE — 85025 COMPLETE CBC W/AUTO DIFF WBC: CPT | Performed by: NURSE PRACTITIONER

## 2018-11-28 PROCEDURE — 80162 ASSAY OF DIGOXIN TOTAL: CPT | Performed by: NURSE PRACTITIONER

## 2018-11-28 PROCEDURE — 99212 OFFICE O/P EST SF 10 MIN: CPT | Performed by: NURSE PRACTITIONER

## 2018-11-28 RX ORDER — POTASSIUM CHLORIDE 20 MEQ/1
20 TABLET, EXTENDED RELEASE ORAL 2 TIMES DAILY
Qty: 60 TABLET | Refills: 1 | Status: SHIPPED | OUTPATIENT
Start: 2018-11-28 | End: 2019-01-07

## 2018-11-28 RX ORDER — POTASSIUM CHLORIDE 20 MEQ/1
20 TABLET, EXTENDED RELEASE ORAL DAILY
COMMUNITY
End: 2018-11-28

## 2018-11-28 RX ORDER — POTASSIUM CHLORIDE 20 MEQ/1
TABLET, EXTENDED RELEASE ORAL
Status: DISCONTINUED
Start: 2018-11-28 | End: 2018-11-28

## 2018-11-28 NOTE — TELEPHONE ENCOUNTER
Pt stopped at [facility] she has been having nausea, lack of appetite and upper abdominal pain for 2-3 weeks- pl call pt

## 2018-11-28 NOTE — PATIENT INSTRUCTIONS
Increase furosemide 20 mg tabs, take 2 tabs in the am and take one tablet in the afternoon X 3 days then resume 2 tablets daily     Increase potassium chloride 20 meq tabs twice daily     Continue all your same medications    Call if having any dizziness,

## 2018-11-28 NOTE — TELEPHONE ENCOUNTER
I spoke to the pt. Pt states over the summer she had pain in her chest and was diagnosed with congestive heart failure    She also had surgery on her mitral valve prolapse. She states all summer her has been losing weight.  She is not sure of her curr

## 2018-11-28 NOTE — PROGRESS NOTES
[address] Patient Status:  No patient class for patient encounter    1945 MRN [id number]   Location MD Dr. Felicia Duron is a negative  Musculoskeletal: see above, denies leg pain    Objective:  Lab Results   Component Value Date/Time    WBC 8.8 11/28/2018 10:16 AM    HGB 15.2 11/28/2018 10:16 AM    HCT 45.7 11/28/2018 10:16 AM     11/28/2018 10:16 AM    CREATSERUM 0.69 11 pressures were normal    Diagnostic tests:   CXR: 11/16/18 resolution of chf    Education:  Patient instructed at length regarding clinic procedures, hours, purpose of clinic visits, sodium restricted diet, low sodium foods, fluid restrictions, daily weigh lightheadedness, heart racing, palpitations, chest pain, shortness of breath, coughing, swelling, weight gain, fever, chills or worsening symptoms.      Weigh yourself daily in the morning before breakfast, call if gaining 3 lbs or more overnight or more th Oral Tab EC, Take 325 mg by mouth daily with breakfast., Disp: , Rfl:   •  Atorvastatin Calcium 40 MG Oral Tab, Take 40 mg by mouth nightly.  , Disp: , Rfl:   •  pregabalin (LYRICA) 75 MG Oral Cap, Take 1 capsule (75 mg total) by mouth 2 (two) times daily.

## 2018-11-29 NOTE — TELEPHONE ENCOUNTER
Patient notified of Cdiff orders and come get container at any [address] [place] [address] lab and bring back with stool ( I have placed a written order as there wasn't one entered?)     Dr. Linda Rao- 12/7/18 you have procedures during that time until  3 PM at [address] [place] [address].  Please advise on a

## 2018-11-29 NOTE — TELEPHONE ENCOUNTER
Please have her get stool for C. difficile toxin (ordered). She can come to see me Friday, December 7 at about 11 AM.  She needs a half an hour for the appointment.

## 2018-11-30 ENCOUNTER — PRIOR ORIGINAL RECORDS (OUTPATIENT)
Dept: OTHER | Age: 73
End: 2018-11-30

## 2018-11-30 ENCOUNTER — MYAURORA ACCOUNT LINK (OUTPATIENT)
Dept: OTHER | Age: 73
End: 2018-11-30

## 2018-11-30 ENCOUNTER — LAB ENCOUNTER (OUTPATIENT)
Dept: LAB | Facility: HOSPITAL | Age: 73
End: 2018-11-30
Attending: INTERNAL MEDICINE
Payer: MEDICARE

## 2018-11-30 DIAGNOSIS — R94.31 NONSPECIFIC ABNORMAL ELECTROCARDIOGRAM (ECG) (EKG): ICD-10-CM

## 2018-11-30 DIAGNOSIS — I50.33 ACUTE ON CHRONIC DIASTOLIC HEART FAILURE (HCC): Primary | ICD-10-CM

## 2018-11-30 PROCEDURE — 84484 ASSAY OF TROPONIN QUANT: CPT

## 2018-11-30 PROCEDURE — 36415 COLL VENOUS BLD VENIPUNCTURE: CPT

## 2018-11-30 PROCEDURE — 80048 BASIC METABOLIC PNL TOTAL CA: CPT

## 2018-11-30 NOTE — TELEPHONE ENCOUNTER
I spoke to the pt. They accepted the appt for next Friday. Date time and location verified.     Future Appointments   Date Time Provider Solis Bell   12/4/2018  1:00 PM HEART FAILURE APN 1 [address] HF CLIN [facility] AT [facility]   12/4/2018  2:00 PM [address] CARD ECHO RM 2

## 2018-11-30 NOTE — TELEPHONE ENCOUNTER
Dr Harish Contreras RN calling re pt issues - she is at his office today but her main complaints are abd pain - pls call pt back re sooner appt

## 2018-11-30 NOTE — TELEPHONE ENCOUNTER
Awaiting response from Dr Eros Chakraborty about a sooner appt.     Dr Eros Chakraborty,    See note below from Dr Joella Holstein office

## 2018-11-30 NOTE — TELEPHONE ENCOUNTER
Dr Dayami Burleson,    Please advise a different date and time for a f/u appt. You have procedures scheduled from 7:30-2:30.

## 2018-11-30 NOTE — TELEPHONE ENCOUNTER
Please schedule for 8 AM, December 7. I do not have any procedure scheduled at that time.   Procedure schedule shows that I begin at 9 AM.

## 2018-12-01 ENCOUNTER — HOSPITAL ENCOUNTER (EMERGENCY)
Facility: HOSPITAL | Age: 73
Discharge: HOME | End: 2018-12-01
Attending: EMERGENCY MEDICINE
Payer: MEDICARE

## 2018-12-01 ENCOUNTER — APPOINTMENT (OUTPATIENT)
Dept: GENERAL RADIOLOGY | Facility: HOSPITAL | Age: 73
End: 2018-12-01
Attending: EMERGENCY MEDICINE
Payer: MEDICARE

## 2018-12-01 ENCOUNTER — APPOINTMENT (OUTPATIENT)
Dept: LAB | Facility: HOSPITAL | Age: 73
End: 2018-12-01
Attending: INTERNAL MEDICINE
Payer: MEDICARE

## 2018-12-01 VITALS
BODY MASS INDEX: 21.86 KG/M2 | HEART RATE: 43 BPM | RESPIRATION RATE: 20 BRPM | TEMPERATURE: 98 F | HEIGHT: 64 IN | OXYGEN SATURATION: 98 % | DIASTOLIC BLOOD PRESSURE: 67 MMHG | SYSTOLIC BLOOD PRESSURE: 130 MMHG | WEIGHT: 128.06 LBS

## 2018-12-01 DIAGNOSIS — R19.7 DIARRHEA, UNSPECIFIED TYPE: ICD-10-CM

## 2018-12-01 DIAGNOSIS — R04.2 HEMOPTYSIS: Primary | ICD-10-CM

## 2018-12-01 PROCEDURE — 87493 C DIFF AMPLIFIED PROBE: CPT

## 2018-12-01 PROCEDURE — 99284 EMERGENCY DEPT VISIT MOD MDM: CPT

## 2018-12-01 PROCEDURE — 85610 PROTHROMBIN TIME: CPT | Performed by: EMERGENCY MEDICINE

## 2018-12-01 PROCEDURE — 71046 X-RAY EXAM CHEST 2 VIEWS: CPT | Performed by: EMERGENCY MEDICINE

## 2018-12-01 PROCEDURE — 80048 BASIC METABOLIC PNL TOTAL CA: CPT | Performed by: EMERGENCY MEDICINE

## 2018-12-01 PROCEDURE — 36415 COLL VENOUS BLD VENIPUNCTURE: CPT

## 2018-12-01 PROCEDURE — 83880 ASSAY OF NATRIURETIC PEPTIDE: CPT | Performed by: EMERGENCY MEDICINE

## 2018-12-01 PROCEDURE — 85025 COMPLETE CBC W/AUTO DIFF WBC: CPT | Performed by: EMERGENCY MEDICINE

## 2018-12-02 ENCOUNTER — TELEPHONE (OUTPATIENT)
Dept: GASTROENTEROLOGY | Facility: CLINIC | Age: 73
End: 2018-12-02

## 2018-12-02 RX ORDER — VANCOMYCIN HYDROCHLORIDE 250 MG/1
250 CAPSULE ORAL 4 TIMES DAILY
Qty: 56 CAPSULE | Refills: 0 | Status: SHIPPED | OUTPATIENT
Start: 2018-12-02 | End: 2018-12-13

## 2018-12-02 NOTE — TELEPHONE ENCOUNTER
I left a message on patient preferred voicemail that C. difficile toxin was positive. ER X for vancomycin sent.     Deven Scruggs

## 2018-12-02 NOTE — ED PROVIDER NOTES
Patient Seen in: [facility] AND [facility] Emergency Department    History   Patient presents with:  Hemoptysis (respiratory)    Stated Complaint: coughed up blood    HPI    The patient is a 68-year-old female with a history of paroxysmal atrial fibrillation on spinal stenosis   • OTHER DISEASES     duplicated left renal collecting system   • OTHER DISEASES     uterine fibroids   • OTHER DISEASES     ulcerative colitis   • OTHER DISEASES     uti   • Pneumonia, organism unspecified(486)    • Reflux    • Self-shen vital signs reviewed. All other systems reviewed and negative except as noted above.     Physical Exam     ED Triage Vitals [12/01/18 2105]   /70   Pulse 63   Resp 18   Temp 97.7 °F (36.5 °C)   Temp src    SpO2 98 %   O2 Device        Current:BP Abnormal; Notable for the following components:    PT 22.1 (*)     INR 2.0 (*)     All other components within normal limits   CBC W/ DIFFERENTIAL - Abnormal; Notable for the following components:    RBC 5.49 (*)     MCH 26.9 (*)     MPV 10.6 (*)     All o Impression:  Hemoptysis  (primary encounter diagnosis)    Disposition:  Discharge  12/1/2018 11:22 pm    Follow-up:  Juwan Caldera.Bea MD [id number]  [address] [phone]    Schedule an appointment as soon as possible for

## 2018-12-02 NOTE — ED INITIAL ASSESSMENT (HPI)
Pt states she had been coughing intermittently during the day and had coughed up blood tinged sputum. Then tonight pt coughed up dark red blood. This RN notes pt has dry blood around her mouth.  Pt states she had pertussis a few months ago but her lungs are

## 2018-12-03 ENCOUNTER — TELEPHONE (OUTPATIENT)
Dept: CARDIOLOGY CLINIC | Facility: HOSPITAL | Age: 73
End: 2018-12-03

## 2018-12-03 LAB
BUN: 30 MG/DL
CALCIUM: 9.5 MG/DL
CHLORIDE: 103 MEQ/L
CREATININE, SERUM: 0.7 MG/DL
GLUCOSE: 118 MG/DL
POTASSIUM, SERUM: 4.1 MEQ/L
SODIUM: 140 MEQ/L

## 2018-12-03 NOTE — TELEPHONE ENCOUNTER
Camryn from reference lab just called, confirming below--I see that Dr Rowdy Mcallister was aware and has already addressed.

## 2018-12-03 NOTE — TELEPHONE ENCOUNTER
I contacted pt to check up on her. States the pharmacy did not have vancomycin but said would order it and call pt. This is not acceptable. Pharmacy should have located a nearby [place] [id number] that did have Vanco and got the pt started yesterday.  I contacted [place]

## 2018-12-04 ENCOUNTER — MYAURORA ACCOUNT LINK (OUTPATIENT)
Dept: OTHER | Age: 73
End: 2018-12-04

## 2018-12-04 ENCOUNTER — HOSPITAL ENCOUNTER (OUTPATIENT)
Dept: CARDIOLOGY CLINIC | Facility: HOSPITAL | Age: 73
Discharge: HOME | End: 2018-12-04
Attending: NURSE PRACTITIONER
Payer: MEDICARE

## 2018-12-04 ENCOUNTER — HOSPITAL ENCOUNTER (OUTPATIENT)
Dept: CV DIAGNOSTICS | Facility: HOSPITAL | Age: 73
Discharge: HOME | End: 2018-12-04
Attending: INTERNAL MEDICINE

## 2018-12-04 VITALS
DIASTOLIC BLOOD PRESSURE: 58 MMHG | HEART RATE: 61 BPM | OXYGEN SATURATION: 98 % | RESPIRATION RATE: 20 BRPM | SYSTOLIC BLOOD PRESSURE: 109 MMHG

## 2018-12-04 DIAGNOSIS — I35.0 AORTIC VALVE STENOSIS, ETIOLOGY OF CARDIAC VALVE DISEASE UNSPECIFIED: ICD-10-CM

## 2018-12-04 PROCEDURE — 99213 OFFICE O/P EST LOW 20 MIN: CPT | Performed by: NURSE PRACTITIONER

## 2018-12-04 RX ORDER — CEFUROXIME AXETIL 250 MG/1
TABLET ORAL 2 TIMES DAILY
Status: ON HOLD | COMMUNITY
End: 2019-04-14

## 2018-12-04 NOTE — PROGRESS NOTES
Erwin Dacosta Note    Subjective:   Patient presents for 1 month postop TMVR visit, accompanied by her , wheelchair bound. She underwent Mitraclip placement on  10/31/18 with Dr. Silas Laurent for severe, symptomatic mitral regurgitation. Valve area by continuity equation (using LVOT flow): 1.1cm^2. 3. Left atrium: The atrium was severely dilated. 4. Pulmonary arteries: Systolic pressure was within the normal range. Compared to the previous study these findings represent  improvement.  Hi (20 mg total) by mouth daily with food. Disp: 90 tablet Rfl: 3   loratadine 10 MG Oral Tab Take 10 mg by mouth daily.  Disp:  Rfl:    ferrous sulfate 325 (65 FE) MG Oral Tab EC Take 325 mg by mouth daily with breakfast. Disp:  Rfl:    Atorvastatin Calcium 4

## 2018-12-05 ENCOUNTER — TELEPHONE (OUTPATIENT)
Dept: CARDIOLOGY CLINIC | Facility: HOSPITAL | Age: 73
End: 2018-12-05

## 2018-12-05 ENCOUNTER — PRIOR ORIGINAL RECORDS (OUTPATIENT)
Dept: OTHER | Age: 73
End: 2018-12-05

## 2018-12-05 NOTE — PROGRESS NOTES
I called patient with results of echo from yesterday. Patient's EF remains at 65-70%, clip present with mild- mod regurgitation. Overall, there has been no significant interval changes compared to previous echo on 11/13/18.   Patient has an appointment in t

## 2018-12-07 ENCOUNTER — OFFICE VISIT (OUTPATIENT)
Dept: GASTROENTEROLOGY | Facility: CLINIC | Age: 73
End: 2018-12-07
Payer: MEDICARE

## 2018-12-07 ENCOUNTER — APPOINTMENT (OUTPATIENT)
Dept: LAB | Facility: HOSPITAL | Age: 73
End: 2018-12-07
Attending: INTERNAL MEDICINE
Payer: MEDICARE

## 2018-12-07 ENCOUNTER — TELEPHONE (OUTPATIENT)
Dept: GASTROENTEROLOGY | Facility: CLINIC | Age: 73
End: 2018-12-07

## 2018-12-07 VITALS — SYSTOLIC BLOOD PRESSURE: 103 MMHG | HEART RATE: 71 BPM | DIASTOLIC BLOOD PRESSURE: 58 MMHG

## 2018-12-07 DIAGNOSIS — E86.0 DEHYDRATION: ICD-10-CM

## 2018-12-07 DIAGNOSIS — R10.9 ABDOMINAL PAIN, UNSPECIFIED ABDOMINAL LOCATION: ICD-10-CM

## 2018-12-07 DIAGNOSIS — A49.8 CLOSTRIDIUM DIFFICILE INFECTION: ICD-10-CM

## 2018-12-07 DIAGNOSIS — A49.8 CLOSTRIDIUM DIFFICILE INFECTION: Primary | ICD-10-CM

## 2018-12-07 DIAGNOSIS — Z87.19 HISTORY OF GASTROESOPHAGEAL REFLUX (GERD): ICD-10-CM

## 2018-12-07 DIAGNOSIS — R19.7 ACUTE DIARRHEA: ICD-10-CM

## 2018-12-07 PROCEDURE — 83735 ASSAY OF MAGNESIUM: CPT

## 2018-12-07 PROCEDURE — 99214 OFFICE O/P EST MOD 30 MIN: CPT | Performed by: INTERNAL MEDICINE

## 2018-12-07 PROCEDURE — 36415 COLL VENOUS BLD VENIPUNCTURE: CPT

## 2018-12-07 PROCEDURE — 80048 BASIC METABOLIC PNL TOTAL CA: CPT

## 2018-12-07 PROCEDURE — G0463 HOSPITAL OUTPT CLINIC VISIT: HCPCS | Performed by: INTERNAL MEDICINE

## 2018-12-07 RX ORDER — VANCOMYCIN HYDROCHLORIDE 250 MG/1
250 CAPSULE ORAL 4 TIMES DAILY
Qty: 110 CAPSULE | Refills: 0 | Status: ON HOLD | OUTPATIENT
Start: 2018-12-07 | End: 2019-04-14

## 2018-12-07 NOTE — TELEPHONE ENCOUNTER
I called the [facility] on file and spoke to pharmacist Unknown Press, I called in the [address] as written per Dr Ira Lechuga      Vancomycin HCl 250 MG Oral Cap 110 capsule 0 12/7/2018     Sig - Route: Take 1 capsule (250 mg total) by mouth 4 (four) times daily.  Take 4 times

## 2018-12-07 NOTE — PROGRESS NOTES
HPI:    Patient ID: Ana Paula Sánchez is a 74year old female. History of present illness:     This is a 53-year-old female with a history of multiple medical problems including chronic lymphocytic colitis, history of C. difficile colitis, history of s managing it well. I have advised Pedialyte if she becomes severely dehydrated. I have advised a check of magnesium and BMP today to assess renal function and electrolytes. 2.  History of colostomy: Intact with nonbloody solid stool.     3.  History of weakness. Negative for dizziness, tremors, seizures, syncope, light-headedness and headaches. Wheelchair-bound due to paraplegia   Hematological: Negative for adenopathy. Does not bruise/bleed easily.    Psychiatric/Behavioral: Negative for agitation daily with food. Disp: 90 tablet Rfl: 3   loratadine 10 MG Oral Tab Take 10 mg by mouth daily.  Disp:  Rfl:    ferrous sulfate 325 (65 FE) MG Oral Tab EC Take 325 mg by mouth daily with breakfast. Disp:  Rfl:    Atorvastatin Calcium 40 MG Oral Tab Take 40 m Soft. Normal appearance and bowel sounds are normal. She exhibits no distension, no fluid wave, no ascites and no mass. There is no hepatosplenomegaly. There is no tenderness. There is no CVA tenderness. No hernia. Abdomen soft, nondistended, nontender.

## 2018-12-07 NOTE — TELEPHONE ENCOUNTER
Pt was seen in office today. Dr. Sri Mccormick asking for vanco to be called into pharmacy due to long sig. See below. Thank you. Vancomycin HCl 250 MG Oral Cap 110 capsule 0 12/7/2018    Sig :  Take 1 capsule (250 mg total) by mouth 4 (four) times daily.  Take 4

## 2018-12-09 ENCOUNTER — TELEPHONE (OUTPATIENT)
Dept: GASTROENTEROLOGY | Facility: CLINIC | Age: 73
End: 2018-12-09

## 2018-12-13 ENCOUNTER — OFFICE VISIT (OUTPATIENT)
Dept: CARDIOLOGY CLINIC | Facility: HOSPITAL | Age: 73
End: 2018-12-13
Attending: INTERNAL MEDICINE
Payer: MEDICARE

## 2018-12-13 VITALS — OXYGEN SATURATION: 100 % | DIASTOLIC BLOOD PRESSURE: 55 MMHG | HEART RATE: 84 BPM | SYSTOLIC BLOOD PRESSURE: 135 MMHG

## 2018-12-13 DIAGNOSIS — I48.19 ATRIAL FIBRILLATION, PERSISTENT (HCC): ICD-10-CM

## 2018-12-13 DIAGNOSIS — I50.9 HEART FAILURE, UNSPECIFIED (HCC): ICD-10-CM

## 2018-12-13 DIAGNOSIS — I50.30 (HFPEF) HEART FAILURE WITH PRESERVED EJECTION FRACTION (HCC): Primary | ICD-10-CM

## 2018-12-13 PROBLEM — I50.23 ACUTE ON CHRONIC HFREF (HEART FAILURE WITH REDUCED EJECTION FRACTION) (HCC): Chronic | Status: RESOLVED | Noted: 2018-11-28 | Resolved: 2018-12-13

## 2018-12-13 PROCEDURE — 80048 BASIC METABOLIC PNL TOTAL CA: CPT | Performed by: NURSE PRACTITIONER

## 2018-12-13 PROCEDURE — 36415 COLL VENOUS BLD VENIPUNCTURE: CPT | Performed by: NURSE PRACTITIONER

## 2018-12-13 PROCEDURE — 83880 ASSAY OF NATRIURETIC PEPTIDE: CPT | Performed by: NURSE PRACTITIONER

## 2018-12-13 PROCEDURE — 99212 OFFICE O/P EST SF 10 MIN: CPT | Performed by: NURSE PRACTITIONER

## 2018-12-13 PROCEDURE — 99214 OFFICE O/P EST MOD 30 MIN: CPT | Performed by: NURSE PRACTITIONER

## 2018-12-13 RX ORDER — EZETIMIBE 10 MG/1
TABLET ORAL
Qty: 90 TABLET | Refills: 2 | Status: ON HOLD | COMMUNITY
Start: 2018-12-13 | End: 2019-11-27

## 2018-12-13 RX ORDER — FUROSEMIDE 20 MG/1
40 TABLET ORAL 2 TIMES DAILY
Status: ON HOLD | COMMUNITY
Start: 2018-12-13 | End: 2019-04-23

## 2018-12-13 NOTE — PATIENT INSTRUCTIONS
Take furosemide 20 mg , 2 tabs in the morning    Continue all your same medications    Call if having any dizziness, lightheadedness, heart racing, palpitations, chest pain, shortness of breath, coughing, swelling, weight gain, fever, chills or worsening s

## 2018-12-13 NOTE — PROGRESS NOTES
[address] Patient Status:  No patient class for patient encounter    1945 MRN [id number]   Location MD Dr. Kenzie Kaminski is a 0.66 12/13/2018 11:04 AM    BUN 24 (H) 12/13/2018 11:04 AM     12/13/2018 11:04 AM    K 3.7 12/13/2018 11:04 AM     12/13/2018 11:04 AM    CO2 25 12/13/2018 11:04 AM    GLU 91 12/13/2018 11:04 AM    CA 9.3 12/13/2018 11:04 AM    ALB 2.8 (L) 10/ Greater than 30 minutes with this patient providing counseling, coordination of care and education given. Patient and  receptive.     Assessment:  Chronic HFpEF  - bnp 170 down from 303  -Abdominal pain and early satiety resolved with C. difficile tr (two) times daily. , Disp: , Rfl:   •  Potassium Chloride ER 20 MEQ Oral Tab CR, Take 1 tablet (20 mEq total) by mouth 2 (two) times daily. , Disp: 60 tablet, Rfl: 1  •  benzonatate 100 MG Oral Cap, Take 1 capsule (100 mg total) by mouth 3 (three) times armaan

## 2019-01-01 ENCOUNTER — EXTERNAL RECORD (OUTPATIENT)
Dept: HEALTH INFORMATION MANAGEMENT | Facility: OTHER | Age: 74
End: 2019-01-01

## 2019-01-03 ENCOUNTER — MYAURORA ACCOUNT LINK (OUTPATIENT)
Dept: OTHER | Age: 74
End: 2019-01-03

## 2019-01-03 ENCOUNTER — PRIOR ORIGINAL RECORDS (OUTPATIENT)
Dept: OTHER | Age: 74
End: 2019-01-03

## 2019-01-09 ENCOUNTER — PRIOR ORIGINAL RECORDS (OUTPATIENT)
Dept: OTHER | Age: 74
End: 2019-01-09

## 2019-01-09 RX ORDER — POTASSIUM CHLORIDE 20 MEQ/1
TABLET, EXTENDED RELEASE ORAL
Qty: 180 TABLET | Refills: 1 | Status: ON HOLD | OUTPATIENT
Start: 2019-01-09 | End: 2019-04-23

## 2019-01-09 RX ORDER — METOPROLOL SUCCINATE 50 MG/1
TABLET, EXTENDED RELEASE ORAL
Qty: 180 TABLET | Refills: 1 | Status: ON HOLD | OUTPATIENT
Start: 2019-01-09 | End: 2019-04-23

## 2019-01-24 ENCOUNTER — TELEPHONE (OUTPATIENT)
Dept: GASTROENTEROLOGY | Facility: CLINIC | Age: 74
End: 2019-01-24

## 2019-01-24 DIAGNOSIS — Z86.19 HISTORY OF CLOSTRIDIUM DIFFICILE INFECTION: Primary | ICD-10-CM

## 2019-01-24 NOTE — TELEPHONE ENCOUNTER
Patient states her stool has been thick and doesn't have odor anymore. Patient is wondering if she should take stool softener because her stool is coming out of her ostomy bag and goes all over her abdomen.  Also patient is asking if she should get another

## 2019-01-24 NOTE — TELEPHONE ENCOUNTER
Patient can repeat stool for C. difficile. (Ordered). She should not take a stool softener. I agree that she should contact her PCP and get wound/ostomy nurse renewed.

## 2019-01-24 NOTE — TELEPHONE ENCOUNTER
Pt inquiring if she can take stool softner and is inquiring if she needs to retest for C. Diff after she has finished medication.  Please call thank you [phone]

## 2019-01-24 NOTE — TELEPHONE ENCOUNTER
Dr. Raul Scruggs- Please advise on more RN visits for patient    Patient notified to not take stool softener but she can repeat c diff testing. Also again to call her PCP to get more RN home visits scheduled for ostomy.

## 2019-01-29 NOTE — TELEPHONE ENCOUNTER
Pt contacted. Name an  verified. Pt states she contacted Dr. Evelia Miller, and was advised no need to do the c-diff stool test. Pt states, stool is now going in the bag. Ostomy bag is intact, and stoma is protruding like it's supposed to.  Pt was informed by

## 2019-02-05 ENCOUNTER — TELEPHONE (OUTPATIENT)
Dept: CARDIOLOGY CLINIC | Facility: HOSPITAL | Age: 74
End: 2019-02-05

## 2019-02-06 ENCOUNTER — MYAURORA ACCOUNT LINK (OUTPATIENT)
Dept: OTHER | Age: 74
End: 2019-02-06

## 2019-02-06 ENCOUNTER — HOSPITAL ENCOUNTER (OUTPATIENT)
Dept: CARDIOLOGY CLINIC | Facility: HOSPITAL | Age: 74
Discharge: HOME | End: 2019-02-06
Attending: NURSE PRACTITIONER
Payer: MEDICARE

## 2019-02-06 ENCOUNTER — HOSPITAL ENCOUNTER (OUTPATIENT)
Dept: CV DIAGNOSTICS | Facility: HOSPITAL | Age: 74
Discharge: HOME | End: 2019-02-06
Attending: INTERNAL MEDICINE

## 2019-02-06 VITALS
TEMPERATURE: 98 F | DIASTOLIC BLOOD PRESSURE: 70 MMHG | OXYGEN SATURATION: 100 % | HEART RATE: 72 BPM | RESPIRATION RATE: 16 BRPM | SYSTOLIC BLOOD PRESSURE: 107 MMHG

## 2019-02-06 DIAGNOSIS — I34.0 MITRAL VALVE INSUFFICIENCY, UNSPECIFIED ETIOLOGY: ICD-10-CM

## 2019-02-06 PROBLEM — Z95.818 S/P MITRAL VALVE CLIP IMPLANTATION: Status: ACTIVE | Noted: 2018-10-31

## 2019-02-06 PROBLEM — Z98.890 S/P MITRAL VALVE CLIP IMPLANTATION: Status: ACTIVE | Noted: 2018-10-31

## 2019-02-06 PROBLEM — I48.0 PAROXYSMAL ATRIAL FIBRILLATION (HCC): Status: ACTIVE | Noted: 2018-06-08

## 2019-02-06 PROCEDURE — 99213 OFFICE O/P EST LOW 20 MIN: CPT | Performed by: NURSE PRACTITIONER

## 2019-02-06 NOTE — PROGRESS NOTES
Erwin Dacosta Note    Subjective:   Patient presents for her 3 month postop TMVR visit, accompanied by her , wheelchair bound.  She underwent Mitraclip placement on 10/31/18  With Dr. Cheryl Ramirez for severe, symptomatic mitral regurgitatio regurgitation. Mean gradient (D): 2mm Hg at a     heart rate of 56 beats per minute  4. Left atrium: The left atrium was markedly dilated. 5. Atrial septum: There is a residual flow across the septum post     transseptal puncture.   6. Pulmonary arteries: tablet Rfl: 3   loratadine 10 MG Oral Tab Take 10 mg by mouth daily. Disp:  Rfl:    ferrous sulfate 325 (65 FE) MG Oral Tab EC Take 325 mg by mouth daily with breakfast. Disp:  Rfl:    Atorvastatin Calcium 40 MG Oral Tab Take 40 mg by mouth nightly.    Disp

## 2019-02-07 ENCOUNTER — TELEPHONE (OUTPATIENT)
Dept: CARDIOLOGY CLINIC | Facility: HOSPITAL | Age: 74
End: 2019-02-07

## 2019-02-07 NOTE — PROGRESS NOTES
I called patient with the results of echo from yesterday. The patient's EF remains at 60-65%, clip present with mild regurgitation. Overall, there has been no significant interval changes compared to previous echo on 12/4/18.  Patient has an appointment in

## 2019-02-08 ENCOUNTER — MYAURORA ACCOUNT LINK (OUTPATIENT)
Dept: OTHER | Age: 74
End: 2019-02-08

## 2019-02-08 ENCOUNTER — PRIOR ORIGINAL RECORDS (OUTPATIENT)
Dept: OTHER | Age: 74
End: 2019-02-08

## 2019-02-28 VITALS
HEIGHT: 64 IN | OXYGEN SATURATION: 96 % | HEART RATE: 84 BPM | DIASTOLIC BLOOD PRESSURE: 58 MMHG | BODY MASS INDEX: 22.2 KG/M2 | SYSTOLIC BLOOD PRESSURE: 106 MMHG | WEIGHT: 130 LBS

## 2019-02-28 VITALS
HEIGHT: 64 IN | HEART RATE: 88 BPM | BODY MASS INDEX: 23.73 KG/M2 | OXYGEN SATURATION: 95 % | SYSTOLIC BLOOD PRESSURE: 120 MMHG | DIASTOLIC BLOOD PRESSURE: 60 MMHG | WEIGHT: 139 LBS

## 2019-02-28 VITALS
HEIGHT: 64 IN | RESPIRATION RATE: 18 BRPM | BODY MASS INDEX: 22.2 KG/M2 | SYSTOLIC BLOOD PRESSURE: 114 MMHG | DIASTOLIC BLOOD PRESSURE: 60 MMHG | WEIGHT: 130 LBS | HEART RATE: 89 BPM

## 2019-02-28 VITALS
WEIGHT: 123 LBS | HEART RATE: 87 BPM | DIASTOLIC BLOOD PRESSURE: 52 MMHG | OXYGEN SATURATION: 98 % | BODY MASS INDEX: 21 KG/M2 | SYSTOLIC BLOOD PRESSURE: 110 MMHG | RESPIRATION RATE: 18 BRPM | HEIGHT: 64 IN

## 2019-02-28 VITALS
RESPIRATION RATE: 18 BRPM | SYSTOLIC BLOOD PRESSURE: 100 MMHG | BODY MASS INDEX: 22.2 KG/M2 | HEART RATE: 55 BPM | HEIGHT: 64 IN | DIASTOLIC BLOOD PRESSURE: 64 MMHG | WEIGHT: 130 LBS

## 2019-02-28 VITALS — SYSTOLIC BLOOD PRESSURE: 110 MMHG | HEART RATE: 78 BPM | DIASTOLIC BLOOD PRESSURE: 62 MMHG

## 2019-02-28 VITALS
WEIGHT: 130 LBS | OXYGEN SATURATION: 98 % | HEART RATE: 90 BPM | BODY MASS INDEX: 22.2 KG/M2 | DIASTOLIC BLOOD PRESSURE: 60 MMHG | SYSTOLIC BLOOD PRESSURE: 100 MMHG | HEIGHT: 64 IN

## 2019-02-28 VITALS
SYSTOLIC BLOOD PRESSURE: 112 MMHG | WEIGHT: 130 LBS | OXYGEN SATURATION: 98 % | DIASTOLIC BLOOD PRESSURE: 60 MMHG | HEART RATE: 93 BPM | BODY MASS INDEX: 22.2 KG/M2 | HEIGHT: 64 IN

## 2019-02-28 PROBLEM — M51.36 DDD (DEGENERATIVE DISC DISEASE), LUMBAR: Status: ACTIVE | Noted: 2019-02-28

## 2019-03-01 VITALS
HEART RATE: 57 BPM | OXYGEN SATURATION: 94 % | BODY MASS INDEX: 24.07 KG/M2 | SYSTOLIC BLOOD PRESSURE: 102 MMHG | HEIGHT: 64 IN | RESPIRATION RATE: 18 BRPM | WEIGHT: 141 LBS | DIASTOLIC BLOOD PRESSURE: 50 MMHG

## 2019-03-12 ENCOUNTER — TELEPHONE (OUTPATIENT)
Dept: CARDIOLOGY | Age: 74
End: 2019-03-12

## 2019-03-15 RX ORDER — FUROSEMIDE 20 MG/1
TABLET ORAL
Qty: 180 TABLET | Refills: 2 | Status: SHIPPED | OUTPATIENT
Start: 2019-03-15 | End: 2020-03-10

## 2019-04-04 ENCOUNTER — APPOINTMENT (OUTPATIENT)
Dept: GENERAL RADIOLOGY | Facility: HOSPITAL | Age: 74
End: 2019-04-04
Payer: MEDICARE

## 2019-04-04 ENCOUNTER — HOSPITAL ENCOUNTER (EMERGENCY)
Facility: HOSPITAL | Age: 74
Discharge: HOME | End: 2019-04-04
Payer: MEDICARE

## 2019-04-04 VITALS
SYSTOLIC BLOOD PRESSURE: 105 MMHG | RESPIRATION RATE: 17 BRPM | HEIGHT: 62 IN | DIASTOLIC BLOOD PRESSURE: 76 MMHG | OXYGEN SATURATION: 97 % | TEMPERATURE: 99 F | BODY MASS INDEX: 24.34 KG/M2 | WEIGHT: 132.25 LBS | HEART RATE: 68 BPM

## 2019-04-04 DIAGNOSIS — R04.2 HEMOPTYSIS: Primary | ICD-10-CM

## 2019-04-04 DIAGNOSIS — J06.9 VIRAL URI WITH COUGH: ICD-10-CM

## 2019-04-04 PROCEDURE — 71045 X-RAY EXAM CHEST 1 VIEW: CPT

## 2019-04-04 PROCEDURE — 93005 ELECTROCARDIOGRAM TRACING: CPT

## 2019-04-04 PROCEDURE — 99284 EMERGENCY DEPT VISIT MOD MDM: CPT

## 2019-04-04 PROCEDURE — 93010 ELECTROCARDIOGRAM REPORT: CPT | Performed by: INTERNAL MEDICINE

## 2019-04-04 NOTE — ED PROVIDER NOTES
Patient Seen in: [facility] Emergency Department    History   Patient presents with:  Cough/URI    Stated Complaint: cough    HPI    10-year-old female with past medical history significant for anemia, anxiety, chronic back pain, C. difficile coli bilateral legs   • Osteoarthritis     legs, back, shoulders and knees   • OSTEOPENIA    • Other and unspecified hyperlipidemia    • OTHER DISEASES     spastic paraparesis   • OTHER DISEASES     cataracts   • OTHER DISEASES     secondary poycythemia   • OTH Aline   • TONSILLECTOMY             Social History    Tobacco Use      Smoking status: Former Smoker      Smokeless tobacco: Never Used      Tobacco comment: pt smoked until 23 yrs old    Alcohol use: No      Alcohol/week: 0.0 oz    Drug use: No      Review 4/4/2019  CONCLUSION:  1. No acute airspace disease. Linear bibasilar scarring/atelectasis. Prominent pulmonary markings throughout the interstitium appear to be chronic.     Dictated by (CST): Chika Johnson MD on 4/04/2019 at 13:08     Approved by (CST)

## 2019-04-09 RX ORDER — ATORVASTATIN CALCIUM 40 MG/1
TABLET, FILM COATED ORAL
COMMUNITY
Start: 2019-01-07 | End: 2019-12-08 | Stop reason: SDUPTHER

## 2019-04-09 RX ORDER — VENLAFAXINE 75 MG/1
TABLET ORAL
COMMUNITY
Start: 2013-09-13 | End: 2020-03-10

## 2019-04-09 RX ORDER — FERROUS SULFATE 325(65) MG
TABLET ORAL
COMMUNITY
Start: 2015-09-29

## 2019-04-09 RX ORDER — METOPROLOL SUCCINATE 50 MG/1
TABLET, EXTENDED RELEASE ORAL
COMMUNITY
Start: 2018-11-30

## 2019-04-09 RX ORDER — LORATADINE 10 MG/1
TABLET ORAL
COMMUNITY

## 2019-04-09 RX ORDER — EZETIMIBE 10 MG/1
TABLET ORAL
COMMUNITY
Start: 2013-09-13

## 2019-04-09 RX ORDER — LISINOPRIL 5 MG/1
TABLET ORAL
COMMUNITY
Start: 2011-05-20 | End: 2020-08-28

## 2019-04-13 ENCOUNTER — APPOINTMENT (OUTPATIENT)
Dept: CT IMAGING | Facility: HOSPITAL | Age: 74
DRG: 871 | End: 2019-04-13
Attending: EMERGENCY MEDICINE
Payer: MEDICARE

## 2019-04-13 ENCOUNTER — HOSPITAL ENCOUNTER (INPATIENT)
Facility: HOSPITAL | Age: 74
LOS: 9 days | Discharge: NURSING, REHAB OR LONG-TERM CARE | DRG: 871 | End: 2019-04-23
Attending: EMERGENCY MEDICINE | Admitting: HOSPITALIST
Payer: MEDICARE

## 2019-04-13 ENCOUNTER — APPOINTMENT (OUTPATIENT)
Dept: GENERAL RADIOLOGY | Facility: HOSPITAL | Age: 74
DRG: 871 | End: 2019-04-13
Attending: EMERGENCY MEDICINE
Payer: MEDICARE

## 2019-04-13 DIAGNOSIS — Z98.1 HISTORY OF LUMBAR FUSION: ICD-10-CM

## 2019-04-13 DIAGNOSIS — N39.0 URINARY TRACT INFECTION WITHOUT HEMATURIA, SITE UNSPECIFIED: ICD-10-CM

## 2019-04-13 DIAGNOSIS — S32.020A CLOSED COMPRESSION FRACTURE OF SECOND LUMBAR VERTEBRA, INITIAL ENCOUNTER: ICD-10-CM

## 2019-04-13 DIAGNOSIS — K92.2 GASTROINTESTINAL HEMORRHAGE, UNSPECIFIED GASTROINTESTINAL HEMORRHAGE TYPE: Primary | ICD-10-CM

## 2019-04-13 DIAGNOSIS — E87.6 HYPOKALEMIA: ICD-10-CM

## 2019-04-13 DIAGNOSIS — M54.16 LUMBAR RADICULITIS: ICD-10-CM

## 2019-04-13 DIAGNOSIS — I50.9 ACUTE ON CHRONIC CONGESTIVE HEART FAILURE, UNSPECIFIED HEART FAILURE TYPE (HCC): ICD-10-CM

## 2019-04-13 PROCEDURE — 99285 EMERGENCY DEPT VISIT HI MDM: CPT

## 2019-04-13 PROCEDURE — 96365 THER/PROPH/DIAG IV INF INIT: CPT

## 2019-04-13 PROCEDURE — 96375 TX/PRO/DX INJ NEW DRUG ADDON: CPT

## 2019-04-13 PROCEDURE — 36415 COLL VENOUS BLD VENIPUNCTURE: CPT

## 2019-04-13 PROCEDURE — 86850 RBC ANTIBODY SCREEN: CPT | Performed by: EMERGENCY MEDICINE

## 2019-04-13 PROCEDURE — 80053 COMPREHEN METABOLIC PANEL: CPT | Performed by: EMERGENCY MEDICINE

## 2019-04-13 PROCEDURE — 86901 BLOOD TYPING SEROLOGIC RH(D): CPT | Performed by: EMERGENCY MEDICINE

## 2019-04-13 PROCEDURE — 83880 ASSAY OF NATRIURETIC PEPTIDE: CPT | Performed by: EMERGENCY MEDICINE

## 2019-04-13 PROCEDURE — 72125 CT NECK SPINE W/O DYE: CPT | Performed by: EMERGENCY MEDICINE

## 2019-04-13 PROCEDURE — 85060 BLOOD SMEAR INTERPRETATION: CPT | Performed by: EMERGENCY MEDICINE

## 2019-04-13 PROCEDURE — 85025 COMPLETE CBC W/AUTO DIFF WBC: CPT | Performed by: EMERGENCY MEDICINE

## 2019-04-13 PROCEDURE — 83690 ASSAY OF LIPASE: CPT | Performed by: EMERGENCY MEDICINE

## 2019-04-13 PROCEDURE — 51702 INSERT TEMP BLADDER CATH: CPT

## 2019-04-13 PROCEDURE — 86900 BLOOD TYPING SEROLOGIC ABO: CPT | Performed by: EMERGENCY MEDICINE

## 2019-04-14 ENCOUNTER — APPOINTMENT (OUTPATIENT)
Dept: GENERAL RADIOLOGY | Facility: HOSPITAL | Age: 74
DRG: 871 | End: 2019-04-14
Attending: EMERGENCY MEDICINE
Payer: MEDICARE

## 2019-04-14 ENCOUNTER — APPOINTMENT (OUTPATIENT)
Dept: CT IMAGING | Facility: HOSPITAL | Age: 74
DRG: 871 | End: 2019-04-14
Attending: EMERGENCY MEDICINE
Payer: MEDICARE

## 2019-04-14 PROBLEM — E87.6 HYPOKALEMIA: Status: ACTIVE | Noted: 2019-04-14

## 2019-04-14 PROBLEM — R79.89 AZOTEMIA: Status: ACTIVE | Noted: 2019-04-14

## 2019-04-14 PROBLEM — K92.2 GASTROINTESTINAL HEMORRHAGE, UNSPECIFIED GASTROINTESTINAL HEMORRHAGE TYPE: Status: ACTIVE | Noted: 2019-04-14

## 2019-04-14 PROBLEM — N39.0 URINARY TRACT INFECTION WITHOUT HEMATURIA, SITE UNSPECIFIED: Status: ACTIVE | Noted: 2019-04-14

## 2019-04-14 PROBLEM — R73.9 HYPERGLYCEMIA: Status: ACTIVE | Noted: 2019-04-14

## 2019-04-14 PROBLEM — D72.829 LEUKOCYTOSIS: Status: ACTIVE | Noted: 2019-04-14

## 2019-04-14 PROBLEM — K92.2 GASTROINTESTINAL HEMORRHAGE: Status: ACTIVE | Noted: 2019-04-14

## 2019-04-14 PROBLEM — I50.9 ACUTE ON CHRONIC CONGESTIVE HEART FAILURE, UNSPECIFIED HEART FAILURE TYPE (HCC): Status: ACTIVE | Noted: 2019-04-14

## 2019-04-14 PROBLEM — S32.020A CLOSED COMPRESSION FRACTURE OF SECOND LUMBAR VERTEBRA, INITIAL ENCOUNTER: Status: ACTIVE | Noted: 2019-04-14

## 2019-04-14 PROCEDURE — 83605 ASSAY OF LACTIC ACID: CPT | Performed by: HOSPITALIST

## 2019-04-14 PROCEDURE — 87486 CHLMYD PNEUM DNA AMP PROBE: CPT | Performed by: INTERNAL MEDICINE

## 2019-04-14 PROCEDURE — 87581 M.PNEUMON DNA AMP PROBE: CPT | Performed by: INTERNAL MEDICINE

## 2019-04-14 PROCEDURE — 70450 CT HEAD/BRAIN W/O DYE: CPT | Performed by: EMERGENCY MEDICINE

## 2019-04-14 PROCEDURE — 80048 BASIC METABOLIC PNL TOTAL CA: CPT | Performed by: HOSPITALIST

## 2019-04-14 PROCEDURE — 81001 URINALYSIS AUTO W/SCOPE: CPT | Performed by: EMERGENCY MEDICINE

## 2019-04-14 PROCEDURE — 87798 DETECT AGENT NOS DNA AMP: CPT | Performed by: INTERNAL MEDICINE

## 2019-04-14 PROCEDURE — 87186 SC STD MICRODIL/AGAR DIL: CPT | Performed by: EMERGENCY MEDICINE

## 2019-04-14 PROCEDURE — 87040 BLOOD CULTURE FOR BACTERIA: CPT | Performed by: EMERGENCY MEDICINE

## 2019-04-14 PROCEDURE — 72100 X-RAY EXAM L-S SPINE 2/3 VWS: CPT | Performed by: EMERGENCY MEDICINE

## 2019-04-14 PROCEDURE — C9113 INJ PANTOPRAZOLE SODIUM, VIA: HCPCS | Performed by: EMERGENCY MEDICINE

## 2019-04-14 PROCEDURE — 83605 ASSAY OF LACTIC ACID: CPT | Performed by: EMERGENCY MEDICINE

## 2019-04-14 PROCEDURE — 83605 ASSAY OF LACTIC ACID: CPT | Performed by: INTERNAL MEDICINE

## 2019-04-14 PROCEDURE — 71045 X-RAY EXAM CHEST 1 VIEW: CPT | Performed by: EMERGENCY MEDICINE

## 2019-04-14 PROCEDURE — 83735 ASSAY OF MAGNESIUM: CPT | Performed by: INTERNAL MEDICINE

## 2019-04-14 PROCEDURE — 87077 CULTURE AEROBIC IDENTIFY: CPT | Performed by: EMERGENCY MEDICINE

## 2019-04-14 PROCEDURE — 85025 COMPLETE CBC W/AUTO DIFF WBC: CPT | Performed by: HOSPITALIST

## 2019-04-14 PROCEDURE — 87633 RESP VIRUS 12-25 TARGETS: CPT | Performed by: INTERNAL MEDICINE

## 2019-04-14 PROCEDURE — 87086 URINE CULTURE/COLONY COUNT: CPT | Performed by: EMERGENCY MEDICINE

## 2019-04-14 RX ORDER — ASPIRIN 81 MG/1
81 TABLET ORAL DAILY
Status: DISCONTINUED | OUTPATIENT
Start: 2019-04-14 | End: 2019-04-23

## 2019-04-14 RX ORDER — SODIUM CHLORIDE 9 MG/ML
INJECTION, SOLUTION INTRAVENOUS CONTINUOUS
Status: DISCONTINUED | OUTPATIENT
Start: 2019-04-14 | End: 2019-04-15

## 2019-04-14 RX ORDER — METOCLOPRAMIDE HYDROCHLORIDE 5 MG/ML
10 INJECTION INTRAMUSCULAR; INTRAVENOUS EVERY 8 HOURS PRN
Status: DISCONTINUED | OUTPATIENT
Start: 2019-04-14 | End: 2019-04-16

## 2019-04-14 RX ORDER — PREDNISONE 1 MG/1
5 TABLET ORAL DAILY
Status: DISCONTINUED | OUTPATIENT
Start: 2019-04-14 | End: 2019-04-19

## 2019-04-14 RX ORDER — FUROSEMIDE 10 MG/ML
40 INJECTION INTRAMUSCULAR; INTRAVENOUS ONCE
Status: COMPLETED | OUTPATIENT
Start: 2019-04-14 | End: 2019-04-14

## 2019-04-14 RX ORDER — SODIUM CHLORIDE 0.9 % (FLUSH) 0.9 %
3 SYRINGE (ML) INJECTION AS NEEDED
Status: DISCONTINUED | OUTPATIENT
Start: 2019-04-14 | End: 2019-04-23

## 2019-04-14 RX ORDER — ONDANSETRON 2 MG/ML
4 INJECTION INTRAMUSCULAR; INTRAVENOUS EVERY 6 HOURS PRN
Status: DISCONTINUED | OUTPATIENT
Start: 2019-04-14 | End: 2019-04-23

## 2019-04-14 RX ORDER — PREGABALIN 50 MG/1
50 CAPSULE ORAL 2 TIMES DAILY
Status: DISCONTINUED | OUTPATIENT
Start: 2019-04-14 | End: 2019-04-23

## 2019-04-14 RX ORDER — ONDANSETRON 2 MG/ML
4 INJECTION INTRAMUSCULAR; INTRAVENOUS ONCE
Status: COMPLETED | OUTPATIENT
Start: 2019-04-14 | End: 2019-04-14

## 2019-04-14 RX ORDER — VENLAFAXINE HYDROCHLORIDE 75 MG/1
75 CAPSULE, EXTENDED RELEASE ORAL DAILY
Status: DISCONTINUED | OUTPATIENT
Start: 2019-04-14 | End: 2019-04-23

## 2019-04-14 RX ORDER — ATORVASTATIN CALCIUM 40 MG/1
40 TABLET, FILM COATED ORAL NIGHTLY
Status: DISCONTINUED | OUTPATIENT
Start: 2019-04-14 | End: 2019-04-19

## 2019-04-14 RX ORDER — MAGNESIUM SULFATE HEPTAHYDRATE 40 MG/ML
2 INJECTION, SOLUTION INTRAVENOUS ONCE
Status: COMPLETED | OUTPATIENT
Start: 2019-04-14 | End: 2019-04-14

## 2019-04-14 RX ORDER — MELATONIN
325
Status: DISCONTINUED | OUTPATIENT
Start: 2019-04-14 | End: 2019-04-23

## 2019-04-14 RX ORDER — METOPROLOL SUCCINATE 50 MG/1
50 TABLET, EXTENDED RELEASE ORAL
Status: DISCONTINUED | OUTPATIENT
Start: 2019-04-14 | End: 2019-04-14

## 2019-04-14 RX ORDER — SODIUM CHLORIDE 9 MG/ML
INJECTION, SOLUTION INTRAVENOUS
Status: DISPENSED
Start: 2019-04-14 | End: 2019-04-14

## 2019-04-14 RX ORDER — HYDROCODONE BITARTRATE AND ACETAMINOPHEN 5; 325 MG/1; MG/1
1 TABLET ORAL EVERY 6 HOURS PRN
Status: DISCONTINUED | OUTPATIENT
Start: 2019-04-14 | End: 2019-04-23

## 2019-04-14 RX ORDER — SODIUM CHLORIDE 9 MG/ML
1000 INJECTION, SOLUTION INTRAVENOUS ONCE
Status: COMPLETED | OUTPATIENT
Start: 2019-04-14 | End: 2019-04-14

## 2019-04-14 RX ORDER — METOPROLOL SUCCINATE 25 MG/1
25 TABLET, EXTENDED RELEASE ORAL
Status: DISCONTINUED | OUTPATIENT
Start: 2019-04-14 | End: 2019-04-23

## 2019-04-14 RX ORDER — ACETAMINOPHEN 325 MG/1
650 TABLET ORAL EVERY 6 HOURS PRN
Status: DISCONTINUED | OUTPATIENT
Start: 2019-04-14 | End: 2019-04-23

## 2019-04-14 NOTE — ED NOTES
Orders for admission, patient is aware of plan and ready to go upstairs. Any questions, please call ED RN olvin  at extension [phone]. Pt getting sepsis bolus now. Started with remaining fluid from the initial bag at 125 cc/Hr.  When second bag NS is hung and

## 2019-04-14 NOTE — H&P
[facility] Hospitalist H&P     CC: Patient presents with:  Fall (musculoskeletal, neurologic)       PCP: Kostas Glynn MD    Admission Date: 4/14/2019    ASSESSMENT / PLAN:   Leanna Edwards is a 74year old female with PMH sig for DVT s/p permanen neurogenic bladder  -straight cath at baseline  -wheelchair bound.   is caregiver  -baclofen pump, cont home meds     #Recurrent cdif colitis and collagenous colitis s/p laparoscopic loop sigmoid colostomy in 9/2017  -no change in ostomy output still Congestive heart disease ([facility] [address]    • Coronary atherosclerosis    • Deep vein thrombosis (HCC)     jamal filter in place   • DEPRESSION    • Depression 1/11/2016   • Dyspnea 6/7/2018   • Esophageal reflux    • GERD    • GOUT    • Hearing impairment    • in office   • ESOPHAGOGASTRODUODENOSCOPY (EGD) N/A 12/24/2016    Performed by Mario Vanessa MD at [facility] ENDOSCOPY   • ESOPHAGOGASTRODUODENOSCOPY, POSSIBLE DILATION, POSSIBLE BIOSPY, POSSIBLE POLYPECTOMY N/A 1/5/2015    Performed by Moni Hyatt MD Systems  Comprehensive ROS reviewed and negative except for what's stated above.      OBJECTIVE:  /53 (BP Location: Right arm)   Pulse 94   Temp 98.9 °F (37.2 °C) (Oral)   Resp 20   Ht 162.6 cm (5' 4\")   Wt 130 lb (59 kg)   SpO2 96%   BMI 22.31 kg/m² liquefying hematoma. NO readily drainable components are detected. Differential considerations would include infected soft tissue hematoma. Follow-up is recommended.

## 2019-04-14 NOTE — ED PROVIDER NOTES
Patient Seen in: [facility] AND [facility] Emergency Department    History   Patient presents with:  Fall (musculoskeletal, neurologic)    Stated Complaint: fall    HPI    73 yo female with h/o spastic paraplegia.  She is wheelchair bound and today was sweeping t Reflux    • Self-catheterizes urinary bladder 1/11/2016   • Ulcerative colitis ([facility] [address]    • Valvular disease     mitral valve prolapse   • Visual impairment     reading       Past Surgical History:   Procedure Laterality Date   • BACK SURGERY      spinal fus 138/71   Pulse 95   Resp 18   Temp 97.4 °F (36.3 °C)   Temp src Temporal   SpO2 90 %   O2 Device None (Room air)       Current:/60   Pulse 105   Temp 97.4 °F (36.3 °C) (Temporal)   Resp 18   Ht 162.6 cm (5' 4\")   Wt 59 kg   SpO2 91%   BMI 22.31 kg/m Urine 324 (*)     Bacteria Urine Moderate (*)     All other components within normal limits   PRO BETA NATRIURETIC PEPTIDE - Abnormal; Notable for the following components:    Pro-Beta Natriuretic Peptide 2,370 (*)     All other components within normal li matter changes  Intracranial atherosclerosis  Mucosal thickening of the maxillary sinuses and patchy opacification of right anterior ethmoid air cells    C-SPINE   No acute fracture  Degenerative changes   Listheses at multiple levels, likely degenerative failure, unspecified heart failure type [facility])    Disposition:  Admit  4/14/2019  3:59 am    Follow-up:  No follow-up provider specified.   We recommend that you schedule follow up care with a primary care provider within the next three months to obtain basi

## 2019-04-15 ENCOUNTER — APPOINTMENT (OUTPATIENT)
Dept: GENERAL RADIOLOGY | Facility: HOSPITAL | Age: 74
DRG: 871 | End: 2019-04-15
Attending: HOSPITALIST
Payer: MEDICARE

## 2019-04-15 PROCEDURE — 85027 COMPLETE CBC AUTOMATED: CPT | Performed by: INTERNAL MEDICINE

## 2019-04-15 PROCEDURE — C9113 INJ PANTOPRAZOLE SODIUM, VIA: HCPCS | Performed by: HOSPITALIST

## 2019-04-15 PROCEDURE — 93010 ELECTROCARDIOGRAM REPORT: CPT | Performed by: HOSPITALIST

## 2019-04-15 PROCEDURE — 97162 PT EVAL MOD COMPLEX 30 MIN: CPT

## 2019-04-15 PROCEDURE — 83735 ASSAY OF MAGNESIUM: CPT | Performed by: INTERNAL MEDICINE

## 2019-04-15 PROCEDURE — 97166 OT EVAL MOD COMPLEX 45 MIN: CPT

## 2019-04-15 PROCEDURE — 85018 HEMOGLOBIN: CPT | Performed by: HOSPITALIST

## 2019-04-15 PROCEDURE — 97530 THERAPEUTIC ACTIVITIES: CPT

## 2019-04-15 PROCEDURE — 71045 X-RAY EXAM CHEST 1 VIEW: CPT | Performed by: HOSPITALIST

## 2019-04-15 PROCEDURE — 84132 ASSAY OF SERUM POTASSIUM: CPT | Performed by: HOSPITALIST

## 2019-04-15 PROCEDURE — 87493 C DIFF AMPLIFIED PROBE: CPT | Performed by: HOSPITALIST

## 2019-04-15 PROCEDURE — 84484 ASSAY OF TROPONIN QUANT: CPT | Performed by: INTERNAL MEDICINE

## 2019-04-15 PROCEDURE — 93005 ELECTROCARDIOGRAM TRACING: CPT

## 2019-04-15 PROCEDURE — 80061 LIPID PANEL: CPT | Performed by: INTERNAL MEDICINE

## 2019-04-15 PROCEDURE — 80048 BASIC METABOLIC PNL TOTAL CA: CPT | Performed by: INTERNAL MEDICINE

## 2019-04-15 RX ORDER — FUROSEMIDE 10 MG/ML
20 INJECTION INTRAMUSCULAR; INTRAVENOUS ONCE
Status: DISCONTINUED | OUTPATIENT
Start: 2019-04-15 | End: 2019-04-15

## 2019-04-15 RX ORDER — DILTIAZEM HYDROCHLORIDE 5 MG/ML
10 INJECTION INTRAVENOUS ONCE
Status: COMPLETED | OUTPATIENT
Start: 2019-04-15 | End: 2019-04-15

## 2019-04-15 RX ORDER — SODIUM CHLORIDE 9 MG/ML
INJECTION, SOLUTION INTRAVENOUS
Status: COMPLETED
Start: 2019-04-15 | End: 2019-04-16

## 2019-04-15 RX ORDER — 0.9 % SODIUM CHLORIDE 0.9 %
VIAL (ML) INJECTION
Status: COMPLETED
Start: 2019-04-15 | End: 2019-04-15

## 2019-04-15 RX ORDER — DIGOXIN 0.25 MG/ML
250 INJECTION INTRAMUSCULAR; INTRAVENOUS ONCE
Status: COMPLETED | OUTPATIENT
Start: 2019-04-15 | End: 2019-04-15

## 2019-04-15 RX ORDER — POTASSIUM CHLORIDE 20 MEQ/1
40 TABLET, EXTENDED RELEASE ORAL EVERY 4 HOURS
Status: COMPLETED | OUTPATIENT
Start: 2019-04-15 | End: 2019-04-15

## 2019-04-15 RX ORDER — FUROSEMIDE 10 MG/ML
20 INJECTION INTRAMUSCULAR; INTRAVENOUS
Status: DISCONTINUED | OUTPATIENT
Start: 2019-04-15 | End: 2019-04-15

## 2019-04-15 NOTE — OCCUPATIONAL THERAPY NOTE
Chart reviewed, discussed case with RN. Pt is mildly hypotensive, not feeling well with pale appearance. Will hold OT eval and f/u in PM as appropriate.

## 2019-04-15 NOTE — PHYSICAL THERAPY NOTE
Chart reviewed, discussed case with RN. Pt is mildly hypotensive, not feeling well with pale appearance. Will HOLD PT evaluation until MD sees pt. Will follow up this PM as appropriate, schedule permitting.

## 2019-04-15 NOTE — OCCUPATIONAL THERAPY NOTE
OCCUPATIONAL THERAPY EVALUATION - INPATIENT     Room Number: 553/553-A  Evaluation Date: 4/15/2019  Type of Evaluation: Initial  Presenting Problem: s/p mechanical fall out of w/c    Physician Order: IP Consult to Occupational Therapy  Reason for Therapy: based on documentation in the [facility] '6 clicks' Inpatient Daily Activity Short Form. Research supports that patients with this level of impairment may benefit from ASIA.      DISCHARGE RECOMMENDATIONS  OT Discharge Recommendations: 24 hour care/supervision;H hyperlipidemia    • OTHER DISEASES     spastic paraparesis   • OTHER DISEASES     cataracts   • OTHER DISEASES     secondary poycythemia   • OTHER DISEASES     lumbar spinal stenosis   • OTHER DISEASES     duplicated left renal collecting system   • OTHER level;Ramped entrance  Lives With: Spouse        Shower/Tub and Equipment: Tub transfer bench;Hand-held showerhead;Tub-shower combo(pt does sponge bathes at bed level)  Other Equipment: (w/c, slide board)          Drives: No  Patient Regularly Uses: None washing, rinsing, drying)?: A Lot  -   Toileting, which includes using toilet, bedpan or urinal? : A Little  -   Putting on and taking off regular upper body clothing?: A Little  -   Taking care of personal grooming such as brushing teeth?: A Little  -   E

## 2019-04-15 NOTE — PLAN OF CARE
Problem: GENITOURINARY - ADULT  Goal: Absence of urinary retention  Description  INTERVENTIONS:  - Assess patient?s ability to void and empty bladder  - Monitor intake/output and perform bladder scan as needed  - Follow urinary retention protocol/standar

## 2019-04-15 NOTE — PHYSICAL THERAPY NOTE
PHYSICAL THERAPY EVALUATION - INPATIENT     Room Number: 553/553-A  Evaluation Date: 4/15/2019  Type of Evaluation: Initial   Physician Order: PT Eval and Treat    Presenting Problem: p/w fall from w/c, N/V, LLE pain, dx UTI and sepsis  Reason for Therapy Short Form. Research supports that patients with this level of impairment may benefit from LTAC. Pt uses w/c and transfer board at baseline. Pt may progress mobility towards safe level for d/c home with improvement in medical status.  Will continue to foll HYPERLIPIDEMIA    • Irritable bowel syndrome    • Lymphocytic colitis Colon= 5/7/12   • MENOPAUSE    • MITRAL VALVE PROLAPSE    • Neuropathy     bilateral legs   • Osteoarthritis     legs, back, shoulders and knees   • OSTEOPENIA    • Other and unspecified • KNEE REPLACEMENT SURGERY      left  12/05   • Ceasar Lobe N/A 10/31/2018    Performed by Booker Poe MD at [facility] [address]  8-24-12    [facility] Dr. Greer Esquivel  Type of Home: House   Home Layout: One le '6-Clicks' INPATIENT SHORT FORM - BASIC MOBILITY  How much difficulty does the patient currently have. ..  -   Turning over in bed (including adjusting bedclothes, sheets and blankets)?: A Lot   -   Sitting down on and standing up from a chair with arms (e. Goal #3   Current Status    Goal #4 Patient to demonstrate independence with home activity/exercise instructions provided to patient in preparation for discharge.    Goal #4   Current Status    Goal #5    Goal #5   Current Status    Goal #6    Goal #6  Cu

## 2019-04-15 NOTE — CONSULTS
[facility]    PATIENT'S NAME: [address] [facility]   ATTENDING PHYSICIAN: Mohan Russell MD   CONSULTING PHYSICIAN: Marion Geiger.  Alicia Tuttle MD   PATIENT ACCOUNT#:   [id number]    LOCATION:  [address] [place] [address] #:   [id number]       DATE OF BIR Alert and oriented x3. VITAL SIGNS:  Blood pressure 100/50, respirations 22, pulse 120 and irregular, afebrile. HEENT:  Unremarkable. NECK:  Supple with mildly increased jugular venous pressure. Carotids are brisk without bruits. No thyromegaly.   Radha

## 2019-04-15 NOTE — CONSULTS
Cardiology (consult dictated)    Assessment:  1. Admission for urosepsis. Blood pressure more stable. 2.  Chronic atrial fibrillation, currently rate is increased. Getting metoprolol p.o. sporadically due to low blood pressure.     3.  History of gamaliel

## 2019-04-15 NOTE — PLAN OF CARE
Problem: Patient Centered Care  Goal: Patient preferences are identified and integrated in the patient's plan of care  Description  Interventions:  - What would you like us to know as we care for you?  \"From home with  (caregiver)\"  - Provide anuradha

## 2019-04-15 NOTE — HISTORICAL OFFICE NOTE
Raj Allen  : 1945  ACCOUNT:  [id number]  [id number]  PCP: Dr. Analilia Banda) Rodrigue Craw DATE: 2019  DICTATED BY:  [Dr. Adilson Andino: [Followup of .  CAD, of native vessels, nonobstructive.]    HPI:    [On  ago and occasionally. CAFFEINE: >5 cups daily and tea and soda. ALCOHOL: denies drinking. EXERCISE: wheelchairbound. DIET: no special diet. MARITAL STATUS: . RESIDENCE: lives in home with .      VITAL SIGNS: [B/P - 0/0 , Weight -  130, Height

## 2019-04-15 NOTE — PROGRESS NOTES
[facility] Hospitalist Progress Note     PCP: Vonna Apgar, MD    CC: Follow up       Assessment/Plan:     Renée Phillips a 74year old female with PMH sig for DVT s/p permanent IVC filter, recurrent cdif colitis and collagenous colitis s/p lap ceftriaxone, po vanc for c diff ppx, will d/c with one week po vanc past antibiotic completion  -s/p additional 1L bolus, continue to trend LA last 2.3->500cc bolus->repeat WNL  -hold IVF due to SOB, see above     #Hematemesis, PTA, now resolved   -seen in feels more SOB. Weight is up as the pt got bolused and IVF. No CP. HR on exam is in the 100-120's range, pulse ox inaccurate with HR of 40's. O2 sats 92-94% on home 2 L at bedside exam.      Otherwise denies Cp,. No N/v.   States left knee is hurting si 04/14/19  0609 04/15/19  0700 04/15/19  1041   WBC 16.4* 13.4* 8.4  --    HGB 15.0 12.8 10.7* 11.7*   MCV 84.4 84.7 86.6  --    .0 187.0 126.0*  --        Recent Labs   Lab 04/13/19  2338 04/14/19  0609 04/15/19  0700    143 141   K 2.8* 3.4* umbrella noted. * Rather advanced osteoarthritic changes are seen. * Marked deformity of upper lumbar vertebra probably chronic. * Suggest re-attempt at routine examination when the patient's clinical condition permits.  * A preliminary report was submitted 16 mm, with NO vascularity, favoring a small liquefying hematoma. NO readily drainable components are detected. Differential considerations would include infected soft tissue hematoma. Follow-up is recommended.

## 2019-04-15 NOTE — WOUND PROGRESS NOTE
Spoke to [place]. Wound services not needed as pt does not have open wounds or ostomy needs. Per RN colostomy is old and is not problematic. Isogel given for pt comfort per RN. Please re-consult Wound Services if needed.

## 2019-04-16 ENCOUNTER — APPOINTMENT (OUTPATIENT)
Dept: CV DIAGNOSTICS | Facility: HOSPITAL | Age: 74
DRG: 871 | End: 2019-04-16
Attending: INTERNAL MEDICINE
Payer: MEDICARE

## 2019-04-16 PROCEDURE — 83605 ASSAY OF LACTIC ACID: CPT | Performed by: HOSPITALIST

## 2019-04-16 PROCEDURE — 82805 BLOOD GASES W/O2 SATURATION: CPT | Performed by: HOSPITALIST

## 2019-04-16 PROCEDURE — 84484 ASSAY OF TROPONIN QUANT: CPT | Performed by: INTERNAL MEDICINE

## 2019-04-16 PROCEDURE — 82570 ASSAY OF URINE CREATININE: CPT | Performed by: INTERNAL MEDICINE

## 2019-04-16 PROCEDURE — 80048 BASIC METABOLIC PNL TOTAL CA: CPT | Performed by: INTERNAL MEDICINE

## 2019-04-16 PROCEDURE — C9113 INJ PANTOPRAZOLE SODIUM, VIA: HCPCS | Performed by: HOSPITALIST

## 2019-04-16 PROCEDURE — 85025 COMPLETE CBC W/AUTO DIFF WBC: CPT | Performed by: HOSPITALIST

## 2019-04-16 PROCEDURE — 93306 TTE W/DOPPLER COMPLETE: CPT | Performed by: INTERNAL MEDICINE

## 2019-04-16 PROCEDURE — 36600 WITHDRAWAL OF ARTERIAL BLOOD: CPT | Performed by: HOSPITALIST

## 2019-04-16 PROCEDURE — 93010 ELECTROCARDIOGRAM REPORT: CPT | Performed by: HOSPITALIST

## 2019-04-16 PROCEDURE — 93005 ELECTROCARDIOGRAM TRACING: CPT

## 2019-04-16 PROCEDURE — 82962 GLUCOSE BLOOD TEST: CPT

## 2019-04-16 PROCEDURE — 85730 THROMBOPLASTIN TIME PARTIAL: CPT | Performed by: HOSPITALIST

## 2019-04-16 PROCEDURE — 82550 ASSAY OF CK (CPK): CPT | Performed by: INTERNAL MEDICINE

## 2019-04-16 PROCEDURE — 84132 ASSAY OF SERUM POTASSIUM: CPT | Performed by: HOSPITALIST

## 2019-04-16 PROCEDURE — 84300 ASSAY OF URINE SODIUM: CPT | Performed by: INTERNAL MEDICINE

## 2019-04-16 RX ORDER — HEPARIN SODIUM AND DEXTROSE 10000; 5 [USP'U]/100ML; G/100ML
18 INJECTION INTRAVENOUS ONCE
Status: DISCONTINUED | OUTPATIENT
Start: 2019-04-16 | End: 2019-04-22

## 2019-04-16 RX ORDER — HEPARIN SODIUM AND DEXTROSE 10000; 5 [USP'U]/100ML; G/100ML
INJECTION INTRAVENOUS CONTINUOUS
Status: DISCONTINUED | OUTPATIENT
Start: 2019-04-16 | End: 2019-04-22

## 2019-04-16 RX ORDER — DEXTROSE MONOHYDRATE 25 G/50ML
50 INJECTION, SOLUTION INTRAVENOUS ONCE
Status: COMPLETED | OUTPATIENT
Start: 2019-04-16 | End: 2019-04-16

## 2019-04-16 RX ORDER — SODIUM CHLORIDE 9 MG/ML
INJECTION, SOLUTION INTRAVENOUS CONTINUOUS
Status: DISCONTINUED | OUTPATIENT
Start: 2019-04-16 | End: 2019-04-18

## 2019-04-16 RX ORDER — CALCIUM GLUCONATE 94 MG/ML
1 INJECTION, SOLUTION INTRAVENOUS ONCE
Status: COMPLETED | OUTPATIENT
Start: 2019-04-16 | End: 2019-04-16

## 2019-04-16 RX ORDER — SODIUM POLYSTYRENE SULFONATE 4.1 MEQ/G
15 POWDER, FOR SUSPENSION ORAL; RECTAL ONCE
Status: COMPLETED | OUTPATIENT
Start: 2019-04-16 | End: 2019-04-16

## 2019-04-16 RX ORDER — METOCLOPRAMIDE HYDROCHLORIDE 5 MG/ML
5 INJECTION INTRAMUSCULAR; INTRAVENOUS EVERY 8 HOURS PRN
Status: DISCONTINUED | OUTPATIENT
Start: 2019-04-16 | End: 2019-04-23

## 2019-04-16 RX ORDER — SODIUM CHLORIDE 9 MG/ML
INJECTION, SOLUTION INTRAVENOUS CONTINUOUS
Status: DISCONTINUED | OUTPATIENT
Start: 2019-04-16 | End: 2019-04-16

## 2019-04-16 NOTE — PLAN OF CARE
Problem: Patient Centered Care  Goal: Patient preferences are identified and integrated in the patient's plan of care  Description  Interventions:  - What would you like us to know as we care for you?  \"From home with  (caregiver)\"  - Provide anuradha increased pain with activity and pre-medicate as appropriate  4/16/2019 0732 by Manolo Jasmine RN  Outcome: Progressing  4/16/2019 0730 by Manolo Jasmine RN  Outcome: Progressing     Problem: SAFETY ADULT - FALL  Goal: Free from fall injury  Desc relaxation techniques  - Monitor for opioid side effects  - Notify MD/LIP if interventions unsuccessful or patient reports new pain  - Anticipate increased pain with activity and pre-medicate as appropriate  4/16/2019 0732 by Celeste Robins, [address] [facility] optimize hemodynamic stability  - Monitor arterial and/or venous puncture sites for bleeding and/or hematoma  - Assess quality of pulses, skin color and temperature  - Assess for signs of decreased coronary artery perfusion - ex.  Angina  - Evaluate fluid b

## 2019-04-16 NOTE — CONSULTS
NEPHROLOGY CONSULT NOTE     DATE: 4/16/2019    Requesting Physician: Dr. Jamie Cobb     Reason for Consult: GABRIELA     HISTORY OF PRESENT ILLNESS: Aletha Mathias is a 74year old female with history of hereditary spastic paraplegia, CAD, Afib, Diastolic H urinary bladder 1/11/2016   • Ulcerative colitis ([facility] [address]    • Valvular disease     mitral valve prolapse   • Visual impairment     reading       SURGICAL HISTORY:   Past Surgical History:   Procedure Laterality Date   • BACK SURGERY      spinal fusion L1-5 status:       Spouse name: Not on file      Number of children: Not on file      Years of education: Not on file      Highest education level: Not on file    Occupational History      Not on file    Social Needs      Financial resource strain: Not o Vancomycin HCl (FIRVANQ) 50 MG/ML oral solution 125 mg 125 mg Oral Q6H   Pantoprazole Sodium (PROTONIX) 40 mg in Sodium Chloride 0.9 % 10 mL IV push 40 mg Intravenous Q24H   Normal Saline Flush 0.9 % injection 3 mL 3 mL Intravenous PRN   acetaminophen (T ALLERGIES:   Cymbalta [Duloxetin*    ITCHING, SWELLING    Comment:Throat swelling and difficulty breathing  Gabapentin              RASH  Gnp Iodides Decolor*        Comment:Iodine based dyes  Cant swallow or breath             And hives Output 880 ml   Net 405 ml         IMAGING:  Xr Chest Ap Portable  (cpt=71045)    Result Date: 4/15/2019  CONCLUSION:  1. Interval worsening in the chest with mild cardiomegaly and mild pulmonary congestion.   Difficult to exclude bibasilar pneumonia/atel

## 2019-04-16 NOTE — SEPSIS REASSESSMENT
[facility]      Sepsis Reassessment Note    /56 (BP Location: Right arm)   Pulse 68   Temp 97.1 °F (36.2 °C) (Temporal)   Resp 17   Ht 5' 4\" (1.626 m)   Wt 138 lb 14.4 oz (63 kg)   SpO2 93%   BMI 23.84 kg/m²      3:56 PM    Cardiac

## 2019-04-16 NOTE — PROGRESS NOTES
[facility] AND [facility]  Progress Note    Salma Free Patient Status:  Inpatient    1945 MRN [id number]   Location [facility] 3W/SW Attending Herrera MD C[place]   Hosp Day # 2 PCP Donavon Boxer, MD     Assessment:    1.   The landry Extremities: Without clubbing, cyanosis or edema. Peripheral pulses are 2+. Skin: Warm and dry.      Labs:  Lab Results   Component Value Date    WBC 16.0 04/16/2019    HGB 11.3 04/16/2019    HCT 39.1 04/16/2019    .0 04/16/2019     Lab Results

## 2019-04-16 NOTE — CM/SW NOTE
SW received MDO re HHC and POLST form and she has been transferred to the unit. Social work/case management to remain available for support and/or discharge planning.     Rogelio Carnes, [id number] [address]

## 2019-04-16 NOTE — CONSULTS
Critical Care H&P/Consult     NAME: Mini Rush - ROOM: [address] [place] [address] - MRN: [id number] - Age: 74year old - :  1945    Date of Admission: 2019 11:06 PM  Admission Diagnosis: Hypokalemia [E87.6]  Urinary tract infection without hematuria, s 5/12   • Cataract    • Colitis    • Congestive heart disease ([facility] [address]    • Coronary atherosclerosis    • Deep vein thrombosis (HCC)     jamal filter in place   • DEPRESSION    • Depression 1/11/2016   • Dyspnea 6/7/2018   • Esophageal reflux    • GERD CYSTOURETHROSCOPY  1/9/09    in office   • ESOPHAGOGASTRODUODENOSCOPY (EGD) N/A 12/24/2016    Performed by Linwood Owen MD at [facility] ENDOSCOPY   • ESOPHAGOGASTRODUODENOSCOPY, POSSIBLE DILATION, POSSIBLE BIOSPY, POSSIBLE POLYPECTOMY N/A 1/5/2015    Pe sodium chloride 0.9% IV bolus 500 mL 500 mL Intravenous Once   [COMPLETED] Insulin Regular Human (NOVOLIN R) 100 UNIT/ML injection 10 Units 10 Units Intravenous Once   [COMPLETED] dextrose 50 % injection 50 mL 50 mL Intravenous Once   [COMPLETED] calcium g acetaminophen (TYLENOL) tab 650 mg 650 mg Oral Q6H PRN   ondansetron HCl (ZOFRAN) injection 4 mg 4 mg Intravenous Q6H PRN   Metoclopramide HCl (REGLAN) injection 10 mg 10 mg Intravenous Q8H PRN   [COMPLETED] sodium chloride 0.9% IV bolus 1,770 mL 30 mL/k atraumatic. Moist oral mucosa   Lungs: Clear to auscultation bilaterally, no focal wheezes or crackles    Chest wall: No tenderness or deformity. Heart: Regular rate and rhythm, normal S1S2, no murmur.    Abdomen: soft, non-tender, non-distended, positive

## 2019-04-16 NOTE — PROGRESS NOTES
[facility] Hospitalist Progress Note     PCP: Jordin Parnell MD    CC: Follow up       Assessment/Plan:     Femi Gerber a 74year old female with PMH sig for DVT s/p permanent IVC filter, recurrent cdif colitis and collagenous colitis s/p lap in the evening.    -now holding BB with hypotension, got dose of dig and rates are improved  -changed to heparin gtt, on xarelto 20 daily at home for afib and DVT hx     #SOB AM of 4/15/19->improved, monitor closely with fluids  -still oN 2L, bedside 92-94 ->see above      #Lumbar Fusion/Lumbar Radiculopathy  -follows with Dr. Jamel Caruso as OP for pain management  -s/p caudal steroid injection on 3/26     #DVT history  -hold xarelto, heparin gtt,  has permanent IVC filter     #Depression/anxiety  -cont home meds deficits    Medications     • Sodium Polystyrene Sulfonate  15 g Oral Once   • Initial dose Heparin infusion (PE/DVT)  18 Units/kg/hr Intravenous Once   • hydrocortisone Na succinate PF  100 mg Intravenous Q8H [address]   • Vancomycin HCl  125 mg Oral Q6H   • pa mucosal disease as discussed above. The examination was read on call by [facility] radiology services with a similar interpretation given.     Dictated by (CST): Cheryl Degroot MD on 4/14/2019 at 8:26     Approved by (CST): Cheryl Degroot MD on 4/14/2019 at 8:3 Ap Portable  (cpt=71045)    Result Date: 4/14/2019  CONCLUSION:  1. Stable findings from April 4, 2019. 2. Cardiomegaly. 3. Postop changes. 4. Atherosclerosis. 5. Scarring/atelectasis. 6. Demineralization. 7. Scoliosis. 8. Osteoarthritis.  9. Calcification

## 2019-04-16 NOTE — PROGRESS NOTES
[facility] Pharmacy Note:  Renal Dose Adjustment for Metoclopramide (REGLAN)    Ursula Jacinto has been prescribed Metoclopramide (REGLAN) 10 mg every 8 hours as needed for n/v.    Estimated Creatinine Clearance: 20.6 mL/min (A) (based on SCr of 2.04 mg/dL (

## 2019-04-16 NOTE — PLAN OF CARE
Problem: CARDIOVASCULAR - ADULT  Goal: Maintains optimal cardiac output and hemodynamic stability  Description  INTERVENTIONS:  - Monitor vital signs, rhythm, and trends  - Monitor for bleeding, hypotension and signs of decreased cardiac output  - Evalua limitations  - Instruct pt to call for assistance with activity based on assessment  - Modify environment to reduce risk of injury  - Provide assistive devices as appropriate  - Consider OT/PT consult to assist with strengthening/mobility  - Encourage toil oxygenation  Description  INTERVENTIONS:  - Assess for changes in respiratory status  - Assess for changes in mentation and behavior  - Position to facilitate oxygenation and minimize respiratory effort  - Oxygen supplementation based on oxygen saturation

## 2019-04-16 NOTE — PROGRESS NOTES
Pt transferred to CCU room 211.  Pt alert and answering appropriately, iv fluids and heparin gtt running, cowan and colostomy intact, 3 L NC, asymptomatic hypotension on monitor-will reassess and notify MD if warranted, afib w/controlled rate,  at be

## 2019-04-17 ENCOUNTER — APPOINTMENT (OUTPATIENT)
Dept: ULTRASOUND IMAGING | Facility: HOSPITAL | Age: 74
DRG: 871 | End: 2019-04-17
Attending: INTERNAL MEDICINE
Payer: MEDICARE

## 2019-04-17 PROCEDURE — 76775 US EXAM ABDO BACK WALL LIM: CPT | Performed by: INTERNAL MEDICINE

## 2019-04-17 PROCEDURE — C9113 INJ PANTOPRAZOLE SODIUM, VIA: HCPCS | Performed by: HOSPITALIST

## 2019-04-17 PROCEDURE — 85025 COMPLETE CBC W/AUTO DIFF WBC: CPT | Performed by: HOSPITALIST

## 2019-04-17 PROCEDURE — 83605 ASSAY OF LACTIC ACID: CPT | Performed by: HOSPITALIST

## 2019-04-17 PROCEDURE — 80069 RENAL FUNCTION PANEL: CPT | Performed by: HOSPITALIST

## 2019-04-17 PROCEDURE — 85027 COMPLETE CBC AUTOMATED: CPT | Performed by: INTERNAL MEDICINE

## 2019-04-17 PROCEDURE — 85730 THROMBOPLASTIN TIME PARTIAL: CPT | Performed by: INTERNAL MEDICINE

## 2019-04-17 PROCEDURE — 87086 URINE CULTURE/COLONY COUNT: CPT | Performed by: INTERNAL MEDICINE

## 2019-04-17 PROCEDURE — 81001 URINALYSIS AUTO W/SCOPE: CPT | Performed by: INTERNAL MEDICINE

## 2019-04-17 RX ORDER — PANTOPRAZOLE SODIUM 40 MG/1
40 TABLET, DELAYED RELEASE ORAL
Status: DISCONTINUED | OUTPATIENT
Start: 2019-04-18 | End: 2019-04-23

## 2019-04-17 NOTE — CHRONIC PAIN
[facility]  Report of Consultation    Norma Tyler Patient Status:  Inpatient    1945 MRN [id number]   Location [facility] 2W/SW Attending Abimael Mcclain MD   Hosp Day # 3 PCP Guerline Gonzales MD     Date of Admission Esophageal reflux    • GERD    • GOUT    • Hearing impairment    • Hereditary spastic paraplegia (HCC)    • High blood pressure    • High cholesterol    • History of blood transfusion    • Hypercholesterolemia    • HYPERLIPIDEMIA    • Irritable bowel syndr POLYPECTOMY N/A 1/5/2015    Performed by Mayda Carr MD at [address] Right 2/8/2016    Performed by Caridad Bey MD at [facility] [address]      left  12/05   • MITRACLIP N/A 1 Intravenous, Q8H [facility]  •  Metoclopramide HCl (REGLAN) injection 5 mg, 5 mg, Intravenous, Q8H PRN  •  0.9%  NaCl infusion, , Intravenous, Continuous  •  Vancomycin HCl (FIRVANQ) 50 MG/ML oral solution 125 mg, 125 mg, Oral, Q6H  •  Normal Saline Flush 0.9 % i HPI.    Physical Exam:  Blood pressure 100/52, pulse 78, temperature 97.6 °F (36.4 °C), temperature source Temporal, resp. rate 17, height 5' 4\" (1.626 m), weight 138 lb 14.4 oz (63 kg), SpO2 92 %, not currently breastfeeding.   Lethargic/ sleepy  NAD  Luis ([facility] [address]     Diabetes mellitus type 2 without retinopathy ([facility] [address]     History of lumbar fusion     Lumbar radiculitis     Paroxysmal atrial fibrillation (HCC)     Non-rheumatic mitral regurgitation     (HFpEF) heart failure with preserved ejection fraction ([facility] [address] reviewing data, obtaining patient information and discussing the care with the patients health care providers. Thank you for allowing me to participate in the care of your patient.         Jim Avila  4/17/2019  6:17 PM

## 2019-04-17 NOTE — PLAN OF CARE
Problem: Patient Centered Care  Goal: Patient preferences are identified and integrated in the patient's plan of care  Description  Interventions:  - What would you like us to know as we care for you?  \"From home with  (caregiver)\"  - Provide anuradha adequate comfort level or patient's stated pain goal  Description  INTERVENTIONS:  - Encourage pt to monitor pain and request assistance  - Assess pain using appropriate pain scale  - Administer analgesics based on type and severity of pain and evaluate re Therapy support as indicated  - Manage/alleviate anxiety  - Monitor for signs/symptoms of CO2 retention  Outcome: Progressing     Problem: SKIN/TISSUE INTEGRITY - ADULT  Goal: Skin integrity remains intact  Description  INTERVENTIONS  - Assess and document when able. Mayorga care provided. Will continue to monitor. 1800: pain consulted for baclofen pump on right side of abdomen.  Decreased dose by Dr. Jaylin Turpin to 25mcg/day

## 2019-04-17 NOTE — OCCUPATIONAL THERAPY NOTE
Attempted to work with patient this morning; discussed with RN; stated patient very lethargic with low arousal level; requested therapy HOLD today; will follow up tomorrow.     Nida Ojeda, OTR/L   [facility]

## 2019-04-17 NOTE — CM/SW NOTE
4/17:Received MDO for POLST, home health. Attempted to meet with patient but lethargic. Spoke with patient's  Angelika Hull. He states he and patient live in a ranch style home, ramp available.  Patient has a h/o hereditary spastic paraplegia, she is wheelch

## 2019-04-17 NOTE — CONSULTS
[facility] Infectious Disease  Report of Consultation    Ursula Juan Miguelterrie Patient Status:  Inpatient    1945 MRN [id number]   Location [facility] 2W/SW Attending Moshe Sanchez MD   [address] Day # 2 PCP Talita Desouza, legs, back, shoulders and knees   • OSTEOPENIA    • Other and unspecified hyperlipidemia    • OTHER DISEASES     spastic paraparesis   • OTHER DISEASES     cataracts   • OTHER DISEASES     secondary poycythemia   • OTHER DISEASES     lumbar spinal stenosis History   Problem Relation Age of Onset   • Cancer Father    • Heart Disorder Father    • Eye Problems Father         glaucoma   • DVT/VTE Father    • Diabetes Mother    • Diabetes Sister    • Eye Problems Sister         glaucoma   • Other (Other) Sister acetaminophen (TYLENOL) tab 650 mg, 650 mg, Oral, Q6H PRN  •  ondansetron HCl (ZOFRAN) injection 4 mg, 4 mg, Intravenous, Q6H PRN  •  CefTRIAXone Sodium (ROCEPHIN) 1 g in sodium chloride 0.9% 100 mL MBP/ADD-vantage, 1 g, Intravenous, Q24H  •  aspirin EC ta 97/59 — — 72 19 94 % —   04/16/19 1400 104/63 — — 72 18 93 % —   04/16/19 1330 102/59 — — 76 18 92 % —   04/16/19 1300 101/59 97.1 °F (36.2 °C) Temporal 73 18 92 % —   04/16/19 1228 (!) 88/60 — — 72 19 92 % —   04/16/19 1100 92/55 — — 74 20 96 % —   04/16/ positive    Radiology:  Reviewed    Assessment and Plan:    1. Relapsed, recurrent c.diff in the setting of known collaginous colitis and a h/o ostomy  - Back on treatment dosing of p.o. Vancomycin and will plan for a prolonged taper    2.   Sepsis on pres

## 2019-04-17 NOTE — PROGRESS NOTES
[facility] AND [facility] Infectious Disease  Progress Note    Salma Free Patient Status:  Inpatient    1945 MRN [id number]   Location [facility] 2W/SW Attending Paola Franz MD   Hosp Day # 3 PCP Donavon Boxer, MD     Subjective: prolonged taper     2.   Sepsis on presentation with active urine sediment in addition to her c.diff process  - Urine cultures with proteus  - Continue IV ceftraixone for now but hope to streamline quickly in light of her c.diff issue  - Repeat UA and cultu

## 2019-04-17 NOTE — PHYSICAL THERAPY NOTE
Attempted PT treatment (pt transferred to CCU)- per RN, Jerad Carrington, patient very drowsy today. Will re-attempt on 4/18.   Thank you,  Esme Pina, PT, DPT

## 2019-04-17 NOTE — PROGRESS NOTES
Critical Care Progress Note     Assessment / Plan:  1.  Severe sepsis - urinary source and hypovolemia from ostomy output in the setting of cdiff  - IVFs per renal  - trend LA to clear  - cont ceftriaxone and vanc po  - agree with stress dose steroids as sh

## 2019-04-17 NOTE — PROGRESS NOTES
[facility] AND CLINICS  Progress Note    Simona Bravo Patient Status:  Inpatient    1945 MRN [id number]   Location [facility] 2W/SW Attending Karthik Champion MD   Hosp Day # 3 PCP oRbin Bates MD     Assessment:    1.     Clinical deter 110.0 04/17/2019     Lab Results   Component Value Date    INR 2.0 (H) 12/01/2018     Lab Results   Component Value Date     04/17/2019     04/17/2019    K 4.6 04/17/2019    K 4.6 04/17/2019     04/17/2019     04/17/2019    CO2 16. 0

## 2019-04-17 NOTE — PROGRESS NOTES
[facility] Pharmacy Note: Route Optimization for Pantoprazole (PROTONIX)    Patient is currently on Pantoprazole (PROTONIX) 40 mg IV every 24 hours.    The patient meets the criteria to convert to the oral equivalent as established by the IV to Oral conversion pro

## 2019-04-17 NOTE — PROGRESS NOTES
[facility] Hospitalist Progress Note     PCP: Alton Wagner MD    CC: Follow up       Assessment/Plan:     Jocelyn Ellis a 74year old female with PMH sig for DVT s/p permanent IVC filter, recurrent cdif colitis and collagenous colitis s/p lap hypotension, got dose of dig and rates are improved  -changed to heparin gtt, on xarelto 20 daily at home for afib and DVT hx     #SOB AM of 4/15/19->improved, monitor closely with fluids  -still oN 2L,    -issues above as during the day decreasing PO [place] filter     #Depression/anxiety  -cont home meds     FN:  - IVF: NS bolus/IVF   - Diet: cardiac     DVT Prophy: heparin gtt   Lines: PIV     Dispo: pending clinical course, DNR confirmed with pt and  (phone 4/16/19) but ok with pressors.       Hansel Feliciano Vancomycin HCl  125 mg Oral Q6H   • cefTRIAXone  1 g Intravenous Q24H   • aspirin  81 mg Oral Daily   • atorvastatin  40 mg Oral Nightly   • ferrous sulfate  325 mg Oral Daily with breakfast   • predniSONE  5 mg Oral Daily   • pregabalin  50 mg Oral BID hemorrhage, focal infarct or mass. Mild chronic appearing deep white matter ischemic change in the periventricular white matter bilaterally. Mild sinus mucosal disease as discussed above.  The examination was read on call by [facility] radiology services with are advised.     Dictated by (CST): Hossein Gardiner MD on 4/15/2019 at 11:11     Approved by (CST): Hossein Gardiner MD on 4/15/2019 at 11:13          Xr Chest Ap Portable  (cpt=71045)    Result Date: 4/14/2019  CONCLUSION:  1. Stable findings from April 4, 20

## 2019-04-18 ENCOUNTER — APPOINTMENT (OUTPATIENT)
Dept: GENERAL RADIOLOGY | Facility: HOSPITAL | Age: 74
DRG: 871 | End: 2019-04-18
Attending: INTERNAL MEDICINE
Payer: MEDICARE

## 2019-04-18 ENCOUNTER — APPOINTMENT (OUTPATIENT)
Dept: ULTRASOUND IMAGING | Facility: HOSPITAL | Age: 74
DRG: 871 | End: 2019-04-18
Attending: HOSPITALIST
Payer: MEDICARE

## 2019-04-18 PROCEDURE — 85025 COMPLETE CBC W/AUTO DIFF WBC: CPT | Performed by: HOSPITALIST

## 2019-04-18 PROCEDURE — 85730 THROMBOPLASTIN TIME PARTIAL: CPT | Performed by: HOSPITALIST

## 2019-04-18 PROCEDURE — 87641 MR-STAPH DNA AMP PROBE: CPT | Performed by: HOSPITALIST

## 2019-04-18 PROCEDURE — 85730 THROMBOPLASTIN TIME PARTIAL: CPT | Performed by: INTERNAL MEDICINE

## 2019-04-18 PROCEDURE — 76705 ECHO EXAM OF ABDOMEN: CPT | Performed by: HOSPITALIST

## 2019-04-18 PROCEDURE — 80053 COMPREHEN METABOLIC PANEL: CPT | Performed by: HOSPITALIST

## 2019-04-18 PROCEDURE — 85610 PROTHROMBIN TIME: CPT | Performed by: HOSPITALIST

## 2019-04-18 PROCEDURE — 83735 ASSAY OF MAGNESIUM: CPT | Performed by: HOSPITALIST

## 2019-04-18 PROCEDURE — 85007 BL SMEAR W/DIFF WBC COUNT: CPT | Performed by: HOSPITALIST

## 2019-04-18 PROCEDURE — 71045 X-RAY EXAM CHEST 1 VIEW: CPT | Performed by: INTERNAL MEDICINE

## 2019-04-18 PROCEDURE — 85027 COMPLETE CBC AUTOMATED: CPT | Performed by: HOSPITALIST

## 2019-04-18 RX ORDER — FUROSEMIDE 10 MG/ML
40 INJECTION INTRAMUSCULAR; INTRAVENOUS DAILY
Status: DISCONTINUED | OUTPATIENT
Start: 2019-04-18 | End: 2019-04-19

## 2019-04-18 RX ORDER — POTASSIUM CHLORIDE 14.9 MG/ML
20 INJECTION INTRAVENOUS ONCE
Status: COMPLETED | OUTPATIENT
Start: 2019-04-18 | End: 2019-04-18

## 2019-04-18 NOTE — PROGRESS NOTES
NEPHROLOGY DAILY PROGRESS NOTE     Follow up Reason: GABRIELA     SUBJECTIVE:  UOP continues to improve  Still lethargic today      OBJECTIVE:    Total Intake/Output:    Intake/Output Summary (Last 24 hours) at 4/18/2019 1500  Last data filed at 4/18/2019 1400 venlafaxine (EFFEXOR-XR) 24 hr cap 75 mg Oral Daily   Metoprolol Succinate ER (Toprol XL) 24 hr tab 25 mg 25 mg Oral 2x Daily(Beta Blocker)       Medications Prior to Admission:  PREDNISONE 5 MG Oral Tab TAKE 1 TABLET BY MOUTH DAILY   VENLAFAXINE HCL ER RVR. May have progressed to ATN. Urine output is improving .  - UA: + protein, large leuk esterase (324 WBCs), but in the setting of UTI   - Urine Na 12, consistent with a pre-renal state   - IVF stopped.    - renal diet   - follow renal fxn and I/Os   - st

## 2019-04-18 NOTE — PHYSICAL THERAPY NOTE
Attempted PT treatment- per RN, Sita Issa, patient continues to be very lethargic today and not appropriate to safely participate in therapeutic intervention. Will reschedule for 4/19 if appropriate.   Thank you,  Isaias Stringer, PT, DPT

## 2019-04-18 NOTE — PLAN OF CARE
Problem: Patient Centered Care  Goal: Patient preferences are identified and integrated in the patient's plan of care  Description  Interventions:  - What would you like us to know as we care for you?  \"From home with  (caregiver)\"  - Provide anuradha appropriate and evaluate response  - Consider cultural and social influences on pain and pain management  - Manage/alleviate anxiety  - Utilize distraction and/or relaxation techniques  - Monitor for opioid side effects  - Notify MD/LIP if interventions un ulcer development  - Assess and document skin integrity  - Monitor for areas of redness and/or skin breakdown  - Initiate interventions, skin care algorithm/standards of care as needed  Outcome: Progressing  Note:   Turning q 2 hours. Pillows for support.

## 2019-04-18 NOTE — PROGRESS NOTES
[facility] AND [facility] Infectious Disease Progress Note    Tyshawn Osullivan Patient Status:  Inpatient    1945 MRN [id number]   Location [facility] 2W/SW Attending Ana Sommers MD   Hosp Day # 4 PCP Bonita Reese MD     Subjective: Oral, Nightly  •  HYDROcodone-acetaminophen (NORCO) 5-325 MG per tab 1 tablet, 1 tablet, Oral, Q6H PRN  •  ferrous sulfate EC tab 325 mg, 325 mg, Oral, Daily with breakfast  •  predniSONE (DELTASONE) tab 5 mg, 5 mg, Oral, Daily  •  pregabalin (LYRICA) cap 173 04/18/2019    BILT 1.0 04/18/2019    TP 4.5 04/18/2019    AST 2,833 04/18/2019    ALT 4,942 04/18/2019    .3 04/18/2019    INR 2.73 04/18/2019    MG 2.2 04/18/2019       Radiology:  Reviewed     Assessment/Plan:    1.   Relapsed, recurrent c.dif

## 2019-04-18 NOTE — PROGRESS NOTES
[facility]  Inpatient Pain Management Progress Note      Patient name: Norma Scott 74year old female  : 1945  MRN: [id number]    Diagnosis: Gastrointestinal hemorrhage, unspecified gastrointestinal hemorrhage type  (primary OTHER DISEASES     ulcerative colitis   • OTHER DISEASES     uti   • Pneumonia, organism unspecified(486)    • Reflux    • Self-catheterizes urinary bladder 1/11/2016   • Ulcerative colitis ([facility] [address]    • Valvular disease     mitral valve prolapse   • Visual i Other (Other) Other       reports that she has quit smoking. She has never used smokeless tobacco. She reports that she does not drink alcohol or use drugs.     Allergies:    Cymbalta [Duloxetin*    ITCHING, SWELLING    Comment:Throat swelling and difficult within normal limits, no unusual discharge or rhinorrhea   Throat: lips grossly within normal limits by visual exam externally  Neck: supple, trachea midline, no obvious JVD  Chest:  no obvious visual deformity  Abdomen: soft, non-tender, bowel sounds pres

## 2019-04-18 NOTE — PROGRESS NOTES
NEPHROLOGY DAILY PROGRESS NOTE     Follow up Reason: GABRIELA     SUBJECTIVE:  UOP continues to improve  Still lethargic today      OBJECTIVE:    Total Intake/Output:    Intake/Output Summary (Last 24 hours) at 4/18/2019 1434  Last data filed at 4/18/2019 0800 venlafaxine (EFFEXOR-XR) 24 hr cap 75 mg Oral Daily   Metoprolol Succinate ER (Toprol XL) 24 hr tab 25 mg 25 mg Oral 2x Daily(Beta Blocker)       Medications Prior to Admission:  PREDNISONE 5 MG Oral Tab TAKE 1 TABLET BY MOUTH DAILY   VENLAFAXINE HCL ER RVR. May have progressed to ATN. Urine output is improving .  - UA: + protein, large leuk esterase (324 WBCs), but in the setting of UTI   - Urine Na 12, consistent with a pre-renal state   - IVF stopped.    - renal diet   - follow renal fxn and I/Os   - st

## 2019-04-18 NOTE — PROGRESS NOTES
[facility]  Progress Note    Ursula Jacinto Patient Status:  Inpatient    1945 MRN [id number]   Location [facility] 2W/SW Attending Fernanda Rossi MD   Hosp Day # 4 PCP Talita Desouza MD     Assessment:    1.    Clinical deter 04/18/2019     Lab Results   Component Value Date    INR 2.73 (H) 04/18/2019     Lab Results   Component Value Date     04/18/2019    K 3.4 04/18/2019     04/18/2019    CO2 15.0 04/18/2019    BUN 63 04/18/2019    CREATSERUM 2.47 04/18/2019    G

## 2019-04-18 NOTE — PROGRESS NOTES
[facility] Hospitalist Progress Note     PCP: Guerline Gonzales MD    CC: Follow up       Assessment/Plan:     Carmen Dannyanastasiya a 74year old female with PMH sig for DVT s/p permanent IVC filter, recurrent cdif colitis and collagenous colitis s/p lap hypovolemia due to poor po intake and loss  -K also exacerbated by K replacement for K of 3.4 in AM--> K now normal  -Kayex/insulin/caclium gluconate--K now normal  -baselin cr 0.7->2.14-> 2.4-> 2.47 today on 4/17/19, renal following  -Renal US ok  -IVF/josesito MR, normal EF      #CAD/HLD  -continue ASA/Statin     #Hereditary spastic paraplegia, neurogenic bladder  -straight cath at baseline  -wheelchair bound.   is caregiver  -baclofen pump see by pain service on 4/17, dose reduced by 30% given renal failu 2105 : 128 lb 1.4 oz (58.1 kg)      Exam  Gen: mild acute distress, alert but lethargic, appears chronically ill   Neck Supple, no JVD  Pulm: moderate airmovement, no wheezing heard  CV: irreg, irreg, no longer tachy. +murmur   Abd: Soft, non-tender, non-d 14.0* 14.0* 16.0*  16.0* 15.0*   BUN 28* 24*   < > 42* 40* 46* 53*  53* 63*   CREATSERUM 0.74 0.72   < > 2.20* 2.04* 2.07* 2.40*  2.40* 2.47*   CA 8.5 8.1*   < > 8.4* 8.0* 7.7* 7.5*  7.5* 7.0*   MG 1.6 2.3  --   --   --   --   --  2.2   PHOS  --   --   -- to severe scoliosis is seen. * Postop changes are seen in the lower lumbar spine. * Inferior vena cava umbrella noted. * Rather advanced osteoarthritic changes are seen. * Marked deformity of upper lumbar vertebra probably chronic.  * Suggest re-attempt at arm, there is a heterogeneous cystic and solid focus, seen in the subcutaneous fat, measuring 16 x 7 x 16 mm, with NO vascularity, favoring a small liquefying hematoma. NO readily drainable components are detected.  Differential considerations would include

## 2019-04-19 PROCEDURE — 85025 COMPLETE CBC W/AUTO DIFF WBC: CPT | Performed by: HOSPITALIST

## 2019-04-19 PROCEDURE — 83735 ASSAY OF MAGNESIUM: CPT | Performed by: HOSPITALIST

## 2019-04-19 PROCEDURE — 97110 THERAPEUTIC EXERCISES: CPT

## 2019-04-19 PROCEDURE — 97530 THERAPEUTIC ACTIVITIES: CPT

## 2019-04-19 PROCEDURE — 82140 ASSAY OF AMMONIA: CPT | Performed by: HOSPITALIST

## 2019-04-19 PROCEDURE — 80053 COMPREHEN METABOLIC PANEL: CPT | Performed by: HOSPITALIST

## 2019-04-19 PROCEDURE — 85610 PROTHROMBIN TIME: CPT | Performed by: HOSPITALIST

## 2019-04-19 PROCEDURE — 85730 THROMBOPLASTIN TIME PARTIAL: CPT | Performed by: HOSPITALIST

## 2019-04-19 PROCEDURE — 97535 SELF CARE MNGMENT TRAINING: CPT

## 2019-04-19 RX ORDER — CEFUROXIME AXETIL 500 MG/1
500 TABLET ORAL DAILY
Status: DISCONTINUED | OUTPATIENT
Start: 2019-04-20 | End: 2019-04-22

## 2019-04-19 RX ORDER — PREDNISONE 1 MG/1
5 TABLET ORAL DAILY
Status: DISCONTINUED | OUTPATIENT
Start: 2019-04-22 | End: 2019-04-23

## 2019-04-19 NOTE — PLAN OF CARE
Problem: Patient Centered Care  Goal: Patient preferences are identified and integrated in the patient's plan of care  Description  Interventions:  - What would you like us to know as we care for you?  \"From home with  (caregiver)\"  - Provide anuradha life threatening arrhythmias  - Monitor electrolytes and administer replacement therapy as ordered  Outcome: Progressing     Problem: PAIN - ADULT  Goal: Verbalizes/displays adequate comfort level or patient's stated pain goal  Description  INTERVENTIONS: deep breathe, Incentive Spirometry  - Assess the need for suctioning and perform as needed  - Assess and instruct to report SOB or any respiratory difficulty  - Respiratory Therapy support as indicated  - Manage/alleviate anxiety  - Monitor for signs/sympt ability or social support system  Outcome: Not Progressing     Lethargic. Mayorga, colostomy. IVF, IV abx, PO vanc. Heparin drip.

## 2019-04-19 NOTE — PROGRESS NOTES
[facility]    Progress Note    Tyshawn Osullivan Patient Status:  Inpatient    1945 MRN [id number]   Location [facility] 2W/SW Attending Ana Sommers MD   Hosp Day # 5 PCP Bonita Reese MD         Assessment and Plan: deficits  Skin: no rashes or lesions              Scheduled Meds:    • furosemide  40 mg Intravenous Daily   • Pantoprazole Sodium  40 mg Oral QAM AC   • Initial dose Heparin infusion (PE/DVT)  18 Units/kg/hr Intravenous Once   • hydrocortisone Na succinat perihilar and lower lobe pulmonary mixed alveolar and interstitial congestion. Interval progression at the lung bases. Small left-sided effusion has developed. .    Dictated by (CST): Doni Villagomez MD on 4/18/2019 at 16:42     Approved by (CST): Rodolfo

## 2019-04-19 NOTE — OCCUPATIONAL THERAPY NOTE
OCCUPATIONAL THERAPY TREATMENT NOTE - INPATIENT        Room Number: 058/541-A           Presenting Problem: s/p mechanical fall out of w/c    Problem List  Principal Problem:    Gastrointestinal hemorrhage, unspecified gastrointestinal hemorrhage type  Act Form.  Research supports that patients with this level of impairment may benefit from ASIA.     DISCHARGE RECOMMENDATIONS  OT Discharge Recommendations: Sub-acute rehabilitation  OT Device Recommendations: TBD     PLAN  OT Treatment Plan: Balance activities; total A for self feeding   Bathing: NT  Toileting: NT  Upper Extremity Dressing: NT  Lower Extremity Dressing: NT    Education Provided:   Patient End of Session: Up in chair;Needs met;Call light within reach;RN aware of session/findings    OT Goals:    Pa

## 2019-04-19 NOTE — PROGRESS NOTES
NEPHROLOGY DAILY PROGRESS NOTE     Follow up Reason: GABRIELA     SUBJECTIVE:  Received dose of IV lasix yesterday   Urine output improving (UOP of 3.1OL yesterday )   more awake today, eating more       OBJECTIVE:    Total Intake/Output:    Intake/Output Summa (LYRICA) cap 50 mg 50 mg Oral BID   venlafaxine (EFFEXOR-XR) 24 hr cap 75 mg Oral Daily   Metoprolol Succinate ER (Toprol XL) 24 hr tab 25 mg 25 mg Oral 2x Daily(Beta Blocker)       Medications Prior to Admission:  PREDNISONE 5 MG Oral Tab TAKE 1 TABLET BY hypoperfusion in the setting of hypotension/ sepsis and Afib RVR. May have progressed to ATN.  Urine output is improving .  - UA: + protein, large leuk esterase (324 WBCs), but in the setting of UTI   - Urine Na 12, consistent with a pre-renal state   - IVF

## 2019-04-19 NOTE — PROGRESS NOTES
[facility] Pharmacy Note:  Renal Adjustment for cefuroxime     Salma Free is a 74year old female who has been prescribed cefuroxime  250 mg every 12 hrs. CrCl is estimated creatinine clearance is 19.7 mL/min (A) (based on SCr of 2.13 mg/dL (H)).  so the

## 2019-04-19 NOTE — PROGRESS NOTES
[facility] Hospitalist Progress Note     PCP: Byron Pitts MD    CC: Follow up       Assessment/Plan:     Diego Mason a 74year old female with PMH sig for DVT s/p permanent IVC filter, recurrent cdif colitis and collagenous colitis s/p lap given renal faliure and baclofen pump, pain service eval, decreased rate by 30% on 4/17  - continue to monitor in PCU for acute changes  - CT head negative on admit  - better each day, able to eat on 4/19    GABRIELA, hyperkalemia  -2/2 ATN due to hypotension a 12.8->10.7->11.7--> 10.1-> 10.3  -patient denies will continue to monitor, likely drop 2/2 IVF     #Left Arm Hematoma:  -seen in [address], s/p US noted to be 16 x 7 x 16 mm   -appears to be resolving CTM, see hgb above     #Severe MR s/p Natividad Clip:  -last TTE 116/70    Intake/Output:    Intake/Output Summary (Last 24 hours) at 4/19/2019 1531  Last data filed at 4/19/2019 1400  Gross per 24 hour   Intake 979.28 ml   Output 4095 ml   Net -3115.72 ml       Last 3 Weights  04/16/19 0508 : 138 lb 14.4 oz (63 kg)  04 126.0*  --  171.0 110.0* 107.0* 105.0*   BAND  --   --   --   --  2  --    INR  --   --   --   --  2.73* 1.63*       Recent Labs   Lab 04/14/19  0609 04/15/19  0700  04/16/19  1823 04/17/19  0426 04/18/19  0413 04/18/19  1747 04/19/19  0439    141 prevertebral soft tissue swelling is noted. Correlate clinically. 2. The examination was read on call by [facility] services with a similar interpretation given.       Dictated by (CST): Wallace Jeans, MD on 4/14/2019 at 8:39     Approved by (CST): CARIDAD and mild pulmonary congestion. Difficult to exclude bibasilar pneumonia/atelectasis. Follow-up studies are advised.     Dictated by (CST): Virginia Hagan MD on 4/15/2019 at 11:11     Approved by (CST): Virginia Hagan MD on 4/15/2019 at 11:13          Xr C

## 2019-04-19 NOTE — PLAN OF CARE
Problem: Patient Centered Care  Goal: Patient preferences are identified and integrated in the patient's plan of care  Description  Interventions:  - What would you like us to know as we care for you?  \"From home with  (caregiver)\"  - Provide anuradha handout, if applicable  - Encourage broncho-pulmonary hygiene including cough, deep breathe, Incentive Spirometry  - Assess the need for suctioning and perform as needed  - Assess and instruct to report SOB or any respiratory difficulty  - Respiratory Ther therapeutic.  Next PTT in am.      Problem: PAIN - ADULT  Goal: Verbalizes/displays adequate comfort level or patient's stated pain goal  Description  INTERVENTIONS:  - Encourage pt to monitor pain and request assistance  - Assess pain using appropriate patricia POLST form as appropriate  - Assess patient's ability to be responsible for managing their own health  - Refer to Case Management Department for coordinating discharge planning if the patient needs post-hospital services based on physician/LIP order or com

## 2019-04-19 NOTE — PHYSICAL THERAPY NOTE
PHYSICAL THERAPY TREATMENT NOTE - INPATIENT     Room Number: 887/739-W       Presenting Problem: p/w fall from w/c, N/V, LLE pain, dx UTI and sepsis    Problem List  Principal Problem:    Gastrointestinal hemorrhage, unspecified gastrointestinal hemorrhage liters, RN aware. Pulse ox 99 heart rate 81 and blood pressure 107/56. DISCHARGE RECOMMENDATIONS  PT Discharge Recommendations: 24 hour care/supervision;Sub-acute rehabilitation     PLAN  PT Treatment Plan: Bed mobility; Body mechanics; Patient education addressed    CURRENT GOALS   Goals to be met by: 4/29/19  Patient Goal Patient's self-stated goal is: to get stronger; be independent with transfers    Goal #1 Patient is able to demonstrate supine - sit EOB @ level: moderate assistance      Goal #1   Curr

## 2019-04-19 NOTE — PROGRESS NOTES
[facility]  Inpatient Pain Management Progress Note      Patient name: Vickie Beltre 74year old female  : 1945  MRN: [id number]    Diagnosis: Gastrointestinal hemorrhage, unspecified gastrointestinal hemorrhage type  (primary DISEASES     ulcerative colitis   • OTHER DISEASES     uti   • Pneumonia, organism unspecified(486)    • Reflux    • Self-catheterizes urinary bladder 1/11/2016   • Ulcerative colitis ([facility] [address]    • Valvular disease     mitral valve prolapse   • Visual impairm (Other) Other       reports that she has quit smoking. She has never used smokeless tobacco. She reports that she does not drink alcohol or use drugs.     Allergies:    Cymbalta [Duloxetin*    ITCHING, SWELLING    Comment:Throat swelling and difficulty john grossly within normal limits, no unusual discharge or rhinorrhea   Throat: lips grossly within normal limits by visual exam externally  Neck: supple, trachea midline, no obvious JVD  Chest:  no obvious visual deformity  Abdomen: soft, non-tender, bowel opal

## 2019-04-19 NOTE — PROGRESS NOTES
[facility] AND [facility] Infectious Disease Progress Note    Darien Zhang Patient Status:  Inpatient    1945 MRN [id number]   Location [facility] 2W/SW Attending Lisbeth Allred MD   Hosp Day # 5 PCP Deidre Nicolas MD     Subjective: 5-325 MG per tab 1 tablet, 1 tablet, Oral, Q6H PRN  •  ferrous sulfate EC tab 325 mg, 325 mg, Oral, Daily with breakfast  •  predniSONE (DELTASONE) tab 5 mg, 5 mg, Oral, Daily  •  pregabalin (LYRICA) cap 50 mg, 50 mg, Oral, BID  •  venlafaxine (EFFEXOR-XR) PTA  -neurogenic bladder  5. Dispo  -complete course of PO cefuroxime  -PO vanc with slow taper  If you have any questions or concerns please call [facility] at [phone].      DONA ARCE NP  4/19/2019  2:10 PM

## 2019-04-20 PROCEDURE — 85025 COMPLETE CBC W/AUTO DIFF WBC: CPT | Performed by: HOSPITALIST

## 2019-04-20 PROCEDURE — 84100 ASSAY OF PHOSPHORUS: CPT | Performed by: INTERNAL MEDICINE

## 2019-04-20 PROCEDURE — 85730 THROMBOPLASTIN TIME PARTIAL: CPT | Performed by: HOSPITALIST

## 2019-04-20 PROCEDURE — 83735 ASSAY OF MAGNESIUM: CPT | Performed by: INTERNAL MEDICINE

## 2019-04-20 PROCEDURE — 80048 BASIC METABOLIC PNL TOTAL CA: CPT | Performed by: INTERNAL MEDICINE

## 2019-04-20 PROCEDURE — 80076 HEPATIC FUNCTION PANEL: CPT | Performed by: HOSPITALIST

## 2019-04-20 RX ORDER — POTASSIUM CHLORIDE 14.9 MG/ML
20 INJECTION INTRAVENOUS ONCE
Status: DISCONTINUED | OUTPATIENT
Start: 2019-04-20 | End: 2019-04-20

## 2019-04-20 RX ORDER — POTASSIUM CHLORIDE 14.9 MG/ML
20 INJECTION INTRAVENOUS ONCE
Status: COMPLETED | OUTPATIENT
Start: 2019-04-20 | End: 2019-04-20

## 2019-04-20 RX ORDER — POTASSIUM CHLORIDE 29.8 MG/ML
INJECTION INTRAVENOUS
Status: DISCONTINUED
Start: 2019-04-20 | End: 2019-04-20 | Stop reason: WASHOUT

## 2019-04-20 RX ORDER — POTASSIUM CHLORIDE 14.9 MG/ML
INJECTION INTRAVENOUS
Status: COMPLETED
Start: 2019-04-20 | End: 2019-04-20

## 2019-04-20 NOTE — PROGRESS NOTES
[facility]    Nephrology Progress Note    Rainer Phelps Attending:  Jaspal Ochoa MD       SUBJECTIVE:     Feels breathing is stable from yesterday  No pain complaints  Has cowan and ostomy  Denies watery ostomy output    PHYSICAL EXAM: NEPRELIM 8.02 (H) 04/20/2019    NEUTABS 6.46 04/18/2019    LYMPHABS 0.36 (L) 04/18/2019    EOSABS 0.00 04/18/2019    BASABS 0.00 04/18/2019    NEUT 89 04/18/2019    LYMPH 5 04/18/2019    MON 4 04/18/2019    EOS 0 04/18/2019    BASO 0 04/18/2019    NEPER Q8H PRN   Vancomycin HCl (FIRVANQ) 50 MG/ML oral solution 125 mg 125 mg Oral Q6H   Normal Saline Flush 0.9 % injection 3 mL 3 mL Intravenous PRN   acetaminophen (TYLENOL) tab 650 mg 650 mg Oral Q6H PRN   ondansetron HCl (ZOFRAN) injection 4 mg 4 mg Gopal Du this patient. Please do not hesitate to call with questions or concerns. Husam Hendrickson, [address] \[facility]\"" Nephrology  [address]  [address] [facility]  [place], [address]    4/20/2019  10:12 AM

## 2019-04-20 NOTE — CHRONIC PAIN
[facility]  Anesthesiology Pain Management Progress Note      Patient name: Erik Rincon 74year old female  : 1945  MRN: [id number]    Diagnosis:  Gastrointestinal hemorrhage, unspecified gastrointestinal hemorrhage type  (pr Q6H PRN  •  ferrous sulfate EC tab 325 mg, 325 mg, Oral, Daily with breakfast  •  pregabalin (LYRICA) cap 50 mg, 50 mg, Oral, BID  •  venlafaxine (EFFEXOR-XR) 24 hr cap, 75 mg, Oral, Daily  •  Metoprolol Succinate ER (Toprol XL) 24 hr tab 25 mg, 25 mg, Ora ALKPHO 223 (H) 04/20/2019    BILT 0.7 04/20/2019    TP 5.1 (L) 04/20/2019     (H) 04/20/2019    ALT 2,727 (H) 04/20/2019    PTT 61.0 (H) 04/20/2019    INR 1.63 (H) 04/19/2019    T4F 0.88 02/10/2009    TSH 2.55 06/08/2018     04/13/2019    DDI

## 2019-04-20 NOTE — PROGRESS NOTES
[facility] Hospitalist Progress Note     PCP: Donavon Boxer, MD    CC: Follow up       Assessment/Plan:     Sundeep Rey a 74year old female with PMH sig for DVT s/p permanent IVC filter, recurrent cdif colitis and collagenous colitis s/p lap on tele, consider antiarrythmic per cardiology if sustained     Encephalopathy- improved  - confused AO *2 this morning, but more alert today  - multifactorial, infection, renal failure now liver dysfunction  - also concern for baclofen toxcity given renal up with PT.       #Hematemesis, PTA, now resolved   -seen in [address] 2 weeks ago with intermittent blood streaks seen in the setting of cough  -attributed to cough and anticoagulation  -episode of emesis in the ED with possible blood streaks, Hgb 12.8->10.7->1 situation, wants to eat      Objective     OBJECTIVE:  Temp:  [96.6 °F (35.9 °C)-98 °F (36.7 °C)] 98 °F (36.7 °C)  Pulse:  [60-90] 84  Resp:  [15-23] 23  BP: ()/(46-88) 131/72    Intake/Output:    Intake/Output Summary (Last 24 hours) at 4/20/2019 12 04/18/19  0413 04/19/19  0439 04/20/19  0424   WBC 16.0* 13.2* 7.1 8.0 10.3   HGB 11.3* 10.9* 10.1* 10.3* 10.6*   MCV 93.3 88.3 85.6 84.4 83.1   .0 110.0* 107.0* 105.0* 123.0*   BAND  --   --  2  --   --    INR  --   --  2.73* 1.63*  --        Maribel spondylosis listhesis at multiple levels as discussed above overall unchanged and stable from previous study. No acute traumatic fracture, traumatic subluxation or prevertebral soft tissue swelling is noted. Correlate clinically.  2. The examination was r MD on 4/18/2019 at 16:44          Xr Chest Ap Portable  (cpt=71045)    Result Date: 4/15/2019  CONCLUSION:  1. Interval worsening in the chest with mild cardiomegaly and mild pulmonary congestion. Difficult to exclude bibasilar pneumonia/atelectasis.   Halina Modi

## 2019-04-20 NOTE — PROGRESS NOTES
[facility]    Progress Note    Emery Baugh Patient Status:  Inpatient    1945 MRN [id number]   Location [facility] 2W/SW Attending Hamilton Kelly MD   Hosp Day # 6 PCP Ricarda Landeros MD         Assessment and Plan: chloride 40mEq IVPB (peripheral line)  40 mEq Intravenous Once    Followed by   • potassium chloride  20 mEq Intravenous Once   • Cefuroxime Axetil  500 mg Oral Daily   • hydrocortisone Na succinate PF  50 mg Intravenous Q8H [address]   • hydrocortisone Na succi cardiomegaly with pulmonary venous distention. 2. Bilateral perihilar and lower lobe pulmonary mixed alveolar and interstitial congestion. Interval progression at the lung bases. Small left-sided effusion has developed. .    Dictated by (CST): Sanchez Marte,

## 2019-04-20 NOTE — PROGRESS NOTES
[facility] AND [facility] Infectious Disease Progress Note    Alexandro Matt Patient Status:  Inpatient    1945 MRN [id number]   Location [facility] 2W/SW Attending Arlin Batres MD   Hosp Day # 6 PCP Marcela Gore MD     Subjective: PRN  •  acetaminophen (TYLENOL) tab 650 mg, 650 mg, Oral, Q6H PRN  •  ondansetron HCl (ZOFRAN) injection 4 mg, 4 mg, Intravenous, Q6H PRN  •  aspirin EC tab 81 mg, 81 mg, Oral, Daily  •  HYDROcodone-acetaminophen (NORCO) 5-325 MG per tab 1 tablet, 1 tablet proteus  -s/p IV ceftriaxone  -on PO cefuroxime d#7 of effective abx  3. Sepsis  -secondary to above  -improving, WBC normalized  4. Hx of spastic paraplegia  -fall PTA  -neurogenic bladder  5.   Dispo  -complete course of PO cefuroxime  -PO vanc with slo

## 2019-04-20 NOTE — PLAN OF CARE
Problem: Patient Centered Care  Goal: Patient preferences are identified and integrated in the patient's plan of care  Description  Interventions:  - What would you like us to know as we care for you?  \"From home with  (caregiver)\"  - Provide anuradha signs of decreased coronary artery perfusion - ex.  Angina  - Evaluate fluid balance, assess for edema, trend weights  Outcome: Progressing  Goal: Absence of cardiac arrhythmias or at baseline  Description  INTERVENTIONS:  - Continuous cardiac monitoring, m RESPIRATORY - ADULT  Goal: Achieves optimal ventilation and oxygenation  Description  INTERVENTIONS:  - Assess for changes in respiratory status  - Assess for changes in mentation and behavior  - Position to facilitate oxygenation and minimize respiratory social support system  Outcome: Progressing

## 2019-04-20 NOTE — PLAN OF CARE
Double RN skin check done prior to transfer off Unit. Skin check performed by this RN and roberto     Wounds are as follows: bruises to bilat arms. mepilex tp upper mid back and healed sacrum area.  Great toe to left nailbed black and third toe nail bed black

## 2019-04-20 NOTE — PLAN OF CARE
Patient has yellow fall band on,l side rails are up. Call bell in reach and instructed to used it (informed to press red button) Refused dinner last night, but was able to take pills with applesauce.  Family not opresent at this time or during the night delmi

## 2019-04-21 PROCEDURE — 80069 RENAL FUNCTION PANEL: CPT | Performed by: INTERNAL MEDICINE

## 2019-04-21 PROCEDURE — 83735 ASSAY OF MAGNESIUM: CPT | Performed by: HOSPITALIST

## 2019-04-21 PROCEDURE — 85730 THROMBOPLASTIN TIME PARTIAL: CPT | Performed by: HOSPITALIST

## 2019-04-21 PROCEDURE — 80076 HEPATIC FUNCTION PANEL: CPT | Performed by: HOSPITALIST

## 2019-04-21 PROCEDURE — 85025 COMPLETE CBC W/AUTO DIFF WBC: CPT | Performed by: HOSPITALIST

## 2019-04-21 RX ORDER — POTASSIUM CHLORIDE 14.9 MG/ML
20 INJECTION INTRAVENOUS ONCE
Status: COMPLETED | OUTPATIENT
Start: 2019-04-21 | End: 2019-04-21

## 2019-04-21 RX ORDER — DEXTROSE MONOHYDRATE 50 MG/ML
INJECTION, SOLUTION INTRAVENOUS CONTINUOUS
Status: DISCONTINUED | OUTPATIENT
Start: 2019-04-21 | End: 2019-04-21

## 2019-04-21 RX ORDER — DEXTROSE MONOHYDRATE 50 MG/ML
INJECTION, SOLUTION INTRAVENOUS CONTINUOUS
Status: DISCONTINUED | OUTPATIENT
Start: 2019-04-21 | End: 2019-04-22

## 2019-04-21 NOTE — PLAN OF CARE
Problem: Patient/Family Goals  Goal: Patient/Family Long Term Goal  Description  Patient's Long Term Goal: \"Go back home\"    Interventions:    - See additional Care Plan goals for specific interventions   Outcome: Progressing     Problem: Patient/Famil clean, q2h positioned,aspiration precaution maintained. Will continue to monitor the pt.

## 2019-04-21 NOTE — PROGRESS NOTES
[facility] AND [facility] Infectious Disease Progress Note    Berna Cisneros Patient Status:  Inpatient    1945 MRN [id number]   Location [facility] 3W/SW Attending Jean Claude Covarrubias MD   Hosp Day # 7 PCP Juan Pierce MD     Subjective: Intravenous, Continuous  •  Metoclopramide HCl (REGLAN) injection 5 mg, 5 mg, Intravenous, Q8H PRN  •  Vancomycin HCl (FIRVANQ) 50 MG/ML oral solution 125 mg, 125 mg, Oral, Q6H  •  Normal Saline Flush 0.9 % injection 3 mL, 3 mL, Intravenous, PRN  •  acetam deficit.     Labs:  Lab Results   Component Value Date    WBC 12.5 04/21/2019    HGB 11.0 04/21/2019    HCT 35.2 04/21/2019    .0 04/21/2019    CREATSERUM 1.32 04/21/2019    BUN 57 04/21/2019     04/21/2019    K 3.4 04/21/2019     04/21/2

## 2019-04-21 NOTE — PROGRESS NOTES
[facility] Hospitalist Progress Note     PCP: Sabine Mccoy MD    CC: Follow up       Assessment/Plan:     Rachael Parra a 74year old female with PMH sig for DVT s/p permanent IVC filter, recurrent cdif colitis and collagenous colitis s/p lap morning   - multifactorial, infection, renal failure now liver dysfunction  - also concern for baclofen toxcity given renal faliure and baclofen pump, pain service eval, decreased rate by 30% on 4/17  - CT head negative on admit  - better each day    GABRIELA, anticoagulation  -episode of emesis in the ED with possible blood streaks, Hgb 12.8->10.7->11.7--> 10.1-> 10.3-> 10.6 stable  -patient denies will continue to monitor, likely drop 2/2 IVF     #Left Arm Hematoma:  -seen in [address], s/p US noted to be 16 x 7 x 1 134/68    Intake/Output:    Intake/Output Summary (Last 24 hours) at 4/21/2019 1235  Last data filed at 4/21/2019 0953  Gross per 24 hour   Intake 1810 ml   Output 2775 ml   Net -965 ml       Last 3 Weights  04/21/19 0509 : 141 lb 14.4 oz (64.4 kg)  04/20/ 04/17/19  0426 04/18/19  0413 04/19/19  0439 04/20/19  0424 04/21/19  0536   WBC 13.2* 7.1 8.0 10.3 12.5*   HGB 10.9* 10.1* 10.3* 10.6* 11.0*   MCV 88.3 85.6 84.4 83.1 84.8   .0* 107.0* 105.0* 123.0* 108.0*   BAND  --  2  --   --   --    INR  --  2. Cervical (cpt=72125)    Result Date: 4/14/2019  CONCLUSION:  1. Marked multilevel spondylosis listhesis at multiple levels as discussed above overall unchanged and stable from previous study.   No acute traumatic fracture, traumatic subluxation or preverteb Haider Valera MD on 4/18/2019 at 16:42     Approved by (CST): Haider Valera MD on 4/18/2019 at 16:44          Xr Chest Ap Portable  (cpt=71045)    Result Date: 4/15/2019  CONCLUSION:  1.  Interval worsening in the chest with mild cardiomegaly an

## 2019-04-21 NOTE — PROGRESS NOTES
[facility]    Nephrology Progress Note    Berna Perfect Attending:  Jean Claude Covarrubias MD       SUBJECTIVE:     Tired  Has not been paying attention to urine  No complaints with urine or ostomy    PHYSICAL EXAM:     Vital Signs: /78 (BP 17. 2 (H) 04/21/2019    NEPRELIM 9.04 (H) 04/21/2019    NEUTABS 6.46 04/18/2019    LYMPHABS 0.36 (L) 04/18/2019    EOSABS 0.00 04/18/2019    BASABS 0.00 04/18/2019    NEUT 89 04/18/2019    LYMPH 5 04/18/2019    MON 4 04/18/2019    EOS 0 04/18/2019    BASO 0 Once   heparin (PORCINE) drip 35855wpuym/250mL infusion CONTINUOUS 200-3,000 Units/hr Intravenous Continuous   Metoclopramide HCl (REGLAN) injection 5 mg 5 mg Intravenous Q8H PRN   Vancomycin HCl (FIRVANQ) 50 MG/ML oral solution 125 mg 125 mg Oral Q6H   No pm      Acidosis  - in the setting of GABRIELA, diarrhea, and elevated lactic acid.   - improving, continue to monitor.      Hypotension   - acceptable today, received IVF  Earlier in her stay  - maintain MAP >65 to allow adequate renal perfusion      Sepsis  -

## 2019-04-21 NOTE — PROGRESS NOTES
[facility]    Progress Note    Cherie Silverio Patient Status:  Inpatient    1945 MRN [id number]   Location [facility] 2W/SW Attending Naima Perez MD   Hosp Day # 7 PCP Jordin Parnell MD         Assessment and Plan: chloride  20 mEq Intravenous Once   • potassium chloride 40mEq IVPB (peripheral line)  40 mEq Intravenous Once   • Cefuroxime Axetil  500 mg Oral Daily   • hydrocortisone Na succinate PF  25 mg Intravenous Q8H [facility]   • [START ON 4/22/2019] predniSONE  5 mg

## 2019-04-22 ENCOUNTER — APPOINTMENT (OUTPATIENT)
Dept: GENERAL RADIOLOGY | Facility: HOSPITAL | Age: 74
DRG: 871 | End: 2019-04-22
Attending: INTERNAL MEDICINE
Payer: MEDICARE

## 2019-04-22 PROCEDURE — 80076 HEPATIC FUNCTION PANEL: CPT | Performed by: HOSPITALIST

## 2019-04-22 PROCEDURE — 85610 PROTHROMBIN TIME: CPT | Performed by: HOSPITALIST

## 2019-04-22 PROCEDURE — 71045 X-RAY EXAM CHEST 1 VIEW: CPT | Performed by: INTERNAL MEDICINE

## 2019-04-22 PROCEDURE — 97530 THERAPEUTIC ACTIVITIES: CPT

## 2019-04-22 PROCEDURE — 85730 THROMBOPLASTIN TIME PARTIAL: CPT | Performed by: HOSPITALIST

## 2019-04-22 PROCEDURE — 80069 RENAL FUNCTION PANEL: CPT | Performed by: HOSPITALIST

## 2019-04-22 PROCEDURE — 85025 COMPLETE CBC W/AUTO DIFF WBC: CPT | Performed by: HOSPITALIST

## 2019-04-22 PROCEDURE — 97535 SELF CARE MNGMENT TRAINING: CPT

## 2019-04-22 PROCEDURE — 97110 THERAPEUTIC EXERCISES: CPT

## 2019-04-22 PROCEDURE — 80048 BASIC METABOLIC PNL TOTAL CA: CPT | Performed by: HOSPITALIST

## 2019-04-22 RX ORDER — DEXTROSE MONOHYDRATE 100 MG/ML
INJECTION, SOLUTION INTRAVENOUS
Status: DISCONTINUED
Start: 2019-04-22 | End: 2019-04-22 | Stop reason: WASHOUT

## 2019-04-22 RX ORDER — POTASSIUM CHLORIDE 20 MEQ/1
40 TABLET, EXTENDED RELEASE ORAL ONCE
Status: COMPLETED | OUTPATIENT
Start: 2019-04-22 | End: 2019-04-22

## 2019-04-22 RX ORDER — CEFUROXIME AXETIL 500 MG/1
500 TABLET ORAL EVERY 12 HOURS SCHEDULED
Status: DISCONTINUED | OUTPATIENT
Start: 2019-04-22 | End: 2019-04-23

## 2019-04-22 NOTE — PROGRESS NOTES
[facility] Hospitalist Progress Note     PCP: Ben Carranza MD    CC: Follow up       Assessment/Plan:     Gerald Moscoso a 74year old female with PMH sig for DVT s/p permanent IVC filter, recurrent cdif colitis and collagenous colitis s/p lap failure now liver dysfunction  - also concern for baclofen toxcity given renal faliure and baclofen pump, pain service eval, decreased rate by 30% on 4/17 (reasses as outpatient)  - CT head negative on admit  - better each day    GABRIELA, hyperkalemia  -2/2 AT x 16 mm   -appears to be resolving CTM, see hgb above     #Severe MR s/p Natividad Clip:  -last TTE 10/2108 with mild to moderate MR, normal EF      #CAD/HLD  -continue ASA/Statin     #Hereditary spastic paraplegia, neurogenic bladder  -straight cath at [facility] 140 lb 8 oz (63.7 kg)  04/21/19 0509 : 141 lb 14.4 oz (64.4 kg)  04/20/19 0400 : 143 lb 14.4 oz (65.3 kg)  04/16/19 0508 : 138 lb 14.4 oz (63 kg)  04/15/19 0614 : 139 lb 6.4 oz (63.2 kg)  04/13/19 2309 : 130 lb (59 kg)  04/04/19 1235 : 132 lb 4.4 oz (60 kg 21.0 22.0   BUN 53*  53* 63*   < > 58* 61* 62* 57* 46* 39*   CREATSERUM 2.40*  2.40* 2.47*   < > 2.13* 1.93* 1.69* 1.32* 1.12* 0.85   CA 7.5*  7.5* 7.0*   < > 7.6* 7.8* 8.2* 8.5 8.2* 7.9*   MG  --  2.2  --  1.8 1.9  --  1.9  --   --    PHOS 4.4  --   -- echotexture of the liver which appears somewhat small. 3. Normal bile ducts and normal right kidney. Dictated by (CST): Mary Kay Ba MD on 4/18/2019 at 13:25     Approved by (CST):  Mary Kay Ba MD on 4/18/2019 at 13:29          Xr Lumbar Spin changes. 4. Atherosclerosis. 5. Scarring/atelectasis. 6. Demineralization. 7. Scoliosis. 8. Osteoarthritis. 9. Calcification about the right shoulder. 10. A preliminary report was submitted and there is agreement without major discrepancies.     Dictated by

## 2019-04-22 NOTE — OCCUPATIONAL THERAPY NOTE
OCCUPATIONAL THERAPY TREATMENT NOTE - INPATIENT        Room Number: 673/924-J           Presenting Problem: s/p mechanical fall out of w/c    Problem List  Principal Problem:    Gastrointestinal hemorrhage, unspecified gastrointestinal hemorrhage type  Act level of impairment may benefit from [address]  OT Discharge Recommendations: Sub-acute rehabilitation  OT Device Recommendations: TBD     PLAN  OT Treatment Plan: Compensatory technique education;ADL training;UE strengthening/ROM goal is: to go home     Patient will complete functional slide board transfer with CGA  Comment: Total A, with use of kathya lift from bed to reclining chair   Patient will tolerate sitting EOB x8 minutes with SBA  Comment: NT   Patient will perform UB dres

## 2019-04-22 NOTE — CM/SW NOTE
Addendum 2:48pm    SHERRELL informed by Marguerite Burgos the [facility] liaison [phone] that they are unable to accept the pt.  SHERRELL left a message with Dana Dodge the [facility] [phone] re the status of the referral.       1:47pm    SHERRELL received an MDO regarding SA

## 2019-04-22 NOTE — PROGRESS NOTES
[facility]  Progress Note    Eva Ruiz Patient Status:  Inpatient    1945 MRN [id number]   Location [facility] 3W/SW Attending Fabrizio Zee MD   Hosp Day # 8 PCP Courtney Donahue MD     Assessment:    1.   Urosepsis, resol Value Date    INR 1.12 04/22/2019     Lab Results   Component Value Date     04/22/2019    K 3.8 04/22/2019     04/22/2019    CO2 22.0 04/22/2019    BUN 39 04/22/2019    CREATSERUM 0.85 04/22/2019     04/22/2019    CA 7.9 04/22/2019    A

## 2019-04-22 NOTE — PROGRESS NOTES
[facility] Pharmacy Note:  Renal Adjustment for Cefuroxime     Harjinder Dukes is a 74year old female who has been prescribed cefuroxime. CrCl is estimated creatinine clearance is 49.4 mL/min (based on SCr of 0.85 mg/dL).  so the dose has been adjusted to 500

## 2019-04-22 NOTE — PHYSICAL THERAPY NOTE
PHYSICAL THERAPY TREATMENT NOTE - INPATIENT     Room Number: 223/447-R       Presenting Problem: p/w fall from w/c, N/V, LLE pain, dx UTI and sepsis    Problem List  Principal Problem:    Gastrointestinal hemorrhage, unspecified gastrointestinal hemorrhage promotion;Breathing techniques;Relaxation;Repositioning    BALANCE                                                                                                                     Static Sitting: Poor  Dynamic Sitting: Poor           Static Standing: No Goal #1 Patient is able to demonstrate supine - sit EOB @ level: moderate assistance      Goal #1   Current Status  pt in chair   Goal #2 Patient is able to demonstrate transfers EOB to/from Chair/Wheelchair at assistance level: CGA with slider board

## 2019-04-22 NOTE — PROGRESS NOTES
[facility]    Nephrology Progress Note    Sharran Koyanagi Attending:  Wesley Mayorga MD       SUBJECTIVE:     Feels tired again today. Weak unable to  water.   Sodium levels improved with D5W    PHYSICAL EXAM:     Vital Signs: / 04/22/2019    NEUTABS 6.46 04/18/2019    LYMPHABS 0.36 (L) 04/18/2019    EOSABS 0.00 04/18/2019    BASABS 0.00 04/18/2019    NEUT 89 04/18/2019    LYMPH 5 04/18/2019    MON 4 04/18/2019    EOS 0 04/18/2019    BASO 0 04/18/2019    NEPERCENT 72.0 04/22/2019 per tab 1 tablet 1 tablet Oral Q6H PRN   ferrous sulfate EC tab 325 mg 325 mg Oral Daily with breakfast   pregabalin (LYRICA) cap 50 mg 50 mg Oral BID   venlafaxine (EFFEXOR-XR) 24 hr cap 75 mg Oral Daily   Metoprolol Succinate ER (Toprol XL) 24 hr tab 25

## 2019-04-22 NOTE — DIETARY NOTE
ADULT NUTRITION REASSESSMENT     Pt is at moderate nutrition risk. Pt does not meet malnutrition criteria. RECOMMENDATIONS TO MD:  ONS (oral nutr supplements) added pt tolerating and taking well.      NUTRITION DIAGNOSIS/PROBLEM:  Inadequate oral in been taking Vanilla Ensure at [employer]. Bk- 2 slices of toast with peanut butter and jelly and [employer], L- Mostly leftovers or something pt prepares on her own, D- Varies from [place] to meat, potato and vegetable.  Pt's  reports pt does not have a b oz)  10/08/18 : 59 kg (130 lb)  10/05/18 : 61.2 kg (135 lb)  09/04/18 : 63 kg (139 lb)  08/09/18 : 59.9 kg (132 lb 1.6 oz)  06/08/18 : 61.8 kg (136 lb 4.8 oz)      GASTROINTESTINAL: episode of emesis in ED and ?hematemesis 2 weeks ago.  Colostomy with stool RN documentation reviewed and Roscoe Shukla.       NUTRITION PRESCRIPTION:  Diet: Cardiac and Renal  Oral Supplements: Nepro TID    ESTIMATED NUTRITION NEEDS:  Calories: 8655-8383 calories/day (25-28 calories per kg Estimated dry wt/UBW of 60 kg)  Protein: 65-8

## 2019-04-22 NOTE — PLAN OF CARE
Problem: Patient Centered Care  Goal: Patient preferences are identified and integrated in the patient's plan of care  Description  Interventions:  - What would you like us to know as we care for you?  \"From home with  (caregiver)\"  - Provide anuradha medications to optimize hemodynamic stability  - Monitor arterial and/or venous puncture sites for bleeding and/or hematoma  - Assess quality of pulses, skin color and temperature  - Assess for signs of decreased coronary artery perfusion - ex.  Angina  - E Identify cognitive and physical deficits and behaviors that affect risk of falls.   - Marshall fall precautions as indicated by assessment.  - Educate pt/family on patient safety including physical limitations  - Instruct pt to call for assistance with act patient/family/discharge partner  - Complete POLST form as appropriate  - Assess patient's ability to be responsible for managing their own health  - Refer to Case Management Department for coordinating discharge planning if the patient needs post-hospital

## 2019-04-22 NOTE — PROGRESS NOTES
[facility] AND [facility] Infectious Disease  Progress Note    Dmitriy Martin Patient Status:  Inpatient    1945 MRN [id number]   Location [facility] 3W/SW Attending Viki Martines MD   Hosp Day # 8 PCP Ben Carranza MD     Subjective: addition to her c.diff process  - Urine cultures with proteus  - Repeat urine negative, now finishing some p.o. cefuroxime     3.  Leukocytosis due to the above  - At [address] and normalized     4.  H/o hereditary spastic paraplegia  - s/p mechanical fall, WC josesito

## 2019-04-23 VITALS
OXYGEN SATURATION: 99 % | BODY MASS INDEX: 24.65 KG/M2 | SYSTOLIC BLOOD PRESSURE: 114 MMHG | HEART RATE: 89 BPM | HEIGHT: 64 IN | DIASTOLIC BLOOD PRESSURE: 67 MMHG | RESPIRATION RATE: 20 BRPM | WEIGHT: 144.38 LBS | TEMPERATURE: 98 F

## 2019-04-23 PROCEDURE — 84100 ASSAY OF PHOSPHORUS: CPT | Performed by: INTERNAL MEDICINE

## 2019-04-23 PROCEDURE — 80053 COMPREHEN METABOLIC PANEL: CPT | Performed by: HOSPITALIST

## 2019-04-23 PROCEDURE — 85025 COMPLETE CBC W/AUTO DIFF WBC: CPT | Performed by: HOSPITALIST

## 2019-04-23 PROCEDURE — 83735 ASSAY OF MAGNESIUM: CPT | Performed by: HOSPITALIST

## 2019-04-23 PROCEDURE — 99211 OFF/OP EST MAY X REQ PHY/QHP: CPT

## 2019-04-23 RX ORDER — PANTOPRAZOLE SODIUM 40 MG/1
40 TABLET, DELAYED RELEASE ORAL
Qty: 30 TABLET | Refills: 0 | Status: SHIPPED | OUTPATIENT
Start: 2019-04-24 | End: 2019-07-24

## 2019-04-23 RX ORDER — CEFUROXIME AXETIL 500 MG/1
500 TABLET ORAL EVERY 12 HOURS SCHEDULED
Qty: 12 TABLET | Refills: 0 | Status: SHIPPED | OUTPATIENT
Start: 2019-04-23 | End: 2019-04-29

## 2019-04-23 RX ORDER — PREGABALIN 50 MG/1
50 CAPSULE ORAL 2 TIMES DAILY
Qty: 10 CAPSULE | Refills: 0 | Status: ON HOLD | OUTPATIENT
Start: 2019-04-23 | End: 2019-05-21

## 2019-04-23 RX ORDER — HYDROCODONE BITARTRATE AND ACETAMINOPHEN 5; 325 MG/1; MG/1
1 TABLET ORAL EVERY 6 HOURS PRN
Qty: 10 TABLET | Refills: 0 | Status: ON HOLD | OUTPATIENT
Start: 2019-04-23 | End: 2019-05-21

## 2019-04-23 RX ORDER — METOPROLOL SUCCINATE 25 MG/1
25 TABLET, EXTENDED RELEASE ORAL
Qty: 60 TABLET | Refills: 0 | Status: ON HOLD | OUTPATIENT
Start: 2019-04-23 | End: 2019-05-16

## 2019-04-23 NOTE — CM/SW NOTE
DAYANARA informed by RN that pt is medically stable for discharge today at 5:00 pm. DAYANARA spoke with Etta Osler the [facility] [phone] who confirmed that they are able to accept the pt at that time.  Dayanara spoke with the pt's  Dustin Kilgore [phone] and he is

## 2019-04-23 NOTE — WOUND PROGRESS NOTE
Wound care stopped to see pt and check on isogel mattress. Pt no longer has specialty mattress, states the one she has at present is \"just fine\". RN states that there is some redness to her buttocks from sitting.  Pt does not wish to stand at present for

## 2019-04-23 NOTE — PROGRESS NOTES
[facility] AND [facility] Infectious Disease  Progress Note    Tyshawn Osullivan Patient Status:  Inpatient    1945 MRN [id number]   Location [facility] 3W/SW Attending Ana Sommers MD   Hosp Day # 9 PCP Bonita Reese MD     Subjective: sediment in addition to her c.diff process  - Urine cultures with proteus  - Repeat urine negative, now finishing some p.o. cefuroxime     3.  Leukocytosis due to the above  - At 9K and normalized     4.  H/o hereditary spastic paraplegia  - s/p mechanical

## 2019-04-23 NOTE — TRANSITION NOTE
Patient with severe sepsis 2/2 proteus UTI and Cdiff.    - Stay complicated by hypotension resulting in GABRIELA and shock liver now resolved.       1.  Hypotension resulting in shock liver and acute kidney injury.  Lab tests indicate that these injuries are res 75 MG Caps  Generic drug:  pregabalin      Take 50 mg by mouth 2 (two) times daily.    Quantity:  180 capsule  Refills:  3     predniSONE 5 MG Tabs  Commonly known as:  DELTASONE      TAKE 1 TABLET BY MOUTH DAILY   Quantity:  90 tablet  Refills:  0     Tresa

## 2019-04-23 NOTE — PROGRESS NOTES
[facility]    Nephrology Progress Note    Rosemary Salcedo Attending:  Oleg Anthony MD       SUBJECTIVE:     Feels a bit better today drinking and eating well Na levels stable.     PHYSICAL EXAM:     Vital Signs: BP 99/54 (BP Location: [place]igh 6.46 04/18/2019    LYMPHABS 0.36 (L) 04/18/2019    EOSABS 0.00 04/18/2019    BASABS 0.00 04/18/2019    NEUT 89 04/18/2019    LYMPH 5 04/18/2019    MON 4 04/18/2019    EOS 0 04/18/2019    BASO 0 04/18/2019    NEPERCENT 66.0 04/23/2019    LYPERCENT 22.5 04/2 Oral Daily with breakfast   pregabalin (LYRICA) cap 50 mg 50 mg Oral BID   venlafaxine (EFFEXOR-XR) 24 hr cap 75 mg Oral Daily   Metoprolol Succinate ER (Toprol XL) 24 hr tab 25 mg 25 mg Oral 2x Daily(Beta Blocker)       ASSESSMENT/PLAN:     74year old fe

## 2019-04-23 NOTE — PLAN OF CARE
Problem: Patient Centered Care  Goal: Patient preferences are identified and integrated in the patient's plan of care  Description  Interventions:  - What would you like us to know as we care for you?  \"From home with  (caregiver)\"  - Provide anuradha bleeding and/or hematoma  - Assess quality of pulses, skin color and temperature  - Assess for signs of decreased coronary artery perfusion - ex.  Angina  - Evaluate fluid balance, assess for edema, trend weights  Outcome: Progressing  Goal: Absence of card with strengthening/mobility  - Encourage toileting schedule  Outcome: Progressing     Problem: RESPIRATORY - ADULT  Goal: Achieves optimal ventilation and oxygenation  Description  INTERVENTIONS:  - Assess for changes in respiratory status  - Assess for ch based on physician/LIP order or complex needs related to functional status, cognitive ability or social support system  Outcome: Progressing     VSS. Tylenol for abdominal pain which afforded relief. Colostomy intact. Isolation for C.-diff and on Vanco po.

## 2019-04-23 NOTE — PROGRESS NOTES
[facility] AND CLINICS  Progress Note    Alexandro Gutierrez Patient Status:  Inpatient    1945 MRN [id number]   Location [facility] 3W/SW Attending Arlin Batres MD   Hosp Day # 9 PCP Marcela Gore MD     Assessment:  1.   Hypotension result Component Value Date     04/23/2019    K 3.7 04/23/2019     04/23/2019    CO2 23.0 04/23/2019    BUN 34 04/23/2019    CREATSERUM 0.74 04/23/2019    GLU 74 04/23/2019    MG 1.7 04/23/2019    CA 8.2 04/23/2019     04/23/2019    AST 68 04

## 2019-04-23 NOTE — PLAN OF CARE
Problem: Patient Centered Care  Goal: Patient preferences are identified and integrated in the patient's plan of care  Description  Interventions:  - What would you like us to know as we care for you?  \"From home with  (caregiver)\"  - Provide anuradha for Discharge  4/23/2019 1628 by Gretchen Kolb RN  Outcome: Adequate for Discharge     Problem: CARDIOVASCULAR - ADULT  Goal: Maintains optimal cardiac output and hemodynamic stability  Description  INTERVENTIONS:  - Monitor vital signs, rhythm, and tren Notify MD/LIP if interventions unsuccessful or patient reports new pain  - Anticipate increased pain with activity and pre-medicate as appropriate  4/23/2019 1640 by Lars Luna RN  Outcome: Adequate for Discharge  4/23/2019 1628 by Lars Luna RN ADULT  Goal: Skin integrity remains intact  Description  INTERVENTIONS  - Assess and document risk factors for pressure ulcer development  - Assess and document skin integrity  - Monitor for areas of redness and/or skin breakdown  - Initiate interventions,

## 2019-04-24 ENCOUNTER — TELEPHONE (OUTPATIENT)
Dept: CARDIOLOGY UNIT | Facility: HOSPITAL | Age: 74
End: 2019-04-24

## 2019-04-24 NOTE — DISCHARGE SUMMARY
General Medicine Discharge Summary     Patient ID:  Marylen Shorter  74year old  5/4/1945    Admit date: 4/13/2019    Discharge date and time: 04/23/19    Attending Physician: No att. providers found     Primary Care Physician: Baldemar Shannon, complicated by septic shock, Cdif, hypotension, GABRIELA, shock liver, encephalopathy, c. diff now resolved.  Plan for SNF on d/c     Severe sepsis / septic shock 2/2 proteus UTI, now cdif, hypotension - clinically improved  -was appropriately bolused on admissi intake     Elevated troponin  -up to 1.2 -> 1.57  -per Dr Cyndie Jacobs, pt on full AC at baseline, denies CP, monitor, likely related to sustained afib with RVR, rates now improved.   -tele     #A Fib w/ RVR->improved   -RVR to 120s on admission, missing doses o abx ->see above      #Lumbar Fusion/Lumbar Radiculopathy  -follows with Dr. Gilma Husain as OP for pain management  -s/p caudal steroid injection on 3/26     #DVT history  -resume xarelto, stopped heparin gtt,  has permanent IVC filter     #Depression/anxiety  - (two) times daily for 14 days, THEN 2.5 mL (125 mg total) daily for 14 days. Start taking on:  4/23/2019        CHANGE how you take these medications    Metoprolol Succinate ER 25 MG Tb24  Commonly known as:   Toprol XL  Take 1 tablet (25 mg total) by rob Medicine, PEDIATRICS  Why:  PT  WILL MAKE F/U DIBAETES APPT WITH  AFTER DISCHARGE. Contact information:  [address]             Jose Moya MD In 1 week.     Specialty:  NEPHROLOGY  Contac

## 2019-04-25 NOTE — PROGRESS NOTES
Subjective    Chief Complaint  This information was obtained from the patient  The patient was seen today for follow up and management of difficult to heal wound on her sacral area.  8/1/2018 patient has no concerns for today    Allergies  Cymbalta (Reactio 8-14-17:  Patient was admitted to the hospital for UTI, fever and sacral pressure wound/diarrhea. Patient diagnosed with Clostridium difficile and treated with antibiotics.    Patient currently resides at [facility] and transported to  Wound #3a Sacral is a chronic Stage 3 Pressure Injury Pressure Ulcer and has received a status of Not Healed. Subsequent wound encounter measurements are 0.2cm length x 0.2cm width x 0.2cm depth, with an area of 0.04 sq cm and a volume of 0.008 cubic cm.  Faustino Morales Clean wound with Normal Saline or Wound Cleanser. - apply skin prep  Miconazole - Apply antifungal cream to back rash Q 12 hours  Collagen - fibracol    Additional Orders:    NPWT Orders  Other negative pressure wound therapy device. See custom orders.  - P no

## 2019-05-12 ENCOUNTER — APPOINTMENT (OUTPATIENT)
Dept: GENERAL RADIOLOGY | Facility: HOSPITAL | Age: 74
DRG: 871 | End: 2019-05-12
Attending: EMERGENCY MEDICINE
Payer: MEDICARE

## 2019-05-12 ENCOUNTER — HOSPITAL ENCOUNTER (INPATIENT)
Facility: HOSPITAL | Age: 74
LOS: 4 days | Discharge: HOME HEALTH | DRG: 871 | End: 2019-05-16
Attending: EMERGENCY MEDICINE | Admitting: HOSPITALIST
Payer: MEDICARE

## 2019-05-12 ENCOUNTER — APPOINTMENT (OUTPATIENT)
Dept: CT IMAGING | Facility: HOSPITAL | Age: 74
DRG: 871 | End: 2019-05-12
Attending: HOSPITALIST
Payer: MEDICARE

## 2019-05-12 DIAGNOSIS — J18.9 COMMUNITY ACQUIRED PNEUMONIA OF RIGHT LOWER LOBE OF LUNG: ICD-10-CM

## 2019-05-12 DIAGNOSIS — R50.9 FEVER, UNSPECIFIED FEVER CAUSE: ICD-10-CM

## 2019-05-12 DIAGNOSIS — I50.43 ACUTE ON CHRONIC COMBINED SYSTOLIC AND DIASTOLIC CONGESTIVE HEART FAILURE (HCC): Primary | ICD-10-CM

## 2019-05-12 PROCEDURE — 96365 THER/PROPH/DIAG IV INF INIT: CPT

## 2019-05-12 PROCEDURE — 96375 TX/PRO/DX INJ NEW DRUG ADDON: CPT

## 2019-05-12 PROCEDURE — 71046 X-RAY EXAM CHEST 2 VIEWS: CPT | Performed by: EMERGENCY MEDICINE

## 2019-05-12 PROCEDURE — 84145 PROCALCITONIN (PCT): CPT | Performed by: HOSPITALIST

## 2019-05-12 PROCEDURE — 87581 M.PNEUMON DNA AMP PROBE: CPT | Performed by: HOSPITALIST

## 2019-05-12 PROCEDURE — 96366 THER/PROPH/DIAG IV INF ADDON: CPT

## 2019-05-12 PROCEDURE — 80048 BASIC METABOLIC PNL TOTAL CA: CPT | Performed by: EMERGENCY MEDICINE

## 2019-05-12 PROCEDURE — 87040 BLOOD CULTURE FOR BACTERIA: CPT | Performed by: EMERGENCY MEDICINE

## 2019-05-12 PROCEDURE — 87798 DETECT AGENT NOS DNA AMP: CPT | Performed by: HOSPITALIST

## 2019-05-12 PROCEDURE — 36415 COLL VENOUS BLD VENIPUNCTURE: CPT

## 2019-05-12 PROCEDURE — 83605 ASSAY OF LACTIC ACID: CPT | Performed by: EMERGENCY MEDICINE

## 2019-05-12 PROCEDURE — 85025 COMPLETE CBC W/AUTO DIFF WBC: CPT | Performed by: EMERGENCY MEDICINE

## 2019-05-12 PROCEDURE — 85610 PROTHROMBIN TIME: CPT | Performed by: EMERGENCY MEDICINE

## 2019-05-12 PROCEDURE — 85379 FIBRIN DEGRADATION QUANT: CPT | Performed by: EMERGENCY MEDICINE

## 2019-05-12 PROCEDURE — 87486 CHLMYD PNEUM DNA AMP PROBE: CPT | Performed by: HOSPITALIST

## 2019-05-12 PROCEDURE — 94640 AIRWAY INHALATION TREATMENT: CPT

## 2019-05-12 PROCEDURE — 80076 HEPATIC FUNCTION PANEL: CPT | Performed by: HOSPITALIST

## 2019-05-12 PROCEDURE — 83880 ASSAY OF NATRIURETIC PEPTIDE: CPT | Performed by: HOSPITALIST

## 2019-05-12 PROCEDURE — 99285 EMERGENCY DEPT VISIT HI MDM: CPT

## 2019-05-12 PROCEDURE — 85730 THROMBOPLASTIN TIME PARTIAL: CPT | Performed by: EMERGENCY MEDICINE

## 2019-05-12 PROCEDURE — 81003 URINALYSIS AUTO W/O SCOPE: CPT | Performed by: HOSPITALIST

## 2019-05-12 PROCEDURE — 96368 THER/DIAG CONCURRENT INF: CPT

## 2019-05-12 PROCEDURE — 74176 CT ABD & PELVIS W/O CONTRAST: CPT | Performed by: HOSPITALIST

## 2019-05-12 PROCEDURE — 87633 RESP VIRUS 12-25 TARGETS: CPT | Performed by: HOSPITALIST

## 2019-05-12 RX ORDER — ONDANSETRON 2 MG/ML
4 INJECTION INTRAMUSCULAR; INTRAVENOUS EVERY 6 HOURS PRN
Status: DISCONTINUED | OUTPATIENT
Start: 2019-05-12 | End: 2019-05-16

## 2019-05-12 RX ORDER — POLYETHYLENE GLYCOL 3350 17 G/17G
17 POWDER, FOR SOLUTION ORAL DAILY PRN
Status: DISCONTINUED | OUTPATIENT
Start: 2019-05-12 | End: 2019-05-16

## 2019-05-12 RX ORDER — PANTOPRAZOLE SODIUM 40 MG/1
40 TABLET, DELAYED RELEASE ORAL
Status: DISCONTINUED | OUTPATIENT
Start: 2019-05-13 | End: 2019-05-16

## 2019-05-12 RX ORDER — ASPIRIN 81 MG/1
81 TABLET ORAL DAILY
Status: DISCONTINUED | OUTPATIENT
Start: 2019-05-13 | End: 2019-05-16

## 2019-05-12 RX ORDER — VENLAFAXINE HYDROCHLORIDE 37.5 MG/1
75 CAPSULE, EXTENDED RELEASE ORAL DAILY
Status: DISCONTINUED | OUTPATIENT
Start: 2019-05-13 | End: 2019-05-16

## 2019-05-12 RX ORDER — ACETAMINOPHEN 325 MG/1
650 TABLET ORAL EVERY 6 HOURS PRN
Status: DISCONTINUED | OUTPATIENT
Start: 2019-05-12 | End: 2019-05-16

## 2019-05-12 RX ORDER — SODIUM CHLORIDE 0.9 % (FLUSH) 0.9 %
3 SYRINGE (ML) INJECTION AS NEEDED
Status: DISCONTINUED | OUTPATIENT
Start: 2019-05-12 | End: 2019-05-16

## 2019-05-12 RX ORDER — HYDROCODONE BITARTRATE AND ACETAMINOPHEN 5; 325 MG/1; MG/1
1 TABLET ORAL EVERY 6 HOURS PRN
Status: DISCONTINUED | OUTPATIENT
Start: 2019-05-12 | End: 2019-05-16

## 2019-05-12 RX ORDER — IPRATROPIUM BROMIDE AND ALBUTEROL SULFATE 2.5; .5 MG/3ML; MG/3ML
3 SOLUTION RESPIRATORY (INHALATION) ONCE
Status: COMPLETED | OUTPATIENT
Start: 2019-05-12 | End: 2019-05-12

## 2019-05-12 RX ORDER — PREGABALIN 50 MG/1
50 CAPSULE ORAL 2 TIMES DAILY
Status: DISCONTINUED | OUTPATIENT
Start: 2019-05-12 | End: 2019-05-16

## 2019-05-12 RX ORDER — PREDNISONE 1 MG/1
5 TABLET ORAL
Status: DISCONTINUED | OUTPATIENT
Start: 2019-05-13 | End: 2019-05-16

## 2019-05-12 RX ORDER — MELATONIN
325
Status: DISCONTINUED | OUTPATIENT
Start: 2019-05-13 | End: 2019-05-16

## 2019-05-12 RX ORDER — METOCLOPRAMIDE HYDROCHLORIDE 5 MG/ML
10 INJECTION INTRAMUSCULAR; INTRAVENOUS EVERY 8 HOURS PRN
Status: DISCONTINUED | OUTPATIENT
Start: 2019-05-12 | End: 2019-05-16

## 2019-05-12 RX ORDER — FUROSEMIDE 10 MG/ML
40 INJECTION INTRAMUSCULAR; INTRAVENOUS ONCE
Status: COMPLETED | OUTPATIENT
Start: 2019-05-12 | End: 2019-05-12

## 2019-05-12 RX ORDER — FUROSEMIDE 10 MG/ML
40 INJECTION INTRAMUSCULAR; INTRAVENOUS
Status: DISCONTINUED | OUTPATIENT
Start: 2019-05-12 | End: 2019-05-14

## 2019-05-12 NOTE — ED NOTES
[address] [facility] Nurse gave report. Patient in am was having sob, RR of 40 and o2 saturation of 89%. Neb tx and xanax given. Patient was having nose bleed last night and xeralto was on hold.

## 2019-05-12 NOTE — CONSULTS
Pulmonary H&P/Consult     NAME: Marisol Arevalo Sedki:  - MRN: [id number] - Age: 66year old - :  1945    Date of Admission: 2019 11:17 AM  Admission Diagnosis: No admission diagnoses are documented for this encounter.     Assessment/P • Dyspnea 6/7/2018   • Esophageal reflux    • GERD    • GOUT    • Hearing impairment    • Hereditary spastic paraplegia (HCC)    • High blood pressure    • High cholesterol    • History of blood transfusion    • Hypercholesterolemia    • HYPERLIPIDEMIA POSSIBLE BIOSPY, POSSIBLE POLYPECTOMY N/A 1/5/2015    Performed by Shellie Irizarry MD at [address] Right 2/8/2016    Performed by Rajendra Xavier MD at [facility] MAIN OR   • [address]      left 3\" (1.6 m)   Wt 148 lb (67.1 kg)   SpO2 96%   BMI 26.22 kg/m²   Physical Exam:   General: alert, cooperative, oriented. No respiratory distress. HEENT: Atraumatic, Lips, mucosa, and tongue normal.  No thrush noted.    Lungs: coarse breath sounds bilater

## 2019-05-12 NOTE — H&P
[address]    History & Physical (or consult)    Marylen Shorter Patient Status:  Emergency    1945 MRN [id number]   Location [address] Attending Sender, Army Brittaney MD   [facility] Day # well at rehab per , but today was acutely worse. Fever started today and she seems to be coughing some. She is fairly sleepy in ER and hard to get a history from her.  at bedside helps with history. Temp in ER to 101.   Wbc 7.4, lactic a Past Surgical History:   Procedure Laterality Date   • BACK SURGERY      spinal fusion L1-5   • CAUDAL N/A 3/26/2019    Performed by Cherylene Deters, MD at [address] [place] [address] N/A 10/10/2017    Performed by Cherylene Deters, MD at  Allergies:   Cymbalta [Duloxetin*    ITCHING, SWELLING    Comment:Throat swelling and difficulty breathing  Gabapentin              RASH  Gnp Iodides Decolor*        Comment:Iodine based dyes  Cant swallow or breath             And hives             Tole 04/23/2019    AST 68 (H) 04/23/2019     (H) 04/23/2019    PTT 35.4 (H) 05/12/2019    INR 1.54 (H) 05/12/2019    T4F 0.88 02/10/2009    TSH 2.55 06/08/2018     04/13/2019    DDIMER 1.33 (H) 05/12/2019    ESRML 18 11/03/2017    MG 1.7 04/23/201 acute on chronic CHFpEF (ef 55-60%)  - HL  - non obs CAD  - Afib on xarelto  - severe MR s/p mitral clip on 10/31/18    - DVT s/p permanent IVC filter  - recurrent cdiff colitis (currently on vanc taper)  - collagenous colitis s/p laparoscopic loop sigmoid improved    #frailty/GOC  -patient has been wheelchair bound x15 years  -hospitalized at least 5x in past 12 months  -pending clinical course, will consider palliative medicine consult after discussing more with     Addendum -    Pct 0.17, BNP 2641,

## 2019-05-12 NOTE — CONSULTS
[facility]    Cardiology Consultation    Salma Quintero Location: [address] [place] [address]    1945 MRN [id number]   Consulting Date 2019 [facility] [phone]   Consulting Physician Denice Aburto MD Attending Tari Dewitt system   • OTHER DISEASES     uterine fibroids   • OTHER DISEASES     ulcerative colitis   • OTHER DISEASES     uti   • Pneumonia, organism unspecified(486)    • Reflux    • Self-catheterizes urinary bladder 1/11/2016   • Ulcerative colitis (HCC)    • Valv Problems Other         aunts/ uncles glaucoma   • Other (Other) Other      She reports that she has quit smoking. She has never used smokeless tobacco. She reports that she does not drink alcohol or use drugs.     Allergies:    Cymbalta [Duloxetin*    ITCHI ezetimibe 10 MG Oral Tab TAKE 1 TABLET ONCE NIGHTLY Disp: 90 tablet Rfl: 2   aspirin 81 MG Oral Tab EC Take 1 tablet (81 mg total) by mouth daily. Disp:  Rfl: 0   Rivaroxaban 20 MG Oral Tab Take 1 tablet (20 mg total) by mouth daily with food.  Disp: 90 t ejection fraction was 55-60%. Wall motion     was normal; there were no regional wall motion abnormalities.     Doppler parameters are consistent with abnormal left ventricular     relaxation (grade 1 diastolic dysfunction).   2. Aortic valve: Mild regurgit

## 2019-05-13 PROCEDURE — 85025 COMPLETE CBC W/AUTO DIFF WBC: CPT | Performed by: HOSPITALIST

## 2019-05-13 PROCEDURE — 97530 THERAPEUTIC ACTIVITIES: CPT

## 2019-05-13 PROCEDURE — 84132 ASSAY OF SERUM POTASSIUM: CPT | Performed by: HOSPITALIST

## 2019-05-13 PROCEDURE — 97162 PT EVAL MOD COMPLEX 30 MIN: CPT

## 2019-05-13 PROCEDURE — 82962 GLUCOSE BLOOD TEST: CPT

## 2019-05-13 PROCEDURE — 97166 OT EVAL MOD COMPLEX 45 MIN: CPT

## 2019-05-13 PROCEDURE — 80048 BASIC METABOLIC PNL TOTAL CA: CPT | Performed by: HOSPITALIST

## 2019-05-13 RX ORDER — METOPROLOL SUCCINATE 25 MG/1
25 TABLET, EXTENDED RELEASE ORAL
Status: DISCONTINUED | OUTPATIENT
Start: 2019-05-13 | End: 2019-05-16

## 2019-05-13 RX ORDER — POTASSIUM CHLORIDE 20 MEQ/1
40 TABLET, EXTENDED RELEASE ORAL EVERY 4 HOURS
Status: COMPLETED | OUTPATIENT
Start: 2019-05-13 | End: 2019-05-13

## 2019-05-13 RX ORDER — ATORVASTATIN CALCIUM 40 MG/1
40 TABLET, FILM COATED ORAL NIGHTLY
Status: DISCONTINUED | OUTPATIENT
Start: 2019-05-13 | End: 2019-05-16

## 2019-05-13 NOTE — PROGRESS NOTES
[facility] AND [facility]  Progress Note    Salma Free Patient Status:  Inpatient    1945 MRN [id number]   Location [facility] 2W/SW Attending Ankit Winters MD   Hosp Day # 1 PCP Donavon Boxer, MD     Assessment:    1.   HFpEF Warm and dry.      Labs:  Lab Results   Component Value Date    WBC 5.4 05/13/2019    HGB 9.5 05/13/2019    HCT 32.5 05/13/2019    .0 05/13/2019     Lab Results   Component Value Date    INR 1.54 (H) 05/12/2019     Lab Results   Component Value Date

## 2019-05-13 NOTE — CARDIAC REHAB
CARDIAC REHAB HEART FAILURE EDUCATION    Handouts provided and reviewed: CHF Booklet. Activity: Chair for all meals: Reviewed       Ambulation: Reviewed       Tolerated Activity:          Disease Process: Disease process reviewed.     Reviewed the allegra

## 2019-05-13 NOTE — CONSULTS
[facility] AND [facility] Infectious Disease  Report of Consultation    Cherie Silverio Patient Status:  Inpatient    1945 MRN [id number]   Location [facility] 3W/SW Attending Nickolas Bustamante MD   Hosp Day # 1 PCP Casper Riggins [facility] unspecified hyperlipidemia    • OTHER DISEASES     spastic paraparesis   • OTHER DISEASES     cataracts   • OTHER DISEASES     secondary poycythemia   • OTHER DISEASES     lumbar spinal stenosis   • OTHER DISEASES     duplicated left renal collecting syste Heart Disorder Father    • Eye Problems Father         glaucoma   • DVT/VTE Father    • Diabetes Mother    • Diabetes Sister    • Eye Problems Sister         glaucoma   • Other (Other) Sister    • Other (Other) Daughter         spastic paraplegia   • Eye P (MIRALAX) powder packet 17 g, 17 g, Oral, Daily PRN  •  ondansetron HCl (ZOFRAN) injection 4 mg, 4 mg, Intravenous, Q6H PRN  •  Metoclopramide HCl (REGLAN) injection 10 mg, 10 mg, Intravenous, Q8H PRN  •  furosemide (LASIX) injection 40 mg, 40 mg, Intraven (!) 27 99 % —   05/12/19 2200 101/69 — — 90 (!) 27 98 % —   05/12/19 2000 101/64 97.6 °F (36.4 °C) Temporal 91 (!) 29 100 % —   05/12/19 1900 110/77 — — 120 (!) 30 100 % —   05/12/19 1800 91/46 98 °F (36.7 °C) Temporal 106 (!) 35 96 % —   05/12/19 1745 98/ thus far    Radiology:  CXR negative    Assessment and Plan:    1.   Acute fever with source unclear - temps now resolved  - UA negative, CT negative for occult process  - Blood cultures negative  - PCT low  - CXR negative, RVP negative  - Patient close to

## 2019-05-13 NOTE — PROGRESS NOTES
Pulmonary Progress Note     Assessment / Plan:  1. Acute respiratory failure - CXR without clear pneumonia, though she does have a productive cough  - wean O2 as able  - volume management per cards  - empiric zosyn.  DC vanc  - f/u cultures and narrow as ab

## 2019-05-13 NOTE — PROGRESS NOTES
[facility]    [facility]    Nba Tee Patient Status:  Inpatient    1945 MRN [id number]   Location [facility] 2W/SW Attending Donato Daly MD   Hosp Day # 1 PCP Celine Barrera encephalopathy (from infection, liver and kidney dysfunction, decreased clearance of baclofen)           PLAN:  #cv  -IV diuresis per cardiology  -lfts ok.   Resume statin  -hold zetia for now,  -hold bblocker for now with low BP, resume as able  -continue °C), resp. rate (!) 30, height 160 cm (5' 3\"), weight 136 lb 9.6 oz (62 kg), SpO2 97 %, not currently breastfeeding.     gen- NAD, frail appearing  heent- moist mucus membranes, atraumatic  Lungs-  clear bilaterally, normal respiratory effort  cv-  rrr,  n Lab 05/12/19  1124 05/12/19  1551 05/13/19  0436   *  --  69*   BUN 10  --  9   CREATSERUM 0.56  --  0.51*   GFRAA 105  --  108   GFRNAA 91  --  94   CA 8.3*  --  8.1*   ALB  --  2.3*  --      --  143   K 3.8  --  3.2*     --  111   CO lumbar laminectomy . Posterolateral fusion from L2 to S1 and additional hardware for fusion at L5-S1.     Dictated by (CST): Charanjit Joaquin MD on 5/12/2019 at 16:55     Approved by (CST): Charanjit Joaquin MD on 5/12/2019 at 17:17

## 2019-05-13 NOTE — CM/SW NOTE
Care Coordination Note    Discharge Planning Evaluation:  Saw pt at bedside for initial readmission evaluation and discharge planning for another rehab request.   Pt sitting up in a chair, states she feels a lot better with her breathing today, A/O X4, o [address] [facility])   Did you know who and how to call someone if you felt worse? Yes   Did any new symptoms or issues develop after you were discharged?  Yes   ----Post D/C symptoms: Symptoms/issue related to previous hospitalization No   Did you understand your disc

## 2019-05-13 NOTE — PLAN OF CARE
Problem: CARDIOVASCULAR - ADULT  Goal: Maintains optimal cardiac output and hemodynamic stability  Description  INTERVENTIONS:  - Monitor vital signs, rhythm, and trends  - Monitor for bleeding, hypotension and signs of decreased cardiac output  - Evalua part  Description  INTERVENTIONS:  - Support and protect limb and body alignment per provider's orders  - Instruct and reinforce with patient and family use of appropriate assistive device and precautions (e.g. spinal or hip dislocation precautions)  Del Ragland hypoglycemic symptoms. Given 90ml apple juice and Dr. Shelby Oliveira was notified. With no orders. Slept well. Code status is DNR. Plan of care addressed with questions/ concerns answered.

## 2019-05-13 NOTE — PHYSICAL THERAPY NOTE
PHYSICAL THERAPY EVALUATION - INPATIENT     Room Number: 203/203-A  Evaluation Date: 5/13/2019  Type of Evaluation: Initial   Physician Order: PT Eval and Treat    Presenting Problem: CHF  Reason for Therapy: Mobility Dysfunction and Discharge Planning to address current impairments (primarily transfers). DISCHARGE RECOMMENDATIONS  PT Discharge Recommendations: Sub-acute rehabilitation    PLAN  PT Treatment Plan: Bed mobility; Body mechanics; Endurance; Energy conservation;Patient education; Family ed • Reflux    • Self-catheterizes urinary bladder 1/11/2016   • Ulcerative colitis ([facility] [address]    • Valvular disease     mitral valve prolapse   • Visual impairment     reading       Past Surgical History  Past Surgical History:   Procedure Laterality Date   • B Restriction: None                PAIN ASSESSMENT  Ratin          COGNITION  · Overall Cognitive Status:  WFL - within functional limits  · Arousal/Alertness:  lethargic    RANGE OF MOTION AND STRENGTH ASSESSMENT  Upper extremity ROM and strength are im Stoop/Curb Assistance: Not tested       Exercise/Education Provided:  Bed mobility  Body mechanics  Energy conservation  Functional activity tolerated  Gait training  Posture  Lower therapeutic exercise:  LAQ  Transfer training    Patient End of Se

## 2019-05-13 NOTE — OCCUPATIONAL THERAPY NOTE
OCCUPATIONAL THERAPY EVALUATION - INPATIENT     Room Number: 146/974-S  Evaluation Date: 5/13/2019  Type of Evaluation: Initial  Presenting Problem: acute respiratory failure    Physician Order: IP Consult to Occupational Therapy  Reason for Therapy: ADL/I activities and is agreeable to sit up in chair- used overhead lift to t/f patient to chair; VSS throughout on 4L NC- patient tolerated all tasks well- recommend patinet return to rehab when medically stable.       DISCHARGE RECOMMENDATIONS  OT Discharge Rec collecting system   • OTHER DISEASES     uterine fibroids   • OTHER DISEASES     ulcerative colitis   • OTHER DISEASES     uti   • Pneumonia, organism unspecified(486)    • Reflux    • Self-catheterizes urinary bladder 1/11/2016   • Ulcerative colitis ([facility] here     OCCUPATIONAL THERAPY EXAMINATION      OBJECTIVE  Precautions: Bed/chair alarm;Limb alert - left;Limb alert - right(spastic paraplegia- BLE- more RUE vs LUE)  Fall Risk: High fall risk    PAIN ASSESSMENT  Ratin          ACTIVITY TOLERANCE  Puls stronger     Patient will tolerated seated EOB x8 minutes with SBA   Comment:     Patient will complete self feeding Mod I   Comment:     Patient will complete UE dressing with setup  Comment:     Comment:         Goals  on: 19  Frequency: 3x we

## 2019-05-13 NOTE — PROGRESS NOTES
[facility] Dosing Service    Initial Pharmacokinetic Consult for Vancomycin Dosing     Valentina Rodriguez is a 66year old female who is being treated for sepsis.   Pharmacy has been asked to dose Vancomycin by Dr. Shannan Grimm    She is allergic to cymbalta

## 2019-05-14 PROCEDURE — 85025 COMPLETE CBC W/AUTO DIFF WBC: CPT | Performed by: HOSPITALIST

## 2019-05-14 PROCEDURE — 80048 BASIC METABOLIC PNL TOTAL CA: CPT | Performed by: HOSPITALIST

## 2019-05-14 RX ORDER — SODIUM CHLORIDE 0.9 % (FLUSH) 0.9 %
10 SYRINGE (ML) INJECTION AS NEEDED
Status: DISCONTINUED | OUTPATIENT
Start: 2019-05-14 | End: 2019-05-16

## 2019-05-14 RX ORDER — FUROSEMIDE 40 MG/1
40 TABLET ORAL DAILY
Status: DISCONTINUED | OUTPATIENT
Start: 2019-05-15 | End: 2019-05-16

## 2019-05-14 RX ORDER — SODIUM CHLORIDE 9 MG/ML
INJECTION, SOLUTION INTRAVENOUS CONTINUOUS
Status: DISCONTINUED | OUTPATIENT
Start: 2019-05-15 | End: 2019-05-16

## 2019-05-14 NOTE — PLAN OF CARE
Problem: Patient Centered Care  Goal: Patient preferences are identified and integrated in the patient's plan of care  Description  Interventions:  - What would you like us to know as we care for you? \" I wish I could go home. \"  - Provide timely, compl cardiac monitoring, monitor vital signs, obtain 12 lead EKG if indicated  - Evaluate effectiveness of antiarrhythmic and heart rate control medications as ordered  - Initiate emergency measures for life threatening arrhythmias  - Monitor electrolytes and a Obtain PT/OT consults as needed  - Advance activity as appropriate  - Communicate ordered activity level and limitations with patient/family  Outcome: Progressing  Goal: Maintain proper alignment of affected body part  Description  INTERVENTIONS:  - Suppor

## 2019-05-14 NOTE — PROGRESS NOTES
[facility] Hospitalist Progress Note     CC: Hospital Follow up    PCP: Vonna Apgar, MD       Assessment/Plan:     Principal Problem:    Acute on chronic combined systolic and diastolic congestive heart failure [facility])  Active Problems:    Community acqui 10/31/18     #frailty/GOC  -patient has been wheelchair bound x15 years  -hospitalized at least 5x in past 12 months  -patient much more alert and improved on 5/13, she was living at home prior to April hospitalization and rehab stay        DVT ppx:  On xa RRR, no murmurs  ABD: Soft, non-tender, non-distended, +BS  Neuro: Grossly normal, CN intact, sensory intact  Psych: Affect- normal  SKIN: warm, dry  EXT: no edema      Data Review:       Labs:     Recent Labs   Lab 05/12/19  1124 05/13/19  0436 05/14/19 7. Atherosclerotic vascular calcification including coronary artery calcification. 8. Significant artifact from a drug infusion pump along the right lower quadrant abdominal wall, and spinal fixation hardware at L5-S1.   Additional artifact from a IVC filte

## 2019-05-14 NOTE — PROGRESS NOTES
[facility]    Cardiology Progress Note    Sarika Hameed Patient Status:  Inpatient    1945 MRN [id number]   Location [facility] 3W/SW Attending Armando Richard MD   Hosp Day # 2 PCP Asim Alfaro MD     [place] Degree of Severe MR s/p mitral clip 10/2018  - Echo from 4 weeks ago reports at least moderate mitral regurgitation.   LV systolic function normal at that time.  - Dr. Vanessa Oscar to review echo from April to determine if JYOTI or repeat ECHO is needed to re-assess mitral Oral)(cpt=74176)    Result Date: 5/12/2019  CONCLUSION:  1. No urolithiasis or renal obstruction. Developmentally prominent right-sided extrarenal pelvis. 2. Mayorga catheter in the bladder. 3. A newly developed 1.6 cm cystic lesion in the pancreatic tail.

## 2019-05-14 NOTE — PROGRESS NOTES
Pulmonary Progress Note     Assessment / Plan:  1. Acute respiratory failure - CXR without clear pneumonia, though she does have a productive cough  - on RA  - volume management per cards  - monitor off abx  - f/u cultures and narrow as able  2.  Fever - te

## 2019-05-14 NOTE — CM/SW NOTE
Case Management/ Progression of Care    Met with patient at the bedside to discuss, goals of care on  discharge, patient was a readmission from [facility] and does not want to return back.   Patient prefers to return home with [address] i

## 2019-05-14 NOTE — DIETARY NOTE
ADULT NUTRITION INITIAL ASSESSMENT    Pt is at moderate nutrition risk. Pt does not meet malnutrition criteria.       RECOMMENDATIONS TO MD:  See Nutrition Intervention     NUTRITION DIAGNOSIS/PROBLEM:  Inadequate oral intake related to prolong hospitalize syndrome    • Lymphocytic colitis Colon= 5/7/12   • MENOPAUSE    • MITRAL VALVE PROLAPSE    • Neuropathy     bilateral legs   • Osteoarthritis     legs, back, shoulders and knees   • OSTEOPENIA    • Other and unspecified hyperlipidemia    • OTHER DISEASES breakfast   • furosemide  40 mg Intravenous BID (Diuretic)     LABS: reviewed  Recent Labs     05/12/19  1124 05/13/19  0436 05/13/19  2206 05/14/19  0542   * 69*  --  89   BUN 10 9  --  12   CREATSERUM 0.56 0.51*  --  0.62   CA 8.3* 8.1*  --  8.4*

## 2019-05-14 NOTE — PROGRESS NOTES
[facility] AND [facility] Infectious Disease  Progress Note    Berna Cisneros Patient Status:  Inpatient    1945 MRN [id number]   Location [facility] 3W/SW Attending Jacob Kayser, MD   Hosp Day # 2 PCP Juan Pierce MD     Sub currently at TID dosing     3.  h/o recurrent UTIs, most recently with proteus s/p treatment  - No stones on CT     4.  H/o hereditary spastic paraplegia  - s/p mechanical fall, WC bound  - Hematoma on arm - supportive care  - Neurogenic bladder due to Boneta Husky

## 2019-05-14 NOTE — PLAN OF CARE
Xarelto on hold due to epistaxis yesterday evening that subsided after approx 6 hours. Will stop xarelto and start eliquis 5 mg po BID tomorrow if no recurrence.

## 2019-05-15 ENCOUNTER — APPOINTMENT (OUTPATIENT)
Dept: CV DIAGNOSTICS | Facility: HOSPITAL | Age: 74
DRG: 871 | End: 2019-05-15
Attending: INTERNAL MEDICINE
Payer: MEDICARE

## 2019-05-15 ENCOUNTER — APPOINTMENT (OUTPATIENT)
Dept: INTERVENTIONAL RADIOLOGY/VASCULAR | Facility: HOSPITAL | Age: 74
DRG: 871 | End: 2019-05-15
Attending: INTERNAL MEDICINE
Payer: MEDICARE

## 2019-05-15 ENCOUNTER — HOSPITAL SCAN (OUTPATIENT)
Dept: CARDIOLOGY | Age: 74
End: 2019-05-15

## 2019-05-15 PROCEDURE — 93312 ECHO TRANSESOPHAGEAL: CPT

## 2019-05-15 PROCEDURE — 93325 DOPPLER ECHO COLOR FLOW MAPG: CPT | Performed by: INTERNAL MEDICINE

## 2019-05-15 PROCEDURE — 85025 COMPLETE CBC W/AUTO DIFF WBC: CPT | Performed by: HOSPITALIST

## 2019-05-15 PROCEDURE — 99152 MOD SED SAME PHYS/QHP 5/>YRS: CPT

## 2019-05-15 PROCEDURE — 80048 BASIC METABOLIC PNL TOTAL CA: CPT | Performed by: HOSPITALIST

## 2019-05-15 PROCEDURE — 93312 ECHO TRANSESOPHAGEAL: CPT | Performed by: INTERNAL MEDICINE

## 2019-05-15 PROCEDURE — B24BZZ4 ULTRASONOGRAPHY OF HEART WITH AORTA, TRANSESOPHAGEAL: ICD-10-PCS | Performed by: INTERNAL MEDICINE

## 2019-05-15 PROCEDURE — 93320 DOPPLER ECHO COMPLETE: CPT | Performed by: INTERNAL MEDICINE

## 2019-05-15 RX ORDER — MIDAZOLAM HYDROCHLORIDE 1 MG/ML
2 INJECTION INTRAMUSCULAR; INTRAVENOUS EVERY 5 MIN PRN
Status: DISCONTINUED | OUTPATIENT
Start: 2019-05-15 | End: 2019-05-16

## 2019-05-15 RX ORDER — MIDAZOLAM HYDROCHLORIDE 1 MG/ML
INJECTION INTRAMUSCULAR; INTRAVENOUS
Status: DISPENSED
Start: 2019-05-15 | End: 2019-05-15

## 2019-05-15 RX ORDER — POTASSIUM CHLORIDE 20 MEQ/1
40 TABLET, EXTENDED RELEASE ORAL ONCE
Status: COMPLETED | OUTPATIENT
Start: 2019-05-15 | End: 2019-05-15

## 2019-05-15 NOTE — PROCEDURES
Preop: Status post mitral clip. Suspect recurrent MR.  CHF. Postop: Severe MR  Procedure: JYOTI    Findings: Mitral clip in place. Severe MR. Severe prolapse of both mitral valve leaflets. Mild TR and AI. Normal LV size and contractility. Small ASD.

## 2019-05-15 NOTE — PROGRESS NOTES
[facility] AND [facility] Infectious Disease  Progress Note    Faisal Lyon Patient Status:  Inpatient    1945 MRN [id number]   Location [facility] 3W/SW Attending Luis Farrell MD   Hosp Day # 3 PCP Sneha Gill MD     Sub course of p.o.  Vancomycin, currently at TID dosing     3.  h/o recurrent UTIs, most recently with proteus s/p treatment  - No stones on CT     4.  H/o hereditary spastic paraplegia  - s/p mechanical fall, WC bound  - Hematoma on arm - supportive care   - N

## 2019-05-15 NOTE — OCCUPATIONAL THERAPY NOTE
PT not seen for OT at this time secondary to being off of floor for testing. Will attempt to see pt later in date as schedule permits.

## 2019-05-15 NOTE — CM/SW NOTE
Case Management/ Progression of Care    Confirmed with patient and , they do not want to return to [address] [place] [address] at discharge and would like to go home with [facility],   Patient has a history of [facility] with [facility], and are agreeable to [facility]

## 2019-05-15 NOTE — PROGRESS NOTES
Pulmonary Progress Note     Assessment / Plan:  1. Acute respiratory failure - CXR without clear pneumonia, though she does have a productive cough.  She is volume overloaded and has improved with diuresis  - on RA  - monitor off abx  - volume management pe

## 2019-05-15 NOTE — PROGRESS NOTES
[facility] AND CLINICS  Progress Note    Linda Alfaro Patient Status:  Inpatient    1945 MRN [id number]   Location [facility] 3W/SW Attending Uriel Rocha MD   Hosp Day # 3 PCP Cash Cooley MD     Assessment:    1.   Kobi Middleton Lab Results   Component Value Date    INR 1.54 (H) 05/12/2019     Lab Results   Component Value Date     05/15/2019    K 3.7 05/15/2019     05/15/2019    CO2 26.0 05/15/2019    BUN 17 05/15/2019    CREATSERUM 0.54 05/15/2019    GLU 79 05/15

## 2019-05-15 NOTE — PROGRESS NOTES
Wilmer Lara  [id number]  5/15/2019    Post procedure/ recovery hand-off report given to [facility] RN. Patient's vital signs stable  .     Gunnar Ovalles RN

## 2019-05-15 NOTE — PROGRESS NOTES
[facility] Hospitalist Progress Note     CC: Hospital Follow up    PCP: Courtney Donahue MD       Assessment/Plan:     Principal Problem:    Acute on chronic combined systolic and diastolic congestive heart failure [facility])  Active Problems:    Community acqui epistaxis, patient does not want to take this medication anymore  - cardiology following, recommends starting eliquis today    - BB held due to low BP     #GI  -recent shocked liver.   LFTs have normalized     #renal  -recent sadie/atn with cr peak at 2.5 a f (63.7 kg)  04/21/19 0509 : 141 lb 14.4 oz (64.4 kg)  04/20/19 0400 : 143 lb 14.4 oz (65.3 kg)  04/16/19 0508 : 138 lb 14.4 oz (63 kg)  04/15/19 0614 : 139 lb 6.4 oz (63.2 kg)  04/13/19 2309 : 130 lb (59 kg)      Exam   GEN: elderly female in NAD  HEENT: EO calcification including coronary artery calcification. 8. Significant artifact from a drug infusion pump along the right lower quadrant abdominal wall, and spinal fixation hardware at L5-S1. Additional artifact from a IVC filter. 9. Mitraclip 10.  Chronic

## 2019-05-16 VITALS
SYSTOLIC BLOOD PRESSURE: 102 MMHG | BODY MASS INDEX: 22.39 KG/M2 | TEMPERATURE: 98 F | HEART RATE: 91 BPM | HEIGHT: 64 IN | OXYGEN SATURATION: 93 % | RESPIRATION RATE: 22 BRPM | DIASTOLIC BLOOD PRESSURE: 60 MMHG | WEIGHT: 131.13 LBS

## 2019-05-16 PROCEDURE — 80048 BASIC METABOLIC PNL TOTAL CA: CPT | Performed by: HOSPITALIST

## 2019-05-16 PROCEDURE — 97530 THERAPEUTIC ACTIVITIES: CPT

## 2019-05-16 PROCEDURE — 97110 THERAPEUTIC EXERCISES: CPT

## 2019-05-16 PROCEDURE — 85025 COMPLETE CBC W/AUTO DIFF WBC: CPT | Performed by: HOSPITALIST

## 2019-05-16 RX ORDER — FUROSEMIDE 40 MG/1
40 TABLET ORAL DAILY
Qty: 30 TABLET | Refills: 0 | Status: SHIPPED | OUTPATIENT
Start: 2019-05-17 | End: 2019-12-05

## 2019-05-16 RX ORDER — METOPROLOL SUCCINATE 25 MG/1
25 TABLET, EXTENDED RELEASE ORAL
Qty: 30 TABLET | Refills: 0 | Status: ON HOLD | OUTPATIENT
Start: 2019-05-17 | End: 2019-05-21

## 2019-05-16 RX ORDER — POTASSIUM CHLORIDE 20 MEQ/1
40 TABLET, EXTENDED RELEASE ORAL ONCE
Status: COMPLETED | OUTPATIENT
Start: 2019-05-16 | End: 2019-05-16

## 2019-05-16 NOTE — DISCHARGE SUMMARY
General Medicine Discharge Summary     Patient ID:  Ana Paula Sánchez  66year old  5/4/1945    Admit date: 5/12/2019    Discharge date and time: 05/16/19    Attending Physician: Susan Pendleton MD     Primary Care Physician: Dora Colbert MD o2 sat of 89%. Patient's xarelto was held due to nose bleed last evening.       She has not been doing well at rehab per , but today was acutely worse. Fever started today and she seems to be coughing some.   She is fairly sleepy in ER and hard to from [place] [address].  Discussed risk / benefits of Mayela Interianoulding her history of clots / hypercoag state and afib, she will need to continue [facility]  -stop prn if recurrent bleeding or significant bleeding  - cardiology recommends holding xarelto today, will start eliquis ezetimibe 10 MG Tabs  Commonly known as:  ZETIA     ferrous sulfate 325 (65 FE) MG Tbec     HYDROcodone-acetaminophen 5-325 MG Tabs  Commonly known as:  NORCO  Take 1 tablet by mouth every 6 (six) hours as needed.      loratadine 10 MG Tabs  Commonly know

## 2019-05-16 NOTE — PROGRESS NOTES
Pulmonary Progress Note     Assessment / Plan:  1. Acute respiratory failure - CXR without clear pneumonia, though she does have a productive cough.  She was volume overloaded and has improved with diuresis  - on RA  - monitor off abx  - volume management p

## 2019-05-16 NOTE — HOME CARE LIAISON
Received referral from Kristi Chadwick for residential home health. Met with patient at the bedside. Patient is agreeable to [facility] services upon discharge. Patient given brochure and liaison card. All questions and concerns were addressed.  Will c

## 2019-05-16 NOTE — PLAN OF CARE
Problem: Patient Centered Care  Goal: Patient preferences are identified and integrated in the patient's plan of care  Description  Interventions:  - What would you like us to know as we care for you? \" I wish I could go home. \"  - Provide timely, compl baseline  Description  INTERVENTIONS:  - Continuous cardiac monitoring, monitor vital signs, obtain 12 lead EKG if indicated  - Evaluate effectiveness of antiarrhythmic and heart rate control medications as ordered  - Initiate emergency measures for life t needed  - Ensure adequate protection for wounds/incisions during mobilization  - Obtain PT/OT consults as needed  - Advance activity as appropriate  - Communicate ordered activity level and limitations with patient/family  Outcome: Adequate for Discharge

## 2019-05-16 NOTE — DISCHARGE PLANNING
Pt is a/o, denies pain, denies SOB. To be discharged today to home. Went over follow up appointments. Went over medications and side effects with patient. D/C IV, site CDI, D/C tele. Assured patient had belongings from home.  Questions from patient and or f

## 2019-05-16 NOTE — TRANSITION NOTE
73-year-old female admitted to [facility] AND CLINICS on 5/12/2019 with congestive heart failure. Symptoms include worsening dyspnea cough and edema. She also appeared to have bronchitis. She has chronic atrial fibrillation and her rate was controlled.   She

## 2019-05-16 NOTE — PHYSICAL THERAPY NOTE
PHYSICAL THERAPY TREATMENT NOTE - INPATIENT     Room Number: 728/515-H       Presenting Problem: CHF    Problem List  Principal Problem:    Acute on chronic combined systolic and diastolic congestive heart failure (HCC)  Active Problems:    Community acqui alert - right(spastic paraplegia- BLE- more RUE vs LUE)    WEIGHT BEARING RESTRICTION  Weight Bearing Restriction: None                PAIN ASSESSMENT   Ratin          BALANCE return to supine   Goal #2 Patient is able to demonstrate transfers EOB to/from Chair/Wheelchair at assistance level: moderate assistance with slider board      Goal #2  Current Status  NT   Goal #3 Patient will tolerate sitting EOB for 10 minutes ta

## 2019-05-16 NOTE — PROGRESS NOTES
[facility] CLIN[facility]  Progress Note    Wiley Robledo Patient Status:  Inpatient    1945 MRN [id number]   Location [facility] 3W/SW Attending Dakota Main MD   Hosp Day # 4 PCP Bayron Isaac MD     Assessment:    1.   Admissio Date     05/16/2019    K 3.7 05/16/2019     05/16/2019    CO2 25.0 05/16/2019    BUN 17 05/16/2019    CREATSERUM 0.49 05/16/2019    GLU 73 05/16/2019    CA 8.3 05/16/2019        Lab Results   Component Value Date    TROP 1.570 ([facility]) 04/16/2019

## 2019-05-16 NOTE — CARDIAC REHAB
CARDIAC REHAB HEART FAILURE EDUCATION    Handouts provided and reviewed: CHF Booklet. Activity: Resting comfortably in bed       Disease Process: Disease process reviewed.     Reviewed the following: DAILY WEIGHT MONITORING: discussed but pt unable to w

## 2019-05-16 NOTE — PROGRESS NOTES
[facility] AND [facility] Infectious Disease Progress Note    Olam Cons Patient Status:  Inpatient    1945 MRN [id number]   Location [facility] 3W/SW Attending Mesha Moon MD   Hosp Day # 4 PCP MD Tiffany Hunter tablet, Oral, Q6H PRN  •  Pantoprazole Sodium (PROTONIX) EC tab 40 mg, 40 mg, Oral, QAM AC  •  pregabalin (LYRICA) cap 50 mg, 50 mg, Oral, BID  •  Vancomycin HCl (FIRVANQ) 50 MG/ML oral solution 125 mg, 125 mg, Oral, TID  •  Venlafaxine HCl ER (EFFEXOR-XR) 05/16/2019    .0 05/16/2019    CREATSERUM 0.49 05/16/2019    BUN 17 05/16/2019     05/16/2019    K 3.7 05/16/2019     05/16/2019    CO2 25.0 05/16/2019    GLU 73 05/16/2019    CA 8.3 05/16/2019       Assessment/Plan:    1.   Fever  -resol

## 2019-05-16 NOTE — CM/SW NOTE
RN informed SW that pt is medically stable to discharge today and will need ambulance transport.  SW spoke w/ Marilyn Carvajal from [facility] and arranged ambulance transport to pt's home, [address] [place] [phone]; arranged at 1pm.     SW updated pt's h

## 2019-05-17 ENCOUNTER — TELEPHONE (OUTPATIENT)
Dept: CARDIOLOGY | Age: 74
End: 2019-05-17

## 2019-05-17 ENCOUNTER — APPOINTMENT (OUTPATIENT)
Dept: GENERAL RADIOLOGY | Facility: HOSPITAL | Age: 74
DRG: 698 | End: 2019-05-17
Attending: EMERGENCY MEDICINE
Payer: MEDICARE

## 2019-05-17 ENCOUNTER — HOSPITAL ENCOUNTER (INPATIENT)
Facility: HOSPITAL | Age: 74
LOS: 4 days | Discharge: NURSING, REHAB OR LONG-TERM CARE | DRG: 698 | End: 2019-05-21
Attending: EMERGENCY MEDICINE | Admitting: HOSPITALIST
Payer: MEDICARE

## 2019-05-17 ENCOUNTER — TELEPHONE (OUTPATIENT)
Dept: CARDIOLOGY UNIT | Facility: HOSPITAL | Age: 74
End: 2019-05-17

## 2019-05-17 DIAGNOSIS — Z98.1 HISTORY OF LUMBAR FUSION: ICD-10-CM

## 2019-05-17 DIAGNOSIS — R50.9 FEVER, UNSPECIFIED FEVER CAUSE: ICD-10-CM

## 2019-05-17 DIAGNOSIS — M54.16 LUMBAR RADICULITIS: ICD-10-CM

## 2019-05-17 DIAGNOSIS — E87.70 HYPERVOLEMIA, UNSPECIFIED HYPERVOLEMIA TYPE: ICD-10-CM

## 2019-05-17 DIAGNOSIS — I48.91 ATRIAL FIBRILLATION WITH RAPID VENTRICULAR RESPONSE (HCC): Primary | ICD-10-CM

## 2019-05-17 PROCEDURE — 93005 ELECTROCARDIOGRAM TRACING: CPT

## 2019-05-17 PROCEDURE — 87633 RESP VIRUS 12-25 TARGETS: CPT | Performed by: EMERGENCY MEDICINE

## 2019-05-17 PROCEDURE — 87186 SC STD MICRODIL/AGAR DIL: CPT | Performed by: EMERGENCY MEDICINE

## 2019-05-17 PROCEDURE — 87581 M.PNEUMON DNA AMP PROBE: CPT | Performed by: EMERGENCY MEDICINE

## 2019-05-17 PROCEDURE — 36415 COLL VENOUS BLD VENIPUNCTURE: CPT

## 2019-05-17 PROCEDURE — 87486 CHLMYD PNEUM DNA AMP PROBE: CPT | Performed by: EMERGENCY MEDICINE

## 2019-05-17 PROCEDURE — 71045 X-RAY EXAM CHEST 1 VIEW: CPT | Performed by: EMERGENCY MEDICINE

## 2019-05-17 PROCEDURE — 99285 EMERGENCY DEPT VISIT HI MDM: CPT

## 2019-05-17 PROCEDURE — 96375 TX/PRO/DX INJ NEW DRUG ADDON: CPT

## 2019-05-17 PROCEDURE — 83605 ASSAY OF LACTIC ACID: CPT | Performed by: EMERGENCY MEDICINE

## 2019-05-17 PROCEDURE — 87040 BLOOD CULTURE FOR BACTERIA: CPT | Performed by: EMERGENCY MEDICINE

## 2019-05-17 PROCEDURE — 96374 THER/PROPH/DIAG INJ IV PUSH: CPT

## 2019-05-17 PROCEDURE — 85025 COMPLETE CBC W/AUTO DIFF WBC: CPT | Performed by: EMERGENCY MEDICINE

## 2019-05-17 PROCEDURE — 80048 BASIC METABOLIC PNL TOTAL CA: CPT | Performed by: EMERGENCY MEDICINE

## 2019-05-17 PROCEDURE — 84145 PROCALCITONIN (PCT): CPT | Performed by: EMERGENCY MEDICINE

## 2019-05-17 PROCEDURE — 93010 ELECTROCARDIOGRAM REPORT: CPT | Performed by: EMERGENCY MEDICINE

## 2019-05-17 PROCEDURE — 81001 URINALYSIS AUTO W/SCOPE: CPT | Performed by: EMERGENCY MEDICINE

## 2019-05-17 PROCEDURE — 87077 CULTURE AEROBIC IDENTIFY: CPT | Performed by: EMERGENCY MEDICINE

## 2019-05-17 PROCEDURE — 87086 URINE CULTURE/COLONY COUNT: CPT | Performed by: EMERGENCY MEDICINE

## 2019-05-17 PROCEDURE — 83880 ASSAY OF NATRIURETIC PEPTIDE: CPT | Performed by: EMERGENCY MEDICINE

## 2019-05-17 PROCEDURE — 87798 DETECT AGENT NOS DNA AMP: CPT | Performed by: EMERGENCY MEDICINE

## 2019-05-17 RX ORDER — METOPROLOL SUCCINATE 50 MG/1
50 TABLET, EXTENDED RELEASE ORAL
Status: DISCONTINUED | OUTPATIENT
Start: 2019-05-18 | End: 2019-05-21

## 2019-05-17 RX ORDER — ACETAMINOPHEN 325 MG/1
650 TABLET ORAL EVERY 6 HOURS PRN
Status: DISCONTINUED | OUTPATIENT
Start: 2019-05-17 | End: 2019-05-21

## 2019-05-17 RX ORDER — FUROSEMIDE 10 MG/ML
40 INJECTION INTRAMUSCULAR; INTRAVENOUS ONCE
Status: COMPLETED | OUTPATIENT
Start: 2019-05-17 | End: 2019-05-17

## 2019-05-17 RX ORDER — METOCLOPRAMIDE HYDROCHLORIDE 5 MG/ML
10 INJECTION INTRAMUSCULAR; INTRAVENOUS EVERY 8 HOURS PRN
Status: DISCONTINUED | OUTPATIENT
Start: 2019-05-17 | End: 2019-05-21

## 2019-05-17 RX ORDER — ACETAMINOPHEN 500 MG
1000 TABLET ORAL ONCE
Status: COMPLETED | OUTPATIENT
Start: 2019-05-17 | End: 2019-05-17

## 2019-05-17 RX ORDER — FUROSEMIDE 10 MG/ML
40 INJECTION INTRAMUSCULAR; INTRAVENOUS
Status: DISCONTINUED | OUTPATIENT
Start: 2019-05-18 | End: 2019-05-19

## 2019-05-17 RX ORDER — SODIUM CHLORIDE 0.9 % (FLUSH) 0.9 %
3 SYRINGE (ML) INJECTION AS NEEDED
Status: DISCONTINUED | OUTPATIENT
Start: 2019-05-17 | End: 2019-05-21

## 2019-05-17 RX ORDER — SODIUM CHLORIDE 9 MG/ML
INJECTION, SOLUTION INTRAVENOUS
Status: COMPLETED
Start: 2019-05-17 | End: 2019-05-17

## 2019-05-17 RX ORDER — ONDANSETRON 2 MG/ML
4 INJECTION INTRAMUSCULAR; INTRAVENOUS EVERY 6 HOURS PRN
Status: DISCONTINUED | OUTPATIENT
Start: 2019-05-17 | End: 2019-05-21

## 2019-05-17 RX ORDER — DILTIAZEM HYDROCHLORIDE 5 MG/ML
10 INJECTION INTRAVENOUS ONCE
Status: COMPLETED | OUTPATIENT
Start: 2019-05-17 | End: 2019-05-17

## 2019-05-17 RX ORDER — FUROSEMIDE 10 MG/ML
40 INJECTION INTRAMUSCULAR; INTRAVENOUS
Status: DISCONTINUED | OUTPATIENT
Start: 2019-05-18 | End: 2019-05-17

## 2019-05-17 NOTE — ED PROVIDER NOTES
Patient Seen in: [facility] AND [facility] Emergency Department    History   Patient presents with:  Cough/URI  Nausea/Vomiting/Diarrhea (gastrointestinal)    Stated Complaint: cough nausea vomiting    HPI    History is provided by EMS and patient.     74-year-ol Reflux    • Self-catheterizes urinary bladder 1/11/2016   • Ulcerative colitis ([facility] [address]    • Valvular disease     mitral valve prolapse   • Visual impairment     reading       Past Surgical History:   Procedure Laterality Date   • BACK SURGERY      spinal fus shortness of breath and wheezing. Cardiovascular: Negative for chest pain. Gastrointestinal: Positive for nausea. Negative for abdominal pain, diarrhea and vomiting. Genitourinary: Negative for dysuria, flank pain and frequency.    Musculoskeletal: N strength in b/l UEs and LEs, normal sensation in all extremities, normal finger to nose b/l, normal gait, no facial asymmetry, normal speech     Skin: Skin is dry. No rash noted.  She is not diaphoretic.   hot   Psychiatric: She has a normal mood and affect Ref Range: 3.80 - 5.30 x10(6)uL 4.37   MCH Latest Ref Range: 26.0 - 34.0 pg 26.1   MCHC Latest Ref Range: 31.0 - 37.0 g/dL 30.9 (L)   MCV Latest Ref Range: 80.0 - 100.0 fL 84.4   RDW Latest Ref Range: 11.0 - 15.0 % 18.6 (H)   RDW-SD Latest Ref Range: 35.1 tylenol ordered  - cardizem ordered  - lasix ordered  - discussed with Sandra Maldonado - NP for [facility] - informed her of pt consult  - discussed with Dr. Issa Waddell - informed her of pt admission  - discussed with Dr. Marci Herron - informed her of pt consult - requesting

## 2019-05-17 NOTE — PROGRESS NOTES
[facility] Dosing Service  Antibiotic Dosing    Berna Cisneros is a 66year old female for whom pharmacy is dosing Zosyn for treatment of  UTI.  .  Other antibiotics (Not dosed by pharmacy): none    Allergies: is allergic to cymbalta [duloxetine hcl]

## 2019-05-17 NOTE — CONSULTS
[facility] Cardiology Consult Note    Marylen Shorter Patient Status:  Emergency    1945 MRN [id number]   Location [address] Attending William Jay MD   Hosp Day # 0 PCP Ariel Solomon MD     66year old [facility] problem    • C. difficile colitis 5/12   • Cataract    • Colitis    • Congestive heart disease ([address]    • Coronary atherosclerosis    • Deep vein thrombosis ([address]     jamal filter in place   • DEPRESSION    • Depression 1/11/2016   • Dyspnea 6/7/2018 findings c/w lymphocytic colitis   • CYSTOURETHROSCOPY  1/9/09    in office   • ESOPHAGOGASTRODUODENOSCOPY (EGD) N/A 12/24/2016    Performed by Erica Boogie MD at [facility] ENDOSCOPY   • ESOPHAGOGASTRODUODENOSCOPY, POSSIBLE DILATION, POSSIBLE BIOSPY, PO PM

## 2019-05-17 NOTE — H&P
[facility] Hospitalist H&P       CC: Patient presents with:  Cough/URI  Nausea/Vomiting/Diarrhea (gastrointestinal)       PCP: Radha Tavares MD    ASSESSMENT / PLAN:   Edmundo Edwards is a(n) 66year old female with DVT s/p permanent IVC filter on el able  -eliquis     #Hx of recurrent C.  Diff colitis  - completing PO vanc taper as per ID, continue here     #HL  - statin     #frailty/GOC  -patient has been wheelchair bound x15 years  -hospitalized at least 6x in past 12 months  -patient was living at  1/11/2016   • Dyspnea 6/7/2018   • Esophageal reflux    • GERD    • GOUT    • Hearing impairment    • Hereditary spastic paraplegia (HCC)    • High blood pressure    • High cholesterol    • History of blood transfusion    • Hypercholesterolemia    • HYPERL POSSIBLE DILATION, POSSIBLE BIOSPY, POSSIBLE POLYPECTOMY N/A 1/5/2015    Performed by Levy Vasquez MD at [address]  Right 2/8/2016    Performed by Fidelina Bee MD at [address] [place] [address] (38.1 °C) (Temporal)   Resp 24   Ht 162.6 cm (5' 4\")   Wt 133 lb (60.3 kg)   SpO2 93%   BMI 22.83 kg/m²   General:  Alert, no distress   Head:  Normocephalic, without obvious abnormality, atraumatic.    Eyes:  Sclera anicteric, No conjunctival pallor, EOMs 25.0 05/17/2019    GLU 83 05/17/2019    CA 8.7 05/17/2019         Recent Labs   Lab 05/12/19  1124 05/13/19  0436 05/14/19  0542 05/15/19  0610 05/16/19  0556 05/17/19  1558   WBC 7.4 5.4 5.6 6.6 6.9 9.9   HGB 10.2* 9.5* 10.0* 9.9* 9.8* 11.4*   MCV 86.1 87

## 2019-05-17 NOTE — ED NOTES
Pt denies CP/SOB, has productive cough with yellow sputum. Pt has colostomy bag, hereditary spastic paraplegia.

## 2019-05-17 NOTE — ED INITIAL ASSESSMENT (HPI)
Pt was here for pneumonia, DC 5-.  Pt c/o continued productive cough, paired with weakness, nausea, and vomiting

## 2019-05-18 PROCEDURE — 85025 COMPLETE CBC W/AUTO DIFF WBC: CPT | Performed by: HOSPITALIST

## 2019-05-18 PROCEDURE — 84132 ASSAY OF SERUM POTASSIUM: CPT | Performed by: HOSPITALIST

## 2019-05-18 PROCEDURE — 83735 ASSAY OF MAGNESIUM: CPT | Performed by: HOSPITALIST

## 2019-05-18 PROCEDURE — 80048 BASIC METABOLIC PNL TOTAL CA: CPT | Performed by: HOSPITALIST

## 2019-05-18 RX ORDER — PREGABALIN 50 MG/1
50 CAPSULE ORAL 2 TIMES DAILY
Status: DISCONTINUED | OUTPATIENT
Start: 2019-05-18 | End: 2019-05-21

## 2019-05-18 RX ORDER — PANTOPRAZOLE SODIUM 40 MG/1
40 TABLET, DELAYED RELEASE ORAL
Status: DISCONTINUED | OUTPATIENT
Start: 2019-05-18 | End: 2019-05-21

## 2019-05-18 RX ORDER — EZETIMIBE 10 MG/1
10 TABLET ORAL NIGHTLY
Status: DISCONTINUED | OUTPATIENT
Start: 2019-05-18 | End: 2019-05-21

## 2019-05-18 RX ORDER — SODIUM CHLORIDE 9 MG/ML
INJECTION, SOLUTION INTRAVENOUS
Status: DISPENSED
Start: 2019-05-18 | End: 2019-05-18

## 2019-05-18 RX ORDER — POTASSIUM CHLORIDE 14.9 MG/ML
20 INJECTION INTRAVENOUS ONCE
Status: COMPLETED | OUTPATIENT
Start: 2019-05-18 | End: 2019-05-18

## 2019-05-18 RX ORDER — HYDROCODONE BITARTRATE AND ACETAMINOPHEN 5; 325 MG/1; MG/1
1 TABLET ORAL EVERY 6 HOURS PRN
Status: DISCONTINUED | OUTPATIENT
Start: 2019-05-18 | End: 2019-05-21

## 2019-05-18 RX ORDER — ATORVASTATIN CALCIUM 40 MG/1
40 TABLET, FILM COATED ORAL NIGHTLY
Status: DISCONTINUED | OUTPATIENT
Start: 2019-05-18 | End: 2019-05-21

## 2019-05-18 RX ORDER — VENLAFAXINE HYDROCHLORIDE 37.5 MG/1
75 CAPSULE, EXTENDED RELEASE ORAL DAILY
Status: DISCONTINUED | OUTPATIENT
Start: 2019-05-18 | End: 2019-05-21

## 2019-05-18 RX ORDER — MELATONIN
325
Status: DISCONTINUED | OUTPATIENT
Start: 2019-05-18 | End: 2019-05-21

## 2019-05-18 RX ORDER — CETIRIZINE HYDROCHLORIDE 10 MG/1
10 TABLET ORAL DAILY
Status: DISCONTINUED | OUTPATIENT
Start: 2019-05-18 | End: 2019-05-21

## 2019-05-18 RX ORDER — PREDNISONE 1 MG/1
5 TABLET ORAL DAILY
Status: DISCONTINUED | OUTPATIENT
Start: 2019-05-18 | End: 2019-05-21

## 2019-05-18 RX ORDER — ASPIRIN 81 MG/1
81 TABLET ORAL DAILY
Status: DISCONTINUED | OUTPATIENT
Start: 2019-05-18 | End: 2019-05-19

## 2019-05-18 RX ORDER — SODIUM CHLORIDE 9 MG/ML
INJECTION, SOLUTION INTRAVENOUS
Status: COMPLETED
Start: 2019-05-18 | End: 2019-05-18

## 2019-05-18 NOTE — PLAN OF CARE
Problem: Patient/Family Goals  Goal: Patient/Family Long Term Goal  Description  Patient's Long Term Goal: to return home with     Interventions:  - IV Zosyn  -Urine culture pending  - See additional Care Plan goals for specific interventions   Ou cardiac output and hemodynamic stability  Description  INTERVENTIONS:  - Monitor vital signs, rhythm, and trends  - Monitor for bleeding, hypotension and signs of decreased cardiac output  - Evaluate effectiveness of vasoactive medications to optimize hemo

## 2019-05-18 NOTE — CM/SW NOTE
Readmission Note/ Progression  Of Care    Patient  Is a readmission from 5/12/2019 to 5/16/2019 at which time she was discharged to home with  [facility],  However patient returned to the ED with complaints of Abdominal Pain and fever.    Prior to admission oleg

## 2019-05-18 NOTE — PROGRESS NOTES
[facility] AND [facility]  Cardiology Progress Note    Nba Tee Patient Status:  Inpatient    1945 MRN [id number]   Location [facility] 3W/SW Attending Ifeanyi Ragland, DO   Hosp Day # 1 PCP Wilfrido Brown MD       Subjective: Feeling HGB 10.2*   < > 10.0* 9.9* 9.8* 11.4* 10.3*   MCV 86.1   < > 86.5 86.8 85.8 84.4 85.9   .0   < > 268.0 257.0 245.0 277.0 221.0   INR 1.54*  --   --   --   --   --   --     < > = values in this interval not displayed.        Recent Labs   Lab 05/14/ Q8H PRN   acetaminophen (TYLENOL) tab 650 mg 650 mg Oral Q6H PRN   furosemide (LASIX) injection 40 mg 40 mg Intravenous BID (Diuretic)   Metoprolol Succinate ER (Toprol XL) 24 hr tab 50 mg 50 mg Oral Daily Beta Blocker   Piperacillin Sod-Tazobactam So (ZOS

## 2019-05-18 NOTE — PROGRESS NOTES
[facility] Hospitalist Progress Note     CC: Hospital Follow up    PCP: Beatriz Gore MD       Assessment/Plan:   Armand Edwards is a(n) 66year old female with DVT s/p permanent IVC filter on eliquis, recurrent cdif colitis and collagenous colitis per ID, continue here     #HL  - statin     #frailty/GOC  -patient has been wheelchair bound x15 years  -hospitalized at least 6x in past 12 months  -patient was living at home prior to April hospitalization and rehab stay  -pt did confirm DNR        FEN: HSM  MSK: LUE 4/5 strength, 1-2.5 RUE, b/l LE paresis  Neuro: strength as above, aaox3, able to follow commands, no facial weakness noted  Psych: Affect- normal, AAOx3  SKIN: no sig erythema L knee, will monitor, ecchymosis of b/l UE and distal RLE hematom Oral QAM AC   • predniSONE  5 mg Oral Daily   • Venlafaxine HCl ER  75 mg Oral Daily   • Vancomycin HCl  125 mg Oral TID   • sodium chloride       • furosemide  40 mg Intravenous BID (Diuretic)   • Metoprolol Succinate ER  50 mg Oral Daily Beta Blocker   •

## 2019-05-18 NOTE — CONSULTS
[facility] AND [facility] Infectious Disease  Report of Consultation    Marylen Shorter Patient Status:  Inpatient    1945 MRN [id number]   Location [facility] 3W/SW Attending Wilmer Singh, [address] [facility] Day # 1 PCP Baldemar Shannon MD hyperlipidemia    • OTHER DISEASES     spastic paraparesis   • OTHER DISEASES     cataracts   • OTHER DISEASES     secondary poycythemia   • OTHER DISEASES     lumbar spinal stenosis   • OTHER DISEASES     duplicated left renal collecting system   • OTHER Father    • Eye Problems Father         glaucoma   • DVT/VTE Father    • Diabetes Mother    • Diabetes Sister    • Eye Problems Sister         glaucoma   • Other (Other) Sister    • Other (Other) Daughter         spastic paraplegia   • Eye Problems Other AC  •  predniSONE (DELTASONE) tab 5 mg, 5 mg, Oral, Daily  •  venlafaxine (EFFEXOR-XR) 24 hr cap, 75 mg, Oral, Daily  •  Vancomycin HCl (FIRVANQ) 50 MG/ML oral solution 125 mg, 125 mg, Oral, TID  •  diltiazem 100mg/100ml in NaCl (CARDIZEM) 1 mg/mL premix/a — 5' 4\" (1.626 m) 130 lb 11.2 oz (59.3 kg)   05/17/19 2006 114/67 98.1 °F (36.7 °C) Oral 75 22 95 % — —   05/17/19 1836 115/56 (!) 96.4 °F (35.8 °C) Axillary 87 22 93 % — —   05/17/19 1800 113/67 — — 82 24 92 % — —   05/17/19 1745 104/71 — — 85 (!) 27 91 tapering course of p.o.  Vancomycin, currently at TID dosing     3.  h/o recurrent UTIs, most recently with proteus s/p treatment  - No stones on recent CT  - f/u current cultures     4.  H/o hereditary spastic paraplegia  - s/p mechanical fall, WC bound  -

## 2019-05-19 PROCEDURE — 97530 THERAPEUTIC ACTIVITIES: CPT

## 2019-05-19 PROCEDURE — 97166 OT EVAL MOD COMPLEX 45 MIN: CPT

## 2019-05-19 PROCEDURE — 80048 BASIC METABOLIC PNL TOTAL CA: CPT | Performed by: HOSPITALIST

## 2019-05-19 PROCEDURE — 83735 ASSAY OF MAGNESIUM: CPT | Performed by: HOSPITALIST

## 2019-05-19 PROCEDURE — 84132 ASSAY OF SERUM POTASSIUM: CPT | Performed by: HOSPITALIST

## 2019-05-19 PROCEDURE — 85025 COMPLETE CBC W/AUTO DIFF WBC: CPT | Performed by: HOSPITALIST

## 2019-05-19 RX ORDER — POTASSIUM CHLORIDE 20 MEQ/1
40 TABLET, EXTENDED RELEASE ORAL EVERY 4 HOURS
Status: COMPLETED | OUTPATIENT
Start: 2019-05-19 | End: 2019-05-19

## 2019-05-19 RX ORDER — POTASSIUM CHLORIDE 14.9 MG/ML
20 INJECTION INTRAVENOUS ONCE
Status: DISCONTINUED | OUTPATIENT
Start: 2019-05-19 | End: 2019-05-19

## 2019-05-19 NOTE — PROGRESS NOTES
[facility] [place] Pharm[facility] Consult Note:  Antibiotic Dosing    Pharmacy was consulted to dose Meropenem (Merrem) for treatment of UTI by Dr. Finesse Montalvo. Body mass index is 21.97 kg/m².   Wt Readings from Last 6 Encounters:  05/19/19 : 58.1 kg (128 lb)  05/16/19 : 59.5 kg (

## 2019-05-19 NOTE — PROGRESS NOTES
[facility] Hospitalist Progress Note     CC: Hospital Follow up    PCP: Byron Pitts MD       Assessment/Plan:   Bren Edwards is a(n) 66year old female with DVT s/p permanent IVC filter on eliquis, recurrent cdif colitis and collagenous colitis taper as per ID, continue here     #HL  - statin     #frailty/GOC  -patient has been wheelchair bound x15 years  -hospitalized at least 6x in past 12 months  -patient was living at home prior to April hospitalization and rehab stay  -pt did confirm DNR    1-2.5 RUE, b/l LE paresis  Neuro: strength as above, aaox3, able to follow commands, no facial weakness noted  Psych: Affect- normal, AAOx3  SKIN: no sig erythema L knee, will monitor, ecchymosis of b/l UE and distal RLE hematoma no assoc erythema  EXT: tr HCl ER  75 mg Oral Daily   • Vancomycin HCl  125 mg Oral TID   • Metoprolol Succinate ER  50 mg Oral Daily Beta Blocker   • piperacillin-tazobactam  3.375 g Intravenous Q8H     • diltiazem Stopped (05/18/19 0742)     HYDROcodone-acetaminophen, Normal Salin

## 2019-05-19 NOTE — PROGRESS NOTES
[facility]  Cardiology Progress Note    Olam Cons Patient Status:  Inpatient    1945 MRN [id number]   Location [facility] 3W/SW Attending Kay Vogel DO   Hosp Day # 2 PCP Sabine Mccoy MD       Subjective: Feeling WBC 7.4   < > 6.6 6.9 9.9 7.0 6.6   HGB 10.2*   < > 9.9* 9.8* 11.4* 10.3* 10.0*   MCV 86.1   < > 86.8 85.8 84.4 85.9 85.3   .0   < > 257.0 245.0 277.0 221.0 220.0   INR 1.54*  --   --   --   --   --   --     < > = values in this interval not displ tablet Oral Q6H PRN   cetirizine (ZYRTEC) tab 10 mg 10 mg Oral Daily   Pantoprazole Sodium (PROTONIX) EC tab 40 mg 40 mg Oral QAM AC   predniSONE (DELTASONE) tab 5 mg 5 mg Oral Daily   venlafaxine (EFFEXOR-XR) 24 hr cap 75 mg Oral Daily   Vancomycin HCl (F

## 2019-05-19 NOTE — PROGRESS NOTES
[facility] AND [facility] Infectious Disease  Progress Note    Alexandro Gutierrez Patient Status:  Inpatient    1945 MRN [id number]   Location [facility] 3W/SW Attending Susanna Nieto DO   Hosp Day # 2 PCP Kris Alanis MD Winnifred Ben f/u current cultures     4.  H/o hereditary spastic paraplegia  - s/p mechanical fall, WC bound  - Hematoma on arm - supportive care   - Neurogenic bladder due to this as well, straight caths at baseline     5.  AECHF  - Recent JYOTI last admission  - Per ca

## 2019-05-19 NOTE — PLAN OF CARE
Problem: Patient/Family Goals  Goal: Patient/Family Long Term Goal  Description  Patient's Long Term Goal: to return home with     Interventions:  - IV Zosyn  -Urine culture pending  - See additional Care Plan goals for specific interventions   Ou stability  Description  INTERVENTIONS:  - Monitor vital signs, rhythm, and trends  - Monitor for bleeding, hypotension and signs of decreased cardiac output  - Evaluate effectiveness of vasoactive medications to optimize hemodynamic stability  - Monitor ar

## 2019-05-19 NOTE — OCCUPATIONAL THERAPY NOTE
OCCUPATIONAL THERAPY EVALUATION - INPATIENT     Room Number: 317/317-A  Evaluation Date: 5/19/2019  Type of Evaluation: Initial       Physician Order: IP Consult to Occupational Therapy  Reason for Therapy: ADL/IADL Dysfunction and Discharge Planning    OC PLAN  OT Treatment Plan: Balance activities; Energy conservation/work simplification techniques;ADL training;Functional transfer training; Endurance training;Patient/Family education;Patient/Family training;Equipment eval/education       OCCUPATIONAL THE History  Past Surgical History:   Procedure Laterality Date   • BACK SURGERY      spinal fusion L1-5   • CAUDAL N/A 3/26/2019    Performed by Polly Mora MD at [address] [place] [address] N/A 10/10/2017    Performed by Polly Mora MD to the bathroom.      SUBJECTIVE  Patient is pleasant and cooperative     OCCUPATIONAL THERAPY EXAMINATION      OBJECTIVE  Precautions: Bed/chair alarm(spastic paraplegia)  Fall Risk: High fall risk    PAIN ASSESSMENT  Rating: Unable to rate  Location: [facility] aware of session/findings; All patient questions and concerns addressed    OT Goals  Patients self stated goal is: not stated     Patient will tolerate sitting EOB with min assist for 5 minutes  Comment:     Patient will complete OFH in sitting with s/u  Co

## 2019-05-20 ENCOUNTER — APPOINTMENT (OUTPATIENT)
Dept: PHYSICAL THERAPY | Facility: HOSPITAL | Age: 74
DRG: 698 | End: 2019-05-20
Attending: INTERNAL MEDICINE
Payer: MEDICARE

## 2019-05-20 ENCOUNTER — APPOINTMENT (OUTPATIENT)
Dept: GENERAL RADIOLOGY | Facility: HOSPITAL | Age: 74
DRG: 698 | End: 2019-05-20
Attending: INTERNAL MEDICINE
Payer: MEDICARE

## 2019-05-20 ENCOUNTER — APPOINTMENT (OUTPATIENT)
Dept: PICC SERVICES | Facility: HOSPITAL | Age: 74
DRG: 698 | End: 2019-05-20
Attending: INTERNAL MEDICINE
Payer: MEDICARE

## 2019-05-20 PROCEDURE — 80048 BASIC METABOLIC PNL TOTAL CA: CPT | Performed by: HOSPITALIST

## 2019-05-20 PROCEDURE — 05HB33Z INSERTION OF INFUSION DEVICE INTO RIGHT BASILIC VEIN, PERCUTANEOUS APPROACH: ICD-10-PCS | Performed by: INTERNAL MEDICINE

## 2019-05-20 PROCEDURE — 71045 X-RAY EXAM CHEST 1 VIEW: CPT | Performed by: INTERNAL MEDICINE

## 2019-05-20 PROCEDURE — 85025 COMPLETE CBC W/AUTO DIFF WBC: CPT | Performed by: HOSPITALIST

## 2019-05-20 PROCEDURE — 97530 THERAPEUTIC ACTIVITIES: CPT

## 2019-05-20 PROCEDURE — 83735 ASSAY OF MAGNESIUM: CPT | Performed by: HOSPITALIST

## 2019-05-20 PROCEDURE — 36410 VNPNXR 3YR/> PHY/QHP DX/THER: CPT

## 2019-05-20 PROCEDURE — 76937 US GUIDE VASCULAR ACCESS: CPT

## 2019-05-20 PROCEDURE — 97162 PT EVAL MOD COMPLEX 30 MIN: CPT

## 2019-05-20 RX ORDER — LIDOCAINE HYDROCHLORIDE 10 MG/ML
0.5 INJECTION, SOLUTION INFILTRATION; PERINEURAL ONCE AS NEEDED
Status: ACTIVE | OUTPATIENT
Start: 2019-05-20 | End: 2019-05-20

## 2019-05-20 RX ORDER — GUAIFENESIN 100 MG/5ML
200 SOLUTION ORAL EVERY 6 HOURS PRN
Status: DISCONTINUED | OUTPATIENT
Start: 2019-05-20 | End: 2019-05-21

## 2019-05-20 RX ORDER — SODIUM CHLORIDE 0.9 % (FLUSH) 0.9 %
10 SYRINGE (ML) INJECTION AS NEEDED
Status: DISCONTINUED | OUTPATIENT
Start: 2019-05-20 | End: 2019-05-21

## 2019-05-20 RX ORDER — ECHINACEA PURPUREA EXTRACT 125 MG
1 TABLET ORAL
Status: DISCONTINUED | OUTPATIENT
Start: 2019-05-20 | End: 2019-05-21

## 2019-05-20 NOTE — CARDIAC REHAB
Order received for Cardiac Rehab, attempted to see pt but presently having medline inserted.  Will F/U in am.

## 2019-05-20 NOTE — CM/SW NOTE
Case Management/ Progression of Care    [facility] [place]  Has accepted  Bloxy pending    / to remain available for support and/or discharge planning. Amanda Bloom RN Case Manager   Ext.  [phone]

## 2019-05-20 NOTE — PROGRESS NOTES
[facility] Hospitalist Progress Note     CC: Hospital Follow up    PCP: Byron Pitts MD       Assessment/Plan:   Bren Edwards is a(n) 66year old female with DVT s/p permanent IVC filter on eliquis, recurrent cdif colitis and collagenous colitis months  -patient was living at home prior to April hospitalization and rehab stay  -pt did confirm DNR     FEN:  - IVF:none  - Diet:cardiac  - Lytes:in am     DVT Prophy:on eliquis  Dispo: likely d/c tomorrow to subacute rehab with cardiology follow up commands, no facial weakness noted  Psych: Affect- normal, AAOx3  SKIN: no sig erythema L knee, will monitor, ecchymosis of b/l UE and distal RLE hematoma no assoc erythema  EXT: trace b/l LE edema, able to move LUE but decreased rom    Data Review: Oral QAM AC   • predniSONE  5 mg Oral Daily   • Venlafaxine HCl ER  75 mg Oral Daily   • Vancomycin HCl  125 mg Oral TID   • Metoprolol Succinate ER  50 mg Oral Daily Beta Blocker     • diltiazem Stopped (05/18/19 0742)     guaiFENesin, Saline Nasal Spray,

## 2019-05-20 NOTE — PROGRESS NOTES
[facility] AND [facility]  Progress Note    Rosalio Gannon Patient Status:  Inpatient    1945 MRN [id number]   Location [facility] 3W/SW Attending Jaspal Rice MD   Hosp Day # 3 PCP Clovis Cohn MD     Assessment:    1.   Early readmiss filed at 5/20/2019 0500  Gross per 24 hour   Intake 607 ml   Output 375 ml   Net 232 ml       Wt Readings from Last 2 Encounters:  05/20/19 : 128 lb 11.2 oz (58.4 kg)  05/16/19 : 131 lb 1.6 oz (59.5 kg)      Physical Exam:    General: Alert and oriented x

## 2019-05-20 NOTE — PROGRESS NOTES
Vascular Access Note  [address] midline catheter insertion      Vascular Access Screening:   Allergies to Lidocaine: no  Allergies to Latex: no  Presence of Pacemaker/Defibrillator: No  Mastectomy with Lymph Node Dissection: No  AV Fistula / AV Graft: N

## 2019-05-20 NOTE — CM/SW NOTE
Readmission Note/ Progression  Of Care     Patient  Is a readmission from 5/12/2019 to 5/16/2019 at which time she was discharged to home with  [facility],  However patient returned to the ED with complaints of Abdominal Pain and fever.    Prior to admission oleg

## 2019-05-20 NOTE — PHYSICAL THERAPY NOTE
PHYSICAL THERAPY EVALUATION - INPATIENT     Room Number: 317/317-A  Evaluation Date: 5/20/2019  Type of Evaluation: Initial   Physician Order: PT Eval and Treat    Presenting Problem: Afib w/ RVR, pneumonia, abdominal pain  Reason for Therapy: Mobility Dy rehabilitation    PLAN  PT Treatment Plan: Bed mobility;Transfer training; Body mechanics;Strengthening;Range of motion;Patient education; Endurance; Coordination;Balance training  Rehab Potential : Fair  Frequency (Obs): 3x/week       PHYSICAL THERAPY MEDICA knees   • OSTEOPENIA    • Other and unspecified hyperlipidemia    • OTHER DISEASES     spastic paraparesis   • OTHER DISEASES     cataracts   • OTHER DISEASES     secondary poycythemia   • OTHER DISEASES     lumbar spinal stenosis   • OTHER DISEASES     du SITUATION  Type of Home: House   Home Layout: One level  Stairs to Enter : 0(ramp)     Stairs to [employer]: 0       Lives With: Spouse     Patient Owned Equipment: (w/c, kathya lift, slide board)       Prior Level of Vest: Pt lives with  who is Standardized Score (AM-PAC Scale): 30.55   CMS Modifier (G-Code): CM    FUNCTIONAL ABILITY STATUS  Gait Assessment   Gait Assistance: Not tested           Stoop/Curb Assistance: Not tested       Bed Mobility: Max A x 2    Transfers: NT    Exercise/Educat

## 2019-05-20 NOTE — PROGRESS NOTES
[facility] AND [facility] Infectious Disease  Progress Note    Sharran Koyanagi Patient Status:  Inpatient    1945 MRN [id number]   Location [facility] 3W/SW Attending Lisandro Winston MD   Hosp Day # 3 PCP MD Dinora Zhou stones on recent CT  - f/u current cultures     4.  H/o hereditary spastic paraplegia  - s/p mechanical fall, WC bound  - Hematoma on arm - supportive care   - Neurogenic bladder due to this as well, straight caths at baseline     5.  AECHF  - Recent JYOTI l

## 2019-05-20 NOTE — PLAN OF CARE
Problem: Patient/Family Goals  Goal: Patient/Family Long Term Goal  Description  Patient's Long Term Goal: to return home with     Interventions:  - IV Zosyn  -Urine culture pending  - See additional Care Plan goals for specific interventions   Ou baseline  Description  INTERVENTIONS:  - Continuous cardiac monitoring, monitor vital signs, obtain 12 lead EKG if indicated  - Evaluate effectiveness of antiarrhythmic and heart rate control medications as ordered  - Initiate emergency measures for life t

## 2019-05-21 VITALS
TEMPERATURE: 98 F | BODY MASS INDEX: 21.45 KG/M2 | HEART RATE: 74 BPM | SYSTOLIC BLOOD PRESSURE: 118 MMHG | HEIGHT: 64 IN | DIASTOLIC BLOOD PRESSURE: 66 MMHG | OXYGEN SATURATION: 97 % | RESPIRATION RATE: 20 BRPM | WEIGHT: 125.63 LBS

## 2019-05-21 PROCEDURE — 83735 ASSAY OF MAGNESIUM: CPT | Performed by: INTERNAL MEDICINE

## 2019-05-21 PROCEDURE — 85027 COMPLETE CBC AUTOMATED: CPT | Performed by: INTERNAL MEDICINE

## 2019-05-21 PROCEDURE — 80048 BASIC METABOLIC PNL TOTAL CA: CPT | Performed by: INTERNAL MEDICINE

## 2019-05-21 PROCEDURE — 36592 COLLECT BLOOD FROM PICC: CPT

## 2019-05-21 RX ORDER — POTASSIUM CHLORIDE 750 MG/1
10 TABLET, EXTENDED RELEASE ORAL DAILY
Status: DISCONTINUED | OUTPATIENT
Start: 2019-05-21 | End: 2019-05-21

## 2019-05-21 RX ORDER — HYDROCODONE BITARTRATE AND ACETAMINOPHEN 5; 325 MG/1; MG/1
1 TABLET ORAL EVERY 6 HOURS PRN
Qty: 30 TABLET | Refills: 0 | Status: SHIPPED | OUTPATIENT
Start: 2019-05-21 | End: 2019-05-21

## 2019-05-21 RX ORDER — PREGABALIN 50 MG/1
50 CAPSULE ORAL 2 TIMES DAILY
Qty: 60 CAPSULE | Refills: 0 | Status: SHIPPED | OUTPATIENT
Start: 2019-05-21 | End: 2019-08-02

## 2019-05-21 RX ORDER — POTASSIUM CHLORIDE 750 MG/1
10 TABLET, EXTENDED RELEASE ORAL DAILY
Qty: 30 TABLET | Refills: 0 | Status: ON HOLD | OUTPATIENT
Start: 2019-05-21 | End: 2019-10-03

## 2019-05-21 RX ORDER — FUROSEMIDE 40 MG/1
40 TABLET ORAL DAILY
Status: DISCONTINUED | OUTPATIENT
Start: 2019-05-21 | End: 2019-05-21

## 2019-05-21 RX ORDER — METOPROLOL SUCCINATE 50 MG/1
50 TABLET, EXTENDED RELEASE ORAL
Qty: 30 TABLET | Refills: 5 | Status: SHIPPED | OUTPATIENT
Start: 2019-05-22 | End: 2019-08-02

## 2019-05-21 NOTE — DISCHARGE PLANNING
I called to give report to nurse Valentina Houser at [facility]. She is aware of patient's current hospitalization, past medical history and current IV antibiotic that the patient will be continuing.  Nurse Andrés Oconnell is aware of patient's diagnosis of CHF and that

## 2019-05-21 NOTE — PROGRESS NOTES
[facility]    Progress Note    Millie Bamberger Patient Status:  Inpatient    1945 MRN [id number]   Location [facility] 3W/SW Attending King Christiansen MD   Hosp Day # 4 PCP Claudean Palmer, MD        Subjective:     R 05/21/2019    K 4.1 05/21/2019     (H) 05/21/2019    CO2 21.0 05/21/2019    GLU 74 05/21/2019    CA 8.6 05/21/2019    ALB 2.3 (L) 05/12/2019    ALKPHO 190 (H) 05/12/2019    BILT 1.1 05/12/2019    TP 5.2 (L) 05/12/2019    AST 23 05/12/2019    ALT 42 0

## 2019-05-21 NOTE — PLAN OF CARE
Problem: Patient/Family Goals  Goal: Patient/Family Long Term Goal  Description  Patient's Long Term Goal: to return home with     Interventions:  - IV Zosyn  -Urine culture pending  - See additional Care Plan goals for specific interventions   Ou trends  - Monitor for bleeding, hypotension and signs of decreased cardiac output  - Evaluate effectiveness of vasoactive medications to optimize hemodynamic stability  - Monitor arterial and/or venous puncture sites for bleeding and/or hematoma  - Assess

## 2019-05-21 NOTE — CDS QUERY
Mckinley Umanzor    Dear Doctor Kvng Thorne,   66 yr old female with multiple medical problems per H&P plus hereditary spastic paraplegia and neurogenic bladder, straight caths at baseline   05/20 Your PN:  Recurrent UTI,  Urine cultur

## 2019-05-21 NOTE — DISCHARGE SUMMARY
[facility] Hospitalist Discharge Summary   Patient ID:  Rosalio Gannon [id number]  33 year old 5/4/1945    Admit date: 5/17/2019  Discharge date: 5/21/2019  Risk of Readmission Lace+ Score: 81  59-90 High Risk  29-58 Medium Risk  0-28   Low Risk    Primary C hereditary spastic paraplegia (wheel chair bound, baclofen pump - dose decreased recently - and straight cath at baseline), GERD, recent admit for hypoxic respiratory failure improved with diuresis and fevers who presents from SNF with a c/co productive co colitis  - completing PO vanc taper as per ID, continue TID oral vanc until 5/23 then BID x 2 weeks then daily x 2 weeks then discontinue     #HL  - statin     #frailty/GOC  -patient has been wheelchair bound x15 years  -hospitalized at least 6x in past 15 Pantoprazole Sodium 40 MG Tbec  Commonly known as:  PROTONIX  Take 1 tablet (40 mg total) by mouth every morning before breakfast.     predniSONE 5 MG Tabs  Commonly known as:  DELTASONE  TAKE 1 TABLET BY MOUTH DAILY     pregabalin 50 MG Caps  Commonly k Coordinating Care: > than 30 minutes    Patient had opportunity to ask questions and state understand and agree with therapeutic plan as outlined    Becky Ohara MD  [facility] Hospitalist  Answering Service number: [phone]

## 2019-05-21 NOTE — PROGRESS NOTES
[facility] Infectious Disease  Progress Note    Geeta Willis Patient Status:  Inpatient    1945 MRN [id number]   Location [facility] 3W/SW Attending Lexa Holland MD   Hosp Day # 4 PCP MD Jose Luis Deng with proteus s/p treatment  - No stones on recent CT  - f/u current cultures     4.  H/o hereditary spastic paraplegia  - s/p mechanical fall, WC bound  - Hematoma on arm - supportive care   - Neurogenic bladder due to this as well, straight caths at basel

## 2019-05-21 NOTE — CM/SW NOTE
05/21/19 1200   Discharge disposition   Expected discharge disposition Skilled Nurs   Name of Facillity/Home Care/Hospice [facility]   Outpatient services CHF clinic   Patient is Discharged to a [address] [place] [address] Yes   Discharge transportation O

## 2019-05-21 NOTE — PLAN OF CARE
Problem: Patient/Family Goals  Goal: Patient/Family Long Term Goal  Description  Patient's Long Term Goal: to return home with     Interventions:  - IV Zosyn  -Urine culture pending  - See additional Care Plan goals for specific interventions   Ou CARDIOVASCULAR - ADULT  Goal: Maintains optimal cardiac output and hemodynamic stability  Description  INTERVENTIONS:  - Monitor vital signs, rhythm, and trends  - Monitor for bleeding, hypotension and signs of decreased cardiac output  - Evaluate effectiv

## 2019-06-05 NOTE — ED PROVIDER NOTES
Patient Seen in: [facility] 3w/sw    History   Patient presents with:  Dyspnea TIMO SOB (respiratory)    Stated Complaint: Shortness of breath    HPI    Patient is a 13-year-old female who presents to the emergency department with a chief complaint • Valvular disease     mitral valve prolapse   • Visual impairment     reading       Past Surgical History:   Procedure Laterality Date   • BACK SURGERY      spinal fusion L1-5   • CAUDAL N/A 3/26/2019    Performed by Dave Wray MD at [facility]  Pulse 109   Resp (!) 43   Temp (!) 101 °F (38.3 °C)   Temp src Oral   SpO2 96 %   O2 Device Nasal cannula       Current:/60 (BP Location: Right arm)   Pulse 91   Temp 98.2 °F (36.8 °C) (Oral)   Resp 22   Ht 162.6 cm (5' 4\")   Wt 59.5 kg   SpO2 93% Pro-Beta Natriuretic Peptide 2,641 (*)     All other components within normal limits   HEPATIC FUNCTION PANEL (7) - Abnormal; Notable for the following components:    Alkaline Phosphatase 190 (*)     Bilirubin, Direct 0.4 (*)     Total Protein 5.2 (*) All other components within normal limits   CBC W/ DIFFERENTIAL - Abnormal; Notable for the following components:    RBC 3.74 (*)     HGB 9.8 (*)     HCT 32.1 (*)     MCHC 30.5 (*)     RDW-SD 57.1 (*)     RDW 18.4 (*)     All other components within nor were created for panel order CBC WITH DIFFERENTIAL WITH PLATELET.   Procedure                               Abnormality         Status                     ---------                               -----------         ------                     CBC W/ DIFFERJAREK mg total) by mouth 2 (two) times daily. , Print Script, Disp-60 tablet, R-0    furosemide 40 MG Oral Tab  Take 1 tablet (40 mg total) by mouth daily. , Print Script, Disp-30 tablet, R-0          Present on Admission  Date Reviewed: 4/29/2019          ICD-10-

## 2019-06-10 ENCOUNTER — TELEPHONE (OUTPATIENT)
Dept: CARDIOLOGY CLINIC | Facility: HOSPITAL | Age: 74
End: 2019-06-10

## 2019-06-10 NOTE — TELEPHONE ENCOUNTER
Rehab center notified that Moe Morley needs transportation to her appt. On 6/20 at 9 am in the structural heart center with Dr. Halie Cardenas. Info given to RN.  Also spoke to , Jamiljasonosiris Current who instructed me to call rehab center

## 2019-06-20 ENCOUNTER — TELEPHONE (OUTPATIENT)
Dept: CARDIOLOGY CLINIC | Facility: HOSPITAL | Age: 74
End: 2019-06-20

## 2019-06-20 NOTE — TELEPHONE ENCOUNTER
Pt.at [facility] in [place], was scheduled for appts. On 5/30 and 6/20 and did not show up both times although appt. Scheduled with RN at rehab center. Want to know the best person to contact to transport pt. To Md appt.

## 2019-07-05 ENCOUNTER — TELEPHONE (OUTPATIENT)
Dept: CARDIOLOGY CLINIC | Facility: HOSPITAL | Age: 74
End: 2019-07-05

## 2019-07-05 NOTE — TELEPHONE ENCOUNTER
Left message on 7/2 and today with nursing admin. at [facility] To bring Francisca Alaniz in for appt with Lina Campbell on 7/9. Pt. Has been scheduled 2 other dates and has not shown up for appts.

## 2019-07-09 ENCOUNTER — HOSPITAL ENCOUNTER (OUTPATIENT)
Dept: CARDIOLOGY CLINIC | Facility: HOSPITAL | Age: 74
Discharge: HOME | End: 2019-07-09
Attending: INTERNAL MEDICINE
Payer: MEDICARE

## 2019-07-09 ENCOUNTER — TELEPHONE (OUTPATIENT)
Dept: CARDIOLOGY CLINIC | Facility: HOSPITAL | Age: 74
End: 2019-07-09

## 2019-07-09 VITALS
OXYGEN SATURATION: 97 % | DIASTOLIC BLOOD PRESSURE: 64 MMHG | HEART RATE: 84 BPM | SYSTOLIC BLOOD PRESSURE: 106 MMHG | RESPIRATION RATE: 14 BRPM

## 2019-07-09 RX ORDER — PHENAZOPYRIDINE HYDROCHLORIDE 100 MG/1
100 TABLET, FILM COATED ORAL 3 TIMES DAILY PRN
COMMUNITY
End: 2019-08-02

## 2019-07-09 RX ORDER — ACETAMINOPHEN 325 MG/1
325 TABLET ORAL EVERY 6 HOURS PRN
COMMUNITY

## 2019-07-19 ENCOUNTER — TELEPHONE (OUTPATIENT)
Dept: CARDIOLOGY | Age: 74
End: 2019-07-19

## 2019-07-24 PROBLEM — R50.9 FEVER, UNSPECIFIED FEVER CAUSE: Status: RESOLVED | Noted: 2019-05-12 | Resolved: 2019-07-24

## 2019-07-24 PROBLEM — M54.16 LUMBAR RADICULITIS: Status: RESOLVED | Noted: 2017-10-10 | Resolved: 2019-07-24

## 2019-07-24 PROBLEM — N39.0 URINARY TRACT INFECTION WITHOUT HEMATURIA, SITE UNSPECIFIED: Status: RESOLVED | Noted: 2019-04-14 | Resolved: 2019-07-24

## 2019-07-24 PROBLEM — R10.10 PAIN OF UPPER ABDOMEN: Chronic | Status: RESOLVED | Noted: 2018-11-28 | Resolved: 2019-07-24

## 2019-07-24 PROBLEM — S32.020A CLOSED COMPRESSION FRACTURE OF SECOND LUMBAR VERTEBRA, INITIAL ENCOUNTER: Status: RESOLVED | Noted: 2019-04-14 | Resolved: 2019-07-24

## 2019-07-24 PROBLEM — I50.30 (HFPEF) HEART FAILURE WITH PRESERVED EJECTION FRACTION (HCC): Status: RESOLVED | Noted: 2018-08-22 | Resolved: 2019-07-24

## 2019-07-24 PROBLEM — J18.9 COMMUNITY ACQUIRED PNEUMONIA OF RIGHT LOWER LOBE OF LUNG: Status: RESOLVED | Noted: 2019-05-12 | Resolved: 2019-07-24

## 2019-07-24 PROBLEM — K92.2 GASTROINTESTINAL HEMORRHAGE, UNSPECIFIED GASTROINTESTINAL HEMORRHAGE TYPE: Status: RESOLVED | Noted: 2019-04-14 | Resolved: 2019-07-24

## 2019-07-24 PROBLEM — E87.6 HYPOKALEMIA: Status: RESOLVED | Noted: 2019-04-14 | Resolved: 2019-07-24

## 2019-07-24 PROBLEM — H43.813 PVD (POSTERIOR VITREOUS DETACHMENT), BILATERAL: Status: RESOLVED | Noted: 2018-11-09 | Resolved: 2019-07-24

## 2019-07-24 PROBLEM — R73.9 HYPERGLYCEMIA: Status: RESOLVED | Noted: 2019-04-14 | Resolved: 2019-07-24

## 2019-07-24 PROBLEM — I34.0 MITRAL REGURGITATION: Status: RESOLVED | Noted: 2018-10-31 | Resolved: 2019-07-24

## 2019-07-24 PROBLEM — R79.89 AZOTEMIA: Status: RESOLVED | Noted: 2019-04-14 | Resolved: 2019-07-24

## 2019-07-24 PROBLEM — K92.2 GASTROINTESTINAL HEMORRHAGE: Status: RESOLVED | Noted: 2019-04-14 | Resolved: 2019-07-24

## 2019-07-24 PROBLEM — I50.43 ACUTE ON CHRONIC COMBINED SYSTOLIC AND DIASTOLIC CONGESTIVE HEART FAILURE (HCC): Status: RESOLVED | Noted: 2019-05-12 | Resolved: 2019-07-24

## 2019-07-24 PROBLEM — I50.9 ACUTE ON CHRONIC CONGESTIVE HEART FAILURE, UNSPECIFIED HEART FAILURE TYPE (HCC): Status: RESOLVED | Noted: 2019-04-14 | Resolved: 2019-07-24

## 2019-07-24 PROBLEM — I48.0 PAROXYSMAL ATRIAL FIBRILLATION (HCC): Status: RESOLVED | Noted: 2018-06-08 | Resolved: 2019-07-24

## 2019-07-24 PROBLEM — D72.829 LEUKOCYTOSIS: Status: RESOLVED | Noted: 2019-04-14 | Resolved: 2019-07-24

## 2019-07-24 PROBLEM — E87.70 HYPERVOLEMIA, UNSPECIFIED HYPERVOLEMIA TYPE: Status: RESOLVED | Noted: 2019-05-17 | Resolved: 2019-07-24

## 2019-07-24 PROBLEM — M51.36 DDD (DEGENERATIVE DISC DISEASE), LUMBAR: Status: RESOLVED | Noted: 2019-02-28 | Resolved: 2019-07-24

## 2019-07-25 ENCOUNTER — TELEPHONE (OUTPATIENT)
Dept: CARDIOLOGY CLINIC | Facility: HOSPITAL | Age: 74
End: 2019-07-25

## 2019-07-25 NOTE — TELEPHONE ENCOUNTER
Called patient and spoke with Jaycee Chamorro regarding Follow up appointment for 1 year TMVR, at this time he did not want to make the appointment and will get back to us after labor day.  He mentioned that her doctor is taking about another clip but could not remem

## 2019-08-01 ENCOUNTER — TELEPHONE (OUTPATIENT)
Dept: CARDIOLOGY | Age: 74
End: 2019-08-01

## 2019-08-02 ENCOUNTER — OFFICE VISIT (OUTPATIENT)
Dept: CARDIOLOGY | Age: 74
End: 2019-08-02

## 2019-08-02 VITALS
HEART RATE: 84 BPM | HEIGHT: 64 IN | DIASTOLIC BLOOD PRESSURE: 60 MMHG | BODY MASS INDEX: 23.9 KG/M2 | SYSTOLIC BLOOD PRESSURE: 110 MMHG | WEIGHT: 140 LBS

## 2019-08-02 DIAGNOSIS — I34.0 NON-RHEUMATIC MITRAL REGURGITATION: Primary | ICD-10-CM

## 2019-08-02 DIAGNOSIS — I50.30 HEART FAILURE WITH PRESERVED EJECTION FRACTION (CMD): ICD-10-CM

## 2019-08-02 DIAGNOSIS — I48.20 CHRONIC ATRIAL FIBRILLATION (CMD): ICD-10-CM

## 2019-08-02 PROBLEM — I25.10 CAD (CORONARY ARTERY DISEASE): Status: ACTIVE | Noted: 2018-06-22

## 2019-08-02 PROCEDURE — 99214 OFFICE O/P EST MOD 30 MIN: CPT | Performed by: NURSE PRACTITIONER

## 2019-08-02 SDOH — HEALTH STABILITY: MENTAL HEALTH: HOW OFTEN DO YOU HAVE A DRINK CONTAINING ALCOHOL?: NEVER

## 2019-08-06 ENCOUNTER — OFFICE VISIT (OUTPATIENT)
Dept: CARDIOLOGY | Age: 74
End: 2019-08-06

## 2019-08-06 DIAGNOSIS — I25.10 CORONARY ARTERY DISEASE INVOLVING NATIVE CORONARY ARTERY OF NATIVE HEART WITHOUT ANGINA PECTORIS: ICD-10-CM

## 2019-08-06 DIAGNOSIS — I48.20 CHRONIC ATRIAL FIBRILLATION (CMD): ICD-10-CM

## 2019-08-06 DIAGNOSIS — I34.0 NON-RHEUMATIC MITRAL REGURGITATION: Primary | ICD-10-CM

## 2019-08-06 PROCEDURE — 99214 OFFICE O/P EST MOD 30 MIN: CPT | Performed by: INTERNAL MEDICINE

## 2019-08-14 ENCOUNTER — TELEPHONE (OUTPATIENT)
Dept: GASTROENTEROLOGY | Facility: CLINIC | Age: 74
End: 2019-08-14

## 2019-08-14 NOTE — TELEPHONE ENCOUNTER
Pt has questions re: coloostomy bag. PLs call - aware [facility] is no longer with organization. Thank you.

## 2019-08-15 NOTE — TELEPHONE ENCOUNTER
I spoke to the pt     She is a pt of [facility]. She wanted to establish care with [facility] while she feels well. appt scheduled.  Date, time and location verified    Future Appointments   Date Time Provider Solis Bell   10/9/2019  2:30 PM Tri Hutton

## 2019-08-27 ENCOUNTER — TELEPHONE (OUTPATIENT)
Dept: CARDIOLOGY CLINIC | Facility: HOSPITAL | Age: 74
End: 2019-08-27

## 2019-08-29 ENCOUNTER — TELEPHONE (OUTPATIENT)
Dept: CARDIOLOGY | Age: 74
End: 2019-08-29

## 2019-08-29 DIAGNOSIS — I34.0 NON-RHEUMATIC MITRAL REGURGITATION: Primary | ICD-10-CM

## 2019-09-09 ENCOUNTER — TELEPHONE (OUTPATIENT)
Dept: CARDIOLOGY CLINIC | Facility: HOSPITAL | Age: 74
End: 2019-09-09

## 2019-09-13 ENCOUNTER — TELEPHONE (OUTPATIENT)
Dept: CARDIOLOGY CLINIC | Facility: HOSPITAL | Age: 74
End: 2019-09-13

## 2019-09-13 NOTE — TELEPHONE ENCOUNTER
Called to set up PAT appt. For mitraclip on 9/25. Pt. Instructed to check in at [employer] on 9/20 at 11:00 and proceed to structural heart center. Verbalized understanding.

## 2019-09-20 ENCOUNTER — HOSPITAL ENCOUNTER (OUTPATIENT)
Dept: LAB | Facility: HOSPITAL | Age: 74
Discharge: HOME | End: 2019-09-20
Attending: THORACIC SURGERY (CARDIOTHORACIC VASCULAR SURGERY)
Payer: MEDICARE

## 2019-09-20 ENCOUNTER — HOSPITAL ENCOUNTER (OUTPATIENT)
Dept: CV DIAGNOSTICS | Facility: HOSPITAL | Age: 74
Discharge: HOME | End: 2019-09-20
Attending: THORACIC SURGERY (CARDIOTHORACIC VASCULAR SURGERY)
Payer: MEDICARE

## 2019-09-20 ENCOUNTER — TELEPHONE (OUTPATIENT)
Dept: CARDIOLOGY CLINIC | Facility: HOSPITAL | Age: 74
End: 2019-09-20

## 2019-09-20 ENCOUNTER — HOSPITAL ENCOUNTER (OUTPATIENT)
Dept: CARDIOLOGY CLINIC | Facility: HOSPITAL | Age: 74
Discharge: HOME | End: 2019-09-20
Attending: THORACIC SURGERY (CARDIOTHORACIC VASCULAR SURGERY)
Payer: MEDICARE

## 2019-09-20 DIAGNOSIS — Z98.890 S/P MITRAL VALVE CLIP IMPLANTATION: ICD-10-CM

## 2019-09-20 DIAGNOSIS — I50.32 CHRONIC DIASTOLIC HF (HEART FAILURE) (HCC): ICD-10-CM

## 2019-09-20 DIAGNOSIS — I34.0 NON-RHEUMATIC MITRAL REGURGITATION: Primary | ICD-10-CM

## 2019-09-20 DIAGNOSIS — I10 BENIGN ESSENTIAL HTN: ICD-10-CM

## 2019-09-20 DIAGNOSIS — I34.0 NON-RHEUMATIC MITRAL REGURGITATION: ICD-10-CM

## 2019-09-20 DIAGNOSIS — I48.0 PAF (PAROXYSMAL ATRIAL FIBRILLATION) (HCC): ICD-10-CM

## 2019-09-20 DIAGNOSIS — Z95.818 S/P MITRAL VALVE CLIP IMPLANTATION: ICD-10-CM

## 2019-09-20 LAB
ANTIBODY SCREEN: NEGATIVE
ATRIAL RATE: 93 BPM
INR BLD: 1.57 (ref 0.9–1.1)
INR PPP: 1.57 (ref 2–3)
NORMAL CONTROL: ABNORMAL
NT-PROBNP SERPL-MCNC: 1785 PG/ML (ref ?–450)
PATIENT ON COUMADIN Y/N: ABNORMAL
PROTHROMBIN TIME: 19.6 S
PSA SERPL DL<=0.01 NG/ML-MCNC: 19.6 SECONDS (ref 12.5–14.7)
Q-T INTERVAL: 388 MS
QRS DURATION: 98 MS
QTC CALCULATION (BEZET): 442 MS
R AXIS: -59 DEGREES
RH BLOOD TYPE: NEGATIVE
T AXIS: 248 DEGREES
VENTRICULAR RATE: 78 BPM

## 2019-09-20 PROCEDURE — 83880 ASSAY OF NATRIURETIC PEPTIDE: CPT | Performed by: INTERNAL MEDICINE

## 2019-09-20 PROCEDURE — 36415 COLL VENOUS BLD VENIPUNCTURE: CPT | Performed by: INTERNAL MEDICINE

## 2019-09-20 PROCEDURE — 86900 BLOOD TYPING SEROLOGIC ABO: CPT | Performed by: INTERNAL MEDICINE

## 2019-09-20 PROCEDURE — 86850 RBC ANTIBODY SCREEN: CPT | Performed by: INTERNAL MEDICINE

## 2019-09-20 PROCEDURE — 93010 ELECTROCARDIOGRAM REPORT: CPT | Performed by: INTERNAL MEDICINE

## 2019-09-20 PROCEDURE — 85610 PROTHROMBIN TIME: CPT | Performed by: INTERNAL MEDICINE

## 2019-09-20 PROCEDURE — 99214 OFFICE O/P EST MOD 30 MIN: CPT | Performed by: NURSE PRACTITIONER

## 2019-09-20 PROCEDURE — 86901 BLOOD TYPING SEROLOGIC RH(D): CPT | Performed by: INTERNAL MEDICINE

## 2019-09-20 PROCEDURE — 87493 C DIFF AMPLIFIED PROBE: CPT | Performed by: INTERNAL MEDICINE

## 2019-09-20 RX ORDER — PREDNISONE 50 MG/1
50 TABLET ORAL DAILY
Qty: 3 TABLET | Refills: 0 | Status: SHIPPED | OUTPATIENT
Start: 2019-09-24 | End: 2019-09-27

## 2019-09-20 NOTE — PROGRESS NOTES
[facility]  [facility]    Rosemary Salcedo Patient Status:  Outpatient    1945 MRN [id number]   Location [address] [place] [address] Attending Schuyler Ash MD   Hosp Day # 0 PCP Jovana Goodman MD ulcerative colitis   • OTHER DISEASES     uti   • Pneumonia, organism unspecified(486)    • Reflux    • Self-catheterizes urinary bladder 1/11/2016   • Ulcerative colitis ([facility] [address]    • Valvular disease     mitral valve prolapse   • Visual impairment     readi Substance and Sexual Activity      Alcohol use: No        Alcohol/week: 0.0 standard drinks      Drug use: No       Family Hostory:  Family History   Problem Relation Age of Onset   • Cancer Father    • Heart Disorder Father    • Eye Problems Father the anterior leaflet and the     posterior leaflet. Moderate to severe regurgitation. 3. Left atrium: The atrium was moderately dilated. Cannot exclude     thrombus in the appendage. 4. Right ventricle: The cavity size was mildly dilated.  Systolic     fu

## 2019-09-20 NOTE — PROGRESS NOTES
Pt. Here for PAT for mitraclip  On 9/25, KCCQ completed. Pt. Unable to do 6min walk, she is wheelchair bound. Pre-procedural instructions given.

## 2019-09-20 NOTE — PROGRESS NOTES
Pre-Procedural Instructions for  Transcatheter Mitral Valve Repair (MitraClip)  I called and spoke to the patient/family and provided the following instructions:   Your MitraClip Procedure is scheduled for: 9/25/19    • Nothing to eat or drink after midnigh

## 2019-09-23 ENCOUNTER — DOCUMENTATION (OUTPATIENT)
Dept: CARDIOLOGY | Age: 74
End: 2019-09-23

## 2019-09-24 ENCOUNTER — TELEPHONE (OUTPATIENT)
Dept: CARDIOLOGY CLINIC | Facility: HOSPITAL | Age: 74
End: 2019-09-24

## 2019-09-24 NOTE — TELEPHONE ENCOUNTER
Pt. Has terrible cold, is congested and coughing. Would like to postpone mitraclip until 10/2. Will call by the end of the week to update , will need prednisone pre procedure for dye allergy.

## 2019-09-25 ENCOUNTER — TELEPHONE (OUTPATIENT)
Dept: CARDIOLOGY CLINIC | Facility: HOSPITAL | Age: 74
End: 2019-09-25

## 2019-09-25 ENCOUNTER — CLINICAL ABSTRACT (OUTPATIENT)
Dept: CARDIOLOGY | Age: 74
End: 2019-09-25

## 2019-09-25 NOTE — TELEPHONE ENCOUNTER
Dr. Silvia Lugo,    I wanted to let you know that Josh Roger developed a bad cold and cough therefore her mitraclip procedure will be delayed until next week, 10/2.  thanks

## 2019-09-26 DIAGNOSIS — I34.0 NON-RHEUMATIC MITRAL REGURGITATION: Primary | ICD-10-CM

## 2019-09-30 ENCOUNTER — TELEPHONE (OUTPATIENT)
Dept: CARDIOLOGY CLINIC | Facility: HOSPITAL | Age: 74
End: 2019-09-30

## 2019-09-30 NOTE — TELEPHONE ENCOUNTER
Pt. Is feeling better, no fevers, only slight cough at times. Pt. Instructed that last dose of Eliquis will be today 9/30. Will need to take 50mg of Prednisone at 6pm on 10/1, MN, and 6:30 am on 10/2.  She will remain NPO after MN on 10/1, and will not take

## 2019-10-01 ENCOUNTER — ANESTHESIA EVENT (OUTPATIENT)
Dept: INTERVENTIONAL RADIOLOGY/VASCULAR | Facility: HOSPITAL | Age: 74
DRG: 267 | End: 2019-10-01
Payer: MEDICARE

## 2019-10-01 NOTE — PROGRESS NOTES
Pt. Sujatha Grewal to arrive at 0530, check in at [employer]. Told to bring last dose of Prednisone to take 1 hour prior to procedure. Reminded of NPO status after MN. Pt. Verbalized understanding.

## 2019-10-02 ENCOUNTER — APPOINTMENT (OUTPATIENT)
Dept: GENERAL RADIOLOGY | Facility: HOSPITAL | Age: 74
DRG: 267 | End: 2019-10-02
Attending: NURSE PRACTITIONER
Payer: MEDICARE

## 2019-10-02 ENCOUNTER — HOSPITAL ENCOUNTER (INPATIENT)
Dept: INTERVENTIONAL RADIOLOGY/VASCULAR | Facility: HOSPITAL | Age: 74
LOS: 1 days | Discharge: HOME | DRG: 267 | End: 2019-10-03
Attending: INTERNAL MEDICINE | Admitting: INTERNAL MEDICINE
Payer: MEDICARE

## 2019-10-02 ENCOUNTER — ANESTHESIA (OUTPATIENT)
Dept: INTERVENTIONAL RADIOLOGY/VASCULAR | Facility: HOSPITAL | Age: 74
DRG: 267 | End: 2019-10-02
Payer: MEDICARE

## 2019-10-02 ENCOUNTER — APPOINTMENT (OUTPATIENT)
Dept: CV DIAGNOSTICS | Facility: HOSPITAL | Age: 74
DRG: 267 | End: 2019-10-02
Attending: INTERNAL MEDICINE
Payer: MEDICARE

## 2019-10-02 DIAGNOSIS — I34.0 MITRAL VALVE INSUFFICIENCY, UNSPECIFIED ETIOLOGY: ICD-10-CM

## 2019-10-02 PROCEDURE — 86920 COMPATIBILITY TEST SPIN: CPT

## 2019-10-02 PROCEDURE — B3101ZZ FLUOROSCOPY OF THORACIC AORTA USING LOW OSMOLAR CONTRAST: ICD-10-PCS | Performed by: INTERNAL MEDICINE

## 2019-10-02 PROCEDURE — 84132 ASSAY OF SERUM POTASSIUM: CPT

## 2019-10-02 PROCEDURE — B5191ZZ FLUOROSCOPY OF INFERIOR VENA CAVA USING LOW OSMOLAR CONTRAST: ICD-10-PCS | Performed by: INTERNAL MEDICINE

## 2019-10-02 PROCEDURE — 80048 BASIC METABOLIC PNL TOTAL CA: CPT | Performed by: INTERNAL MEDICINE

## 2019-10-02 PROCEDURE — 85610 PROTHROMBIN TIME: CPT | Performed by: NURSE PRACTITIONER

## 2019-10-02 PROCEDURE — 85730 THROMBOPLASTIN TIME PARTIAL: CPT | Performed by: NURSE PRACTITIONER

## 2019-10-02 PROCEDURE — 93662 INTRACARDIAC ECG (ICE): CPT

## 2019-10-02 PROCEDURE — 85014 HEMATOCRIT: CPT

## 2019-10-02 PROCEDURE — B245ZZ4 ULTRASONOGRAPHY OF LEFT HEART, TRANSESOPHAGEAL: ICD-10-PCS | Performed by: INTERNAL MEDICINE

## 2019-10-02 PROCEDURE — 33418 REPAIR TCAT MITRAL VALVE: CPT | Performed by: INTERNAL MEDICINE

## 2019-10-02 PROCEDURE — 85384 FIBRINOGEN ACTIVITY: CPT | Performed by: NURSE PRACTITIONER

## 2019-10-02 PROCEDURE — 82803 BLOOD GASES ANY COMBINATION: CPT

## 2019-10-02 PROCEDURE — 71045 X-RAY EXAM CHEST 1 VIEW: CPT | Performed by: NURSE PRACTITIONER

## 2019-10-02 PROCEDURE — 93355 ECHO TRANSESOPHAGEAL (TEE): CPT | Performed by: INTERNAL MEDICINE

## 2019-10-02 PROCEDURE — 85025 COMPLETE CBC W/AUTO DIFF WBC: CPT | Performed by: NURSE PRACTITIONER

## 2019-10-02 PROCEDURE — 84295 ASSAY OF SERUM SODIUM: CPT

## 2019-10-02 PROCEDURE — 93010 ELECTROCARDIOGRAM REPORT: CPT | Performed by: INTERNAL MEDICINE

## 2019-10-02 PROCEDURE — 93005 ELECTROCARDIOGRAM TRACING: CPT

## 2019-10-02 PROCEDURE — 93355 ECHO TRANSESOPHAGEAL (TEE): CPT

## 2019-10-02 PROCEDURE — 82330 ASSAY OF CALCIUM: CPT

## 2019-10-02 PROCEDURE — 87081 CULTURE SCREEN ONLY: CPT | Performed by: INTERNAL MEDICINE

## 2019-10-02 PROCEDURE — 87147 CULTURE TYPE IMMUNOLOGIC: CPT | Performed by: INTERNAL MEDICINE

## 2019-10-02 PROCEDURE — 33419 REPAIR TCAT MITRAL VALVE: CPT | Performed by: INTERNAL MEDICINE

## 2019-10-02 PROCEDURE — 02UG3JZ SUPPLEMENT MITRAL VALVE WITH SYNTHETIC SUBSTITUTE, PERCUTANEOUS APPROACH: ICD-10-PCS | Performed by: INTERNAL MEDICINE

## 2019-10-02 PROCEDURE — S0028 INJECTION, FAMOTIDINE, 20 MG: HCPCS

## 2019-10-02 PROCEDURE — 85347 COAGULATION TIME ACTIVATED: CPT

## 2019-10-02 PROCEDURE — 83735 ASSAY OF MAGNESIUM: CPT | Performed by: NURSE PRACTITIONER

## 2019-10-02 PROCEDURE — 33418 REPAIR TCAT MITRAL VALVE: CPT

## 2019-10-02 RX ORDER — SODIUM CHLORIDE, SODIUM LACTATE, POTASSIUM CHLORIDE, CALCIUM CHLORIDE 600; 310; 30; 20 MG/100ML; MG/100ML; MG/100ML; MG/100ML
INJECTION, SOLUTION INTRAVENOUS CONTINUOUS
Status: DISCONTINUED | OUTPATIENT
Start: 2019-10-02 | End: 2019-10-02

## 2019-10-02 RX ORDER — MELATONIN
325
Status: DISCONTINUED | OUTPATIENT
Start: 2019-10-02 | End: 2019-10-03

## 2019-10-02 RX ORDER — CEFAZOLIN SODIUM/WATER 2 G/20 ML
2 SYRINGE (ML) INTRAVENOUS
Status: DISCONTINUED | OUTPATIENT
Start: 2019-10-02 | End: 2019-10-02 | Stop reason: HOSPADM

## 2019-10-02 RX ORDER — HYDROMORPHONE HYDROCHLORIDE 1 MG/ML
0.4 INJECTION, SOLUTION INTRAMUSCULAR; INTRAVENOUS; SUBCUTANEOUS EVERY 5 MIN PRN
Status: ACTIVE | OUTPATIENT
Start: 2019-10-02 | End: 2019-10-02

## 2019-10-02 RX ORDER — METOPROLOL SUCCINATE 25 MG/1
25 TABLET, EXTENDED RELEASE ORAL 2 TIMES DAILY
Status: DISCONTINUED | OUTPATIENT
Start: 2019-10-02 | End: 2019-10-02

## 2019-10-02 RX ORDER — ACETAMINOPHEN 325 MG/1
325 TABLET ORAL EVERY 6 HOURS PRN
Status: DISCONTINUED | OUTPATIENT
Start: 2019-10-02 | End: 2019-10-03

## 2019-10-02 RX ORDER — LIDOCAINE HYDROCHLORIDE 10 MG/ML
INJECTION, SOLUTION EPIDURAL; INFILTRATION; INTRACAUDAL; PERINEURAL
Status: COMPLETED
Start: 2019-10-02 | End: 2019-10-02

## 2019-10-02 RX ORDER — DOCUSATE SODIUM 100 MG/1
100 CAPSULE, LIQUID FILLED ORAL 2 TIMES DAILY
Status: DISCONTINUED | OUTPATIENT
Start: 2019-10-02 | End: 2019-10-03

## 2019-10-02 RX ORDER — FAMOTIDINE 10 MG/ML
20 INJECTION, SOLUTION INTRAVENOUS 2 TIMES DAILY
Status: DISCONTINUED | OUTPATIENT
Start: 2019-10-02 | End: 2019-10-02

## 2019-10-02 RX ORDER — FAMOTIDINE 20 MG/1
20 TABLET ORAL 2 TIMES DAILY
Status: DISCONTINUED | OUTPATIENT
Start: 2019-10-02 | End: 2019-10-03

## 2019-10-02 RX ORDER — LISINOPRIL 5 MG/1
5 TABLET ORAL DAILY
Status: DISCONTINUED | OUTPATIENT
Start: 2019-10-03 | End: 2019-10-03

## 2019-10-02 RX ORDER — SODIUM CHLORIDE 9 MG/ML
INJECTION, SOLUTION INTRAVENOUS CONTINUOUS
Status: DISCONTINUED | OUTPATIENT
Start: 2019-10-02 | End: 2019-10-03

## 2019-10-02 RX ORDER — FUROSEMIDE 40 MG/1
40 TABLET ORAL DAILY
Status: DISCONTINUED | OUTPATIENT
Start: 2019-10-03 | End: 2019-10-02

## 2019-10-02 RX ORDER — ONDANSETRON 2 MG/ML
4 INJECTION INTRAMUSCULAR; INTRAVENOUS EVERY 6 HOURS PRN
Status: DISCONTINUED | OUTPATIENT
Start: 2019-10-02 | End: 2019-10-03

## 2019-10-02 RX ORDER — DEXTROSE MONOHYDRATE 25 G/50ML
50 INJECTION, SOLUTION INTRAVENOUS
Status: DISCONTINUED | OUTPATIENT
Start: 2019-10-02 | End: 2019-10-03

## 2019-10-02 RX ORDER — METOPROLOL SUCCINATE 25 MG/1
25 TABLET, EXTENDED RELEASE ORAL
Status: DISCONTINUED | OUTPATIENT
Start: 2019-10-03 | End: 2019-10-03

## 2019-10-02 RX ORDER — SODIUM CHLORIDE 9 MG/ML
INJECTION, SOLUTION INTRAVENOUS CONTINUOUS
Status: DISCONTINUED | OUTPATIENT
Start: 2019-10-02 | End: 2019-10-02

## 2019-10-02 RX ORDER — MAGNESIUM OXIDE 400 MG (241.3 MG MAGNESIUM) TABLET
400 TABLET ONCE
Status: COMPLETED | OUTPATIENT
Start: 2019-10-02 | End: 2019-10-02

## 2019-10-02 RX ORDER — VENLAFAXINE HYDROCHLORIDE 75 MG/1
75 CAPSULE, EXTENDED RELEASE ORAL
Status: DISCONTINUED | OUTPATIENT
Start: 2019-10-03 | End: 2019-10-03

## 2019-10-02 RX ORDER — DIPHENHYDRAMINE HYDROCHLORIDE 50 MG/ML
INJECTION INTRAMUSCULAR; INTRAVENOUS
Status: DISCONTINUED
Start: 2019-10-02 | End: 2019-10-02 | Stop reason: WASHOUT

## 2019-10-02 RX ORDER — TRAMADOL HYDROCHLORIDE 50 MG/1
25 TABLET ORAL EVERY 8 HOURS PRN
Status: DISCONTINUED | OUTPATIENT
Start: 2019-10-02 | End: 2019-10-03

## 2019-10-02 RX ORDER — EZETIMIBE 10 MG/1
10 TABLET ORAL NIGHTLY
Status: DISCONTINUED | OUTPATIENT
Start: 2019-10-02 | End: 2019-10-03

## 2019-10-02 RX ORDER — ATORVASTATIN CALCIUM 40 MG/1
40 TABLET, FILM COATED ORAL NIGHTLY
Status: DISCONTINUED | OUTPATIENT
Start: 2019-10-02 | End: 2019-10-03

## 2019-10-02 RX ORDER — HEPARIN SODIUM 5000 [USP'U]/ML
INJECTION, SOLUTION INTRAVENOUS; SUBCUTANEOUS
Status: COMPLETED
Start: 2019-10-02 | End: 2019-10-02

## 2019-10-02 RX ORDER — PREDNISONE 1 MG/1
5 TABLET ORAL
Status: DISCONTINUED | OUTPATIENT
Start: 2019-10-03 | End: 2019-10-03

## 2019-10-02 RX ORDER — NALOXONE HYDROCHLORIDE 0.4 MG/ML
80 INJECTION, SOLUTION INTRAMUSCULAR; INTRAVENOUS; SUBCUTANEOUS AS NEEDED
Status: ACTIVE | OUTPATIENT
Start: 2019-10-02 | End: 2019-10-02

## 2019-10-02 RX ORDER — POTASSIUM CHLORIDE 20 MEQ/1
40 TABLET, EXTENDED RELEASE ORAL EVERY 4 HOURS
Status: COMPLETED | OUTPATIENT
Start: 2019-10-02 | End: 2019-10-02

## 2019-10-02 RX ORDER — DIPHENHYDRAMINE HCL 50 MG
CAPSULE ORAL
Status: COMPLETED
Start: 2019-10-02 | End: 2019-10-02

## 2019-10-02 RX ORDER — PROTAMINE SULFATE 10 MG/ML
INJECTION, SOLUTION INTRAVENOUS
Status: COMPLETED
Start: 2019-10-02 | End: 2019-10-02

## 2019-10-02 RX ORDER — POTASSIUM CHLORIDE 750 MG/1
10 TABLET, EXTENDED RELEASE ORAL DAILY
Status: DISCONTINUED | OUTPATIENT
Start: 2019-10-03 | End: 2019-10-03

## 2019-10-02 RX ADMIN — POTASSIUM CHLORIDE 40 MEQ: 20 TABLET, EXTENDED RELEASE ORAL at 15:37:00

## 2019-10-02 RX ADMIN — MAGNESIUM OXIDE 400 MG (241.3 MG MAGNESIUM) TABLET 400 MG: TABLET at 15:37:00

## 2019-10-02 RX ADMIN — FAMOTIDINE 20 MG: 20 TABLET ORAL at 20:59:00

## 2019-10-02 RX ADMIN — POTASSIUM CHLORIDE 40 MEQ: 20 TABLET, EXTENDED RELEASE ORAL at 19:19:00

## 2019-10-02 RX ADMIN — ATORVASTATIN CALCIUM 40 MG: 40 TABLET, FILM COATED ORAL at 20:59:00

## 2019-10-02 RX ADMIN — EZETIMIBE 10 MG: 10 TABLET ORAL at 20:59:00

## 2019-10-02 NOTE — ANESTHESIA POSTPROCEDURE EVALUATION
C/Casia 10 Patient Status:  Outpatient in a Bed   Age/Gender 66year old female MRN [id number]   Location [facility] Attending Victory Goodell, MD   Hosp Day # 0 PCP Umair Terry MD       Anesthesia

## 2019-10-02 NOTE — ANESTHESIA PREPROCEDURE EVALUATION
PRE-OP EVALUATION    Patient Name: Rosalio Gannon    Pre-op Diagnosis: severe mitral regurgitation    Mitral clip        Pre-op vitals reviewed.     /58   Pulse 63   Temp 97.1 °F (36.2 °C) (Temporal)   Resp 13   SpO2 100%        There is no heigh hyperlipidemia  (+) CAD        (+) valvular problems/murmurs and MR    (+) dysrhythmias and atrial fibrillation  (+) CHF                Endo/Other      (+) diabetes  type 2, not using insulin                  Arthritis: gout.        Pulmonary until 23 yrs old    Alcohol use: No      Alcohol/week: 0.0 standard drinks      Drug use: No     Available pre-op labs reviewed.   Lab Results   Component Value Date    WBC 9.90 09/09/2019    RBC 5.37 (H) 09/09/2019    HGB 13.8 09/09/2019    HCT 43.3 09/09/

## 2019-10-02 NOTE — H&P
History & Physical Examination    Patient Name: Daksha Olson  MRN: [id number]  CSN: [id number]  YOB: 1945    Diagnosis: severe, symptomatic mitral regurgitation     Present Illness: Daksha Olson is a pleasant 66year old female wh Atorvastatin Calcium 40 MG Oral Tab Take 40 mg by mouth nightly.    Disp:  Rfl:  10/1/2019 at pm   Neomycin-Polymyxin-HC 3.5-15796-7 Otic Suspension 2 drops tid  Prn  itch Disp: 10 mL Rfl: 3 Unknown at Unknown time   acetaminophen 325 MG Oral Tab Take 325 Colon= 5/7/12   • MENOPAUSE    • MITRAL VALVE PROLAPSE    • Neuropathy     bilateral legs   • Osteoarthritis     legs, back, shoulders and knees   • OSTEOPENIA    • Other and unspecified hyperlipidemia    • OTHER DISEASES     spastic paraparesis   • OTHER Ridge Mcmullen MD at [facility] CVOR   • OTHER SURGICAL HISTORY  8-24-12    cysto Dr. Sury Lee   • TONSILLECTOMY     • VALVE REPAIR       Family History   Problem Relation Age of Onset   • Cancer Father    • Heart Disorder Father    • Eye Problems Father         Marilyn Barclay

## 2019-10-02 NOTE — PROCEDURES
Cardiology Transesophageal Echo Note    PRE and POST PROCEDURE DIAGNOSIS:   1.  Mitral regurgitation    PROCEDURE: Intra-operative transesophageal echocardiogram (JYOTI) for hyfr-oa-rfhl mitral clip procedure    The patient was already intubated and sedated b lateral and adjacent to previous [id number]-tt-bvke mitral clip since middle jet and jet between clip was unacceptable with residual overall moderate-severe mitral regurgitation with resultant mean gradient across the mitral valve of 3 mmHg and small systolic fl

## 2019-10-02 NOTE — PROGRESS NOTES
Prelim    Two Mitraclip NTR devices placed via right CFV    Mean gradient 2 -- unchanged from baseline    MR severe -- mild to moderate    Mean LA decreased from 16 to 9    V wave from 30 to 17    Perclose right CFV    Reviewed with patient's  Trish Ramospe

## 2019-10-03 ENCOUNTER — APPOINTMENT (OUTPATIENT)
Dept: CV DIAGNOSTICS | Facility: HOSPITAL | Age: 74
DRG: 267 | End: 2019-10-03
Attending: NURSE PRACTITIONER
Payer: MEDICARE

## 2019-10-03 ENCOUNTER — APPOINTMENT (OUTPATIENT)
Dept: GENERAL RADIOLOGY | Facility: HOSPITAL | Age: 74
DRG: 267 | End: 2019-10-03
Attending: NURSE PRACTITIONER
Payer: MEDICARE

## 2019-10-03 ENCOUNTER — APPOINTMENT (OUTPATIENT)
Dept: CARDIOLOGY | Age: 74
End: 2019-10-03

## 2019-10-03 VITALS
OXYGEN SATURATION: 100 % | DIASTOLIC BLOOD PRESSURE: 59 MMHG | SYSTOLIC BLOOD PRESSURE: 87 MMHG | HEART RATE: 73 BPM | RESPIRATION RATE: 21 BRPM | TEMPERATURE: 97 F

## 2019-10-03 LAB
ABSOLUTE IMMATURE GRANULOCYTES (OFFPRE24): NORMAL
ALBUMIN SERPL-MCNC: NORMAL G/DL
ALBUMIN/GLOB SERPL: NORMAL {RATIO}
ALP SERPL-CCNC: NORMAL U/L
ALT SERPL-CCNC: NORMAL U/L
ANION GAP SERPL CALC-SCNC: 8 MMOL/L
AST SERPL-CCNC: NORMAL U/L
BASO+EOS+MONOS # BLD: NORMAL 10*3/UL
BASO+EOS+MONOS NFR BLD: NORMAL %
BASOPHILS # BLD: NORMAL 10*3/UL
BASOPHILS NFR BLD: NORMAL %
BILIRUB SERPL-MCNC: NORMAL MG/DL
BUN SERPL-MCNC: 26 MG/DL
BUN/CREAT SERPL: NORMAL
CALCIUM SERPL-MCNC: 8.9 MG/DL
CHLORIDE SERPL-SCNC: 111 MMOL/L
CO2 SERPL-SCNC: 22 MMOL/L
CREAT SERPL-MCNC: 0.7 MG/DL
DIFFERENTIAL METHOD BLD: NORMAL
EOSINOPHIL # BLD: NORMAL 10*3/UL
EOSINOPHIL NFR BLD: NORMAL %
ERYTHROCYTE [DISTWIDTH] IN BLOOD: NORMAL %
GLOBULIN SER-MCNC: NORMAL G/DL
GLUCOSE SERPL-MCNC: 99 MG/DL
HCT VFR BLD CALC: 37.7 %
HGB BLD-MCNC: 12 G/DL
IMMATURE GRANULOCYTES (OFFPRE25): NORMAL
LENGTH OF FAST TIME PATIENT: NORMAL H
LYMPHOCYTES # BLD: NORMAL 10*3/UL
LYMPHOCYTES NFR BLD: NORMAL %
MCH RBC QN AUTO: 26.5 PG
MCHC RBC AUTO-ENTMCNC: 31.8 G/DL
MCV RBC AUTO: 83.2 FL
MONOCYTES # BLD: NORMAL 10*3/UL
MONOCYTES NFR BLD: NORMAL %
MPV (OFFPRE2): NORMAL
NEUTROPHILS # BLD: NORMAL 10*3/UL
NEUTROPHILS NFR BLD: NORMAL %
NRBC BLD MANUAL-RTO: NORMAL %
PLAT MORPH BLD: NORMAL
PLATELET # BLD: 189 10*3/UL
POTASSIUM SERPL-SCNC: 4.4 MMOL/L
PROT SERPL-MCNC: NORMAL G/DL
RBC # BLD: 4.53 10*6/UL
RBC MORPH BLD: NORMAL
SODIUM SERPL-SCNC: 141 MMOL/L
WBC # BLD: 11.7 10*3/UL
WBC MORPH BLD: NORMAL

## 2019-10-03 PROCEDURE — 97162 PT EVAL MOD COMPLEX 30 MIN: CPT

## 2019-10-03 PROCEDURE — 84132 ASSAY OF SERUM POTASSIUM: CPT | Performed by: INTERNAL MEDICINE

## 2019-10-03 PROCEDURE — 93306 TTE W/DOPPLER COMPLETE: CPT | Performed by: NURSE PRACTITIONER

## 2019-10-03 PROCEDURE — 85610 PROTHROMBIN TIME: CPT | Performed by: NURSE PRACTITIONER

## 2019-10-03 PROCEDURE — 83735 ASSAY OF MAGNESIUM: CPT | Performed by: NURSE PRACTITIONER

## 2019-10-03 PROCEDURE — 99024 POSTOP FOLLOW-UP VISIT: CPT | Performed by: INTERNAL MEDICINE

## 2019-10-03 PROCEDURE — 71045 X-RAY EXAM CHEST 1 VIEW: CPT | Performed by: NURSE PRACTITIONER

## 2019-10-03 PROCEDURE — 85025 COMPLETE CBC W/AUTO DIFF WBC: CPT | Performed by: NURSE PRACTITIONER

## 2019-10-03 PROCEDURE — 93005 ELECTROCARDIOGRAM TRACING: CPT

## 2019-10-03 PROCEDURE — 97530 THERAPEUTIC ACTIVITIES: CPT

## 2019-10-03 PROCEDURE — 97165 OT EVAL LOW COMPLEX 30 MIN: CPT

## 2019-10-03 PROCEDURE — 93010 ELECTROCARDIOGRAM REPORT: CPT | Performed by: INTERNAL MEDICINE

## 2019-10-03 PROCEDURE — 80048 BASIC METABOLIC PNL TOTAL CA: CPT | Performed by: NURSE PRACTITIONER

## 2019-10-03 PROCEDURE — 90471 IMMUNIZATION ADMIN: CPT

## 2019-10-03 RX ORDER — FUROSEMIDE 40 MG/1
40 TABLET ORAL DAILY
Status: DISCONTINUED | OUTPATIENT
Start: 2019-10-03 | End: 2019-10-03

## 2019-10-03 RX ORDER — POTASSIUM CHLORIDE 750 MG/1
20 TABLET, EXTENDED RELEASE ORAL DAILY
Qty: 30 TABLET | Refills: 0 | Status: SHIPPED | OUTPATIENT
Start: 2019-10-03 | End: 2019-12-05

## 2019-10-03 RX ORDER — POTASSIUM CHLORIDE 20 MEQ/1
20 TABLET, EXTENDED RELEASE ORAL DAILY
Status: DISCONTINUED | OUTPATIENT
Start: 2019-10-03 | End: 2019-10-03

## 2019-10-03 RX ORDER — ASPIRIN 81 MG/1
81 TABLET ORAL DAILY
Status: DISCONTINUED | OUTPATIENT
Start: 2019-10-03 | End: 2019-10-03

## 2019-10-03 RX ADMIN — MELATONIN 325 MG: at 09:32:00

## 2019-10-03 RX ADMIN — FAMOTIDINE 20 MG: 20 TABLET ORAL at 09:33:00

## 2019-10-03 RX ADMIN — POTASSIUM CHLORIDE 20 MEQ: 20 TABLET, EXTENDED RELEASE ORAL at 10:28:00

## 2019-10-03 RX ADMIN — FUROSEMIDE 40 MG: 40 TABLET ORAL at 10:28:00

## 2019-10-03 RX ADMIN — METOPROLOL SUCCINATE 25 MG: 25 TABLET, EXTENDED RELEASE ORAL at 06:41:00

## 2019-10-03 RX ADMIN — VENLAFAXINE HYDROCHLORIDE 75 MG: 75 CAPSULE, EXTENDED RELEASE ORAL at 09:34:00

## 2019-10-03 RX ADMIN — ASPIRIN 81 MG: 81 TABLET ORAL at 09:30:00

## 2019-10-03 RX ADMIN — LISINOPRIL 5 MG: 5 TABLET ORAL at 09:34:00

## 2019-10-03 RX ADMIN — PREDNISONE 5 MG: 1 TABLET ORAL at 09:31:00

## 2019-10-03 NOTE — DIETARY NOTE
[facility]    NUTRITION INITIAL ASSESSMENT    Pt does not meet malnutrition criteria. NUTRITION DIAGNOSIS/PROBLEM:    Food and nutrition-related knowledge deficit related to uncertainty how to apply information  as evidenced by patient report.

## 2019-10-03 NOTE — PLAN OF CARE
Assumed patient care at 7 am this morning. Aox4, Patient stable in Afib. EKG obtained in the morning prior to working with physical therapy. Systolic BP in the 98'I, asymptomatic, physician notified. Okay to discharge home with .  Explained plan of c

## 2019-10-03 NOTE — OPERATIVE REPORT
[facility]    PATIENT'S NAME: Lizz Yarbrough   ATTENDING PHYSICIAN: Ran Davalos M.D. OPERATING PHYSICIAN: Luis Dias M.D.    PATIENT ACCOUNT#:   [de-identified]    LOCATION:  [facility] 1 EDWP 10  MEDICAL RECORD #:   [id number]       DATE OF left atrium. We then proceeded with the MitraClip procedure. A separate dictation will be performed by Dr. Tereza Yeh. Yet in short, we placed 2 NTR devices. The first was placed medial to the previous clip on A2/P2.   Despite an excellent grasp, this

## 2019-10-03 NOTE — OPERATIVE REPORT
[facility]    PATIENT'S NAME: Alfreda Jay   ATTENDING PHYSICIAN: Dario Lira M.D. OPERATING PHYSICIAN: Guy Guallpa M.D.    PATIENT ACCOUNT#:   [de-identified]    LOCATION:  [address] [place] [address]  MEDICAL RECORD #:   [id number]       DATE OF B good grasp. Interrogation revealed the severity of the mitral insufficiency now went from moderate to severe to mild to moderate. The mean gradient was 2 mmHg. Given this, the second clip was released.   Interrogation by Dr. Lazaro Dougherty continued to reveal after

## 2019-10-03 NOTE — PHYSICAL THERAPY NOTE
PHYSICAL THERAPY QUICK EVALUATION - INPATIENT    Room Number: 8739/0258-K  Evaluation Date: 10/3/2019  Presenting Problem: s/p mitraclip  Physician Order: PT Eval and Treat    Problem List  Active Problems:    Mitral valve insufficiency      Past Medical Emmanuel Colón MD at [address] N/A 12/8/2014    Performed by Emmanuel Colón MD at [address] N/A 10/27/2014    Performed by Emmanuel Coóln MD at [address] [place] [address] much difficulty does the patient currently have. ..  -   Turning over in bed (including adjusting bedclothes, sheets and blankets)?: A Little   -   Sitting down on and standing up from a chair with arms (e.g., wheelchair, bedside commode, etc.): Unable   -

## 2019-10-03 NOTE — CM/SW NOTE
10/03/19 1500   Discharge disposition   Expected discharge disposition Home or Self     Pt s/p Natividad Zuniga. Nikky Raymundo, [address] [facility] /Dischage Planner  [phone]  Pager [phone]

## 2019-10-03 NOTE — PLAN OF CARE
Patient to room 8581 s/p mitral clip. Patient alert, oriented. Denies pain. Right groin site intact with no hematoma noted. Patient flat x2hrs. Sylvia d/c'd, manual pressure held x30 min due to bleeding from xeralto. Pressure dressing applied.  Vanessa ash

## 2019-10-03 NOTE — OCCUPATIONAL THERAPY NOTE
OCCUPATIONAL THERAPY QUICK EVALUATION - INPATIENT    Room Number: 2650/8738-Y  Evaluation Date: 10/3/2019     Type of Evaluation: Initial  Presenting Problem: Mitral Clip    Physician Order: IP Consult to Occupational Therapy  Reason for Therapy:  ADL/IADL Past Surgical History  Past Surgical History:   Procedure Laterality Date   • BACK SURGERY      spinal fusion L1-5   • CAUDAL N/A 3/26/2019    Performed by Lennice Felty, MD at [address] [place] [address] N/A 10/10/2017    Performed by mobility from  and is in a W/C at baseline. Pt has kathya lift at home    SUBJECTIVE   Pt stated, \"I am doing well. \"    Patient self-stated goal is to get back home but not have to use kathya.     OBJECTIVE  Precautions: Cardiac  Fall Risk: High fal concerns addressed;SCDs in place    ASSESSMENT     Patient is a 66year old female admitted on 10/2/2019 for mitral clip. Complete medical history and occupational profile noted above. Functional outcome measures completed include AMPAC, MMT, ROM.  In this

## 2019-10-03 NOTE — DISCHARGE SUMMARY
[facility]  Discharge Summary    Sharran Koyanagi Patient Status:  Inpatient    1945 MRN [id number]   [facility] 6NE-A Attending Ghulam Duran MD   Hosp Day # 1 PCP Shavon Rubi MD         Admit date: 10/2/2019    Dischar Charu/Jarrett      Disposition:  Stable - D/c Home      Discharge Medications:   Potassium Chloride ER 10 MEQ Oral Tab CR  Take 2 tablets (20 mEq total) by mouth daily.  Only take when taking lasix  Qty: 30 tablet Refills: 0    predniSONE 5 MG Oral Tab elevated while sitting. · No tight fitting underwear. · NO strenuous exercise or activity for 30 days after Mitraclip procedure    HYGIENE:  · Wash incisions with mild soap and water daily. Showering is allowed.    · No tub baths or hot tubs until total weeks to check your incisions. · You will be seen in the valve clinic in 1 month after discharge. · You will need an echocardiogram at 1 month, 3 months, 6 months, and 1 year.      CONTACT NUMBERS FOR YOUR REFERENCE:  · [facility] (Dr. Mireille Galvez

## 2019-10-03 NOTE — PLAN OF CARE
Pt received sitting up in the chair. Pt denies complaints of pain or discomfort. Pt is requesting to sleep in the recliner due to back pain while laying in bed. Right groin is stable. Pulses palpable. Vitals stable. HR 60-70's Afib with PVC's.  Blood pressu

## 2019-10-03 NOTE — PROGRESS NOTES
[facility] Progress Note    Subjective:   POD # 1 s/p Mitraclip. In bed on exam getting echo. She currently denies CP, SOB, dizziness, groin pain.     Objective:   BP 98/63   Pulse 74   Temp 97.4 °F (36.3 °C) (Temporal)   Resp 24   SpO2 99% 11.7 10/03/2019    HGB 12.0 10/03/2019    HCT 37.7 10/03/2019    .0 10/03/2019    CREATSERUM 0.70 10/03/2019    BUN 26 10/03/2019     10/03/2019    K 4.4 10/03/2019     10/03/2019    CO2 22.0 10/03/2019    GLU 99 10/03/2019    CA 8.9 10/ Tab Take 1 tablet (5 mg total) by mouth 2 (two) times daily. Disp: 60 tablet Rfl: 0         Assessment:  · POD 1 s/p placement of Mitraclip NTR device x 2 via RFV d/t severe MR. Mean grad 2, MR from severe to mild-mod post procedure  ?  H/o prior Mitraclip

## 2019-10-04 ENCOUNTER — TELEPHONE (OUTPATIENT)
Dept: CARDIOLOGY CLINIC | Facility: HOSPITAL | Age: 74
End: 2019-10-04

## 2019-10-08 RX ORDER — FERROUS FUMARATE 324(106)MG
TABLET ORAL
COMMUNITY
Start: 2017-01-18 | End: 2019-10-10

## 2019-10-08 RX ORDER — LOPERAMIDE HYDROCHLORIDE 2 MG/1
CAPSULE ORAL
COMMUNITY
Start: 2017-01-18 | End: 2020-08-28

## 2019-10-08 RX ORDER — PANTOPRAZOLE SODIUM 40 MG/1
TABLET, DELAYED RELEASE ORAL
COMMUNITY
Start: 2016-12-25 | End: 2019-10-10

## 2019-10-08 RX ORDER — BUDESONIDE 3 MG/1
CAPSULE, COATED PELLETS ORAL
COMMUNITY
Start: 2017-01-18 | End: 2019-10-10

## 2019-10-08 RX ORDER — OLOPATADINE HYDROCHLORIDE 1 MG/ML
SOLUTION/ DROPS OPHTHALMIC
COMMUNITY
Start: 2015-03-15 | End: 2019-10-10

## 2019-10-08 RX ORDER — WATER / MINERAL OIL / WHITE PETROLATUM 16 OZ
CREAM TOPICAL
COMMUNITY
Start: 2014-10-11 | End: 2019-10-10

## 2019-10-08 RX ORDER — NEOMYCIN SULFATE, POLYMYXIN B SULFATE AND HYDROCORTISONE 10; 3.5; 1 MG/ML; MG/ML; [USP'U]/ML
SUSPENSION/ DROPS AURICULAR (OTIC)
COMMUNITY
Start: 2019-08-20 | End: 2020-08-28

## 2019-10-08 RX ORDER — TRAMADOL HYDROCHLORIDE 50 MG/1
25 TABLET ORAL PRN
COMMUNITY
Start: 2019-08-22

## 2019-10-08 RX ORDER — ACETAMINOPHEN 325 MG/1
TABLET ORAL
COMMUNITY

## 2019-10-08 RX ORDER — PRAVASTATIN SODIUM 40 MG
TABLET ORAL
COMMUNITY
Start: 2014-11-25 | End: 2019-10-10

## 2019-10-08 RX ORDER — PETROLATUM,WHITE 41 %
OINTMENT (GRAM) TOPICAL
COMMUNITY
End: 2020-08-28

## 2019-10-08 RX ORDER — PREDNISONE 5 MG/1
TABLET ORAL
COMMUNITY
Start: 2015-01-27

## 2019-10-08 RX ORDER — HYDROCORTISONE ACETATE 25 MG/1
SUPPOSITORY RECTAL
COMMUNITY
Start: 2014-12-31 | End: 2019-10-10

## 2019-10-08 RX ORDER — MESALAMINE 400 MG/1
CAPSULE, DELAYED RELEASE ORAL
COMMUNITY
Start: 2015-03-24 | End: 2019-10-10

## 2019-10-08 RX ORDER — SPIRONOLACTONE 25 MG/1
TABLET ORAL
COMMUNITY
Start: 2014-10-11 | End: 2019-10-10

## 2019-10-08 RX ORDER — METRONIDAZOLE 500 MG/1
TABLET ORAL
COMMUNITY
Start: 2017-01-18 | End: 2019-10-10

## 2019-10-09 ENCOUNTER — OFFICE VISIT (OUTPATIENT)
Dept: GASTROENTEROLOGY | Facility: CLINIC | Age: 74
End: 2019-10-09
Payer: MEDICARE

## 2019-10-09 VITALS
BODY MASS INDEX: 23 KG/M2 | HEIGHT: 64 IN | DIASTOLIC BLOOD PRESSURE: 69 MMHG | HEART RATE: 55 BPM | SYSTOLIC BLOOD PRESSURE: 100 MMHG | RESPIRATION RATE: 20 BRPM

## 2019-10-09 DIAGNOSIS — K52.831 COLLAGENOUS COLITIS: Primary | ICD-10-CM

## 2019-10-09 DIAGNOSIS — I34.0 MITRAL VALVE INSUFFICIENCY, UNSPECIFIED ETIOLOGY: Primary | ICD-10-CM

## 2019-10-09 DIAGNOSIS — Z93.3 COLOSTOMY IN PLACE (HCC): ICD-10-CM

## 2019-10-09 PROBLEM — I50.32 CHRONIC DIASTOLIC CHF (CONGESTIVE HEART FAILURE) (CMD): Status: ACTIVE | Noted: 2019-10-09

## 2019-10-09 PROCEDURE — 99213 OFFICE O/P EST LOW 20 MIN: CPT | Performed by: INTERNAL MEDICINE

## 2019-10-09 PROCEDURE — G0463 HOSPITAL OUTPT CLINIC VISIT: HCPCS | Performed by: INTERNAL MEDICINE

## 2019-10-09 RX ORDER — POTASSIUM CHLORIDE 750 MG/1
20 TABLET, EXTENDED RELEASE ORAL 2 TIMES DAILY
COMMUNITY
Start: 2019-10-03 | End: 2021-04-06 | Stop reason: SDUPTHER

## 2019-10-10 ENCOUNTER — OFFICE VISIT (OUTPATIENT)
Dept: CARDIOLOGY | Age: 74
End: 2019-10-10

## 2019-10-10 VITALS
DIASTOLIC BLOOD PRESSURE: 50 MMHG | HEIGHT: 65 IN | WEIGHT: 140 LBS | OXYGEN SATURATION: 97 % | SYSTOLIC BLOOD PRESSURE: 98 MMHG | HEART RATE: 76 BPM | BODY MASS INDEX: 23.32 KG/M2

## 2019-10-10 DIAGNOSIS — I34.0 MITRAL VALVE INSUFFICIENCY, UNSPECIFIED ETIOLOGY: Primary | ICD-10-CM

## 2019-10-10 PROCEDURE — 99024 POSTOP FOLLOW-UP VISIT: CPT | Performed by: NURSE PRACTITIONER

## 2019-10-17 NOTE — PROGRESS NOTES
HPI:    Patient ID: Mini Rush is a 66year old female. HPI  The patient presents today with her  to establish ongoing GI care. She was under the care of Dr. Jerel Harris who has since retired.   She was last seen by Dr. Usman Moseley 0  predniSONE 5 MG Oral Tab, Take 1 tablet (5 mg total) by mouth once daily. , Disp: 90 tablet, Rfl: 3  traMADol HCl 50 MG Oral Tab, Take 0.5 tablets (25 mg total) by mouth every 8 (eight) hours as needed for Pain., Disp: 30 tablet, Rfl: 1  Neomycin-Polymyx but pt on ASA daily. Bactrim                 RASH    Comment:Periorbital dermatitis 24 hours into course   PHYSICAL EXAM:   Physical Exam   Constitutional: She is oriented to person, place, and time. She appears well-developed and well-nourished.  No distr 0.20 x10(3) uL 0.01   Immature Granulocyte Absolute      0.00 - 1.00 x10(3) uL 0.04   Neutrophils %      % 68.6   Lymphocytes %      % 21.8   Monocytes %      % 9.2   Eosinophils %      % 0.0   Basophils %      % 0.1   Immature Granulocyte %      % 0.3   G spleen. AORTA/VASCULAR:      No aneurysm. IVC filter. Atherosclerotic vascular calcification including coronary artery calcification. LYMPHADENOPATHY: None. GI/MESENTERY:           Few uncomplicated colonic diverticula.   Large amount of stool in the right lower quadrant abdominal wall, and spinal fixation hardware at L5-S1. Additional artifact from a IVC filter. 9. Mitraclip  10. Chronic vertebral body compression fractures. Multilevel lumbar laminectomy .   Posterolateral fusion from L2 to S1 and a mucosal hemorrhage and superficial erosion along the proximal anterior wall. This was biopsied. There was no retained food material within the stomach. The pylorus was widely patent.   The duodenal bulb and post bulbar region were normal.        Rejii reagents have  not been cleared or approved by the U.S. Food and Drug Administration. The FDA   does not require these tests to go  through premarket FDA review. These tests are used for clinical purposes only.    They should not be regarded as  investigati record number and \"LT colon BX\", and  consists of multiple irregular fragment(s) of tan soft tissue ranging in size   from 0.3-0.7 cm and measuring in aggregate  0.9 x 0.5 x 0.3 cm.  The entire specimen is wrapped in lens paper and submitted   in one c FINDINGS AND TECHNIQUE:  The patient was placed in the left   lateral  decubitus position and preoperative sedation was administered. Then rectal  digital examination was performed revealing a decreased sphincter   tone, no  masses.   The colonoscope w output should be monitored. If diarrhea recurs I would exclude C. difficile and employ an empiric trial of budesonide.     Family history of colon cancer/history of colon polyps  The patient's last colonoscopy was in May 2017 with removal of a solitary sub

## 2019-10-22 ENCOUNTER — TELEPHONE (OUTPATIENT)
Dept: GASTROENTEROLOGY | Facility: CLINIC | Age: 74
End: 2019-10-22

## 2019-10-22 NOTE — TELEPHONE ENCOUNTER
[facility]'s office states that pt needs colostomy bags (only has 2 left). Pt uses [facility].  Phone # is [phone]. Please also call pt.

## 2019-10-23 ENCOUNTER — HOSPITAL ENCOUNTER (OUTPATIENT)
Dept: CV DIAGNOSTICS | Facility: HOSPITAL | Age: 74
Discharge: HOME | End: 2019-10-23
Attending: INTERNAL MEDICINE
Payer: MEDICARE

## 2019-10-23 ENCOUNTER — HOSPITAL ENCOUNTER (OUTPATIENT)
Dept: CARDIOLOGY CLINIC | Facility: HOSPITAL | Age: 74
Discharge: HOME | End: 2019-10-23
Attending: INTERNAL MEDICINE
Payer: MEDICARE

## 2019-10-23 VITALS
OXYGEN SATURATION: 100 % | HEART RATE: 88 BPM | SYSTOLIC BLOOD PRESSURE: 107 MMHG | RESPIRATION RATE: 16 BRPM | DIASTOLIC BLOOD PRESSURE: 67 MMHG

## 2019-10-23 DIAGNOSIS — I48.91 ATRIAL FIBRILLATION WITH RAPID VENTRICULAR RESPONSE (HCC): ICD-10-CM

## 2019-10-23 DIAGNOSIS — Z98.890 S/P MITRAL VALVE CLIP IMPLANTATION: ICD-10-CM

## 2019-10-23 DIAGNOSIS — I50.32 CHRONIC DIASTOLIC HEART FAILURE (HCC): ICD-10-CM

## 2019-10-23 DIAGNOSIS — Z95.818 S/P MITRAL VALVE CLIP IMPLANTATION: ICD-10-CM

## 2019-10-23 DIAGNOSIS — I34.0 MITRAL VALVE INSUFFICIENCY, UNSPECIFIED ETIOLOGY: ICD-10-CM

## 2019-10-23 PROCEDURE — 93306 TTE W/DOPPLER COMPLETE: CPT | Performed by: INTERNAL MEDICINE

## 2019-10-23 PROCEDURE — 99214 OFFICE O/P EST MOD 30 MIN: CPT | Performed by: NURSE PRACTITIONER

## 2019-10-23 NOTE — TELEPHONE ENCOUNTER
Spoke to Ton Perez from [facility] will fax order to  [phone]. Waiting on fax.

## 2019-10-23 NOTE — PROGRESS NOTES
Erwin Dacosta Note    Subjective:   Patient presents for 1 month postop TMVR  APN  visit, accompanied by her  in wheelchair. She underwent Mitraclip placement x 2 on 10/2/19 d/t severe, symptomatic mitral regurgitation.    She has a h diagnostic evidence for regional wall motion abnormalities. The study is not technically sufficient to allow evaluation of LV     diastolic function. LVOT diameter (S): 2.1cm. Cardiac index (Qs/bsa)     (LVOT, Doppler): 2.4L/(min-m^2).   2. Aortic valve RBC 4.53 10/03/2019    HGB 12.0 10/03/2019    HCT 37.7 10/03/2019    MCV 83.2 10/03/2019    MCH 26.5 10/03/2019    MCHC 31.8 10/03/2019    RDW 18.2 (H) 10/03/2019    .0 10/03/2019    MPV 10.6 (H) 12/01/2018       Lab Results   Component Value Deni MR.  Mean grad 2, MR from severe to mild-mod post procedure  ?  H/o prior Mitraclip x1 on  10/31/18  · Chronic diastolic heart failure, preserved EF, NYHA II  · PAF-on Eliquis, rate controlled  · HL  · DVT s/p IVC filter permanent  · Colitis s/p colostomy p

## 2019-10-24 ENCOUNTER — TELEPHONE (OUTPATIENT)
Dept: CARDIOLOGY | Age: 74
End: 2019-10-24

## 2019-10-24 ENCOUNTER — TELEPHONE (OUTPATIENT)
Dept: CARDIOLOGY CLINIC | Facility: HOSPITAL | Age: 74
End: 2019-10-24

## 2019-10-24 DIAGNOSIS — I34.0 MITRAL VALVE INSUFFICIENCY, UNSPECIFIED ETIOLOGY: Primary | ICD-10-CM

## 2019-10-24 DIAGNOSIS — I50.30 HEART FAILURE WITH PRESERVED EJECTION FRACTION, UNSPECIFIED HF CHRONICITY (CMD): Primary | ICD-10-CM

## 2019-10-24 DIAGNOSIS — I25.10 CORONARY ARTERY DISEASE INVOLVING NATIVE CORONARY ARTERY OF NATIVE HEART WITHOUT ANGINA PECTORIS: ICD-10-CM

## 2019-10-24 DIAGNOSIS — I34.0 MITRAL VALVE INSUFFICIENCY, UNSPECIFIED ETIOLOGY: ICD-10-CM

## 2019-10-24 NOTE — TELEPHONE ENCOUNTER
Called patient with results of echocardiogram. EF 60-65% Three clips are present with moderate regurgitation. Echo results reviewed with OCTAVIANO Floyd. Patient advised to call with any question and follow up with next appointment.

## 2019-10-25 NOTE — TELEPHONE ENCOUNTER
Called [facility] and informed Debbi Stager that we have still not received faxed order, to please fax [phone] cognitive impairment/fall

## 2019-10-25 NOTE — TELEPHONE ENCOUNTER
[facility] calling to check if office received order for ostomy supplies.  Please call [phone]

## 2019-10-25 NOTE — TELEPHONE ENCOUNTER
Dr. Leihg Ann Claire-  We have gotten request for orders for patient to receive ostomy supplies please sign and review, these have been placed on your desk for review.

## 2019-10-28 ENCOUNTER — TELEPHONE (OUTPATIENT)
Dept: GASTROENTEROLOGY | Facility: CLINIC | Age: 74
End: 2019-10-28

## 2019-10-28 DIAGNOSIS — K86.2 PANCREATIC CYST: Primary | ICD-10-CM

## 2019-10-28 NOTE — TELEPHONE ENCOUNTER
Pt states when she eats she feels as though her \"intestines are filled and enlarged\" not having cramping pain but stomach discomfort. Has taken some GasX that seemed to help a little and Imodium but did not seem to help with her symptoms.  Rates pain 5/1

## 2019-10-29 NOTE — TELEPHONE ENCOUNTER
Patient contacted and states she does have a severe iodine allergy. States they usually do  a CT scan with no IV and no oral contrast.  Please advise. Thank you.

## 2019-10-29 NOTE — TELEPHONE ENCOUNTER
I spoke to Wayne. She is a difficult historian. She states that she was feeling well until about 1-1/2 weeks prior when she noted burning in her abdomen, nausea, poor appetite and liquid colostomy output. She has not been able to eat much.   She has no

## 2019-10-30 RX ORDER — FLUOCINOLONE ACETONIDE 0.11 MG/ML
1 OIL TOPICAL DAILY
Refills: 0 | COMMUNITY
Start: 2019-09-09 | End: 2019-11-01

## 2019-10-30 RX ORDER — BACLOFEN 500 UG/ML
500 INJECTION, SOLUTION INTRATHECAL DAILY
COMMUNITY
Start: 2019-10-15 | End: 2020-08-28

## 2019-10-30 RX ORDER — LEVOCETIRIZINE DIHYDROCHLORIDE 5 MG/1
5 TABLET, FILM COATED ORAL DAILY
Refills: 2 | COMMUNITY
Start: 2019-09-10 | End: 2020-08-28

## 2019-10-30 NOTE — TELEPHONE ENCOUNTER
Pt informed that Dr. Dave Rizzo is seeing patients once message is reviewed we will call her back with instructions.     Dr. Geovanna Nguyen

## 2019-10-31 ENCOUNTER — CLINICAL ABSTRACT (OUTPATIENT)
Dept: CARDIOLOGY | Age: 74
End: 2019-10-31

## 2019-10-31 ENCOUNTER — LAB ENCOUNTER (OUTPATIENT)
Dept: LAB | Facility: HOSPITAL | Age: 74
End: 2019-10-31
Attending: INTERNAL MEDICINE
Payer: MEDICARE

## 2019-10-31 DIAGNOSIS — I25.10 CORONARY ATHEROSCLEROSIS OF NATIVE CORONARY ARTERY: ICD-10-CM

## 2019-10-31 DIAGNOSIS — I50.30 DIASTOLIC HEART FAILURE (HCC): Primary | ICD-10-CM

## 2019-10-31 LAB
CHOLEST SERPL-MCNC: 153 MG/DL
CHOLEST/HDLC SERPL: NORMAL {RATIO}
CREATININE KINASE: 30
HDLC SERPL-MCNC: 52 MG/DL
LDLC SERPL CALC-MCNC: 80 MG/DL
LENGTH OF FAST TIME PATIENT: NORMAL H
NONHDLC SERPL-MCNC: NORMAL MG/DL
TRIGL SERPL-MCNC: 104 MG/DL
VLDLC SERPL CALC-MCNC: NORMAL MG/DL

## 2019-10-31 PROCEDURE — 82550 ASSAY OF CK (CPK): CPT

## 2019-10-31 PROCEDURE — 36415 COLL VENOUS BLD VENIPUNCTURE: CPT

## 2019-10-31 PROCEDURE — 80061 LIPID PANEL: CPT

## 2019-10-31 NOTE — TELEPHONE ENCOUNTER
Pt informed that Dr. Marcelino Sousa reviewed the CT with radiology, and she should have a CT scan of the abdomen and pelvis with oral contrast (she is not allergic to this) and omit the IV contrast. Pt also given # to schedule CT.

## 2019-10-31 NOTE — TELEPHONE ENCOUNTER
Order sent to scanning but has not been uploaded into media tab to send with DX code yet.     Called [facility] spoke to Vicky and informed her I could give her a verbal for DX code or have her refax forms/order to our office and resign and give DX

## 2019-10-31 NOTE — TELEPHONE ENCOUNTER
GI RN staff: Please contact the patient. I have reviewed the CT with radiology. We will do a CT scan of the abdomen and pelvis with oral contrast (she is not allergic to this) and omit the IV contrast.  I have placed the order.

## 2019-11-01 ENCOUNTER — OFFICE VISIT (OUTPATIENT)
Dept: CARDIOLOGY | Age: 74
End: 2019-11-01

## 2019-11-01 VITALS
OXYGEN SATURATION: 94 % | HEIGHT: 64 IN | WEIGHT: 140 LBS | HEART RATE: 70 BPM | BODY MASS INDEX: 23.9 KG/M2 | DIASTOLIC BLOOD PRESSURE: 60 MMHG | SYSTOLIC BLOOD PRESSURE: 100 MMHG

## 2019-11-01 DIAGNOSIS — I25.10 CORONARY ARTERY DISEASE INVOLVING NATIVE CORONARY ARTERY OF NATIVE HEART WITHOUT ANGINA PECTORIS: ICD-10-CM

## 2019-11-01 DIAGNOSIS — I48.20 CHRONIC ATRIAL FIBRILLATION (CMD): ICD-10-CM

## 2019-11-01 DIAGNOSIS — I34.0 NON-RHEUMATIC MITRAL REGURGITATION: Primary | ICD-10-CM

## 2019-11-01 PROCEDURE — 99214 OFFICE O/P EST MOD 30 MIN: CPT | Performed by: INTERNAL MEDICINE

## 2019-11-01 ASSESSMENT — PATIENT HEALTH QUESTIONNAIRE - PHQ9
1. LITTLE INTEREST OR PLEASURE IN DOING THINGS: NOT AT ALL
2. FEELING DOWN, DEPRESSED OR HOPELESS: NOT AT ALL
SUM OF ALL RESPONSES TO PHQ9 QUESTIONS 1 AND 2: 0
SUM OF ALL RESPONSES TO PHQ9 QUESTIONS 1 AND 2: 0

## 2019-11-04 ENCOUNTER — TELEPHONE (OUTPATIENT)
Dept: GASTROENTEROLOGY | Facility: CLINIC | Age: 74
End: 2019-11-04

## 2019-11-04 DIAGNOSIS — R19.7 DIARRHEA, UNSPECIFIED TYPE: Primary | ICD-10-CM

## 2019-11-04 NOTE — TELEPHONE ENCOUNTER
Mary Young MD  P  Gi Clinical Staff             GI RN staff: Please contact the patient and inform her that the C. difficile toxin test was negative.  If the diarrhea per the colostomy continues, I would recommend a fecal calprotectin.

## 2019-11-06 ENCOUNTER — HOSPITAL ENCOUNTER (OUTPATIENT)
Dept: CT IMAGING | Facility: HOSPITAL | Age: 74
Discharge: HOME | End: 2019-11-06
Attending: INTERNAL MEDICINE
Payer: MEDICARE

## 2019-11-06 DIAGNOSIS — K86.2 PANCREATIC CYST: ICD-10-CM

## 2019-11-06 PROCEDURE — 74176 CT ABD & PELVIS W/O CONTRAST: CPT | Performed by: INTERNAL MEDICINE

## 2019-11-06 NOTE — TELEPHONE ENCOUNTER
Pt states diarrhea is continuing but pt is now on barium and would like to wait on calprotectin was told it may cause diarrhea like symptoms but pt would like to have test done in a week or so.     Dr. Keegan Ruvalcaba- Please order lab

## 2019-11-06 NOTE — TELEPHONE ENCOUNTER
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 This is good news. A large hiatal hernia is again identified. Uncomplicated diverticulosis is present. Stones are present in the bladder.

## 2019-11-08 NOTE — TELEPHONE ENCOUNTER
Pt called back and was informed her CT scan showed that the cyst in the pancreas is stable in size, this is good news. Also a large hiatal hernia was seen again. Uncomplicated diverticulosis is present and stones were seen in the bladder.  Dr. Ivan BERNARDO' Us

## 2019-11-12 ENCOUNTER — TELEPHONE (OUTPATIENT)
Dept: CARDIOLOGY | Age: 74
End: 2019-11-12

## 2019-11-13 ENCOUNTER — APPOINTMENT (OUTPATIENT)
Dept: LAB | Facility: HOSPITAL | Age: 74
End: 2019-11-13
Attending: INTERNAL MEDICINE
Payer: MEDICARE

## 2019-11-13 DIAGNOSIS — R19.7 DIARRHEA, UNSPECIFIED TYPE: ICD-10-CM

## 2019-11-13 PROCEDURE — 83993 ASSAY FOR CALPROTECTIN FECAL: CPT

## 2019-11-15 RX ORDER — BUDESONIDE 3 MG/1
9 CAPSULE, COATED PELLETS ORAL EVERY MORNING
Qty: 90 CAPSULE | Refills: 1 | Status: ON HOLD | OUTPATIENT
Start: 2019-11-15 | End: 2020-01-21

## 2019-11-24 ENCOUNTER — APPOINTMENT (OUTPATIENT)
Dept: GENERAL RADIOLOGY | Facility: HOSPITAL | Age: 74
End: 2019-11-24
Payer: MEDICARE

## 2019-11-24 ENCOUNTER — HOSPITAL ENCOUNTER (OUTPATIENT)
Facility: HOSPITAL | Age: 74
Setting detail: OBSERVATION
Discharge: HOME HEALTH | End: 2019-11-27
Attending: EMERGENCY MEDICINE | Admitting: HOSPITALIST
Payer: MEDICARE

## 2019-11-24 DIAGNOSIS — I50.9 ACUTE ON CHRONIC CONGESTIVE HEART FAILURE, UNSPECIFIED HEART FAILURE TYPE (HCC): Primary | ICD-10-CM

## 2019-11-24 DIAGNOSIS — J98.01 BRONCHOSPASM: ICD-10-CM

## 2019-11-24 PROCEDURE — 96374 THER/PROPH/DIAG INJ IV PUSH: CPT

## 2019-11-24 PROCEDURE — 94640 AIRWAY INHALATION TREATMENT: CPT

## 2019-11-24 PROCEDURE — 96376 TX/PRO/DX INJ SAME DRUG ADON: CPT

## 2019-11-24 PROCEDURE — 99214 OFFICE O/P EST MOD 30 MIN: CPT | Performed by: INTERNAL MEDICINE

## 2019-11-24 PROCEDURE — 93005 ELECTROCARDIOGRAM TRACING: CPT

## 2019-11-24 PROCEDURE — 87086 URINE CULTURE/COLONY COUNT: CPT | Performed by: EMERGENCY MEDICINE

## 2019-11-24 PROCEDURE — 85025 COMPLETE CBC W/AUTO DIFF WBC: CPT | Performed by: EMERGENCY MEDICINE

## 2019-11-24 PROCEDURE — 87798 DETECT AGENT NOS DNA AMP: CPT | Performed by: HOSPITALIST

## 2019-11-24 PROCEDURE — 83880 ASSAY OF NATRIURETIC PEPTIDE: CPT | Performed by: EMERGENCY MEDICINE

## 2019-11-24 PROCEDURE — 93010 ELECTROCARDIOGRAM REPORT: CPT | Performed by: EMERGENCY MEDICINE

## 2019-11-24 PROCEDURE — 87486 CHLMYD PNEUM DNA AMP PROBE: CPT | Performed by: HOSPITALIST

## 2019-11-24 PROCEDURE — 81001 URINALYSIS AUTO W/SCOPE: CPT | Performed by: EMERGENCY MEDICINE

## 2019-11-24 PROCEDURE — 71045 X-RAY EXAM CHEST 1 VIEW: CPT | Performed by: EMERGENCY MEDICINE

## 2019-11-24 PROCEDURE — 94667 MNPJ CHEST WALL 1ST: CPT

## 2019-11-24 PROCEDURE — 99285 EMERGENCY DEPT VISIT HI MDM: CPT

## 2019-11-24 PROCEDURE — 87581 M.PNEUMON DNA AMP PROBE: CPT | Performed by: HOSPITALIST

## 2019-11-24 PROCEDURE — 84145 PROCALCITONIN (PCT): CPT | Performed by: HOSPITALIST

## 2019-11-24 PROCEDURE — 80048 BASIC METABOLIC PNL TOTAL CA: CPT | Performed by: EMERGENCY MEDICINE

## 2019-11-24 PROCEDURE — 87633 RESP VIRUS 12-25 TARGETS: CPT | Performed by: HOSPITALIST

## 2019-11-24 PROCEDURE — 85379 FIBRIN DEGRADATION QUANT: CPT | Performed by: EMERGENCY MEDICINE

## 2019-11-24 RX ORDER — BISACODYL 10 MG
10 SUPPOSITORY, RECTAL RECTAL
Status: DISCONTINUED | OUTPATIENT
Start: 2019-11-24 | End: 2019-11-27

## 2019-11-24 RX ORDER — POLYETHYLENE GLYCOL 3350 17 G/17G
17 POWDER, FOR SOLUTION ORAL DAILY PRN
Status: DISCONTINUED | OUTPATIENT
Start: 2019-11-24 | End: 2019-11-27

## 2019-11-24 RX ORDER — ATORVASTATIN CALCIUM 40 MG/1
40 TABLET, FILM COATED ORAL NIGHTLY
Status: DISCONTINUED | OUTPATIENT
Start: 2019-11-24 | End: 2019-11-27

## 2019-11-24 RX ORDER — FUROSEMIDE 10 MG/ML
40 INJECTION INTRAMUSCULAR; INTRAVENOUS ONCE
Status: COMPLETED | OUTPATIENT
Start: 2019-11-24 | End: 2019-11-24

## 2019-11-24 RX ORDER — BENZONATATE 100 MG/1
200 CAPSULE ORAL 3 TIMES DAILY
Status: DISCONTINUED | OUTPATIENT
Start: 2019-11-24 | End: 2019-11-27

## 2019-11-24 RX ORDER — LORATADINE 10 MG/1
10 TABLET ORAL DAILY
COMMUNITY

## 2019-11-24 RX ORDER — CETIRIZINE HYDROCHLORIDE 10 MG/1
10 TABLET ORAL DAILY
Status: DISCONTINUED | OUTPATIENT
Start: 2019-11-25 | End: 2019-11-27

## 2019-11-24 RX ORDER — ASPIRIN 81 MG/1
81 TABLET ORAL DAILY
Status: DISCONTINUED | OUTPATIENT
Start: 2019-11-24 | End: 2019-11-27

## 2019-11-24 RX ORDER — ACETAMINOPHEN 325 MG/1
650 TABLET ORAL EVERY 6 HOURS PRN
Status: DISCONTINUED | OUTPATIENT
Start: 2019-11-24 | End: 2019-11-27

## 2019-11-24 RX ORDER — VENLAFAXINE HYDROCHLORIDE 37.5 MG/1
75 CAPSULE, EXTENDED RELEASE ORAL DAILY
Status: DISCONTINUED | OUTPATIENT
Start: 2019-11-24 | End: 2019-11-27

## 2019-11-24 RX ORDER — FUROSEMIDE 10 MG/ML
20 INJECTION INTRAMUSCULAR; INTRAVENOUS
Status: DISCONTINUED | OUTPATIENT
Start: 2019-11-24 | End: 2019-11-27

## 2019-11-24 RX ORDER — METOPROLOL SUCCINATE 50 MG/1
50 TABLET, EXTENDED RELEASE ORAL 2 TIMES DAILY
Status: DISCONTINUED | OUTPATIENT
Start: 2019-11-24 | End: 2019-11-27

## 2019-11-24 RX ORDER — ONDANSETRON 2 MG/ML
4 INJECTION INTRAMUSCULAR; INTRAVENOUS EVERY 6 HOURS PRN
Status: DISCONTINUED | OUTPATIENT
Start: 2019-11-24 | End: 2019-11-27

## 2019-11-24 RX ORDER — BUDESONIDE 3 MG/1
9 CAPSULE, COATED PELLETS ORAL EVERY MORNING
Status: DISCONTINUED | OUTPATIENT
Start: 2019-11-25 | End: 2019-11-27

## 2019-11-24 RX ORDER — SODIUM CHLORIDE 0.9 % (FLUSH) 0.9 %
3 SYRINGE (ML) INJECTION AS NEEDED
Status: DISCONTINUED | OUTPATIENT
Start: 2019-11-24 | End: 2019-11-27

## 2019-11-24 RX ORDER — IPRATROPIUM BROMIDE AND ALBUTEROL SULFATE 2.5; .5 MG/3ML; MG/3ML
3 SOLUTION RESPIRATORY (INHALATION) ONCE
Status: COMPLETED | OUTPATIENT
Start: 2019-11-24 | End: 2019-11-24

## 2019-11-24 RX ORDER — MELATONIN
325
Status: DISCONTINUED | OUTPATIENT
Start: 2019-11-25 | End: 2019-11-27

## 2019-11-24 RX ORDER — FUROSEMIDE 10 MG/ML
40 INJECTION INTRAMUSCULAR; INTRAVENOUS
Status: DISCONTINUED | OUTPATIENT
Start: 2019-11-24 | End: 2019-11-24

## 2019-11-24 RX ORDER — TRAMADOL HYDROCHLORIDE 50 MG/1
25 TABLET ORAL EVERY 8 HOURS PRN
Status: DISCONTINUED | OUTPATIENT
Start: 2019-11-24 | End: 2019-11-27

## 2019-11-24 RX ORDER — PREDNISONE 1 MG/1
5 TABLET ORAL DAILY
Status: DISCONTINUED | OUTPATIENT
Start: 2019-11-24 | End: 2019-11-27

## 2019-11-24 RX ORDER — IPRATROPIUM BROMIDE AND ALBUTEROL SULFATE 2.5; .5 MG/3ML; MG/3ML
3 SOLUTION RESPIRATORY (INHALATION)
Status: DISCONTINUED | OUTPATIENT
Start: 2019-11-24 | End: 2019-11-26

## 2019-11-24 NOTE — H&P
[facility] Hospitalist H&P       CC: Patient presents with:  Cough/URI       PCP: Marcin Crooks MD    History of Present Illness: Rosalinda Edwards is a(n) 66year old female with DVT s/p permanent IVC filter on eliquis, recurrent cdif colitis and collag collecting system   • OTHER DISEASES     uterine fibroids   • OTHER DISEASES     ulcerative colitis   • OTHER DISEASES     uti   • Pneumonia, organism unspecified(486)    • Reflux    • Self-catheterizes urinary bladder 1/11/2016   • Ulcerative colitis ([facility] 75. 8/14/12  Penicillins             RASH, DIARRHEA    Comment:As a child.   Localized rash at injection site             CLASS             Patient tolerated cefepime 10/1/18 admission  Shellfish               ANAPHYLAXIS  Shellfish-Derived P*    ANAPHYLAXIS  T daily., Disp: 30 tablet, Rfl: 0  VENLAFAXINE HCL ER 75 MG Oral Capsule SR 24 Hr, TAKE 1 CAPSULE BY MOUTH DAILY, Disp: 90 capsule, Rfl: 2  ezetimibe 10 MG Oral Tab, TAKE 1 TABLET ONCE NIGHTLY, Disp: 90 tablet, Rfl: 2  aspirin 81 MG Oral Tab EC, Take 1 table No rashes or lesions.     Neurologic: Normal strength, no focal deficit appreciated     Diagnostic Data:    CBC/Chem  Recent Labs   Lab 11/24/19  0632   WBC 11.9*   HGB 13.6   MCV 84.3   .0       Recent Labs   Lab 11/24/19  0632      K 3.7   CL clinical course    Outpatient records or previous hospital records reviewed. Further recommendations pending patient's clinical course.   [facility] hospitalist to continue to follow patient while in house    Patient and/or patient's family given opportunity t

## 2019-11-24 NOTE — ED PROVIDER NOTES
Patient Seen in: [facility] AND [facility] Emergency Department      History   Patient presents with:  Cough/URI    Stated Complaint:     HPI    Patient is a 57-year-old female who presents to the emergency department with a chief complaint of shortness of breat poycythemia   • OTHER DISEASES     lumbar spinal stenosis   • OTHER DISEASES     duplicated left renal collecting system   • OTHER DISEASES     uterine fibroids   • OTHER DISEASES     ulcerative colitis   • OTHER DISEASES     uti   • Pneumonia, organism un No      Alcohol/week: 0.0 standard drinks    Drug use: No             Review of Systems    Positive for stated complaint:   Other systems are as noted in HPI. Constitutional and vital signs reviewed.       All other systems reviewed and negative except as (*)     Protein Urine 100  (*)     Leukocyte Esterase Urine Moderate (*)     WBC Urine 31 (*)     Bacteria Urine Many (*)     All other components within normal limits   CBC W/ DIFFERENTIAL - Abnormal; Notable for the following components:    WBC 11.9 (*)

## 2019-11-24 NOTE — PLAN OF CARE
Patient received from ED for cough and SOB. Oriented at this time and denies ant SOB. Patient complains of some mild chest tightness/ pressure from coughing, physicians aware. Contact and droplet precautions in place for rhinovirus.  Pt handles her own colo respiratory status  - Assess for changes in mentation and behavior  - Position to facilitate oxygenation and minimize respiratory effort  - Oxygen supplementation based on oxygen saturation or ABGs  - Provide Smoking Cessation handout, if applicable  - Enc needed  Outcome: Progressing     Problem: PAIN - ADULT  Goal: Verbalizes/displays adequate comfort level or patient's stated pain goal  Description  INTERVENTIONS:  - Encourage pt to monitor pain and request assistance  - Assess pain using appropriate pain

## 2019-11-24 NOTE — CONSULTS
[facility] HOSP - [facility]    Report of Cardiology Consultation    Simona Bravo Patient Status:  Observation    1945 MRN [id number]   Location [facility] Attending Karthik Champion MD   Hosp Day # 0 PCP Adelita Wilson MD     Date of Ad sufficient to allow evaluation of LV     diastolic function. 2. Aorta: Aortic root was 3.9cm at the sinus of Valsalva. 3. Mitral valve: A single MitraClip was previously present. Two additional     [id number] MitraClips were now added.  Thickening and e secondary poycythemia   • OTHER DISEASES     lumbar spinal stenosis   • OTHER DISEASES     duplicated left renal collecting system   • OTHER DISEASES     uterine fibroids   • OTHER DISEASES     ulcerative colitis   • OTHER DISEASES     uti   • Pneumonia, o DVT/VTE Father    • Diabetes Mother    • Diabetes Sister    • Eye Problems Sister         glaucoma   • Other (Other) Sister    • Other (Other) Daughter         spastic paraplegia   • Eye Problems Other         aunts/ uncles glaucoma   • Other (Other) Other morning. Metoprolol Succinate ER 50 MG Oral Tablet 24 Hr, Take 1 tablet (50 mg total) by mouth daily.  (Patient taking differently: Take 50 mg by mouth 2 (two) times daily.  )  EZETIMIBE 10 MG Oral Tab, TAKE 1 TABLET ONCE DAILY  Potassium Chloride ER 10 ME injection site             CLASS             Patient tolerated cefepime 10/1/18 admission  Shellfish               ANAPHYLAXIS  Shellfish-Derived P*    ANAPHYLAXIS  Tetracycline Base       RASH  Aleve [Naprelan]        RASH  Aspirin                     Com CREATSERUM 0.75 11/24/2019    BUN 27 (H) 11/24/2019     11/24/2019    K 3.7 11/24/2019     11/24/2019    CO2 29.0 11/24/2019    GLU 78 11/24/2019    CA 9.3 11/24/2019    ALB 3.8 09/09/2019    ALKPHO 175 (H) 09/09/2019    TP 6.3 (L) 09/09/201

## 2019-11-24 NOTE — ED NOTES
PATIENT TRANSFERRED TO FLOOR VIA CART WITH TRANSPORTER. PATIENT IN NO DISTRESS.  1500ML URINE OUTPUT IN BLACK BAG.   MASKED PATIENT FOR TRANSPORT TO FLOOR

## 2019-11-24 NOTE — ED INITIAL ASSESSMENT (HPI)
Pt presents to ED for a cough and sob x 1 week. Pt states she was dx with bronchitis last week. Pt denies fevers. Pt has rhonchi upon ausculation.

## 2019-11-24 NOTE — CM/SW NOTE
Received MDO for evaluation. Spoke with patient for assessment. Patient and  Serenity Vizcarra live in a 2 story home, but patient stays on the first floor. She is wheelchair bound at baseline. She has a wheelchair, walker, shower chair.  She has a colostomy & m

## 2019-11-25 PROCEDURE — 80048 BASIC METABOLIC PNL TOTAL CA: CPT | Performed by: HOSPITALIST

## 2019-11-25 PROCEDURE — 96376 TX/PRO/DX INJ SAME DRUG ADON: CPT

## 2019-11-25 PROCEDURE — 84132 ASSAY OF SERUM POTASSIUM: CPT | Performed by: HOSPITALIST

## 2019-11-25 PROCEDURE — 94640 AIRWAY INHALATION TREATMENT: CPT

## 2019-11-25 PROCEDURE — 83735 ASSAY OF MAGNESIUM: CPT | Performed by: HOSPITALIST

## 2019-11-25 PROCEDURE — 85025 COMPLETE CBC W/AUTO DIFF WBC: CPT | Performed by: HOSPITALIST

## 2019-11-25 PROCEDURE — 94668 MNPJ CHEST WALL SBSQ: CPT

## 2019-11-25 PROCEDURE — 99214 OFFICE O/P EST MOD 30 MIN: CPT | Performed by: INTERNAL MEDICINE

## 2019-11-25 RX ORDER — POTASSIUM CHLORIDE 20 MEQ/1
40 TABLET, EXTENDED RELEASE ORAL EVERY 4 HOURS
Status: COMPLETED | OUTPATIENT
Start: 2019-11-25 | End: 2019-11-25

## 2019-11-25 RX ORDER — GUAIFENESIN 100 MG/5ML
100 SOLUTION ORAL EVERY 4 HOURS PRN
Status: DISCONTINUED | OUTPATIENT
Start: 2019-11-25 | End: 2019-11-27

## 2019-11-25 RX ORDER — IPRATROPIUM BROMIDE AND ALBUTEROL SULFATE 2.5; .5 MG/3ML; MG/3ML
3 SOLUTION RESPIRATORY (INHALATION) EVERY 6 HOURS PRN
Status: DISCONTINUED | OUTPATIENT
Start: 2019-11-25 | End: 2019-11-27

## 2019-11-25 NOTE — PROGRESS NOTES
[facility] Hospitalist Progress Note     CC: Hospital Follow up    PCP: Prashant Lopez MD       Assessment/Plan:     Principal Problem:    Acute on chronic congestive heart failure, unspecified heart failure type [facility])  Active Problems:    Acute on chronic co (101115)/(4260) 115/42      Intake/Output:    Intake/Output Summary (Last 24 hours) at 11/25/2019 1517  Last data filed at 11/25/2019 1132  Gross per 24 hour   Intake 428 ml   Output 2600 ml   Net -2172 ml       Last 3 Weights  11/25/19 1321 : 131 lb 12. 40 mg Oral Nightly   • benzonatate  200 mg Oral TID   • Budesonide  9 mg Oral QAM   • ferrous sulfate  325 mg Oral Daily with breakfast   • cetirizine  10 mg Oral Daily   • Metoprolol Succinate ER  50 mg Oral BID   • predniSONE  5 mg Oral Daily   • Tom

## 2019-11-25 NOTE — PLAN OF CARE
Problem: Patient Centered Care  Goal: Patient preferences are identified and integrated in the patient's plan of care  Description  Interventions:  - What would you like us to know as we care for you?  Patient has spastic paraplegia   - Provide timely, co Spirometry  - Assess the need for suctioning and perform as needed  - Assess and instruct to report SOB or any respiratory difficulty  - Respiratory Therapy support as indicated  - Manage/alleviate anxiety  - Monitor for signs/symptoms of CO2 retention  Hugh Valdeses evaluate response  - Implement non-pharmacological measures as appropriate and evaluate response  - Consider cultural and social influences on pain and pain management  - Manage/alleviate anxiety  - Utilize distraction and/or relaxation techniques  - Monit

## 2019-11-25 NOTE — PLAN OF CARE
Pt c/o SOB & tightness this am-- improved w/ nebs & 1L of O2 for comfort. Dr. Edward Kaur aware. Up to chair w/ lift equipment. Potassium covered.  Plan- IV lasix, monitor O2 sats    Problem: Patient Centered Care  Goal: Patient preferences are identified and inte supplementation based on oxygen saturation or ABGs  - Provide Smoking Cessation handout, if applicable  - Encourage broncho-pulmonary hygiene including cough, deep breathe, Incentive Spirometry  - Assess the need for suctioning and perform as needed  - Ass or patient reports new pain  - Anticipate increased pain with activity and pre-medicate as appropriate  Outcome: Progressing     Problem: RISK FOR INFECTION - ADULT  Goal: Absence of fever/infection during anticipated neutropenic period  Description  INTER for signs and symptoms of electrolyte imbalances  - Administer electrolyte replacement as ordered  - Monitor response to electrolyte replacements, including rhythm and repeat lab results as appropriate  - Fluid restriction as ordered  - Instruct patient on

## 2019-11-25 NOTE — CARDIAC REHAB
CARDIAC REHAB HEART FAILURE EDUCATION    Handouts provided and reviewed: CHF Booklet. Activity: Chair for all meals: yes      Paraplegic          Disease Process: Disease process reviewed with patient.   Questions answered    Reviewed the following: EVE

## 2019-11-25 NOTE — PROGRESS NOTES
[facility] CL[facility]  Progress Note    Ursula Juan Miguelterrie Patient Status:  Observation    1945 MRN [id number]   Location [facility] Attending Fernanda Rossi MD   Hosp Day # 0 PCP Dwaine Perez MD     Assessment:    1.   Respiratory insufficie 13.1 11/25/2019    HCT 40.9 11/25/2019    .0 11/25/2019     Lab Results   Component Value Date    INR 1.12 (H) 10/03/2019     Lab Results   Component Value Date     11/25/2019    K 2.8 11/25/2019     11/25/2019    CO2 28.0 11/25/2019

## 2019-11-25 NOTE — HOME CARE LIAISON
Received referral from [address] with patient at the bedside. Patient is agreeable to [facility], pending orders. Residential brochure provided with contact information. All questions addressed and answered.      Patient will need a

## 2019-11-26 ENCOUNTER — APPOINTMENT (OUTPATIENT)
Dept: GENERAL RADIOLOGY | Facility: HOSPITAL | Age: 74
End: 2019-11-26
Attending: HOSPITALIST
Payer: MEDICARE

## 2019-11-26 PROCEDURE — 96376 TX/PRO/DX INJ SAME DRUG ADON: CPT

## 2019-11-26 PROCEDURE — 85025 COMPLETE CBC W/AUTO DIFF WBC: CPT | Performed by: HOSPITALIST

## 2019-11-26 PROCEDURE — 94640 AIRWAY INHALATION TREATMENT: CPT

## 2019-11-26 PROCEDURE — 80048 BASIC METABOLIC PNL TOTAL CA: CPT | Performed by: INTERNAL MEDICINE

## 2019-11-26 PROCEDURE — 84145 PROCALCITONIN (PCT): CPT | Performed by: HOSPITALIST

## 2019-11-26 PROCEDURE — 99214 OFFICE O/P EST MOD 30 MIN: CPT | Performed by: NURSE PRACTITIONER

## 2019-11-26 PROCEDURE — 83735 ASSAY OF MAGNESIUM: CPT | Performed by: HOSPITALIST

## 2019-11-26 PROCEDURE — 94668 MNPJ CHEST WALL SBSQ: CPT

## 2019-11-26 PROCEDURE — 71046 X-RAY EXAM CHEST 2 VIEWS: CPT | Performed by: HOSPITALIST

## 2019-11-26 RX ORDER — FLUTICASONE PROPIONATE 50 MCG
1 SPRAY, SUSPENSION (ML) NASAL DAILY
Status: DISCONTINUED | OUTPATIENT
Start: 2019-11-26 | End: 2019-11-27

## 2019-11-26 RX ORDER — IPRATROPIUM BROMIDE AND ALBUTEROL SULFATE 2.5; .5 MG/3ML; MG/3ML
3 SOLUTION RESPIRATORY (INHALATION)
Status: DISCONTINUED | OUTPATIENT
Start: 2019-11-26 | End: 2019-11-27

## 2019-11-26 RX ORDER — ECHINACEA PURPUREA EXTRACT 125 MG
1 TABLET ORAL EVERY 6 HOURS PRN
Status: DISCONTINUED | OUTPATIENT
Start: 2019-11-26 | End: 2019-11-27

## 2019-11-26 NOTE — PROGRESS NOTES
[facility] Hospitalist Progress Note     CC: Hospital Follow up    PCP: Estrellita Kayser, MD       Assessment/Plan:     Principal Problem:    Acute on chronic congestive heart failure, unspecified heart failure type [facility])  Active Problems:    Acute on chronic co oz (55.8 kg), SpO2 96 %, not currently breastfeeding.     Temp:  [97.6 °F (36.4 °C)-98.1 °F (36.7 °C)] 98.1 °F (36.7 °C)  Pulse:  [63-95] 95  Resp:  [18-24] 24  BP: (107-134)/(53-83) 134/83      Intake/Output:    Intake/Output Summary (Last 24 hours) at 11/ 11/26/2019  CONCLUSION:  1. Favorable change from November 24, 2019. 2. Heart is slightly smaller and less congestion noted. 3. Otherwise little change. Dictated by (CST): Mary Kay Ba MD on 11/26/2019 at 11:05     Approved by (CST):  Leti Casillas

## 2019-11-26 NOTE — PLAN OF CARE
Robitussin administered for cough. Weaned down to 0.5L O2 nasal cannula with oxygen saturations > 95%. [place] and colostomy intact. Con't IV lasix. Droplet and contact isolation maintained.      Problem: Patient Centered Care  Goal: Patient preferences are i Oxygen supplementation based on oxygen saturation or ABGs  - Provide Smoking Cessation handout, if applicable  - Encourage broncho-pulmonary hygiene including cough, deep breathe, Incentive Spirometry  - Assess the need for suctioning and perform as needed goal  Description  INTERVENTIONS:  - Encourage pt to monitor pain and request assistance  - Assess pain using appropriate pain scale  - Administer analgesics based on type and severity of pain and evaluate response  - Implement non-pharmacological measures lasix  - O2 prn  - cough medication  - See additional Care Plan goals for specific interventions   Outcome: Progressing  Goal: Patient/Family Short Term Goal  Description  Patient's Short Term Goal: To not feel SOB    Interventions:   - nebs  - IV lasix  -

## 2019-11-26 NOTE — PROGRESS NOTES
[facility]    Progress Note    Giovanna Skaggs Patient Status:  Observation    1945 MRN [id number]   Location [address] [facility] Attending Klarissa Dumont MD   Hosp Day # 0 PCP Estrellita Kayser, MD        Subjective:     Kassie Klein ([place]) 04/16/2019    CK 30 10/31/2019                        Adair Sandhu, APRN  11/26/2019

## 2019-11-26 NOTE — PLAN OF CARE
Problem: Patient Centered Care  Goal: Patient preferences are identified and integrated in the patient's plan of care  Description  Interventions:  - What would you like us to know as we care for you?  Patient has spastic paraplegia   - Provide timely, co changes in mentation and behavior  - Position to facilitate oxygenation and minimize respiratory effort  - Oxygen supplementation based on oxygen saturation or ABGs  - Provide Smoking Cessation handout, if applicable  - Encourage broncho-pulmonary hygiene intact  Description  INTERVENTIONS  - Assess and document risk factors for pressure ulcer development  - Assess and document skin integrity  - Monitor for areas of redness and/or skin breakdown  - Initiate interventions, skin care algorithm/standards of ca needs (meds, wound care, etc)  - Arrange for interpreters to assist at discharge as needed  - Consider post-discharge preferences of patient/family/discharge partner  - Complete POLST form as appropriate  - Assess patient's ability to be responsible for ma

## 2019-11-27 VITALS
TEMPERATURE: 99 F | DIASTOLIC BLOOD PRESSURE: 45 MMHG | WEIGHT: 116.19 LBS | OXYGEN SATURATION: 98 % | HEART RATE: 106 BPM | BODY MASS INDEX: 19.84 KG/M2 | SYSTOLIC BLOOD PRESSURE: 122 MMHG | HEIGHT: 64 IN | RESPIRATION RATE: 20 BRPM

## 2019-11-27 PROCEDURE — 83735 ASSAY OF MAGNESIUM: CPT | Performed by: HOSPITALIST

## 2019-11-27 PROCEDURE — 96376 TX/PRO/DX INJ SAME DRUG ADON: CPT

## 2019-11-27 PROCEDURE — 80048 BASIC METABOLIC PNL TOTAL CA: CPT | Performed by: INTERNAL MEDICINE

## 2019-11-27 PROCEDURE — 99214 OFFICE O/P EST MOD 30 MIN: CPT | Performed by: INTERNAL MEDICINE

## 2019-11-27 PROCEDURE — 94640 AIRWAY INHALATION TREATMENT: CPT

## 2019-11-27 PROCEDURE — 85025 COMPLETE CBC W/AUTO DIFF WBC: CPT | Performed by: HOSPITALIST

## 2019-11-27 RX ORDER — ECHINACEA PURPUREA EXTRACT 125 MG
1 TABLET ORAL EVERY 6 HOURS PRN
Qty: 1 BOTTLE | Refills: 0 | Status: SHIPPED | OUTPATIENT
Start: 2019-11-27 | End: 2020-01-18

## 2019-11-27 RX ORDER — POTASSIUM CHLORIDE 20 MEQ/1
40 TABLET, EXTENDED RELEASE ORAL EVERY 4 HOURS
Status: COMPLETED | OUTPATIENT
Start: 2019-11-27 | End: 2019-11-27

## 2019-11-27 RX ORDER — FUROSEMIDE 40 MG/1
40 TABLET ORAL DAILY
Status: DISCONTINUED | OUTPATIENT
Start: 2019-11-28 | End: 2019-11-27

## 2019-11-27 RX ORDER — GUAIFENESIN 100 MG/5ML
100 SOLUTION ORAL EVERY 4 HOURS PRN
Qty: 1 BOTTLE | Refills: 0 | Status: ON HOLD | OUTPATIENT
Start: 2019-11-27 | End: 2020-02-25

## 2019-11-27 RX ORDER — FLUTICASONE PROPIONATE 50 MCG
1 SPRAY, SUSPENSION (ML) NASAL DAILY
Qty: 1 BOTTLE | Refills: 0 | Status: SHIPPED | OUTPATIENT
Start: 2019-11-28

## 2019-11-27 NOTE — PLAN OF CARE
Pt is alert and oriented. Colostomy bag changed last night. Isolation precautions. PRN tylenol for pain in both legs. Pt turned and repositioned. Call light within reach. Plan is d/c back to home when medically cleared.    Problem: Patient Centered Care  Go Evaluate effectiveness of antiarrhythmic and heart rate control medications as ordered  - Initiate emergency measures for life threatening arrhythmias  - Monitor electrolytes and administer replacement therapy as ordered  Outcome: Progressing     Problem: stated pain goal  Description  INTERVENTIONS:  - Encourage pt to monitor pain and request assistance  - Assess pain using appropriate pain scale  - Administer analgesics based on type and severity of pain and evaluate response  - Implement non-pharmacologi

## 2019-11-27 NOTE — PROGRESS NOTES
[facility] CLIN[facility]  Progress Note    Nba Tee Patient Status:  Observation    1945 MRN [id number]   Location [facility] Attending Angus Young MD   Hosp Day # 0 PCP Damaris Moncada MD     Assessment:    1.   Rhinovirus infe 11/27/2019    CREATSERUM 0.69 11/27/2019     11/27/2019    MG 1.9 11/27/2019    CA 9.0 11/27/2019        Lab Results   Component Value Date    TROP 1.570 () 04/16/2019    TROP 1.240 () 04/16/2019    TROP 0.454 () 04/15/2019        Medications:

## 2019-11-27 NOTE — CM/SW NOTE
F2F order entered. Per RN, pt stable to discharge today. SW updated [place] from [facility] regarding discharge.      Omer Colón

## 2019-11-27 NOTE — PLAN OF CARE
Problem: Patient Centered Care  Goal: Patient preferences are identified and integrated in the patient's plan of care  Description  Interventions:  - What would you like us to know as we care for you?  Patient has spastic paraplegia   - Provide timely, co Spirometry  - Assess the need for suctioning and perform as needed  - Assess and instruct to report SOB or any respiratory difficulty  - Respiratory Therapy support as indicated  - Manage/alleviate anxiety  - Monitor for signs/symptoms of CO2 retention  Aguilar Miquel evaluate response  - Implement non-pharmacological measures as appropriate and evaluate response  - Consider cultural and social influences on pain and pain management  - Manage/alleviate anxiety  - Utilize distraction and/or relaxation techniques  - Monit To not feel SOB    Interventions:   - nebs  - IV lasix  - O2 prn  - See additional Care Plan goals for specific interventions   Outcome: Progressing

## 2019-11-27 NOTE — DISCHARGE SUMMARY
[facility] Discharge Summary   Patient ID:  Salvador Booker  [id number]  24 year old  5/4/1945    Admit date: 11/24/2019    Discharge date and time: 11/27/2019  2:44 PM     Attending Physician: No att. providers found     Via Kai Lopez (5 mg total) by mouth 2 (two) times daily.      aspirin 81 MG Tbec     atorvastatin 40 MG Tabs  Commonly known as:  LIPITOR     benzonatate 200 MG Caps  Commonly known as:  TESSALON  Take 1 capsule (200 mg total) by mouth 3 (three) times daily as needed for with cough and sob. Patient states she has been coughing, congested for the past couple weeks, notes cough productive of yellow green sputum, intermittent wheezing, that has got progressively worse the past few days.   Denies chest pain, no fevers, no naus 11/24/2019, 6:08. INDICATIONS: Persistent hypoxia. History of chronic congestive heart failure and mitral valve clip implantation. TECHNIQUE:   Two views. FINDINGS:  CARDIAC/VASC:   The heart is borderline enlarged.  Postop changes are seen about the he the thoracic spine, without discrete vertebral compression deformities. IMPRESSION: No significant interval radiographic change: No pneumonic airspace consolidation or pleural effusions.     Ct Abdomen+pelvis(cpt=74176)    Result Date: 11/6/2019  PROCEDU Inhomogeneous ground-glass opacities are present with mosaic attenuation. LIVER: No enlargement, atrophy, abnormal density, or significant focal lesion is identified within the parameters of this noncontrast study. BILIARY: The gallbladder is present.  PAN demonstrated secondary to a moderate compression fracture deformity of L1. Decompressive laminectomy defects are evident throughout the lumbar spine. An intervertebral disc spacer device is present at L5-S1. There is relative osteopenia.  Nonspecific scler Tavo Mckeon MD on 11/06/2019 at 14:50          Xr Chest Ap Portable  (cpt=71045)    Result Date: 11/24/2019  PROCEDURE: XR CHEST AP PORTABLE (CPT=71010) TIME: 0613 hours. COMPARISON: [facility], XR CHEST AP PORTABLE  (CPT=71045), 10/03/2019, 4:34.   INDICAT

## 2019-11-27 NOTE — PLAN OF CARE
Patient discharged on 11/27 at 2:45 pm. Patient alert and oriented. Belongings gathered and sent with patient. Discharge instructions given. Prescriptions given to patient. Tele monitor, cowan, and IV removed.  Per Dr. Valdivia Fail do not need to do voiding tr

## 2019-11-29 ENCOUNTER — TELEPHONE (OUTPATIENT)
Dept: CASE MANAGEMENT | Age: 74
End: 2019-11-29

## 2019-12-04 ENCOUNTER — TELEPHONE (OUTPATIENT)
Dept: GASTROENTEROLOGY | Facility: CLINIC | Age: 74
End: 2019-12-04

## 2019-12-04 DIAGNOSIS — K92.1 BLACK STOOL: Primary | ICD-10-CM

## 2019-12-04 NOTE — TELEPHONE ENCOUNTER
Pt states that she is taking budesonide and it is working but she states that stool is black. Is this a side effect of medication? Please call.

## 2019-12-04 NOTE — TELEPHONE ENCOUNTER
Black stools are not typically associated with budesonide. Although I doubt that the patient is bleeding we should repeat a CBC. I have placed the order.

## 2019-12-04 NOTE — TELEPHONE ENCOUNTER
Pt states her black stools have been going on for about 1 week. Pt states she has no other GI symptoms and no blood seen, pt states she is feeling fine and has seen an improvement since starting the Budesonide.      Pt has not been taking any pepto-bism

## 2019-12-04 NOTE — TELEPHONE ENCOUNTER
Pt called and informed to have CBC done as black stools are not typically associated with RX. Patient verbalized message was understood and had no further questions.

## 2019-12-05 ENCOUNTER — OFFICE VISIT (OUTPATIENT)
Dept: CARDIOLOGY CLINIC | Facility: HOSPITAL | Age: 74
End: 2019-12-05
Attending: NURSE PRACTITIONER
Payer: MEDICARE

## 2019-12-05 ENCOUNTER — LAB ENCOUNTER (OUTPATIENT)
Dept: LAB | Facility: HOSPITAL | Age: 74
End: 2019-12-05
Attending: NURSE PRACTITIONER
Payer: MEDICARE

## 2019-12-05 VITALS — OXYGEN SATURATION: 98 % | HEART RATE: 75 BPM | DIASTOLIC BLOOD PRESSURE: 59 MMHG | SYSTOLIC BLOOD PRESSURE: 107 MMHG

## 2019-12-05 DIAGNOSIS — I50.33 ACUTE ON CHRONIC HEART FAILURE WITH PRESERVED EJECTION FRACTION (HFPEF) (HCC): Primary | ICD-10-CM

## 2019-12-05 DIAGNOSIS — I50.9 HEART FAILURE, UNSPECIFIED (HCC): ICD-10-CM

## 2019-12-05 DIAGNOSIS — D72.829 LEUKOCYTOSIS, UNSPECIFIED TYPE: Primary | ICD-10-CM

## 2019-12-05 DIAGNOSIS — R05.8 PRODUCTIVE COUGH: ICD-10-CM

## 2019-12-05 DIAGNOSIS — B34.8 RHINOVIRUS: ICD-10-CM

## 2019-12-05 DIAGNOSIS — Z95.818 S/P MITRAL VALVE CLIP IMPLANTATION: ICD-10-CM

## 2019-12-05 DIAGNOSIS — Z98.890 S/P MITRAL VALVE CLIP IMPLANTATION: ICD-10-CM

## 2019-12-05 DIAGNOSIS — I48.11 LONGSTANDING PERSISTENT ATRIAL FIBRILLATION (HCC): ICD-10-CM

## 2019-12-05 DIAGNOSIS — K92.1 BLACK STOOL: ICD-10-CM

## 2019-12-05 DIAGNOSIS — E87.6 HYPOKALEMIA: ICD-10-CM

## 2019-12-05 PROBLEM — J41.0 SIMPLE CHRONIC BRONCHITIS (HCC): Status: ACTIVE | Noted: 2019-12-05

## 2019-12-05 PROCEDURE — 94640 AIRWAY INHALATION TREATMENT: CPT | Performed by: NURSE PRACTITIONER

## 2019-12-05 PROCEDURE — 36415 COLL VENOUS BLD VENIPUNCTURE: CPT | Performed by: NURSE PRACTITIONER

## 2019-12-05 PROCEDURE — 99215 OFFICE O/P EST HI 40 MIN: CPT | Performed by: NURSE PRACTITIONER

## 2019-12-05 PROCEDURE — 83880 ASSAY OF NATRIURETIC PEPTIDE: CPT

## 2019-12-05 PROCEDURE — 99213 OFFICE O/P EST LOW 20 MIN: CPT | Performed by: NURSE PRACTITIONER

## 2019-12-05 PROCEDURE — 80048 BASIC METABOLIC PNL TOTAL CA: CPT

## 2019-12-05 PROCEDURE — 36415 COLL VENOUS BLD VENIPUNCTURE: CPT

## 2019-12-05 PROCEDURE — 85025 COMPLETE CBC W/AUTO DIFF WBC: CPT

## 2019-12-05 RX ORDER — POTASSIUM CHLORIDE 20 MEQ/1
20 TABLET, EXTENDED RELEASE ORAL 2 TIMES DAILY
Qty: 60 TABLET | Refills: 1 | Status: SHIPPED | OUTPATIENT
Start: 2019-12-05 | End: 2020-03-11

## 2019-12-05 RX ORDER — ALBUTEROL SULFATE 2.5 MG/3ML
SOLUTION RESPIRATORY (INHALATION)
Status: DISCONTINUED
Start: 2019-12-05 | End: 2019-12-05

## 2019-12-05 RX ORDER — FUROSEMIDE 40 MG/1
40 TABLET ORAL 2 TIMES DAILY
Qty: 60 TABLET | Refills: 1 | Status: SHIPPED | OUTPATIENT
Start: 2019-12-05 | End: 2019-12-19

## 2019-12-05 RX ORDER — AZITHROMYCIN 250 MG/1
250 TABLET, FILM COATED ORAL DAILY
Qty: 6 TABLET | Refills: 0 | Status: SHIPPED | OUTPATIENT
Start: 2019-12-05 | End: 2019-12-19

## 2019-12-05 RX ORDER — PREDNISONE 20 MG/1
40 TABLET ORAL DAILY
Qty: 10 TABLET | Refills: 0 | Status: SHIPPED | OUTPATIENT
Start: 2019-12-05 | End: 2019-12-10

## 2019-12-05 NOTE — PATIENT INSTRUCTIONS
Take 3 additional potassium chloride tablets today with ensure then increase potassium chloride to 20 meq one tab twice daily     Increase furosemide to 40 mg one tablet twice daily in morning and afternoon    Begin azithromycin 250 mg tabs, take 2 tablets fatigue.  Stop exercise if you develop chest pain, lightheadedness, or significant shortness of breath

## 2019-12-05 NOTE — PROGRESS NOTES
[address] Patient Status:  No patient class for patient encounter    1945 MRN [id number]   Location [address] [facility] MD Dr. Leti Briones is a 66 y 11/27/2019 06:01 AM    HGB 13.5 11/27/2019 06:01 AM    HCT 41.8 11/27/2019 06:01 AM    .0 11/27/2019 06:01 AM    CREATSERUM 0.69 11/27/2019 06:01 AM    BUN 29 (H) 11/27/2019 06:01 AM     11/27/2019 06:01 AM    K 3.4 (L) 11/27/2019 06:01 AM Grossly normal    Diagnostics:   EC18 atrial fib 117 bpm with pvc's    Echo: 10/23/19     1. Left ventricle: The cavity size was normal. Wall thickness was normal.     Systolic function was normal. The estimated ejection fraction was 60-65%.      Richardson Goodpasture chronic chronic HFpEF  -Diuresed?   24 pounds with IV diuretics unsure if that is correct  -Continued productive cough and dyspnea with exertion, wheelchair-bound, denies orthopnea, chest pain, heart racing or dizziness  -Abdominal bloating  and early satie Take 3 additional potassium chloride tablets today with ensure then increase potassium chloride to 20 meq one tab twice daily     Increase furosemide to 40 mg one tablet twice daily in morning and afternoon    Use albuterol inhaler 2 puffs 4 times a day (VITAMIN D3) 125 MCG (5000 UT) Oral Tab, Take by mouth daily. , Disp: , Rfl:   •  benzonatate 200 MG Oral Cap, Take 1 capsule (200 mg total) by mouth 3 (three) times daily as needed for cough. , Disp: 60 capsule, Rfl: 0  •  Albuterol Sulfate HFA (PROAIR HF Atorvastatin Calcium 40 MG Oral Tab, Take 40 mg by mouth nightly.  , Disp: , Rfl:     Bernard Lynn NP  12/5/19

## 2019-12-06 PROBLEM — N21.0 BLADDER STONES: Status: ACTIVE | Noted: 2019-12-06

## 2019-12-11 RX ORDER — ATORVASTATIN CALCIUM 40 MG/1
TABLET, FILM COATED ORAL
Qty: 90 TABLET | Refills: 1 | Status: SHIPPED | OUTPATIENT
Start: 2019-12-11 | End: 2020-09-03

## 2019-12-12 ENCOUNTER — LAB ENCOUNTER (OUTPATIENT)
Dept: LAB | Facility: HOSPITAL | Age: 74
End: 2019-12-12
Attending: INTERNAL MEDICINE
Payer: MEDICARE

## 2019-12-12 DIAGNOSIS — D72.829 LEUKOCYTOSIS, UNSPECIFIED TYPE: ICD-10-CM

## 2019-12-12 PROCEDURE — 36415 COLL VENOUS BLD VENIPUNCTURE: CPT

## 2019-12-12 PROCEDURE — 85025 COMPLETE CBC W/AUTO DIFF WBC: CPT

## 2019-12-16 ENCOUNTER — LAB REQUISITION (OUTPATIENT)
Dept: LAB | Facility: HOSPITAL | Age: 74
End: 2019-12-16
Payer: MEDICARE

## 2019-12-16 DIAGNOSIS — E87.6 HYPOKALEMIA: ICD-10-CM

## 2019-12-16 LAB
ANION GAP SERPL CALC-SCNC: NORMAL MMOL/L
BUN SERPL-MCNC: 22 MG/DL
BUN/CREAT SERPL: NORMAL
CALCIUM SERPL-MCNC: 9 MG/DL
CHLORIDE SERPL-SCNC: 101 MMOL/L
CO2 SERPL-SCNC: NORMAL MMOL/L
CREAT SERPL-MCNC: 0.75 MG/DL
GLUCOSE SERPL-MCNC: 235 MG/DL
LENGTH OF FAST TIME PATIENT: NORMAL H
POTASSIUM SERPL-SCNC: 3.4 MMOL/L
SODIUM SERPL-SCNC: 140 MMOL/L

## 2019-12-16 PROCEDURE — 80048 BASIC METABOLIC PNL TOTAL CA: CPT | Performed by: INTERNAL MEDICINE

## 2019-12-17 ENCOUNTER — CLINICAL ABSTRACT (OUTPATIENT)
Dept: CARDIOLOGY | Age: 74
End: 2019-12-17

## 2019-12-19 ENCOUNTER — OFFICE VISIT (OUTPATIENT)
Dept: CARDIOLOGY CLINIC | Facility: HOSPITAL | Age: 74
End: 2019-12-19
Attending: NURSE PRACTITIONER
Payer: MEDICARE

## 2019-12-19 VITALS — DIASTOLIC BLOOD PRESSURE: 55 MMHG | SYSTOLIC BLOOD PRESSURE: 112 MMHG | HEART RATE: 59 BPM | OXYGEN SATURATION: 92 %

## 2019-12-19 DIAGNOSIS — J41.0 SIMPLE CHRONIC BRONCHITIS (HCC): ICD-10-CM

## 2019-12-19 DIAGNOSIS — I50.33 ACUTE ON CHRONIC HEART FAILURE WITH PRESERVED EJECTION FRACTION (HFPEF) (HCC): Primary | ICD-10-CM

## 2019-12-19 DIAGNOSIS — Z95.818 S/P MITRAL VALVE CLIP IMPLANTATION: ICD-10-CM

## 2019-12-19 DIAGNOSIS — Z98.890 S/P MITRAL VALVE CLIP IMPLANTATION: ICD-10-CM

## 2019-12-19 PROBLEM — R05.8 PRODUCTIVE COUGH: Chronic | Status: RESOLVED | Noted: 2019-12-05 | Resolved: 2019-12-19

## 2019-12-19 PROBLEM — J98.01 BRONCHOSPASM: Status: RESOLVED | Noted: 2019-11-24 | Resolved: 2019-12-19

## 2019-12-19 PROCEDURE — 99213 OFFICE O/P EST LOW 20 MIN: CPT | Performed by: NURSE PRACTITIONER

## 2019-12-19 PROCEDURE — 99211 OFF/OP EST MAY X REQ PHY/QHP: CPT | Performed by: NURSE PRACTITIONER

## 2019-12-19 RX ORDER — FUROSEMIDE 40 MG/1
40 TABLET ORAL 2 TIMES DAILY
Status: ON HOLD | COMMUNITY
Start: 2019-12-19 | End: 2020-02-25

## 2019-12-19 RX ORDER — METOPROLOL SUCCINATE 50 MG/1
50 TABLET, EXTENDED RELEASE ORAL 2 TIMES DAILY
Status: ON HOLD | COMMUNITY
Start: 2019-12-19 | End: 2020-01-01

## 2019-12-19 NOTE — PATIENT INSTRUCTIONS
Use albuterol inhaler 4 times a day followed by incentive spirometer and Acapella, 5 breaths, pause and cough with another 5 breaths then cough    following    Take an extra potassium chloride 20 meq tab today then continue one tab twice daily    Continue

## 2019-12-19 NOTE — PROGRESS NOTES
[address] Patient Status:  No patient class for patient encounter    1945 MRN [id number]   Location [facility] MD Dr. Lili Hui is a 66 y palpitations  Gastrointestinal: negative for diarrhea, melena, nausea and vomiting  Hematologic/lymphatic: negative  Musculoskeletal: see above, denies leg pain    Objective:  Lab Results   Component Value Date/Time    WBC 11.1 (H) 12/12/2019 03:28 PM    H pvc's    Echo: 10/23/19     1. Left ventricle: The cavity size was normal. Wall thickness was normal.     Systolic function was normal. The estimated ejection fraction was 60-65%. There was no diagnostic evidence for regional wall motion abnormalities. diuretics unsure if that is correct  -productive cough improved, fell weak and dry on higher dose furosemide 60 mg daily, she reports she has been now taking furosemide 40 mg daily and this was her normal dose.   -tolerating PT with mild  montes, wheelchair-b prior to urological procedure, call to make an appointment at [phone]      Current Outpatient Medications:   •  ALBUTEROL SULFATE  (90 Base) MCG/ACT Inhalation Aero Soln, INHALE 2 PUFFS INTO THE LUNGS EVERY 6 HOURS AS NEEDED FOR WHEEZING OR WHIT by mouth every 8 (eight) hours as needed for Pain., Disp: 30 tablet, Rfl: 1  •  Neomycin-Polymyxin-HC 3.5-71455-8 Otic Suspension, 2 drops tid  Prn  itch, Disp: 10 mL, Rfl: 3  •  acetaminophen 325 MG Oral Tab, Take 325 mg by mouth every 6 (six) hours as ne

## 2019-12-21 NOTE — CM/SW NOTE
8/7/18 CM Discharge planning   Pt resides with , family home, w/c bound and is current with [facility]. Anticipate pt returning home with continued [facility] service from [facility], pending medical course. CM will continue to follow.   Elisha See, IV intact

## 2019-12-27 ENCOUNTER — TELEPHONE (OUTPATIENT)
Dept: CARDIOLOGY CLINIC | Facility: HOSPITAL | Age: 74
End: 2019-12-27

## 2019-12-27 NOTE — TELEPHONE ENCOUNTER
Patient called because she has had a headache for the past 4-5 days. Patient reports drinking at least 32oz of fluid daily.  Patient reports that the headache starts in the morning and then goes away and occasionally she has been having them in the afternoo

## 2019-12-30 ENCOUNTER — TELEPHONE (OUTPATIENT)
Dept: CARDIOLOGY CLINIC | Facility: HOSPITAL | Age: 74
End: 2019-12-30

## 2019-12-30 ENCOUNTER — HOSPITAL ENCOUNTER (OUTPATIENT)
Dept: GENERAL RADIOLOGY | Facility: HOSPITAL | Age: 74
Discharge: HOME | End: 2019-12-30
Attending: NURSE PRACTITIONER
Payer: MEDICARE

## 2019-12-30 DIAGNOSIS — R04.2 HEMOPTYSIS: ICD-10-CM

## 2019-12-30 DIAGNOSIS — R05.8 PRODUCTIVE COUGH: Primary | ICD-10-CM

## 2019-12-30 DIAGNOSIS — R05.8 PRODUCTIVE COUGH: ICD-10-CM

## 2019-12-30 PROCEDURE — 71046 X-RAY EXAM CHEST 2 VIEWS: CPT | Performed by: NURSE PRACTITIONER

## 2019-12-30 NOTE — TELEPHONE ENCOUNTER
Budesonide was started on 11/15/2019 at 9 mg daily. How is the patient's diarrhea? She should still have medication left based on this timeframe.

## 2019-12-30 NOTE — TELEPHONE ENCOUNTER
Requested Prescriptions     Pending Prescriptions Disp Refills   • BUDESONIDE 3 MG Oral Cap DR Particles [Pharmacy Med Name: BUDESONIDE 3MG CAPSULES] 90 capsule 1     Sig: TAKE 3 CAPSULES(9 MG) BY MOUTH EVERY MORNING     lov-10/9/19  lr-11/15/19

## 2019-12-30 NOTE — TELEPHONE ENCOUNTER
called reporting patient having increased shortness of breath. Spoke with patient she had some LIRIANO while cooking on 12/24/2019.   Then she developed a headache the next day and her home health nurse told her to hold her furosemide 12/27 and she onl

## 2019-12-31 ENCOUNTER — APPOINTMENT (OUTPATIENT)
Dept: GENERAL RADIOLOGY | Facility: HOSPITAL | Age: 74
End: 2019-12-31
Attending: EMERGENCY MEDICINE
Payer: MEDICARE

## 2019-12-31 ENCOUNTER — HOSPITAL ENCOUNTER (OUTPATIENT)
Facility: HOSPITAL | Age: 74
Setting detail: OBSERVATION
Discharge: HOME HEALTH | End: 2020-01-02
Attending: EMERGENCY MEDICINE | Admitting: HOSPITALIST
Payer: MEDICARE

## 2019-12-31 DIAGNOSIS — N30.00 ACUTE CYSTITIS WITHOUT HEMATURIA: ICD-10-CM

## 2019-12-31 DIAGNOSIS — R53.1 WEAKNESS GENERALIZED: Primary | ICD-10-CM

## 2019-12-31 DIAGNOSIS — R41.82 ALTERED MENTAL STATUS, UNSPECIFIED ALTERED MENTAL STATUS TYPE: ICD-10-CM

## 2019-12-31 LAB
ADENOVIRUS PCR:: NEGATIVE
ALBUMIN SERPL-MCNC: 2.9 G/DL (ref 3.4–5)
ALP LIVER SERPL-CCNC: 94 U/L (ref 55–142)
ALT SERPL-CCNC: 36 U/L (ref 13–56)
ANION GAP SERPL CALC-SCNC: 15 MMOL/L (ref 0–18)
AST SERPL-CCNC: 21 U/L (ref 15–37)
B PERT DNA SPEC QL NAA+PROBE: NEGATIVE
BASOPHILS # BLD AUTO: 0.03 X10(3) UL (ref 0–0.2)
BASOPHILS NFR BLD AUTO: 0.4 %
BILIRUB DIRECT SERPL-MCNC: 0.2 MG/DL (ref 0–0.2)
BILIRUB SERPL-MCNC: 0.7 MG/DL (ref 0.1–2)
BILIRUB UR QL: NEGATIVE
BUN BLD-MCNC: 30 MG/DL (ref 7–18)
BUN/CREAT SERPL: 43.5 (ref 10–20)
C PNEUM DNA SPEC QL NAA+PROBE: NEGATIVE
CALCIUM BLD-MCNC: 9 MG/DL (ref 8.5–10.1)
CHLORIDE SERPL-SCNC: 107 MMOL/L (ref 98–112)
CO2 SERPL-SCNC: 18 MMOL/L (ref 21–32)
COLOR UR: YELLOW
CORONAVIRUS 229E PCR:: NEGATIVE
CORONAVIRUS HKU1 PCR:: NEGATIVE
CORONAVIRUS NL63 PCR:: NEGATIVE
CORONAVIRUS OC43 PCR:: NEGATIVE
CREAT BLD-MCNC: 0.69 MG/DL (ref 0.55–1.02)
DEPRECATED RDW RBC AUTO: 48.9 FL (ref 35.1–46.3)
EOSINOPHIL # BLD AUTO: 0.07 X10(3) UL (ref 0–0.7)
EOSINOPHIL NFR BLD AUTO: 0.8 %
ERYTHROCYTE [DISTWIDTH] IN BLOOD BY AUTOMATED COUNT: 15.9 % (ref 11–15)
FLUAV RNA SPEC QL NAA+PROBE: NEGATIVE
FLUBV RNA SPEC QL NAA+PROBE: NEGATIVE
GLUCOSE BLD-MCNC: 167 MG/DL (ref 70–99)
GLUCOSE UR-MCNC: NEGATIVE MG/DL
HCT VFR BLD AUTO: 40.6 % (ref 35–48)
HGB BLD-MCNC: 12.5 G/DL (ref 12–16)
IMM GRANULOCYTES # BLD AUTO: 0.05 X10(3) UL (ref 0–1)
IMM GRANULOCYTES NFR BLD: 0.6 %
KETONES UR-MCNC: NEGATIVE MG/DL
LYMPHOCYTES # BLD AUTO: 1.55 X10(3) UL (ref 1–4)
LYMPHOCYTES NFR BLD AUTO: 18.3 %
M PROTEIN MFR SERPL ELPH: 5.8 G/DL (ref 6.4–8.2)
MCH RBC QN AUTO: 26.1 PG (ref 26–34)
MCHC RBC AUTO-ENTMCNC: 30.8 G/DL (ref 31–37)
MCV RBC AUTO: 84.8 FL (ref 80–100)
METAPNEUMOVIRUS PCR:: NEGATIVE
MONOCYTES # BLD AUTO: 0.58 X10(3) UL (ref 0.1–1)
MONOCYTES NFR BLD AUTO: 6.9 %
MYCOPLASMA PNEUMONIA PCR:: NEGATIVE
NEUTROPHILS # BLD AUTO: 6.17 X10 (3) UL (ref 1.5–7.7)
NEUTROPHILS # BLD AUTO: 6.17 X10(3) UL (ref 1.5–7.7)
NEUTROPHILS NFR BLD AUTO: 73 %
NITRITE UR QL STRIP.AUTO: NEGATIVE
NT-PROBNP SERPL-MCNC: 2173 PG/ML (ref ?–450)
OSMOLALITY SERPL CALC.SUM OF ELEC: 300 MOSM/KG (ref 275–295)
PARAINFLUENZA 1 PCR:: NEGATIVE
PARAINFLUENZA 2 PCR:: NEGATIVE
PARAINFLUENZA 3 PCR:: NEGATIVE
PARAINFLUENZA 4 PCR:: NEGATIVE
PH UR: 8 [PH] (ref 5–8)
PLATELET # BLD AUTO: 200 10(3)UL (ref 150–450)
POTASSIUM SERPL-SCNC: 3.9 MMOL/L (ref 3.5–5.1)
PROT UR-MCNC: 100 MG/DL
RBC # BLD AUTO: 4.79 X10(6)UL (ref 3.8–5.3)
RBC #/AREA URNS AUTO: 17 /HPF
RHINOVIRUS/ENTERO PCR:: NEGATIVE
RSV RNA SPEC QL NAA+PROBE: NEGATIVE
SODIUM SERPL-SCNC: 140 MMOL/L (ref 136–145)
SP GR UR STRIP: 1.02 (ref 1–1.03)
TROPONIN I SERPL-MCNC: <0.045 NG/ML (ref ?–0.04)
UROBILINOGEN UR STRIP-ACNC: <2
WBC # BLD AUTO: 8.5 X10(3) UL (ref 4–11)
WBC #/AREA URNS AUTO: 6 /HPF

## 2019-12-31 PROCEDURE — 93010 ELECTROCARDIOGRAM REPORT: CPT | Performed by: EMERGENCY MEDICINE

## 2019-12-31 PROCEDURE — 87581 M.PNEUMON DNA AMP PROBE: CPT | Performed by: HOSPITALIST

## 2019-12-31 PROCEDURE — 87798 DETECT AGENT NOS DNA AMP: CPT | Performed by: HOSPITALIST

## 2019-12-31 PROCEDURE — 85025 COMPLETE CBC W/AUTO DIFF WBC: CPT | Performed by: EMERGENCY MEDICINE

## 2019-12-31 PROCEDURE — 84484 ASSAY OF TROPONIN QUANT: CPT | Performed by: EMERGENCY MEDICINE

## 2019-12-31 PROCEDURE — 87077 CULTURE AEROBIC IDENTIFY: CPT | Performed by: EMERGENCY MEDICINE

## 2019-12-31 PROCEDURE — 99285 EMERGENCY DEPT VISIT HI MDM: CPT

## 2019-12-31 PROCEDURE — 80048 BASIC METABOLIC PNL TOTAL CA: CPT | Performed by: EMERGENCY MEDICINE

## 2019-12-31 PROCEDURE — 87493 C DIFF AMPLIFIED PROBE: CPT | Performed by: HOSPITALIST

## 2019-12-31 PROCEDURE — 83880 ASSAY OF NATRIURETIC PEPTIDE: CPT | Performed by: EMERGENCY MEDICINE

## 2019-12-31 PROCEDURE — 87486 CHLMYD PNEUM DNA AMP PROBE: CPT | Performed by: HOSPITALIST

## 2019-12-31 PROCEDURE — 87633 RESP VIRUS 12-25 TARGETS: CPT | Performed by: HOSPITALIST

## 2019-12-31 PROCEDURE — 96365 THER/PROPH/DIAG IV INF INIT: CPT

## 2019-12-31 PROCEDURE — 71045 X-RAY EXAM CHEST 1 VIEW: CPT | Performed by: EMERGENCY MEDICINE

## 2019-12-31 PROCEDURE — 93005 ELECTROCARDIOGRAM TRACING: CPT

## 2019-12-31 PROCEDURE — 80076 HEPATIC FUNCTION PANEL: CPT | Performed by: EMERGENCY MEDICINE

## 2019-12-31 PROCEDURE — 81001 URINALYSIS AUTO W/SCOPE: CPT | Performed by: EMERGENCY MEDICINE

## 2019-12-31 PROCEDURE — 87186 SC STD MICRODIL/AGAR DIL: CPT | Performed by: EMERGENCY MEDICINE

## 2019-12-31 PROCEDURE — 87086 URINE CULTURE/COLONY COUNT: CPT | Performed by: EMERGENCY MEDICINE

## 2019-12-31 PROCEDURE — 99214 OFFICE O/P EST MOD 30 MIN: CPT | Performed by: INTERNAL MEDICINE

## 2019-12-31 RX ORDER — ONDANSETRON 2 MG/ML
4 INJECTION INTRAMUSCULAR; INTRAVENOUS EVERY 6 HOURS PRN
Status: DISCONTINUED | OUTPATIENT
Start: 2019-12-31 | End: 2020-01-02

## 2019-12-31 RX ORDER — METOPROLOL SUCCINATE 25 MG/1
25 TABLET, EXTENDED RELEASE ORAL 2 TIMES DAILY
Status: DISCONTINUED | OUTPATIENT
Start: 2019-12-31 | End: 2020-01-02

## 2019-12-31 RX ORDER — FLUTICASONE PROPIONATE 50 MCG
1 SPRAY, SUSPENSION (ML) NASAL DAILY
Status: DISCONTINUED | OUTPATIENT
Start: 2020-01-01 | End: 2020-01-02

## 2019-12-31 RX ORDER — SODIUM CHLORIDE 0.9 % (FLUSH) 0.9 %
3 SYRINGE (ML) INJECTION AS NEEDED
Status: DISCONTINUED | OUTPATIENT
Start: 2019-12-31 | End: 2020-01-02

## 2019-12-31 RX ORDER — ECHINACEA PURPUREA EXTRACT 125 MG
1 TABLET ORAL EVERY 6 HOURS PRN
Status: DISCONTINUED | OUTPATIENT
Start: 2019-12-31 | End: 2020-01-02

## 2019-12-31 RX ORDER — BISACODYL 10 MG
10 SUPPOSITORY, RECTAL RECTAL
Status: DISCONTINUED | OUTPATIENT
Start: 2019-12-31 | End: 2020-01-02

## 2019-12-31 RX ORDER — VENLAFAXINE HYDROCHLORIDE 37.5 MG/1
75 CAPSULE, EXTENDED RELEASE ORAL
Status: DISCONTINUED | OUTPATIENT
Start: 2020-01-01 | End: 2020-01-02

## 2019-12-31 RX ORDER — POLYETHYLENE GLYCOL 3350 17 G/17G
17 POWDER, FOR SOLUTION ORAL DAILY PRN
Status: DISCONTINUED | OUTPATIENT
Start: 2019-12-31 | End: 2020-01-02

## 2019-12-31 RX ORDER — METOCLOPRAMIDE HYDROCHLORIDE 5 MG/ML
10 INJECTION INTRAMUSCULAR; INTRAVENOUS EVERY 8 HOURS PRN
Status: DISCONTINUED | OUTPATIENT
Start: 2019-12-31 | End: 2020-01-02

## 2019-12-31 RX ORDER — ASPIRIN 81 MG/1
81 TABLET ORAL DAILY
Status: DISCONTINUED | OUTPATIENT
Start: 2020-01-01 | End: 2020-01-02

## 2019-12-31 RX ORDER — ACETAMINOPHEN 325 MG/1
650 TABLET ORAL EVERY 6 HOURS PRN
Status: DISCONTINUED | OUTPATIENT
Start: 2019-12-31 | End: 2020-01-02

## 2019-12-31 RX ORDER — ATORVASTATIN CALCIUM 40 MG/1
40 TABLET, FILM COATED ORAL NIGHTLY
Status: DISCONTINUED | OUTPATIENT
Start: 2019-12-31 | End: 2020-01-02

## 2019-12-31 RX ORDER — EZETIMIBE 10 MG/1
10 TABLET ORAL NIGHTLY
Status: DISCONTINUED | OUTPATIENT
Start: 2019-12-31 | End: 2020-01-02

## 2019-12-31 RX ORDER — FUROSEMIDE 40 MG/1
40 TABLET ORAL DAILY
Status: DISCONTINUED | OUTPATIENT
Start: 2020-01-01 | End: 2020-01-02

## 2019-12-31 RX ORDER — MELATONIN
325
Status: DISCONTINUED | OUTPATIENT
Start: 2020-01-01 | End: 2020-01-02

## 2019-12-31 RX ORDER — PREDNISONE 1 MG/1
5 TABLET ORAL
Status: DISCONTINUED | OUTPATIENT
Start: 2020-01-01 | End: 2020-01-02

## 2019-12-31 RX ORDER — CETIRIZINE HYDROCHLORIDE 10 MG/1
10 TABLET ORAL DAILY
Status: DISCONTINUED | OUTPATIENT
Start: 2020-01-01 | End: 2020-01-02

## 2019-12-31 RX ORDER — BUDESONIDE 3 MG/1
9 CAPSULE, COATED PELLETS ORAL EVERY MORNING
Status: DISCONTINUED | OUTPATIENT
Start: 2020-01-01 | End: 2020-01-02

## 2019-12-31 RX ORDER — ALBUTEROL SULFATE 90 UG/1
2 AEROSOL, METERED RESPIRATORY (INHALATION) EVERY 6 HOURS PRN
Status: DISCONTINUED | OUTPATIENT
Start: 2019-12-31 | End: 2020-01-02

## 2019-12-31 RX ORDER — TRAMADOL HYDROCHLORIDE 50 MG/1
25 TABLET ORAL EVERY 8 HOURS PRN
Status: DISCONTINUED | OUTPATIENT
Start: 2019-12-31 | End: 2020-01-02

## 2019-12-31 NOTE — H&P
[facility] Hospitalist H&P     CC: Patient presents with:  Fatigue       PCP: Amber Garces MD      Assessment and Plan     Berto Edwards is O 13 year old female with anxiety, depression, DVT s/p permanent IVC filter on eliquis, recurrent cdif coliti pending patient's clinical course. [facility] hospitalist to continue to follow patient while in house    Patient and/or patient's family given opportunity to ask questions and note understanding and agreeing with therapeutic plan as outlined.   D/W pt and spouse blood transfusion    • Hypercholesterolemia    • HYPERLIPIDEMIA    • Irritable bowel syndrome    • Lymphocytic colitis Colon= 5/7/12   • MENOPAUSE    • MITRAL VALVE PROLAPSE    • Neuropathy     bilateral legs   • Osteoarthritis     legs, back, shoulders an Angelique Whitehead MD at [facility] MAIN OR   • KNEE REPLACEMENT SURGERY      left  12/05   • Dalton Forbes N/A 10/31/2018    Performed by Andrea House MD at [place] [address]  8-24-12    cysto Dr. Theo Gama   • TONSILLECTOMY     • VALVE REPAIR          ALL: wheezing or crackles  CV: irreg, irreg, +murmur   Abd: Abdomen soft, nontender, nondistended, +liquid stool in ostomy, not watery, no organomegaly, bowel sounds present  MSK:   No Lower extremity edema  Skin: no rashes   Psych: mood stable, cooperative  Ne MD on 12/30/2019 at 16:25          Xr Chest Ap Portable  (cpt=71045)    Result Date: 12/31/2019  PROCEDURE: XR CHEST AP PORTABLE (CPT=71010) TIME: 1355 hours. COMPARISON: [facility], XR CHEST AP PORTABLE (CPT=71045), 5/20/2019, 11:29.   El

## 2019-12-31 NOTE — ED PROVIDER NOTES
Patient Seen in: [facility] AND [facility] Emergency Department    History   Patient presents with:  Fatigue    Stated Complaint: hypotension/ weakness    HPI    Patient is here with complaint of just not feeling well she has been weak the home health nurse was bladder 1/11/2016   • Ulcerative colitis ([facility] [address]    • Valvular disease     mitral valve prolapse   • Visual impairment     reading       Past Surgical History:   Procedure Laterality Date   • BACK SURGERY      spinal fusion L1-5   • CAUDAL N/A 3/26/2019    P Inhalation Aero Soln,  INHALE 2 PUFFS INTO THE LUNGS EVERY 6 HOURS AS NEEDED FOR WHEEZING OR SHORTNESS OF BREATH OR COUGHING   Potassium Chloride ER 20 MEQ Oral Tab CR,  Take 1 tablet (20 mEq total) by mouth 2 (two) times daily.  Only take when taking lasix systems are as noted in HPI. Constitutional and vital signs reviewed. All other systems reviewed and negative except as noted above.       Physical Exam     ED Triage Vitals [12/31/19 1310]   /60   Pulse 78   Resp 26   Temp    Temp src    SpO2 9 BASIC METABOLIC PANEL (8) - Abnormal; Notable for the following components:       Result Value    Glucose 167 (*)     CO2 18.0 (*)     BUN 30 (*)     BUN/CREA Ratio 43.5 (*)     Calculated Osmolality 300 (*)     All other components within normal limits that you schedule follow up care with a primary care provider within the next three months to obtain basic health screening including reassessment of your blood pressure.       Clinical Impression:  Weakness generalized  (primary encounter diagnosis)  Alter

## 2019-12-31 NOTE — ED INITIAL ASSESSMENT (HPI)
Pt arrived per EMS from home for weakness and low b/p. EMS states b/p 90/52. States was lethargic but now normal. States chair ridden at home.

## 2020-01-01 LAB
ALBUMIN SERPL-MCNC: 2.7 G/DL (ref 3.4–5)
ALBUMIN/GLOB SERPL: 0.9 {RATIO} (ref 1–2)
ALP LIVER SERPL-CCNC: 85 U/L (ref 55–142)
ALT SERPL-CCNC: 32 U/L (ref 13–56)
ANION GAP SERPL CALC-SCNC: 6 MMOL/L (ref 0–18)
AST SERPL-CCNC: 12 U/L (ref 15–37)
BASOPHILS # BLD AUTO: 0.03 X10(3) UL (ref 0–0.2)
BASOPHILS NFR BLD AUTO: 0.4 %
BILIRUB SERPL-MCNC: 0.6 MG/DL (ref 0.1–2)
BUN BLD-MCNC: 30 MG/DL (ref 7–18)
BUN/CREAT SERPL: 36.6 (ref 10–20)
C DIFF TOX B STL QL: NEGATIVE
CALCIUM BLD-MCNC: 9.2 MG/DL (ref 8.5–10.1)
CHLORIDE SERPL-SCNC: 109 MMOL/L (ref 98–112)
CO2 SERPL-SCNC: 29 MMOL/L (ref 21–32)
CREAT BLD-MCNC: 0.82 MG/DL (ref 0.55–1.02)
DEPRECATED RDW RBC AUTO: 49.4 FL (ref 35.1–46.3)
EOSINOPHIL # BLD AUTO: 0.1 X10(3) UL (ref 0–0.7)
EOSINOPHIL NFR BLD AUTO: 1.3 %
ERYTHROCYTE [DISTWIDTH] IN BLOOD BY AUTOMATED COUNT: 16.1 % (ref 11–15)
GLOBULIN PLAS-MCNC: 3 G/DL (ref 2.8–4.4)
GLUCOSE BLD-MCNC: 76 MG/DL (ref 70–99)
HCT VFR BLD AUTO: 41 % (ref 35–48)
HGB BLD-MCNC: 12.8 G/DL (ref 12–16)
IMM GRANULOCYTES # BLD AUTO: 0.05 X10(3) UL (ref 0–1)
IMM GRANULOCYTES NFR BLD: 0.7 %
LYMPHOCYTES # BLD AUTO: 2.4 X10(3) UL (ref 1–4)
LYMPHOCYTES NFR BLD AUTO: 32.3 %
M PROTEIN MFR SERPL ELPH: 5.7 G/DL (ref 6.4–8.2)
MCH RBC QN AUTO: 26.6 PG (ref 26–34)
MCHC RBC AUTO-ENTMCNC: 31.2 G/DL (ref 31–37)
MCV RBC AUTO: 85.2 FL (ref 80–100)
MONOCYTES # BLD AUTO: 0.67 X10(3) UL (ref 0.1–1)
MONOCYTES NFR BLD AUTO: 9 %
NEUTROPHILS # BLD AUTO: 4.19 X10 (3) UL (ref 1.5–7.7)
NEUTROPHILS # BLD AUTO: 4.19 X10(3) UL (ref 1.5–7.7)
NEUTROPHILS NFR BLD AUTO: 56.3 %
OSMOLALITY SERPL CALC.SUM OF ELEC: 303 MOSM/KG (ref 275–295)
PLATELET # BLD AUTO: 178 10(3)UL (ref 150–450)
POTASSIUM SERPL-SCNC: 4.7 MMOL/L (ref 3.5–5.1)
PROCALCITONIN SERPL-MCNC: 0.12 NG/ML
RBC # BLD AUTO: 4.81 X10(6)UL (ref 3.8–5.3)
SODIUM SERPL-SCNC: 144 MMOL/L (ref 136–145)
WBC # BLD AUTO: 7.4 X10(3) UL (ref 4–11)

## 2020-01-01 PROCEDURE — 80053 COMPREHEN METABOLIC PANEL: CPT | Performed by: HOSPITALIST

## 2020-01-01 PROCEDURE — 97166 OT EVAL MOD COMPLEX 45 MIN: CPT

## 2020-01-01 PROCEDURE — 96366 THER/PROPH/DIAG IV INF ADDON: CPT

## 2020-01-01 PROCEDURE — 97161 PT EVAL LOW COMPLEX 20 MIN: CPT

## 2020-01-01 PROCEDURE — 84145 PROCALCITONIN (PCT): CPT | Performed by: HOSPITALIST

## 2020-01-01 PROCEDURE — 99214 OFFICE O/P EST MOD 30 MIN: CPT | Performed by: INTERNAL MEDICINE

## 2020-01-01 PROCEDURE — 97530 THERAPEUTIC ACTIVITIES: CPT

## 2020-01-01 PROCEDURE — 85025 COMPLETE CBC W/AUTO DIFF WBC: CPT | Performed by: HOSPITALIST

## 2020-01-01 NOTE — PROGRESS NOTES
[facility] AND CLINICS  Progress Note    Vickie Alexsander Patient Status:  Observation    1945 MRN [id number]   Location [facility] 3W/SW Attending Bob Johnston MD   Hosp Day # 0 PCP Julio March MD     Assessment:    1.   Presentation with K 4.7 01/01/2020     01/01/2020    CO2 29.0 01/01/2020    BUN 30 01/01/2020    CREATSERUM 0.82 01/01/2020    GLU 76 01/01/2020    CA 9.2 01/01/2020    ALT 32 01/01/2020    AST 12 01/01/2020    ALB 2.7 01/01/2020        Lab Results   Component Value D

## 2020-01-01 NOTE — CONSULTS
[facility]  Report of Consultation    Olam Cons Patient Status:  Observation    1945 MRN [id number]   Location [facility] 3W/SW Attending Yuriy Stephens MD   Hosp Day # 0 PCP Rocio Malik MD     Reason for Consultat duplicated left renal collecting system   • OTHER DISEASES     uterine fibroids   • OTHER DISEASES     ulcerative colitis   • OTHER DISEASES     uti   • Pneumonia, organism unspecified(486)    • Reflux    • Self-catheterizes urinary bladder 1/11/2016 Other (Other) Daughter         spastic paraplegia   • Eye Problems Other         aunts/ uncles glaucoma   • Other (Other) Other       reports that she has quit smoking.  She has never used smokeless tobacco. She reports that she does not drink alcohol or us [START ON 1/1/2020] aspirin EC tab 81 mg, 81 mg, Oral, Daily  •  apixaban (ELIQUIS) tab 5 mg, 5 mg, Oral, BID  •  atorvastatin (LIPITOR) tab 40 mg, 40 mg, Oral, Nightly  •  [START ON 1/1/2020] Budesonide (ENTOCORT EC) 24 hr cap 9 mg, 9 mg, Oral, QAM  •  [S equal.  Neurologic: No focal motor deficits. Skin: Warm and dry. Laboratories and Data:  Diagnostics:  EKG: a fib. Echo: 10/2019, normal LVEF. Mild MR. Multiple mitral clips. CXR: no infiltrates.     Labs:   Lab Results   Component Value Date    WB needed. Will follow. Thanks.         Betsy Beyering  12/31/2019  8:06 PM

## 2020-01-01 NOTE — OCCUPATIONAL THERAPY NOTE
OCCUPATIONAL THERAPY EVALUATION - INPATIENT     Room Number: 652/730-B  Evaluation Date: 1/1/2020  Type of Evaluation: Initial  Presenting Problem: (weakness, UTI)    Physician Order: IP Consult to Occupational Therapy  Reason for Therapy: ADL/IADL Dysfu supports that patients with this level of impairment may benefit from .     DISCHARGE RECOMMENDATIONS  OT Discharge Recommendations: 24 hour care/supervision(Home Health OT)       PLAN    Patient has been evaluated and presents with no skilled Occupational • Reflux    • Self-catheterizes urinary bladder 1/11/2016   • Ulcerative colitis ([facility] [address]    • Valvular disease     mitral valve prolapse   • Visual impairment     reading       Past Surgical History  Past Surgical History:   Procedure Laterality Date   • THERAPY EXAMINATION      OBJECTIVE  Precautions: Colostomy; Bed/chair alarm(heels off bed)  Fall Risk: High fall risk      Patient End of Session: In bed;Needs met;Call light within reach;RN aware of session/findings

## 2020-01-01 NOTE — PHYSICAL THERAPY NOTE
PHYSICAL THERAPY EVALUATION - INPATIENT     Room Number: 425/801-B  Evaluation Date: 1/1/2020  Type of Evaluation: Initial   Physician Order: PT Eval and Treat    Presenting Problem: UIT, weakness  Reason for Therapy: Mobility Dysfunction and Discharge Pl mental status, unspecified altered mental status type    Acute cystitis without hematuria      Past Medical History  Past Medical History:   Diagnosis Date   • Anemia    • Anxiety state, unspecified    • BACK PAIN     scoliosis   • Back problem    • [facility]IN MANAG[facility]   • CERVICAL EPIDURAL N/A 10/27/2014    Performed by Tri Liu MD at [address]   • COLONOSCOPY  7/11/08 7/11/08 at [address] [place] [address] and negative for polyps, UC was quiet   • COLONOSCOPY,DIAGNOSTIC  5/12    normal contracture    Lower extremity strength is within functional limits  0/5 bryan le    BALANCE  Static Sitting: Fair  Dynamic Sitting: Fair -  Static Standing: Not applicable  Dynamic Standing: Not applicable    ADDITIONAL TESTS

## 2020-01-01 NOTE — PLAN OF CARE
Patient remains on IV antibiotics for treatment of UTI. Patient's mental status much better today. Likely home with 24 hour supervision tomorrow.      Problem: Patient Centered Care  Goal: Patient preferences are identified and integrated in the patient's p Problem: GENITOURINARY - ADULT  Goal: Absence of urinary retention  Description  INTERVENTIONS:  - Assess patient’s ability to void and empty bladder  - Monitor intake/output and perform bladder scan as needed  - Follow urinary retention protocol/standar neglected side by encouraging family to sit/stand on the inattentive side during conversation  Outcome: Progressing

## 2020-01-01 NOTE — PLAN OF CARE
Problem: Patient Centered Care  Goal: Patient preferences are identified and integrated in the patient's plan of care  Description  Interventions:  - What would you like us to know as we care for you? Pt has a colostomy and history of spastic paraplegic. bladder  - Monitor intake/output and perform bladder scan as needed  - Follow urinary retention protocol/standard of care  - Consider collaborating with pharmacy to review patient's medication profile  - Implement strategies to promote bladder emptying  Mey Sylvester and repositioned. Isolation precaution for history of c.diff and ESBL (urine?). Call light within reach. Will continue to monitor pt.

## 2020-01-01 NOTE — PROGRESS NOTES
[facility] Hospitalist Progress Note     CC: Hospital Follow up    PCP: Michael Newman MD       Assessment/Plan:     Principal Problem:    Weakness generalized  Active Problems:    Altered mental status, unspecified altered mental status type    Acute cystiti Subjective:     No CP, SOB, or N/V. Feeling better, feels stronger today    OBJECTIVE:    Blood pressure 102/66, pulse 78, temperature 97.8 °F (36.6 °C), temperature source Oral, resp.  rate 18, height 5' (1.524 m), weight 125 lb 3.2 oz (56.8 kg), SpO2 96 % 12/30/2019  CONCLUSION:  1. Mild cardiomegaly. Prominent central pulmonary vasculature. 2. Tortuous thoracic aorta. Mitraclips redemonstrated. Moderate hiatal hernia 3. No acute pulmonary disease. Minimal left perihilar scar/atelectasis.     Dictated by

## 2020-01-02 VITALS
WEIGHT: 123 LBS | TEMPERATURE: 98 F | BODY MASS INDEX: 24.15 KG/M2 | OXYGEN SATURATION: 96 % | RESPIRATION RATE: 18 BRPM | HEIGHT: 60 IN | HEART RATE: 70 BPM | DIASTOLIC BLOOD PRESSURE: 68 MMHG | SYSTOLIC BLOOD PRESSURE: 105 MMHG

## 2020-01-02 LAB
ANION GAP SERPL CALC-SCNC: 3 MMOL/L (ref 0–18)
BASOPHILS # BLD AUTO: 0.03 X10(3) UL (ref 0–0.2)
BASOPHILS NFR BLD AUTO: 0.4 %
BUN BLD-MCNC: 25 MG/DL (ref 7–18)
BUN/CREAT SERPL: 38.5 (ref 10–20)
CALCIUM BLD-MCNC: 8.5 MG/DL (ref 8.5–10.1)
CHLORIDE SERPL-SCNC: 109 MMOL/L (ref 98–112)
CO2 SERPL-SCNC: 29 MMOL/L (ref 21–32)
CREAT BLD-MCNC: 0.65 MG/DL (ref 0.55–1.02)
DEPRECATED RDW RBC AUTO: 49.4 FL (ref 35.1–46.3)
EOSINOPHIL # BLD AUTO: 0.07 X10(3) UL (ref 0–0.7)
EOSINOPHIL NFR BLD AUTO: 1 %
ERYTHROCYTE [DISTWIDTH] IN BLOOD BY AUTOMATED COUNT: 16 % (ref 11–15)
GLUCOSE BLD-MCNC: 81 MG/DL (ref 70–99)
HAV IGM SER QL: 2.1 MG/DL (ref 1.6–2.6)
HCT VFR BLD AUTO: 38.1 % (ref 35–48)
HGB BLD-MCNC: 12.1 G/DL (ref 12–16)
IMM GRANULOCYTES # BLD AUTO: 0.05 X10(3) UL (ref 0–1)
IMM GRANULOCYTES NFR BLD: 0.7 %
LYMPHOCYTES # BLD AUTO: 2.42 X10(3) UL (ref 1–4)
LYMPHOCYTES NFR BLD AUTO: 33.3 %
MCH RBC QN AUTO: 27 PG (ref 26–34)
MCHC RBC AUTO-ENTMCNC: 31.8 G/DL (ref 31–37)
MCV RBC AUTO: 85 FL (ref 80–100)
MONOCYTES # BLD AUTO: 0.64 X10(3) UL (ref 0.1–1)
MONOCYTES NFR BLD AUTO: 8.8 %
NEUTROPHILS # BLD AUTO: 4.06 X10 (3) UL (ref 1.5–7.7)
NEUTROPHILS # BLD AUTO: 4.06 X10(3) UL (ref 1.5–7.7)
NEUTROPHILS NFR BLD AUTO: 55.8 %
OSMOLALITY SERPL CALC.SUM OF ELEC: 295 MOSM/KG (ref 275–295)
PLATELET # BLD AUTO: 178 10(3)UL (ref 150–450)
POTASSIUM SERPL-SCNC: 4.4 MMOL/L (ref 3.5–5.1)
RBC # BLD AUTO: 4.48 X10(6)UL (ref 3.8–5.3)
SODIUM SERPL-SCNC: 141 MMOL/L (ref 136–145)
WBC # BLD AUTO: 7.3 X10(3) UL (ref 4–11)

## 2020-01-02 PROCEDURE — 85025 COMPLETE CBC W/AUTO DIFF WBC: CPT | Performed by: HOSPITALIST

## 2020-01-02 PROCEDURE — 83735 ASSAY OF MAGNESIUM: CPT | Performed by: HOSPITALIST

## 2020-01-02 PROCEDURE — 80048 BASIC METABOLIC PNL TOTAL CA: CPT | Performed by: HOSPITALIST

## 2020-01-02 RX ORDER — VANCOMYCIN HYDROCHLORIDE 125 MG/1
125 CAPSULE ORAL DAILY
Qty: 12 CAPSULE | Refills: 0 | Status: SHIPPED | OUTPATIENT
Start: 2020-01-02 | End: 2020-01-14

## 2020-01-02 RX ORDER — CEFDINIR 300 MG/1
300 CAPSULE ORAL 2 TIMES DAILY
Qty: 10 CAPSULE | Refills: 0 | Status: SHIPPED | OUTPATIENT
Start: 2020-01-03 | End: 2020-01-08

## 2020-01-02 RX ORDER — BUDESONIDE 3 MG/1
CAPSULE, COATED PELLETS ORAL
Qty: 90 CAPSULE | Refills: 1 | OUTPATIENT
Start: 2020-01-02

## 2020-01-02 NOTE — TELEPHONE ENCOUNTER
Tried calling patient on home number. No answer no VM option. Tried mobile number. She answered. She states she is doing well, no diarrhea currently, feels the medication helps. Informed her she should have a refill already at the pharmacy.  She was n

## 2020-01-02 NOTE — CM/SW NOTE
Received MDO for d/c planning. Per chart review PT/OT rec HH OT. SW received notice from [facility] that pt is current w/ their services. Per PT note: pt typically completes w/c transfers w/ a sliding board and assistance from her .  Pt lives in a [facility]

## 2020-01-02 NOTE — PLAN OF CARE
Alert and oriented. Colostomy and cowan in place. Pt on IV Antibiotics for UTI. Plan for pt to DC tomorrow with [facility]. Will continue to monitor.    Problem: Patient Centered Care  Goal: Patient preferences are identified and integrated in the patient's plan o Problem: GENITOURINARY - ADULT  Goal: Absence of urinary retention  Description  INTERVENTIONS:  - Assess patient’s ability to void and empty bladder  - Monitor intake/output and perform bladder scan as needed  - Follow urinary retention protocol/standar

## 2020-01-02 NOTE — DISCHARGE SUMMARY
General Medicine Discharge Summary     Patient ID:  Daksha Olson  66year old  5/4/1945    Admit date: 12/31/2019    Discharge date and time: 1/2/20    Attending Physician: Wayne Snow MD     Consults: IP CONSULT TO HOSPITALIST  IP CONSULT TO [address] [place] [address] gain.  No marked change in ostomy output but maybe looser per family. No cough. Just feels diffusely tired, achey.       Hospital Course:   Reynaldo IBARRA 66 year old female with anxiety, depression, DVT s/p permanent IVC filter on eliquis, recu Dictated by (CST): Kris Nunez MD on 12/30/2019 at 16:20     Approved by (CST): Kris Nunez MD on 12/30/2019 at 16:25          Xr Chest Ap Portable  (cpt=71045)    Result Date: 12/31/2019  CONCLUSION:  1. Mild cardiomegaly.   Prominent ce morning.     ezetimibe 10 MG Tabs  Commonly known as:  ZETIA  TAKE 1 TABLET ONCE DAILY     ferrous sulfate 325 (65 FE) MG Tbec     Fluticasone Propionate 50 MCG/ACT Susp  Commonly known as:  FLONASE  1 spray by Each Nare route daily.      furosemide 40 MG T [facility].  [phone]             Call Radha King MD.    Specialty:  UROLOGY  Contact information:  [id number] [place] [address] instructions:       Other Discharge Instructions:       Ple

## 2020-01-14 ENCOUNTER — LAB REQUISITION (OUTPATIENT)
Dept: LAB | Facility: HOSPITAL | Age: 75
End: 2020-01-14
Payer: MEDICARE

## 2020-01-14 DIAGNOSIS — N39.0 URINARY TRACT INFECTION, SITE NOT SPECIFIED: ICD-10-CM

## 2020-01-14 LAB
BILIRUB UR QL: NEGATIVE
CLARITY UR: CLEAR
COLOR UR: YELLOW
GLUCOSE UR-MCNC: NEGATIVE MG/DL
HYALINE CASTS #/AREA URNS AUTO: 1 /LPF
KETONES UR-MCNC: NEGATIVE MG/DL
LEUKOCYTE ESTERASE UR QL STRIP.AUTO: NEGATIVE
NITRITE UR QL STRIP.AUTO: NEGATIVE
PH UR: 5 [PH] (ref 5–8)
PROT UR-MCNC: NEGATIVE MG/DL
RBC #/AREA URNS AUTO: <1 /HPF
SP GR UR STRIP: 1.02 (ref 1–1.03)
UROBILINOGEN UR STRIP-ACNC: <2
WBC #/AREA URNS AUTO: 2 /HPF

## 2020-01-14 PROCEDURE — 81001 URINALYSIS AUTO W/SCOPE: CPT | Performed by: UROLOGY

## 2020-01-18 ENCOUNTER — TELEPHONE (OUTPATIENT)
Dept: CARDIOLOGY | Age: 75
End: 2020-01-18

## 2020-01-20 ENCOUNTER — HOSPITAL ENCOUNTER (INPATIENT)
Facility: HOSPITAL | Age: 75
LOS: 8 days | Discharge: HOME | DRG: 193 | End: 2020-01-28
Attending: EMERGENCY MEDICINE | Admitting: INTERNAL MEDICINE
Payer: MEDICARE

## 2020-01-20 ENCOUNTER — APPOINTMENT (OUTPATIENT)
Dept: GENERAL RADIOLOGY | Facility: HOSPITAL | Age: 75
DRG: 193 | End: 2020-01-20
Attending: EMERGENCY MEDICINE
Payer: MEDICARE

## 2020-01-20 DIAGNOSIS — B97.4 RESPIRATORY SYNCYTIAL VIRUS (RSV): ICD-10-CM
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
## 2020-01-20 DIAGNOSIS — J18.9 NOSOCOMIAL PNEUMONIA: Primary | ICD-10-CM

## 2020-01-20 PROBLEM — R79.89 AZOTEMIA: Status: ACTIVE | Noted: 2020-01-20

## 2020-01-20 PROBLEM — E87.3 METABOLIC ALKALOSIS: Status: ACTIVE | Noted: 2020-01-20

## 2020-01-20 PROBLEM — E87.6 HYPOKALEMIA: Status: ACTIVE | Noted: 2020-01-20

## 2020-01-20 PROBLEM — B33.8 RESPIRATORY SYNCYTIAL VIRUS (RSV): Status: ACTIVE | Noted: 2020-01-20

## 2020-01-20 PROBLEM — R73.9 HYPERGLYCEMIA: Status: ACTIVE | Noted: 2020-01-20

## 2020-01-20 PROBLEM — J11.1 FLU: Status: ACTIVE | Noted: 2020-01-20

## 2020-01-20 LAB
ALBUMIN SERPL-MCNC: 3 G/DL (ref 3.4–5)
ALP LIVER SERPL-CCNC: 81 U/L (ref 55–142)
ALT SERPL-CCNC: 37 U/L (ref 13–56)
ANION GAP SERPL CALC-SCNC: 2 MMOL/L (ref 0–18)
ANION GAP SERPL CALC-SCNC: 9 MMOL/L (ref 0–18)
AST SERPL-CCNC: 33 U/L (ref 15–37)
BASE EXCESS BLD CALC-SCNC: 1.9 MMOL/L (ref ?–2)
BASOPHILS # BLD AUTO: 0.02 X10(3) UL (ref 0–0.2)
BASOPHILS # BLD AUTO: 0.03 X10(3) UL (ref 0–0.2)
BASOPHILS NFR BLD AUTO: 0.2 %
BASOPHILS NFR BLD AUTO: 0.3 %
BILIRUB DIRECT SERPL-MCNC: 0.3 MG/DL (ref 0–0.2)
BILIRUB SERPL-MCNC: 1 MG/DL (ref 0.1–2)
BUN BLD-MCNC: 24 MG/DL (ref 7–18)
BUN BLD-MCNC: 28 MG/DL (ref 7–18)
BUN/CREAT SERPL: 37.5 (ref 10–20)
BUN/CREAT SERPL: 37.8 (ref 10–20)
CALCIUM BLD-MCNC: 8.7 MG/DL (ref 8.5–10.1)
CALCIUM BLD-MCNC: 8.7 MG/DL (ref 8.5–10.1)
CHLORIDE SERPL-SCNC: 102 MMOL/L (ref 98–112)
CHLORIDE SERPL-SCNC: 104 MMOL/L (ref 98–112)
CO2 SERPL-SCNC: 27 MMOL/L (ref 21–32)
CO2 SERPL-SCNC: 34 MMOL/L (ref 21–32)
CREAT BLD-MCNC: 0.64 MG/DL (ref 0.55–1.02)
CREAT BLD-MCNC: 0.74 MG/DL (ref 0.55–1.02)
DEPRECATED RDW RBC AUTO: 49.9 FL (ref 35.1–46.3)
DEPRECATED RDW RBC AUTO: 51.8 FL (ref 35.1–46.3)
EOSINOPHIL # BLD AUTO: 0.01 X10(3) UL (ref 0–0.7)
EOSINOPHIL # BLD AUTO: 0.06 X10(3) UL (ref 0–0.7)
EOSINOPHIL NFR BLD AUTO: 0.1 %
EOSINOPHIL NFR BLD AUTO: 0.6 %
ERYTHROCYTE [DISTWIDTH] IN BLOOD BY AUTOMATED COUNT: 16.7 % (ref 11–15)
ERYTHROCYTE [DISTWIDTH] IN BLOOD BY AUTOMATED COUNT: 17 % (ref 11–15)
FLUAV + FLUBV RNA SPEC NAA+PROBE: NEGATIVE
FLUAV + FLUBV RNA SPEC NAA+PROBE: NEGATIVE
FLUAV + FLUBV RNA SPEC NAA+PROBE: POSITIVE
GLUCOSE BLD-MCNC: 100 MG/DL (ref 70–99)
GLUCOSE BLD-MCNC: 87 MG/DL (ref 70–99)
HAV IGM SER QL: 1.9 MG/DL (ref 1.6–2.6)
HCO3 BLDA-SCNC: 26.4 MEQ/L (ref 21–27)
HCT VFR BLD AUTO: 40.8 % (ref 35–48)
HCT VFR BLD AUTO: 46.4 % (ref 35–48)
HGB BLD-MCNC: 12.8 G/DL (ref 12–16)
HGB BLD-MCNC: 14.3 G/DL (ref 12–16)
IMM GRANULOCYTES # BLD AUTO: 0.03 X10(3) UL (ref 0–1)
IMM GRANULOCYTES # BLD AUTO: 0.03 X10(3) UL (ref 0–1)
IMM GRANULOCYTES NFR BLD: 0.3 %
IMM GRANULOCYTES NFR BLD: 0.3 %
LACTATE SERPL-SCNC: 2.3 MMOL/L (ref 0.4–2)
LACTATE SERPL-SCNC: 2.5 MMOL/L (ref 0.4–2)
LACTATE SERPL-SCNC: 2.5 MMOL/L (ref 0.4–2)
LACTATE SERPL-SCNC: 2.8 MMOL/L (ref 0.4–2)
LACTATE SERPL-SCNC: 3.8 MMOL/L (ref 0.4–2)
LACTATE SERPL-SCNC: 4 MMOL/L (ref 0.4–2)
LYMPHOCYTES # BLD AUTO: 1.34 X10(3) UL (ref 1–4)
LYMPHOCYTES # BLD AUTO: 2.34 X10(3) UL (ref 1–4)
LYMPHOCYTES NFR BLD AUTO: 15 %
LYMPHOCYTES NFR BLD AUTO: 24.9 %
M PROTEIN MFR SERPL ELPH: 5.7 G/DL (ref 6.4–8.2)
MCH RBC QN AUTO: 26 PG (ref 26–34)
MCH RBC QN AUTO: 26.3 PG (ref 26–34)
MCHC RBC AUTO-ENTMCNC: 30.8 G/DL (ref 31–37)
MCHC RBC AUTO-ENTMCNC: 31.4 G/DL (ref 31–37)
MCV RBC AUTO: 82.8 FL (ref 80–100)
MCV RBC AUTO: 85.5 FL (ref 80–100)
MODIFIED ALLEN TEST: POSITIVE
MONOCYTES # BLD AUTO: 0.61 X10(3) UL (ref 0.1–1)
MONOCYTES # BLD AUTO: 0.75 X10(3) UL (ref 0.1–1)
MONOCYTES NFR BLD AUTO: 6.5 %
MONOCYTES NFR BLD AUTO: 8.4 %
NEUTROPHILS # BLD AUTO: 6.32 X10 (3) UL (ref 1.5–7.7)
NEUTROPHILS # BLD AUTO: 6.32 X10(3) UL (ref 1.5–7.7)
NEUTROPHILS # BLD AUTO: 6.81 X10 (3) UL (ref 1.5–7.7)
NEUTROPHILS # BLD AUTO: 6.81 X10(3) UL (ref 1.5–7.7)
NEUTROPHILS NFR BLD AUTO: 67.4 %
NEUTROPHILS NFR BLD AUTO: 76 %
NT-PROBNP SERPL-MCNC: 2363 PG/ML (ref ?–450)
O2 CT BLD-SCNC: 15.4 VOL% (ref 15–23)
OSMOLALITY SERPL CALC.SUM OF ELEC: 289 MOSM/KG (ref 275–295)
OSMOLALITY SERPL CALC.SUM OF ELEC: 296 MOSM/KG (ref 275–295)
PCO2 BLDA: 36 MM HG (ref 35–45)
PH BLDA: 7.46 [PH] (ref 7.35–7.45)
PLATELET # BLD AUTO: 167 10(3)UL (ref 150–450)
PLATELET # BLD AUTO: 182 10(3)UL (ref 150–450)
PO2 BLDA: 71 MM HG (ref 80–100)
POTASSIUM SERPL-SCNC: 3.2 MMOL/L (ref 3.5–5.1)
POTASSIUM SERPL-SCNC: 3.3 MMOL/L (ref 3.5–5.1)
POTASSIUM SERPL-SCNC: 4.4 MMOL/L (ref 3.5–5.1)
PROCALCITONIN SERPL-MCNC: 0.15 NG/ML
PUNCTURE CHARGE: YES
RBC # BLD AUTO: 4.93 X10(6)UL (ref 3.8–5.3)
RBC # BLD AUTO: 5.43 X10(6)UL (ref 3.8–5.3)
SAO2 % BLDA: 97.8 % (ref 94–100)
SODIUM SERPL-SCNC: 138 MMOL/L (ref 136–145)
SODIUM SERPL-SCNC: 140 MMOL/L (ref 136–145)
WBC # BLD AUTO: 9 X10(3) UL (ref 4–11)
WBC # BLD AUTO: 9.4 X10(3) UL (ref 4–11)

## 2020-01-20 PROCEDURE — 94640 AIRWAY INHALATION TREATMENT: CPT

## 2020-01-20 PROCEDURE — 87040 BLOOD CULTURE FOR BACTERIA: CPT | Performed by: EMERGENCY MEDICINE

## 2020-01-20 PROCEDURE — 87040 BLOOD CULTURE FOR BACTERIA: CPT | Performed by: NURSE PRACTITIONER

## 2020-01-20 PROCEDURE — 96365 THER/PROPH/DIAG IV INF INIT: CPT

## 2020-01-20 PROCEDURE — 80048 BASIC METABOLIC PNL TOTAL CA: CPT | Performed by: EMERGENCY MEDICINE

## 2020-01-20 PROCEDURE — 83605 ASSAY OF LACTIC ACID: CPT | Performed by: NURSE PRACTITIONER

## 2020-01-20 PROCEDURE — 85025 COMPLETE CBC W/AUTO DIFF WBC: CPT | Performed by: EMERGENCY MEDICINE

## 2020-01-20 PROCEDURE — 36600 WITHDRAWAL OF ARTERIAL BLOOD: CPT | Performed by: HOSPITALIST

## 2020-01-20 PROCEDURE — 83605 ASSAY OF LACTIC ACID: CPT | Performed by: EMERGENCY MEDICINE

## 2020-01-20 PROCEDURE — 80076 HEPATIC FUNCTION PANEL: CPT | Performed by: HOSPITALIST

## 2020-01-20 PROCEDURE — 71046 X-RAY EXAM CHEST 2 VIEWS: CPT | Performed by: EMERGENCY MEDICINE

## 2020-01-20 PROCEDURE — 85025 COMPLETE CBC W/AUTO DIFF WBC: CPT | Performed by: INTERNAL MEDICINE

## 2020-01-20 PROCEDURE — 83880 ASSAY OF NATRIURETIC PEPTIDE: CPT | Performed by: INTERNAL MEDICINE

## 2020-01-20 PROCEDURE — 99222 1ST HOSP IP/OBS MODERATE 55: CPT | Performed by: INTERNAL MEDICINE

## 2020-01-20 PROCEDURE — 84145 PROCALCITONIN (PCT): CPT | Performed by: EMERGENCY MEDICINE

## 2020-01-20 PROCEDURE — 99285 EMERGENCY DEPT VISIT HI MDM: CPT

## 2020-01-20 PROCEDURE — 94667 MNPJ CHEST WALL 1ST: CPT

## 2020-01-20 PROCEDURE — 94668 MNPJ CHEST WALL SBSQ: CPT

## 2020-01-20 PROCEDURE — 87631 RESP VIRUS 3-5 TARGETS: CPT | Performed by: EMERGENCY MEDICINE

## 2020-01-20 PROCEDURE — 96361 HYDRATE IV INFUSION ADD-ON: CPT

## 2020-01-20 PROCEDURE — 83735 ASSAY OF MAGNESIUM: CPT | Performed by: NURSE PRACTITIONER

## 2020-01-20 PROCEDURE — 80048 BASIC METABOLIC PNL TOTAL CA: CPT | Performed by: INTERNAL MEDICINE

## 2020-01-20 PROCEDURE — 84132 ASSAY OF SERUM POTASSIUM: CPT | Performed by: NURSE PRACTITIONER

## 2020-01-20 PROCEDURE — 82805 BLOOD GASES W/O2 SATURATION: CPT | Performed by: HOSPITALIST

## 2020-01-20 RX ORDER — PREDNISONE 20 MG/1
40 TABLET ORAL
Status: DISCONTINUED | OUTPATIENT
Start: 2020-01-21 | End: 2020-01-21

## 2020-01-20 RX ORDER — ALBUTEROL SULFATE 90 UG/1
2 AEROSOL, METERED RESPIRATORY (INHALATION) EVERY 4 HOURS PRN
Status: DISCONTINUED | OUTPATIENT
Start: 2020-01-20 | End: 2020-01-28

## 2020-01-20 RX ORDER — BISACODYL 10 MG
10 SUPPOSITORY, RECTAL RECTAL
Status: DISCONTINUED | OUTPATIENT
Start: 2020-01-20 | End: 2020-01-28

## 2020-01-20 RX ORDER — FUROSEMIDE 10 MG/ML
20 INJECTION INTRAMUSCULAR; INTRAVENOUS
Status: DISCONTINUED | OUTPATIENT
Start: 2020-01-20 | End: 2020-01-21

## 2020-01-20 RX ORDER — SODIUM PHOSPHATE, DIBASIC AND SODIUM PHOSPHATE, MONOBASIC 7; 19 G/133ML; G/133ML
1 ENEMA RECTAL ONCE AS NEEDED
Status: DISCONTINUED | OUTPATIENT
Start: 2020-01-20 | End: 2020-01-28

## 2020-01-20 RX ORDER — ASPIRIN 81 MG/1
81 TABLET ORAL DAILY
Status: DISCONTINUED | OUTPATIENT
Start: 2020-01-20 | End: 2020-01-28

## 2020-01-20 RX ORDER — POLYETHYLENE GLYCOL 3350 17 G/17G
17 POWDER, FOR SOLUTION ORAL DAILY PRN
Status: DISCONTINUED | OUTPATIENT
Start: 2020-01-20 | End: 2020-01-28

## 2020-01-20 RX ORDER — ATORVASTATIN CALCIUM 40 MG/1
40 TABLET, FILM COATED ORAL NIGHTLY
Status: DISCONTINUED | OUTPATIENT
Start: 2020-01-20 | End: 2020-01-28

## 2020-01-20 RX ORDER — TRAMADOL HYDROCHLORIDE 50 MG/1
25 TABLET ORAL EVERY 12 HOURS PRN
Status: DISCONTINUED | OUTPATIENT
Start: 2020-01-20 | End: 2020-01-28

## 2020-01-20 RX ORDER — BENZONATATE 100 MG/1
100 CAPSULE ORAL 3 TIMES DAILY
Status: CANCELLED | OUTPATIENT
Start: 2020-01-20

## 2020-01-20 RX ORDER — SODIUM CHLORIDE 9 MG/ML
1000 INJECTION, SOLUTION INTRAVENOUS ONCE
Status: COMPLETED | OUTPATIENT
Start: 2020-01-20 | End: 2020-01-20

## 2020-01-20 RX ORDER — GUAIFENESIN 100 MG/5ML
100 SOLUTION ORAL EVERY 4 HOURS PRN
Status: DISCONTINUED | OUTPATIENT
Start: 2020-01-20 | End: 2020-01-28

## 2020-01-20 RX ORDER — BUDESONIDE 3 MG/1
9 CAPSULE, COATED PELLETS ORAL EVERY MORNING
Status: DISCONTINUED | OUTPATIENT
Start: 2020-01-20 | End: 2020-01-23

## 2020-01-20 RX ORDER — IPRATROPIUM BROMIDE AND ALBUTEROL SULFATE 2.5; .5 MG/3ML; MG/3ML
3 SOLUTION RESPIRATORY (INHALATION) ONCE
Status: COMPLETED | OUTPATIENT
Start: 2020-01-20 | End: 2020-01-20

## 2020-01-20 RX ORDER — VENLAFAXINE HYDROCHLORIDE 75 MG/1
75 CAPSULE, EXTENDED RELEASE ORAL
Status: DISCONTINUED | OUTPATIENT
Start: 2020-01-20 | End: 2020-01-28

## 2020-01-20 RX ORDER — CETIRIZINE HYDROCHLORIDE 10 MG/1
10 TABLET ORAL DAILY
Status: DISCONTINUED | OUTPATIENT
Start: 2020-01-20 | End: 2020-01-28

## 2020-01-20 RX ORDER — SODIUM CHLORIDE 0.9 % (FLUSH) 0.9 %
3 SYRINGE (ML) INJECTION AS NEEDED
Status: DISCONTINUED | OUTPATIENT
Start: 2020-01-20 | End: 2020-01-28

## 2020-01-20 RX ORDER — ACETAMINOPHEN 325 MG/1
650 TABLET ORAL EVERY 6 HOURS PRN
Status: DISCONTINUED | OUTPATIENT
Start: 2020-01-20 | End: 2020-01-28

## 2020-01-20 RX ORDER — BENZONATATE 100 MG/1
200 CAPSULE ORAL 3 TIMES DAILY PRN
Status: DISCONTINUED | OUTPATIENT
Start: 2020-01-20 | End: 2020-01-28

## 2020-01-20 RX ORDER — POTASSIUM CHLORIDE 20 MEQ/1
40 TABLET, EXTENDED RELEASE ORAL EVERY 4 HOURS
Status: COMPLETED | OUTPATIENT
Start: 2020-01-20 | End: 2020-01-20

## 2020-01-20 RX ORDER — PREDNISONE 1 MG/1
5 TABLET ORAL
Status: DISCONTINUED | OUTPATIENT
Start: 2020-01-20 | End: 2020-01-20

## 2020-01-20 RX ORDER — EZETIMIBE 10 MG/1
10 TABLET ORAL NIGHTLY
Status: DISCONTINUED | OUTPATIENT
Start: 2020-01-20 | End: 2020-01-28

## 2020-01-20 RX ORDER — METOPROLOL SUCCINATE 50 MG/1
50 TABLET, EXTENDED RELEASE ORAL
Status: DISCONTINUED | OUTPATIENT
Start: 2020-01-20 | End: 2020-01-28

## 2020-01-20 RX ORDER — IPRATROPIUM BROMIDE AND ALBUTEROL SULFATE 2.5; .5 MG/3ML; MG/3ML
3 SOLUTION RESPIRATORY (INHALATION)
Status: DISCONTINUED | OUTPATIENT
Start: 2020-01-20 | End: 2020-01-28

## 2020-01-20 RX ORDER — ONDANSETRON 2 MG/ML
4 INJECTION INTRAMUSCULAR; INTRAVENOUS EVERY 6 HOURS PRN
Status: DISCONTINUED | OUTPATIENT
Start: 2020-01-20 | End: 2020-01-28

## 2020-01-20 RX ORDER — DOCUSATE SODIUM 100 MG/1
100 CAPSULE, LIQUID FILLED ORAL 2 TIMES DAILY
Status: DISCONTINUED | OUTPATIENT
Start: 2020-01-20 | End: 2020-01-20

## 2020-01-20 RX ORDER — IPRATROPIUM BROMIDE AND ALBUTEROL SULFATE 2.5; .5 MG/3ML; MG/3ML
3 SOLUTION RESPIRATORY (INHALATION)
Status: DISCONTINUED | OUTPATIENT
Start: 2020-01-20 | End: 2020-01-20

## 2020-01-20 RX ORDER — GUAIFENESIN 600 MG
600 TABLET, EXTENDED RELEASE 12 HR ORAL 2 TIMES DAILY
Status: DISCONTINUED | OUTPATIENT
Start: 2020-01-20 | End: 2020-01-28

## 2020-01-20 RX ORDER — CODEINE PHOSPHATE AND GUAIFENESIN 10; 100 MG/5ML; MG/5ML
5 SOLUTION ORAL EVERY 4 HOURS PRN
Status: DISCONTINUED | OUTPATIENT
Start: 2020-01-20 | End: 2020-01-27

## 2020-01-20 RX ORDER — FLUTICASONE PROPIONATE 50 MCG
1 SPRAY, SUSPENSION (ML) NASAL 2 TIMES DAILY
Status: DISCONTINUED | OUTPATIENT
Start: 2020-01-20 | End: 2020-01-28

## 2020-01-20 NOTE — PLAN OF CARE
Shree Enciso is resting in bed complaining of SOB on room air oxygenating well. Alert and oriented. Vital signs stable. Medications given and reviewed. Call light in hand, bed alarm on.      Problem: Patient Centered Care  Goal: Patient preferences are identifie

## 2020-01-20 NOTE — ED PROVIDER NOTES
Patient Seen in: [facility] AND [facility] Emergency Department      History   Patient presents with:  Cough/URI    Stated Complaint: cough    HPI    67 yo female with two days of productive cough and difficulty breathing. No fever. No chest pain.   [facility] 10/10/2017    Performed by Bre Chirinos MD at [address] [facility] N/A 12/8/2014    Performed by Bre Chirinos MD at [address]   • Physical Exam  Vitals signs and nursing note reviewed. Constitutional:       Appearance: Normal appearance. She is well-developed. HENT:      Head: Normocephalic and atraumatic.       Nose: Nose normal.      Mouth/Throat:      Mouth: Mucous memb other components within normal limits   CBC WITH DIFFERENTIAL WITH PLATELET    Narrative: The following orders were created for panel order CBC WITH DIFFERENTIAL WITH PLATELET.   Procedure                               Abnormality         Status

## 2020-01-20 NOTE — H&P
[facility] Team  History and Physical  Admit Date:  1/20/20    ASSESSMENT / PLAN:   Lakeshia Edwards is who is at [facility] 66year old female with  DVT s/p permanent IVC filter on eliquis, recurrent cdif colitis and collagenous colitis s/p laparos legt     HL  - statin    Hereditary Spastic Paralysis  -WC bound, straight cath, baclofen pump  -PT/OT/SW    Anxiety/Depression  -continue home meds     GOC  -POA-  -DNR    FEN  -lytes in am  -IVF  -diet-cardiac, ensure with breakfast    Prophy  -SC difficile colitis 5/12   • Cataract    • Colitis    • Congestive heart disease ([address]    • Coronary atherosclerosis    • Deep vein thrombosis (HCC)     jamal filter in place.  unsure of leg   • DEPRESSION    • Depression 1/11/2016   • Dyspnea 6/7/2018 random colon bx showed findings c/w lymphocytic colitis   • CYSTOURETHROSCOPY  1/9/09    in office   • ESOPHAGOGASTRODUODENOSCOPY (EGD) N/A 12/24/2016    Performed by Ryan Reynolds MD at [facility] ENDOSCOPY   • ESOPHAGOGASTRODUODENOSCOPY, [address] • DVT/VTE Father    • Diabetes Mother    • Diabetes Sister    • Eye Problems Sister         glaucoma   • Other (Other) Sister    • Other (Other) Daughter         spastic paraplegia   • Eye Problems Other         aunts/ uncles glaucoma   • Other (Other) O perihilar/ LL opacifications- given neg PCT, nml WBC and C diff hx- will hold on abx at this time.  Should pt clinically worsen, will reassess    Elevated lactate  - in the setting of boderline sepsis (T 100.6 and tachcyardia) with BP 100s (? Bsl)  - improv

## 2020-01-20 NOTE — CM/SW NOTE
09: 55AM  Received MDO for d/c planning. Per RN rounds - pt lives at home w/ her  and is independent. Per daily report from [facility] - pt is current w/ their [facility] services. 11:35AM  Received MDO for [facility] orders.     HH instructions added pt's AVS.

## 2020-01-20 NOTE — OCCUPATIONAL THERAPY NOTE
Attempted to see patient this PM. Per RN, lactic acid levels elevated and would like to hold therapy until it is determined what is causing elevation. Will reattempt as scheduling allows.     Delia Atkinson, OTR/L  [address] [facility]  [id number]

## 2020-01-20 NOTE — ED INITIAL ASSESSMENT (HPI)
Pt arrive in ER per [facility] ambulance with c/o cough x 2 days, denies fever, sts that \" I got my symptom from my \", denies CP, denies vomiting/diarrhea

## 2020-01-20 NOTE — CONSULTS
Cardiology (consult dictated)    Assessment:  1.  RSV virus pneumonia    2. Suspect component of CHF    3.  HFpEF. 4.  Chronic atrial fibrillation    5. Status post mitral clip, 2 procedures. Preserved LV systolic function      Plan:  1.   Gentle diur

## 2020-01-20 NOTE — CONSULTS
[facility]    PATIENT'S NAME: Aleah Alberto   ATTENDING PHYSICIAN: Alexandrea Amato MD   CONSULTING PHYSICIAN: Jr Robb.  Lori Rutherford MD   PATIENT ACCOUNT#:   [id number]    LOCATION:  [address] [place] [address] RECORD #:   [id number]       DATE OF BIRTH: mildly to moderately increased jugular venous pressure. Carotids are normal; no bruits. LUNGS:  Diffuse congestion with inspiratory rhonchi and expiratory wheezing. HEART:  S1, S2 normal.  Grade 2/6 holosystolic murmur at the left sternal border.   ABDOM Simple / Intermediate / Complex Repair - Final Wound Length In Cm: 3.6

## 2020-01-21 LAB
ALBUMIN SERPL-MCNC: 2.5 G/DL (ref 3.4–5)
ALBUMIN/GLOB SERPL: 1 {RATIO} (ref 1–2)
ALP LIVER SERPL-CCNC: 69 U/L (ref 55–142)
ALT SERPL-CCNC: 35 U/L (ref 13–56)
ANION GAP SERPL CALC-SCNC: 3 MMOL/L (ref 0–18)
AST SERPL-CCNC: 29 U/L (ref 15–37)
BILIRUB SERPL-MCNC: 0.8 MG/DL (ref 0.1–2)
BUN BLD-MCNC: 15 MG/DL (ref 7–18)
BUN/CREAT SERPL: 28.3 (ref 10–20)
C DIFF TOX B STL QL: NEGATIVE
CALCIUM BLD-MCNC: 8.5 MG/DL (ref 8.5–10.1)
CHLORIDE SERPL-SCNC: 112 MMOL/L (ref 98–112)
CO2 SERPL-SCNC: 27 MMOL/L (ref 21–32)
CREAT BLD-MCNC: 0.53 MG/DL (ref 0.55–1.02)
GLOBULIN PLAS-MCNC: 2.5 G/DL (ref 2.8–4.4)
GLUCOSE BLD-MCNC: 83 MG/DL (ref 70–99)
M PROTEIN MFR SERPL ELPH: 5 G/DL (ref 6.4–8.2)
OSMOLALITY SERPL CALC.SUM OF ELEC: 294 MOSM/KG (ref 275–295)
POTASSIUM SERPL-SCNC: 4.7 MMOL/L (ref 3.5–5.1)
SODIUM SERPL-SCNC: 142 MMOL/L (ref 136–145)

## 2020-01-21 PROCEDURE — 97110 THERAPEUTIC EXERCISES: CPT

## 2020-01-21 PROCEDURE — 97530 THERAPEUTIC ACTIVITIES: CPT

## 2020-01-21 PROCEDURE — 97161 PT EVAL LOW COMPLEX 20 MIN: CPT

## 2020-01-21 PROCEDURE — 87493 C DIFF AMPLIFIED PROBE: CPT | Performed by: INTERNAL MEDICINE

## 2020-01-21 PROCEDURE — 97166 OT EVAL MOD COMPLEX 45 MIN: CPT

## 2020-01-21 PROCEDURE — 99232 SBSQ HOSP IP/OBS MODERATE 35: CPT | Performed by: INTERNAL MEDICINE

## 2020-01-21 PROCEDURE — 80053 COMPREHEN METABOLIC PANEL: CPT | Performed by: HOSPITALIST

## 2020-01-21 PROCEDURE — 94640 AIRWAY INHALATION TREATMENT: CPT

## 2020-01-21 RX ORDER — SODIUM CHLORIDE 9 MG/ML
INJECTION, SOLUTION INTRAVENOUS CONTINUOUS
Status: ACTIVE | OUTPATIENT
Start: 2020-01-21 | End: 2020-01-21

## 2020-01-21 RX ORDER — BUDESONIDE 3 MG/1
CAPSULE, COATED PELLETS ORAL
Qty: 45 CAPSULE | Refills: 0 | Status: ON HOLD | OUTPATIENT
Start: 2020-01-21 | End: 2020-02-23

## 2020-01-21 RX ORDER — METHYLPREDNISOLONE SODIUM SUCCINATE 125 MG/2ML
60 INJECTION, POWDER, LYOPHILIZED, FOR SOLUTION INTRAMUSCULAR; INTRAVENOUS EVERY 8 HOURS
Status: DISCONTINUED | OUTPATIENT
Start: 2020-01-21 | End: 2020-01-22

## 2020-01-21 NOTE — PLAN OF CARE
Pt is A/Ox4, on O2 3L per [place], this morning, the patient was visibly having labored breathing with audible wheezing although her O2 sats maintains at mid to high 90's. Started on IV solumedrol. Pt is now resting comfortably. O2 maintained at 3L per [place].  Pt s oral medicated treatments as ordered  Outcome: Progressing

## 2020-01-21 NOTE — PHYSICAL THERAPY NOTE
PHYSICAL THERAPY EVALUATION - INPATIENT     Room Number: 307/307-A  Evaluation Date: 1/21/2020  Type of Evaluation: Initial   Physician Order: PT Eval and Treat    Presenting Problem: Progressive cough and weakness; RSV+  Reason for Therapy: Mobility Dysf fatigued. She does tolerate 12 min of seated activity and breathing and is motivated to get back to baseline level. She is in bed with all needs in reach and RN aware of session.      Patient will benefit from continued IP PT services to address these defic OTHER DISEASES     secondary poycythemia   • OTHER DISEASES     lumbar spinal stenosis   • OTHER DISEASES     duplicated left renal collecting system   • OTHER DISEASES     uterine fibroids   • OTHER DISEASES     ulcerative colitis   • OTHER DISEASES     u (Comment)(w/c, sliding board)  Patient Regularly Uses: Reading glasses    Prior Level of [place]: Patient reports she is home with her  and in a w/c due to spastic paraparesis. She has assist for ADL and transfers using a sliding board.  Has a c over in bed (including adjusting bedclothes, sheets and blankets)?: A Little   -   Sitting down on and standing up from a chair with arms (e.g., wheelchair, bedside commode, etc.): Unable   -   Moving from lying on back to sitting on the side of the bed?: for discharge.    Goal #5   Current Status    Goal #6    Goal #6  Current Status

## 2020-01-21 NOTE — PROGRESS NOTES
[facility] CL[facility]  Progress Note    Mini Rush Patient Status:  Inpatient    1945 MRN [id number]   Location [facility] 3W/SW Attending Victor Hugo Ybarra MD   Hosp Day # 1 PCP Mendel Housekeeper, MD     Assessment:    1.  RSV pneumonia. 01/21/2020    K 4.7 01/21/2020     01/21/2020    CO2 27.0 01/21/2020    BUN 15 01/21/2020    CREATSERUM 0.53 01/21/2020    GLU 83 01/21/2020    MG 1.9 01/20/2020    CA 8.5 01/21/2020    ALT 35 01/21/2020    AST 29 01/21/2020    ALB 2.5 01/21/2020

## 2020-01-21 NOTE — TELEPHONE ENCOUNTER
states patient is currently in the hospital with the flu and pneumonia. He states patient was not experiencing diarrhea before she went in the hospital and is currently taking 3 Budesonide capsules daily. [employer] message sent to Dr. Sayra Shell.

## 2020-01-21 NOTE — OCCUPATIONAL THERAPY NOTE
OCCUPATIONAL THERAPY EVALUATION - INPATIENT     Room Number: 307/307-A  Evaluation Date: 1/21/2020  Type of Evaluation: Initial  Presenting Problem: (RVS)    Physician Order: IP Consult to Occupational Therapy  Reason for Therapy: ADL/IADL Dysfunction and conservation/work simplification techniques; Functional transfer training;UE strengthening/ROM; Endurance training;Patient/Family education;Patient/Family training;Equipment eval/education; Compensatory technique education       OCCUPATIONAL THERAPY MEDICAL/S clips placed       Past Surgical History  Past Surgical History:   Procedure Laterality Date   • BACK SURGERY      spinal fusion L1-5   • CAUDAL N/A 3/26/2019    Performed by Juaquin Almanzar MD at [address] [place] [address] N/A 10/10/2017 transfer. SUBJECTIVE  \"I feel good sitting up\"     OCCUPATIONAL THERAPY EXAMINATION      OBJECTIVE  Precautions:  Other (Comment);Cardiac(contact precautions/)  Fall Risk: High fall risk    PAIN ASSESSMENT  Ratin    ACTIVITY TOLERANCE  Pulse: 100 Dressing: max a     Education Provided: Educated pt on role of OT in acute care setting, physiological benefits of functional mobility/OOB activity and POC - pt verbalized understanding.     Patient End of Session: In bed;Needs met;Call light within reach;R

## 2020-01-21 NOTE — PLAN OF CARE
Problem: Patient Centered Care  Goal: Patient preferences are identified and integrated in the patient's plan of care  Description  Interventions:  - What would you like us to know as we care for you?  I am usually wheelchair bound   - Provide timely, com

## 2020-01-21 NOTE — TELEPHONE ENCOUNTER
Upon discharge I would decrease the patient's budesonide to 6 mg (#2) daily for 2 weeks, #1 daily for 2 weeks and then stop. To call if diarrhea recurs.

## 2020-01-21 NOTE — CONSULTS
Pulmonary H&P/Consult     NAME: Ursula Jacinto - ROOM: [address] [place] [address] - MRN: [id number] - Age: 66year old - :  1945    Date of Admission: 2020  3:20 AM  Admission Diagnosis: Respiratory syncytial virus (RSV) [B97.4]  Nosocomial pneumonia [J18. Lymphocytic colitis Colon= 5/7/12   • MENOPAUSE    • MITRAL VALVE PROLAPSE    • Neuropathy     bilateral legs   • Osteoarthritis     legs, back, shoulders and knees   • OSTEOPENIA    • Other and unspecified hyperlipidemia    • OTHER DISEASES     spastic pa Yossi Noble MD at [facility] CVOR   • OTHER SURGICAL HISTORY  8-24-12    cysto Dr. Khadra Garcia   • TONSILLECTOMY     • VALVE REPAIR       Family History   Problem Relation Age of Onset   • Cancer Father    • Heart Disorder Father    • Eye Problems Father         Brittney Boswell [Naprelan]        RASH  Aspirin                     Comment:Bleeding issues, but pt on ASA daily.   Bactrim                 RASH    Comment:Periorbital dermatitis 24 hours into course    REVIEW OF SYSTEMS: 10 systems reviewed, negative except as stated in t noted.

## 2020-01-22 LAB
ANION GAP SERPL CALC-SCNC: 5 MMOL/L (ref 0–18)
BASOPHILS # BLD AUTO: 0 X10(3) UL (ref 0–0.2)
BASOPHILS NFR BLD AUTO: 0 %
BUN BLD-MCNC: 19 MG/DL (ref 7–18)
BUN/CREAT SERPL: 41.3 (ref 10–20)
CALCIUM BLD-MCNC: 9.2 MG/DL (ref 8.5–10.1)
CHLORIDE SERPL-SCNC: 111 MMOL/L (ref 98–112)
CO2 SERPL-SCNC: 24 MMOL/L (ref 21–32)
CREAT BLD-MCNC: 0.46 MG/DL (ref 0.55–1.02)
DEPRECATED RDW RBC AUTO: 52.4 FL (ref 35.1–46.3)
EOSINOPHIL # BLD AUTO: 0 X10(3) UL (ref 0–0.7)
EOSINOPHIL NFR BLD AUTO: 0 %
ERYTHROCYTE [DISTWIDTH] IN BLOOD BY AUTOMATED COUNT: 16.9 % (ref 11–15)
GLUCOSE BLD-MCNC: 135 MG/DL (ref 70–99)
HCT VFR BLD AUTO: 37.3 % (ref 35–48)
HGB BLD-MCNC: 11.3 G/DL (ref 12–16)
IMM GRANULOCYTES # BLD AUTO: 0.03 X10(3) UL (ref 0–1)
IMM GRANULOCYTES NFR BLD: 0.6 %
LYMPHOCYTES # BLD AUTO: 0.86 X10(3) UL (ref 1–4)
LYMPHOCYTES NFR BLD AUTO: 18.1 %
MCH RBC QN AUTO: 25.9 PG (ref 26–34)
MCHC RBC AUTO-ENTMCNC: 30.3 G/DL (ref 31–37)
MCV RBC AUTO: 85.6 FL (ref 80–100)
MONOCYTES # BLD AUTO: 0.1 X10(3) UL (ref 0.1–1)
MONOCYTES NFR BLD AUTO: 2.1 %
NEUTROPHILS # BLD AUTO: 3.75 X10 (3) UL (ref 1.5–7.7)
NEUTROPHILS # BLD AUTO: 3.75 X10(3) UL (ref 1.5–7.7)
NEUTROPHILS NFR BLD AUTO: 79.2 %
OSMOLALITY SERPL CALC.SUM OF ELEC: 294 MOSM/KG (ref 275–295)
PLATELET # BLD AUTO: 137 10(3)UL (ref 150–450)
POTASSIUM SERPL-SCNC: 4.7 MMOL/L (ref 3.5–5.1)
POTASSIUM SERPL-SCNC: 5.4 MMOL/L (ref 3.5–5.1)
RBC # BLD AUTO: 4.36 X10(6)UL (ref 3.8–5.3)
SODIUM SERPL-SCNC: 140 MMOL/L (ref 136–145)
WBC # BLD AUTO: 4.7 X10(3) UL (ref 4–11)

## 2020-01-22 PROCEDURE — 94640 AIRWAY INHALATION TREATMENT: CPT

## 2020-01-22 PROCEDURE — 99232 SBSQ HOSP IP/OBS MODERATE 35: CPT | Performed by: NURSE PRACTITIONER

## 2020-01-22 PROCEDURE — 84132 ASSAY OF SERUM POTASSIUM: CPT | Performed by: NURSE PRACTITIONER

## 2020-01-22 PROCEDURE — 85025 COMPLETE CBC W/AUTO DIFF WBC: CPT | Performed by: NURSE PRACTITIONER

## 2020-01-22 PROCEDURE — 80048 BASIC METABOLIC PNL TOTAL CA: CPT | Performed by: INTERNAL MEDICINE

## 2020-01-22 RX ORDER — PREDNISONE 20 MG/1
40 TABLET ORAL
Status: DISCONTINUED | OUTPATIENT
Start: 2020-01-23 | End: 2020-01-28

## 2020-01-22 RX ORDER — FUROSEMIDE 40 MG/1
40 TABLET ORAL DAILY
Status: DISCONTINUED | OUTPATIENT
Start: 2020-01-22 | End: 2020-01-28

## 2020-01-22 NOTE — PLAN OF CARE
Breathing a little better today. On 2l of oxygen. Scheduled meds given. Pt alert and oriented. Fall prec in place. Will continue to monitor.     Problem: Patient Centered Care  Goal: Patient preferences are identified and integrated in the patient's plan of

## 2020-01-22 NOTE — DIETARY NOTE
ADULT NUTRITION INITIAL ASSESSMENT    Pt is at moderate nutrition risk. Pt does not meet malnutrition criteria.       RECOMMENDATIONS TO MD:  See Nutrition Intervention     NUTRITION DIAGNOSIS/PROBLEM:  Inadequate oral intake related to decreased ability t DISEASES, OTHER DISEASES, OTHER DISEASES, OTHER DISEASES, OTHER DISEASES, Pneumonia, organism unspecified(486), Reflux, Self-catheterizes urinary bladder (1/11/2016), Ulcerative colitis ([facility] and Valvular disease.     ANTHROPOMETRICS:  HT: 162.6 cm (5' 4\" adequacy of PO intake and adequacy of supplement intake.   - Anthropometric Measurement:      Monitor: wt   - Nutrition Goals:      allow wt loss due to fluid losses, PO and supplement greater than 75% of needs, monitor fluid status and maintain true wt wit

## 2020-01-22 NOTE — PROGRESS NOTES
Pulmonary Progress Note     Assessment / Plan:  1. Acute hypoxic resp failure - RSV pneumonitis and bronchospasm  - on RA  - IV to po steroids  - scheduled nebs  - PCT negative, agree with monitoring off abx in the setting of cdiff history  2.  Afib  - on e

## 2020-01-22 NOTE — PROGRESS NOTES
[facility] Hospitalist Team  Progress Note    Wilmer Lara Patient Status:  Inpatient    1945 MRN [id number]   Location [facility] 3W/SW Attending Marisela Ellis MD   Hosp Day # 2 PCP Natividad Sheikh MD     CC: Follow Up  PCP: Jonn La recheck 4.7      Recurrent UTI  Bladder Calculi  -recent proteus and e coli UTI  -straight cath's at home, consider cowan placement  -1/23 planned cystolitholapaxy with stone extraction with Dr Gera Santoro notified to cancel     Chronic diastolic HF  Severe Ox3  RESP: labored, wheezing   CARDIO: tachy,irregular   ABD: soft, + ostomy  : cowan  EXTREMITIES: no edema, no calf tenderness, SCD in place    DIAGNOSTIC DATA:   Labs:     Recent Labs   Lab 01/20/20  0452 01/20/20  0903 01/22/20  0542   WBC 9.4 9.0 4. Daily  Normal Saline Flush 0.9 % injection 3 mL, 3 mL, Intravenous, PRN  acetaminophen (TYLENOL) tab 650 mg, 650 mg, Oral, Q6H PRN  guaiFENesin (ROBITUSSIN) 100 MG/5ML solution 100 mg, 100 mg, Oral, Q4H PRN  vancomycin HCl (FIRVANQ) 50 MG/ML oral solution

## 2020-01-22 NOTE — PROGRESS NOTES
[facility]    Progress Note    Daksha Olson Patient Status:  Inpatient    1945 MRN [id number]   Location [facility] 3W/SW Attending Km Andrew MD   [facility] Day # 2 PCP Michael Newman MD        Subjective:     Re HCT 37.3 01/22/2020    .0 (L) 01/22/2020    CREATSERUM 0.46 (L) 01/22/2020    BUN 19 (H) 01/22/2020     01/22/2020    K 4.7 01/22/2020     01/22/2020    CO2 24.0 01/22/2020     (H) 01/22/2020    CA 9.2 01/22/2020    ALB 2.5 (L)

## 2020-01-22 NOTE — PROGRESS NOTES
[facility] Hospitalist Team  Progress Note    Salma Free Patient Status:  Inpatient    1945 MRN [id number]   Location [facility] 3W/SW Attending Colby Peraza MD   Hosp Day # 2 PCP John Rhoades MD     CC: Follow Up  PCP: Melba Skelton recheck 4.7      Recurrent UTI  Bladder Calculi  -recent proteus and e coli UTI  -straight cath's at home, consider cowan placement  -1/23 planned cystolitholapaxy with stone extraction with Dr Franck Layne notified to cancel     Chronic diastolic HF  Severe Ox3  RESP: labored, wheezing   CARDIO: tachy,irregular   ABD: soft, + ostomy  : cowan  EXTREMITIES: no edema, no calf tenderness, SCD in place    DIAGNOSTIC DATA:   Labs:     Recent Labs   Lab 01/20/20  0452 01/20/20  0903 01/22/20  0542   WBC 9.4 9.0 4. spray, 1 spray, Each Nare, BID  cetirizine (ZYRTEC) tab 10 mg, 10 mg, Oral, Daily  Metoprolol Succinate ER (Toprol XL) 24 hr tab 50 mg, 50 mg, Oral, Daily  traMADol HCl (ULTRAM) tab 25 mg, 25 mg, Oral, Q12H PRN  Venlafaxine HCl ER (EFFEXOR-XR) 24 hr cap 75 current med management holding off on additional workup including CXR at this time  - wean O2 as able  - PCT neg and no noted fevers suggestive of bacterial superimposed infection at this time     Lactate elevation  - unclear what the cause of this was/is

## 2020-01-22 NOTE — CM/SW NOTE
Pt is current w/ [facility]. Received MDO for [place] orders. [place] instructions already added to pt's AVS.    PLAN: Home w/ [facility], [place] orders in    SW/ to remain available for support and/or discharge planning.        Estil Part, [address] [facility]

## 2020-01-23 ENCOUNTER — APPOINTMENT (OUTPATIENT)
Dept: CV DIAGNOSTICS | Facility: HOSPITAL | Age: 75
DRG: 193 | End: 2020-01-23
Attending: NURSE PRACTITIONER
Payer: MEDICARE

## 2020-01-23 ENCOUNTER — APPOINTMENT (OUTPATIENT)
Dept: GENERAL RADIOLOGY | Facility: HOSPITAL | Age: 75
DRG: 193 | End: 2020-01-23
Attending: NURSE PRACTITIONER
Payer: MEDICARE

## 2020-01-23 LAB
ANION GAP SERPL CALC-SCNC: 8 MMOL/L (ref 0–18)
BASOPHILS # BLD AUTO: 0.01 X10(3) UL (ref 0–0.2)
BASOPHILS NFR BLD AUTO: 0.1 %
BUN BLD-MCNC: 28 MG/DL (ref 7–18)
BUN/CREAT SERPL: 45.2 (ref 10–20)
CALCIUM BLD-MCNC: 9.2 MG/DL (ref 8.5–10.1)
CHLORIDE SERPL-SCNC: 106 MMOL/L (ref 98–112)
CO2 SERPL-SCNC: 25 MMOL/L (ref 21–32)
CREAT BLD-MCNC: 0.62 MG/DL (ref 0.55–1.02)
DEPRECATED RDW RBC AUTO: 52.5 FL (ref 35.1–46.3)
EOSINOPHIL # BLD AUTO: 0 X10(3) UL (ref 0–0.7)
EOSINOPHIL NFR BLD AUTO: 0 %
ERYTHROCYTE [DISTWIDTH] IN BLOOD BY AUTOMATED COUNT: 16.8 % (ref 11–15)
GLUCOSE BLD-MCNC: 140 MG/DL (ref 70–99)
HCT VFR BLD AUTO: 35.3 % (ref 35–48)
HGB BLD-MCNC: 11.1 G/DL (ref 12–16)
IMM GRANULOCYTES # BLD AUTO: 0.04 X10(3) UL (ref 0–1)
IMM GRANULOCYTES NFR BLD: 0.6 %
LYMPHOCYTES # BLD AUTO: 1.05 X10(3) UL (ref 1–4)
LYMPHOCYTES NFR BLD AUTO: 15.1 %
MCH RBC QN AUTO: 26.8 PG (ref 26–34)
MCHC RBC AUTO-ENTMCNC: 31.4 G/DL (ref 31–37)
MCV RBC AUTO: 85.3 FL (ref 80–100)
MONOCYTES # BLD AUTO: 0.37 X10(3) UL (ref 0.1–1)
MONOCYTES NFR BLD AUTO: 5.3 %
NEUTROPHILS # BLD AUTO: 5.47 X10 (3) UL (ref 1.5–7.7)
NEUTROPHILS # BLD AUTO: 5.47 X10(3) UL (ref 1.5–7.7)
NEUTROPHILS NFR BLD AUTO: 78.9 %
OSMOLALITY SERPL CALC.SUM OF ELEC: 296 MOSM/KG (ref 275–295)
PLATELET # BLD AUTO: 142 10(3)UL (ref 150–450)
POTASSIUM SERPL-SCNC: 4 MMOL/L (ref 3.5–5.1)
RBC # BLD AUTO: 4.14 X10(6)UL (ref 3.8–5.3)
SODIUM SERPL-SCNC: 139 MMOL/L (ref 136–145)
WBC # BLD AUTO: 6.9 X10(3) UL (ref 4–11)

## 2020-01-23 PROCEDURE — 80048 BASIC METABOLIC PNL TOTAL CA: CPT | Performed by: INTERNAL MEDICINE

## 2020-01-23 PROCEDURE — 85025 COMPLETE CBC W/AUTO DIFF WBC: CPT | Performed by: NURSE PRACTITIONER

## 2020-01-23 PROCEDURE — 94640 AIRWAY INHALATION TREATMENT: CPT

## 2020-01-23 PROCEDURE — 99232 SBSQ HOSP IP/OBS MODERATE 35: CPT | Performed by: NURSE PRACTITIONER

## 2020-01-23 PROCEDURE — 93306 TTE W/DOPPLER COMPLETE: CPT | Performed by: NURSE PRACTITIONER

## 2020-01-23 PROCEDURE — 71046 X-RAY EXAM CHEST 2 VIEWS: CPT | Performed by: NURSE PRACTITIONER

## 2020-01-23 RX ORDER — FUROSEMIDE 10 MG/ML
40 INJECTION INTRAMUSCULAR; INTRAVENOUS ONCE
Status: COMPLETED | OUTPATIENT
Start: 2020-01-23 | End: 2020-01-23

## 2020-01-23 RX ORDER — BUDESONIDE 3 MG/1
6 CAPSULE, COATED PELLETS ORAL EVERY MORNING
Status: DISCONTINUED | OUTPATIENT
Start: 2020-01-24 | End: 2020-01-28

## 2020-01-23 NOTE — PROGRESS NOTES
[facility]    Progress Note    Rainer Phelps Patient Status:  Inpatient    1945 MRN [id number]   Location [facility] 3W/SW Attending Petey Beal MD   [address] Day # 3 PCP Sussy Raymond MD        Subjective:     Chaitanya Hebert muscle use than prior. Continues on O2.  Xray ordered - will follow up volume overload vs. worsening respiratory process        Results:     Lab Results   Component Value Date    WBC 6.9 01/23/2020    HGB 11.1 (L) 01/23/2020    HCT 35.3 01/23/2020    PLT 14

## 2020-01-23 NOTE — PROGRESS NOTES
Pulmonary Progress Note     Assessment / Plan:  1. Acute hypoxic resp failure - RSV pneumonitis and bronchospasm. PCT negative  - wean supplemental O2 as able  - prednisone  - scheduled nebs  2. HFpEF, afib  - lasix  - eliquis  - per cards  3.  H/o DVT s/p

## 2020-01-24 LAB
ANION GAP SERPL CALC-SCNC: 6 MMOL/L (ref 0–18)
BUN BLD-MCNC: 28 MG/DL (ref 7–18)
BUN/CREAT SERPL: 38.4 (ref 10–20)
CALCIUM BLD-MCNC: 8.8 MG/DL (ref 8.5–10.1)
CHLORIDE SERPL-SCNC: 104 MMOL/L (ref 98–112)
CO2 SERPL-SCNC: 29 MMOL/L (ref 21–32)
CREAT BLD-MCNC: 0.73 MG/DL (ref 0.55–1.02)
GLUCOSE BLD-MCNC: 92 MG/DL (ref 70–99)
OSMOLALITY SERPL CALC.SUM OF ELEC: 293 MOSM/KG (ref 275–295)
POTASSIUM SERPL-SCNC: 3.1 MMOL/L (ref 3.5–5.1)
POTASSIUM SERPL-SCNC: 3.7 MMOL/L (ref 3.5–5.1)
SODIUM SERPL-SCNC: 139 MMOL/L (ref 136–145)

## 2020-01-24 PROCEDURE — 80048 BASIC METABOLIC PNL TOTAL CA: CPT | Performed by: NURSE PRACTITIONER

## 2020-01-24 PROCEDURE — 97535 SELF CARE MNGMENT TRAINING: CPT

## 2020-01-24 PROCEDURE — 97530 THERAPEUTIC ACTIVITIES: CPT

## 2020-01-24 PROCEDURE — 99232 SBSQ HOSP IP/OBS MODERATE 35: CPT | Performed by: INTERNAL MEDICINE

## 2020-01-24 PROCEDURE — 94640 AIRWAY INHALATION TREATMENT: CPT

## 2020-01-24 PROCEDURE — 84132 ASSAY OF SERUM POTASSIUM: CPT | Performed by: NURSE PRACTITIONER

## 2020-01-24 RX ORDER — POTASSIUM CHLORIDE 20 MEQ/1
40 TABLET, EXTENDED RELEASE ORAL EVERY 4 HOURS
Status: COMPLETED | OUTPATIENT
Start: 2020-01-24 | End: 2020-01-24

## 2020-01-24 RX ORDER — FUROSEMIDE 10 MG/ML
20 INJECTION INTRAMUSCULAR; INTRAVENOUS ONCE
Status: COMPLETED | OUTPATIENT
Start: 2020-01-24 | End: 2020-01-24

## 2020-01-24 RX ORDER — POTASSIUM CHLORIDE 20 MEQ/1
40 TABLET, EXTENDED RELEASE ORAL ONCE
Status: COMPLETED | OUTPATIENT
Start: 2020-01-24 | End: 2020-01-24

## 2020-01-24 NOTE — PLAN OF CARE
VS stable. Denies any pain. Still coughing. PRN cough medicine given. PT up in chair with lift. Fall prec in place. Call light within reach. Will continue to monitor.     Problem: Patient Centered Care  Goal: Patient preferences are identified and integrate stability  Description  INTERVENTIONS:  - Monitor vital signs, rhythm, and trends  - Monitor for bleeding, hypotension and signs of decreased cardiac output  - Evaluate effectiveness of vasoactive medications to optimize hemodynamic stability  - Monitor ar

## 2020-01-24 NOTE — PROGRESS NOTES
[facility] AND CLINICS  Progress Note    Salma Free Patient Status:  Inpatient    1945 MRN [id number]   Location [facility] 3W/SW Attending Dipika Bernal MD   [facility] Day # 4 PCP John Rhoades MD     Assessment:    1.  RSV pneumonia.   Cl 01/24/2020    CO2 29.0 01/24/2020    BUN 28 01/24/2020    CREATSERUM 0.73 01/24/2020    GLU 92 01/24/2020    CA 8.8 01/24/2020        Lab Results   Component Value Date    TROP <0.045 12/31/2019    TROP 1.570 () 04/16/2019    TROP 1.240 () 04/16/2019

## 2020-01-24 NOTE — PROGRESS NOTES
Pulmonary Progress Note     Assessment / Plan:  1. Acute hypoxic resp failure - RSV pneumonitis and bronchospasm. PCT negative  - wean supplemental O2 as able  - prednisone 40mg daily  - scheduled nebs  2.  HFpEF, afib  - lasix po  - eliquis  - per cards  3

## 2020-01-24 NOTE — PLAN OF CARE
CONTINUING ISOLATION PRECAUTIONS, BLACK CATHETER, CXR AND 2D ECHO TODAY, LASIX X 1, WEANED OFF OXYGEN, PLAN FOR HOME WITH H.H. WHEN D/C'ED    Problem: SKIN/TISSUE INTEGRITY - ADULT  Goal: Skin integrity remains intact  Description  INTERVENTIONS  - Assess

## 2020-01-24 NOTE — PROGRESS NOTES
[facility] Hospitalist Team  Progress Note    [place] Perfect Patient Status:  Inpatient    1945 MRN [id number]   Location [facility] 3W/SW Attending Lars Becerra MD   [facility] Day # 4 PCP Pollo Sne MD     CC: Follow Up  PCP: Myles Willoughby PNA     Hypokalemia  Hyperkalemia-? False reading   -repleted via protocol then repeat K this am 5.4, recheck 4.7-->4     Recurrent UTI  Bladder Calculi  -recent proteus and e coli UTI  -straight cath's at home, consider cowan placement  -1/23 planned cyst get up in the chair      OBJECTIVE:   Blood pressure 96/62, pulse 81, temperature 97.8 °F (36.6 °C), temperature source Oral, resp. rate 21, height 5' 4\" (1.626 m), weight 134 lb 3.2 oz (60.9 kg), SpO2 96 %, not currently breastfeeding.     GENERAL: less l syrup 5 mL, 5 mL, Oral, Q4H PRN  apixaban (ELIQUIS) tab 5 mg, 5 mg, Oral, BID  aspirin EC tab 81 mg, 81 mg, Oral, Daily  Albuterol Sulfate  (90 Base) MCG/ACT inhaler 2 puff, 2 puff, Inhalation, Q4H PRN  benzonatate (TESSALON) cap 200 mg, 200 mg, Ora Lungs clear, normal respiratory effort  CV: Heart with regular rate and rhythm, no peripheral edema  Abd: Abdomen soft, nontender, nondistended, no organomegaly, bowel sounds present  MSK: Full range of motion in extremities, no clubbing, no cyanosis  Skin

## 2020-01-24 NOTE — OCCUPATIONAL THERAPY NOTE
OCCUPATIONAL THERAPY TREATMENT NOTE - INPATIENT        Room Number: 659/836-U           Presenting Problem: (RVS)    Problem List  Principal Problem:    Respiratory syncytial virus (RSV)  Active Problems:    Hypokalemia    Azotemia    Metabolic alkalosis 56  Heart Rate Source: Monitor                   O2 SATURATIONS  SPO2 on Room Air at Rest: 95             ACTIVITIES OF DAILY LIVING ASSESSMENT  AM-PAC ‘6-Clicks’ Inpatient Daily Activity Short Form  How much help from another person does the patient sayda

## 2020-01-24 NOTE — PLAN OF CARE
Problem: SKIN/TISSUE INTEGRITY - ADULT  Goal: Skin integrity remains intact  Description  INTERVENTIONS  - Assess and document risk factors for pressure ulcer development  - Assess and document skin integrity  - Monitor for areas of redness and/or skin b

## 2020-01-25 LAB — POTASSIUM SERPL-SCNC: 4.9 MMOL/L (ref 3.5–5.1)

## 2020-01-25 PROCEDURE — 94640 AIRWAY INHALATION TREATMENT: CPT

## 2020-01-25 PROCEDURE — 84132 ASSAY OF SERUM POTASSIUM: CPT | Performed by: HOSPITALIST

## 2020-01-25 PROCEDURE — 99232 SBSQ HOSP IP/OBS MODERATE 35: CPT | Performed by: NURSE PRACTITIONER

## 2020-01-25 RX ORDER — PREDNISONE 20 MG/1
40 TABLET ORAL
Qty: 4 TABLET | Refills: 0 | Status: SHIPPED | OUTPATIENT
Start: 2020-01-26 | End: 2020-01-27

## 2020-01-25 NOTE — PLAN OF CARE
Problem: Patient Centered Care  Goal: Patient preferences are identified and integrated in the patient's plan of care  Description  Interventions:  - What would you like us to know as we care for you?  I am usually wheelchair bound   - Provide timely, com vasoactive medications to optimize hemodynamic stability  - Monitor arterial and/or venous puncture sites for bleeding and/or hematoma  - Assess quality of pulses, skin color and temperature  - Assess for signs of decreased coronary artery perfusion - ex.

## 2020-01-25 NOTE — PROGRESS NOTES
[facility] Hospitalist Progress Note     CC: Hospital Follow up    PCP: Teresa Peña MD       Assessment/Plan:     Principal Problem:    Respiratory syncytial virus (RSV)  Active Problems:    Hypokalemia    Azotemia    Metabolic alkalosis    Hyperglycemia 4.7-->4     Recurrent UTI  Bladder Calculi  -recent proteus and e coli UTI  -straight cath's at home, consider cowan placement  -1/23 planned cystolitholapaxy with stone extraction with Dr Gera Santoro notified to cancel     Chronic diastolic HF  Severe MR   845 ml   Output 2350 ml   Net -1505 ml       Last 3 Weights  01/25/20 0636 : 140 lb 8 oz (63.7 kg)  01/24/20 0700 : 134 lb 3.2 oz (60.9 kg)  01/20/20 0322 : 135 lb (61.2 kg)  01/08/20 0947 : 130 lb (59 kg)  01/02/20 0615 : 123 lb (55.8 kg)  01/01/20 0430 : Oral Daily with breakfast   • furosemide  40 mg Oral Daily   • dilTIAZem HCl  30 mg Oral Q8H [address]   • apixaban  5 mg Oral BID   • aspirin  81 mg Oral Daily   • atorvastatin  40 mg Oral Nightly   • ezetimibe  10 mg Oral Nightly   • Fluticasone Propionate  1

## 2020-01-25 NOTE — PROGRESS NOTES
[facility]    Cardiology Progress Note    Harjinder Ernst Patient Status:  Inpatient    1945 MRN [id number]   Location [facility] 3W/SW Attending Karely Lai MD   [address] Day # 5 PCP Sonny Nageotte, MD     Primary C  Chronic atrial fibrillation.    Satisfactory rate control. On Eliquis.     Plan:     Continue oral Lasix and other cardiac medications. Stable cardiac status for discharge when okay with all other services. Cardiology will sign off for the weekend.

## 2020-01-25 NOTE — PROGRESS NOTES
Pulmonary Progress Note      NAME: Salma Quintero - ROOM: 368/220-C - MRN: [id number] - Age: 66year old - : 1945    Assessment/Plan:  1. Acute hypoxic resp failure - RSV pneumonitis and bronchospasm.  PCT negative  - wean supplemental O2 as abl HGB 11.1*   HCT 35.3   MCV 85.3   MCH 26.8   MCHC 31.4   RDW 16.8*   NEPRELIM 5.47   WBC 6.9   .0*     Recent Labs   Lab 01/20/20  0917  01/21/20  0607 01/22/20  0542  01/23/20  0610 01/24/20  0604 01/24/20  2031 01/25/20  0636   GLU  --   --  83

## 2020-01-25 NOTE — PLAN OF CARE
Congested cough, see MAR. Lift to chair. Good appetite. Will continue monitoring.   Problem: Patient Centered Care  Goal: Patient preferences are identified and integrated in the patient's plan of care  Description  Interventions:  - What would you like us hypotension and signs of decreased cardiac output  - Evaluate effectiveness of vasoactive medications to optimize hemodynamic stability  - Monitor arterial and/or venous puncture sites for bleeding and/or hematoma  - Assess quality of pulses, skin color an

## 2020-01-26 LAB
ANION GAP SERPL CALC-SCNC: 4 MMOL/L (ref 0–18)
BUN BLD-MCNC: 32 MG/DL (ref 7–18)
BUN/CREAT SERPL: 62.7 (ref 10–20)
CALCIUM BLD-MCNC: 8.6 MG/DL (ref 8.5–10.1)
CHLORIDE SERPL-SCNC: 102 MMOL/L (ref 98–112)
CO2 SERPL-SCNC: 33 MMOL/L (ref 21–32)
CREAT BLD-MCNC: 0.51 MG/DL (ref 0.55–1.02)
GLUCOSE BLD-MCNC: 85 MG/DL (ref 70–99)
OSMOLALITY SERPL CALC.SUM OF ELEC: 294 MOSM/KG (ref 275–295)
POTASSIUM SERPL-SCNC: 4.1 MMOL/L (ref 3.5–5.1)
SODIUM SERPL-SCNC: 139 MMOL/L (ref 136–145)

## 2020-01-26 PROCEDURE — 80048 BASIC METABOLIC PNL TOTAL CA: CPT | Performed by: HOSPITALIST

## 2020-01-26 PROCEDURE — 94640 AIRWAY INHALATION TREATMENT: CPT

## 2020-01-26 NOTE — PROGRESS NOTES
[facility] Hospitalist Progress Note     CC: Hospital Follow up    PCP: Rocio Malik MD       Assessment/Plan:     Principal Problem:    Respiratory syncytial virus (RSV)  Active Problems:    Hypokalemia    Azotemia    Metabolic alkalosis    Hyperglycemia 4.7-->4     Recurrent UTI  Bladder Calculi  -recent proteus and e coli UTI  -straight cath's at home, consider cowan placement  -1/23 planned cystolitholapaxy with stone extraction with Dr Griffin Saldaña notified to cancel     Chronic diastolic HF  Severe MR   per 24 hour   Intake 935 ml   Output 1100 ml   Net -165 ml       Last 3 Weights  01/26/20 0700 : 140 lb (63.5 kg)  01/25/20 0636 : 140 lb 8 oz (63.7 kg)  01/24/20 0700 : 134 lb 3.2 oz (60.9 kg)  01/20/20 0322 : 135 lb (61.2 kg)  01/08/20 0947 : 130 lb (59 guaiFENesin ER  600 mg Oral BID   • ipratropium-albuterol  3 mL Nebulization Q4H WA (4 times daily)       benzocaine-menthol, guaiFENesin-codeine, Albuterol Sulfate HFA, benzonatate, traMADol HCl, Normal Saline Flush, acetaminophen, guaiFENesin, ondansetro

## 2020-01-26 NOTE — PROGRESS NOTES
Pulmonary Progress Note      NAME: Norma Scott - ROOM: 865/506-X - MRN: [id number] - Age: 66year old - : 1945    Assessment/Plan:  1. Acute hypoxic resp failure - RSV pneumonitis and bronchospasm.  PCT negative  - better and now off O2  - pr atraumatic. Lips, mucosa, and tongue normal.  No thrush noted. Neck: supple without mass   Lungs: mild ronchi / wheeze   Chest wall: No tenderness or deformity. Heart: Regular rate and rhythm, normal S1S2, no murmur.    Abdomen: soft, non-tender, non-dis

## 2020-01-27 ENCOUNTER — TELEPHONE (OUTPATIENT)
Dept: CARDIOLOGY CLINIC | Facility: HOSPITAL | Age: 75
End: 2020-01-27

## 2020-01-27 PROBLEM — E87.6 HYPOKALEMIA: Status: RESOLVED | Noted: 2020-01-20 | Resolved: 2020-01-27
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
## 2020-01-27 PROBLEM — J18.9 NOSOCOMIAL PNEUMONIA: Status: RESOLVED | Noted: 2020-01-20 | Resolved: 2020-01-27

## 2020-01-27 PROBLEM — R73.9 HYPERGLYCEMIA: Status: RESOLVED | Noted: 2020-01-20 | Resolved: 2020-01-27

## 2020-01-27 PROBLEM — R79.89 AZOTEMIA: Status: RESOLVED | Noted: 2020-01-20 | Resolved: 2020-01-27

## 2020-01-27 PROBLEM — E87.3 METABOLIC ALKALOSIS: Status: RESOLVED | Noted: 2020-01-20 | Resolved: 2020-01-27

## 2020-01-27 PROBLEM — J11.1 FLU: Status: RESOLVED | Noted: 2020-01-20 | Resolved: 2020-01-27

## 2020-01-27 PROCEDURE — 94640 AIRWAY INHALATION TREATMENT: CPT

## 2020-01-27 PROCEDURE — 99232 SBSQ HOSP IP/OBS MODERATE 35: CPT | Performed by: INTERNAL MEDICINE

## 2020-01-27 PROCEDURE — 97530 THERAPEUTIC ACTIVITIES: CPT

## 2020-01-27 RX ORDER — PREDNISONE 10 MG/1
TABLET ORAL
Qty: 21 TABLET | Refills: 0 | Status: ON HOLD | OUTPATIENT
Start: 2020-01-29 | End: 2020-02-25

## 2020-01-27 RX ORDER — PREDNISONE 1 MG/1
5 TABLET ORAL
Qty: 90 TABLET | Refills: 3 | Status: SHIPPED | COMMUNITY
Start: 2020-02-06 | End: 2020-11-22

## 2020-01-27 RX ORDER — PREDNISONE 20 MG/1
TABLET ORAL
Qty: 18 TABLET | Refills: 0 | Status: SHIPPED | OUTPATIENT
Start: 2020-01-28 | End: 2020-01-27

## 2020-01-27 NOTE — PLAN OF CARE
Pt rested through out night. Robitussin with codeine for cough and discomfort. Plan to dc home with [facility] today. Stable condition. No further complaint.     Problem: Patient Centered Care  Goal: Patient preferences are identified and integrated in the patient' stability  Description  INTERVENTIONS:  - Monitor vital signs, rhythm, and trends  - Monitor for bleeding, hypotension and signs of decreased cardiac output  - Evaluate effectiveness of vasoactive medications to optimize hemodynamic stability  - Monitor ar

## 2020-01-27 NOTE — PROGRESS NOTES
[facility]  Progress Note    Salma Free Patient Status:  Inpatient    1945 MRN [id number]   Location [facility] 3W/SW Attending Latoya Razo MD   [facility] Day # 7 PCP John Rhoades MD     Assessment:    1.  RSV pneumoni 12/31/2019    TROP 1.570 () 04/16/2019    TROP 1.240 () 04/16/2019        Medications:    • Budesonide  6 mg Oral QAM   • predniSONE  40 mg Oral Daily with breakfast   • furosemide  40 mg Oral Daily   • dilTIAZem HCl  30 mg Oral Q8H [address]   • apixaban  5

## 2020-01-27 NOTE — PROGRESS NOTES
Pulmonary Progress Note     Assessment / Plan:  1. Acute hypoxic resp failure - RSV pneumonitis and bronchospasm. PCT negative  - on RA  - prednisone taper  - scheduled nebs  2. HFpEF, afib  - per cards  3. H/o DVT s/p IVC filter  - eliquis  4.  Dispo - ful

## 2020-01-27 NOTE — TELEPHONE ENCOUNTER
Calling to confirm clinic visit for 1/29 at 10. Pt. Is supposed to be D/C from [address] [place] [address] tomorrow. Will call on 1/28 to see if she can make appt.

## 2020-01-27 NOTE — PROGRESS NOTES
[facility] Hospitalist Team  Progress Note    Wileysilvestre Robledo Patient Status:  Inpatient    1945 MRN [id number]   Location [facility] 3W/SW Attending Dakota Main MD   Hosp Day # 7 PCP Rin Jimenez MD     CC: Follow Up  PCP: Jose Rafael Bellamy PNA     Recurrent UTI  Bladder Calculi  -recent proteus and e coli UTI  -straight cath's at home, consider cowan placement  -1/23 planned cystolitholapaxy with stone extraction with Dr Jos Burgess notified to cancel     Chronic diastolic HF  Severe MR  S/P (36.7 °C), temperature source Oral, resp. rate 18, height 5' 4\" (1.626 m), weight 139 lb 12.8 oz (63.4 kg), SpO2 97 %, not currently breastfeeding.     GENERAL: slightly labored  NEURO: A/A Ox3  RESP: non labored, CTA  CARDIO: irregular   ABD: soft, NT, ND 50 MCG/ACT nasal spray 1 spray, 1 spray, Each Nare, BID  cetirizine (ZYRTEC) tab 10 mg, 10 mg, Oral, Daily  Metoprolol Succinate ER (Toprol XL) 24 hr tab 50 mg, 50 mg, Oral, Daily  traMADol HCl (ULTRAM) tab 25 mg, 25 mg, Oral, Q12H PRN  Venlafaxine HCl ER (ef 55-60%) , severe MR s/p mitral clip, hereditary spastic paraplegia (wheel chair bound, baclofen pump - and straight cath at baseline), GERD, right foot deformity with plans for future surgery with Dr Costa Infante, recurrent UTI/bladder calculi with plans f prophylaxis for recent abx use     Hx DVT S/P IVC Filter   -eliquis     HL  - statin     Hereditary Spastic Paralysis  -baseline status- WC bound, straight cath, baclofen pump  -remove cowan at D/C  -PT/OT/SW     Ostomy  Collagenous colitis    Pancreatic C

## 2020-01-27 NOTE — DISCHARGE PLANNING
Telephone call with patient's spouse, Rocio Donahue, who states that he is the only one who cares for the patient and denies having additional caregivers at this time.  Rocio Donahue states that patient would need to stay one more night as he is working today and cannot pi

## 2020-01-27 NOTE — DISCHARGE SUMMARY
[facility] Hospitalist Discharge Summary   Patient ID:  Erik Rincon  [id number]  55 year old  5/4/1945    Admit date: 1/20/2020  Discharge date: 1/28/2020    Primary Care Physician: Martita Mac MD   Attending Physician: Khoi Barboza MD   Cons Kenroy Oconnell is at [facility] and [facility] year old female with  DVT s/p permanent IVC filter on eliquis, recurrent cdif colitis and collagenous colitis s/p laparoscopic loop sigmoid colostomy in 9/2017, anxiety/depression, stable HL, non obs CAD, S/P o -pro BNP 2363, previous pro BNP [id number]  -lasix 40 mg daily   -follow up with  Dr Ryland alvarez  -cardizem added for rate control     Hx of recurrent C.  Diff colitis  -completed home vanco oral p What changed: You were already taking a medication with the same name, and this prescription was added. Make sure you understand how and when to take each.      * predniSONE 5 MG Tabs  Commonly known as:  Chloe Lerma  Start taking on:  February 6, 2020  What traMADol HCl 50 MG Tabs  Commonly known as:  ULTRAM  Take 0.5 tablets (25 mg total) by mouth every 8 (eight) hours as needed for Pain.   Notes to patient:  Last dose Sat 1/25 at 3:31am     Venlafaxine HCl ER 75 MG Cp24  Commonly known as:  EFFEXOR-XR  TAKE Abd: Abdomen soft, nontender, nondistended, no organomegaly, bowel sounds present  MSK: Full range of motion in extremities, no clubbing, no cyanosis  Skin: no rashes or lesions  Neuro: AO*3, motor intact, no sensory deficits  Psyc: appropriate mood and af

## 2020-01-27 NOTE — PHYSICAL THERAPY NOTE
PHYSICAL THERAPY TREATMENT NOTE - INPATIENT     Room Number: 381/496-O       Presenting Problem: Progressive cough and weakness; RSV+    Problem List  Principal Problem:    Respiratory syncytial virus (RSV)  Active Problems:    Hypokalemia    Azotemia    M Static Sitting: Fair +  Dynamic Sitting: Fair           Static Standing: Not applicable  Dynamic Standing: Not applicable    ACTIVITY TOLERANCE                         O2 WALK                  AM-PAC '6-Clicks' INPATIENT SHORT FORM - BASIC MOBILITY Goal #4 Patient will tolerate sitting EOB independently for 10 min with feet supported   Goal #4   Current Status  Tolerated sitting EOB x 10 minutes with S, but limited due to pain   Goal #5 Patient to demonstrate independence with home activity/exercis

## 2020-01-28 VITALS
TEMPERATURE: 99 F | OXYGEN SATURATION: 96 % | WEIGHT: 132.19 LBS | HEIGHT: 64 IN | DIASTOLIC BLOOD PRESSURE: 65 MMHG | HEART RATE: 86 BPM | BODY MASS INDEX: 22.57 KG/M2 | SYSTOLIC BLOOD PRESSURE: 106 MMHG | RESPIRATION RATE: 16 BRPM

## 2020-01-28 LAB
ANION GAP SERPL CALC-SCNC: 4 MMOL/L (ref 0–18)
BASOPHILS # BLD AUTO: 0.03 X10(3) UL (ref 0–0.2)
BASOPHILS NFR BLD AUTO: 0.3 %
BUN BLD-MCNC: 28 MG/DL (ref 7–18)
BUN/CREAT SERPL: 50.9 (ref 10–20)
CALCIUM BLD-MCNC: 8.6 MG/DL (ref 8.5–10.1)
CHLORIDE SERPL-SCNC: 101 MMOL/L (ref 98–112)
CO2 SERPL-SCNC: 33 MMOL/L (ref 21–32)
CREAT BLD-MCNC: 0.55 MG/DL (ref 0.55–1.02)
DEPRECATED RDW RBC AUTO: 49.6 FL (ref 35.1–46.3)
EOSINOPHIL # BLD AUTO: 0.04 X10(3) UL (ref 0–0.7)
EOSINOPHIL NFR BLD AUTO: 0.4 %
ERYTHROCYTE [DISTWIDTH] IN BLOOD BY AUTOMATED COUNT: 16.5 % (ref 11–15)
GLUCOSE BLD-MCNC: 84 MG/DL (ref 70–99)
HCT VFR BLD AUTO: 34.8 % (ref 35–48)
HGB BLD-MCNC: 10.9 G/DL (ref 12–16)
IMM GRANULOCYTES # BLD AUTO: 0.24 X10(3) UL (ref 0–1)
IMM GRANULOCYTES NFR BLD: 2.3 %
LYMPHOCYTES # BLD AUTO: 2.39 X10(3) UL (ref 1–4)
LYMPHOCYTES NFR BLD AUTO: 22.5 %
MCH RBC QN AUTO: 26.2 PG (ref 26–34)
MCHC RBC AUTO-ENTMCNC: 31.3 G/DL (ref 31–37)
MCV RBC AUTO: 83.7 FL (ref 80–100)
MONOCYTES # BLD AUTO: 0.74 X10(3) UL (ref 0.1–1)
MONOCYTES NFR BLD AUTO: 7 %
NEUTROPHILS # BLD AUTO: 7.17 X10 (3) UL (ref 1.5–7.7)
NEUTROPHILS # BLD AUTO: 7.17 X10(3) UL (ref 1.5–7.7)
NEUTROPHILS NFR BLD AUTO: 67.5 %
OSMOLALITY SERPL CALC.SUM OF ELEC: 291 MOSM/KG (ref 275–295)
PLATELET # BLD AUTO: 223 10(3)UL (ref 150–450)
POTASSIUM SERPL-SCNC: 3.8 MMOL/L (ref 3.5–5.1)
RBC # BLD AUTO: 4.16 X10(6)UL (ref 3.8–5.3)
SODIUM SERPL-SCNC: 138 MMOL/L (ref 136–145)
WBC # BLD AUTO: 10.6 X10(3) UL (ref 4–11)

## 2020-01-28 PROCEDURE — 85025 COMPLETE CBC W/AUTO DIFF WBC: CPT | Performed by: NURSE PRACTITIONER

## 2020-01-28 PROCEDURE — 80048 BASIC METABOLIC PNL TOTAL CA: CPT | Performed by: NURSE PRACTITIONER

## 2020-01-28 PROCEDURE — 94640 AIRWAY INHALATION TREATMENT: CPT

## 2020-01-28 RX ORDER — POTASSIUM CHLORIDE 20 MEQ/1
40 TABLET, EXTENDED RELEASE ORAL ONCE
Status: COMPLETED | OUTPATIENT
Start: 2020-01-28 | End: 2020-01-28

## 2020-01-28 RX ORDER — IPRATROPIUM BROMIDE AND ALBUTEROL SULFATE 2.5; .5 MG/3ML; MG/3ML
3 SOLUTION RESPIRATORY (INHALATION)
Status: DISCONTINUED | OUTPATIENT
Start: 2020-01-28 | End: 2020-01-28

## 2020-01-28 RX ORDER — IPRATROPIUM BROMIDE AND ALBUTEROL SULFATE 2.5; .5 MG/3ML; MG/3ML
3 SOLUTION RESPIRATORY (INHALATION) EVERY 6 HOURS PRN
Status: DISCONTINUED | OUTPATIENT
Start: 2020-01-28 | End: 2020-01-28

## 2020-01-28 NOTE — DISCHARGE PLANNING
Discharge AVS reviewed with  and patient. Patient discharged stable to home with  via private vehicle.

## 2020-01-28 NOTE — CDS QUERY
.Heart Failure  CLINICAL DOCUMENTATION CLARIFICATION FORM  Dear Doctor: Morales Valerio or Ilir BRUMFIELD     Progress notes states patient did receive one dose of  IV lasix on 01/23. Could this classify as an exacerbation of the chronic diastolic chf?      PLEASE

## 2020-01-28 NOTE — PLAN OF CARE
Patient is alert and oriented x 4. VSS. RA. Mayorga in place. Colostomy bag c/d/I. Patient has remained free from falls. Bed locked and in lowest position. Call light and belongings within reach. Non-skid socks on. Will continue to monitor.     Problem: Mir ADULT  Goal: Maintains optimal cardiac output and hemodynamic stability  Description  INTERVENTIONS:  - Monitor vital signs, rhythm, and trends  - Monitor for bleeding, hypotension and signs of decreased cardiac output  - Evaluate effectiveness of vasoacti

## 2020-01-28 NOTE — PLAN OF CARE
Problem: Patient Centered Care  Goal: Patient preferences are identified and integrated in the patient's plan of care  Description  Interventions:  - What would you like us to know as we care for you?  I am usually wheelchair bound   - Provide timely, com cardiac output  - Evaluate effectiveness of vasoactive medications to optimize hemodynamic stability  - Monitor arterial and/or venous puncture sites for bleeding and/or hematoma  - Assess quality of pulses, skin color and temperature  - Assess for signs o

## 2020-01-28 NOTE — DIETARY NOTE
ADULT NUTRITION UPDATE NOTE    Pt is at moderate nutrition risk. Pt does not meet malnutrition criteria.       RECOMMENDATIONS TO MD:  None at this time     NUTRITION DIAGNOSIS/PROBLEM:  Inadequate oral intake related to decreased ability to consume suffic Intake Meeting Needs: Yes and supplement to maximize intake    MEDICATIONS: noted  LABS: reviewed    NUTRITION RELATED PHYSICAL FINDINGS:  - Body Fat/Muscle Mass: well nourished per visual exam.  - Fluid Accumulation: lower extremity edema per visua

## 2020-01-29 ENCOUNTER — PATIENT OUTREACH (OUTPATIENT)
Dept: CASE MANAGEMENT | Age: 75
End: 2020-01-29

## 2020-01-30 ENCOUNTER — TELEPHONE (OUTPATIENT)
Dept: CARDIOLOGY | Age: 75
End: 2020-01-30

## 2020-01-30 DIAGNOSIS — Z98.890 S/P MITRAL VALVE REPAIR: ICD-10-CM

## 2020-01-30 DIAGNOSIS — I34.0 MITRAL VALVE INSUFFICIENCY, UNSPECIFIED ETIOLOGY: Primary | ICD-10-CM

## 2020-02-02 NOTE — PROGRESS NOTES
[address] Patient Status:  No patient class for patient encounter    1945 MRN [id number]   Location [facility] MD Dr. Gaston Richard is a 66 y using albuterol inhaler 4 x day and using IS and acapella.     Review of Systems:  Constitutional: negative for chills or fever  Respiratory:  negative for hemoptysis and wheezing  Cardiovascular: negative for chest pain, exertional chest pressure, near-syn no murmur, click, rub or gallop, irreg/irregular rate and rhythm  Abdomen: soft, non-tender; bowel sounds normal; no masses,  mild abdominal distension  Extremities: extremities normal, atraumatic, no cyanosis, trace bryan LE mid tibial to pedal edema  Pulse small residual atrial shunt post-transseptal     catheterization. Impressions:   This study is compared with previous dated 10/03/2019: Two  additional MitraClips now present with mild-to-moderate mitral regurgitation  and mean gradient of 4 mmHg at a H consultation to discuss and consider long term plan of care and end of life care  -spoke with Sonia Perez RN with [facility] with update , plan and request to review pt's home medications     Acute on chronic chronic HFpEF  -near euvolemic on furos 3 mL by nebulization 3 (three) times daily. And every 6 hours as needed for increased cough or wheezing, Disp: 120 vial, Rfl: 0  •  [START ON 2/6/2020] predniSONE 5 MG Oral Tab, Take 1 tablet (5 mg total) by mouth once daily.  Resume when off steroid taper, Otic Suspension, 2 drops tid  Prn  itch, Disp: 10 mL, Rfl: 3  •  acetaminophen 325 MG Oral Tab, Take 325 mg by mouth every 6 (six) hours as needed for Pain., Disp: , Rfl:   •  apixaban 5 MG Oral Tab, Take 1 tablet (5 mg total) by mouth 2 (two) times daily.

## 2020-02-03 ENCOUNTER — OFFICE VISIT (OUTPATIENT)
Dept: CARDIOLOGY CLINIC | Facility: HOSPITAL | Age: 75
End: 2020-02-03
Attending: NURSE PRACTITIONER
Payer: MEDICARE

## 2020-02-03 VITALS
OXYGEN SATURATION: 99 % | SYSTOLIC BLOOD PRESSURE: 115 MMHG | HEART RATE: 58 BPM | DIASTOLIC BLOOD PRESSURE: 44 MMHG | TEMPERATURE: 97 F

## 2020-02-03 DIAGNOSIS — D72.829 LEUKOCYTOSIS, UNSPECIFIED TYPE: ICD-10-CM

## 2020-02-03 DIAGNOSIS — I50.9 HEART FAILURE, UNSPECIFIED (HCC): ICD-10-CM

## 2020-02-03 DIAGNOSIS — I50.33 ACUTE ON CHRONIC HEART FAILURE WITH PRESERVED EJECTION FRACTION (HFPEF) (HCC): ICD-10-CM

## 2020-02-03 DIAGNOSIS — Z95.818 S/P MITRAL VALVE CLIP IMPLANTATION: ICD-10-CM

## 2020-02-03 DIAGNOSIS — J12.1 RSV (RESPIRATORY SYNCYTIAL VIRUS PNEUMONIA): Primary | ICD-10-CM

## 2020-02-03 DIAGNOSIS — R53.1 WEAKNESS GENERALIZED: ICD-10-CM

## 2020-02-03 DIAGNOSIS — Z98.890 S/P MITRAL VALVE CLIP IMPLANTATION: ICD-10-CM

## 2020-02-03 DIAGNOSIS — G11.4 HEREDITARY SPASTIC PARAPLEGIA (HCC): ICD-10-CM

## 2020-02-03 DIAGNOSIS — I48.11 LONGSTANDING PERSISTENT ATRIAL FIBRILLATION (HCC): ICD-10-CM

## 2020-02-03 LAB
ANION GAP SERPL CALC-SCNC: 7 MMOL/L (ref 0–18)
BASOPHILS # BLD AUTO: 0.02 X10(3) UL (ref 0–0.2)
BASOPHILS NFR BLD AUTO: 0.1 %
BUN BLD-MCNC: 42 MG/DL (ref 7–18)
BUN/CREAT SERPL: 50 (ref 10–20)
CALCIUM BLD-MCNC: 8.8 MG/DL (ref 8.5–10.1)
CHLORIDE SERPL-SCNC: 102 MMOL/L (ref 98–112)
CO2 SERPL-SCNC: 28 MMOL/L (ref 21–32)
CREAT BLD-MCNC: 0.84 MG/DL (ref 0.55–1.02)
DEPRECATED RDW RBC AUTO: 52.1 FL (ref 35.1–46.3)
EOSINOPHIL # BLD AUTO: 0.01 X10(3) UL (ref 0–0.7)
EOSINOPHIL NFR BLD AUTO: 0.1 %
ERYTHROCYTE [DISTWIDTH] IN BLOOD BY AUTOMATED COUNT: 17.3 % (ref 11–15)
GLUCOSE BLD-MCNC: 210 MG/DL (ref 70–99)
HCT VFR BLD AUTO: 38.3 % (ref 35–48)
HGB BLD-MCNC: 11.9 G/DL (ref 12–16)
IMM GRANULOCYTES # BLD AUTO: 0.16 X10(3) UL (ref 0–1)
IMM GRANULOCYTES NFR BLD: 0.8 %
LYMPHOCYTES # BLD AUTO: 3.32 X10(3) UL (ref 1–4)
LYMPHOCYTES NFR BLD AUTO: 17.4 %
MCH RBC QN AUTO: 26.3 PG (ref 26–34)
MCHC RBC AUTO-ENTMCNC: 31.1 G/DL (ref 31–37)
MCV RBC AUTO: 84.5 FL (ref 80–100)
MONOCYTES # BLD AUTO: 0.91 X10(3) UL (ref 0.1–1)
MONOCYTES NFR BLD AUTO: 4.8 %
NEUTROPHILS # BLD AUTO: 14.68 X10 (3) UL (ref 1.5–7.7)
NEUTROPHILS # BLD AUTO: 14.68 X10(3) UL (ref 1.5–7.7)
NEUTROPHILS NFR BLD AUTO: 76.8 %
NT-PROBNP SERPL-MCNC: 2938 PG/ML (ref ?–450)
OSMOLALITY SERPL CALC.SUM OF ELEC: 301 MOSM/KG (ref 275–295)
PATIENT FASTING Y/N/NP: NO
PLATELET # BLD AUTO: 303 10(3)UL (ref 150–450)
POTASSIUM SERPL-SCNC: 3.8 MMOL/L (ref 3.5–5.1)
RBC # BLD AUTO: 4.53 X10(6)UL (ref 3.8–5.3)
SODIUM SERPL-SCNC: 137 MMOL/L (ref 136–145)
WBC # BLD AUTO: 19.1 X10(3) UL (ref 4–11)

## 2020-02-03 PROCEDURE — 99214 OFFICE O/P EST MOD 30 MIN: CPT | Performed by: NURSE PRACTITIONER

## 2020-02-03 PROCEDURE — 99212 OFFICE O/P EST SF 10 MIN: CPT | Performed by: NURSE PRACTITIONER

## 2020-02-03 PROCEDURE — 83880 ASSAY OF NATRIURETIC PEPTIDE: CPT | Performed by: NURSE PRACTITIONER

## 2020-02-03 PROCEDURE — 80048 BASIC METABOLIC PNL TOTAL CA: CPT | Performed by: NURSE PRACTITIONER

## 2020-02-03 PROCEDURE — 85025 COMPLETE CBC W/AUTO DIFF WBC: CPT | Performed by: NURSE PRACTITIONER

## 2020-02-03 PROCEDURE — 36415 COLL VENOUS BLD VENIPUNCTURE: CPT | Performed by: NURSE PRACTITIONER

## 2020-02-03 RX ORDER — IPRATROPIUM BROMIDE AND ALBUTEROL SULFATE 2.5; .5 MG/3ML; MG/3ML
3 SOLUTION RESPIRATORY (INHALATION) 3 TIMES DAILY
Qty: 120 VIAL | Refills: 0 | Status: SHIPPED | OUTPATIENT
Start: 2020-02-03 | End: 2020-07-27

## 2020-02-03 NOTE — PATIENT INSTRUCTIONS
Begin duo nebulizer 3 times daily and every 6 hours as needed ( use incentive spirometer and acapella after nebulizer) Use duo nebulizer in place of albuterol inhaler     Continue all your same medications    Call if having any dizziness, lightheadedness,

## 2020-02-06 PROBLEM — R53.1 WEAKNESS GENERALIZED: Status: RESOLVED | Noted: 2019-12-31 | Resolved: 2020-02-06

## 2020-02-06 PROBLEM — Z93.3 COLOSTOMY IN PLACE (HCC): Status: ACTIVE | Noted: 2020-02-06

## 2020-02-06 PROBLEM — B34.8 RHINOVIRUS: Chronic | Status: RESOLVED | Noted: 2019-12-05 | Resolved: 2020-02-06

## 2020-02-06 PROBLEM — B33.8 RESPIRATORY SYNCYTIAL VIRUS (RSV): Status: RESOLVED | Noted: 2020-01-20 | Resolved: 2020-02-06

## 2020-02-06 PROBLEM — G82.22 PARAPLEGIA, INCOMPLETE (HCC): Status: ACTIVE | Noted: 2020-02-06

## 2020-02-06 PROBLEM — I34.0 MITRAL VALVE INSUFFICIENCY: Status: RESOLVED | Noted: 2019-10-02 | Resolved: 2020-02-06

## 2020-02-06 PROBLEM — N21.0 BLADDER STONES: Status: RESOLVED | Noted: 2019-12-06 | Resolved: 2020-02-06

## 2020-02-06 PROBLEM — N30.00 ACUTE CYSTITIS WITHOUT HEMATURIA: Status: RESOLVED | Noted: 2019-12-31 | Resolved: 2020-02-06

## 2020-02-06 PROBLEM — J12.1 RSV (RESPIRATORY SYNCYTIAL VIRUS PNEUMONIA): Chronic | Status: RESOLVED | Noted: 2020-02-03 | Resolved: 2020-02-06

## 2020-02-06 PROBLEM — B97.4 RESPIRATORY SYNCYTIAL VIRUS (RSV): Status: RESOLVED | Noted: 2020-01-20 | Resolved: 2020-02-06

## 2020-02-06 PROBLEM — I48.91 ATRIAL FIBRILLATION WITH RAPID VENTRICULAR RESPONSE (HCC): Status: RESOLVED | Noted: 2019-05-17 | Resolved: 2020-02-06

## 2020-02-10 ENCOUNTER — LAB REQUISITION (OUTPATIENT)
Dept: LAB | Facility: HOSPITAL | Age: 75
End: 2020-02-10
Payer: MEDICARE

## 2020-02-10 DIAGNOSIS — I50.33 ACUTE ON CHRONIC DIASTOLIC (CONGESTIVE) HEART FAILURE (HCC): ICD-10-CM

## 2020-02-10 LAB
BASOPHILS # BLD AUTO: 0.02 X10(3) UL (ref 0–0.2)
BASOPHILS NFR BLD AUTO: 0.3 %
DEPRECATED RDW RBC AUTO: 50.1 FL (ref 35.1–46.3)
EOSINOPHIL # BLD AUTO: 0.08 X10(3) UL (ref 0–0.7)
EOSINOPHIL NFR BLD AUTO: 1 %
ERYTHROCYTE [DISTWIDTH] IN BLOOD BY AUTOMATED COUNT: 17.1 % (ref 11–15)
HCT VFR BLD AUTO: 38 % (ref 35–48)
HGB BLD-MCNC: 12 G/DL (ref 12–16)
IMM GRANULOCYTES # BLD AUTO: 0.03 X10(3) UL (ref 0–1)
IMM GRANULOCYTES NFR BLD: 0.4 %
LYMPHOCYTES # BLD AUTO: 1.7 X10(3) UL (ref 1–4)
LYMPHOCYTES NFR BLD AUTO: 22.1 %
MCH RBC QN AUTO: 26.1 PG (ref 26–34)
MCHC RBC AUTO-ENTMCNC: 31.6 G/DL (ref 31–37)
MCV RBC AUTO: 82.8 FL (ref 80–100)
MONOCYTES # BLD AUTO: 0.47 X10(3) UL (ref 0.1–1)
MONOCYTES NFR BLD AUTO: 6.1 %
NEUTROPHILS # BLD AUTO: 5.39 X10 (3) UL (ref 1.5–7.7)
NEUTROPHILS # BLD AUTO: 5.39 X10(3) UL (ref 1.5–7.7)
NEUTROPHILS NFR BLD AUTO: 70.1 %
PLATELET # BLD AUTO: 213 10(3)UL (ref 150–450)
RBC # BLD AUTO: 4.59 X10(6)UL (ref 3.8–5.3)
WBC # BLD AUTO: 7.7 X10(3) UL (ref 4–11)

## 2020-02-10 PROCEDURE — 85025 COMPLETE CBC W/AUTO DIFF WBC: CPT | Performed by: FAMILY MEDICINE

## 2020-02-19 ENCOUNTER — OFFICE VISIT (OUTPATIENT)
Dept: CARDIOLOGY CLINIC | Facility: HOSPITAL | Age: 75
End: 2020-02-19
Attending: NURSE PRACTITIONER
Payer: MEDICARE

## 2020-02-19 VITALS — OXYGEN SATURATION: 94 % | SYSTOLIC BLOOD PRESSURE: 110 MMHG | HEART RATE: 88 BPM | DIASTOLIC BLOOD PRESSURE: 72 MMHG

## 2020-02-19 DIAGNOSIS — I48.11 LONGSTANDING PERSISTENT ATRIAL FIBRILLATION (HCC): ICD-10-CM

## 2020-02-19 DIAGNOSIS — G11.4 HEREDITARY SPASTIC PARAPLEGIA (HCC): ICD-10-CM

## 2020-02-19 DIAGNOSIS — J41.0 SIMPLE CHRONIC BRONCHITIS (HCC): Primary | ICD-10-CM

## 2020-02-19 DIAGNOSIS — I50.32 CHRONIC HEART FAILURE WITH PRESERVED EJECTION FRACTION (HFPEF) (HCC): ICD-10-CM

## 2020-02-19 PROCEDURE — 99214 OFFICE O/P EST MOD 30 MIN: CPT | Performed by: NURSE PRACTITIONER

## 2020-02-19 PROCEDURE — 99212 OFFICE O/P EST SF 10 MIN: CPT | Performed by: NURSE PRACTITIONER

## 2020-02-19 NOTE — PATIENT INSTRUCTIONS
Continue all your same medications    Call if having any dizziness, lightheadedness, heart racing, palpitations, chest pain, shortness of breath, coughing,  wheezing, swelling, weight gain,  or weakness    Follow 2000 mg sodium restricted , heart healthy d

## 2020-02-19 NOTE — PROGRESS NOTES
[address] Patient Status:  No patient class for patient encounter    1945 MRN [id number]   Location [facility] MD Dr. Mili Bryan is a 66 y except accidentally took 80 mg 2 days ago,  checking her pills, he confirms she has furosemide 40mg tab, taking 1 daily. Tolerating PT , sob,denies orthopnea cp or dizziness. using duoneb 3-  4 x day and using IS and acapella.  Nausea improved on ome alert, appears stated age and cooperative  Neck: + JVD at 90 degrees  Lungs: clear to auscultation bilaterally for lobes, coarse rhonchi right lobe  Heart: S1, S2 normal, no murmur, click, rub or gallop, irreg/irregular rate and rhythm  Abdomen: soft, non- LVOT flow): 0.9cm^2. 4. Left atrium: The left atrial volume was markedly increased. 5. Right atrium: The atrium was moderately dilated. 6. Atrial septum: There was a small residual atrial shunt post-transseptal     catheterization. Impressions:   Annamarie Yanes decreased /on the dry side, bun 42, cr 0.84, K 3.8, pro BNP  2938 <-- 2363 , unsure may be near her baseline  -keep fluids at 64 oz/day and limit sodium to 2 mg/day    Severe MR s/p mitral valve clip 10/31/18 with repeat mitral clip x2  10/2/19   -mod MR o tablet (5 mg total) by mouth once daily. Resume when off steroid taper, Disp: 90 tablet, Rfl: 3  •  Albuterol Sulfate 108 (90 Base) MCG/ACT Inhalation Aerosol Powder, Breath Activated, Inhale 2 puffs into the lungs every 4 (four) hours as needed. , Disp: 1 Prn  itch, Disp: 10 mL, Rfl: 3  •  acetaminophen 325 MG Oral Tab, Take 325 mg by mouth every 6 (six) hours as needed for Pain., Disp: , Rfl:   •  apixaban 5 MG Oral Tab, Take 1 tablet (5 mg total) by mouth 2 (two) times daily. , Disp: 60 tablet, Rfl: 0  •

## 2020-02-23 ENCOUNTER — APPOINTMENT (OUTPATIENT)
Dept: GENERAL RADIOLOGY | Facility: HOSPITAL | Age: 75
End: 2020-02-23
Attending: EMERGENCY MEDICINE
Payer: MEDICARE

## 2020-02-23 ENCOUNTER — HOSPITAL ENCOUNTER (OUTPATIENT)
Facility: HOSPITAL | Age: 75
Setting detail: OBSERVATION
Discharge: HOME | End: 2020-02-25
Attending: EMERGENCY MEDICINE | Admitting: HOSPITALIST
Payer: MEDICARE

## 2020-02-23 DIAGNOSIS — I50.9 ACUTE ON CHRONIC CONGESTIVE HEART FAILURE, UNSPECIFIED HEART FAILURE TYPE (HCC): ICD-10-CM

## 2020-02-23 DIAGNOSIS — K52.9 GASTROENTERITIS: Primary | ICD-10-CM

## 2020-02-23 PROBLEM — E87.6 HYPOKALEMIA: Status: ACTIVE | Noted: 2020-02-23

## 2020-02-23 LAB
ADENOVIRUS PCR:: NEGATIVE
ANION GAP SERPL CALC-SCNC: 6 MMOL/L (ref 0–18)
B PERT DNA SPEC QL NAA+PROBE: NEGATIVE
BASOPHILS # BLD AUTO: 0.03 X10(3) UL (ref 0–0.2)
BASOPHILS NFR BLD AUTO: 0.3 %
BUN BLD-MCNC: 25 MG/DL (ref 7–18)
BUN/CREAT SERPL: 36.2 (ref 10–20)
C DIFF TOX B STL QL: NEGATIVE
C PNEUM DNA SPEC QL NAA+PROBE: NEGATIVE
CALCIUM BLD-MCNC: 9 MG/DL (ref 8.5–10.1)
CHLORIDE SERPL-SCNC: 104 MMOL/L (ref 98–112)
CO2 SERPL-SCNC: 28 MMOL/L (ref 21–32)
CORONAVIRUS 229E PCR:: NEGATIVE
CORONAVIRUS HKU1 PCR:: NEGATIVE
CORONAVIRUS NL63 PCR:: NEGATIVE
CORONAVIRUS OC43 PCR:: NEGATIVE
CREAT BLD-MCNC: 0.69 MG/DL (ref 0.55–1.02)
DEPRECATED RDW RBC AUTO: 49.9 FL (ref 35.1–46.3)
EOSINOPHIL # BLD AUTO: 0.03 X10(3) UL (ref 0–0.7)
EOSINOPHIL NFR BLD AUTO: 0.3 %
ERYTHROCYTE [DISTWIDTH] IN BLOOD BY AUTOMATED COUNT: 16.6 % (ref 11–15)
FLUAV RNA SPEC QL NAA+PROBE: NEGATIVE
FLUBV RNA SPEC QL NAA+PROBE: NEGATIVE
GLUCOSE BLD-MCNC: 84 MG/DL (ref 70–99)
GLUCOSE BLDC GLUCOMTR-MCNC: 161 MG/DL (ref 70–99)
GLUCOSE BLDC GLUCOMTR-MCNC: 91 MG/DL (ref 70–99)
HCT VFR BLD AUTO: 38.9 % (ref 35–48)
HGB BLD-MCNC: 12.1 G/DL (ref 12–16)
IMM GRANULOCYTES # BLD AUTO: 0.08 X10(3) UL (ref 0–1)
IMM GRANULOCYTES NFR BLD: 0.9 %
LYMPHOCYTES # BLD AUTO: 2.28 X10(3) UL (ref 1–4)
LYMPHOCYTES NFR BLD AUTO: 25.3 %
MCH RBC QN AUTO: 25.9 PG (ref 26–34)
MCHC RBC AUTO-ENTMCNC: 31.1 G/DL (ref 31–37)
MCV RBC AUTO: 83.1 FL (ref 80–100)
METAPNEUMOVIRUS PCR:: NEGATIVE
MONOCYTES # BLD AUTO: 0.77 X10(3) UL (ref 0.1–1)
MONOCYTES NFR BLD AUTO: 8.5 %
MYCOPLASMA PNEUMONIA PCR:: NEGATIVE
NEUTROPHILS # BLD AUTO: 5.82 X10 (3) UL (ref 1.5–7.7)
NEUTROPHILS # BLD AUTO: 5.82 X10(3) UL (ref 1.5–7.7)
NEUTROPHILS NFR BLD AUTO: 64.7 %
NT-PROBNP SERPL-MCNC: 3110 PG/ML (ref ?–450)
OSMOLALITY SERPL CALC.SUM OF ELEC: 290 MOSM/KG (ref 275–295)
PARAINFLUENZA 1 PCR:: NEGATIVE
PARAINFLUENZA 2 PCR:: NEGATIVE
PARAINFLUENZA 3 PCR:: NEGATIVE
PARAINFLUENZA 4 PCR:: NEGATIVE
PLATELET # BLD AUTO: 209 10(3)UL (ref 150–450)
POTASSIUM SERPL-SCNC: 3 MMOL/L (ref 3.5–5.1)
PROCALCITONIN SERPL-MCNC: 0.2 NG/ML
RBC # BLD AUTO: 4.68 X10(6)UL (ref 3.8–5.3)
RHINOVIRUS/ENTERO PCR:: NEGATIVE
RSV RNA SPEC QL NAA+PROBE: NEGATIVE
SODIUM SERPL-SCNC: 138 MMOL/L (ref 136–145)
TROPONIN I SERPL-MCNC: <0.045 NG/ML (ref ?–0.04)
WBC # BLD AUTO: 9 X10(3) UL (ref 4–11)

## 2020-02-23 PROCEDURE — 87798 DETECT AGENT NOS DNA AMP: CPT | Performed by: HOSPITALIST

## 2020-02-23 PROCEDURE — 87493 C DIFF AMPLIFIED PROBE: CPT | Performed by: HOSPITALIST

## 2020-02-23 PROCEDURE — 80048 BASIC METABOLIC PNL TOTAL CA: CPT | Performed by: EMERGENCY MEDICINE

## 2020-02-23 PROCEDURE — 94640 AIRWAY INHALATION TREATMENT: CPT

## 2020-02-23 PROCEDURE — 96375 TX/PRO/DX INJ NEW DRUG ADDON: CPT

## 2020-02-23 PROCEDURE — 99285 EMERGENCY DEPT VISIT HI MDM: CPT

## 2020-02-23 PROCEDURE — 93005 ELECTROCARDIOGRAM TRACING: CPT

## 2020-02-23 PROCEDURE — 96374 THER/PROPH/DIAG INJ IV PUSH: CPT

## 2020-02-23 PROCEDURE — 87633 RESP VIRUS 12-25 TARGETS: CPT | Performed by: HOSPITALIST

## 2020-02-23 PROCEDURE — 99214 OFFICE O/P EST MOD 30 MIN: CPT | Performed by: INTERNAL MEDICINE

## 2020-02-23 PROCEDURE — 82962 GLUCOSE BLOOD TEST: CPT

## 2020-02-23 PROCEDURE — 87581 M.PNEUMON DNA AMP PROBE: CPT | Performed by: HOSPITALIST

## 2020-02-23 PROCEDURE — 87486 CHLMYD PNEUM DNA AMP PROBE: CPT | Performed by: HOSPITALIST

## 2020-02-23 PROCEDURE — 84145 PROCALCITONIN (PCT): CPT | Performed by: HOSPITALIST

## 2020-02-23 PROCEDURE — 83880 ASSAY OF NATRIURETIC PEPTIDE: CPT | Performed by: EMERGENCY MEDICINE

## 2020-02-23 PROCEDURE — 71045 X-RAY EXAM CHEST 1 VIEW: CPT | Performed by: EMERGENCY MEDICINE

## 2020-02-23 PROCEDURE — 93010 ELECTROCARDIOGRAM REPORT: CPT | Performed by: EMERGENCY MEDICINE

## 2020-02-23 PROCEDURE — 85025 COMPLETE CBC W/AUTO DIFF WBC: CPT | Performed by: EMERGENCY MEDICINE

## 2020-02-23 PROCEDURE — 84484 ASSAY OF TROPONIN QUANT: CPT | Performed by: EMERGENCY MEDICINE

## 2020-02-23 RX ORDER — ALBUTEROL SULFATE 90 UG/1
2 AEROSOL, METERED RESPIRATORY (INHALATION) EVERY 4 HOURS PRN
Status: DISCONTINUED | OUTPATIENT
Start: 2020-02-23 | End: 2020-02-25

## 2020-02-23 RX ORDER — METOPROLOL SUCCINATE 50 MG/1
50 TABLET, EXTENDED RELEASE ORAL
Status: DISCONTINUED | OUTPATIENT
Start: 2020-02-23 | End: 2020-02-25

## 2020-02-23 RX ORDER — MELATONIN
325
Status: DISCONTINUED | OUTPATIENT
Start: 2020-02-24 | End: 2020-02-25

## 2020-02-23 RX ORDER — SODIUM CHLORIDE 0.9 % (FLUSH) 0.9 %
3 SYRINGE (ML) INJECTION AS NEEDED
Status: DISCONTINUED | OUTPATIENT
Start: 2020-02-23 | End: 2020-02-25

## 2020-02-23 RX ORDER — VENLAFAXINE HYDROCHLORIDE 37.5 MG/1
75 CAPSULE, EXTENDED RELEASE ORAL
Status: DISCONTINUED | OUTPATIENT
Start: 2020-02-23 | End: 2020-02-25

## 2020-02-23 RX ORDER — FUROSEMIDE 10 MG/ML
40 INJECTION INTRAMUSCULAR; INTRAVENOUS
Status: DISCONTINUED | OUTPATIENT
Start: 2020-02-24 | End: 2020-02-25

## 2020-02-23 RX ORDER — SODIUM CHLORIDE 9 MG/ML
INJECTION, SOLUTION INTRAVENOUS CONTINUOUS
Status: DISCONTINUED | OUTPATIENT
Start: 2020-02-23 | End: 2020-02-23

## 2020-02-23 RX ORDER — TRAMADOL HYDROCHLORIDE 50 MG/1
25 TABLET ORAL EVERY 8 HOURS PRN
Status: DISCONTINUED | OUTPATIENT
Start: 2020-02-23 | End: 2020-02-25

## 2020-02-23 RX ORDER — DILTIAZEM HYDROCHLORIDE 60 MG/1
60 TABLET, FILM COATED ORAL EVERY 8 HOURS SCHEDULED
Status: DISCONTINUED | OUTPATIENT
Start: 2020-02-23 | End: 2020-02-25

## 2020-02-23 RX ORDER — CETIRIZINE HYDROCHLORIDE 10 MG/1
10 TABLET ORAL DAILY
Status: DISCONTINUED | OUTPATIENT
Start: 2020-02-23 | End: 2020-02-23

## 2020-02-23 RX ORDER — EZETIMIBE 10 MG/1
10 TABLET ORAL NIGHTLY
Status: DISCONTINUED | OUTPATIENT
Start: 2020-02-23 | End: 2020-02-25

## 2020-02-23 RX ORDER — DEXTROSE MONOHYDRATE 25 G/50ML
50 INJECTION, SOLUTION INTRAVENOUS
Status: DISCONTINUED | OUTPATIENT
Start: 2020-02-23 | End: 2020-02-25

## 2020-02-23 RX ORDER — IPRATROPIUM BROMIDE AND ALBUTEROL SULFATE 2.5; .5 MG/3ML; MG/3ML
3 SOLUTION RESPIRATORY (INHALATION) 3 TIMES DAILY
Status: DISCONTINUED | OUTPATIENT
Start: 2020-02-23 | End: 2020-02-25

## 2020-02-23 RX ORDER — POTASSIUM CHLORIDE 1.5 G/1.77G
40 POWDER, FOR SOLUTION ORAL ONCE
Status: COMPLETED | OUTPATIENT
Start: 2020-02-23 | End: 2020-02-23

## 2020-02-23 RX ORDER — BENZONATATE 100 MG/1
200 CAPSULE ORAL 3 TIMES DAILY PRN
Status: DISCONTINUED | OUTPATIENT
Start: 2020-02-23 | End: 2020-02-25

## 2020-02-23 RX ORDER — ASPIRIN 81 MG/1
81 TABLET ORAL DAILY
Status: DISCONTINUED | OUTPATIENT
Start: 2020-02-23 | End: 2020-02-25

## 2020-02-23 RX ORDER — ATORVASTATIN CALCIUM 40 MG/1
40 TABLET, FILM COATED ORAL NIGHTLY
Status: DISCONTINUED | OUTPATIENT
Start: 2020-02-23 | End: 2020-02-25

## 2020-02-23 RX ORDER — ACETAMINOPHEN 325 MG/1
325 TABLET ORAL EVERY 6 HOURS PRN
Status: DISCONTINUED | OUTPATIENT
Start: 2020-02-23 | End: 2020-02-23

## 2020-02-23 RX ORDER — PANTOPRAZOLE SODIUM 40 MG/1
40 TABLET, DELAYED RELEASE ORAL
Status: DISCONTINUED | OUTPATIENT
Start: 2020-02-24 | End: 2020-02-25

## 2020-02-23 RX ORDER — PREDNISONE 1 MG/1
5 TABLET ORAL
Status: DISCONTINUED | OUTPATIENT
Start: 2020-02-23 | End: 2020-02-25

## 2020-02-23 RX ORDER — FLUTICASONE PROPIONATE 50 MCG
1 SPRAY, SUSPENSION (ML) NASAL DAILY
Status: DISCONTINUED | OUTPATIENT
Start: 2020-02-23 | End: 2020-02-25

## 2020-02-23 RX ORDER — FUROSEMIDE 10 MG/ML
20 INJECTION INTRAMUSCULAR; INTRAVENOUS ONCE
Status: COMPLETED | OUTPATIENT
Start: 2020-02-23 | End: 2020-02-23

## 2020-02-23 RX ORDER — CETIRIZINE HYDROCHLORIDE 5 MG/1
5 TABLET ORAL DAILY
Status: DISCONTINUED | OUTPATIENT
Start: 2020-02-23 | End: 2020-02-25

## 2020-02-23 RX ORDER — POTASSIUM CHLORIDE 20 MEQ/1
40 TABLET, EXTENDED RELEASE ORAL ONCE
Status: DISCONTINUED | OUTPATIENT
Start: 2020-02-23 | End: 2020-02-23

## 2020-02-23 RX ORDER — ACETAMINOPHEN 325 MG/1
650 TABLET ORAL EVERY 6 HOURS PRN
Status: DISCONTINUED | OUTPATIENT
Start: 2020-02-23 | End: 2020-02-23

## 2020-02-23 RX ORDER — ACETAMINOPHEN 325 MG/1
650 TABLET ORAL EVERY 6 HOURS PRN
Status: DISCONTINUED | OUTPATIENT
Start: 2020-02-23 | End: 2020-02-25

## 2020-02-23 NOTE — H&P
[facility] HISTORY AND PHYSICAL     CC: Patient presents with:  Nausea/vomiting  Dyspnea TIMO SOB       PCP: Sagar Johnson MD    History of Present Illness:   Patient is a 66year old female with PMH sig for HF w/ preserved EF, severe MR sp mitral unspecified hyperlipidemia    • OTHER DISEASES     spastic paraparesis   • OTHER DISEASES     cataracts   • OTHER DISEASES     secondary poycythemia   • OTHER DISEASES     lumbar spinal stenosis   • OTHER DISEASES     duplicated left renal collecting syste swelling and difficulty breathing  Gabapentin              RASH  Gnp Iodides Decolor*        Comment:Iodine based dyes  Cant swallow or breath             And hives             Tolerated dye for CT with premed at [facility] 8/14/12  Penicillins             RASH, Component Value Date    WBC 9.0 02/23/2020    HGB 12.1 02/23/2020    HCT 38.9 02/23/2020    .0 02/23/2020    CREATSERUM 0.69 02/23/2020    BUN 25 02/23/2020     02/23/2020    K 3.0 02/23/2020     02/23/2020    CO2 28.0 02/23/2020    GL spine are apparent. There is superior migration of the humeral heads with chronic full-thickness rotator cuff tears. Periarticular calcifications are seen adjacent to the right glenohumeral joint. Degenerative changes of shoulders are present bilaterally. bolus by ER out of concern she is clinically dry (had endorsed N/V/D) but earlier given a dose of IV lasix as well?  - will hold lasix and IVF both and monitor clinically  - O2 protocol  - check PCT as well    N/V/D  - hx C diff and collagenous colitis  -

## 2020-02-23 NOTE — ED NOTES
Pt resting comfortably on the cot, VSS. Emptied colostomy at this time, soft brown stool. Guiac positive. MD aware.

## 2020-02-23 NOTE — H&P (VIEW-ONLY)
[facility] H[facility]ORY AND PHYSICAL     CC: Patient presents with:  Nausea/vomiting  Dyspnea TIMO SOB       PCP: Trish Johnson MD    History of Present Illness:   Patient is a 66year old female with PMH sig for HF w/ preserved EF, severe MR sp mitral unspecified hyperlipidemia    • OTHER DISEASES     spastic paraparesis   • OTHER DISEASES     cataracts   • OTHER DISEASES     secondary poycythemia   • OTHER DISEASES     lumbar spinal stenosis   • OTHER DISEASES     duplicated left renal collecting syste swelling and difficulty breathing  Gabapentin              RASH  Gnp Iodides Decolor*        Comment:Iodine based dyes  Cant swallow or breath             And hives             Tolerated dye for CT with premed at [facility] 8/14/12  Penicillins             RASH, Component Value Date    WBC 9.0 02/23/2020    HGB 12.1 02/23/2020    HCT 38.9 02/23/2020    .0 02/23/2020    CREATSERUM 0.69 02/23/2020    BUN 25 02/23/2020     02/23/2020    K 3.0 02/23/2020     02/23/2020    CO2 28.0 02/23/2020    GL spine are apparent. There is superior migration of the humeral heads with chronic full-thickness rotator cuff tears. Periarticular calcifications are seen adjacent to the right glenohumeral joint. Degenerative changes of shoulders are present bilaterally. bolus by ER out of concern she is clinically dry (had endorsed N/V/D) but earlier given a dose of IV lasix as well?  - will hold lasix and IVF both and monitor clinically  - O2 protocol  - check PCT as well    N/V/D  - hx C diff and collagenous colitis  -

## 2020-02-23 NOTE — CONSULTS
[facility] - [facility]    Cardiology Consultation    Alexandro Gutierrez Location: [address] [place] [address] 1945 MRN [id number]   Consulting Date 2020 [facility] [id number]   Consulting Physician Elian Mora MD Attending Physician Landon Baker DISEASES     cataracts   • OTHER DISEASES     secondary poycythemia   • OTHER DISEASES     lumbar spinal stenosis   • OTHER DISEASES     duplicated left renal collecting system   • OTHER DISEASES     uterine fibroids   • OTHER DISEASES     ulcerative colit • Diabetes Mother    • Diabetes Sister    • Eye Problems Sister         glaucoma   • Other (Other) Sister    • Other (Other) Daughter         spastic paraplegia   • Eye Problems Other         aunts/ uncles glaucoma   • Other (Other) Other      SHX:   Pat (four) hours as needed. , Disp: 1 each, Rfl: 0  METOPROLOL SUCCINATE ER 50 MG Oral Tablet 24 Hr, TAKE 1 TABLET BY MOUTH DAILY. , Disp: 90 tablet, Rfl: 0  furosemide 40 MG Oral Tab, Take 40 mg by mouth 2 (two) times daily.  Taking 2  20 mg tabs , Disp: , Rfl: 0  Neomycin-Polymyxin-HC 3.5-14429-2 Otic Suspension, 2 drops tid  Prn  itch, Disp: 10 mL, Rfl: 3    Review of Systems:  GENERAL: no fevers, chills, sweats  HEENT: no visual or hearing changes  SKIN: denies any unusual skin lesions or rashes  RESPIRATORY: 9.0 02/23/2020    HGB 12.1 02/23/2020    HCT 38.9 02/23/2020    .0 02/23/2020    CREATSERUM 0.69 02/23/2020    BUN 25 02/23/2020     02/23/2020    K 3.0 02/23/2020     02/23/2020    CO2 28.0 02/23/2020    GLU 84 02/23/2020    CA 9.0 02/2

## 2020-02-23 NOTE — ED NOTES
Patient reports going out to restaurant yesterday for dinner and she felt fine. Patient report nausea now and vomiting last night \"brown\" emesis. Patient denies emesis appearing \"coffee ground\". +colostomy output normal per patient.   Patient denies

## 2020-02-23 NOTE — ED INITIAL ASSESSMENT (HPI)
Patient complains of nausea and vomiting.  +abdominal pain.   +sob with exertion reported by patient

## 2020-02-23 NOTE — ED PROVIDER NOTES
Patient Seen in: [facility] AND [facility] Emergency Department      History   Patient presents with:  Nausea/vomiting  Dyspnea TIMO SOB    Stated Complaint: nausea/vomiting/sob    HPI    Patient is a 68-year-old female with a history of CHF chronic A. fib GERD w Pneumonia, organism unspecified(486)    • Reflux    • Self-catheterizes urinary bladder 1/11/2016   • Ulcerative colitis ([facility] [address]    • Valvular disease     mitral valve prolapse- 3 clips placed              Past Surgical History:   Procedure Laterality Date negative except as noted above.     Physical Exam     ED Triage Vitals [02/23/20 0959]   /74   Pulse 110   Resp 22   Temp 98.8 °F (37.1 °C)   Temp src Oral   SpO2 97 %   O2 Device None (Room air)       Current:BP 97/67   Pulse 105   Temp 98.8 °F (37.1 Notable for the following components:       Result Value    Potassium 3.0 (*)     BUN 25 (*)     BUN/CREA Ratio 36.2 (*)     All other components within normal limits   PRO BETA NATRIURETIC PEPTIDE - Abnormal; Notable for the following components:    Pro-B with mild pulmonary interstitial edema, suggesting mild cardiac insufficiency and/or volume overload.     Dictated by (CST): Gordo Moctezuma MD on 2/23/2020 at 10:45     Approved by (CST): Gordo Moctezuma MD on 2/23/2020 at 10:48            Radiology exams

## 2020-02-24 LAB
ABSOLUTE IMMATURE GRANULOCYTES (OFFPRE24): NORMAL
ANION GAP SERPL CALC-SCNC: 7 MMOL/L (ref 0–18)
BASO+EOS+MONOS # BLD: NORMAL 10*3/UL
BASO+EOS+MONOS NFR BLD: NORMAL %
BASOPHILS # BLD AUTO: 0.04 X10(3) UL (ref 0–0.2)
BASOPHILS # BLD: NORMAL 10*3/UL
BASOPHILS NFR BLD AUTO: 0.6 %
BASOPHILS NFR BLD: NORMAL %
BUN BLD-MCNC: 24 MG/DL (ref 7–18)
BUN/CREAT SERPL: 34.8 (ref 10–20)
CALCIUM BLD-MCNC: 8.7 MG/DL (ref 8.5–10.1)
CHLORIDE SERPL-SCNC: 105 MMOL/L (ref 98–112)
CO2 SERPL-SCNC: 26 MMOL/L (ref 21–32)
CREAT BLD-MCNC: 0.69 MG/DL (ref 0.55–1.02)
DEPRECATED RDW RBC AUTO: 50.1 FL (ref 35.1–46.3)
DIFFERENTIAL METHOD BLD: NORMAL
EOSINOPHIL # BLD AUTO: 0.05 X10(3) UL (ref 0–0.7)
EOSINOPHIL # BLD: NORMAL 10*3/UL
EOSINOPHIL NFR BLD AUTO: 0.7 %
EOSINOPHIL NFR BLD: NORMAL %
ERYTHROCYTE [DISTWIDTH] IN BLOOD BY AUTOMATED COUNT: 16.5 % (ref 11–15)
ERYTHROCYTE [DISTWIDTH] IN BLOOD: NORMAL %
GLUCOSE BLD-MCNC: 94 MG/DL (ref 70–99)
GLUCOSE BLDC GLUCOMTR-MCNC: 87 MG/DL (ref 70–99)
HAV IGM SER QL: 2 MG/DL (ref 1.6–2.6)
HCT VFR BLD AUTO: 35 % (ref 35–48)
HCT VFR BLD CALC: 35 %
HGB BLD-MCNC: 10.9 G/DL
HGB BLD-MCNC: 10.9 G/DL (ref 12–16)
IMM GRANULOCYTES # BLD AUTO: 0.04 X10(3) UL (ref 0–1)
IMM GRANULOCYTES NFR BLD: 0.6 %
IMMATURE GRANULOCYTES (OFFPRE25): NORMAL
LYMPHOCYTES # BLD AUTO: 2.38 X10(3) UL (ref 1–4)
LYMPHOCYTES # BLD: NORMAL 10*3/UL
LYMPHOCYTES NFR BLD AUTO: 35.5 %
LYMPHOCYTES NFR BLD: NORMAL %
MCH RBC QN AUTO: 26 PG
MCH RBC QN AUTO: 26 PG (ref 26–34)
MCHC RBC AUTO-ENTMCNC: 31.1 G/DL
MCHC RBC AUTO-ENTMCNC: 31.1 G/DL (ref 31–37)
MCV RBC AUTO: 83.3 FL
MCV RBC AUTO: 83.3 FL (ref 80–100)
MONOCYTES # BLD AUTO: 0.69 X10(3) UL (ref 0.1–1)
MONOCYTES # BLD: NORMAL 10*3/UL
MONOCYTES NFR BLD AUTO: 10.3 %
MONOCYTES NFR BLD: NORMAL %
MPV (OFFPRE2): NORMAL
NEUTROPHILS # BLD AUTO: 3.5 X10 (3) UL (ref 1.5–7.7)
NEUTROPHILS # BLD AUTO: 3.5 X10(3) UL (ref 1.5–7.7)
NEUTROPHILS # BLD: NORMAL 10*3/UL
NEUTROPHILS NFR BLD AUTO: 52.3 %
NEUTROPHILS NFR BLD: NORMAL %
NRBC BLD MANUAL-RTO: NORMAL %
OSMOLALITY SERPL CALC.SUM OF ELEC: 290 MOSM/KG (ref 275–295)
PLAT MORPH BLD: NORMAL
PLATELET # BLD AUTO: 202 10(3)UL (ref 150–450)
PLATELET # BLD: 202 10*3/UL
POTASSIUM SERPL-SCNC: 4.2 MMOL/L (ref 3.5–5.1)
RBC # BLD AUTO: 4.2 X10(6)UL (ref 3.8–5.3)
RBC # BLD: 4.2 10*6/UL
RBC MORPH BLD: NORMAL
SODIUM SERPL-SCNC: 138 MMOL/L (ref 136–145)
WBC # BLD AUTO: 6.7 X10(3) UL (ref 4–11)
WBC # BLD: 6.7 10*3/UL
WBC MORPH BLD: NORMAL

## 2020-02-24 PROCEDURE — 94640 AIRWAY INHALATION TREATMENT: CPT

## 2020-02-24 PROCEDURE — 80048 BASIC METABOLIC PNL TOTAL CA: CPT | Performed by: HOSPITALIST

## 2020-02-24 PROCEDURE — 85025 COMPLETE CBC W/AUTO DIFF WBC: CPT | Performed by: HOSPITALIST

## 2020-02-24 PROCEDURE — 82962 GLUCOSE BLOOD TEST: CPT

## 2020-02-24 PROCEDURE — 83735 ASSAY OF MAGNESIUM: CPT | Performed by: HOSPITALIST

## 2020-02-24 PROCEDURE — 99214 OFFICE O/P EST MOD 30 MIN: CPT | Performed by: INTERNAL MEDICINE

## 2020-02-24 PROCEDURE — 96376 TX/PRO/DX INJ SAME DRUG ADON: CPT

## 2020-02-24 PROCEDURE — 99211 OFF/OP EST MAY X REQ PHY/QHP: CPT

## 2020-02-24 RX ORDER — MELATONIN
DAILY
Status: DISCONTINUED | OUTPATIENT
Start: 2020-02-24 | End: 2020-02-25

## 2020-02-24 NOTE — WOUND PROGRESS NOTE
WOUND CARE NOTE    History:  Past Medical History:   Diagnosis Date   • Anemia    • Anxiety state, unspecified    • BACK PAIN     scoliosis   • Back problem    • Bladder stones    • C. difficile colitis 5/12   • Cataract    • Colitis    • Congestive hear Mini Hannon MD at [facility]   • COLONOSCOPY  7/11/08 7/11/08 at [address] [place] [address] and negative for polyps, UC was quiet   • COLONOSCOPY,DIAGNOSTIC  5/12    normal, random colon bx showed findings c/w lymphocytic colitis   • CYSTOURETHROSCOPY planning    SUBJECTIVE   Hi    OBJECTIVE   MD Consult new sacral ulcer      ASSESSMENT   Roscoe Score:  Roscoe Scale Score: 13    Chart Reviewed: yes    Wound(s):  The pt. is resting in bed, Contact isolation maintained, the pt.  is known to wound care serv

## 2020-02-24 NOTE — PLAN OF CARE
Patient denies nausea, tolerating po intake. Pt reports feeling better. Will maintain contact/droplet isolation. Will continue to monitor pt.     Problem: Patient/Family Goals  Goal: Patient/Family Long Term Goal  Description  Patient's Long Term Goal: ret respiratory effort  - Oxygen supplementation based on oxygen saturation or ABGs  - Provide Smoking Cessation handout, if applicable  - Encourage broncho-pulmonary hygiene including cough, deep breathe, Incentive Spirometry  - Assess the need for suctioning

## 2020-02-24 NOTE — PLAN OF CARE
Problem: CARDIOVASCULAR - ADULT  Goal: Maintains optimal cardiac output and hemodynamic stability  Description  INTERVENTIONS:  - Monitor vital signs, rhythm, and trends  - Monitor for bleeding, hypotension and signs of decreased cardiac output  - Evalua Centered Care  Goal: Patient preferences are identified and integrated in the patient's plan of care  Description  Interventions:  - What would you like us to know as we care for you?  I can do everything with the colostomy myself  - Provide timely, complet

## 2020-02-24 NOTE — CARDIAC REHAB
CARDIAC REHAB HEART FAILURE EDUCATION    Handouts provided and reviewed: CHF Booklet. Disease Process: Disease process reviewed.     Reviewed the following: DAILY WEIGHT MONITORING: patient unable to weigh daily d/t physical limitations      SODIUM

## 2020-02-24 NOTE — PROGRESS NOTES
CC: follow-up hospital admission chf    SUBJECTIVE:  Interval History:     Feels better today  No further n/v  Tolerating diet  States she was sob with exertoin at home - when she was moving her WC, at rest she was ok    OBJECTIVE:  Scheduled Meds:   • api TBIL, DBIL, TPROT    Recent Labs   Lab 02/23/20  1813 02/23/20  2148 02/24/20  0813   PGLU 91 161* 87       ECHO Jan 2010  Study Conclusions  1. Left ventricle: The cavity size was normal. Wall thickness was     increased in a pattern of mild LVH.  Systolic fluid overload. CXR with edema  - RVP - negative  - no infiltrate on CXR and no wbc or fevers.    - on AC so doubt VTE  -  Weaned off O2       N/V/D  - hx C diff and collagenous colitis  - check C  Diff here - negative  - likley gastrenteritis.     Donn M

## 2020-02-24 NOTE — BH PROGRESS NOTE
[facility]    Rosalio Gannon Patient Status:  Observation    1945 MRN [id number]   Location [address] [facility] Attending Wesley Meyer,    Hosp Day # 0 PCP Timmy Coleman MD       S(subjective) \"I think my medication is w

## 2020-02-24 NOTE — DIETARY NOTE
ADULT NUTRITION INITIAL ASSESSMENT    Pt is at moderate nutrition risk. Pt does not meet malnutrition criteria.       RECOMMENDATIONS TO MD:  See Nutrition Intervention     NUTRITION DIAGNOSIS/PROBLEM:  Increased nutrient needs related to increased demand 59.9 kg (132 lb 0.9 oz)     GASTROINTESTINAL: Colostomy present. No GI complaints per patient.     FOOD/NUTRITION RELATED HISTORY:  Appetite: Fair to good - Monitor intake   Intake: 100% breakfast this morning    Intake Meeting Needs: Yes and supplement to

## 2020-02-24 NOTE — PROGRESS NOTES
[facility]    Progress Note    Aletha Mathias Patient Status:  Observation    1945 MRN [id number]   Location [facility] Attending Sina Schlatter,    Hosp Day # 0 PCP Nakita Tapia MD         Assessment and Sam Fluticasone Propionate  1 spray Each Nare Daily   • ipratropium-albuterol  3 mL Nebulization TID   • Metoprolol Succinate ER  50 mg Oral Daily   • Pantoprazole Sodium  40 mg Oral ULICES    • predniSONE  5 mg Oral Daily   • Venlafaxine HCl ER  75 mg Oral Trey Rushing

## 2020-02-25 VITALS
BODY MASS INDEX: 21 KG/M2 | TEMPERATURE: 98 F | WEIGHT: 123.38 LBS | HEART RATE: 74 BPM | DIASTOLIC BLOOD PRESSURE: 46 MMHG | OXYGEN SATURATION: 97 % | RESPIRATION RATE: 20 BRPM | SYSTOLIC BLOOD PRESSURE: 107 MMHG

## 2020-02-25 LAB
ANION GAP SERPL CALC-SCNC: 6 MMOL/L (ref 0–18)
BUN BLD-MCNC: 22 MG/DL (ref 7–18)
BUN/CREAT SERPL: 31.9 (ref 10–20)
CALCIUM BLD-MCNC: 8.8 MG/DL (ref 8.5–10.1)
CHLORIDE SERPL-SCNC: 102 MMOL/L (ref 98–112)
CO2 SERPL-SCNC: 28 MMOL/L (ref 21–32)
CREAT BLD-MCNC: 0.69 MG/DL (ref 0.55–1.02)
EST. AVERAGE GLUCOSE BLD GHB EST-MCNC: 105 MG/DL (ref 68–126)
GLUCOSE BLD-MCNC: 82 MG/DL (ref 70–99)
HAV IGM SER QL: 1.9 MG/DL (ref 1.6–2.6)
HBA1C MFR BLD HPLC: 5.3 % (ref ?–5.7)
HGB BLD-MCNC: 11 G/DL (ref 12–16)
OSMOLALITY SERPL CALC.SUM OF ELEC: 284 MOSM/KG (ref 275–295)
POTASSIUM SERPL-SCNC: 2.9 MMOL/L (ref 3.5–5.1)
POTASSIUM SERPL-SCNC: 3.9 MMOL/L (ref 3.5–5.1)
SODIUM SERPL-SCNC: 136 MMOL/L (ref 136–145)

## 2020-02-25 PROCEDURE — 80048 BASIC METABOLIC PNL TOTAL CA: CPT | Performed by: HOSPITALIST

## 2020-02-25 PROCEDURE — 84132 ASSAY OF SERUM POTASSIUM: CPT | Performed by: HOSPITALIST

## 2020-02-25 PROCEDURE — 96366 THER/PROPH/DIAG IV INF ADDON: CPT

## 2020-02-25 PROCEDURE — 85018 HEMOGLOBIN: CPT | Performed by: HOSPITALIST

## 2020-02-25 PROCEDURE — 99214 OFFICE O/P EST MOD 30 MIN: CPT | Performed by: NURSE PRACTITIONER

## 2020-02-25 PROCEDURE — 94640 AIRWAY INHALATION TREATMENT: CPT

## 2020-02-25 PROCEDURE — 83735 ASSAY OF MAGNESIUM: CPT | Performed by: HOSPITALIST

## 2020-02-25 PROCEDURE — 96365 THER/PROPH/DIAG IV INF INIT: CPT

## 2020-02-25 PROCEDURE — 83036 HEMOGLOBIN GLYCOSYLATED A1C: CPT | Performed by: HOSPITALIST

## 2020-02-25 RX ORDER — FUROSEMIDE 40 MG/1
40 TABLET ORAL
Status: DISCONTINUED | OUTPATIENT
Start: 2020-02-25 | End: 2020-02-25

## 2020-02-25 RX ORDER — FUROSEMIDE 40 MG/1
40 TABLET ORAL DAILY
Refills: 0 | Status: SHIPPED | COMMUNITY
Start: 2020-02-25 | End: 2020-06-16

## 2020-02-25 RX ORDER — MAGNESIUM OXIDE 400 MG (241.3 MG MAGNESIUM) TABLET
400 TABLET ONCE
Status: COMPLETED | OUTPATIENT
Start: 2020-02-25 | End: 2020-02-25

## 2020-02-25 RX ORDER — POTASSIUM CHLORIDE 20 MEQ/1
20 TABLET, EXTENDED RELEASE ORAL 2 TIMES DAILY
Status: DISCONTINUED | OUTPATIENT
Start: 2020-02-25 | End: 2020-02-25

## 2020-02-25 RX ORDER — POTASSIUM CHLORIDE 20 MEQ/1
40 TABLET, EXTENDED RELEASE ORAL EVERY 4 HOURS
Status: DISCONTINUED | OUTPATIENT
Start: 2020-02-25 | End: 2020-02-25

## 2020-02-25 RX ORDER — FUROSEMIDE 40 MG/1
40 TABLET ORAL DAILY
Status: DISCONTINUED | OUTPATIENT
Start: 2020-02-25 | End: 2020-02-25

## 2020-02-25 NOTE — PROGRESS NOTES
CC: follow-up hospital admission chf    SUBJECTIVE:  Interval History:     K low this am. Given kdur but cause her upset stomch, switched to IV  Now better    OBJECTIVE:  Scheduled Meds:   • potassium chloride 40mEq IVPB (peripheral line)  40 mEq Intraveno ALT, AST, ALB, AMYLASE, LIPASE, LDH in the last 168 hours. Invalid input(s): ALPHOS, TBIL, DBIL, TPROT    Recent Labs   Lab 02/23/20  1813 02/23/20  2148 02/24/20  0813   PGLU 91 161* 87       ECHO Jan 2010  Study Conclusions  1. Left ventricle:  The cav to Jan 20, RSV Jan 2020)    Hypokalemia - resolved        Plan    Dyspnea  - likely fluid overload. CXR with edema  - RVP - negative  - no infiltrate on CXR and no wbc or fevers.    - on AC so doubt VTE  -  Weaned off O2       N/V/D  - hx C diff and collage

## 2020-02-25 NOTE — PHYSICAL THERAPY NOTE
Per nursing d/c today back to home with HHPT as per SHERRELL. Pt at baseline level of functional skills and does not need further skilled PT intervention.

## 2020-02-25 NOTE — PLAN OF CARE
VSS. Repositioning pt q2 hours and prn. Patient tolerating po intake. Continue IV lasix. Will monitor pt.     Problem: Patient/Family Goals  Goal: Patient/Family Long Term Goal  Description  Patient's Long Term Goal: return home    Interventions:  - monitor oxygenation  Description  INTERVENTIONS:  - Assess for changes in respiratory status  - Assess for changes in mentation and behavior  - Position to facilitate oxygenation and minimize respiratory effort  - Oxygen supplementation based on oxygen saturation

## 2020-02-25 NOTE — PROGRESS NOTES
[facility]    Progress Note    Eva Ruiz Patient Status:  Observation    1945 MRN [id number]   Location [address] [facility] Attending Ava Case, [address] [facility] Day # 0 PCP Marcin Crooks MD       Assessment and Plan: clubbing, no cyanosis,no edema, heal dressings bilat  Neuro: no focal deficits  Skin: no rashes or lesions    Scheduled Meds:   • potassium chloride 40mEq IVPB (peripheral line)  40 mEq Intravenous Once   • Potassium Chloride ER  20 mEq Oral BID   • [facility].   [phone]

## 2020-02-25 NOTE — PLAN OF CARE
Problem: Patient/Family Goals  Goal: Patient/Family Long Term Goal  Description  Patient's Long Term Goal: return home    Interventions:  - monitor VS, daily weights  Monitor labs  Medication as per orders      - See additional Care Plan goals for specif Smoking Cessation handout, if applicable  - Encourage broncho-pulmonary hygiene including cough, deep breathe, Incentive Spirometry  - Assess the need for suctioning and perform as needed  - Assess and instruct to report SOB or any respiratory difficulty

## 2020-02-25 NOTE — CM/SW NOTE
SW self-referred to patient chart for discharge planning. Per [facility] report - pt is current with their services. Resume [facility] orders entered. SW/CM to remain available for support and/or discharge planning.      [address], [place] [id number]

## 2020-02-25 NOTE — DISCHARGE SUMMARY
[facility] Hospitalist Discharge Summary    Patient ID  Cherie Silverio  [id number]  78 year old  5/4/1945    Admit date: 2/23/2020    Discharge date: 02/25    Attending: Savannah Prado DO     Primary Care Physician: Prashant Lopez MD      Reason for loratadine 10 MG Tabs  Commonly known as:  CLARITIN     Metoprolol Succinate ER 50 MG Tb24  Commonly known as: Toprol XL  TAKE 1 TABLET BY MOUTH DAILY. omeprazole 20 MG Cpdr  Commonly known as:  PRILOSEC  Take 1 capsule (20 mg total) by mouth daily. 12:19. [facility], XR CHEST AP PORTABLE (CPT=71045), 5/17/2019, 16:23. [facility], XR CHEST AP PORTABLE (CPT=71045), 5/20/2019, 11:29. [facility], XR CHEST AP PORTABLE (CPT=71045), 11/24/2019, 6:08.   [facility] 10[facility]            Operative reports:      Hospital course:    Patient is a 66year old female with PMH sig for HF w/ preserved EF, severe MR sp mitral clip Oct 2018 and repeat clip Oct 2019, afib on eliquis, DVT sp IVC filter, DM c/b neuropa Byron   [facility] Hospitalist  [phone]  Answering Service: [phone]

## 2020-02-26 ENCOUNTER — PATIENT OUTREACH (OUTPATIENT)
Dept: CASE MANAGEMENT | Age: 75
End: 2020-02-26

## 2020-02-26 NOTE — PLAN OF CARE
Problem: Patient/Family Goals  Goal: Patient/Family Long Term Goal  Description  Patient's Long Term Goal: return home    Interventions:  - monitor VS, daily weights  Monitor labs  Medication as per orders      - See additional Care Plan goals for specif on oxygen saturation or ABGs  - Provide Smoking Cessation handout, if applicable  - Encourage broncho-pulmonary hygiene including cough, deep breathe, Incentive Spirometry  - Assess the need for suctioning and perform as needed  - Assess and instruct to re

## 2020-03-02 ENCOUNTER — LAB REQUISITION (OUTPATIENT)
Dept: LAB | Facility: HOSPITAL | Age: 75
End: 2020-03-02
Payer: MEDICARE

## 2020-03-02 DIAGNOSIS — I50.33 ACUTE ON CHRONIC DIASTOLIC (CONGESTIVE) HEART FAILURE (HCC): ICD-10-CM

## 2020-03-02 LAB
ALBUMIN SERPL-MCNC: NORMAL G/DL
ALBUMIN/GLOB SERPL: NORMAL {RATIO}
ALP SERPL-CCNC: NORMAL U/L
ALT SERPL-CCNC: NORMAL U/L
ANION GAP SERPL CALC-SCNC: 7 MMOL/L
ANION GAP SERPL CALC-SCNC: 7 MMOL/L (ref 0–18)
AST SERPL-CCNC: NORMAL U/L
BILIRUB SERPL-MCNC: NORMAL MG/DL
BUN BLD-MCNC: 26 MG/DL (ref 7–18)
BUN SERPL-MCNC: 26 MG/DL
BUN/CREAT SERPL: 46.4 (ref 10–20)
BUN/CREAT SERPL: NORMAL
CALCIUM BLD-MCNC: 9 MG/DL (ref 8.5–10.1)
CALCIUM SERPL-MCNC: 9 MG/DL
CHLORIDE SERPL-SCNC: 107 MMOL/L
CHLORIDE SERPL-SCNC: 107 MMOL/L (ref 98–112)
CO2 SERPL-SCNC: 24 MMOL/L
CO2 SERPL-SCNC: 24 MMOL/L (ref 21–32)
CREAT BLD-MCNC: 0.56 MG/DL (ref 0.55–1.02)
CREAT SERPL-MCNC: 0.56 MG/DL
GLOBULIN SER-MCNC: NORMAL G/DL
GLUCOSE BLD-MCNC: 70 MG/DL (ref 70–99)
GLUCOSE SERPL-MCNC: 70 MG/DL
LENGTH OF FAST TIME PATIENT: NORMAL H
OSMOLALITY SERPL CALC.SUM OF ELEC: 289 MOSM/KG (ref 275–295)
POTASSIUM SERPL-SCNC: 3.6 MMOL/L
POTASSIUM SERPL-SCNC: 3.6 MMOL/L (ref 3.5–5.1)
PROT SERPL-MCNC: NORMAL G/DL
SODIUM SERPL-SCNC: 138 MMOL/L
SODIUM SERPL-SCNC: 138 MMOL/L (ref 136–145)

## 2020-03-02 PROCEDURE — 80048 BASIC METABOLIC PNL TOTAL CA: CPT

## 2020-03-04 ENCOUNTER — OFFICE VISIT (OUTPATIENT)
Dept: CARDIOLOGY CLINIC | Facility: HOSPITAL | Age: 75
End: 2020-03-04
Attending: NURSE PRACTITIONER
Payer: MEDICARE

## 2020-03-04 VITALS
OXYGEN SATURATION: 96 % | DIASTOLIC BLOOD PRESSURE: 63 MMHG | HEART RATE: 58 BPM | TEMPERATURE: 97 F | SYSTOLIC BLOOD PRESSURE: 123 MMHG

## 2020-03-04 DIAGNOSIS — Z95.818 S/P MITRAL VALVE CLIP IMPLANTATION: ICD-10-CM

## 2020-03-04 DIAGNOSIS — K52.9 GASTROENTERITIS: ICD-10-CM

## 2020-03-04 DIAGNOSIS — I50.9 HEART FAILURE, UNSPECIFIED (HCC): ICD-10-CM

## 2020-03-04 DIAGNOSIS — Z98.890 S/P MITRAL VALVE CLIP IMPLANTATION: ICD-10-CM

## 2020-03-04 DIAGNOSIS — I48.11 LONGSTANDING PERSISTENT ATRIAL FIBRILLATION (HCC): ICD-10-CM

## 2020-03-04 DIAGNOSIS — I50.33 ACUTE ON CHRONIC HEART FAILURE WITH PRESERVED EJECTION FRACTION (HFPEF) (HCC): Primary | ICD-10-CM

## 2020-03-04 PROCEDURE — 99212 OFFICE O/P EST SF 10 MIN: CPT | Performed by: NURSE PRACTITIONER

## 2020-03-04 PROCEDURE — 99214 OFFICE O/P EST MOD 30 MIN: CPT | Performed by: NURSE PRACTITIONER

## 2020-03-04 NOTE — PROGRESS NOTES
[address] Patient Status:  No patient class for patient encounter    1945 MRN [id number]   Location [address] [facility] Sonny Nageotte, MD Dr. Roel Pretty is a 66 y 2  [id number] : 7    Subjective:  Here with , wheelchair bound. Mouth is dry , voice is hoarse, persistent liquid stool in colostomy, watery. Mild sob but breathing has greatly improved since discharge, drinking a lot more liquids.  Feeling a little 123    General appearance: alert, appears stated age and cooperative  Neck: no  JVD at 90 degrees  Lungs: clear to auscultation bilaterally , diminished left base  Heart: S1, S2 normal, no murmur, click, rub or gallop, irreg/irregular rate and rhythm  Abdo regurgitation lateral to the     MitraClips. Mean gradient (D): 4mm Hg at a HR of 75 bpm. Valve area by     continuity equation (using LVOT flow): 0.9cm^2. 4. Left atrium: The left atrial volume was markedly increased.   5. Right atrium: The atrium was mod 10/2/19   -mod MR on recent echo, EF 60-65%    Chronic atrial fib  -rate controlled on  toprol to 50 mg daily  -anticoagulated on eliqius, no sign of bleeding    Recent RSV pneumonitis with bronchospasm, resolved.    - history of frequent admissions, monthl Take 1 capsule (20 mg total) by mouth daily. , Disp: 30 capsule, Rfl: 0  •  Venlafaxine HCl ER 75 MG Oral Capsule SR 24 Hr, Take 1 capsule (75 mg total) by mouth once daily. , Disp: 90 capsule, Rfl: 2  •  ipratropium-albuterol 0.5-2.5 (3) MG/3ML Inhalation S Calcium 40 MG Oral Tab, Take 40 mg by mouth nightly.  , Disp: , Rfl:     Vickye Dubin, NP  3/4/2020

## 2020-03-04 NOTE — PATIENT INSTRUCTIONS
Hold furosemide today and tomorrow take half of furosemide 40 mg tab ( 20 mg on 3/5) then resume furosemide 40 mg daily on 3/6/2020 or as directed by Dr. Bond Shed all your same medications    Call if having any dizziness, lightheadedness, heart ra

## 2020-03-06 ENCOUNTER — TELEPHONE (OUTPATIENT)
Dept: GASTROENTEROLOGY | Facility: CLINIC | Age: 75
End: 2020-03-06

## 2020-03-06 DIAGNOSIS — R19.7 DIARRHEA, UNSPECIFIED TYPE: Primary | ICD-10-CM

## 2020-03-06 PROBLEM — I50.33 ACUTE ON CHRONIC HEART FAILURE WITH PRESERVED EJECTION FRACTION (HFPEF) (HCC): Chronic | Status: RESOLVED | Noted: 2019-12-05 | Resolved: 2020-03-06

## 2020-03-06 PROBLEM — R10.84 GENERALIZED ABDOMINAL PAIN: Status: ACTIVE | Noted: 2020-03-06

## 2020-03-07 NOTE — PAT NURSING NOTE
Pt. States she never got instrucitons about either stopping or taking Eloquis. Pt.  Instructed to call prescribing physician for further instructions

## 2020-03-09 ENCOUNTER — TELEPHONE (OUTPATIENT)
Dept: CARDIOLOGY | Age: 75
End: 2020-03-09

## 2020-03-09 ENCOUNTER — APPOINTMENT (OUTPATIENT)
Dept: LAB | Facility: HOSPITAL | Age: 75
End: 2020-03-09
Attending: UROLOGY
Payer: MEDICARE

## 2020-03-09 RX ORDER — BUDESONIDE 3 MG/1
3 CAPSULE, COATED PELLETS ORAL DAILY
Refills: 0 | COMMUNITY
Start: 2020-01-21

## 2020-03-09 RX ORDER — CEFDINIR 300 MG/1
300 CAPSULE ORAL 2 TIMES DAILY
Refills: 0 | COMMUNITY
Start: 2020-01-02 | End: 2020-03-10

## 2020-03-09 RX ORDER — IPRATROPIUM BROMIDE AND ALBUTEROL SULFATE 2.5; .5 MG/3ML; MG/3ML
3 SOLUTION RESPIRATORY (INHALATION) EVERY 6 HOURS PRN
COMMUNITY
Start: 2020-02-03 | End: 2020-05-19

## 2020-03-09 RX ORDER — VANCOMYCIN HYDROCHLORIDE 125 MG/1
125 CAPSULE ORAL DAILY
Refills: 0 | COMMUNITY
Start: 2020-01-02 | End: 2020-03-10 | Stop reason: CLARIF

## 2020-03-09 RX ORDER — METOCLOPRAMIDE 5 MG/1
5 TABLET ORAL DAILY
Refills: 0 | COMMUNITY
Start: 2020-02-10 | End: 2020-03-10

## 2020-03-09 RX ORDER — OMEPRAZOLE 20 MG/1
20 CAPSULE, DELAYED RELEASE ORAL DAILY
COMMUNITY
Start: 2020-02-10 | End: 2020-03-10

## 2020-03-10 ENCOUNTER — APPOINTMENT (OUTPATIENT)
Dept: LAB | Facility: HOSPITAL | Age: 75
End: 2020-03-10
Attending: INTERNAL MEDICINE
Payer: MEDICARE

## 2020-03-10 ENCOUNTER — TELEPHONE (OUTPATIENT)
Dept: GASTROENTEROLOGY | Facility: CLINIC | Age: 75
End: 2020-03-10

## 2020-03-10 ENCOUNTER — OFFICE VISIT (OUTPATIENT)
Dept: CARDIOLOGY | Age: 75
End: 2020-03-10

## 2020-03-10 VITALS
BODY MASS INDEX: 26.5 KG/M2 | SYSTOLIC BLOOD PRESSURE: 110 MMHG | HEART RATE: 60 BPM | DIASTOLIC BLOOD PRESSURE: 60 MMHG | HEIGHT: 60 IN | OXYGEN SATURATION: 100 % | WEIGHT: 135 LBS

## 2020-03-10 DIAGNOSIS — I48.20 CHRONIC ATRIAL FIBRILLATION (CMD): ICD-10-CM

## 2020-03-10 DIAGNOSIS — I50.32 CHRONIC HEART FAILURE WITH PRESERVED EJECTION FRACTION (CMD): ICD-10-CM

## 2020-03-10 DIAGNOSIS — Z01.810 PREOP CARDIOVASCULAR EXAM: Primary | ICD-10-CM

## 2020-03-10 DIAGNOSIS — R19.7 DIARRHEA, UNSPECIFIED TYPE: ICD-10-CM

## 2020-03-10 PROCEDURE — 99214 OFFICE O/P EST MOD 30 MIN: CPT | Performed by: NURSE PRACTITIONER

## 2020-03-10 PROCEDURE — 83993 ASSAY FOR CALPROTECTIN FECAL: CPT

## 2020-03-10 RX ORDER — FUROSEMIDE 40 MG/1
40 TABLET ORAL DAILY
COMMUNITY
End: 2020-09-03

## 2020-03-10 ASSESSMENT — PATIENT HEALTH QUESTIONNAIRE - PHQ9
1. LITTLE INTEREST OR PLEASURE IN DOING THINGS: NOT AT ALL
SUM OF ALL RESPONSES TO PHQ9 QUESTIONS 1 AND 2: 0
SUM OF ALL RESPONSES TO PHQ9 QUESTIONS 1 AND 2: 0
2. FEELING DOWN, DEPRESSED OR HOPELESS: NOT AT ALL

## 2020-03-10 NOTE — TELEPHONE ENCOUNTER
Patient is scheduled for Cystolitholapaxy on Thursday, 3/12/20 and wanted to discuss with Dr Claudell Public to see if this was a good idea since she is have abdominal issues. Please call.

## 2020-03-10 NOTE — TELEPHONE ENCOUNTER
Dr. Theodora Bajwa    Please advise on below note. Patient scheduled for Cystolitholapaxy on 3/12 in the hospital because office procedures were booked up. Patient wants to know if it is ok to have procedure on 3/12 because she is having abdominal pain.

## 2020-03-10 NOTE — TELEPHONE ENCOUNTER
The patient should discuss this with Dr. Rosado Friday. She has also seen Dr. Michelle Kumar from general surgery. The surgery planned is cystoscopic break-up/removal of bladder stones.   There is no absolute contraindication from my standpoint with regards to her pre

## 2020-03-11 NOTE — TELEPHONE ENCOUNTER
Patient reports feeling better today. Aware that she should call Dr. Rafael Call to review her symptoms/concerns.   Offered to help patient schedule an appointment with Dr. Duke Bruner, but she already has one scheduled for 3/20/20 and wants to keep that appoin

## 2020-03-11 NOTE — PAT NURSING NOTE
Spoke with Patricia Clemens from Dr. Estela Fortune office about the abnormal urinalysis results and urine cultures still pending from 3/9/2020 . She will follow up with Dr. Jesse Harper and get back to [facility] for updates .

## 2020-03-12 ENCOUNTER — HOSPITAL ENCOUNTER (OUTPATIENT)
Facility: HOSPITAL | Age: 75
Discharge: HOME | End: 2020-03-12
Attending: UROLOGY | Admitting: UROLOGY
Payer: MEDICARE

## 2020-03-12 ENCOUNTER — APPOINTMENT (OUTPATIENT)
Dept: GENERAL RADIOLOGY | Facility: HOSPITAL | Age: 75
End: 2020-03-12
Attending: UROLOGY
Payer: MEDICARE

## 2020-03-12 ENCOUNTER — ANESTHESIA EVENT (OUTPATIENT)
Dept: SURGERY | Facility: HOSPITAL | Age: 75
End: 2020-03-12
Payer: MEDICARE

## 2020-03-12 ENCOUNTER — ANESTHESIA (OUTPATIENT)
Dept: SURGERY | Facility: HOSPITAL | Age: 75
End: 2020-03-12
Payer: MEDICARE

## 2020-03-12 VITALS
WEIGHT: 135 LBS | HEART RATE: 65 BPM | DIASTOLIC BLOOD PRESSURE: 59 MMHG | HEIGHT: 64 IN | TEMPERATURE: 98 F | SYSTOLIC BLOOD PRESSURE: 112 MMHG | BODY MASS INDEX: 23.05 KG/M2 | RESPIRATION RATE: 16 BRPM | OXYGEN SATURATION: 96 %

## 2020-03-12 DIAGNOSIS — N21.0 BLADDER STONES: ICD-10-CM

## 2020-03-12 LAB — CALPROTECTIN STL-MCNT: 367 ΜG/G (ref ?–50)

## 2020-03-12 PROCEDURE — 0TCB8ZZ EXTIRPATION OF MATTER FROM BLADDER, VIA NATURAL OR ARTIFICIAL OPENING ENDOSCOPIC: ICD-10-PCS | Performed by: UROLOGY

## 2020-03-12 PROCEDURE — 82365 CALCULUS SPECTROSCOPY: CPT | Performed by: UROLOGY

## 2020-03-12 RX ORDER — FAMOTIDINE 20 MG/1
20 TABLET ORAL ONCE
Status: DISCONTINUED | OUTPATIENT
Start: 2020-03-12 | End: 2020-03-12 | Stop reason: HOSPADM

## 2020-03-12 RX ORDER — NALOXONE HYDROCHLORIDE 0.4 MG/ML
80 INJECTION, SOLUTION INTRAMUSCULAR; INTRAVENOUS; SUBCUTANEOUS AS NEEDED
Status: DISCONTINUED | OUTPATIENT
Start: 2020-03-12 | End: 2020-03-12

## 2020-03-12 RX ORDER — HYDROCODONE BITARTRATE AND ACETAMINOPHEN 5; 325 MG/1; MG/1
1 TABLET ORAL AS NEEDED
Status: DISCONTINUED | OUTPATIENT
Start: 2020-03-12 | End: 2020-03-12

## 2020-03-12 RX ORDER — CIPROFLOXACIN 2 MG/ML
400 INJECTION, SOLUTION INTRAVENOUS
Status: DISCONTINUED | OUTPATIENT
Start: 2020-03-12 | End: 2020-03-12

## 2020-03-12 RX ORDER — MORPHINE SULFATE 10 MG/ML
6 INJECTION, SOLUTION INTRAMUSCULAR; INTRAVENOUS EVERY 10 MIN PRN
Status: DISCONTINUED | OUTPATIENT
Start: 2020-03-12 | End: 2020-03-12

## 2020-03-12 RX ORDER — HYDROCODONE BITARTRATE AND ACETAMINOPHEN 5; 325 MG/1; MG/1
2 TABLET ORAL AS NEEDED
Status: DISCONTINUED | OUTPATIENT
Start: 2020-03-12 | End: 2020-03-12

## 2020-03-12 RX ORDER — SODIUM CHLORIDE, SODIUM LACTATE, POTASSIUM CHLORIDE, CALCIUM CHLORIDE 600; 310; 30; 20 MG/100ML; MG/100ML; MG/100ML; MG/100ML
INJECTION, SOLUTION INTRAVENOUS CONTINUOUS
Status: DISCONTINUED | OUTPATIENT
Start: 2020-03-12 | End: 2020-03-12

## 2020-03-12 RX ORDER — ACETAMINOPHEN 500 MG
1000 TABLET ORAL ONCE
Status: COMPLETED | OUTPATIENT
Start: 2020-03-12 | End: 2020-03-12

## 2020-03-12 RX ORDER — METOCLOPRAMIDE HYDROCHLORIDE 5 MG/ML
INJECTION INTRAMUSCULAR; INTRAVENOUS AS NEEDED
Status: DISCONTINUED | OUTPATIENT
Start: 2020-03-12 | End: 2020-03-12 | Stop reason: SURG

## 2020-03-12 RX ORDER — MORPHINE SULFATE 4 MG/ML
2 INJECTION, SOLUTION INTRAMUSCULAR; INTRAVENOUS EVERY 10 MIN PRN
Status: DISCONTINUED | OUTPATIENT
Start: 2020-03-12 | End: 2020-03-12

## 2020-03-12 RX ORDER — DEXTROSE MONOHYDRATE 25 G/50ML
50 INJECTION, SOLUTION INTRAVENOUS
Status: DISCONTINUED | OUTPATIENT
Start: 2020-03-12 | End: 2020-03-12

## 2020-03-12 RX ORDER — ONDANSETRON 2 MG/ML
4 INJECTION INTRAMUSCULAR; INTRAVENOUS ONCE AS NEEDED
Status: DISCONTINUED | OUTPATIENT
Start: 2020-03-12 | End: 2020-03-12

## 2020-03-12 RX ORDER — HYDROMORPHONE HYDROCHLORIDE 1 MG/ML
0.2 INJECTION, SOLUTION INTRAMUSCULAR; INTRAVENOUS; SUBCUTANEOUS EVERY 5 MIN PRN
Status: DISCONTINUED | OUTPATIENT
Start: 2020-03-12 | End: 2020-03-12

## 2020-03-12 RX ORDER — PROCHLORPERAZINE EDISYLATE 5 MG/ML
5 INJECTION INTRAMUSCULAR; INTRAVENOUS ONCE AS NEEDED
Status: DISCONTINUED | OUTPATIENT
Start: 2020-03-12 | End: 2020-03-12

## 2020-03-12 RX ORDER — HYDROMORPHONE HYDROCHLORIDE 1 MG/ML
0.6 INJECTION, SOLUTION INTRAMUSCULAR; INTRAVENOUS; SUBCUTANEOUS EVERY 5 MIN PRN
Status: DISCONTINUED | OUTPATIENT
Start: 2020-03-12 | End: 2020-03-12

## 2020-03-12 RX ORDER — HYDROMORPHONE HYDROCHLORIDE 1 MG/ML
0.4 INJECTION, SOLUTION INTRAMUSCULAR; INTRAVENOUS; SUBCUTANEOUS EVERY 5 MIN PRN
Status: DISCONTINUED | OUTPATIENT
Start: 2020-03-12 | End: 2020-03-12

## 2020-03-12 RX ORDER — METOCLOPRAMIDE 10 MG/1
10 TABLET ORAL ONCE
Status: DISCONTINUED | OUTPATIENT
Start: 2020-03-12 | End: 2020-03-12 | Stop reason: HOSPADM

## 2020-03-12 RX ORDER — MORPHINE SULFATE 4 MG/ML
4 INJECTION, SOLUTION INTRAMUSCULAR; INTRAVENOUS EVERY 10 MIN PRN
Status: DISCONTINUED | OUTPATIENT
Start: 2020-03-12 | End: 2020-03-12

## 2020-03-12 RX ORDER — DEXAMETHASONE SODIUM PHOSPHATE 4 MG/ML
VIAL (ML) INJECTION AS NEEDED
Status: DISCONTINUED | OUTPATIENT
Start: 2020-03-12 | End: 2020-03-12 | Stop reason: SURG

## 2020-03-12 RX ORDER — GLYCOPYRROLATE 0.2 MG/ML
INJECTION, SOLUTION INTRAMUSCULAR; INTRAVENOUS AS NEEDED
Status: DISCONTINUED | OUTPATIENT
Start: 2020-03-12 | End: 2020-03-12 | Stop reason: SURG

## 2020-03-12 RX ORDER — PHENYLEPHRINE HCL 10 MG/ML
VIAL (ML) INJECTION AS NEEDED
Status: DISCONTINUED | OUTPATIENT
Start: 2020-03-12 | End: 2020-03-12 | Stop reason: SURG

## 2020-03-12 RX ADMIN — SODIUM CHLORIDE, SODIUM LACTATE, POTASSIUM CHLORIDE, CALCIUM CHLORIDE: 600; 310; 30; 20 INJECTION, SOLUTION INTRAVENOUS at 10:12:00

## 2020-03-12 RX ADMIN — PHENYLEPHRINE HCL 100 MCG: 10 MG/ML VIAL (ML) INJECTION at 09:00:00

## 2020-03-12 RX ADMIN — METOCLOPRAMIDE HYDROCHLORIDE 10 MG: 5 INJECTION INTRAMUSCULAR; INTRAVENOUS at 08:55:00

## 2020-03-12 RX ADMIN — DEXAMETHASONE SODIUM PHOSPHATE 4 MG: 4 MG/ML VIAL (ML) INJECTION at 08:55:00

## 2020-03-12 RX ADMIN — GLYCOPYRROLATE 0.2 MG: 0.2 INJECTION, SOLUTION INTRAMUSCULAR; INTRAVENOUS at 08:55:00

## 2020-03-12 RX ADMIN — PHENYLEPHRINE HCL 100 MCG: 10 MG/ML VIAL (ML) INJECTION at 08:58:00

## 2020-03-12 NOTE — ANESTHESIA POSTPROCEDURE EVALUATION
Patient: Wilmer Lara    Procedure Summary     Date:  03/12/20 Room / Location:  [facility] OR  / [facility] OR    Anesthesia Start:  0045 Anesthesia Stop:  2523    Procedure:  CYSTOSCOPY STONE MANIPULATION (N/A ) Diagnosis:       Bladder stones

## 2020-03-12 NOTE — ANESTHESIA PROCEDURE NOTES
Airway  Date/Time: 3/12/2020 8:55 AM  Urgency: Elective    Airway not difficult    General Information and Staff    Patient location during procedure: OR  Anesthesiologist: Eric Mercado MD  Performed: anesthesiologist     Indications and Patient Condi

## 2020-03-12 NOTE — BRIEF OP NOTE
Pre-Operative Diagnosis: Bladder stones [N21.0]     Post-Operative Diagnosis: Bladder stones [N21.0]      Procedure Performed:   Procedure(s):  CYSTOSCOPY, LASER LITHOTRIPSY OF BLADDER STONES, AND EXTRACTION OF BLADDER STONES    Surgeon(s) and Role:     *

## 2020-03-12 NOTE — OPERATIVE REPORT
[facility] POST ANESTHESIA CARE UNIT OPERATIVE REPORT:     PATIENT NAME: Rosemary Salcedo  : 1945   MRN: [id number]    DATE OF OPERATION:   3/12/2020    PREOPERATIVE DIAGNOSIS: bladder stone     POSTOPERATIVE DIAGNOSIS: bladder stone     P 3cm in diameter. Many are flat shaped. Some are small enough to irrigate out. Using the flexible stent grasper, many were removed. The rest were lasered with the 550 micron fiber at 1.0J and 15 HZ and then irrigated and grasped out.  There was no lasering

## 2020-03-12 NOTE — INTERVAL H&P NOTE
Pre-op Diagnosis: Bladder stones [N21.0]    The above referenced H&P was reviewed by Farhat Blakely MD on 3/12/2020, the patient was examined and no significant changes have occurred in the patient's condition since the H&P was performed.   I discussed with maurice

## 2020-03-12 NOTE — ANESTHESIA PREPROCEDURE EVALUATION
Anesthesia PreOp Note    HPI:     Ursula Jacinto is a 66year old female who presents for preoperative consultation requested by: Tanner Ovalles MD    Date of Surgery: 3/12/2020    Procedure(s):  CYSTOSCOPY STONE MANIPULATION  Indication: Bladder stones Colitis    • Congestive heart disease ([facility] [place] [address]    • Coronary atherosclerosis    • Deep vein thrombosis (HCC)     jamal filter in place.  unsure of leg   • DEPRESSION    • Depression 1/11/2016   • Dyspnea 6/7/2018   • Esophageal reflux    • GERD    • GOUT COLONOSCOPY,DIAGNOSTIC  5/12    normal, random colon bx showed findings c/w lymphocytic colitis   • CYSTOURETHROSCOPY  1/9/09    in office   • ESOPHAGOGASTRODUODENOSCOPY (EGD) N/A 12/24/2016    Performed by Judy Harden MD at [facility] ENDOSCOPY   • ESO taper, Disp: 90 tablet, Rfl: 3, 3/11/2020 at 0900  Albuterol Sulfate 108 (90 Base) MCG/ACT Inhalation Aerosol Powder, Breath Activated, Inhale 2 puffs into the lungs every 4 (four) hours as needed. , Disp: 1 each, Rfl: 0, 3/10/2020  Fluticasone Propionate 5 400 mg, Intravenous, On Call to OR, Jian Mireles MD    No current [facility]-ordered outpatient medications on file.         Cymbalta [Duloxetin*    ITCHING, SWELLING    Comment:Throat swelling and difficulty breathing  Gabapentin              RASH  Gnp Iodides D Not on file        Comment: no protection    Lifestyle      Physical activity:        Days per week: Not on file        Minutes per session: Not on file      Stress: Not on file    Relationships      Social connections:        Talks on phone: Not on file limited  Dental - normal exam     Pulmonary    (+) shortness of breath,   Cardiovascular   Exercise tolerance: poor  (+) hypertension, CAD, CHF,     NYHA Classification: III  Rate: normal    Neuro/Psych    (+)  anxiety/panic attacks,  depression,       GI/

## 2020-03-14 RX ORDER — BUDESONIDE 3 MG/1
6 CAPSULE, COATED PELLETS ORAL EVERY MORNING
Qty: 60 CAPSULE | Refills: 1 | Status: SHIPPED | OUTPATIENT
Start: 2020-03-14 | End: 2020-12-29

## 2020-03-20 ENCOUNTER — OFFICE VISIT (OUTPATIENT)
Dept: WOUND CARE | Facility: HOSPITAL | Age: 75
End: 2020-03-20
Attending: CLINICAL NURSE SPECIALIST
Payer: MEDICARE

## 2020-03-20 ENCOUNTER — OFFICE VISIT (OUTPATIENT)
Dept: GASTROENTEROLOGY | Facility: CLINIC | Age: 75
End: 2020-03-20
Payer: MEDICARE

## 2020-03-20 VITALS — DIASTOLIC BLOOD PRESSURE: 67 MMHG | SYSTOLIC BLOOD PRESSURE: 102 MMHG | HEART RATE: 76 BPM

## 2020-03-20 DIAGNOSIS — L89.152 PRESSURE INJURY OF SACRAL REGION, STAGE 2 (HCC): Primary | ICD-10-CM

## 2020-03-20 DIAGNOSIS — K52.831 COLLAGENOUS COLITIS: Primary | ICD-10-CM

## 2020-03-20 PROCEDURE — G0463 HOSPITAL OUTPT CLINIC VISIT: HCPCS | Performed by: INTERNAL MEDICINE

## 2020-03-20 PROCEDURE — 99213 OFFICE O/P EST LOW 20 MIN: CPT | Performed by: INTERNAL MEDICINE

## 2020-03-20 PROCEDURE — 99214 OFFICE O/P EST MOD 30 MIN: CPT

## 2020-03-20 RX ORDER — BUDESONIDE 3 MG/1
3 CAPSULE, COATED PELLETS ORAL DAILY
COMMUNITY
Start: 2020-01-21 | End: 2020-06-29 | Stop reason: DRUGHIGH

## 2020-03-20 NOTE — PATIENT INSTRUCTIONS
1.  Continue 6 mg daily of budesonide (#2 capsules every morning). 2.  Continue current dose of prednisone for now from my standpoint unless otherwise directed by your other physicians. 3.  Please contact me via phone with a symptom update in 10-14 days.

## 2020-03-20 NOTE — PROGRESS NOTES
Subjective    Chief Complaint  This information was obtained from the patient  The patient is new to the [address] [facility] here for an initial visit for the evaluation and management of non-healing wound(s).     Allergies  Cymbalta (Reaction: itching, swelling), 8-14-17:  Patient was admitted to the hospital for UTI, fever and sacral pressure wound/diarrhea. Patient diagnosed with Clostridium difficile and treated with antibiotics.    Patient currently resides at [facility] and transported to  Musculoskeletal: Decreased Activity (wheelchair/ transfers with sliding board), Other (Hx: paraplegia ), Muscle Wasting  Integumentary (Hair/Skin/Nails): Ulcers (Sacral stage 2 pressure ulcer), Dryness (BLE dermatitis: instructed to apply lotion to dry ski tramadol - oral 50 mg .5 tablet every 8 hours as needed  aspirin - oral 81 mg tablet,chewable once daily  potassium chloride - oral 20 mEq tablet extended release twice daily for when taking lasix  venlafaxine - oral 75 mg tablet once daily  Vitamin D3 - o Patient presents to the outpatient wound clinic with spouse. Patient developed a  sacral pressure ulcer stage 2. Wound granular. no s/s of infection noted. Tegaderm hydrocolloid dressing to assist in healing of wound to closure.  Instructed spouse to have H Other: - pressure prevention measures: Spouse to be taught to Repostion patient every 15 minutes when sitting, wheelchair cushion, Turn and reposition every 2 hours when in bed. Low air loss mattress. Elevate heels off of bed using heel elevator boots.  Marvin Oliva

## 2020-03-20 NOTE — PROGRESS NOTES
HPI:    Patient ID: Faisal Lyon is a 66year old female. HPI  Cassie Brito returns in follow-up today along with her . She was last seen in October 2019.      As per previous notes, Cassie Brito has a history of multiple chronic medical issues incl every morning. 60 capsule 1   • Potassium Chloride ER 20 MEQ Oral Tab CR Take 1 tablet (20 mEq total) by mouth 2 (two) times daily.  Only take when taking lasix 60 tablet 1   • METOPROLOL SUCCINATE ER 50 MG Oral Tablet 24 Hr TAKE 1 TABLET BY MOUTH DAILY 90 mouth 3 (three) times daily as needed for cough.  (Patient not taking: Reported on 3/20/2020 ) 60 capsule 0     Allergies:  Cymbalta [Duloxetin*    ITCHING, SWELLING    Comment:Throat swelling and difficulty breathing  Gabapentin              RASH  Gnp Iodi reviewed.     Component      Latest Ref Rng & Units 3/10/2020 11/13/2019   CALPROTECTIN, FECAL      <50 µg/g 367 (H) 157 (H)     C. DIFFICILE(TOXIGENIC)PCR   Order: [id number]   Collected:  3/3/2020  1:51 PM   Status:  Final result   Dx:  Diarrhea of presume

## 2020-03-24 RX ORDER — APIXABAN 5 MG/1
TABLET, FILM COATED ORAL
Qty: 60 TABLET | Refills: 5 | Status: SHIPPED | OUTPATIENT
Start: 2020-03-24 | End: 2020-12-01 | Stop reason: SDUPTHER

## 2020-03-27 ENCOUNTER — TELEPHONE (OUTPATIENT)
Dept: CARDIOLOGY | Age: 75
End: 2020-03-27

## 2020-03-27 ENCOUNTER — APPOINTMENT (OUTPATIENT)
Dept: CARDIOLOGY | Age: 75
End: 2020-03-27

## 2020-03-27 RX ORDER — CEFDINIR 300 MG/1
CAPSULE ORAL
COMMUNITY
Start: 2020-03-11 | End: 2020-08-28

## 2020-04-09 ENCOUNTER — APPOINTMENT (OUTPATIENT)
Dept: WOUND CARE | Facility: HOSPITAL | Age: 75
End: 2020-04-09
Attending: CLINICAL NURSE SPECIALIST
Payer: MEDICARE

## 2020-04-10 ENCOUNTER — APPOINTMENT (OUTPATIENT)
Dept: WOUND CARE | Facility: HOSPITAL | Age: 75
End: 2020-04-10
Attending: CLINICAL NURSE SPECIALIST
Payer: MEDICARE

## 2020-04-13 PROBLEM — R10.84 GENERALIZED ABDOMINAL PAIN: Status: RESOLVED | Noted: 2020-03-06 | Resolved: 2020-04-13

## 2020-05-12 ENCOUNTER — TELEPHONE (OUTPATIENT)
Dept: OBGYN CLINIC | Facility: CLINIC | Age: 75
End: 2020-05-12

## 2020-05-12 NOTE — TELEPHONE ENCOUNTER
Dr. Duke Bruner    Patient requesting refill of below medication:    Budesonide 3 MG Oral Cap DR Particles 60 capsule 1 3/14/2020    Sig:   Take 2 capsules (6 mg total) by mouth every morning.      Route:   Oral         LOV 3/20/2020  LR 3/14/2020  Order p

## 2020-05-13 RX ORDER — BUDESONIDE 3 MG/1
6 CAPSULE, COATED PELLETS ORAL EVERY MORNING
Qty: 60 CAPSULE | Refills: 1 | Status: SHIPPED | OUTPATIENT
Start: 2020-05-13 | End: 2020-05-15 | Stop reason: CLARIF

## 2020-05-13 NOTE — TELEPHONE ENCOUNTER
I would like the patient to decrease the budesonide to 3 mg (once daily). If she has a flare of the symptoms, please have her let us know immediately.

## 2020-05-13 NOTE — TELEPHONE ENCOUNTER
Dr. Paul Stevens    Patient reported that she is doing well- no symptoms at this time. Reports that she is taking the budesonide and it is working well for her.     Thank you

## 2020-05-14 NOTE — TELEPHONE ENCOUNTER
LMTCB- need to review Dr. Christiano Tong message below. Dr. Christiano Tong-  Please clarify the budesonide RX from yesterday sent to the pharmacy. It has two different signs differing from your message below. Please advise, thank you.

## 2020-05-15 RX ORDER — BUDESONIDE 3 MG/1
9 CAPSULE, COATED PELLETS ORAL EVERY MORNING
Qty: 60 CAPSULE | Refills: 1 | Status: SHIPPED | OUTPATIENT
Start: 2020-05-15 | End: 2020-06-29 | Stop reason: DRUGHIGH

## 2020-05-15 NOTE — TELEPHONE ENCOUNTER
Prescription corrected. Dose instructions were left at #2 daily. I would, however, like the patient to decrease the budesonide to #1 daily. If she has any recurrent pain or diarrhea she should increase back to 2 daily and let us know.

## 2020-05-15 NOTE — TELEPHONE ENCOUNTER
Pt contacted and reviewed Dr. Marcellus Maurice message below. Pt verbalized understanding of all and did not have further questions or concerns at this time.

## 2020-05-19 ENCOUNTER — OFFICE VISIT (OUTPATIENT)
Dept: CARDIOLOGY | Age: 75
End: 2020-05-19

## 2020-05-19 VITALS
HEIGHT: 64 IN | HEART RATE: 57 BPM | DIASTOLIC BLOOD PRESSURE: 60 MMHG | BODY MASS INDEX: 23.17 KG/M2 | SYSTOLIC BLOOD PRESSURE: 110 MMHG | OXYGEN SATURATION: 99 %

## 2020-05-19 DIAGNOSIS — I34.0 NON-RHEUMATIC MITRAL REGURGITATION: Primary | ICD-10-CM

## 2020-05-19 DIAGNOSIS — I48.20 CHRONIC ATRIAL FIBRILLATION (CMD): ICD-10-CM

## 2020-05-19 DIAGNOSIS — I25.10 CORONARY ARTERY DISEASE INVOLVING NATIVE CORONARY ARTERY OF NATIVE HEART WITHOUT ANGINA PECTORIS: ICD-10-CM

## 2020-05-19 DIAGNOSIS — I50.32 CHRONIC HEART FAILURE WITH PRESERVED EJECTION FRACTION (CMD): ICD-10-CM

## 2020-05-19 PROCEDURE — 99214 OFFICE O/P EST MOD 30 MIN: CPT | Performed by: INTERNAL MEDICINE

## 2020-05-19 ASSESSMENT — PATIENT HEALTH QUESTIONNAIRE - PHQ9
CLINICAL INTERPRETATION OF PHQ2 SCORE: NO FURTHER SCREENING NEEDED
2. FEELING DOWN, DEPRESSED OR HOPELESS: NOT AT ALL
1. LITTLE INTEREST OR PLEASURE IN DOING THINGS: NOT AT ALL
SUM OF ALL RESPONSES TO PHQ9 QUESTIONS 1 AND 2: 0
SUM OF ALL RESPONSES TO PHQ9 QUESTIONS 1 AND 2: 0
CLINICAL INTERPRETATION OF PHQ9 SCORE: NO FURTHER SCREENING NEEDED

## 2020-05-21 ENCOUNTER — LAB REQUISITION (OUTPATIENT)
Dept: LAB | Facility: HOSPITAL | Age: 75
End: 2020-05-21
Payer: MEDICARE

## 2020-05-21 DIAGNOSIS — I50.33 ACUTE ON CHRONIC DIASTOLIC (CONGESTIVE) HEART FAILURE (HCC): ICD-10-CM

## 2020-05-21 LAB
ALBUMIN SERPL-MCNC: 2.7 G/DL
ALP SERPL-CCNC: 72 U/L
ALT SERPL-CCNC: 45 U/L
AST SERPL-CCNC: 24 U/L
BILIRUB SERPL-MCNC: 0.8 MG/DL
BUN SERPL-MCNC: 26 MG/DL
CALCIUM SERPL-MCNC: 8.8 MG/DL
CHLORIDE SERPL-SCNC: 104 MMOL/L
CREAT SERPL-MCNC: 0.52 MG/DL
GLOBULIN SER-MCNC: 2.9 G/DL
GLUCOSE SERPL-MCNC: 97 MG/DL
POTASSIUM SERPL-SCNC: 3.2 MMOL/L
PROT SERPL-MCNC: 5.6 G/DL
SODIUM SERPL-SCNC: 142 MMOL/L

## 2020-05-21 PROCEDURE — 80053 COMPREHEN METABOLIC PANEL: CPT | Performed by: INTERNAL MEDICINE

## 2020-05-22 ENCOUNTER — TELEPHONE (OUTPATIENT)
Dept: CARDIOLOGY | Age: 75
End: 2020-05-22

## 2020-05-22 ENCOUNTER — CLINICAL ABSTRACT (OUTPATIENT)
Dept: CARDIOLOGY | Age: 75
End: 2020-05-22

## 2020-05-26 ENCOUNTER — TELEPHONE (OUTPATIENT)
Dept: CARDIOLOGY | Age: 75
End: 2020-05-26

## 2020-05-26 ENCOUNTER — LAB REQUISITION (OUTPATIENT)
Dept: LAB | Facility: HOSPITAL | Age: 75
End: 2020-05-26
Payer: MEDICARE

## 2020-05-26 DIAGNOSIS — I48.20 CHRONIC ATRIAL FIBRILLATION, UNSPECIFIED (HCC): ICD-10-CM

## 2020-05-26 PROCEDURE — 80048 BASIC METABOLIC PNL TOTAL CA: CPT | Performed by: INTERNAL MEDICINE

## 2020-05-26 PROCEDURE — 85025 COMPLETE CBC W/AUTO DIFF WBC: CPT | Performed by: INTERNAL MEDICINE

## 2020-06-04 ENCOUNTER — ANCILLARY PROCEDURE (OUTPATIENT)
Dept: CARDIOLOGY | Age: 75
End: 2020-06-04
Attending: INTERNAL MEDICINE

## 2020-06-04 ENCOUNTER — HOSPITAL ENCOUNTER (OUTPATIENT)
Dept: GENERAL RADIOLOGY | Facility: HOSPITAL | Age: 75
Discharge: HOME | End: 2020-06-04
Attending: INTERNAL MEDICINE
Payer: MEDICARE

## 2020-06-04 DIAGNOSIS — I50.32 CHRONIC HEART FAILURE WITH PRESERVED EJECTION FRACTION (CMD): ICD-10-CM

## 2020-06-04 DIAGNOSIS — I34.0 NON-RHEUMATIC MITRAL REGURGITATION: ICD-10-CM

## 2020-06-04 DIAGNOSIS — I50.32 CHRONIC DIASTOLIC HEART FAILURE (HCC): ICD-10-CM

## 2020-06-04 PROCEDURE — 93306 TTE W/DOPPLER COMPLETE: CPT | Performed by: INTERNAL MEDICINE

## 2020-06-04 PROCEDURE — 71046 X-RAY EXAM CHEST 2 VIEWS: CPT | Performed by: INTERNAL MEDICINE

## 2020-06-18 ENCOUNTER — TELEPHONE (OUTPATIENT)
Dept: PULMONOLOGY | Facility: CLINIC | Age: 75
End: 2020-06-18

## 2020-06-18 ENCOUNTER — TELEPHONE (OUTPATIENT)
Dept: CARDIOLOGY | Age: 75
End: 2020-06-18

## 2020-06-18 NOTE — TELEPHONE ENCOUNTER
Dr. Emelia Garcia- Please read message below. Can patient be added onto your schedule or can patient be seen by Raúl Harding PA-C?

## 2020-06-18 NOTE — TELEPHONE ENCOUNTER
Patient called to schedule a consult visit with Dr. Danay Mclaughlin referred by Dr. Adriano Mejía due to shortness of breath. Patient is requesting to be seen soon as possible.  Please advise

## 2020-06-19 ENCOUNTER — APPOINTMENT (OUTPATIENT)
Dept: GENERAL RADIOLOGY | Facility: HOSPITAL | Age: 75
DRG: 291 | End: 2020-06-19
Attending: EMERGENCY MEDICINE
Payer: MEDICARE

## 2020-06-19 ENCOUNTER — HOSPITAL ENCOUNTER (INPATIENT)
Facility: HOSPITAL | Age: 75
LOS: 5 days | Discharge: HOME HEALTH | DRG: 291 | End: 2020-06-24
Attending: EMERGENCY MEDICINE | Admitting: HOSPITALIST
Payer: MEDICARE

## 2020-06-19 DIAGNOSIS — I50.9 ACUTE ON CHRONIC CONGESTIVE HEART FAILURE, UNSPECIFIED HEART FAILURE TYPE (HCC): ICD-10-CM

## 2020-06-19 DIAGNOSIS — J18.9 COMMUNITY ACQUIRED PNEUMONIA OF RIGHT LOWER LOBE OF LUNG: Primary | ICD-10-CM

## 2020-06-19 PROCEDURE — 71045 X-RAY EXAM CHEST 1 VIEW: CPT | Performed by: EMERGENCY MEDICINE

## 2020-06-19 RX ORDER — POLYETHYLENE GLYCOL 3350 17 G/17G
17 POWDER, FOR SOLUTION ORAL DAILY PRN
Status: DISCONTINUED | OUTPATIENT
Start: 2020-06-19 | End: 2020-06-24

## 2020-06-19 RX ORDER — DILTIAZEM HYDROCHLORIDE 60 MG/1
60 TABLET, FILM COATED ORAL EVERY 8 HOURS SCHEDULED
Status: DISCONTINUED | OUTPATIENT
Start: 2020-06-19 | End: 2020-06-24

## 2020-06-19 RX ORDER — AZITHROMYCIN 250 MG/1
250 TABLET, FILM COATED ORAL
Status: COMPLETED | OUTPATIENT
Start: 2020-06-20 | End: 2020-06-22

## 2020-06-19 RX ORDER — ONDANSETRON 2 MG/ML
4 INJECTION INTRAMUSCULAR; INTRAVENOUS EVERY 6 HOURS PRN
Status: DISCONTINUED | OUTPATIENT
Start: 2020-06-19 | End: 2020-06-24

## 2020-06-19 RX ORDER — FUROSEMIDE 10 MG/ML
20 INJECTION INTRAMUSCULAR; INTRAVENOUS
Status: COMPLETED | OUTPATIENT
Start: 2020-06-20 | End: 2020-06-23

## 2020-06-19 RX ORDER — ACETAMINOPHEN 325 MG/1
650 TABLET ORAL EVERY 6 HOURS PRN
Status: DISCONTINUED | OUTPATIENT
Start: 2020-06-19 | End: 2020-06-24

## 2020-06-19 RX ORDER — SODIUM CHLORIDE 0.9 % (FLUSH) 0.9 %
3 SYRINGE (ML) INJECTION AS NEEDED
Status: DISCONTINUED | OUTPATIENT
Start: 2020-06-19 | End: 2020-06-24

## 2020-06-19 RX ORDER — FUROSEMIDE 10 MG/ML
40 INJECTION INTRAMUSCULAR; INTRAVENOUS ONCE
Status: COMPLETED | OUTPATIENT
Start: 2020-06-19 | End: 2020-06-19

## 2020-06-19 RX ORDER — ATORVASTATIN CALCIUM 40 MG/1
40 TABLET, FILM COATED ORAL NIGHTLY
Status: DISCONTINUED | OUTPATIENT
Start: 2020-06-19 | End: 2020-06-24

## 2020-06-19 RX ORDER — MELATONIN
325
Status: DISCONTINUED | OUTPATIENT
Start: 2020-06-20 | End: 2020-06-24

## 2020-06-19 RX ORDER — METOPROLOL SUCCINATE 50 MG/1
50 TABLET, EXTENDED RELEASE ORAL DAILY
Status: DISCONTINUED | OUTPATIENT
Start: 2020-06-20 | End: 2020-06-24

## 2020-06-19 RX ORDER — EZETIMIBE 10 MG/1
10 TABLET ORAL DAILY
Status: DISCONTINUED | OUTPATIENT
Start: 2020-06-20 | End: 2020-06-24

## 2020-06-19 RX ORDER — ASPIRIN 81 MG/1
81 TABLET ORAL DAILY
Status: DISCONTINUED | OUTPATIENT
Start: 2020-06-20 | End: 2020-06-24

## 2020-06-19 RX ORDER — PREDNISONE 1 MG/1
5 TABLET ORAL
Status: DISCONTINUED | OUTPATIENT
Start: 2020-06-20 | End: 2020-06-24

## 2020-06-19 RX ORDER — TRAMADOL HYDROCHLORIDE 50 MG/1
25 TABLET ORAL EVERY 8 HOURS PRN
Status: DISCONTINUED | OUTPATIENT
Start: 2020-06-19 | End: 2020-06-24

## 2020-06-19 RX ORDER — VENLAFAXINE HYDROCHLORIDE 37.5 MG/1
75 CAPSULE, EXTENDED RELEASE ORAL
Status: DISCONTINUED | OUTPATIENT
Start: 2020-06-20 | End: 2020-06-24

## 2020-06-19 RX ORDER — IPRATROPIUM BROMIDE AND ALBUTEROL SULFATE 2.5; .5 MG/3ML; MG/3ML
3 SOLUTION RESPIRATORY (INHALATION) 3 TIMES DAILY
Status: DISCONTINUED | OUTPATIENT
Start: 2020-06-19 | End: 2020-06-22

## 2020-06-19 NOTE — H&P
[facility] Hospitalist H&P       CC: Patient presents with:  Dyspnea TIMO SOB       PCP: Nakita Tapia MD    History of Present Illness:   Marcheta Catching 66year old female with  hs DVT s/p permanent IVC filter on eliquis, recurrent cdif colitis a Other and unspecified hyperlipidemia    • OTHER DISEASES     spastic paraparesis   • OTHER DISEASES     cataracts   • OTHER DISEASES     secondary poycythemia   • OTHER DISEASES     lumbar spinal stenosis   • OTHER DISEASES     duplicated left renal collec OTHER SURGICAL HISTORY  8-24-12    cysto Dr. Anabel Oneil   • Ver[place]aport STONE,>2.5CM  03/12/2020    laser litho bladder stones   • SPINE SURGERY PROCEDURE UNLISTED      x4   • TONSILLECTOMY     • TOTAL KNEE REPLACEMENT Left    • VALVE REPAIR          ALL:    C distress   Head:  Normocephalic, without obvious abnormality, atraumatic.    Eyes:  Sclera anicteric, No conjunctival pallor    Nose: Nares normal. Septum midline    Throat: Lips, mucosa, and tongue normal    Neck: Supple    Lungs:   Clear to auscultation b chair bound, baclofen pump - and straight cath at baseline), GERD,  recurrent UTI/bladder calculi with plans for cystolitholapaxy with stone extraction, who presents with sob.       Acute Hypoxemic Respiratory Failure 2/2 Acute CHF / poss pna  - hypoxic on

## 2020-06-19 NOTE — TELEPHONE ENCOUNTER
Attempted to call patient, Beverley Anderson- When patient returns call, can you please confirm appointment 6/29/20 at 11:45AM at [address].

## 2020-06-19 NOTE — ED PROVIDER NOTES
Patient Seen in: [facility] AND [facility] Emergency Department      History   Patient presents with:  Dyspnea TIMO SOB    Stated Complaint:     HPI    70-year-old female with history of hypertension, hypercholesterolemia, coronary artery disease, valvular heart OTHER DISEASES     spastic paraparesis   • OTHER DISEASES     cataracts   • OTHER DISEASES     secondary poycythemia   • OTHER DISEASES     lumbar spinal stenosis   • OTHER DISEASES     duplicated left renal collecting system   • OTHER DISEASES     uterine Aline   • REMOVE BLADDER STONE,>2.5CM  03/12/2020    laser litho bladder stones   • SPINE SURGERY PROCEDURE UNLISTED      x4   • TONSILLECTOMY     • TOTAL KNEE REPLACEMENT Left    • VALVE REPAIR                      Social History    Tobacco Use      Smoking Non- 49 (*)     GFR, -American 56 (*)     All other components within normal limits   PRO BETA NATRIURETIC PEPTIDE - Abnormal; Notable for the following components:    Pro-Beta Natriuretic Peptide 2,147 (*)     All other components w Discussed with Dr. Nan Austin, hospitalist.  Also discussed with Dr. Georgia Chairez, cardiology.   Admission disposition: 6/19/2020  6:17 PM                   Disposition and Plan     Clinical Impression:  Community acquired pneumonia of right lower lobe of lung  (primary

## 2020-06-20 ENCOUNTER — APPOINTMENT (OUTPATIENT)
Dept: CT IMAGING | Facility: HOSPITAL | Age: 75
DRG: 291 | End: 2020-06-20
Attending: INTERNAL MEDICINE
Payer: MEDICARE

## 2020-06-20 PROCEDURE — 99221 1ST HOSP IP/OBS SF/LOW 40: CPT | Performed by: INTERNAL MEDICINE

## 2020-06-20 PROCEDURE — 71250 CT THORAX DX C-: CPT | Performed by: INTERNAL MEDICINE

## 2020-06-20 RX ORDER — POTASSIUM CHLORIDE 20 MEQ/1
40 TABLET, EXTENDED RELEASE ORAL ONCE
Status: COMPLETED | OUTPATIENT
Start: 2020-06-20 | End: 2020-06-20

## 2020-06-20 NOTE — ED NOTES
Orders for admission, patient is aware of plan and ready to go upstairs.  Any questions, please call ED RN olvin at [facility].

## 2020-06-20 NOTE — DIETARY NOTE
ADULT NUTRITION INITIAL ASSESSMENT    Pt is at moderate nutrition risk. Pt does not meet malnutrition criteria.       RECOMMENDATIONS TO MD:  See Nutrition Intervention     NUTRITION DIAGNOSIS/PROBLEM:  Increased nutrient needs related to increased demand • Colitis    • Congestive heart disease ([address]    • Coronary atherosclerosis    • Deep vein thrombosis (HCC)     jamal filter in place.  unsure of leg   • DEPRESSION    • Depression 1/11/2016   • Dyspnea 6/7/2018   • Esophageal reflux    • GERD    • G kg (130 lb)  01/02/20 : 55.8 kg (123 lb)  11/27/19 : 52.7 kg (116 lb 3.2 oz)  07/24/19 : 61.2 kg (135 lb)    GASTROINTESTINAL: Re-current C-Diff Colitis. Patient has a Colostomy.  Reports she was having trouble with her ostomy last year and went to [place] reviewed.  Per RN: Sacrum Stage II   - Roscoe score 16    NUTRITION PRESCRIPTION:  Diet: Cardiac with Fluid Restriction 1800 ml   Oral Supplements: Ensure Enlive vanilla qd     ESTIMATED NUTRITION NEEDS:  Calories: 7486-7473 calories/day (25-30 calories per

## 2020-06-20 NOTE — PHYSICAL THERAPY NOTE
PHYSICAL THERAPY EVALUATION - INPATIENT     Room Number: 318/318-A  Evaluation Date: 6/20/2020  Type of Evaluation: Initial   Physician Order: PT Eval and Treat    Presenting Problem: CAP/ acute on chronic heart failure.   Pt PMH sign for cardiac history / therapy using rolled up blankets laterally at left LE at end of session to position LE in neutral position. D/c planning with pt. Discussed if someone provides pt with gentle ROM program at home to prevent contracture.    No family present for pt and fam require 24 hr skilled care at d/c . Spouse may need more support in the home or benefit from family training as needed on proper mechanics and equipment use to help in caring for pt at home.   Therapy would recommend [facility] PT at d/c for family training / home straight cath at baseline), GERD,  recurrent UTI/bladder calculi with plans for cystolitholapaxy with stone extraction, who presents with sob.        Acute Hypoxemic Respiratory Failure 2/2 Acute CHF / poss pna  - hypoxic on admission, tachypnic, CXR with i Maurice Salmon MD  [facility] Hospitalist  Answering Service number: [phone]     Time spent with patient: 35 mins with > 50% spent counseling patient face         Problem List  Principal Problem:    Community acquired pneumonia of right lower lobe of lung  Activ Procedure Laterality Date   • ARTHROSCOPY OF JOINT UNLISTED Right     knee   • BACK SURGERY      spinal fusion L1-5   • CAUDAL N/A 3/26/2019    Performed by Abdoulaye Mathews MD at [address] [place] [address] N/A 10/10/2017    Performed by Gerald Mcdonald reports dependent slideboard or lift transfers usually spending approx 5 hr per day in chair         SUBJECTIVE  At rest Left LE 6/10   Goal for home    \" My  is strong \"     PHYSICAL THERAPY EXAMINATION     OBJECTIVE  Precautions: Cardiac;Bed/nikita patient currently need. ..   -   Moving to and from a bed to a chair (including a wheelchair)?: Total   -   Need to walk in hospital room?: Total   -   Climbing 3-5 steps with a railing?: Total     AM-PAC Score:  Raw Score: 8   Approx Degree of Impairment:

## 2020-06-20 NOTE — PROGRESS NOTES
[facility] Hospitalist Progress Note     CC: Hospital Follow up    PCP: Timmy Coleman MD       Assessment/Plan:     Principal Problem:    Community acquired pneumonia of right lower lobe of lung  Active Problems:    Acute on chronic congestive heart failure, straight caths, baclofen pump  -HHC     Ostomy  Collagenous colitis    Pancreatic Cystic Lesion  -no diarrhea reported   -follows with Dr Lisa Myles     Anxiety/Depression  -continue home meds     GOC  -POA-     FN:  - IVF:none  - Diet: cardiac    non-tender, non-distended, +BS  Neuro: Grossly normal, CN intact, sensory intact  Psych: Affect- normal  SKIN: warm, dry, multiple bruises on arms and legs  EXT: trace edema BLE      Data Review:       Labs:     Recent Labs   Lab 06/19/20  1650 06/20/20  0 Saline Flush, acetaminophen, PEG 3350, ondansetron HCl

## 2020-06-20 NOTE — PLAN OF CARE
[facility] Plan of Care Note    Giovanna Skaggs Patient Status:  Emergency    1945 MRN [id number]   Location [address] Attending Ana Humphrey MD   Hosp Day # 0 PCP Estrellita Kayser, MD     66year old fe eliquis  • CAP - per primary, empiric abx      iLttle Goodrich Md.   [facility] Card[facility]

## 2020-06-20 NOTE — PLAN OF CARE
Alert and oriented.  at bedside to complete admission.  took hearing aids home. Purewick in place. Bladder scanned. <400. IV Abx. Bed in lowest position. Bed alarm on. Call light in reach. Will continue to monitor. Pt bladder scanned.  Gary Rosa 40 - Supplemental O2  - Incentive spirometry   - Coughing, deep-breathing, repositioning   - See additional Care Plan goals for specific interventions   Outcome: Progressing     Problem: CARDIOVASCULAR - ADULT  Goal: Maintains optimal cardiac output and hem

## 2020-06-20 NOTE — CONSULTS
[place] [facility] - [facility]     [facility] Consult Note    Geeta Solo Patient Status:  Inpatient    1945 MRN [id number]   Location [facility] 3W/SW Attending Estela Oconnor MD   Hosp Day # 1 PCP Nasra Arthur MD     R atherosclerosis    • Deep vein thrombosis (HCC)     jamal filter in place.  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 Mild cardiomegaly with new mild interstitial edema.   There is patchy right basilar and left mid lung airspace disease which may suggest changes from pulmonary edema, but I cannot exclude pneumonia and or ate coronary is strongly dominant. 3. Normal LV size and contractility. 4. Moderate elevation of left ventricular filling pressures at rest.  5. Moderate mitral insufficiency.       Assessment and Plan:  67 y/o F with pmh severe MR s/p MitraClip x2 (TTE 6/202

## 2020-06-20 NOTE — CONSULTS
[facility]OSP - [facility]    Report of Consultation    Wiley Robledo Patient Status:  Inpatient    1945 MRN [id number]   Location [facility] 3W/SW Attending Dakota Main MD   Hosp Day # 1 PCP Rin Jimenez MD     Date of Adm Hereditary spastic paraplegia X 20 years    • Osteoarthritis     legs, back, shoulders and knees   • OSTEOPENIA    • Other and unspecified hyperlipidemia    • OTHER DISEASES     spastic paraparesis   • OTHER DISEASES     cataracts   • OTHER DISEASES     se MAIN OR   • KNEE REPLACEMENT SURGERY      left  12/05   • Serina Gould N/A 10/31/2018    Performed by Yanet Molina MD at [facility] 1615  8-24-12    cysto Dr. Rooap Bauer   • [place] STONE,>2.5CM  03/12/2020    laser litho bladder stones 17 g, Oral, Daily PRN  ondansetron HCl (ZOFRAN) injection 4 mg, 4 mg, Intravenous, Q6H PRN  furosemide (LASIX) injection 20 mg, 20 mg, Intravenous, BID (Diuretic)  vancomycin HCl (FIRVANQ) 50 MG/ML oral solution 125 mg, 125 mg, Oral, Daily      furosemide Cap, Take 1 capsule (200 mg total) by mouth 3 (three) times daily as needed for cough. EZETIMIBE 10 MG Oral Tab, TAKE 1 TABLET ONCE DAILY  traMADol HCl 50 MG Oral Tab, Take 0.5 tablets (25 mg total) by mouth every 8 (eight) hours as needed for Pain.   acet 82, temperature 97.6 °F (36.4 °C), temperature source Oral, resp. rate 16, height 5' 4\" (1.626 m), weight 122 lb 3.2 oz (55.4 kg), SpO2 97 %, not currently breastfeeding.     Constitutional: no acute distress  Eyes: PERRL  ENT: nares patent  Cardio: Irregu exertion over the last several weeks.   Underlying history of significant cardiac disease including severe MR status post mitral clip, chronic diastolic CHF, atrial fibrillation.  -Chest x-ray with patchy basilar atelectasis versus possible infiltrate altho

## 2020-06-20 NOTE — PLAN OF CARE
Turn q 2 while in bed. Offloaded heels. Up to the chair.      Problem: SKIN/TISSUE INTEGRITY - ADULT  Goal: Skin integrity remains intact  Description  INTERVENTIONS  - Assess and document risk factors for pressure ulcer development  - Assess and document s

## 2020-06-21 ENCOUNTER — APPOINTMENT (OUTPATIENT)
Dept: ULTRASOUND IMAGING | Facility: HOSPITAL | Age: 75
DRG: 291 | End: 2020-06-21
Attending: HOSPITALIST
Payer: MEDICARE

## 2020-06-21 PROCEDURE — 76770 US EXAM ABDO BACK WALL COMP: CPT | Performed by: HOSPITALIST

## 2020-06-21 PROCEDURE — 99232 SBSQ HOSP IP/OBS MODERATE 35: CPT | Performed by: INTERNAL MEDICINE

## 2020-06-21 RX ORDER — POTASSIUM CHLORIDE 20 MEQ/1
40 TABLET, EXTENDED RELEASE ORAL EVERY 4 HOURS
Status: COMPLETED | OUTPATIENT
Start: 2020-06-21 | End: 2020-06-21

## 2020-06-21 NOTE — PROGRESS NOTES
[facility]OSP - [facility]     [facility] Progress Note    Rosemary Salcedo Patient Status:  Inpatient    1945 MRN [id number]   Location [facility] 3W/SW Attending Lottie Hooker MD   Hosp Day # 2 PCP Umair Terry MD 06/19/20  1650   TROP <0.045     No results for input(s): BNP in the last 168 hours. Lab Results   Component Value Date    INR 1.12 (H) 10/03/2019       Studies:  CT chest:  1.  Diffuse bilateral interstitial prominence/infiltrate, predominantly posteriorl study.  Normal ejection fraction calculated at 77%.    2.  The EKG portion of the test showed no ischemic ST depression.  There were frequent premature ventricular contractions as well as couplets, and one episode of ventricular bigeminy.  No other signific

## 2020-06-21 NOTE — PROGRESS NOTES
[facility] Hospitalist Progress Note     CC: Hospital Follow up    PCP: Adelita Wilson MD       Assessment/Plan:     Principal Problem:    Community acquired pneumonia of right lower lobe of lung  Active Problems:    Acute on chronic congestive heart failure, Diff colitis  -oral vanco prophylaxis     Hx DVT S/P IVC Filter   -eliquis     HL  - statin     Hereditary Spastic Paralysis  -baseline status- WC bound, self straight caths, baclofen pump  -HHC     Ostomy  Collagenous colitis    Pancreatic Cystic Lesion sounds at bases  CV: RRR, no murmurs   ABD: Soft, non-tender, non-distended, +BS  Neuro: Grossly normal, CN intact, sensory intact  Psych: Affect- normal  SKIN: warm, dry, multiple bruises on arms and legs  EXT: trace edema BLE      Data Review:       Labs 6/19/2020 at 5:43 PM     Finalized by (CST): Mayda Pena MD on 6/19/2020 at 5:46 PM              Meds:     • meropenem  500 mg Intravenous Q12H   • Potassium Chloride ER  40 mEq Oral Q4H   • apixaban  5 mg Oral BID   • aspirin  81 mg Oral Daily   • ator

## 2020-06-21 NOTE — PLAN OF CARE
Problem: Patient Centered Care  Goal: Patient preferences are identified and integrated in the patient's plan of care  Description  Interventions:  - What would you like us to know as we care for you?  I have home health care  - Provide timely, complete, trends  - Monitor for bleeding, hypotension and signs of decreased cardiac output  - Evaluate effectiveness of vasoactive medications to optimize hemodynamic stability  - Monitor arterial and/or venous puncture sites for bleeding and/or hematoma  - Assess

## 2020-06-21 NOTE — CM/SW NOTE
SHERRELL received MDO regarding hospital bed overlay. SHERRELL performed chart review. Pt will need special gel mattress due to wounds. SHERRELL started Aidin referral to HME for gel mattress. HME form on the chart.     MD to sign HME form tubed down    SHERRELL to follow

## 2020-06-21 NOTE — CONSULTS
[facility] AND [facility] Infectious Disease  Report of Consultation    Faisal Re Patient Status:  Inpatient    1945 MRN [id number]   Location [facility] 3W/SW Attending Salma May MD   Hosp Day # 2 PCP Kartihk Horton MD spastic paraplegia X 20 years    • Osteoarthritis     legs, back, shoulders and knees   • OSTEOPENIA    • Other and unspecified hyperlipidemia    • OTHER DISEASES     spastic paraparesis   • OTHER DISEASES     cataracts   • OTHER DISEASES     secondary poy left  12/05   • MITRACLIP N/A 10/31/2018    Performed by Uriel Tolbert MD at [facility] 1615  8-24-12    cysto Dr. Arianna Bloom   • Veronicaport STONE,>2.5CM  03/12/2020    laser litho bladder stones   • SPINE SURGERY PROCEDURE UNLISTED (ZETIA) tab 10 mg, 10 mg, Oral, Daily  •  ferrous sulfate EC tab 325 mg, 325 mg, Oral, Daily with breakfast  •  ipratropium-albuterol (DUONEB) nebulizer solution 3 mL, 3 mL, Nebulization, TID  •  Metoprolol Succinate ER (Toprol XL) 24 hr tab 50 mg, 50 mg, 06/21/20 1317 107/70 — — 96 — 95 %   06/21/20 1006 106/68 97.4 °F (36.3 °C) Oral 87 18 97 %   06/21/20 0720 — — — — — 99 %   06/21/20 0553 98/67 97.3 °F (36.3 °C) Axillary 69 17 97 %   06/20/20 2034 118/74 97.6 °F (36.4 °C) Oral 74 17 98 %   06/20/20 200  h/o relapsed, recurrent c.diff in the setting of known collaginous colitis and a h/o ostomy  - Continue p.o. vancomycin prophy     3.  AECHF with volume overload on imaging  - COVID negative  - Doubt pneumonia     4.  H/o hereditary spastic paraplegia  -

## 2020-06-21 NOTE — PROGRESS NOTES
[facility]     Progress Note        Corby Maguire Patient Status:  Inpatient    1945 MRN [id number]   Location [facility] 3W/SW Attending Dina Koehler MD   Hosp Day # 2 PCP Trish Johnson MD       Subjective: 20 mg, 20 mg, Intravenous, BID (Diuretic)  vancomycin HCl (FIRVANQ) 50 MG/ML oral solution 125 mg, 125 mg, Oral, Daily        Continuous Infusions:     Physical Exam  Constitutional: no acute distress  Eyes: PERRL  ENT: nares pateint  Cardio: irregularly i respiratory failure  2. Severe MR status post mitral clip  3. Chronic diastolic CHF  4. Atrial fibrillation  5. Spastic paraplegia  6.   Klebsiella ESBL UTI     Plan   -Patient presents with evidence of increasing dyspnea with exertion over the last sev

## 2020-06-21 NOTE — PLAN OF CARE
Pain medication given as needed for leg and feet pain. US of kidney done today. Nursing communication order was placed for [facility] boot. RN to call tomorrow since the office is closed on Sunday. Plan is to Dc tomorrow on IV antibiotics.  Midline to be placed Progressing  Goal: Patient/Family Short Term Goal  Description  Patient's Short Term Goal: Breathe better    Interventions:   - Supplemental O2  - Incentive spirometry   - Coughing, deep-breathing, repositioning   - See additional Care Plan goals for speci

## 2020-06-22 PROCEDURE — 99232 SBSQ HOSP IP/OBS MODERATE 35: CPT | Performed by: INTERNAL MEDICINE

## 2020-06-22 RX ORDER — IPRATROPIUM BROMIDE AND ALBUTEROL SULFATE 2.5; .5 MG/3ML; MG/3ML
3 SOLUTION RESPIRATORY (INHALATION)
Status: DISCONTINUED | OUTPATIENT
Start: 2020-06-22 | End: 2020-06-24

## 2020-06-22 NOTE — PLAN OF CARE
L sided colostomy managed independently by pt. Bladder scanned pt this AM-- 500mL in bladder. Straight cath output was 500mL. Pt straight caths herself at home PRN. Pt repositioned and frequently rounded on. Call light within reach. Plan: 2:2:1 FR.  IV L Goal  Description  Patient's Short Term Goal: Breathe better    Interventions:   - Supplemental O2  - Incentive spirometry   - Coughing, deep-breathing, repositioning   - See additional Care Plan goals for specific interventions   Outcome: Progressing

## 2020-06-22 NOTE — PROGRESS NOTES
[place] [facility] - [facility]    Progress Note    Linda Alfaro Patient Status:  Inpatient    1945 MRN [id number]   Location [facility] 3W/SW Attending Uriel Rocha MD   [facility] Day # 3 PCP Magali Abarca MD        Subjective:   Juan Joseph 02/10/2009    TSH 0.651 09/09/2019     04/13/2019    DDIMER <0.27 11/24/2019    ESRML 7 09/09/2019    MG 2.1 06/20/2020    PHOS 2.8 04/23/2019    TROP <0.045 06/19/2020    CK 30 10/31/2019       Ct Chest ([id number])    Result Date: 6/20/2020  CONCLU

## 2020-06-22 NOTE — CM/SW NOTE
11: 25AM  Per RN rounds - pt may be medically cleared for d/c today. Received [facility] for [facility] orders. Per Aidin: pt is covered at 100% w/ [employer], [employer] and [employer] for home IV anbx. [facility] would have a copay of $45 plus $55 for supplies/week.

## 2020-06-22 NOTE — PROGRESS NOTES
[facility] AND [facility] Infectious Disease  Progress Note    Vickie Beltre Patient Status:  Inpatient    1945 MRN [id number]   Location [facility] 3W/SW Attending Amirah Jefferson MD   Hosp Day # 3 PCP Julio March MD     Subject low  - IV meropenem ongoing     2.  h/o relapsed, recurrent c.diff in the setting of known collaginous colitis and a h/o ostomy  - Continue p.o. vancomycin prophy     3.  AECHF with volume overload on imaging  - COVID negative  - Doubt pneumonia  - Diuresi

## 2020-06-22 NOTE — PROGRESS NOTES
[facility] Hospitalist Progress Note     CC: Hospital Follow up    PCP: Dwaine Perez MD       Assessment/Plan:     Principal Problem:    Community acquired pneumonia of right lower lobe of lung  Active Problems:    Acute on chronic congestive heart failure,    Hx of recurrent C.  Diff colitis  -oral vanco prophylaxis     Hx DVT S/P IVC Filter   -eliquis     HL  - statin     Hereditary Spastic Paralysis  -baseline status- WC bound, self straight caths, baclofen pump  -HHC     Ostomy  Collagenous colitis    Pa in place  Neck: Supple  Pulm: decreased breath sounds at bases  CV: RRR, no murmurs   ABD: Soft, non-tender, non-distended, +BS  Neuro: Grossly normal, CN intact, sensory intact  Psych: Affect- normal  SKIN: warm, dry, multiple bruises on arms and legs  EX base.   Correlate clinically and follow-up studies are advised.     Dictated by (CST): Little Arciniega MD on 6/19/2020 at 5:43 PM     Finalized by (CST): Little Arciniega MD on 6/19/2020 at 5:46 PM              Meds:     • ipratropium-albuterol  3 mL Nebulizat

## 2020-06-22 NOTE — PLAN OF CARE
Patient resting comfortably in bed. Bed alarm on, call light within reach. DC pending midline placement and cardiac clearance. Pt updated and informed on plan of care. Will continue to monitor.       Problem: Patient/Family Goals  Goal: Patient/Family Long Outcome: Adequate for Discharge     Problem: SAFETY ADULT - FALL  Goal: Free from fall injury  Description  INTERVENTIONS:  - Assess pt frequently for physical needs  - Identify cognitive and physical deficits and behaviors that affect risk of falls.   -

## 2020-06-22 NOTE — PROGRESS NOTES
[facility] AND [facility]  Progress Note    Dmitriy Martin Patient Status:  Inpatient    1945 MRN [id number]   Location [facility] 3W/SW Attending Aurora Fontanez MD   Hosp Day # 3 PCP Augusto Woodruff MD     Assessment:    1.   Admission fo non-tender. Extremities: Without clubbing, cyanosis or edema. Peripheral pulses are 2+. Skin: Warm and dry.      Labs:     Lab Results   Component Value Date    INR 1.12 (H) 10/03/2019     Lab Results   Component Value Date     06/22/2020    K 4.2

## 2020-06-23 ENCOUNTER — APPOINTMENT (OUTPATIENT)
Dept: PICC SERVICES | Facility: HOSPITAL | Age: 75
DRG: 291 | End: 2020-06-23
Attending: INTERNAL MEDICINE
Payer: MEDICARE

## 2020-06-23 PROCEDURE — 99232 SBSQ HOSP IP/OBS MODERATE 35: CPT | Performed by: INTERNAL MEDICINE

## 2020-06-23 RX ORDER — FLUTICASONE PROPIONATE 50 MCG
1 SPRAY, SUSPENSION (ML) NASAL DAILY
Status: DISCONTINUED | OUTPATIENT
Start: 2020-06-23 | End: 2020-06-24

## 2020-06-23 RX ORDER — POTASSIUM CHLORIDE 20 MEQ/1
40 TABLET, EXTENDED RELEASE ORAL ONCE
Status: COMPLETED | OUTPATIENT
Start: 2020-06-23 | End: 2020-06-23

## 2020-06-23 RX ORDER — FUROSEMIDE 40 MG/1
40 TABLET ORAL DAILY
Status: DISCONTINUED | OUTPATIENT
Start: 2020-06-24 | End: 2020-06-24

## 2020-06-23 NOTE — CM/SW NOTE
Received HME order form completed/signed by MD. SHERRELL contacted Alicia Lala w/ HME and notified. Per Epic - anticipated d/c for tomorrow 6/24.     PLAN: Home w/ [facility], Home IV Anbx w/ [facility], HME Gel Overlay Mattress    SW/RACHID to remain rui

## 2020-06-23 NOTE — PLAN OF CARE
Patient resting comfortably in bed with call light in reach. Midline placed in right upper arm today, R arm precautions. Repositioned Q2HR.   Plan is for patient to be discharged tomorrow 6/24/2020 with [place], home ABX and boot (fax order and call to set up) a O2  - Incentive spirometry   - Coughing, deep-breathing, repositioning   - See additional Care Plan goals for specific interventions   Outcome: Progressing     Problem: CARDIOVASCULAR - ADULT  Goal: Maintains optimal cardiac output and hemodynamic stabilit

## 2020-06-23 NOTE — PROGRESS NOTES
[place] [facility] - [facility]    Progress Note    Giovanna Skaggs Patient Status:  Inpatient    1945 MRN [id number]   Location [facility] 3W/SW Attending Bienvenido Herrmann MD   [facility] Day # 4 PCP Estrellita Kayser, MD        Subjective: BILT 0.8 05/21/2020    TP 5.6 (L) 05/21/2020    AST 24 05/21/2020    ALT 45 05/21/2020    PTT 34.2 10/02/2019    INR 1.12 (H) 10/03/2019    T4F 0.88 02/10/2009    TSH 0.651 09/09/2019     04/13/2019    DDIMER <0.27 11/24/2019    ESRML 7 09/09/2019

## 2020-06-23 NOTE — PROGRESS NOTES
[place] [facility] - [facility]    Progress Note    Harjinder Dukes Patient Status:  Inpatient    1945 MRN [id number]   Location [facility] 3W/SW Attending Karely Lai MD   [facility] Day # 4 PCP Sonny Nageotte, MD         CARDIOLOGY ATT will pursue as outpatient, may need additional clip.     UTI  -Per ID            Results:     Lab Results   Component Value Date    WBC 10.6 06/20/2020    HGB 14.2 06/20/2020    HCT 44.2 06/20/2020    .0 06/20/2020    CREATSERUM 0.42 (L) 06/23/2020

## 2020-06-23 NOTE — PLAN OF CARE
Back px managed w/ PRN Tramadol. BLE repositioned to prevent hips from turning inward. Pt comfortable and resting in bed. Call light within reach. Plan: 2:2:1 FR. IV Lasix BID. IV meropenem. Midline placement prior to d/c.  Overlay hospital bed/gel shaneka spirometry   - Coughing, deep-breathing, repositioning   - See additional Care Plan goals for specific interventions   Outcome: Progressing     Problem: CARDIOVASCULAR - ADULT  Goal: Maintains optimal cardiac output and hemodynamic stability  Description

## 2020-06-23 NOTE — PROGRESS NOTES
[facility] Hospitalist Progress Note     CC: Hospital Follow up    PCP: Bebo Mccauley MD       Assessment/Plan:     Principal Problem:    Community acquired pneumonia of right lower lobe of lung  Active Problems:    Acute on chronic congestive heart failure, recurrent C.  Diff colitis  -oral vanco prophylaxis     Hx DVT S/P IVC Filter   -eliquis     HL  - statin     Hereditary Spastic Paralysis  -baseline status- WC bound, self straight caths, baclofen pump  -HHC     Ostomy  Collagenous colitis    Pancreatic Cy murmurs   ABD: Soft, non-tender, non-distended, +BS  Neuro: Grossly normal, CN intact, sensory intact  Psych: Affect- normal  SKIN: warm, dry, multiple bruises on arms and legs  EXT: trace edema BLE    Data Review:       Labs:     Recent Labs   Lab 06/19/2 Oral Daily with breakfast   • Metoprolol Succinate ER  50 mg Oral Daily   • predniSONE  5 mg Oral Daily   • Venlafaxine HCl ER  75 mg Oral Daily   • furosemide  20 mg Intravenous BID (Diuretic)   • vancomycin HCl  125 mg Oral Daily       traMADol HCl, Norm

## 2020-06-23 NOTE — PROGRESS NOTES
[facility] AND CLINICS  [facility] Infectious Disease Progress Note    Cherie Silverio Patient Status:  Inpatient    1945 MRN [id number]   Location [facility] 3W/SW Attending Theo Sanchez, [address] [facility] Se Day # 4 PCP MD Monica Gallagher Daily  •  predniSONE (DELTASONE) tab 5 mg, 5 mg, Oral, Daily  •  traMADol HCl (ULTRAM) tab 25 mg, 25 mg, Oral, Q8H PRN  •  Venlafaxine HCl ER (EFFEXOR-XR) 24 hr cap 75 mg, 75 mg, Oral, Daily  •  Normal Saline Flush 0.9 % injection 3 mL, 3 mL, Intravenous, diarrhea currently  -on PO Vanco prophy    3. AECHF with volume overload on imaging  -COVID negative  -unlikely pneumonia  -diuresis ongoing    4.  H/o hereditary spastic paraplegia  -WC bound at baseline  -neurogenic bladder due to this, straight caths at

## 2020-06-24 VITALS
OXYGEN SATURATION: 98 % | BODY MASS INDEX: 21.85 KG/M2 | HEIGHT: 64 IN | HEART RATE: 61 BPM | DIASTOLIC BLOOD PRESSURE: 72 MMHG | WEIGHT: 128 LBS | SYSTOLIC BLOOD PRESSURE: 120 MMHG | TEMPERATURE: 98 F | RESPIRATION RATE: 16 BRPM

## 2020-06-24 PROCEDURE — 99232 SBSQ HOSP IP/OBS MODERATE 35: CPT | Performed by: INTERNAL MEDICINE

## 2020-06-24 RX ORDER — POTASSIUM CHLORIDE 20 MEQ/1
40 TABLET, EXTENDED RELEASE ORAL EVERY 4 HOURS
Status: DISCONTINUED | OUTPATIENT
Start: 2020-06-24 | End: 2020-06-24

## 2020-06-24 RX ORDER — FUROSEMIDE 40 MG/1
40 TABLET ORAL DAILY
Qty: 30 TABLET | Refills: 0 | Status: SHIPPED | OUTPATIENT
Start: 2020-06-25 | End: 2020-10-14

## 2020-06-24 NOTE — CM/SW NOTE
10: 40AM  Per RN rounds, pt will most likely be medically cleared for d/c today. SW contacted Rosemary Ohara w/ [employer] and confirmed they have everything for Gel Overlay mattress. They will contact family/ to arrange delivery.     SHERRELL contacted Breana bansal/ [place]

## 2020-06-24 NOTE — PLAN OF CARE
Patient is alert and orientated. Vital signs stable. Denies n/v. Tolerating cardiac diet. Denies pain. Max assist. Turn q turn and when asked. Patient unable to move legs. Patient self straight cath's at home. Patient maintains colostomy bag.   1st dose of Term Goal  Description  Patient's Long Term Goal: Return home with home health    Interventions:  - Lasix  - IV Abx  - PT/OT  - See additional Care Plan goals for specific interventions   Outcome: Progressing  Goal: Patient/Family Short Term Goal  Descript

## 2020-06-24 NOTE — PROGRESS NOTES
[facility] Infectious Disease  Progress Note    Berna Cisneros Patient Status:  Inpatient    1945 MRN [id number]   Location [facility] 3W/SW Attending Jacob Kayser, MD   Hosp Day # 5 PCP Pollo Sen MD     Subject low  - IV meropenem ongoing     2.  h/o relapsed, recurrent c.diff in the setting of known collaginous colitis and a h/o ostomy  - Continue p.o. vancomycin prophy     3.  AECHF with volume overload on imaging  - COVID negative  - Doubt pneumonia  - Diuresi

## 2020-06-24 NOTE — PROGRESS NOTES
[facility]    Progress Note    Faisal Land Patient Status:  Inpatient    1945 MRN [id number]   Location [facility] 3W/SW Attending Salma May MD   [facility] Day # 5 PCP Karthik Horton MD        Subjective: 05/21/2020    TP 5.6 (L) 05/21/2020    AST 24 05/21/2020    ALT 45 05/21/2020    PTT 34.2 10/02/2019    INR 1.12 (H) 10/03/2019    T4F 0.88 02/10/2009    TSH 0.651 09/09/2019     04/13/2019    DDIMER <0.27 11/24/2019    ESRML 7 09/09/2019    MG 2.0

## 2020-06-24 NOTE — PROGRESS NOTES
[facility]    Progress Note    Sharran Koyanagi Patient Status:  Inpatient    1945 MRN [id number]   Location [facility] 3W/SW Attending Re Melchor, [address] [facility] Se Day # 5 PCP Nadya Holt 224 Lasix 40mg qd 06/24/2020, Cr remains stable.   Cpm, follow I&O/daily weights.     Acute on chronic hypoxic resp failure   - pulm following     Atrial fibrillation, persistent  -rates controlled on diltiazem and Toprol, cpm   -AC Eliquis.     Mitral regurgit

## 2020-06-24 NOTE — DISCHARGE SUMMARY
General Medicine Discharge Summary     Patient ID:  Norma Scott  66year old  5/4/1945    Admit date: 6/19/2020    Discharge date and time: 06/24/20    Attending Physician: Payton Walker MD     Primary Care Physician: Bebo Mccauley MD Jose Edwards is a 66year old female with  hs DVT s/p permanent IVC filter on eliquis, recurrent cdif colitis and collagenous colitis s/p laparoscopic loop sigmoid colostomy in 9/2017, anxiety/depression, stable HL, non obs CAD, S/P ostomy, Afib on  -baseline status- WC bound, self straight caths, baclofen pump  -HHC     Ostomy  Collagenous colitis    Pancreatic Cystic Lesion  -no diarrhea reported   -follows with Dr Margareth Dumont     Anxiety/Depression  -continue home meds     GOC  -POA-    Exa dilTIAZem HCl 30 MG Tabs  Commonly known as:  CARDIZEM     ezetimibe 10 MG Tabs  Commonly known as:  ZETIA  TAKE 1 TABLET ONCE DAILY     ferrous sulfate 325 (65 FE) MG Tbec     Fluticasone Propionate 50 MCG/ACT Susp  Commonly known as:  FLONASE  1 spray by PCP   Specialist cardiology       FU  Follow-up Information     Maria Leonardo MD In 1 week.     Specialties:  Internal Medicine, PEDIATRICS  Contact information:  Darnell Ingram [address] instr

## 2020-06-28 ENCOUNTER — TELEPHONE (OUTPATIENT)
Dept: GASTROENTEROLOGY | Facility: CLINIC | Age: 75
End: 2020-06-28

## 2020-06-28 NOTE — TELEPHONE ENCOUNTER
Nehal Mcgraw contacted me today as the on-call physician. She was recently discharged from the hospital on IV Invanz for an ESBL UTI. She has a previous history of C. difficile. She questions whether she should take vancomycin.   She has had stomach upset but

## 2020-06-29 ENCOUNTER — OFFICE VISIT (OUTPATIENT)
Dept: CARDIOLOGY CLINIC | Facility: HOSPITAL | Age: 75
End: 2020-06-29
Attending: NURSE PRACTITIONER
Payer: MEDICARE

## 2020-06-29 VITALS — OXYGEN SATURATION: 95 % | HEART RATE: 78 BPM | DIASTOLIC BLOOD PRESSURE: 61 MMHG | SYSTOLIC BLOOD PRESSURE: 90 MMHG

## 2020-06-29 DIAGNOSIS — Z98.890 S/P MITRAL VALVE CLIP IMPLANTATION: ICD-10-CM

## 2020-06-29 DIAGNOSIS — I48.11 LONGSTANDING PERSISTENT ATRIAL FIBRILLATION (HCC): ICD-10-CM

## 2020-06-29 DIAGNOSIS — I34.0 NON-RHEUMATIC MITRAL REGURGITATION: ICD-10-CM

## 2020-06-29 DIAGNOSIS — I50.33 ACUTE ON CHRONIC HEART FAILURE WITH PRESERVED EJECTION FRACTION (HFPEF) (HCC): Primary | ICD-10-CM

## 2020-06-29 DIAGNOSIS — Z95.818 S/P MITRAL VALVE CLIP IMPLANTATION: ICD-10-CM

## 2020-06-29 DIAGNOSIS — I50.9 HEART FAILURE, UNSPECIFIED (HCC): ICD-10-CM

## 2020-06-29 PROCEDURE — 83880 ASSAY OF NATRIURETIC PEPTIDE: CPT | Performed by: NURSE PRACTITIONER

## 2020-06-29 PROCEDURE — 80048 BASIC METABOLIC PNL TOTAL CA: CPT | Performed by: NURSE PRACTITIONER

## 2020-06-29 PROCEDURE — 99212 OFFICE O/P EST SF 10 MIN: CPT | Performed by: NURSE PRACTITIONER

## 2020-06-29 PROCEDURE — 85025 COMPLETE CBC W/AUTO DIFF WBC: CPT | Performed by: NURSE PRACTITIONER

## 2020-06-29 PROCEDURE — 36415 COLL VENOUS BLD VENIPUNCTURE: CPT | Performed by: NURSE PRACTITIONER

## 2020-06-29 PROCEDURE — 99214 OFFICE O/P EST MOD 30 MIN: CPT | Performed by: NURSE PRACTITIONER

## 2020-06-29 RX ORDER — VANCOMYCIN HYDROCHLORIDE 125 MG/1
125 CAPSULE ORAL 2 TIMES DAILY
COMMUNITY
End: 2020-07-27

## 2020-06-29 NOTE — PROGRESS NOTES
[address] Patient Status:  No patient class for patient encounter    1945 MRN [id number]   Location [facility] Lauran Lint, MD Dr. Loetta Duverney is a 66 y 10/31/18, repeat 10/2/19  Chronic atrial fib with RVR, on eliquis  History of DVT with permanent IVC filter  CAD non obstructive  Colitis with history of cdiff  GERD  Hereditery spastic paraplegia, wheelchair bound with baclofen pump    [facility] [place] [facility] 04/23/2019 06:18 AM    TROP <0.045 06/19/2020 04:50 PM    CK 30 10/31/2019 11:27 AM    PGLU 87 02/24/2020 08:13 AM       Clinical labs drawn by MA: bmp, pro bnp and cbc , results reviewed with pt and .     BP 90/61   Pulse 78   SpO2 95%     Clinical technically sufficient to allow evaluation of LV     diastolic function. 2. Aorta: Aortic root was 3.9cm at the sinus of Valsalva. 3. Mitral valve: A single MitraClip was previously present. Two additional [id number] MitraClips were now added.  Zuri Vergara infection, seen by ID   - diuresed 6 lbs with IV lasix, home  On lasix 40 mg daily. -feeling weak, bp low 90/61, hypovolemic with increased water stools on IV abx x 14 days. She just restarted oral vanco for history of cdiff. Less montes.    - renal function (two) times daily as needed. , Disp: 60 g, Rfl: 3  •  POTASSIUM CHLORIDE ER 20 MEQ Oral Tab CR, TAKE 1 TABLET BY MOUTH TWICE DAILY.  TAKE ONLY WHEN TAKING FUROSEMIDE, Disp: 60 tablet, Rfl: 1  •  METOPROLOL SUCCINATE ER 50 MG Oral Tablet 24 Hr, TAKE 1 TABLET total) by mouth daily. , Disp: , Rfl: 0  •  ferrous sulfate 325 (65 FE) MG Oral Tab EC, Take 325 mg by mouth daily with breakfast., Disp: , Rfl:   •  Atorvastatin Calcium 40 MG Oral Tab, Take 40 mg by mouth nightly.  , Disp: , Rfl:   •  benzonatate 200 MG O

## 2020-06-29 NOTE — PATIENT INSTRUCTIONS
Hold furosemide dose today and tomorrow, then continue furosemide 40 mg daily until seeing Dr. Avery Delatorre all your same medications    Call if having any dizziness, lightheadedness, heart racing, palpitations, chest pain, shortness of breath, cough

## 2020-06-30 ENCOUNTER — LAB REQUISITION (OUTPATIENT)
Dept: LAB | Facility: HOSPITAL | Age: 75
End: 2020-06-30
Payer: MEDICARE

## 2020-06-30 DIAGNOSIS — J18.9 PNEUMONIA, UNSPECIFIED ORGANISM: ICD-10-CM

## 2020-06-30 LAB
ALBUMIN SERPL-MCNC: 2.7 G/DL (ref 3.4–5)
ALBUMIN/GLOB SERPL: 1.2 {RATIO} (ref 1–2)
ALP LIVER SERPL-CCNC: 91 U/L (ref 55–142)
ALT SERPL-CCNC: 40 U/L (ref 13–56)
ANION GAP SERPL CALC-SCNC: 5 MMOL/L (ref 0–18)
AST SERPL-CCNC: 23 U/L (ref 15–37)
BASOPHILS # BLD AUTO: 0.04 X10(3) UL (ref 0–0.2)
BASOPHILS NFR BLD AUTO: 0.5 %
BILIRUB SERPL-MCNC: 0.8 MG/DL (ref 0.1–2)
BUN BLD-MCNC: 23 MG/DL (ref 7–18)
BUN/CREAT SERPL: 47.9 (ref 10–20)
CALCIUM BLD-MCNC: 8.5 MG/DL (ref 8.5–10.1)
CHLORIDE SERPL-SCNC: 106 MMOL/L (ref 98–112)
CO2 SERPL-SCNC: 30 MMOL/L (ref 21–32)
CREAT BLD-MCNC: 0.48 MG/DL (ref 0.55–1.02)
CRP SERPL-MCNC: 0.3 MG/DL (ref ?–0.3)
DEPRECATED RDW RBC AUTO: 49.7 FL (ref 35.1–46.3)
EOSINOPHIL # BLD AUTO: 0.06 X10(3) UL (ref 0–0.7)
EOSINOPHIL NFR BLD AUTO: 0.7 %
ERYTHROCYTE [DISTWIDTH] IN BLOOD BY AUTOMATED COUNT: 15.6 % (ref 11–15)
GLOBULIN PLAS-MCNC: 2.2 G/DL (ref 2.8–4.4)
GLUCOSE BLD-MCNC: 130 MG/DL (ref 70–99)
HCT VFR BLD AUTO: 39.3 % (ref 35–48)
HGB BLD-MCNC: 12.6 G/DL (ref 12–16)
IMM GRANULOCYTES # BLD AUTO: 0.03 X10(3) UL (ref 0–1)
IMM GRANULOCYTES NFR BLD: 0.3 %
LYMPHOCYTES # BLD AUTO: 2.23 X10(3) UL (ref 1–4)
LYMPHOCYTES NFR BLD AUTO: 25.8 %
M PROTEIN MFR SERPL ELPH: 4.9 G/DL (ref 6.4–8.2)
MCH RBC QN AUTO: 27.9 PG (ref 26–34)
MCHC RBC AUTO-ENTMCNC: 32.1 G/DL (ref 31–37)
MCV RBC AUTO: 87.1 FL (ref 80–100)
MONOCYTES # BLD AUTO: 0.57 X10(3) UL (ref 0.1–1)
MONOCYTES NFR BLD AUTO: 6.6 %
NEUTROPHILS # BLD AUTO: 5.73 X10 (3) UL (ref 1.5–7.7)
NEUTROPHILS # BLD AUTO: 5.73 X10(3) UL (ref 1.5–7.7)
NEUTROPHILS NFR BLD AUTO: 66.1 %
OSMOLALITY SERPL CALC.SUM OF ELEC: 297 MOSM/KG (ref 275–295)
PLATELET # BLD AUTO: 176 10(3)UL (ref 150–450)
POTASSIUM SERPL-SCNC: 3.9 MMOL/L (ref 3.5–5.1)
RBC # BLD AUTO: 4.51 X10(6)UL (ref 3.8–5.3)
SODIUM SERPL-SCNC: 141 MMOL/L (ref 136–145)
WBC # BLD AUTO: 8.7 X10(3) UL (ref 4–11)

## 2020-06-30 PROCEDURE — 85025 COMPLETE CBC W/AUTO DIFF WBC: CPT

## 2020-06-30 PROCEDURE — 86140 C-REACTIVE PROTEIN: CPT

## 2020-06-30 PROCEDURE — 80053 COMPREHEN METABOLIC PANEL: CPT

## 2020-07-01 ENCOUNTER — APPOINTMENT (OUTPATIENT)
Dept: GENERAL RADIOLOGY | Facility: HOSPITAL | Age: 75
End: 2020-07-01
Attending: EMERGENCY MEDICINE
Payer: MEDICARE

## 2020-07-01 ENCOUNTER — APPOINTMENT (OUTPATIENT)
Dept: ULTRASOUND IMAGING | Facility: HOSPITAL | Age: 75
End: 2020-07-01
Attending: EMERGENCY MEDICINE
Payer: MEDICARE

## 2020-07-01 ENCOUNTER — HOSPITAL ENCOUNTER (EMERGENCY)
Facility: HOSPITAL | Age: 75
Discharge: HOME | End: 2020-07-01
Attending: EMERGENCY MEDICINE
Payer: MEDICARE

## 2020-07-01 VITALS
HEART RATE: 81 BPM | RESPIRATION RATE: 16 BRPM | OXYGEN SATURATION: 98 % | WEIGHT: 127.88 LBS | SYSTOLIC BLOOD PRESSURE: 118 MMHG | BODY MASS INDEX: 22.66 KG/M2 | DIASTOLIC BLOOD PRESSURE: 75 MMHG | HEIGHT: 63 IN | TEMPERATURE: 97 F

## 2020-07-01 DIAGNOSIS — M79.605 LEFT LEG PAIN: Primary | ICD-10-CM

## 2020-07-01 PROCEDURE — 73590 X-RAY EXAM OF LOWER LEG: CPT | Performed by: EMERGENCY MEDICINE

## 2020-07-01 PROCEDURE — 93971 EXTREMITY STUDY: CPT | Performed by: EMERGENCY MEDICINE

## 2020-07-01 PROCEDURE — 99284 EMERGENCY DEPT VISIT MOD MDM: CPT

## 2020-07-01 NOTE — ED INITIAL ASSESSMENT (HPI)
[facility] pain worsening since recent hospitalization for pneumonia.  Hx of lower extremity paralysis

## 2020-07-01 NOTE — ED PROVIDER NOTES
Patient Seen in: [facility] Emergency Department      History   Patient presents with:  Pain    Stated Complaint: LLE pain    HPI    Yvonne Sloanebri Grace is a 66year old female with  hs DVT s/p permanent IVC filter on eliquis, recurrent cdif colit OSTEOPENIA    • Other and unspecified hyperlipidemia    • OTHER DISEASES     spastic paraparesis   • OTHER DISEASES     cataracts   • OTHER DISEASES     secondary poycythemia   • OTHER DISEASES     lumbar spinal stenosis   • OTHER DISEASES     duplicated l at [facility]   • OTHER SURGICAL HISTORY  8-24-12    cysto Dr. Griffin Johns   • [place] STONE,>2.5CM  03/12/2020    laser litho bladder stones   • SPINE SURGERY PROCEDURE UNLISTED      x4   • TONSILLECTOMY     • TOTAL KNEE REPLACEMENT Left    • VALVE REPAIR Curt Drummond MD on 7/01/2020 at 9:28 AM          Us Venous Doppler Leg Left - Diag Img (cpt=93971)    Result Date: 7/1/2020  CONCLUSION:  Negative for sonographic evidence of deep venous thrombosis in the left lower extremity. Remote.    Dictated by NIRALI

## 2020-07-06 ENCOUNTER — LAB REQUISITION (OUTPATIENT)
Dept: LAB | Facility: HOSPITAL | Age: 75
End: 2020-07-06
Payer: MEDICARE

## 2020-07-06 DIAGNOSIS — J18.9 PNEUMONIA, UNSPECIFIED ORGANISM: ICD-10-CM

## 2020-07-06 LAB
ALBUMIN SERPL-MCNC: 2.9 G/DL (ref 3.4–5)
ALBUMIN/GLOB SERPL: 1.4 {RATIO} (ref 1–2)
ALP LIVER SERPL-CCNC: 103 U/L (ref 55–142)
ALT SERPL-CCNC: 52 U/L (ref 13–56)
ANION GAP SERPL CALC-SCNC: 7 MMOL/L (ref 0–18)
AST SERPL-CCNC: 23 U/L (ref 15–37)
BASOPHILS # BLD AUTO: 0.03 X10(3) UL (ref 0–0.2)
BASOPHILS NFR BLD AUTO: 0.3 %
BILIRUB SERPL-MCNC: 0.7 MG/DL (ref 0.1–2)
BUN BLD-MCNC: 23 MG/DL (ref 7–18)
BUN/CREAT SERPL: 46.9 (ref 10–20)
CALCIUM BLD-MCNC: 9.1 MG/DL (ref 8.5–10.1)
CHLORIDE SERPL-SCNC: 111 MMOL/L (ref 98–112)
CO2 SERPL-SCNC: 26 MMOL/L (ref 21–32)
CREAT BLD-MCNC: 0.49 MG/DL (ref 0.55–1.02)
CRP SERPL-MCNC: 2.19 MG/DL (ref ?–0.3)
DEPRECATED RDW RBC AUTO: 50.7 FL (ref 35.1–46.3)
EOSINOPHIL # BLD AUTO: 0.05 X10(3) UL (ref 0–0.7)
EOSINOPHIL NFR BLD AUTO: 0.6 %
ERYTHROCYTE [DISTWIDTH] IN BLOOD BY AUTOMATED COUNT: 15.8 % (ref 11–15)
GLOBULIN PLAS-MCNC: 2.1 G/DL (ref 2.8–4.4)
GLUCOSE BLD-MCNC: 192 MG/DL (ref 70–99)
HCT VFR BLD AUTO: 40.3 % (ref 35–48)
HGB BLD-MCNC: 12.4 G/DL (ref 12–16)
IMM GRANULOCYTES # BLD AUTO: 0.05 X10(3) UL (ref 0–1)
IMM GRANULOCYTES NFR BLD: 0.6 %
LYMPHOCYTES # BLD AUTO: 1.5 X10(3) UL (ref 1–4)
LYMPHOCYTES NFR BLD AUTO: 16.5 %
M PROTEIN MFR SERPL ELPH: 5 G/DL (ref 6.4–8.2)
MCH RBC QN AUTO: 27.3 PG (ref 26–34)
MCHC RBC AUTO-ENTMCNC: 30.8 G/DL (ref 31–37)
MCV RBC AUTO: 88.6 FL (ref 80–100)
MONOCYTES # BLD AUTO: 0.49 X10(3) UL (ref 0.1–1)
MONOCYTES NFR BLD AUTO: 5.4 %
NEUTROPHILS # BLD AUTO: 6.97 X10 (3) UL (ref 1.5–7.7)
NEUTROPHILS # BLD AUTO: 6.97 X10(3) UL (ref 1.5–7.7)
NEUTROPHILS NFR BLD AUTO: 76.6 %
OSMOLALITY SERPL CALC.SUM OF ELEC: 307 MOSM/KG (ref 275–295)
PLATELET # BLD AUTO: 201 10(3)UL (ref 150–450)
POTASSIUM SERPL-SCNC: 3.9 MMOL/L (ref 3.5–5.1)
RBC # BLD AUTO: 4.55 X10(6)UL (ref 3.8–5.3)
SODIUM SERPL-SCNC: 144 MMOL/L (ref 136–145)
WBC # BLD AUTO: 9.1 X10(3) UL (ref 4–11)

## 2020-07-06 PROCEDURE — 86140 C-REACTIVE PROTEIN: CPT

## 2020-07-06 PROCEDURE — 80053 COMPREHEN METABOLIC PANEL: CPT

## 2020-07-06 PROCEDURE — 85025 COMPLETE CBC W/AUTO DIFF WBC: CPT

## 2020-07-10 PROBLEM — Z98.1 HISTORY OF LUMBAR FUSION: Status: RESOLVED | Noted: 2017-10-10 | Resolved: 2020-07-10

## 2020-07-10 PROBLEM — N21.0 BLADDER STONES: Status: RESOLVED | Noted: 2019-12-06 | Resolved: 2020-07-10

## 2020-07-10 PROBLEM — G82.22 PARAPLEGIA, INCOMPLETE (HCC): Status: RESOLVED | Noted: 2020-02-06 | Resolved: 2020-07-10

## 2020-07-10 PROBLEM — J18.9 COMMUNITY ACQUIRED PNEUMONIA OF RIGHT LOWER LOBE OF LUNG: Status: RESOLVED | Noted: 2020-06-19 | Resolved: 2020-07-10

## 2020-07-10 PROBLEM — K52.9 GASTROENTERITIS: Status: RESOLVED | Noted: 2020-02-23 | Resolved: 2020-07-10

## 2020-07-10 PROBLEM — E87.6 HYPOKALEMIA: Status: RESOLVED | Noted: 2020-02-23 | Resolved: 2020-07-10

## 2020-07-10 PROBLEM — R41.82 ALTERED MENTAL STATUS, UNSPECIFIED ALTERED MENTAL STATUS TYPE: Status: RESOLVED | Noted: 2019-12-31 | Resolved: 2020-07-10

## 2020-07-13 PROBLEM — M15.9 PRIMARY OSTEOARTHRITIS INVOLVING MULTIPLE JOINTS: Status: ACTIVE | Noted: 2020-07-13

## 2020-07-13 PROBLEM — M19.049 HAND ARTHRITIS: Status: ACTIVE | Noted: 2020-07-13

## 2020-07-16 ENCOUNTER — TELEPHONE (OUTPATIENT)
Dept: SURGERY | Facility: CLINIC | Age: 75
End: 2020-07-16

## 2020-07-16 PROBLEM — M19.049 HAND ARTHRITIS: Status: RESOLVED | Noted: 2020-07-13 | Resolved: 2020-07-16

## 2020-07-16 PROBLEM — M15.9 PRIMARY OSTEOARTHRITIS INVOLVING MULTIPLE JOINTS: Status: RESOLVED | Noted: 2020-07-13 | Resolved: 2020-07-16

## 2020-07-16 NOTE — TELEPHONE ENCOUNTER
replacement of baclofen pump, patient was refd by Mary Leonard. Is this something Dr. Hamida Ball would do?

## 2020-07-17 NOTE — TELEPHONE ENCOUNTER
Pt states her pump will start beeping in a few days and it needs to be replaced asap. Advised patient per RN that Dr. Reba Valdovinos is out of office for 2 weeks and for patient to check back with her PCP to find the next steps best for her.  Patient appreciative

## 2020-07-18 ENCOUNTER — HOSPITAL ENCOUNTER (INPATIENT)
Facility: HOSPITAL | Age: 75
LOS: 5 days | Discharge: HOME HEALTH | DRG: 292 | End: 2020-07-23
Attending: EMERGENCY MEDICINE | Admitting: HOSPITALIST
Payer: MEDICARE

## 2020-07-18 ENCOUNTER — APPOINTMENT (OUTPATIENT)
Dept: GENERAL RADIOLOGY | Facility: HOSPITAL | Age: 75
DRG: 292 | End: 2020-07-18
Attending: EMERGENCY MEDICINE
Payer: MEDICARE

## 2020-07-18 DIAGNOSIS — I50.9 ACUTE ON CHRONIC CONGESTIVE HEART FAILURE, UNSPECIFIED HEART FAILURE TYPE (HCC): Primary | ICD-10-CM

## 2020-07-18 PROBLEM — E87.6 HYPOKALEMIA: Status: ACTIVE | Noted: 2020-07-18

## 2020-07-18 PROBLEM — R73.9 HYPERGLYCEMIA: Status: ACTIVE | Noted: 2020-07-18

## 2020-07-18 LAB
ANION GAP SERPL CALC-SCNC: 9 MMOL/L (ref 0–18)
BASOPHILS # BLD AUTO: 0.03 X10(3) UL (ref 0–0.2)
BASOPHILS NFR BLD AUTO: 0.3 %
BILIRUB UR QL: NEGATIVE
BUN BLD-MCNC: 21 MG/DL (ref 7–18)
BUN/CREAT SERPL: 36.2 (ref 10–20)
CALCIUM BLD-MCNC: 9 MG/DL (ref 8.5–10.1)
CHLORIDE SERPL-SCNC: 105 MMOL/L (ref 98–112)
CO2 SERPL-SCNC: 27 MMOL/L (ref 21–32)
COLOR UR: YELLOW
CREAT BLD-MCNC: 0.58 MG/DL (ref 0.55–1.02)
DEPRECATED RDW RBC AUTO: 49.2 FL (ref 35.1–46.3)
EOSINOPHIL # BLD AUTO: 0.06 X10(3) UL (ref 0–0.7)
EOSINOPHIL NFR BLD AUTO: 0.6 %
ERYTHROCYTE [DISTWIDTH] IN BLOOD BY AUTOMATED COUNT: 15.7 % (ref 11–15)
GLUCOSE BLD-MCNC: 174 MG/DL (ref 70–99)
GLUCOSE UR-MCNC: NEGATIVE MG/DL
HCT VFR BLD AUTO: 42.4 % (ref 35–48)
HGB BLD-MCNC: 13.8 G/DL (ref 12–16)
IMM GRANULOCYTES # BLD AUTO: 0.04 X10(3) UL (ref 0–1)
IMM GRANULOCYTES NFR BLD: 0.4 %
KETONES UR-MCNC: NEGATIVE MG/DL
LEUKOCYTE ESTERASE UR QL STRIP.AUTO: NEGATIVE
LYMPHOCYTES # BLD AUTO: 1.83 X10(3) UL (ref 1–4)
LYMPHOCYTES NFR BLD AUTO: 19.6 %
MCH RBC QN AUTO: 28.1 PG (ref 26–34)
MCHC RBC AUTO-ENTMCNC: 32.5 G/DL (ref 31–37)
MCV RBC AUTO: 86.4 FL (ref 80–100)
MONOCYTES # BLD AUTO: 0.59 X10(3) UL (ref 0.1–1)
MONOCYTES NFR BLD AUTO: 6.3 %
NEUTROPHILS # BLD AUTO: 6.78 X10 (3) UL (ref 1.5–7.7)
NEUTROPHILS # BLD AUTO: 6.78 X10(3) UL (ref 1.5–7.7)
NEUTROPHILS NFR BLD AUTO: 72.8 %
NITRITE UR QL STRIP.AUTO: NEGATIVE
NT-PROBNP SERPL-MCNC: 1874 PG/ML (ref ?–450)
OSMOLALITY SERPL CALC.SUM OF ELEC: 299 MOSM/KG (ref 275–295)
PH UR: 5 [PH] (ref 5–8)
PLATELET # BLD AUTO: 213 10(3)UL (ref 150–450)
POTASSIUM SERPL-SCNC: 3 MMOL/L (ref 3.5–5.1)
PROT UR-MCNC: NEGATIVE MG/DL
RBC # BLD AUTO: 4.91 X10(6)UL (ref 3.8–5.3)
RBC #/AREA URNS AUTO: 2 /HPF
SARS-COV-2 RNA RESP QL NAA+PROBE: NOT DETECTED
SODIUM SERPL-SCNC: 141 MMOL/L (ref 136–145)
SP GR UR STRIP: 1.02 (ref 1–1.03)
TROPONIN I SERPL-MCNC: <0.045 NG/ML (ref ?–0.04)
UROBILINOGEN UR STRIP-ACNC: <2
WBC # BLD AUTO: 9.3 X10(3) UL (ref 4–11)
WBC #/AREA URNS AUTO: 4 /HPF

## 2020-07-18 PROCEDURE — 71045 X-RAY EXAM CHEST 1 VIEW: CPT | Performed by: EMERGENCY MEDICINE

## 2020-07-18 PROCEDURE — 80048 BASIC METABOLIC PNL TOTAL CA: CPT | Performed by: EMERGENCY MEDICINE

## 2020-07-18 PROCEDURE — 99222 1ST HOSP IP/OBS MODERATE 55: CPT | Performed by: INTERNAL MEDICINE

## 2020-07-18 PROCEDURE — 96374 THER/PROPH/DIAG INJ IV PUSH: CPT

## 2020-07-18 PROCEDURE — 84484 ASSAY OF TROPONIN QUANT: CPT | Performed by: EMERGENCY MEDICINE

## 2020-07-18 PROCEDURE — 81001 URINALYSIS AUTO W/SCOPE: CPT | Performed by: EMERGENCY MEDICINE

## 2020-07-18 PROCEDURE — 93010 ELECTROCARDIOGRAM REPORT: CPT | Performed by: EMERGENCY MEDICINE

## 2020-07-18 PROCEDURE — 93005 ELECTROCARDIOGRAM TRACING: CPT

## 2020-07-18 PROCEDURE — 85025 COMPLETE CBC W/AUTO DIFF WBC: CPT | Performed by: EMERGENCY MEDICINE

## 2020-07-18 PROCEDURE — 99285 EMERGENCY DEPT VISIT HI MDM: CPT

## 2020-07-18 PROCEDURE — 83880 ASSAY OF NATRIURETIC PEPTIDE: CPT | Performed by: EMERGENCY MEDICINE

## 2020-07-18 RX ORDER — POTASSIUM CHLORIDE 20 MEQ/1
40 TABLET, EXTENDED RELEASE ORAL ONCE
Status: COMPLETED | OUTPATIENT
Start: 2020-07-18 | End: 2020-07-18

## 2020-07-18 RX ORDER — METOPROLOL SUCCINATE 50 MG/1
50 TABLET, EXTENDED RELEASE ORAL DAILY
Status: DISCONTINUED | OUTPATIENT
Start: 2020-07-18 | End: 2020-07-19

## 2020-07-18 RX ORDER — FLUTICASONE PROPIONATE 50 MCG
1 SPRAY, SUSPENSION (ML) NASAL DAILY
Status: DISCONTINUED | OUTPATIENT
Start: 2020-07-18 | End: 2020-07-23

## 2020-07-18 RX ORDER — ATORVASTATIN CALCIUM 40 MG/1
40 TABLET, FILM COATED ORAL NIGHTLY
Status: DISCONTINUED | OUTPATIENT
Start: 2020-07-18 | End: 2020-07-23

## 2020-07-18 RX ORDER — POLYETHYLENE GLYCOL 3350 17 G/17G
17 POWDER, FOR SOLUTION ORAL DAILY PRN
Status: DISCONTINUED | OUTPATIENT
Start: 2020-07-18 | End: 2020-07-23

## 2020-07-18 RX ORDER — VENLAFAXINE HYDROCHLORIDE 37.5 MG/1
75 CAPSULE, EXTENDED RELEASE ORAL
Status: DISCONTINUED | OUTPATIENT
Start: 2020-07-18 | End: 2020-07-23

## 2020-07-18 RX ORDER — SODIUM CHLORIDE 0.9 % (FLUSH) 0.9 %
3 SYRINGE (ML) INJECTION AS NEEDED
Status: DISCONTINUED | OUTPATIENT
Start: 2020-07-18 | End: 2020-07-23

## 2020-07-18 RX ORDER — METOCLOPRAMIDE HYDROCHLORIDE 5 MG/ML
10 INJECTION INTRAMUSCULAR; INTRAVENOUS EVERY 8 HOURS PRN
Status: DISCONTINUED | OUTPATIENT
Start: 2020-07-18 | End: 2020-07-23

## 2020-07-18 RX ORDER — DILTIAZEM HYDROCHLORIDE 60 MG/1
60 TABLET, FILM COATED ORAL EVERY 8 HOURS SCHEDULED
Status: DISCONTINUED | OUTPATIENT
Start: 2020-07-18 | End: 2020-07-19

## 2020-07-18 RX ORDER — FUROSEMIDE 10 MG/ML
40 INJECTION INTRAMUSCULAR; INTRAVENOUS ONCE
Status: COMPLETED | OUTPATIENT
Start: 2020-07-18 | End: 2020-07-18

## 2020-07-18 RX ORDER — BISACODYL 10 MG
10 SUPPOSITORY, RECTAL RECTAL
Status: DISCONTINUED | OUTPATIENT
Start: 2020-07-18 | End: 2020-07-23

## 2020-07-18 RX ORDER — MELATONIN
325
Status: DISCONTINUED | OUTPATIENT
Start: 2020-07-19 | End: 2020-07-23

## 2020-07-18 RX ORDER — ALBUTEROL SULFATE 90 UG/1
2 AEROSOL, METERED RESPIRATORY (INHALATION) EVERY 4 HOURS PRN
Status: DISCONTINUED | OUTPATIENT
Start: 2020-07-18 | End: 2020-07-23

## 2020-07-18 RX ORDER — PREDNISONE 1 MG/1
5 TABLET ORAL
Status: DISCONTINUED | OUTPATIENT
Start: 2020-07-18 | End: 2020-07-23

## 2020-07-18 RX ORDER — EZETIMIBE 10 MG/1
10 TABLET ORAL DAILY
Status: DISCONTINUED | OUTPATIENT
Start: 2020-07-18 | End: 2020-07-23

## 2020-07-18 RX ORDER — CETIRIZINE HYDROCHLORIDE 10 MG/1
10 TABLET ORAL DAILY
Status: DISCONTINUED | OUTPATIENT
Start: 2020-07-18 | End: 2020-07-23

## 2020-07-18 RX ORDER — ACETAMINOPHEN 325 MG/1
650 TABLET ORAL EVERY 6 HOURS PRN
Status: DISCONTINUED | OUTPATIENT
Start: 2020-07-18 | End: 2020-07-23

## 2020-07-18 RX ORDER — ONDANSETRON 2 MG/ML
4 INJECTION INTRAMUSCULAR; INTRAVENOUS EVERY 6 HOURS PRN
Status: DISCONTINUED | OUTPATIENT
Start: 2020-07-18 | End: 2020-07-23

## 2020-07-18 RX ORDER — BUDESONIDE 3 MG/1
6 CAPSULE, COATED PELLETS ORAL EVERY MORNING
Status: DISCONTINUED | OUTPATIENT
Start: 2020-07-18 | End: 2020-07-23

## 2020-07-18 RX ORDER — TRAMADOL HYDROCHLORIDE 50 MG/1
25 TABLET ORAL EVERY 8 HOURS PRN
Status: DISCONTINUED | OUTPATIENT
Start: 2020-07-18 | End: 2020-07-23

## 2020-07-18 RX ORDER — POTASSIUM CHLORIDE 20 MEQ/1
40 TABLET, EXTENDED RELEASE ORAL EVERY 4 HOURS
Status: COMPLETED | OUTPATIENT
Start: 2020-07-18 | End: 2020-07-18

## 2020-07-18 NOTE — ED INITIAL ASSESSMENT (HPI)
Worsening sob, cough, feels like its hard to take a breath in, saw pcp recently and increased pt \"water pill\". No increase in swelling to legs.

## 2020-07-18 NOTE — PLAN OF CARE
Patient admitted and oriented to unit. Reports no pain, shortness of breath with slight exertion/talking with oxygen saturation in mid 90s on room air. Patient has chronic urinary retention, but good urine output after lasix.  Pain services consulted for ba management  - Manage/alleviate anxiety  - Utilize distraction and/or relaxation techniques  - Monitor for opioid side effects  - Notify MD/LIP if interventions unsuccessful or patient reports new pain  - Anticipate increased pain with activity and pre-medi coronary artery perfusion - ex.  Angina  - Evaluate fluid balance, assess for edema, trend weights  Outcome: Progressing  Goal: Absence of cardiac arrhythmias or at baseline  Description  INTERVENTIONS:  - Continuous cardiac monitoring, monitor vital signs, and/or skin breakdown  - Initiate interventions, skin care algorithm/standards of care as needed  Outcome: Progressing

## 2020-07-18 NOTE — ED PROVIDER NOTES
Patient Seen in: [facility] Emergency Department      History   Patient presents with:  Dyspnea TIMO SOB    Stated Complaint:     HPI    The patient is a 47-year-old female bedridden due to hereditary spastic paraplegia who presents with 4 to 5 day urinary bladder 1/11/2016   • Ulcerative colitis ([facility] [address]    • Valvular disease     mitral valve prolapse- 3 clips placed              Past Surgical History:   Procedure Laterality Date   • ARTHROSCOPY OF JOINT UNLISTED Right     knee   • BACK SURGERY      sp standard drinks    Drug use: No             Review of Systems    Positive for stated complaint:   Other systems are as noted in HPI. Constitutional and vital signs reviewed. All other systems reviewed and negative except as noted above.     Physical E Paraplegic   Psychiatric:         Judgment: Judgment normal.       Differential diagnosis includes CHF, pneumonia, COVID        ED Course     Labs Reviewed   BASIC METABOLIC PANEL (8) - Abnormal; Notable for the following components:       Result Value heart        MDM     Pulse Ox: 95%, Normal and Improved after treatment, normal    Cardiac Monitor: Pulse Readings from Last 1 Encounters:  07/19/20 : 71  , sinus, room air    Radiology findings: Xr Chest Ap Portable  (cpt=71045)    Result Date: 7/18/2020

## 2020-07-18 NOTE — H&P
[facility] Hospitalist H&P     CC: Patient presents with:  Dyspnea TIMO SOB       PCP: Rin Jimenez MD      Assessment and Plan     Lisa Edwards is a 66year old female with  hs DVT s/p permanent IVC filter on eliquis, recurrent cdif colitis and col d/w pain  -PT/OT    Hx DVT S/P IVC Filter   -eliquis     HL  - statin    Ostomy  Collagenous colitis    Pancreatic Cystic Lesion  -no diarrhea reported   -follows with Dr Marietta Robles  -no IVF, lytes in AM, cardiac diet    PPX: Eliquis    Dispo Pt s BACK PAIN     scoliosis   • Back problem    • Bladder stones    • C. difficile colitis 5/12   • Cataract    • Colitis    • Congestive heart disease ([facility] [address]    • Coronary atherosclerosis    • Deep vein thrombosis (HCC)     jamal filter in place.  unsure o [facility]   • COLONOSCOPY  7/11/08 7/11/08 at [facility] and negative for polyps, UC was quiet   • COLONOSCOPY,DIAGNOSTIC  5/12    normal, random colon bx showed findings c/w lymphocytic colitis   • CYSTOSCOPY STONE MANIPULATION N/A 3/12/2 3  furosemide 40 MG Oral Tab, Take 1 tablet (40 mg total) by mouth daily. , Disp: 30 tablet, Rfl: 0  POTASSIUM CHLORIDE ER 20 MEQ Oral Tab CR, TAKE 1 TABLET BY MOUTH TWICE DAILY.  TAKE ONLY WHEN TAKING FUROSEMIDE, Disp: 60 tablet, Rfl: 1  METOPROLOL SUCCINAT Rfl:         Soc Hx  Social History    Tobacco Use      Smoking status: Former Smoker      Smokeless tobacco: Never Used      Tobacco comment: pt smoked until 23 yrs old    Alcohol use: No      Alcohol/week: 0.0 standard drinks       Fam Hx  Family History

## 2020-07-18 NOTE — ED NOTES
Orders for admission, patient is aware of plan and ready to go upstairs. Vitals stable during ED stay. Continuing SOB not requiring oxygen, SpO2 maintained > 97% on room air. No UOP after lasix 40mg.  Pt intermittent straight caths at home and will noti

## 2020-07-19 LAB
ALBUMIN SERPL-MCNC: 2.8 G/DL (ref 3.4–5)
ALBUMIN/GLOB SERPL: 1 {RATIO} (ref 1–2)
ALP LIVER SERPL-CCNC: 94 U/L (ref 55–142)
ALT SERPL-CCNC: 59 U/L (ref 13–56)
ANION GAP SERPL CALC-SCNC: 4 MMOL/L (ref 0–18)
AST SERPL-CCNC: 22 U/L (ref 15–37)
BASOPHILS # BLD AUTO: 0.02 X10(3) UL (ref 0–0.2)
BASOPHILS NFR BLD AUTO: 0.2 %
BILIRUB SERPL-MCNC: 0.9 MG/DL (ref 0.1–2)
BUN BLD-MCNC: 24 MG/DL (ref 7–18)
BUN/CREAT SERPL: 38.1 (ref 10–20)
CALCIUM BLD-MCNC: 8.8 MG/DL (ref 8.5–10.1)
CHLORIDE SERPL-SCNC: 109 MMOL/L (ref 98–112)
CO2 SERPL-SCNC: 29 MMOL/L (ref 21–32)
CREAT BLD-MCNC: 0.63 MG/DL (ref 0.55–1.02)
DEPRECATED RDW RBC AUTO: 49.8 FL (ref 35.1–46.3)
EOSINOPHIL # BLD AUTO: 0.02 X10(3) UL (ref 0–0.7)
EOSINOPHIL NFR BLD AUTO: 0.2 %
ERYTHROCYTE [DISTWIDTH] IN BLOOD BY AUTOMATED COUNT: 15.8 % (ref 11–15)
GLOBULIN PLAS-MCNC: 2.8 G/DL (ref 2.8–4.4)
GLUCOSE BLD-MCNC: 137 MG/DL (ref 70–99)
GLUCOSE BLDC GLUCOMTR-MCNC: 164 MG/DL (ref 70–99)
GLUCOSE BLDC GLUCOMTR-MCNC: 204 MG/DL (ref 70–99)
HCT VFR BLD AUTO: 40 % (ref 35–48)
HGB BLD-MCNC: 12.6 G/DL (ref 12–16)
IMM GRANULOCYTES # BLD AUTO: 0.04 X10(3) UL (ref 0–1)
IMM GRANULOCYTES NFR BLD: 0.4 %
LYMPHOCYTES # BLD AUTO: 1.77 X10(3) UL (ref 1–4)
LYMPHOCYTES NFR BLD AUTO: 18.8 %
M PROTEIN MFR SERPL ELPH: 5.6 G/DL (ref 6.4–8.2)
MCH RBC QN AUTO: 27.5 PG (ref 26–34)
MCHC RBC AUTO-ENTMCNC: 31.5 G/DL (ref 31–37)
MCV RBC AUTO: 87.1 FL (ref 80–100)
MONOCYTES # BLD AUTO: 0.55 X10(3) UL (ref 0.1–1)
MONOCYTES NFR BLD AUTO: 5.8 %
NEUTROPHILS # BLD AUTO: 7.01 X10 (3) UL (ref 1.5–7.7)
NEUTROPHILS # BLD AUTO: 7.01 X10(3) UL (ref 1.5–7.7)
NEUTROPHILS NFR BLD AUTO: 74.6 %
OSMOLALITY SERPL CALC.SUM OF ELEC: 300 MOSM/KG (ref 275–295)
PLATELET # BLD AUTO: 212 10(3)UL (ref 150–450)
POTASSIUM SERPL-SCNC: 5 MMOL/L (ref 3.5–5.1)
POTASSIUM SERPL-SCNC: 5.7 MMOL/L (ref 3.5–5.1)
POTASSIUM SERPL-SCNC: 6 MMOL/L (ref 3.5–5.1)
POTASSIUM SERPL-SCNC: 6 MMOL/L (ref 3.5–5.1)
RBC # BLD AUTO: 4.59 X10(6)UL (ref 3.8–5.3)
SODIUM SERPL-SCNC: 142 MMOL/L (ref 136–145)
TROPONIN I SERPL-MCNC: <0.045 NG/ML (ref ?–0.04)
WBC # BLD AUTO: 9.4 X10(3) UL (ref 4–11)

## 2020-07-19 PROCEDURE — 85025 COMPLETE CBC W/AUTO DIFF WBC: CPT | Performed by: HOSPITALIST

## 2020-07-19 PROCEDURE — 99232 SBSQ HOSP IP/OBS MODERATE 35: CPT | Performed by: INTERNAL MEDICINE

## 2020-07-19 PROCEDURE — 84484 ASSAY OF TROPONIN QUANT: CPT | Performed by: HOSPITALIST

## 2020-07-19 PROCEDURE — 93010 ELECTROCARDIOGRAM REPORT: CPT | Performed by: HOSPITALIST

## 2020-07-19 PROCEDURE — 84132 ASSAY OF SERUM POTASSIUM: CPT | Performed by: HOSPITALIST

## 2020-07-19 PROCEDURE — 80053 COMPREHEN METABOLIC PANEL: CPT | Performed by: HOSPITALIST

## 2020-07-19 PROCEDURE — 82962 GLUCOSE BLOOD TEST: CPT

## 2020-07-19 PROCEDURE — 93005 ELECTROCARDIOGRAM TRACING: CPT

## 2020-07-19 RX ORDER — FUROSEMIDE 10 MG/ML
20 INJECTION INTRAMUSCULAR; INTRAVENOUS
Status: COMPLETED | OUTPATIENT
Start: 2020-07-19 | End: 2020-07-21

## 2020-07-19 RX ORDER — SODIUM POLYSTYRENE SULFONATE 4.1 MEQ/G
15 POWDER, FOR SUSPENSION ORAL; RECTAL ONCE
Status: DISCONTINUED | OUTPATIENT
Start: 2020-07-19 | End: 2020-07-19

## 2020-07-19 RX ORDER — DEXTROSE MONOHYDRATE 25 G/50ML
50 INJECTION, SOLUTION INTRAVENOUS ONCE
Status: COMPLETED | OUTPATIENT
Start: 2020-07-19 | End: 2020-07-19

## 2020-07-19 RX ORDER — METOPROLOL SUCCINATE 50 MG/1
50 TABLET, EXTENDED RELEASE ORAL
Status: DISCONTINUED | OUTPATIENT
Start: 2020-07-19 | End: 2020-07-22

## 2020-07-19 NOTE — PLAN OF CARE
Problem: Patient Centered Care  Goal: Patient preferences are identified and integrated in the patient's plan of care  Description  Interventions:  - What would you like us to know as we care for you?  My  is my caregiver at home  - Provide timely, ADULT  Goal: Absence of fever/infection during anticipated neutropenic period  Description  INTERVENTIONS  - Monitor WBC  - Administer growth factors as ordered  - Implement neutropenic guidelines  Outcome: Progressing     Problem: DISCHARGE PLANNING  Goal antiarrhythmic and heart rate control medications as ordered  - Initiate emergency measures for life threatening arrhythmias  - Monitor electrolytes and administer replacement therapy as ordered  Outcome: Progressing     Problem: RESPIRATORY - ADULT  Goal:

## 2020-07-19 NOTE — PROCEDURES
Pain Procedure Note    Procedure Type:  Analysis of intrathecal pump  For Hereditary spastic paraplegia (wheel chair bound, baclofen pump - and straight cath at baseline pump managed at [facility] \"leah\"    Prep:  Hand Washing,, Sterile Gloves

## 2020-07-19 NOTE — CM/SW NOTE
Case Management/ Progression of Care    Patient is a readmission or Acute HF exacerbation. Currently on IV Lasix.    Patient lives with  and plans to return home on discharge,   Current with [facility] [address] [facility] pending clinical cou

## 2020-07-19 NOTE — CONSULTS
Cardiology (consult dictated)    Assessment:  1. HFpEF. Acute exacerbation. 2.  Chronic atrial fibrillation. Heart rate increased on admission due to noncompliance with medication. 3.  Severe mitral regurgitation. History of mitral clip.   Persist

## 2020-07-19 NOTE — CHRONIC PAIN
[facility]  Report of Consultation    Brodie Bealtiff Patient Status:  Inpatient    1945 MRN [id number]   Location [facility] 3W/SW Attending Dora Morataya MD   Hosp Day # 1 PCP Sagar Johnson MD     Date of Admiss Hypercholesterolemia    • HYPERLIPIDEMIA    • Irritable bowel syndrome    • Lymphocytic colitis Colon= 5/7/12   • MENOPAUSE    • MITRAL VALVE PROLAPSE    • Neuropathy     bilateral legs;  Hereditary spastic paraplegia X 20 years    • Osteoarthritis     legs 1/5/2015    Performed by Phillip Clancy MD at [address] Right 2/8/2016    Performed by Jeanie Ibarra MD at [address]      left  12/05   • [place] N/A 10/31/2018    Per Oral, Q6H PRN  •  ondansetron HCl (ZOFRAN) injection 4 mg, 4 mg, Intravenous, Q6H PRN  •  Metoclopramide HCl (REGLAN) injection 10 mg, 10 mg, Intravenous, Q8H PRN  •  PEG 3350 (MIRALAX) powder packet 17 g, 17 g, Oral, Daily PRN  •  bisacodyl (DULCOLAX) rec HPI.    Physical Exam:  Blood pressure 112/64, pulse 63, temperature 98.1 °F (36.7 °C), temperature source Oral, resp. rate 20, weight 126 lb 9.6 oz (57.4 kg), SpO2 99 %, not currently breastfeeding.   General: Alert and oriented x3, NAD, appears stated age pump managed at [facility] \"[facility]\"    Recommendations:  1 Will do pump analysis   Pain service to follow. Comprehensive analgesic plan was formulated.  Conservative vs. Aggressive measures were discussed at length including pharmacotherapy (e

## 2020-07-19 NOTE — PROGRESS NOTES
[facility] Hospitalist Progress Note     PCP: Samra Hernandez MD    CC: Follow up       Assessment/Plan:   Ella Ariel old female with  hs DVT s/p permanent IVC filter on eliquis, recurrent cdif colitis and collagenous colitis s/p laparo baclofen pump - and straight cath at baseline)  -refills at [facility] but appears battery or unit needs to be replaced in the near future  -d/w Dr Elisabeth Jaffe.   Will interrogate to assess needs but even if low typically several week buffer  -PT/OT     Hx DVT S clear, normal respiratory effort, No wheezing or crackles  CV: irreg, irreg,rate normal,  + murmurs, rubs, gallops  Abd: Abdomen soft, nontender, nondistended, ostomy, stool and gas  MSK: no edema or erythema  Skin: no rashes or lesions, well perfused  Psy

## 2020-07-19 NOTE — CONSULTS
[facility]    PATIENT'S NAME: [address] [place] [address]   ATTENDING PHYSICIAN: Norma Gomez MD   CONSULTING PHYSICIAN: Damir Cohn.  Jo Ann Jasmine MD   PATIENT ACCOUNT#:   [id number]    LOCATION:  3WSW [address] [facility] #:   [id number]       DATE OF CYRIL Troponin normal.  BUN 21, creatinine 0.58, sodium 141, potassium 3.0. Hemoglobin 13.8, white count 9.3, platelets 338. Urinalysis is clear. Chest x-ray shows normal caliber pulmonary vessels. EKG shows atrial fibrillation with no acute changes.

## 2020-07-19 NOTE — PROGRESS NOTES
[facility]    Progress Note    Ana Paula Sánchez Patient Status:  Inpatient    1945 MRN [id number]   Location [facility] 3W/SW Attending Ovidio Stanley MD   Hosp Day # 1 PCP Irina Moore MD         CARDIOLOGY ATTE stable  -continues with +JVD to level of mandible, 1-2+ DEEDEE     Chronic atrial fibrillation.  -rate/rhythm controlled at present  -continue cardizem, toprol 25 XL  -CHADsVASc 5 (age, gender, HTN, CAD) - eliquis for [facility]     Severe mitral regurgitation  -s/p signed on 07/18/2020 at 13:06 by MD Saira De La Torre, [address]  7/19/2020

## 2020-07-20 LAB
ALBUMIN SERPL-MCNC: 2.7 G/DL (ref 3.4–5)
ALP LIVER SERPL-CCNC: 81 U/L (ref 55–142)
ALT SERPL-CCNC: 45 U/L (ref 13–56)
ANION GAP SERPL CALC-SCNC: 5 MMOL/L (ref 0–18)
AST SERPL-CCNC: 15 U/L (ref 15–37)
BASOPHILS # BLD AUTO: 0.02 X10(3) UL (ref 0–0.2)
BASOPHILS NFR BLD AUTO: 0.2 %
BILIRUB DIRECT SERPL-MCNC: 0.2 MG/DL (ref 0–0.2)
BILIRUB SERPL-MCNC: 1 MG/DL (ref 0.1–2)
BUN BLD-MCNC: 21 MG/DL (ref 7–18)
BUN/CREAT SERPL: 40.4 (ref 10–20)
CALCIUM BLD-MCNC: 8.7 MG/DL (ref 8.5–10.1)
CHLORIDE SERPL-SCNC: 105 MMOL/L (ref 98–112)
CO2 SERPL-SCNC: 29 MMOL/L (ref 21–32)
CREAT BLD-MCNC: 0.52 MG/DL (ref 0.55–1.02)
DEPRECATED RDW RBC AUTO: 49.3 FL (ref 35.1–46.3)
EOSINOPHIL # BLD AUTO: 0.04 X10(3) UL (ref 0–0.7)
EOSINOPHIL NFR BLD AUTO: 0.5 %
ERYTHROCYTE [DISTWIDTH] IN BLOOD BY AUTOMATED COUNT: 15.5 % (ref 11–15)
GLUCOSE BLD-MCNC: 77 MG/DL (ref 70–99)
HAV IGM SER QL: 2.3 MG/DL (ref 1.6–2.6)
HCT VFR BLD AUTO: 39.1 % (ref 35–48)
HGB BLD-MCNC: 12.4 G/DL (ref 12–16)
IMM GRANULOCYTES # BLD AUTO: 0.05 X10(3) UL (ref 0–1)
IMM GRANULOCYTES NFR BLD: 0.6 %
LYMPHOCYTES # BLD AUTO: 2.13 X10(3) UL (ref 1–4)
LYMPHOCYTES NFR BLD AUTO: 25.4 %
M PROTEIN MFR SERPL ELPH: 5.4 G/DL (ref 6.4–8.2)
MCH RBC QN AUTO: 27.7 PG (ref 26–34)
MCHC RBC AUTO-ENTMCNC: 31.7 G/DL (ref 31–37)
MCV RBC AUTO: 87.3 FL (ref 80–100)
MONOCYTES # BLD AUTO: 0.61 X10(3) UL (ref 0.1–1)
MONOCYTES NFR BLD AUTO: 7.3 %
NEUTROPHILS # BLD AUTO: 5.55 X10 (3) UL (ref 1.5–7.7)
NEUTROPHILS # BLD AUTO: 5.55 X10(3) UL (ref 1.5–7.7)
NEUTROPHILS NFR BLD AUTO: 66 %
OSMOLALITY SERPL CALC.SUM OF ELEC: 290 MOSM/KG (ref 275–295)
PLATELET # BLD AUTO: 194 10(3)UL (ref 150–450)
POTASSIUM SERPL-SCNC: 4 MMOL/L (ref 3.5–5.1)
RBC # BLD AUTO: 4.48 X10(6)UL (ref 3.8–5.3)
SODIUM SERPL-SCNC: 139 MMOL/L (ref 136–145)
T3FREE SERPL-MCNC: 1.71 PG/ML (ref 2.4–4.2)
T4 FREE SERPL-MCNC: 1.2 NG/DL (ref 0.8–1.7)
TSH SERPL-ACNC: 0.27 MCUNITS/ML
TSI SER-ACNC: 0.27 MIU/ML (ref 0.36–3.74)
VIT B12 SERPL-MCNC: 550 PG/ML (ref 193–986)
WBC # BLD AUTO: 8.4 X10(3) UL (ref 4–11)

## 2020-07-20 PROCEDURE — 80048 BASIC METABOLIC PNL TOTAL CA: CPT | Performed by: HOSPITALIST

## 2020-07-20 PROCEDURE — 84439 ASSAY OF FREE THYROXINE: CPT | Performed by: HOSPITALIST

## 2020-07-20 PROCEDURE — 84481 FREE ASSAY (FT-3): CPT | Performed by: HOSPITALIST

## 2020-07-20 PROCEDURE — 97530 THERAPEUTIC ACTIVITIES: CPT

## 2020-07-20 PROCEDURE — 84443 ASSAY THYROID STIM HORMONE: CPT | Performed by: HOSPITALIST

## 2020-07-20 PROCEDURE — 80076 HEPATIC FUNCTION PANEL: CPT | Performed by: HOSPITALIST

## 2020-07-20 PROCEDURE — 85025 COMPLETE CBC W/AUTO DIFF WBC: CPT | Performed by: HOSPITALIST

## 2020-07-20 PROCEDURE — 97161 PT EVAL LOW COMPLEX 20 MIN: CPT

## 2020-07-20 PROCEDURE — 97165 OT EVAL LOW COMPLEX 30 MIN: CPT

## 2020-07-20 PROCEDURE — 83735 ASSAY OF MAGNESIUM: CPT | Performed by: HOSPITALIST

## 2020-07-20 PROCEDURE — 99232 SBSQ HOSP IP/OBS MODERATE 35: CPT | Performed by: INTERNAL MEDICINE

## 2020-07-20 PROCEDURE — 82607 VITAMIN B-12: CPT | Performed by: HOSPITALIST

## 2020-07-20 NOTE — OCCUPATIONAL THERAPY NOTE
OCCUPATIONAL THERAPY EVALUATION - INPATIENT     Room Number: 317/317-A  Evaluation Date: 7/20/2020  Type of Evaluation: Initial  Presenting Problem: SOB, CHF    Physician Order: IP Consult to Occupational Therapy  Reason for Therapy: ADL/IADL Dysfunction a patients prior level of functioning (assist for all mobility and ADL's from paraplegia) and pt's current support system at home (spouse), she has all necessary equipment and assistance needed to return home.        DISCHARGE RECOMMENDATIONS  OT Discharge Re DISEASES     uti   • Pneumonia, organism unspecified(486)    • Reflux    • Self-catheterizes urinary bladder 1/11/2016   • Ulcerative colitis ([facility] [address]    • Valvular disease     mitral valve prolapse- 3 clips placed       Past Surgical History  Past Surgical H (Comment)(spouse is caregiver)        Shower/Tub and Equipment: Shower chair  Other Equipment: Reacher(WC, slide board, kathya lift)    Occupation/Status: retired x-ray tech     Drives: No  Patient Regularly Uses: None    Stairs in Home: ramped entrance  Us brushing teeth?: A Little  -   Eating meals?: None    AM-PAC Score:  Score: 15  Approx Degree of Impairment: 56.46%  Standardized Score (AM-PAC Scale): 34.69  CMS Modifier (G-Code): CK    FUNCTIONAL TRANSFER ASSESSMENT  Supine to Sit : Maximum assistance

## 2020-07-20 NOTE — CM/SW NOTE
Per chart review pt is current with [facility]. Resume [facility] orders entered and [facility] liaison notified of orders via secure chat. Divya Nelson.  Jeffrey Samano RN, BSN  Nurse   [phone]

## 2020-07-20 NOTE — PROGRESS NOTES
[facility] HOSP - [facility]    Progress Note    Giovanna Skaggs Patient Status:  Inpatient    1945 MRN [id number]   Location [facility] 3W/SW Attending Batool Aranda MD   Hosp Day # 2 PCP Estrellita Kayser, MD     CARDIOLOGY ATTENDING -continue cardizem 30 mg q 8 hr, toprol 50 XL  -CHADsVASc 5 (age, gender, HTN, CAD) - continue eliquis 5 mg bid     Severe mitral regurgitation  -s/p MitraClip x2, most recent 10/2018  -follows with Dr. Ivelisse El and valve clinic     CAD, nonobstructive  - -------------------------- Atria fibrillation -Left axis -anterior fascicular block.  -Old anteroseptal infarct.  -Nonspecific ST depression -Nondiagnostic.  ABNORMAL When compared with ECG of 06/19/2020 16:49:00 No significant changes have occurred Electro

## 2020-07-20 NOTE — PLAN OF CARE
Problem: PAIN - ADULT  Goal: Verbalizes/displays adequate comfort level or patient's stated pain goal  Description  INTERVENTIONS:  - Encourage pt to monitor pain and request assistance  - Assess pain using appropriate pain scale  - Administer analgesics arrhythmias  - Monitor electrolytes and administer replacement therapy as ordered  Outcome: Progressing     Problem: RESPIRATORY - ADULT  Goal: Achieves optimal ventilation and oxygenation  Description  INTERVENTIONS:  - Assess for changes in respiratory s

## 2020-07-20 NOTE — CHRONIC PAIN
[facility] HOSP - [facility]  Inpatient Pain Management Progress Note      Patient name: Ursula Jacinto 66year old female  : 1945  MRN: [id number]    Diagnosis: Acute on chronic congestive heart failure, unspecified heart failure type ([facility] [address]  (pr clips placed     Past Surgical History:   Procedure Laterality Date   • ARTHROSCOPY OF JOINT UNLISTED Right     knee   • BACK SURGERY      spinal fusion L1-5   • CAUDAL N/A 3/26/2019    Performed by Debbi Angulo MD at [address]   • Problems Other         aunts/ uncles glaucoma   • Other (Other) Other       reports that she has quit smoking. She has never used smokeless tobacco. She reports that she does not drink alcohol or use drugs.     Allergies:    Cymbalta [Duloxetin*    ITCHING, Head: normocephalic, atraumatic  Eyes: anicteric; no injection  Ears: no obvious deformities noted   Nose: externally grossly within normal limits, no unusual discharge or rhinorrhea   Throat: lips grossly within normal limits by visual exam externally

## 2020-07-20 NOTE — BH PROGRESS NOTE
Lakshmi Salas SOIMANI Note    Ursula Jacinto Patient Status:  Inpatient    1945 MRN [id number]   Location [facility] 3W/SW Attending Rand Cain MD   [address] Day # 2 PCP Dwaine Perez MD       S(subjective) [place] lot of people in my family h

## 2020-07-20 NOTE — PHYSICAL THERAPY NOTE
PHYSICAL THERAPY EVALUATION - INPATIENT     Room Number: 317/317-A  Evaluation Date: 7/20/2020  Type of Evaluation: Initial   Physician Order: PT Eval and Treat    Presenting Problem: CHF  Reason for Therapy: Mobility Dysfunction and Discharge Planning no skilled Physical Therapy needs at this time. Patient will be discharged from Physical Therapy services. Please re-order if a new functional limitation presents during this admission.        PHYSICAL THERAPY MEDICAL/SOCIAL HISTORY     Problem List  Princ Past Surgical History  Past Surgical History:   Procedure Laterality Date   • ARTHROSCOPY OF JOINT UNLISTED Right     knee   • BACK SURGERY      spinal fusion L1-5   • CAUDAL N/A 3/26/2019    Performed by Jaspal Barclay MD at [address] [place] [address] in [facility], assists with meal prep and cleaning from [facility] level    SUBJECTIVE  \"I do feel more tired\"    PHYSICAL THERAPY EXAMINATION     OBJECTIVE  Precautions: Limb alert - right;Limb alert - left; Other (Comment)(hereditary spastic paraplegia)  Fall Risk: Hig Modifier (G-Code): CM    FUNCTIONAL ABILITY STATUS  Gait Assessment   Gait Assistance: Not tested      Exercise/Education Provided:  Bed mobility  Body mechanics  Energy conservation  Functional activity tolerated  Posture  Transfer training    Patient End

## 2020-07-20 NOTE — PROGRESS NOTES
[facility] Hospitalist Progress Note     PCP: Bebo Mccauley MD    CC: Follow up       Assessment/Plan:   Rogerio Renetta old female with  hs DVT s/p permanent IVC filter on eliquis, recurrent cdif colitis and collagenous colitis s/p laparo Dr Campbell      FEN  -no IVF, lytes in AM, cardiac diet     PPX: Eliquis     Dispo Pt states she wishes to be full code, dw DTR on the phone     Further recommendations pending patient's clinical course.   [facility] hospitalist to continue to follow patient w atorvastatin  40 mg Oral Nightly   • Budesonide  6 mg Oral QAM   • cholecalciferol  5,000 Units Oral Daily   • ezetimibe  10 mg Oral Daily   • ferrous sulfate  325 mg Oral Daily with breakfast   • Fluticasone Propionate  1 spray Each Nare Daily   • cetiriz

## 2020-07-21 LAB
ANION GAP SERPL CALC-SCNC: 2 MMOL/L (ref 0–18)
BUN BLD-MCNC: 25 MG/DL (ref 7–18)
BUN/CREAT SERPL: 46.3 (ref 10–20)
CALCIUM BLD-MCNC: 8.2 MG/DL (ref 8.5–10.1)
CHLORIDE SERPL-SCNC: 105 MMOL/L (ref 98–112)
CO2 SERPL-SCNC: 31 MMOL/L (ref 21–32)
CREAT BLD-MCNC: 0.54 MG/DL (ref 0.55–1.02)
GLUCOSE BLD-MCNC: 83 MG/DL (ref 70–99)
OSMOLALITY SERPL CALC.SUM OF ELEC: 290 MOSM/KG (ref 275–295)
POTASSIUM SERPL-SCNC: 3.4 MMOL/L (ref 3.5–5.1)
POTASSIUM SERPL-SCNC: 3.9 MMOL/L (ref 3.5–5.1)
SODIUM SERPL-SCNC: 138 MMOL/L (ref 136–145)

## 2020-07-21 PROCEDURE — 99232 SBSQ HOSP IP/OBS MODERATE 35: CPT | Performed by: INTERNAL MEDICINE

## 2020-07-21 PROCEDURE — 80048 BASIC METABOLIC PNL TOTAL CA: CPT | Performed by: NURSE PRACTITIONER

## 2020-07-21 PROCEDURE — 84132 ASSAY OF SERUM POTASSIUM: CPT | Performed by: HOSPITALIST

## 2020-07-21 RX ORDER — POTASSIUM CHLORIDE 20 MEQ/1
40 TABLET, EXTENDED RELEASE ORAL EVERY 4 HOURS
Status: DISPENSED | OUTPATIENT
Start: 2020-07-21 | End: 2020-07-21

## 2020-07-21 RX ORDER — FUROSEMIDE 40 MG/1
40 TABLET ORAL DAILY
Status: DISCONTINUED | OUTPATIENT
Start: 2020-07-22 | End: 2020-07-23

## 2020-07-21 NOTE — PROGRESS NOTES
[facility] Hospitalist Progress Note     PCP: Magali Abarca MD    CC: Follow up       Assessment/Plan:   Cristy Bulla old female with  hs DVT s/p permanent IVC filter on eliquis, recurrent cdif colitis and collagenous colitis s/p laparo code     Further recommendations pending patient's clinical course.   [facility] hospitalist to continue to follow patient while in house     Patient and/or patient's family given opportunity to ask questions and note understanding and agreeing with therapeutic pl 40 mg Oral Nightly   • Budesonide  6 mg Oral QAM   • cholecalciferol  5,000 Units Oral Daily   • ezetimibe  10 mg Oral Daily   • ferrous sulfate  325 mg Oral Daily with breakfast   • Fluticasone Propionate  1 spray Each Nare Daily   • cetirizine  10 mg Ora

## 2020-07-21 NOTE — PLAN OF CARE
Patient is a/ox4. RA. Max assist x2 with bed mobility. Bladder scan q6h w/straight cath PRN. Purewick in place. Colostomy bag changed today. IV lasix to be changed to PO tomorrow. Plan is to go home tomorrow. Will continue to monitor.     Problem: Patient C Monitor for opioid side effects  - Notify MD/LIP if interventions unsuccessful or patient reports new pain  - Anticipate increased pain with activity and pre-medicate as appropriate  Outcome: Progressing     Problem: RISK FOR INFECTION - ADULT  Goal: Absen weights  Outcome: Progressing  Goal: Absence of cardiac arrhythmias or at baseline  Description  INTERVENTIONS:  - Continuous cardiac monitoring, monitor vital signs, obtain 12 lead EKG if indicated  - Evaluate effectiveness of antiarrhythmic and heart rat needed  Outcome: Progressing

## 2020-07-21 NOTE — PROGRESS NOTES
[facility]  Progress Note    Wilmer Lara Patient Status:  Inpatient    1945 MRN [id number]   Location [facility] 3W/SW Attending Walker Hodge MD   [address] Day # 3 PCP Natividad Sheikh MD     Assessment:    1.   Acute exacerbation Date    INR 1.12 (H) 10/03/2019     Lab Results   Component Value Date     07/21/2020    K 3.4 07/21/2020     07/21/2020    CO2 31.0 07/21/2020    BUN 25 07/21/2020    CREATSERUM 0.54 07/21/2020    GLU 83 07/21/2020    CA 8.2 07/21/2020

## 2020-07-22 PROCEDURE — 99232 SBSQ HOSP IP/OBS MODERATE 35: CPT | Performed by: INTERNAL MEDICINE

## 2020-07-22 NOTE — CHRONIC PAIN
[facility] HOSP - [facility]  Inpatient Pain Management Progress Note      Patient name: Daksha Olson 66year old female  : 1945  MRN: [id number]    Diagnosis: Acute on chronic congestive heart failure, unspecified heart failure type ([facility] [address]  (pr valve prolapse- 3 clips placed     Past Surgical History:   Procedure Laterality Date   • ARTHROSCOPY OF JOINT UNLISTED Right     knee   • BACK SURGERY      spinal fusion L1-5   • CAUDAL N/A 3/26/2019    Performed by Ji Hernandez MD at [address] [place] [address], [place] [id number] paraplegia   • Eye Problems Other         aunts/ uncles glaucoma   • Other (Other) Other       reports that she has quit smoking. She has never used smokeless tobacco. She reports that she does not drink alcohol or use drugs.     Allergies:    Cymbalta [Dul noted   Nose: externally grossly within normal limits, no unusual discharge or rhinorrhea   Throat: lips grossly within normal limits by visual exam externally  Neck: supple, trachea midline, no obvious JVD  Chest: S1, S2, RRR, no obvious visual deformity

## 2020-07-22 NOTE — PROGRESS NOTES
[facility] Hospitalist Progress Note     PCP: Bebo Mccauley MD    CC: Follow up       Assessment/Plan:   Rogerio Renetta old female with  hs DVT s/p permanent IVC filter on eliquis, recurrent cdif colitis and collagenous colitis s/p laparo [place]     FEN  -no IVF, lytes in AM, cardiac diet     PPX: Eliquis     Dispo full code     Further recommendations pending patient's clinical course.   [facility] hospitalist to continue to follow patient while in house     Patient and/or patient's family g atorvastatin  40 mg Oral Nightly   • Budesonide  6 mg Oral QAM   • cholecalciferol  5,000 Units Oral Daily   • ezetimibe  10 mg Oral Daily   • ferrous sulfate  325 mg Oral Daily with breakfast   • Fluticasone Propionate  1 spray Each Nare Daily   • cetiriz

## 2020-07-22 NOTE — PLAN OF CARE
Patient is a/ox4. RA. Max assist x2 with bed mobility. Colostomy in place. Bladder scan q6h with straight cath PRN. Pt also using purewick when incontinent. Switched to PO lasix today. Contact prec in place. Plan is home tomorrow. Will continue to monitor. relaxation techniques  - Monitor for opioid side effects  - Notify MD/LIP if interventions unsuccessful or patient reports new pain  - Anticipate increased pain with activity and pre-medicate as appropriate  Outcome: Progressing     Problem: RISK FOR INFEC assess for edema, trend weights  Outcome: Progressing  Goal: Absence of cardiac arrhythmias or at baseline  Description  INTERVENTIONS:  - Continuous cardiac monitoring, monitor vital signs, obtain 12 lead EKG if indicated  - Evaluate effectiveness of anti algorithm/standards of care as needed  Outcome: Progressing

## 2020-07-22 NOTE — PROGRESS NOTES
[facility]    Progress Note    Salma Free Patient Status:  Inpatient    1945 MRN [id number]   Location [facility] 3W/SW Attending Dipika Bernal MD   [address] Day # 4 PCP John Rhoades MD         CARDIOLOGY ATTENDING cardiac standpoint.   Follow-up w/ [facility] in 1 week, Dr. Kiarra Mckeon in 1 month.       Results:     Lab Results   Component Value Date    WBC 8.4 07/20/2020    HGB 12.4 07/20/2020    HCT 39.1 07/20/2020    .0 07/20/2020    CREATSERUM 0.54 (L) 07/21/20

## 2020-07-22 NOTE — PLAN OF CARE
Bladder scan q 6hr., 1 x straight cath done. Plan: D/C home with [facility]. [facility].     Problem: Patient Centered Care  Goal: Patient preferences are identified and integrated in the patient's plan of care  Description  Interventions:  - What would you like us to k Arrange for needed discharge resources and transportation as appropriate  - Identify discharge learning needs (meds, wound care, etc)  - Arrange for interpreters to assist at discharge as needed  - Consider post-discharge preferences of patient/family/disc effort  - Oxygen supplementation based on oxygen saturation or ABGs  - Provide Smoking Cessation handout, if applicable  - Encourage broncho-pulmonary hygiene including cough, deep breathe, Incentive Spirometry  - Assess the need for suctioning and perform

## 2020-07-22 NOTE — CM/SW NOTE
Received message from pt's RN - per MD, pt may be medically cleared for d/c tomorrow 7/23. SW will arrange Ambulance transport home once medically cleared.     PLAN: Home w/ [facility], pend med clear    SW/CM to remain available for support and/or

## 2020-07-23 VITALS
BODY MASS INDEX: 22 KG/M2 | SYSTOLIC BLOOD PRESSURE: 139 MMHG | HEART RATE: 74 BPM | WEIGHT: 123.5 LBS | DIASTOLIC BLOOD PRESSURE: 76 MMHG | TEMPERATURE: 98 F | RESPIRATION RATE: 24 BRPM | OXYGEN SATURATION: 95 %

## 2020-07-23 PROCEDURE — 99231 SBSQ HOSP IP/OBS SF/LOW 25: CPT | Performed by: INTERNAL MEDICINE

## 2020-07-23 RX ORDER — DIPHENHYDRAMINE HCL 25 MG
25 CAPSULE ORAL ONCE
Status: COMPLETED | OUTPATIENT
Start: 2020-07-23 | End: 2020-07-23

## 2020-07-23 RX ORDER — METOPROLOL SUCCINATE 50 MG/1
50 TABLET, EXTENDED RELEASE ORAL
Status: DISCONTINUED | OUTPATIENT
Start: 2020-07-23 | End: 2020-07-23

## 2020-07-23 RX ORDER — DIPHENHYDRAMINE HCL 25 MG
25 CAPSULE ORAL EVERY 8 HOURS PRN
Qty: 12 CAPSULE | Refills: 0 | Status: SHIPPED | OUTPATIENT
Start: 2020-07-23 | End: 2020-09-16 | Stop reason: ALTCHOICE

## 2020-07-23 NOTE — PLAN OF CARE
Patient was provided with discharge instructions, education, and follow up information.  Printed prescription for benadryl was given to patient; patient states that her friend helps to organize her medication at home and I emphasized to patient that [address] [place] [address] St adequate comfort level or patient's stated pain goal  Description  INTERVENTIONS:  - Encourage pt to monitor pain and request assistance  - Assess pain using appropriate pain scale  - Administer analgesics based on type and severity of pain and evaluate re cardiac output and hemodynamic stability  Description  INTERVENTIONS:  - Monitor vital signs, rhythm, and trends  - Monitor for bleeding, hypotension and signs of decreased cardiac output  - Evaluate effectiveness of vasoactive medications to optimize hemo bladder scan as needed  - Follow urinary retention protocol/standard of care  - Consider collaborating with pharmacy to review patient's medication profile  - Implement strategies to promote bladder emptying  Outcome: Adequate for Discharge     Problem: SK

## 2020-07-23 NOTE — CHRONIC PAIN
[facility] HOSP - [facility]  Inpatient Pain Management Progress Note      Patient name: Rainer Phelps 66year old female  : 1945  MRN: [id number]    Diagnosis: Acute on chronic congestive heart failure, unspecified heart failure type ([facility] [address]  (pr • Valvular disease     mitral valve prolapse- 3 clips placed     Past Surgical History:   Procedure Laterality Date   • ARTHROSCOPY OF JOINT UNLISTED Right     knee   • BACK SURGERY      spinal fusion L1-5   • CAUDAL N/A 3/26/2019    Performed by Mray Alcantara (Other) Daughter         spastic paraplegia   • Eye Problems Other         aunts/ uncles glaucoma   • Other (Other) Other       reports that she has quit smoking.  She has never used smokeless tobacco. She reports that she does not drink alcohol or use drug injection  Ears: no obvious deformities noted   Nose: externally grossly within normal limits, no unusual discharge or rhinorrhea   Throat: lips grossly within normal limits by visual exam externally  Neck: supple, trachea midline, no obvious JVD  Chest: S

## 2020-07-23 NOTE — PROGRESS NOTES
[facility]  Progress Note    Alexandro Gutierrez Patient Status:  Inpatient    1945 MRN [id number]   Location [facility] 3W/SW Attending Jeffrey Maciel MD   [facility] Day # 5 PCP Samra Hernandez MD     Assessment:    1.   Acute exacerbation <0.045 07/19/2020    TROP <0.045 07/18/2020    TROP <0.045 06/19/2020        Medications:    • Metoprolol Succinate ER  50 mg Oral Daily Beta Blocker   • furosemide  40 mg Oral Daily   • apixaban  5 mg Oral BID   • atorvastatin  40 mg Oral Nightly   • Calvin

## 2020-07-23 NOTE — PROGRESS NOTES
[facility]    Progress Note    Tyshawn Osullivan Patient Status:  Inpatient    1945 MRN [id number]   Location [facility] 3W/SW Attending Margaret Billingsley MD   [facility] Day # 5 PCP Lindy Shell MD        Subjective:     Idris Hines 0.54 (L) 07/21/2020    BUN 25 (H) 07/21/2020     07/21/2020    K 3.9 07/21/2020     07/21/2020    CO2 31.0 07/21/2020    GLU 83 07/21/2020    CA 8.2 (L) 07/21/2020    ALB 2.7 (L) 07/20/2020    ALKPHO 81 07/20/2020    BILT 1.0 07/20/2020    TP 5

## 2020-07-23 NOTE — DISCHARGE SUMMARY
General Medicine Discharge Summary     Patient ID:  Brodie Ortiz  66year old  5/4/1945    Admit date: 7/18/2020    Discharge date and time: 7/23/20    Attending Physician: Pallavi Mccabe MD     Consults: IP CONSULT TO CARDIOLOGY  IP CONSULT TO CARDIO Imaging: No results found.       Disposition: home    Activity: activity as tolerated  Diet: cardiac diet  Wound Care: none needed  Code Status: Full Code  O2:     Home Medication Changes:     Med list     Medication List      CONTINUE taking these medi these medications    dilTIAZem HCl 30 MG Tabs  Commonly known as:  Benigno Bear MD In 1 month.     Specialties:  Interventional, Cardiology, CARDIOLOGY  Why:  Follow up with Dr. Chauncey Cardenas Total Time Coordinating Care: Greater than 30 minutes    Patient had opportunity to ask questions and state understand and agree with therapeutic plan as outlined    Thank You,    Doni Villagomez MD    [facility] Hospitalist  Pager

## 2020-07-23 NOTE — CM/SW NOTE
Received notice that pt is medically cleared for d/c today. Pt will need ambulance transport home. SW consulted w/ pt's RN  - agreeable to d/c time of 12PM.    SW contacted pt's , Vin Luong, and provided update - he is agreeable to d/c time.     Ambul

## 2020-07-26 NOTE — PROGRESS NOTES
[address] Patient Status:  No patient class for patient encounter    1945 MRN [id number]   Location [facility] Sonny N[place]otte, MD Dr. Roel Pretty is a 66 year o List:  Acute on chronic HFpEF  Gastritis  RSV pneumonitis, resolved   Recurrent UTI  Severe MR s/p mitral valve clip 10/31/18, repeat 10/2/19  Chronic atrial fib with RVR, on eliquis  History of DVT with permanent IVC filter  CAD non obstructive  Colitis w 07/21/2020 04:47 AM     07/21/2020 04:47 AM    K 3.9 07/21/2020 05:28 PM     07/21/2020 04:47 AM    CO2 31.0 07/21/2020 04:47 AM    GLU 83 07/21/2020 04:47 AM    CA 8.2 (L) 07/21/2020 04:47 AM    ALB 2.7 (L) 07/20/2020 04:32 AM    ALKPHO 81 07/ regurgitation. 3. Mitral valve: Prolapse. Mild regurgitation. Valve area by     continuity equation (using LVOT flow): 1.0cm^2. 4. Left atrium: The atrium was severely dilated. 5. Right atrium: The atrium was severely dilated.   Compared to the previous 5 days.      Monitor yourself for signs and symptoms of fluid overload, increased shortness of breath, difficulty breathing when laying down etc. Call the clinic if any of these signs develop at ph: [phone]    Call if having any dizziness, lightheaded

## 2020-07-27 ENCOUNTER — OFFICE VISIT (OUTPATIENT)
Dept: CARDIOLOGY CLINIC | Facility: HOSPITAL | Age: 75
End: 2020-07-27
Attending: NURSE PRACTITIONER
Payer: MEDICARE

## 2020-07-27 VITALS — DIASTOLIC BLOOD PRESSURE: 75 MMHG | OXYGEN SATURATION: 100 % | SYSTOLIC BLOOD PRESSURE: 139 MMHG | HEART RATE: 56 BPM

## 2020-07-27 DIAGNOSIS — I50.9 HEART FAILURE, UNSPECIFIED (HCC): ICD-10-CM

## 2020-07-27 DIAGNOSIS — I48.11 LONGSTANDING PERSISTENT ATRIAL FIBRILLATION (HCC): ICD-10-CM

## 2020-07-27 DIAGNOSIS — E87.6 HYPOKALEMIA: ICD-10-CM

## 2020-07-27 DIAGNOSIS — I34.0 NON-RHEUMATIC MITRAL REGURGITATION: ICD-10-CM

## 2020-07-27 DIAGNOSIS — I50.9 ACUTE ON CHRONIC CONGESTIVE HEART FAILURE, UNSPECIFIED HEART FAILURE TYPE (HCC): Primary | ICD-10-CM

## 2020-07-27 LAB
ANION GAP SERPL CALC-SCNC: 9 MMOL/L
ANION GAP SERPL CALC-SCNC: 9 MMOL/L (ref 0–18)
BUN BLD-MCNC: 25 MG/DL (ref 7–18)
BUN SERPL-MCNC: 25 MG/DL
BUN/CREAT SERPL: 28.4
BUN/CREAT SERPL: 28.4 (ref 10–20)
CALCIUM BLD-MCNC: 8.9 MG/DL (ref 8.5–10.1)
CALCIUM SERPL-MCNC: 8.9 MG/DL
CHLORIDE SERPL-SCNC: 102 MMOL/L
CHLORIDE SERPL-SCNC: 102 MMOL/L (ref 98–112)
CO2 SERPL-SCNC: 26 MMOL/L
CO2 SERPL-SCNC: 26 MMOL/L (ref 21–32)
CREAT BLD-MCNC: 0.88 MG/DL (ref 0.55–1.02)
CREAT SERPL-MCNC: 0.88 MG/DL
DEPRECATED RDW RBC AUTO: 45.6 FL (ref 35.1–46.3)
ERYTHROCYTE [DISTWIDTH] IN BLOOD BY AUTOMATED COUNT: 14.7 % (ref 11–15)
GLUCOSE BLD-MCNC: 241 MG/DL (ref 70–99)
GLUCOSE SERPL-MCNC: 241 MG/DL
HCT VFR BLD AUTO: 44.3 % (ref 35–48)
HGB BLD-MCNC: 14.3 G/DL (ref 12–16)
MCH RBC QN AUTO: 27.7 PG (ref 26–34)
MCHC RBC AUTO-ENTMCNC: 32.3 G/DL (ref 31–37)
MCV RBC AUTO: 85.7 FL (ref 80–100)
NT-PROBNP SERPL-MCNC: 2899 PG/ML (ref ?–450)
OSMOLALITY SERPL CALC.SUM OF ELEC: 296 MOSM/KG (ref 275–295)
PATIENT FASTING Y/N/NP: NO
PLATELET # BLD AUTO: 215 10(3)UL (ref 150–450)
POTASSIUM SERPL-SCNC: 3 MMOL/L
POTASSIUM SERPL-SCNC: 3 MMOL/L (ref 3.5–5.1)
RBC # BLD AUTO: 5.17 X10(6)UL (ref 3.8–5.3)
SODIUM SERPL-SCNC: 137 MMOL/L
SODIUM SERPL-SCNC: 137 MMOL/L (ref 136–145)
WBC # BLD AUTO: 10.1 X10(3) UL (ref 4–11)

## 2020-07-27 PROCEDURE — 85027 COMPLETE CBC AUTOMATED: CPT | Performed by: NURSE PRACTITIONER

## 2020-07-27 PROCEDURE — 99212 OFFICE O/P EST SF 10 MIN: CPT | Performed by: NURSE PRACTITIONER

## 2020-07-27 PROCEDURE — 99213 OFFICE O/P EST LOW 20 MIN: CPT | Performed by: NURSE PRACTITIONER

## 2020-07-27 PROCEDURE — 36415 COLL VENOUS BLD VENIPUNCTURE: CPT | Performed by: NURSE PRACTITIONER

## 2020-07-27 PROCEDURE — 83880 ASSAY OF NATRIURETIC PEPTIDE: CPT | Performed by: NURSE PRACTITIONER

## 2020-07-27 PROCEDURE — 80048 BASIC METABOLIC PNL TOTAL CA: CPT | Performed by: NURSE PRACTITIONER

## 2020-07-27 RX ORDER — POTASSIUM CHLORIDE 20 MEQ/1
40 TABLET, EXTENDED RELEASE ORAL 2 TIMES DAILY
Qty: 60 TABLET | Refills: 1 | COMMUNITY
Start: 2020-07-27 | End: 2020-08-17

## 2020-07-27 NOTE — PATIENT INSTRUCTIONS
Increase furosemide to 40mg twice a day    Increase potassium chloride to 40mEq (2 tablets) three times a day for 2 days, then continue potassium chloride 40mEq (2 tablets) twice a day    Repeat blood work on Friday    Return to [facility] on 8/

## 2020-07-28 ENCOUNTER — TELEPHONE (OUTPATIENT)
Dept: CARDIOLOGY CLINIC | Facility: HOSPITAL | Age: 75
End: 2020-07-28

## 2020-07-28 NOTE — TELEPHONE ENCOUNTER
Breanna, RN with Home Health called to notify that patient's blood pressure was low 88/54. After re-check blood pressure 100/62. Patient reports that she feels better since yesterday.  Reviewed medication instructions for furosemide and potassium with Home

## 2020-07-30 ENCOUNTER — TELEPHONE (OUTPATIENT)
Dept: CARDIOLOGY | Age: 75
End: 2020-07-30

## 2020-08-10 ENCOUNTER — HOSPITAL ENCOUNTER (EMERGENCY)
Facility: HOSPITAL | Age: 75
Discharge: HOME | End: 2020-08-10
Attending: EMERGENCY MEDICINE
Payer: MEDICARE

## 2020-08-10 ENCOUNTER — APPOINTMENT (OUTPATIENT)
Dept: GENERAL RADIOLOGY | Facility: HOSPITAL | Age: 75
End: 2020-08-10
Attending: EMERGENCY MEDICINE
Payer: MEDICARE

## 2020-08-10 VITALS
RESPIRATION RATE: 18 BRPM | HEIGHT: 65 IN | SYSTOLIC BLOOD PRESSURE: 108 MMHG | DIASTOLIC BLOOD PRESSURE: 76 MMHG | OXYGEN SATURATION: 97 % | HEART RATE: 89 BPM | WEIGHT: 123 LBS | BODY MASS INDEX: 20.49 KG/M2 | TEMPERATURE: 99 F

## 2020-08-10 DIAGNOSIS — R10.13 ABDOMINAL PAIN, EPIGASTRIC: Primary | ICD-10-CM

## 2020-08-10 LAB
ALBUMIN SERPL-MCNC: 2.9 G/DL
ALBUMIN SERPL-MCNC: 2.9 G/DL (ref 3.4–5)
ALBUMIN SERPL-MCNC: 3 G/DL (ref 3.4–5)
ALBUMIN/GLOB SERPL: 0.9 {RATIO}
ALBUMIN/GLOB SERPL: 0.9 {RATIO} (ref 1–2)
ALP LIVER SERPL-CCNC: 48 U/L (ref 55–142)
ALP LIVER SERPL-CCNC: 87 U/L (ref 55–142)
ALP SERPL-CCNC: 48 U/L
ALT SERPL-CCNC: 29 U/L (ref 13–56)
ALT SERPL-CCNC: 29 UNITS/L
ALT SERPL-CCNC: 30 U/L (ref 13–56)
ANION GAP SERPL CALC-SCNC: 8 MMOL/L
ANION GAP SERPL CALC-SCNC: 8 MMOL/L (ref 0–18)
AST SERPL-CCNC: 21 U/L (ref 15–37)
AST SERPL-CCNC: 21 U/L (ref 15–37)
AST SERPL-CCNC: 21 UNITS/L
BASOPHILS # BLD AUTO: 0.04 X10(3) UL (ref 0–0.2)
BASOPHILS NFR BLD AUTO: 0.4 %
BILIRUB DIRECT SERPL-MCNC: 0.2 MG/DL (ref 0–0.2)
BILIRUB SERPL-MCNC: 0.8 MG/DL
BILIRUB SERPL-MCNC: 0.8 MG/DL (ref 0.1–2)
BILIRUB SERPL-MCNC: 0.8 MG/DL (ref 0.1–2)
BUN BLD-MCNC: 22 MG/DL (ref 7–18)
BUN SERPL-MCNC: 22 MG/DL
BUN/CREAT SERPL: 32.4
BUN/CREAT SERPL: 32.4 (ref 10–20)
CALCIUM BLD-MCNC: 9.5 MG/DL (ref 8.5–10.1)
CALCIUM SERPL-MCNC: 9.5 MG/DL
CHLORIDE SERPL-SCNC: 104 MMOL/L
CHLORIDE SERPL-SCNC: 104 MMOL/L (ref 98–112)
CO2 SERPL-SCNC: 25 MMOL/L
CO2 SERPL-SCNC: 25 MMOL/L (ref 21–32)
CREAT BLD-MCNC: 0.68 MG/DL (ref 0.55–1.02)
CREAT SERPL-MCNC: 0.68 MG/DL
DEPRECATED RDW RBC AUTO: 44.9 FL (ref 35.1–46.3)
EOSINOPHIL # BLD AUTO: 0.02 X10(3) UL (ref 0–0.7)
EOSINOPHIL NFR BLD AUTO: 0.2 %
ERYTHROCYTE [DISTWIDTH] IN BLOOD BY AUTOMATED COUNT: 14.6 % (ref 11–15)
GLOBULIN PLAS-MCNC: 3.2 G/DL (ref 2.8–4.4)
GLOBULIN SER-MCNC: 3.2 G/DL
GLUCOSE BLD-MCNC: 110 MG/DL (ref 70–99)
GLUCOSE SERPL-MCNC: 110 MG/DL
HCT VFR BLD AUTO: 44.2 % (ref 35–48)
HCT VFR BLD CALC: 44.2 %
HGB BLD-MCNC: 14.4 G/DL
HGB BLD-MCNC: 14.4 G/DL (ref 12–16)
IMM GRANULOCYTES # BLD AUTO: 0.06 X10(3) UL (ref 0–1)
IMM GRANULOCYTES NFR BLD: 0.6 %
LIPASE SERPL-CCNC: 97 U/L (ref 73–393)
LYMPHOCYTES # BLD AUTO: 1.85 X10(3) UL (ref 1–4)
LYMPHOCYTES NFR BLD AUTO: 18.3 %
M PROTEIN MFR SERPL ELPH: 6.1 G/DL (ref 6.4–8.2)
M PROTEIN MFR SERPL ELPH: 6.2 G/DL (ref 6.4–8.2)
MCH RBC QN AUTO: 27.5 PG
MCH RBC QN AUTO: 27.5 PG (ref 26–34)
MCHC RBC AUTO-ENTMCNC: 32.6 G/DL
MCHC RBC AUTO-ENTMCNC: 32.6 G/DL (ref 31–37)
MCV RBC AUTO: 84.5 FL
MCV RBC AUTO: 84.5 FL (ref 80–100)
MONOCYTES # BLD AUTO: 0.65 X10(3) UL (ref 0.1–1)
MONOCYTES NFR BLD AUTO: 6.4 %
NEUTROPHILS # BLD AUTO: 7.48 X10 (3) UL (ref 1.5–7.7)
NEUTROPHILS # BLD AUTO: 7.48 X10(3) UL (ref 1.5–7.7)
NEUTROPHILS NFR BLD AUTO: 74.1 %
OSMOLALITY SERPL CALC.SUM OF ELEC: 288 MOSM/KG (ref 275–295)
PLATELET # BLD AUTO: 219 10(3)UL (ref 150–450)
PLATELET # BLD: 219 K/MCL
POTASSIUM SERPL-SCNC: 3.9 MMOL/L
POTASSIUM SERPL-SCNC: 3.9 MMOL/L (ref 3.5–5.1)
PROT SERPL-MCNC: 6.1 G/DL
RBC # BLD AUTO: 5.23 X10(6)UL (ref 3.8–5.3)
RBC # BLD: 5.23 10*6/UL
SODIUM SERPL-SCNC: 137 MMOL/L
SODIUM SERPL-SCNC: 137 MMOL/L (ref 136–145)
WBC # BLD AUTO: 10.1 X10(3) UL (ref 4–11)
WBC # BLD: 10.1 K/MCL

## 2020-08-10 PROCEDURE — 80053 COMPREHEN METABOLIC PANEL: CPT | Performed by: EMERGENCY MEDICINE

## 2020-08-10 PROCEDURE — 99284 EMERGENCY DEPT VISIT MOD MDM: CPT

## 2020-08-10 PROCEDURE — 83690 ASSAY OF LIPASE: CPT | Performed by: EMERGENCY MEDICINE

## 2020-08-10 PROCEDURE — 82248 BILIRUBIN DIRECT: CPT | Performed by: EMERGENCY MEDICINE

## 2020-08-10 PROCEDURE — 73610 X-RAY EXAM OF ANKLE: CPT | Performed by: EMERGENCY MEDICINE

## 2020-08-10 PROCEDURE — 36415 COLL VENOUS BLD VENIPUNCTURE: CPT

## 2020-08-10 PROCEDURE — 85025 COMPLETE CBC W/AUTO DIFF WBC: CPT | Performed by: EMERGENCY MEDICINE

## 2020-08-10 RX ORDER — ONDANSETRON 4 MG/1
4 TABLET, ORALLY DISINTEGRATING ORAL EVERY 4 HOURS PRN
Qty: 15 TABLET | Refills: 0 | Status: ON HOLD | OUTPATIENT
Start: 2020-08-10 | End: 2021-06-14

## 2020-08-10 RX ORDER — MAGNESIUM HYDROXIDE/ALUMINUM HYDROXICE/SIMETHICONE 120; 1200; 1200 MG/30ML; MG/30ML; MG/30ML
30 SUSPENSION ORAL ONCE
Status: COMPLETED | OUTPATIENT
Start: 2020-08-10 | End: 2020-08-10

## 2020-08-10 RX ORDER — FAMOTIDINE 20 MG
20 TABLET ORAL 2 TIMES DAILY
Qty: 60 TABLET | Refills: 0 | Status: SHIPPED | OUTPATIENT
Start: 2020-08-10 | End: 2020-09-09

## 2020-08-10 RX ORDER — ONDANSETRON 4 MG/1
4 TABLET, ORALLY DISINTEGRATING ORAL ONCE
Status: COMPLETED | OUTPATIENT
Start: 2020-08-10 | End: 2020-08-10

## 2020-08-10 NOTE — ED INITIAL ASSESSMENT (HPI)
Pt to ED with c/o abdominal pain and increased bloating sensation for over one month. Pt denies fever. Pt with colostomy. Pt denies vomiting. Pt states poor po intake due to abdominal pain.

## 2020-08-11 NOTE — ED PROVIDER NOTES
Patient Seen in: [facility] AND [facility] Emergency Department    History   Patient presents with:  Abdomen/Flank Pain      HPI    Patient presents to the ED complaining of abdominal pain and bloating over the past month or more.   No fever, no decreased oral in Self-catheterizes urinary bladder 1/11/2016   • Ulcerative colitis ([address]    • Valvular disease     mitral valve prolapse- 3 clips placed       History reviewed.    Past Surgical History:   Procedure Laterality Date   • ARTHROSCOPY OF JOINT UNLISTED Right glaucoma   • DVT/VTE Father    • Diabetes Mother    • Diabetes Sister    • Eye Problems Sister         glaucoma   • Other (Other) Sister    • Other (Other) Daughter         spastic paraplegia   • Eye Problems Other         aunts/ uncles glaucoma   • Other discharge. Left eye exhibits no discharge. Neck: No tracheal deviation present. Cardiovascular: Normal rate and intact distal pulses. Pulmonary/Chest: Effort normal. No stridor. No respiratory distress. Abdominal: Soft. She exhibits no distension. Left (cpt=73610)    Result Date: 8/10/2020  CONCLUSION:   1. Extreme bone demineralization diffusely. 2. Interval healing of previous medial malleolar fracture of 2011. No new fracture is seen.      Dictated by (CST): Nicky Briceno MD on 8/10/2020 at 6:08 [address] [place] [address]  [phone]    Schedule an appointment as soon as possible for a visit in 2 days        Medications Prescribed:  Discharge Medication List as of 8/10/20

## 2020-08-13 PROBLEM — I82.402 ACUTE EMBOLISM AND THROMBOSIS OF DEEP VEIN OF LEFT LOWER EXTREMITY (HCC): Status: ACTIVE | Noted: 2020-08-13

## 2020-08-13 PROBLEM — R73.9 HYPERGLYCEMIA: Status: RESOLVED | Noted: 2020-07-18 | Resolved: 2020-08-13

## 2020-08-13 PROBLEM — E87.6 HYPOKALEMIA: Status: RESOLVED | Noted: 2020-07-18 | Resolved: 2020-08-13

## 2020-08-26 ENCOUNTER — OFFICE VISIT (OUTPATIENT)
Dept: CARDIOLOGY CLINIC | Facility: HOSPITAL | Age: 75
End: 2020-08-26
Attending: NURSE PRACTITIONER
Payer: MEDICARE

## 2020-08-26 VITALS — SYSTOLIC BLOOD PRESSURE: 94 MMHG | DIASTOLIC BLOOD PRESSURE: 50 MMHG | HEART RATE: 83 BPM | OXYGEN SATURATION: 97 %

## 2020-08-26 DIAGNOSIS — I50.9 ACUTE ON CHRONIC CONGESTIVE HEART FAILURE, UNSPECIFIED HEART FAILURE TYPE (HCC): Primary | ICD-10-CM

## 2020-08-26 DIAGNOSIS — R42 LIGHTHEADEDNESS: ICD-10-CM

## 2020-08-26 DIAGNOSIS — Z95.818 S/P MITRAL VALVE CLIP IMPLANTATION: ICD-10-CM

## 2020-08-26 DIAGNOSIS — Z98.890 S/P MITRAL VALVE CLIP IMPLANTATION: ICD-10-CM

## 2020-08-26 DIAGNOSIS — I48.11 LONGSTANDING PERSISTENT ATRIAL FIBRILLATION (HCC): ICD-10-CM

## 2020-08-26 PROCEDURE — 99211 OFF/OP EST MAY X REQ PHY/QHP: CPT | Performed by: NURSE PRACTITIONER

## 2020-08-26 PROCEDURE — 99213 OFFICE O/P EST LOW 20 MIN: CPT | Performed by: NURSE PRACTITIONER

## 2020-08-26 NOTE — PROGRESS NOTES
[address] Patient Status:  No patient class for patient encounter    1945 MRN [id number]   Location [address] [facility] MD Dr. Sagar Gallagher is a 66 y 10/2/19  Chronic atrial fib with RVR, on eliquis  History of DVT with permanent IVC filter  CAD non obstructive  Colitis with history of cdiff  GERD  Hereditery spastic paraplegia, wheelchair bound with baclofen pump    New York Heart Association Class: 2 07/20/2020 04:32 AM    TSH 0.271 (L) 07/20/2020 04:32 AM    LIP 97 08/10/2020 03:52 PM    DDIMER <0.27 11/24/2019 06:32 AM    ESRML 7 09/09/2019 04:09 PM    CRP 2.19 (H) 07/06/2020 01:40 PM    MG 2.3 07/20/2020 04:32 AM    PHOS 2.8 04/23/2019 06:18 AM    T size was normal. Wall thickness was normal.     Systolic function was normal. The estimated ejection fraction was 60-65%. There was no diagnostic evidence for regional wall motion abnormalities.      The study is not technically sufficient to allow eval 30 minutes with this patient providing counseling, coordination of care and education given. Patient and  receptive.     Assessment:  Acute on chronic chronic HFpEF in setting of UTI with ESBL klebsiella infection  -near euvolemic on lasix 40 mg armaan BY MOUTH TWICE DAILY.  TAKE ONLY WHEN TAKING FUROSEMIDE., Disp: 180 tablet, Rfl: 3  •  EZETIMIBE 10 MG Oral Tab, TAKE 1 TABLET ONCE DAILY, Disp: 90 tablet, Rfl: 2  •  traMADol HCl 50 MG Oral Tab, Increase to one  Po  Every 6 Hour if needed for pain, Disp: 1 mg by mouth daily with breakfast., Disp: , Rfl:   •  Atorvastatin Calcium 40 MG Oral Tab, Take 40 mg by mouth nightly.  , Disp: , Rfl:     India Alonso NP  8/26/2020

## 2020-08-26 NOTE — PATIENT INSTRUCTIONS
Switch metoprolol succinate 50 mg tab to bedtime starting tomorrow 8/27/20     Continue all your same medications    Call if having any dizziness, lightheadedness, heart racing, palpitations, chest pain, shortness of breath, coughing,  wheezing, swelling,

## 2020-08-28 ENCOUNTER — OFFICE VISIT (OUTPATIENT)
Dept: CARDIOLOGY | Age: 75
End: 2020-08-28

## 2020-08-28 VITALS
SYSTOLIC BLOOD PRESSURE: 130 MMHG | HEIGHT: 64 IN | HEART RATE: 102 BPM | WEIGHT: 135 LBS | DIASTOLIC BLOOD PRESSURE: 60 MMHG | BODY MASS INDEX: 23.05 KG/M2 | OXYGEN SATURATION: 96 %

## 2020-08-28 DIAGNOSIS — I34.0 NON-RHEUMATIC MITRAL REGURGITATION: Primary | ICD-10-CM

## 2020-08-28 DIAGNOSIS — I48.20 CHRONIC ATRIAL FIBRILLATION (CMD): ICD-10-CM

## 2020-08-28 DIAGNOSIS — I50.32 CHRONIC HEART FAILURE WITH PRESERVED EJECTION FRACTION (CMD): ICD-10-CM

## 2020-08-28 PROCEDURE — 99214 OFFICE O/P EST MOD 30 MIN: CPT | Performed by: INTERNAL MEDICINE

## 2020-08-28 RX ORDER — ONDANSETRON 4 MG/1
4 TABLET, FILM COATED ORAL
COMMUNITY
Start: 2020-08-27 | End: 2020-09-11

## 2020-08-28 RX ORDER — FAMOTIDINE 20 MG/1
20 TABLET, FILM COATED ORAL
COMMUNITY
Start: 2020-08-10 | End: 2020-09-09

## 2020-08-28 RX ORDER — CLONAZEPAM 0.5 MG/1
TABLET, ORALLY DISINTEGRATING ORAL
COMMUNITY
Start: 2020-08-21 | End: 2020-12-01

## 2020-08-28 RX ORDER — DIPHENHYDRAMINE HCL 25 MG
25 CAPSULE ORAL
COMMUNITY
Start: 2020-07-23 | End: 2020-12-01

## 2020-08-28 RX ORDER — NITROGLYCERIN 0.4 MG/1
0.4 TABLET SUBLINGUAL EVERY 5 MIN PRN
Qty: 30 TABLET | Refills: 11 | Status: SHIPPED | OUTPATIENT
Start: 2020-08-28

## 2020-08-28 RX ORDER — VENLAFAXINE HYDROCHLORIDE 75 MG/1
CAPSULE, EXTENDED RELEASE ORAL
COMMUNITY
Start: 2020-08-03 | End: 2021-04-06 | Stop reason: SDUPTHER

## 2020-08-28 SDOH — HEALTH STABILITY: MENTAL HEALTH: HOW OFTEN DO YOU HAVE A DRINK CONTAINING ALCOHOL?: NEVER

## 2020-08-28 ASSESSMENT — PATIENT HEALTH QUESTIONNAIRE - PHQ9
2. FEELING DOWN, DEPRESSED OR HOPELESS: NOT AT ALL
SUM OF ALL RESPONSES TO PHQ9 QUESTIONS 1 AND 2: 0
1. LITTLE INTEREST OR PLEASURE IN DOING THINGS: NOT AT ALL
CLINICAL INTERPRETATION OF PHQ9 SCORE: NO FURTHER SCREENING NEEDED
CLINICAL INTERPRETATION OF PHQ2 SCORE: NO FURTHER SCREENING NEEDED
SUM OF ALL RESPONSES TO PHQ9 QUESTIONS 1 AND 2: 0

## 2020-09-02 ENCOUNTER — TELEPHONE (OUTPATIENT)
Dept: GASTROENTEROLOGY | Facility: CLINIC | Age: 75
End: 2020-09-02

## 2020-09-02 DIAGNOSIS — R19.7 DIARRHEA, UNSPECIFIED TYPE: Primary | ICD-10-CM

## 2020-09-02 NOTE — TELEPHONE ENCOUNTER
Dr Campbell , patient transferred on priority line. See your 6/28 encounter. For past 3 weeks experiencing diarrhea and lots of air in colostomy bag. Has worsened last few days.  Today, the bag exploded when she tried to open it and watery diarrhea hit

## 2020-09-02 NOTE — TELEPHONE ENCOUNTER
Pt states colonoscopy bag has been running out for the past 2 weeks. \"pt states it is like water. \"

## 2020-09-02 NOTE — TELEPHONE ENCOUNTER
The patient should repeat the C. difficile toxin assay and fecal calprotectin. I have placed the orders.

## 2020-09-03 ENCOUNTER — LAB ENCOUNTER (OUTPATIENT)
Dept: LAB | Facility: HOSPITAL | Age: 75
End: 2020-09-03
Attending: INTERNAL MEDICINE
Payer: MEDICARE

## 2020-09-03 DIAGNOSIS — R19.7 DIARRHEA, UNSPECIFIED TYPE: ICD-10-CM

## 2020-09-03 DIAGNOSIS — I25.10 CORONARY ARTERY DISEASE INVOLVING NATIVE CORONARY ARTERY OF NATIVE HEART WITHOUT ANGINA PECTORIS: Primary | ICD-10-CM

## 2020-09-03 PROCEDURE — 83993 ASSAY FOR CALPROTECTIN FECAL: CPT

## 2020-09-03 PROCEDURE — 87493 C DIFF AMPLIFIED PROBE: CPT

## 2020-09-03 RX ORDER — FUROSEMIDE 40 MG/1
TABLET ORAL
Qty: 90 TABLET | Refills: 1 | Status: SHIPPED | OUTPATIENT
Start: 2020-09-03

## 2020-09-03 RX ORDER — ATORVASTATIN CALCIUM 40 MG/1
TABLET, FILM COATED ORAL
Qty: 90 TABLET | Refills: 0 | Status: SHIPPED | OUTPATIENT
Start: 2020-09-03 | End: 2020-12-11 | Stop reason: SDUPTHER

## 2020-09-04 LAB — C DIFF TOX B STL QL: NEGATIVE

## 2020-09-07 LAB — CALPROTECTIN STL-MCNT: 83.8 ΜG/G (ref ?–50)

## 2020-09-09 ENCOUNTER — TELEPHONE (OUTPATIENT)
Dept: GASTROENTEROLOGY | Facility: CLINIC | Age: 75
End: 2020-09-09

## 2020-09-09 NOTE — TELEPHONE ENCOUNTER
Dr Wagner Host-    Pt called and informed C Diff results negative. Fecal Calprotectin results are in but awaiting further recommendations and next steps. Please advise. Thank You.

## 2020-09-10 NOTE — TELEPHONE ENCOUNTER
Please inform the patient that the C. difficile toxin assay was indeed negative and the stool inflammation test was much better than before and closer to the normal range. Have her symptoms improved?

## 2020-09-10 NOTE — TELEPHONE ENCOUNTER
Based on the negative C. difficile and improvement in the fecal calprotectin I would observe at the present time and not change medical therapy.

## 2020-09-10 NOTE — TELEPHONE ENCOUNTER
Noted left detailed message for patient to call back if symptoms worse or develop fever or abdominal pain

## 2020-09-10 NOTE — TELEPHONE ENCOUNTER
Dr Randi Reaves-    Patient is still have symptoms but states they are slightly better. She is having 3 watery stools per day. Denies fever, abdominal pain/cramping. She has some occasional nausea and her appetite is \"OK\". She denies any weight loss.

## 2020-09-11 PROBLEM — I50.9 ACUTE ON CHRONIC CONGESTIVE HEART FAILURE, UNSPECIFIED HEART FAILURE TYPE (HCC): Status: RESOLVED | Noted: 2020-06-19 | Resolved: 2020-09-11

## 2020-09-11 PROBLEM — R42 LIGHTHEADEDNESS: Status: RESOLVED | Noted: 2020-08-26 | Resolved: 2020-09-11

## 2020-09-16 ENCOUNTER — OFFICE VISIT (OUTPATIENT)
Dept: CARDIOLOGY CLINIC | Facility: HOSPITAL | Age: 75
End: 2020-09-16
Attending: NURSE PRACTITIONER
Payer: MEDICARE

## 2020-09-16 VITALS — SYSTOLIC BLOOD PRESSURE: 96 MMHG | HEART RATE: 85 BPM | OXYGEN SATURATION: 100 % | DIASTOLIC BLOOD PRESSURE: 50 MMHG

## 2020-09-16 DIAGNOSIS — I50.9 HEART FAILURE, UNSPECIFIED (HCC): ICD-10-CM

## 2020-09-16 DIAGNOSIS — I48.11 LONGSTANDING PERSISTENT ATRIAL FIBRILLATION (HCC): ICD-10-CM

## 2020-09-16 DIAGNOSIS — Z98.890 S/P MITRAL VALVE CLIP IMPLANTATION: ICD-10-CM

## 2020-09-16 DIAGNOSIS — Z95.818 S/P MITRAL VALVE CLIP IMPLANTATION: ICD-10-CM

## 2020-09-16 DIAGNOSIS — I34.0 NON-RHEUMATIC MITRAL REGURGITATION: ICD-10-CM

## 2020-09-16 DIAGNOSIS — E87.6 HYPOKALEMIA: ICD-10-CM

## 2020-09-16 DIAGNOSIS — I50.32 CHRONIC DIASTOLIC HEART FAILURE (HCC): Primary | ICD-10-CM

## 2020-09-16 LAB
ANION GAP SERPL CALC-SCNC: 8 MMOL/L (ref 0–18)
BUN BLD-MCNC: 21 MG/DL (ref 7–18)
BUN/CREAT SERPL: 35.6 (ref 10–20)
CALCIUM BLD-MCNC: 8.9 MG/DL (ref 8.5–10.1)
CHLORIDE SERPL-SCNC: 106 MMOL/L (ref 98–112)
CO2 SERPL-SCNC: 26 MMOL/L (ref 21–32)
CREAT BLD-MCNC: 0.59 MG/DL (ref 0.55–1.02)
GLUCOSE BLD-MCNC: 189 MG/DL (ref 70–99)
NT-PROBNP SERPL-MCNC: 2327 PG/ML (ref ?–450)
OSMOLALITY SERPL CALC.SUM OF ELEC: 298 MOSM/KG (ref 275–295)
PATIENT FASTING Y/N/NP: NO
POTASSIUM SERPL-SCNC: 2.8 MMOL/L (ref 3.5–5.1)
SODIUM SERPL-SCNC: 140 MMOL/L (ref 136–145)

## 2020-09-16 PROCEDURE — 99214 OFFICE O/P EST MOD 30 MIN: CPT | Performed by: NURSE PRACTITIONER

## 2020-09-16 PROCEDURE — 80048 BASIC METABOLIC PNL TOTAL CA: CPT | Performed by: NURSE PRACTITIONER

## 2020-09-16 PROCEDURE — 36415 COLL VENOUS BLD VENIPUNCTURE: CPT | Performed by: NURSE PRACTITIONER

## 2020-09-16 PROCEDURE — 83880 ASSAY OF NATRIURETIC PEPTIDE: CPT | Performed by: NURSE PRACTITIONER

## 2020-09-16 PROCEDURE — 99212 OFFICE O/P EST SF 10 MIN: CPT | Performed by: NURSE PRACTITIONER

## 2020-09-16 RX ORDER — NITROGLYCERIN 0.4 MG/1
0.4 TABLET SUBLINGUAL EVERY 5 MIN PRN
COMMUNITY

## 2020-09-16 RX ORDER — BACLOFEN 5 MG/1
5 TABLET ORAL
COMMUNITY
Start: 2020-07-29 | End: 2020-10-23

## 2020-09-16 NOTE — PROGRESS NOTES
[address] Patient Status:  No patient class for patient encounter    1945 MRN [id number]   Location [address] [facility] MD Dr. Lucila Mckeon is a 66 y 10/2/19  Chronic atrial fib with RVR, on eliquis  History of DVT with permanent IVC filter  CAD non obstructive  Colitis with history of cdiff  GERD  Hereditery spastic paraplegia, wheelchair bound with baclofen pump    New York Heart Association Class: 2 TSH 0.271 (L) 07/20/2020 04:32 AM    LIP 97 08/10/2020 03:52 PM    DDIMER <0.27 11/24/2019 06:32 AM    ESRML 7 09/09/2019 04:09 PM    CRP 2.19 (H) 07/06/2020 01:40 PM    MG 2.3 07/20/2020 04:32 AM    PHOS 2.8 04/23/2019 06:18 AM    TROP <0.045 07/19/202 cavity size was normal. Wall thickness was normal.     Systolic function was normal. The estimated ejection fraction was 60-65%. There was no diagnostic evidence for regional wall motion abnormalities.      The study is not technically sufficient to all Greater than 30 minutes with this patient providing counseling, coordination of care and education given. Patient and  receptive.     Assessment:  Acute on chronic chronic HFpEF in setting of UTI with ESBL klebsiella infection  -near euvolemic on las wheezing, swelling, weight gain,  or weakness    Weigh yourself daily in the morning before breakfast, call if gaining 3 lbs or more overnight or more than 5 lbs in one week.     Follow 2000 mg sodium restricted , heart healthy diet, Keep daily fluids at 48 Disp: 60 capsule, Rfl: 1  •  Venlafaxine HCl ER 75 MG Oral Capsule SR 24 Hr, Take 1 capsule (75 mg total) by mouth once daily. , Disp: 90 capsule, Rfl: 2  •  predniSONE 5 MG Oral Tab, Take 1 tablet (5 mg total) by mouth once daily.  Resume when off steroid t

## 2020-09-16 NOTE — PATIENT INSTRUCTIONS
Spoke with  Elena Cuellar by phone.  Take 40 meq of kdur now and again with dinner about 6 pm in addition to daily Kdur 20 meq bid    Increase kdur to 20 meq 3 times daily starting tomorrow 9/17    Continue all your same medications    Call if having any diz

## 2020-09-21 ENCOUNTER — TELEPHONE (OUTPATIENT)
Dept: CARDIOLOGY CLINIC | Facility: HOSPITAL | Age: 75
End: 2020-09-21

## 2020-09-21 ENCOUNTER — LAB REQUISITION (OUTPATIENT)
Dept: LAB | Facility: HOSPITAL | Age: 75
End: 2020-09-21
Payer: MEDICARE

## 2020-09-21 DIAGNOSIS — I50.9 HEART FAILURE, UNSPECIFIED (HCC): ICD-10-CM

## 2020-09-21 DIAGNOSIS — E87.6 HYPOKALEMIA: ICD-10-CM

## 2020-09-21 LAB
ANION GAP SERPL CALC-SCNC: 5 MMOL/L (ref 0–18)
BUN BLD-MCNC: 24 MG/DL (ref 7–18)
BUN/CREAT SERPL: 40.7 (ref 10–20)
CALCIUM BLD-MCNC: 8.7 MG/DL (ref 8.5–10.1)
CHLORIDE SERPL-SCNC: 107 MMOL/L (ref 98–112)
CO2 SERPL-SCNC: 27 MMOL/L (ref 21–32)
CREAT BLD-MCNC: 0.59 MG/DL
GLUCOSE BLD-MCNC: 102 MG/DL (ref 70–99)
OSMOLALITY SERPL CALC.SUM OF ELEC: 292 MOSM/KG (ref 275–295)
POTASSIUM SERPL-SCNC: 3 MMOL/L (ref 3.5–5.1)
SODIUM SERPL-SCNC: 139 MMOL/L (ref 136–145)

## 2020-09-21 PROCEDURE — 80048 BASIC METABOLIC PNL TOTAL CA: CPT | Performed by: OPHTHALMOLOGY

## 2020-09-21 RX ORDER — POTASSIUM CHLORIDE 20 MEQ/1
20 TABLET, EXTENDED RELEASE ORAL 3 TIMES DAILY
Qty: 300 TABLET | Refills: 1 | Status: SHIPPED | OUTPATIENT
Start: 2020-09-21 | End: 2020-09-28

## 2020-09-21 RX ORDER — POTASSIUM CHLORIDE 20 MEQ/1
20 TABLET, EXTENDED RELEASE ORAL 3 TIMES DAILY
Qty: 180 TABLET | Refills: 3 | COMMUNITY
Start: 2020-09-21 | End: 2020-09-21

## 2020-09-21 NOTE — TELEPHONE ENCOUNTER
BMP results from today 9/21/20 bun 24, cr 0.59, K 3.0, sodium 139. Pt only took Kdur 20 meq tid for 1 day after extra kdur 40 meq , now back on kdur 20meq bid. Still having water stools, taking imodium  Last night with some improvement.      Instructed pt t

## 2020-09-24 ENCOUNTER — TELEPHONE (OUTPATIENT)
Dept: GASTROENTEROLOGY | Facility: CLINIC | Age: 75
End: 2020-09-24

## 2020-09-24 ENCOUNTER — LAB REQUISITION (OUTPATIENT)
Dept: LAB | Facility: HOSPITAL | Age: 75
End: 2020-09-24
Payer: MEDICARE

## 2020-09-24 DIAGNOSIS — E87.6 HYPOKALEMIA: ICD-10-CM

## 2020-09-24 LAB
ANION GAP SERPL CALC-SCNC: 6 MMOL/L (ref 0–18)
BUN BLD-MCNC: 20 MG/DL (ref 7–18)
BUN/CREAT SERPL: 40.8 (ref 10–20)
CALCIUM BLD-MCNC: 8.7 MG/DL (ref 8.5–10.1)
CHLORIDE SERPL-SCNC: 107 MMOL/L (ref 98–112)
CO2 SERPL-SCNC: 26 MMOL/L (ref 21–32)
CREAT BLD-MCNC: 0.49 MG/DL
GLUCOSE BLD-MCNC: 95 MG/DL (ref 70–99)
OSMOLALITY SERPL CALC.SUM OF ELEC: 290 MOSM/KG (ref 275–295)
POTASSIUM SERPL-SCNC: 3.4 MMOL/L (ref 3.5–5.1)
SODIUM SERPL-SCNC: 139 MMOL/L (ref 136–145)

## 2020-09-24 PROCEDURE — 80048 BASIC METABOLIC PNL TOTAL CA: CPT | Performed by: INTERNAL MEDICINE

## 2020-09-24 NOTE — TELEPHONE ENCOUNTER
Please have the patient try #1 Imodium tablet daily along with OTC Gas-X. Please have the patient contact us with an update.

## 2020-09-24 NOTE — TELEPHONE ENCOUNTER
Dr Mercedez Markham, please see your 9/9 encounter. Patient contacted, states her colostomy bag fills with air/diarrhea stool about 3x daily and just sprays all over from pressure when opened. States was better for a few days, then returned.  Before her colosto

## 2020-09-24 NOTE — TELEPHONE ENCOUNTER
Patient contacted and given instructions below, and to call us back Monday with an update. States understanding.

## 2020-09-28 ENCOUNTER — TELEPHONE (OUTPATIENT)
Dept: CARDIOLOGY CLINIC | Facility: HOSPITAL | Age: 75
End: 2020-09-28

## 2020-09-28 ENCOUNTER — TELEPHONE (OUTPATIENT)
Dept: GASTROENTEROLOGY | Facility: CLINIC | Age: 75
End: 2020-09-28

## 2020-09-28 DIAGNOSIS — E87.6 HYPOKALEMIA: ICD-10-CM

## 2020-09-28 RX ORDER — BUDESONIDE 3 MG/1
9 CAPSULE, COATED PELLETS ORAL EVERY MORNING
Qty: 90 CAPSULE | Refills: 1 | Status: ON HOLD | OUTPATIENT
Start: 2020-09-28 | End: 2020-11-04

## 2020-09-28 RX ORDER — POTASSIUM CHLORIDE 20 MEQ/1
40 TABLET, EXTENDED RELEASE ORAL 2 TIMES DAILY
Qty: 360 TABLET | Refills: 1 | Status: SHIPPED | OUTPATIENT
Start: 2020-09-28 | End: 2021-01-04

## 2020-09-28 NOTE — TELEPHONE ENCOUNTER
Pt called reporting she had persistent watery drainage from colostomy. Kidney function was normal and K 3.4 on bmp from 9/24/20, on kdur 20 meq tid. She is starting imodium and gasx per GI today.  Recommend increasing kdur to 40 meq bid while having watery

## 2020-09-28 NOTE — TELEPHONE ENCOUNTER
Patient contacted, reviewed below, states understanding. She was not sure if you needed to know, but said to tell you her Baclofen pump will be coming out Monday, as she was told it wasn't helping.  I thought this could possibly be responsible for diarrhea

## 2020-09-28 NOTE — TELEPHONE ENCOUNTER
Pt states she still has diarrhea - stomach pain - asking for refill on Budesonide - pls call this afternoon

## 2020-09-28 NOTE — TELEPHONE ENCOUNTER
I have sent the prescription to the pharmacy. Please have the patient begin #3 capsules in the morning with a phone call in 2 weeks time.

## 2020-09-29 DIAGNOSIS — I50.9 HEART FAILURE, UNSPECIFIED (HCC): Primary | ICD-10-CM

## 2020-10-05 ENCOUNTER — TELEPHONE (OUTPATIENT)
Dept: GASTROENTEROLOGY | Facility: CLINIC | Age: 75
End: 2020-10-05

## 2020-10-05 ENCOUNTER — LAB REQUISITION (OUTPATIENT)
Dept: LAB | Facility: HOSPITAL | Age: 75
End: 2020-10-05
Payer: MEDICARE

## 2020-10-05 DIAGNOSIS — I50.9 HEART FAILURE, UNSPECIFIED (HCC): ICD-10-CM

## 2020-10-05 PROCEDURE — 80048 BASIC METABOLIC PNL TOTAL CA: CPT | Performed by: INTERNAL MEDICINE

## 2020-10-05 NOTE — TELEPHONE ENCOUNTER
I would give the budesonide until the end of the week. Please have the patient contact us with an update at that time.

## 2020-10-05 NOTE — TELEPHONE ENCOUNTER
Per 9/28 encounter, patient was to start the budesonide sent to pharmacy by Dr Jerome Damon and to call back to update in 2 weeks. States she has been taking Budesonide past 5 days with no improvement whatsoever. She also took Imodium over the weekend, without discernable improvement. Usually has 3-4 watery stools daily with a lot of gas. Today only one stool so far. No blood. I did tell patient that it takes awhile to work. Please advise if further orders. Thanks.

## 2020-10-05 NOTE — TELEPHONE ENCOUNTER
Pt states she is not any better. Refer to TE from 9/28 still having bad diarrhea and no appetite.  Please call

## 2020-10-06 ENCOUNTER — HOSPITAL ENCOUNTER (OUTPATIENT)
Dept: CV DIAGNOSTICS | Facility: HOSPITAL | Age: 75
Discharge: HOME | End: 2020-10-06
Attending: INTERNAL MEDICINE
Payer: MEDICARE

## 2020-10-06 ENCOUNTER — HOSPITAL ENCOUNTER (OUTPATIENT)
Dept: CARDIOLOGY CLINIC | Facility: HOSPITAL | Age: 75
Discharge: HOME | End: 2020-10-06
Attending: INTERNAL MEDICINE
Payer: MEDICARE

## 2020-10-06 VITALS
RESPIRATION RATE: 18 BRPM | DIASTOLIC BLOOD PRESSURE: 62 MMHG | TEMPERATURE: 98 F | SYSTOLIC BLOOD PRESSURE: 108 MMHG | OXYGEN SATURATION: 99 % | HEART RATE: 83 BPM

## 2020-10-06 DIAGNOSIS — I50.32 CHRONIC DIASTOLIC HEART FAILURE (HCC): ICD-10-CM

## 2020-10-06 DIAGNOSIS — Z98.890 S/P MITRAL VALVE CLIP IMPLANTATION: ICD-10-CM

## 2020-10-06 DIAGNOSIS — I34.0 MITRAL VALVE INSUFFICIENCY, UNSPECIFIED ETIOLOGY: ICD-10-CM

## 2020-10-06 DIAGNOSIS — Z95.818 S/P MITRAL VALVE CLIP IMPLANTATION: ICD-10-CM

## 2020-10-06 DIAGNOSIS — I48.11 LONGSTANDING PERSISTENT ATRIAL FIBRILLATION (HCC): ICD-10-CM

## 2020-10-06 DIAGNOSIS — Z93.3 COLOSTOMY IN PLACE (HCC): ICD-10-CM

## 2020-10-06 PROCEDURE — 99214 OFFICE O/P EST MOD 30 MIN: CPT | Performed by: NURSE PRACTITIONER

## 2020-10-06 PROCEDURE — 93306 TTE W/DOPPLER COMPLETE: CPT | Performed by: INTERNAL MEDICINE

## 2020-10-06 NOTE — PROGRESS NOTES
Erwin Dacosta Note    Subjective:   Patient presents for routine 1 year  postop TMVR visit, accompanied by , in wheelchair.    She underwent initial MItraclip placement x 1 on 10/31/18, but developed subsequent SOB/fatigue, and underwe healed      Laboratory/Data:  Echo: 1/23/20:  1. Left ventricle: The cavity size was normal. Wall thickness was     increased in a pattern of mild LVH. Systolic function was     normal. The estimated ejection fraction was 55-60%, by visual     assessment. daily.  60 capsule 5   • METOPROLOL SUCCINATE ER 50 MG Oral Tablet 24 Hr TAKE 1 TABLET BY MOUTH DAILY 90 tablet 0   • EZETIMIBE 10 MG Oral Tab TAKE 1 TABLET ONCE DAILY 90 tablet 2   • traMADol HCl 50 MG Oral Tab Increase to one  Po  Every 6 Hour if needed f MCG/ACT Nasal Suspension 1 spray by Each Nare route daily.  1 Bottle 0   • ferrous sulfate 325 (65 FE) MG Oral Tab EC Take 325 mg by mouth daily with breakfast.           Assessment:  · s/p placement of Mitraclip NTR device x 2 via RFV d/t severe MR. Ivonne Davila

## 2020-10-07 ENCOUNTER — TELEPHONE (OUTPATIENT)
Dept: CARDIOLOGY CLINIC | Facility: HOSPITAL | Age: 75
End: 2020-10-07

## 2020-10-07 NOTE — TELEPHONE ENCOUNTER
Spoke with pt and Pennie Barlow with [facility] . Reviewed labs, renal function normal, K 3.3. pt reports loose stool from colostomy decreased, more formed with imodium 2 tabs daily x2 days , denies sob, abd bloating or increased le edema.  Take addition k

## 2020-10-07 NOTE — TELEPHONE ENCOUNTER
I called patient with the results of echo from yesterday. The patient's EF remains at 60%, clip present with mild regurgitation. Overall, there has been no significant interval changes compared to previous echo on 1/23/20.  Patient verbalized understanding

## 2020-10-12 ENCOUNTER — LAB REQUISITION (OUTPATIENT)
Dept: LAB | Facility: HOSPITAL | Age: 75
End: 2020-10-12
Payer: MEDICARE

## 2020-10-12 DIAGNOSIS — I50.33 ACUTE ON CHRONIC DIASTOLIC (CONGESTIVE) HEART FAILURE (HCC): ICD-10-CM

## 2020-10-12 PROCEDURE — 80048 BASIC METABOLIC PNL TOTAL CA: CPT | Performed by: INTERNAL MEDICINE

## 2020-10-13 ENCOUNTER — TELEPHONE (OUTPATIENT)
Dept: CARDIOLOGY CLINIC | Facility: HOSPITAL | Age: 75
End: 2020-10-13

## 2020-10-13 NOTE — TELEPHONE ENCOUNTER
Reviewed bmp results , renal function stable. K still low at 3.2 after extra dose of Kcl on 10/7/2020. Called pt's home and mobile # , no answer or voice mail. Epic message sent and spoke with her Saira Tran who is seeing pt in 2 days on 10/15.  Epic messa

## 2020-10-14 RX ORDER — FUROSEMIDE 40 MG/1
40 TABLET ORAL SEE ADMIN INSTRUCTIONS
Qty: 30 TABLET | Refills: 0 | COMMUNITY
Start: 2020-10-14 | End: 2020-10-23

## 2020-10-14 NOTE — TELEPHONE ENCOUNTER
Spoke with patient . Still having liquid stool, denies sob, bloating or LE edema. Having large amount of pain on different baclofen dose.  Recommend decreasing furosemide to 40 mg alternating with 20 mg QOD , take additional Kdur 40 meq today and continue k

## 2020-10-19 ENCOUNTER — LAB REQUISITION (OUTPATIENT)
Dept: LAB | Facility: HOSPITAL | Age: 75
End: 2020-10-19
Payer: MEDICARE

## 2020-10-19 DIAGNOSIS — I50.33 ACUTE ON CHRONIC DIASTOLIC (CONGESTIVE) HEART FAILURE (HCC): ICD-10-CM

## 2020-10-19 PROCEDURE — 80048 BASIC METABOLIC PNL TOTAL CA: CPT | Performed by: INTERNAL MEDICINE

## 2020-10-20 ENCOUNTER — TELEPHONE (OUTPATIENT)
Dept: CARDIOLOGY CLINIC | Facility: HOSPITAL | Age: 75
End: 2020-10-20

## 2020-10-20 NOTE — TELEPHONE ENCOUNTER
Spoke with José Luis Huerta from [facility] health, at pt's home now. Renal function and K normal. Pt continued on lasix 40 mg daily alternating with 20 mg daily without sob, bloating , swelling or signs of fluid overload.  Continue same meds and call if hav

## 2020-10-23 ENCOUNTER — OFFICE VISIT (OUTPATIENT)
Dept: CARDIOLOGY CLINIC | Facility: HOSPITAL | Age: 75
End: 2020-10-23
Attending: NURSE PRACTITIONER
Payer: MEDICARE

## 2020-10-23 VITALS — OXYGEN SATURATION: 99 % | SYSTOLIC BLOOD PRESSURE: 119 MMHG | HEART RATE: 86 BPM | DIASTOLIC BLOOD PRESSURE: 75 MMHG

## 2020-10-23 DIAGNOSIS — I50.43 ACUTE ON CHRONIC COMBINED SYSTOLIC AND DIASTOLIC CONGESTIVE HEART FAILURE (HCC): Primary | ICD-10-CM

## 2020-10-23 DIAGNOSIS — I48.11 LONGSTANDING PERSISTENT ATRIAL FIBRILLATION (HCC): ICD-10-CM

## 2020-10-23 DIAGNOSIS — I50.9 HEART FAILURE, UNSPECIFIED (HCC): ICD-10-CM

## 2020-10-23 PROCEDURE — 83880 ASSAY OF NATRIURETIC PEPTIDE: CPT | Performed by: NURSE PRACTITIONER

## 2020-10-23 PROCEDURE — 99214 OFFICE O/P EST MOD 30 MIN: CPT | Performed by: NURSE PRACTITIONER

## 2020-10-23 PROCEDURE — 36415 COLL VENOUS BLD VENIPUNCTURE: CPT | Performed by: NURSE PRACTITIONER

## 2020-10-23 PROCEDURE — 99212 OFFICE O/P EST SF 10 MIN: CPT | Performed by: NURSE PRACTITIONER

## 2020-10-23 PROCEDURE — 85027 COMPLETE CBC AUTOMATED: CPT | Performed by: NURSE PRACTITIONER

## 2020-10-23 PROCEDURE — 80048 BASIC METABOLIC PNL TOTAL CA: CPT | Performed by: NURSE PRACTITIONER

## 2020-10-23 RX ORDER — FUROSEMIDE 20 MG/1
20 TABLET ORAL SEE ADMIN INSTRUCTIONS
COMMUNITY
Start: 2020-10-23 | End: 2020-10-23

## 2020-10-23 RX ORDER — FUROSEMIDE 40 MG/1
40 TABLET ORAL SEE ADMIN INSTRUCTIONS
COMMUNITY
Start: 2020-10-23 | End: 2020-11-02

## 2020-10-23 NOTE — PROGRESS NOTES
[address] Patient Status:  No patient class for patient encounter    1945 MRN [id number]   Location [facility] MD Dr. Abdulkadir Hubbard is a 66 y 10/2/19  Chronic atrial fib with RVR, on eliquis  History of DVT with permanent IVC filter  CAD non obstructive  Colitis with history of cdiff  GERD  Hereditery spastic paraplegia, wheelchair bound with baclofen pump    New York Heart Association Class: 2 PM    ALT 29 08/10/2020 03:52 PM    ALT 30 08/10/2020 03:52 PM    PTT 34.2 10/02/2019 11:05 AM    INR 1.12 (H) 10/03/2019 04:24 AM    PTP 14.9 (H) 10/03/2019 04:24 AM    T4F 1.2 07/20/2020 04:32 AM    TSH 0.271 (L) 07/20/2020 04:32 AM    LIP 97 08/10/2020 Mitral valve: Prolapse. Mild regurgitation. Valve area by     continuity equation (using LVOT flow): 1.0cm^2. 4. Left atrium: The atrium was severely dilated. 5. Right atrium: The atrium was severely dilated.   Compared to the previous study these finding calcifications    Education:  Patient and  instructed at length regarding clinic procedures, hours, purpose of clinic visits, sodium restricted diet, low sodium foods, fluid restrictions, daily weights, medication regimen s/s of atrial fib, heart fa every other day. Instructed to take furosemide 40 mg daily for the next 3 days.   If symptoms continue, can continue furosemide 40 mg daily until they return to [facility] on Thursday 10/29 and call [facility] with update, otherwise to return to  furosemide 40 mg daily nitroGLYCERIN 0.4 MG Sublingual SL Tab, Place 0.4 mg under the tongue every 5 (five) minutes as needed for Chest pain (or sob , can take 3 doses then call Dr. Chay Mendoza). , Disp: , Rfl:   •  Ascorbic Acid (VITAMIN C OR), Take 1 tablet by mouth daily. , Disp: , •  Cholecalciferol (VITAMIN D3) 125 MCG (5000 UT) Oral Tab, Take by mouth daily.   , Disp: , Rfl:   •  acetaminophen 325 MG Oral Tab, Take 325 mg by mouth every 6 (six) hours as needed for Pain., Disp: , Rfl:   •  apixaban 5 MG Oral Tab, Take 1 tablet (5

## 2020-10-23 NOTE — PATIENT INSTRUCTIONS
Please call clinic with current furosemide dose when you get home. I will give you further instructions at that time.     Continue all your same medications    Call if having any dizziness, lightheadedness, heart racing, palpitations, chest pain, shortness

## 2020-10-23 NOTE — ADDENDUM NOTE
Addended by: Myriam Nunes on: 10/23/2020 03:38 PM     Modules accepted: Orders Called pt to confirm arrival time of 830am for procedure on 11-12-19. Gave pt NPO instructions and gave pt opportunity to ask questions. Pt verbalized understanding.

## 2020-10-29 ENCOUNTER — TELEPHONE (OUTPATIENT)
Dept: CARDIOLOGY CLINIC | Facility: HOSPITAL | Age: 75
End: 2020-10-29

## 2020-10-29 NOTE — TELEPHONE ENCOUNTER
Spoke with pt, she had appt at neurology today for increased neuropathy pain and could not make appt today in HF clinic. Breathing is normal, no LE edema or bloating.  Still having liquid colostomy drainage, eating 1 banana per day and taking furosemide 40

## 2020-10-31 ENCOUNTER — HOSPITAL ENCOUNTER (EMERGENCY)
Facility: HOSPITAL | Age: 75
Discharge: HOME | End: 2020-10-31
Attending: EMERGENCY MEDICINE
Payer: MEDICARE

## 2020-10-31 VITALS
WEIGHT: 138 LBS | SYSTOLIC BLOOD PRESSURE: 91 MMHG | DIASTOLIC BLOOD PRESSURE: 63 MMHG | RESPIRATION RATE: 18 BRPM | OXYGEN SATURATION: 95 % | BODY MASS INDEX: 27 KG/M2 | HEART RATE: 94 BPM | TEMPERATURE: 99 F

## 2020-10-31 DIAGNOSIS — S81.812A SKIN TEAR OF LEFT LOWER LEG WITHOUT COMPLICATION, INITIAL ENCOUNTER: Primary | ICD-10-CM

## 2020-10-31 PROCEDURE — 90471 IMMUNIZATION ADMIN: CPT

## 2020-10-31 PROCEDURE — 99283 EMERGENCY DEPT VISIT LOW MDM: CPT

## 2020-10-31 PROCEDURE — 12002 RPR S/N/AX/GEN/TRNK2.6-7.5CM: CPT

## 2020-11-01 NOTE — ED INITIAL ASSESSMENT (HPI)
Pt states a friend was attempting to help her get into the car, pushed into a sharp part and now with laceration to left lower leg.  Another friend was cutting her nails and accidentally clipped her right hand second digit which is also bandaged

## 2020-11-01 NOTE — ED PROVIDER NOTES
Patient Seen in: [facility] AND [facility] Emergency Department      History   Patient presents with:  Laceration/Abrasion    Stated Complaint: fall/lacs    HPI    Patient is a 24-year-old female history listed below she is on Eliquis.   She was getting in the ca prolapse- 3 clips placed              Past Surgical History:   Procedure Laterality Date   • ARTHROSCOPY OF JOINT UNLISTED Right     knee   • BACK SURGERY      spinal fusion L1-5   • CAUDAL N/A 3/26/2019    Performed by Paula Fair MD at [facility]  fall/lacs  Other systems are as noted in HPI. Constitutional and vital signs reviewed. All other systems reviewed and negative except as noted above.     Physical Exam     ED Triage Vitals [10/31/20 2132]   BP 91/63   Pulse 94   Resp 18   Temp 99.1 °F

## 2020-11-02 ENCOUNTER — OFFICE VISIT (OUTPATIENT)
Dept: CARDIOLOGY CLINIC | Facility: HOSPITAL | Age: 75
End: 2020-11-02
Attending: NURSE PRACTITIONER
Payer: MEDICARE

## 2020-11-02 VITALS — DIASTOLIC BLOOD PRESSURE: 56 MMHG | HEART RATE: 83 BPM | OXYGEN SATURATION: 99 % | SYSTOLIC BLOOD PRESSURE: 107 MMHG

## 2020-11-02 DIAGNOSIS — Z95.818 S/P MITRAL VALVE CLIP IMPLANTATION: ICD-10-CM

## 2020-11-02 DIAGNOSIS — I50.9 HEART FAILURE, UNSPECIFIED (HCC): ICD-10-CM

## 2020-11-02 DIAGNOSIS — Z98.890 S/P MITRAL VALVE CLIP IMPLANTATION: ICD-10-CM

## 2020-11-02 DIAGNOSIS — I50.32 CHRONIC DIASTOLIC HEART FAILURE (HCC): Primary | ICD-10-CM

## 2020-11-02 DIAGNOSIS — S81.802D WOUND OF LEFT LOWER EXTREMITY, SUBSEQUENT ENCOUNTER: ICD-10-CM

## 2020-11-02 DIAGNOSIS — I48.11 LONGSTANDING PERSISTENT ATRIAL FIBRILLATION (HCC): ICD-10-CM

## 2020-11-02 PROBLEM — I50.42 CHRONIC COMBINED SYSTOLIC (CONGESTIVE) AND DIASTOLIC (CONGESTIVE) HEART FAILURE (HCC): Chronic | Status: RESOLVED | Noted: 2020-11-02 | Resolved: 2020-11-02

## 2020-11-02 PROBLEM — S81.802A LEG WOUND, LEFT: Chronic | Status: ACTIVE | Noted: 2020-11-02

## 2020-11-02 PROBLEM — I50.42 CHRONIC COMBINED SYSTOLIC (CONGESTIVE) AND DIASTOLIC (CONGESTIVE) HEART FAILURE (HCC): Chronic | Status: ACTIVE | Noted: 2020-11-02

## 2020-11-02 LAB — NT-PROBNP SERPL-MCNC: 2215 PG/ML

## 2020-11-02 PROCEDURE — 99214 OFFICE O/P EST MOD 30 MIN: CPT | Performed by: NURSE PRACTITIONER

## 2020-11-02 PROCEDURE — 83880 ASSAY OF NATRIURETIC PEPTIDE: CPT | Performed by: NURSE PRACTITIONER

## 2020-11-02 PROCEDURE — 99212 OFFICE O/P EST SF 10 MIN: CPT | Performed by: NURSE PRACTITIONER

## 2020-11-02 PROCEDURE — 36415 COLL VENOUS BLD VENIPUNCTURE: CPT | Performed by: NURSE PRACTITIONER

## 2020-11-02 PROCEDURE — 80048 BASIC METABOLIC PNL TOTAL CA: CPT | Performed by: NURSE PRACTITIONER

## 2020-11-02 RX ORDER — FUROSEMIDE 40 MG/1
40 TABLET ORAL DAILY
Qty: 30 TABLET | Refills: 2 | Status: SHIPPED | OUTPATIENT
Start: 2020-11-02 | End: 2021-02-01

## 2020-11-02 RX ORDER — FUROSEMIDE 40 MG/1
40 TABLET ORAL DAILY
Qty: 30 TABLET | Refills: 2 | Status: SHIPPED | OUTPATIENT
Start: 2020-11-02 | End: 2020-11-02

## 2020-11-02 NOTE — PROGRESS NOTES
[address] Patient Status:  No patient class for patient encounter    1945 MRN [id number]   Location 602 [facility] MD Dr. Fany Rodriges is a 66 y 10/2/19  Chronic atrial fib with RVR, on eliquis  History of DVT with permanent IVC filter  CAD non obstructive  Colitis with history of cdiff  GERD  Hereditery spastic paraplegia, wheelchair bound with baclofen pump    New York Heart Association Class: 2 INR 1.12 (H) 10/03/2019 04:24 AM    PTP 14.9 (H) 10/03/2019 04:24 AM    T4F 1.2 07/20/2020 04:32 AM    TSH 0.271 (L) 07/20/2020 04:32 AM    LIP 97 08/10/2020 03:52 PM    DDIMER <0.27 11/24/2019 06:32 AM    ESRML 7 09/09/2019 04:09 PM    CRP 2.19 (H) 07/06/ regurgitation. 3. Mitral valve: Prolapse. Mild regurgitation. Valve area by     continuity equation (using LVOT flow): 1.0cm^2. 4. Left atrium: The atrium was severely dilated. 5. Right atrium: The atrium was severely dilated.   Compared to the previous coronary artery calcifications    Education:  Patient and  instructed at length regarding clinic procedures, hours, purpose of clinic visits, sodium restricted diet, low sodium foods, fluid restrictions, daily weights, medication regimen s/s of atri ounces per day    Follow up with Dr. Dank Castro 12/2/20    Follow up with the specialty care clinic 1/4/2021    Follow up with Dr. Bettie Herrera in 2-3 days for evaluation of left leg wound        Current Outpatient Medications:   •  pregabalin 100 MG Oral Cap, Take 1 once daily. Resume when off steroid taper, Disp: 90 tablet, Rfl: 3  •  Albuterol Sulfate 108 (90 Base) MCG/ACT Inhalation Aerosol Powder, Breath Activated, Inhale 2 puffs into the lungs every 4 (four) hours as needed. , Disp: 1 each, Rfl: 0  •  Fluticasone

## 2020-11-03 ENCOUNTER — HOSPITAL ENCOUNTER (INPATIENT)
Facility: HOSPITAL | Age: 75
LOS: 3 days | Discharge: HOME | DRG: 917 | End: 2020-11-07
Attending: EMERGENCY MEDICINE | Admitting: INTERNAL MEDICINE
Payer: MEDICARE

## 2020-11-03 ENCOUNTER — APPOINTMENT (OUTPATIENT)
Dept: CT IMAGING | Facility: HOSPITAL | Age: 75
DRG: 917 | End: 2020-11-03
Attending: EMERGENCY MEDICINE
Payer: MEDICARE

## 2020-11-03 DIAGNOSIS — R47.81 SLURRED SPEECH: ICD-10-CM

## 2020-11-03 DIAGNOSIS — G45.9 TIA (TRANSIENT ISCHEMIC ATTACK): Primary | ICD-10-CM

## 2020-11-03 LAB
EST. AVERAGE GLUCOSE BLD GHB EST-MCNC: 120 MG/DL
HBA1C MFR BLD: 5.8 %

## 2020-11-03 PROCEDURE — 70450 CT HEAD/BRAIN W/O DYE: CPT | Performed by: EMERGENCY MEDICINE

## 2020-11-03 RX ORDER — MORPHINE SULFATE 4 MG/ML
2 INJECTION, SOLUTION INTRAMUSCULAR; INTRAVENOUS ONCE
Status: COMPLETED | OUTPATIENT
Start: 2020-11-03 | End: 2020-11-03

## 2020-11-03 RX ORDER — ACETAMINOPHEN 325 MG/1
650 TABLET ORAL ONCE
Status: DISCONTINUED | OUTPATIENT
Start: 2020-11-03 | End: 2020-11-03

## 2020-11-03 RX ORDER — VANCOMYCIN HYDROCHLORIDE
25 ONCE
Status: COMPLETED | OUTPATIENT
Start: 2020-11-03 | End: 2020-11-04

## 2020-11-04 ENCOUNTER — APPOINTMENT (OUTPATIENT)
Dept: CT IMAGING | Facility: HOSPITAL | Age: 75
DRG: 917 | End: 2020-11-04
Attending: HOSPITALIST
Payer: MEDICARE

## 2020-11-04 ENCOUNTER — APPOINTMENT (OUTPATIENT)
Dept: CV DIAGNOSTICS | Facility: HOSPITAL | Age: 75
DRG: 917 | End: 2020-11-04
Attending: Other
Payer: MEDICARE

## 2020-11-04 ENCOUNTER — APPOINTMENT (OUTPATIENT)
Dept: ULTRASOUND IMAGING | Facility: HOSPITAL | Age: 75
DRG: 917 | End: 2020-11-04
Attending: Other
Payer: MEDICARE

## 2020-11-04 PROBLEM — R47.81 SLURRED SPEECH: Status: ACTIVE | Noted: 2020-11-04

## 2020-11-04 PROCEDURE — 93880 EXTRACRANIAL BILAT STUDY: CPT | Performed by: OTHER

## 2020-11-04 PROCEDURE — 99223 1ST HOSP IP/OBS HIGH 75: CPT | Performed by: OTHER

## 2020-11-04 PROCEDURE — 73700 CT LOWER EXTREMITY W/O DYE: CPT | Performed by: HOSPITALIST

## 2020-11-04 PROCEDURE — 93306 TTE W/DOPPLER COMPLETE: CPT | Performed by: OTHER

## 2020-11-04 PROCEDURE — 70450 CT HEAD/BRAIN W/O DYE: CPT | Performed by: HOSPITALIST

## 2020-11-04 RX ORDER — POLYETHYLENE GLYCOL 3350 17 G/17G
17 POWDER, FOR SOLUTION ORAL DAILY PRN
Status: DISCONTINUED | OUTPATIENT
Start: 2020-11-04 | End: 2020-11-07

## 2020-11-04 RX ORDER — FUROSEMIDE 40 MG/1
40 TABLET ORAL DAILY
Status: DISCONTINUED | OUTPATIENT
Start: 2020-11-05 | End: 2020-11-07

## 2020-11-04 RX ORDER — BUDESONIDE 3 MG/1
6 CAPSULE, COATED PELLETS ORAL EVERY MORNING
Status: DISCONTINUED | OUTPATIENT
Start: 2020-11-05 | End: 2020-11-07

## 2020-11-04 RX ORDER — ATORVASTATIN CALCIUM 40 MG/1
40 TABLET, FILM COATED ORAL NIGHTLY
Status: DISCONTINUED | OUTPATIENT
Start: 2020-11-04 | End: 2020-11-07

## 2020-11-04 RX ORDER — ONDANSETRON 2 MG/ML
4 INJECTION INTRAMUSCULAR; INTRAVENOUS EVERY 6 HOURS PRN
Status: DISCONTINUED | OUTPATIENT
Start: 2020-11-04 | End: 2020-11-07

## 2020-11-04 RX ORDER — SODIUM CHLORIDE 0.9 % (FLUSH) 0.9 %
3 SYRINGE (ML) INJECTION AS NEEDED
Status: DISCONTINUED | OUTPATIENT
Start: 2020-11-04 | End: 2020-11-07

## 2020-11-04 RX ORDER — ACETAMINOPHEN 325 MG/1
325 TABLET ORAL EVERY 6 HOURS PRN
Status: DISCONTINUED | OUTPATIENT
Start: 2020-11-04 | End: 2020-11-04 | Stop reason: ALTCHOICE

## 2020-11-04 RX ORDER — BISACODYL 10 MG
10 SUPPOSITORY, RECTAL RECTAL
Status: DISCONTINUED | OUTPATIENT
Start: 2020-11-04 | End: 2020-11-07

## 2020-11-04 RX ORDER — TRAMADOL HYDROCHLORIDE 50 MG/1
50 TABLET ORAL EVERY 6 HOURS PRN
Status: DISCONTINUED | OUTPATIENT
Start: 2020-11-04 | End: 2020-11-07

## 2020-11-04 RX ORDER — METOCLOPRAMIDE HYDROCHLORIDE 5 MG/ML
10 INJECTION INTRAMUSCULAR; INTRAVENOUS EVERY 8 HOURS PRN
Status: DISCONTINUED | OUTPATIENT
Start: 2020-11-04 | End: 2020-11-07

## 2020-11-04 RX ORDER — ACETAMINOPHEN 325 MG/1
650 TABLET ORAL EVERY 6 HOURS PRN
Status: DISCONTINUED | OUTPATIENT
Start: 2020-11-04 | End: 2020-11-07

## 2020-11-04 RX ORDER — SODIUM PHOSPHATE, DIBASIC AND SODIUM PHOSPHATE, MONOBASIC 7; 19 G/133ML; G/133ML
1 ENEMA RECTAL ONCE AS NEEDED
Status: DISCONTINUED | OUTPATIENT
Start: 2020-11-04 | End: 2020-11-07

## 2020-11-04 RX ORDER — ECHINACEA PURPUREA EXTRACT 125 MG
1 TABLET ORAL
Status: DISCONTINUED | OUTPATIENT
Start: 2020-11-04 | End: 2020-11-07

## 2020-11-04 RX ORDER — DEXTROSE MONOHYDRATE 25 G/50ML
INJECTION, SOLUTION INTRAVENOUS
Status: COMPLETED
Start: 2020-11-04 | End: 2020-11-04

## 2020-11-04 RX ORDER — MELATONIN
325
Status: DISCONTINUED | OUTPATIENT
Start: 2020-11-05 | End: 2020-11-07

## 2020-11-04 RX ORDER — VENLAFAXINE HYDROCHLORIDE 75 MG/1
75 CAPSULE, EXTENDED RELEASE ORAL DAILY
Status: DISCONTINUED | OUTPATIENT
Start: 2020-11-05 | End: 2020-11-07

## 2020-11-04 RX ORDER — ASCORBIC ACID 500 MG
500 TABLET ORAL DAILY
Status: DISCONTINUED | OUTPATIENT
Start: 2020-11-05 | End: 2020-11-07

## 2020-11-04 RX ORDER — PREGABALIN 50 MG/1
50 CAPSULE ORAL 3 TIMES DAILY
Status: DISCONTINUED | OUTPATIENT
Start: 2020-11-04 | End: 2020-11-07

## 2020-11-04 RX ORDER — SODIUM CHLORIDE 9 MG/ML
INJECTION, SOLUTION INTRAVENOUS CONTINUOUS
Status: DISCONTINUED | OUTPATIENT
Start: 2020-11-04 | End: 2020-11-04

## 2020-11-04 RX ORDER — DOCUSATE SODIUM 100 MG/1
100 CAPSULE, LIQUID FILLED ORAL 2 TIMES DAILY
Status: DISCONTINUED | OUTPATIENT
Start: 2020-11-04 | End: 2020-11-07

## 2020-11-04 RX ORDER — PREDNISONE 1 MG/1
5 TABLET ORAL DAILY
Status: DISCONTINUED | OUTPATIENT
Start: 2020-11-05 | End: 2020-11-07

## 2020-11-04 RX ORDER — METOPROLOL SUCCINATE 50 MG/1
50 TABLET, EXTENDED RELEASE ORAL DAILY
Status: DISCONTINUED | OUTPATIENT
Start: 2020-11-05 | End: 2020-11-07

## 2020-11-04 RX ORDER — FLUTICASONE PROPIONATE 50 MCG
1 SPRAY, SUSPENSION (ML) NASAL DAILY
Status: DISCONTINUED | OUTPATIENT
Start: 2020-11-04 | End: 2020-11-07

## 2020-11-04 RX ORDER — ALBUTEROL SULFATE 90 UG/1
2 AEROSOL, METERED RESPIRATORY (INHALATION) EVERY 4 HOURS PRN
Status: DISCONTINUED | OUTPATIENT
Start: 2020-11-04 | End: 2020-11-07

## 2020-11-04 RX ORDER — PREGABALIN 50 MG/1
100 CAPSULE ORAL 3 TIMES DAILY
Status: DISCONTINUED | OUTPATIENT
Start: 2020-11-04 | End: 2020-11-04

## 2020-11-04 NOTE — ED NOTES
Family at bedside with MD per  patient more alert at this time  states symptoms come and go patient currently only c/o left leg pain

## 2020-11-04 NOTE — WOUND PROGRESS NOTE
WOUND CARE NOTE    History:  Past Medical History:   Diagnosis Date   • Anemia    • Anxiety state, unspecified    • BACK PAIN     scoliosis   • Back problem    • Bladder stones    • C. difficile colitis 5/12   • Cataract    • Colitis    • Congestive hear at [address] N/A 10/27/2014    Performed by Lennice Felty, MD at [address]   • COLONOSCOPY  7/11/08 7/11/08 at [facility] and negative for polyps, UC was quiet   • COLONOSCOPY,DIAGNOSTIC  5/12 prn  Appliance 2 pc        SUBJECTIVE   hello    OBJECTIVE   RN Consult new  Reason for Consult \"left lower leg wound\"      ASSESSMENT   Roscoe Score:  Roscoe Scale Score: 12    Chart Reviewed: yes    Wound(s):  Pt seen for wound consult.  Pt was agreeabl Jaylon Fletcher RN, BSN, [address] [facility],Third Floor  [phone]

## 2020-11-04 NOTE — PLAN OF CARE
Wound consulted for LLE wound-- hematoma present. MD notified. CT of LLE ordered and complete. K of 2.5 being replaced by IVPB. Neuro status appears to have improved from the report received overnight. CT head (-) for acute per lab results.  [employer] Patient's Short Term Goal: No pain  Interventions:   - See MAR  - Q2 turn/repositioning   - See additional Care Plan goals for specific interventions  Outcome: Progressing     Problem: NEUROLOGICAL - ADULT  Goal: Achieves stable or improved neurological st needed discharge resources and transportation as appropriate  - Identify discharge learning needs (meds, wound care, etc)  - Arrange for interpreters to assist at discharge as needed  - Consider post-discharge preferences of patient/family/discharge partne

## 2020-11-04 NOTE — ED NOTES
Orders for admission, patient is aware of plan and ready to go upstairs. Any questions, please call ED RN Kourtney  at extension [phone].    Type of COVID test sent:rapid  COVID Suspicion level: Low    Titratable drug(s) infusing:none  Rate:    LOC at time of

## 2020-11-04 NOTE — PLAN OF CARE
Alert, but pt extremely drowsy. Called pts  to complete admission. Denies pain. Wound traced. Placed in chart. Pt on RA. Mepilex applied to sacrum. Colostomy in place. NPO. Q6 accuchecks. Bed locked and in lowest position.  Bed alarm and ibed in plac - See MAR  - Q2 turn/repositioning   - See additional Care Plan goals for specific interventions  Outcome: Progressing     Problem: NEUROLOGICAL - ADULT  Goal: Achieves stable or improved neurological status  Description: INTERVENTIONS  - Assess for and

## 2020-11-04 NOTE — ED PROVIDER NOTES
Patient Seen in: [facility] AND [facility] Emergency Department      History   Patient presents with:  Weakness  Leg Pain    Stated Complaint: WEAKNESS, LEFT LEG PAIN    HPI    59-year-old female presents to the ER for decreased mental status.   Patient with a pa stenosis   • OTHER DISEASES     duplicated left renal collecting system   • OTHER DISEASES     uterine fibroids   • OTHER DISEASES     ulcerative colitis   • OTHER DISEASES     uti   • Pneumonia, organism unspecified(486)    • Reflux    • Self-catheterizes KNEE REPLACEMENT Left    • VALVE REPAIR                      Social History    Tobacco Use      Smoking status: Former Smoker      Smokeless tobacco: Never Used      Tobacco comment: pt smoked until 23 yrs old    Alcohol use: No      Alcohol/week: 0.0 lopez extremities at baseline   Psychiatric:         Mood and Affect: Mood normal.               ED Course     Labs Reviewed   COMP METABOLIC PANEL (14) - Abnormal; Notable for the following components:       Result Value    Glucose 113 (*)     BUN/CREA Ratio 29 States that last known well for the patient was at 8 PM.  Patient states he came in the house approximately 10 PM after working on his car, noted that the patient was confused. Patient did not recognize who he was.   States that she was mildly slurring her 4/14/2019      IMPRESSION:    No acute intracranial finding. No evidence of intracranial hemorrhage, mass-effect, midline shift, or extra-axial fluid collection. Mild cerebral and cerebellar volume loss.   There are periventricular and deep white ma

## 2020-11-04 NOTE — H&P
[facility] Hospitalist H&P     CC: Patient presents with:  Weakness  Leg Pain     PCP: Sylvia Hall MD    Date of Admission: 11/3/2020 10:23 PM    ASSESSMENT / PLAN:     Chani Corley old female with  hs DVT s/p permanent IVC filter on Further recommendations pending patient's clinical course.   [facility] hospitalist to continue to follow patient while in house    Patient and/or patient's family given opportunity to ask questions and note understanding and agreeing with therapeutic plan as colitis Colon= 5/7/12   • MENOPAUSE    • MITRAL VALVE PROLAPSE    • Neuropathy     bilateral legs;  Hereditary spastic paraplegia X 20 years    • Osteoarthritis     legs, back, shoulders and knees   • OSTEOPENIA    • Other and unspecified hyperlipidemia CATARACT WITH INTRAOCULAR LENS Right 2/8/2016    Performed by Kierra Pendleton MD at [address] [facility] OR   • [address]      left  12/05   • Serina Gould N/A 10/31/2018    Performed by Yanet Molina MD at [place] [address]  8-24-12    cy Oral Cap DR Particles, Take 2 capsules (6 mg total) by mouth every morning., Disp: 60 capsule, Rfl: 1    •  Venlafaxine HCl ER 75 MG Oral Capsule SR 24 Hr, Take 1 capsule (75 mg total) by mouth once daily. , Disp: 90 capsule, Rfl: 2    •  predniSONE 5 MG Or CTAB, no crackles or wheezes  CV: RRR, no murmurs  ABD: Soft, non-tender, non-distended, +BS  MSK: strength 4+/5 in upper extremities, spastic paresis in BLE  Neuro: Grossly normal, CN intact, sensory intact  Psych: Affect- normal  SKIN: warm, dry  EXT: LL Dictated by (CST): Fabiola Booth MD on 11/04/2020 at 6:43 AM     Finalized by (CST): Fabiola Booth MD on 11/04/2020 at 6:45 AM          Us Carotid Doppler Bilat - Diag Img (cpt=93880)    Result Date: 11/4/2020  CONCLUSION: Normal duplex carotid ultrasound ex

## 2020-11-04 NOTE — SLP NOTE
ADULT SWALLOWING EVALUATION    ASSESSMENT    ASSESSMENT/OVERALL IMPRESSION:  PPE REQUIRED. THIS THERAPIST WORE GLOVES, DROPLET MASK, GOWN, AND GOGGLES FOR DURATION OF EVALUATION. HANDS WASHED UPON ENTRANCE/EXIT.     SLP BSSE orders received and acknowledged time  Treatment Plan/Recommendations: Aspiration precautions  Discharge Recommendations/Plan: Undetermined    HISTORY   MEDICAL HISTORY  Reason for Referral: Stroke protocol    Problem List  Principal Problem:    TIA (transient ischemic attack)  Active Pro Aspiration;Cardiac    Patient/Family Goals: assess swallow for safe oral intake    SWALLOWING HISTORY  Current Diet Consistency: NPO  Dysphagia History: none  Imaging Results:     CXR 11/3/20:  CONCLUSION:   1. No acute intracranial process.   2.  There ar Yvon Fuentes [address] [facility]  Speech Language Pathologist  Phone Number Ext. [phone]

## 2020-11-04 NOTE — CONSULTS
As noted by the ER physician about a patient presenting with confusion, language difficulty which is markedly improved. Patient is on a NOAC. Not a TPA or neuroradiology intervention candidate. Will evaluate the patient later this morning.   Will order M

## 2020-11-04 NOTE — ED INITIAL ASSESSMENT (HPI)
Pt presents from home via ems after pt's spouse stated he found her \"out of it\". Pt noted to have slurred speech while communicating with staff at bedside - unknown if chronic or acute.     Ems field FAST (-)

## 2020-11-04 NOTE — ED NOTES
Pt's initial presenting slurred speech has decreased - witnessed by and another rn at bedside. speech change noted during straight cath. edmd aware.

## 2020-11-05 PROCEDURE — 0HDLXZZ EXTRACTION OF LEFT LOWER LEG SKIN, EXTERNAL APPROACH: ICD-10-PCS | Performed by: SURGERY

## 2020-11-05 PROCEDURE — 95816 EEG AWAKE AND DROWSY: CPT | Performed by: OTHER

## 2020-11-05 PROCEDURE — 99233 SBSQ HOSP IP/OBS HIGH 50: CPT | Performed by: OTHER

## 2020-11-05 PROCEDURE — 0Y9J0ZZ DRAINAGE OF LEFT LOWER LEG, OPEN APPROACH: ICD-10-PCS | Performed by: SURGERY

## 2020-11-05 RX ORDER — VANCOMYCIN HYDROCHLORIDE 125 MG/1
125 CAPSULE ORAL DAILY
Status: DISCONTINUED | OUTPATIENT
Start: 2020-11-05 | End: 2020-11-06

## 2020-11-05 NOTE — PROGRESS NOTES
[facility]    CDI Prediction Tool Protocol (Vancomycin Initiated)    OVP (oral vancomycin prophylaxis) 125 mg PO Daily is being started in this patient based on a score of 15.1.       Score Breakdown:  History of CDI > 1 year ago AND high risk an

## 2020-11-05 NOTE — PHYSICAL THERAPY NOTE
Chart reviewed ,  Discussed pt status with RN . Per RN pt is appropriate for therapy at time but currently having EEG at bedside. RN asking for therapy to come later in day . Attempt x 1  .          Per RN pt with a w/c bound / bed bound status on

## 2020-11-05 NOTE — CONSULTS
[address] [place] [address]  GVW:[birth year]  [facility]:[id number]  LOS:1    Date of Admission:  11/3/2020  Date of Consult:  11/5/2020 • Irritable bowel syndrome    • Lymphocytic colitis Colon= 5/7/12   • MENOPAUSE    • MITRAL VALVE PROLAPSE    • Neuropathy     bilateral legs;  Hereditary spastic paraplegia X 20 years    • Osteoarthritis     legs, back, shoulders and knees   • OSTEOPENIA [address] [place] [address]   • EYE CATARACT WITH INTRAOCULAR LENS Right 2/8/2016    Performed by Yolanda Goodman MD at [facility] MAIN OR   • [address]      left  12/05   • Erin Beckham N/A 10/31/2018    Performed by Grey Mackey MD at [address] [place] [address] 650 mg, 650 mg, Oral, Q6H PRN  •  docusate sodium (COLACE) cap 100 mg, 100 mg, Oral, BID  •  PEG 3350 (MIRALAX) powder packet 17 g, 17 g, Oral, Daily PRN  •  bisacodyl (DULCOLAX) rectal suppository 10 mg, 10 mg, Rectal, Daily PRN  •  Fleet Enema (FLEET) 7- spirits  Pulmonary:lungs clear to auscultation bilaterally  Cardiac: irregularly irregular  Abdomen: normal BS, soft, nondistended and nontender, no guarding  Rectal exam: deferred  Extremities: left distal medial calf with sutures in a semicircular fashio long-standing hypertension and/or diabetes. 3.  Atherosclerosis in the anterior and posterior circulations. [facility] provided a preliminary report for this examination. This final report agrees with their preliminary findings.   Dictated by (CST): (transient ischemic attack)     Slurred speech    Left leg laceration, hematoma, necrotic skin    I recommended incision and drainage and debridement of necrotic skin. The patient consented to this procedure.     PROCEDURE:     Informed consent obtained  Th

## 2020-11-05 NOTE — PROGRESS NOTES
[facility] Hospitalist Progress Note     CC: Hospital Follow up    PCP: Mendel HousekeMD nancy       Assessment/Plan:     Principal Problem:    TIA (transient ischemic attack)  Active Problems:    Slurred speech    Katelin Edwards is a 66year old female with  colitis    Pancreatic Cystic Lesion  - has had increased ostomy output recently  -follows with Dr Vikash East, was told to watch it  - will keep strict I/O     GOC  - CODE- DNR/select- confirmed with patient  - POA- daughter Veronique Michi- currently [facility] murmurs  ABD: Soft, non-tender, non-distended, +BS  MSK: strength 4+/5 in upper extremities, spastic paresis in BLE  Neuro: Grossly normal, CN intact, sensory intact  Psych: Affect- normal  SKIN: warm, dry  EXT: LLE with swelling and erythema, stitches int 11/04/2020 at 6:43 AM     Finalized by (CST): Jerry Nieto MD on 11/04/2020 at 6:45 AM          Ct Tibia/fibula Left (cpt=73700)    Result Date: 11/4/2020  CONCLUSION:  1.  There is a superficial hematoma involving the posteromedial soft tissues, measuring

## 2020-11-05 NOTE — PROGRESS NOTES
[facility]    Progress Note    Rainer Phelps Patient Status:  Inpatient    1945 MRN [id number]   Location [facility] 3W/SW Attending Mich Heck MD   Hosp Day # 1 PCP Sussy Raymond MD       Subjective:   Tammy Morgan atorvastatin (LIPITOR) tab 40 mg, 40 mg, Oral, Nightly    •  Budesonide (ENTOCORT EC) 24 hr cap 6 mg, 6 mg, Oral, QAM    •  ferrous sulfate EC tab 325 mg, 325 mg, Oral, Daily with breakfast    •  Fluticasone Propionate (FLONASE) 50 MCG/ACT nasal spray 1 sp INR 1.36 (H) 11/03/2020    T4F 1.2 07/20/2020    TSH 0.271 (L) 07/20/2020    LIP 97 08/10/2020    DDIMER <0.27 11/24/2019    ESRML 7 09/09/2019    CRP 2.19 (H) 07/06/2020    MG 1.8 11/04/2020    PHOS 2.8 04/23/2019    TROP <0.045 11/03/2020    CK 30 10/31/ Date: 11/4/2020  CONCLUSION: Normal duplex carotid ultrasound examination.      Dictated by (CST): Shavon Pinto MD on 11/04/2020 at 9:12 AM     Finalized by (CST): Shavon Pinto MD on 11/04/2020 at 9:13 AM          Ekg 12-lead    Result

## 2020-11-05 NOTE — WOUND PROGRESS NOTE
Spoke to Dr. Chilo Bustillo, plan is for placement of wound vac tomorrow morning. Pt is s/p surgical debridement of a left lower leg hematoma today. Home vac [employer] paperwork faxed. Plan for home health for wound vac dressing changes, f/u in wound clinic weekly.  Yas Bonilla

## 2020-11-05 NOTE — SLP NOTE
SPEECH DAILY NOTE - INPATIENT    ASSESSMENT & PLAN   ASSESSMENT      PPE REQUIRED. THIS SLP WORE GLOVES AND DROPLET MASK. HANDS SANITIZED/WASHED UPON ENTRANCE/EXIT. Pt alert, afebrile and on room air.  Pt seen for swallow analysis per BSE recommendatio liquids during this session for 100% of trials. Reviewed standard swallowing precautions with Pt; Pt self-cued for execution.        GOAL MET           FOLLOW UP  Follow Up Needed: No  SLP Follow-up Date: 11/05/20  Number of Visits to Meet Established Goals

## 2020-11-05 NOTE — PLAN OF CARE
Had EEG today. HR became elevated and sustained from around 130s-180s earlier in the day d/t pt finding out distressing family news. Pt was straight cathed today. PW in place for intermittent episodes of urinary incontinence.  Pt had debridement and removal -Bladder scanning and PRN urinary straight catheterization  - Ann-Marie area care  - CT head ordered  - Q2 turn/repositioning   - PRN pain meds  - See additional Care Plan goals for specific interventions  Outcome: Progressing     Problem: NEUROLOGICAL - ADUL patient/family/discharge partner in discharge planning  - Arrange for needed discharge resources and transportation as appropriate  - Identify discharge learning needs (meds, wound care, etc)  - Arrange for interpreters to assist at discharge as needed  -

## 2020-11-05 NOTE — CONSULTS
[facility]    PATIENT'S NAME: 1 [facility]   ATTENDING PHYSICIAN: Rebecca Byrd MD   CONSULTING PHYSICIAN: Jean Valdivia MD   PATIENT ACCOUNT#:   [id number]    LOCATION:  [address]WS[address] [place] [address] RECORD #:   [id number]       [facility] Reviewed. WBC is normal.  Liver function tests normal.  Urinalysis reviewed. Previous blood tests reviewed. Free T4 was normal on July 20. In the emergency room, she had a CT of the brain last night, report reviewed.   I ordered an MRI of the brain wh

## 2020-11-05 NOTE — CONSULTS
Consult dictated. Patient evaluated earlier today accompanied by . He relates lethargy starting last night. Exam reveals marked lethargy. Hereditary spastic paraparesis. No new rigidity. No other focal neurological deficits.   Please see dictat

## 2020-11-05 NOTE — PLAN OF CARE
Problem: Patient Centered Care  Goal: Patient preferences are identified and integrated in the patient's plan of care  Description: Interventions:  - What would you like us to know as we care for you?  Pt lives a home with   - Provide timely, compl intracranial pressure  - Maintain blood pressure and fluid volume within ordered parameters to optimize cerebral perfusion and minimize risk of hemorrhage  - Monitor temperature, glucose, and sodium.  Initiate appropriate interventions as ordered  Outcome: Management Department for coordinating discharge planning if the patient needs post-hospital services based on physician/LIP order or complex needs related to functional status, cognitive ability or social support system  Outcome: Progressing     Problem:

## 2020-11-05 NOTE — OCCUPATIONAL THERAPY NOTE
OT orders generated through Functional Mobility Assessment. Attempted to see pt for eval however unavailable- EEG.  Nurse agreeable to rescheduling for pm.

## 2020-11-06 ENCOUNTER — APPOINTMENT (OUTPATIENT)
Dept: MRI IMAGING | Facility: HOSPITAL | Age: 75
DRG: 917 | End: 2020-11-06
Attending: Other
Payer: MEDICARE

## 2020-11-06 PROCEDURE — 99233 SBSQ HOSP IP/OBS HIGH 50: CPT | Performed by: OTHER

## 2020-11-06 RX ORDER — VANCOMYCIN HYDROCHLORIDE 125 MG/1
125 CAPSULE ORAL EVERY 6 HOURS
Status: DISCONTINUED | OUTPATIENT
Start: 2020-11-06 | End: 2020-11-07

## 2020-11-06 NOTE — PLAN OF CARE
Alert. Leg pain managed with Tylenol. Pt resting comfortably. IV Abx infused. Eliquis restarted. Plan or wound vac later today. Bed locked and I lowest position. Bed alarm and ibed in place. Call light in reach. Will continue to monitor.        Problem: Brooke Gonzalez interventions  Outcome: Progressing     Problem: NEUROLOGICAL - ADULT  Goal: Achieves stable or improved neurological status  Description: INTERVENTIONS  - Assess for and report changes in neurological status  - Initiate measures to prevent increased intra interpreters to assist at discharge as needed  - Consider post-discharge preferences of patient/family/discharge partner  - Complete POLST form as appropriate  - Assess patient's ability to be responsible for managing their own health  - Refer to [facility] FOR REHAB MEDICINE

## 2020-11-06 NOTE — PROGRESS NOTES
[facility]    Progress Note    Corby Maguire Patient Status:  Inpatient    1945 MRN [id number]   Location [facility] 3W/SW Attending Dina Koehler, [address] [facility] Se Day # 2 PCP Trish Johnson MD       Subjective:   Giulia Manual atorvastatin (LIPITOR) tab 40 mg, 40 mg, Oral, Nightly    •  Budesonide (ENTOCORT EC) 24 hr cap 6 mg, 6 mg, Oral, QAM    •  ferrous sulfate EC tab 325 mg, 325 mg, Oral, Daily with breakfast    •  Fluticasone Propionate (FLONASE) 50 MCG/ACT nasal spray 1 sp 07/20/2020    LIP 97 08/10/2020    DDIMER <0.27 11/24/2019    ESRML 7 09/09/2019    CRP 2.19 (H) 07/06/2020    MG 1.8 11/04/2020    PHOS 2.8 04/23/2019    TROP <0.045 11/03/2020    CK 30 10/31/2019    B12 550 07/20/2020       Ct Brain Or Head (560[address])    Re

## 2020-11-06 NOTE — PROGRESS NOTES
[facility]UCILA [facility]FS[facility]    General Surgery Progress Note  Cathlean Call  MJV:[id number]  HD# 2       Subjective:   No new complaints denies abdominal pain, nausea and vomiting  Passing flatus and BM(s)     Exam:     General: awake and alert, in no Recent Labs   Lab 11/04/20  1909 11/05/20  0536 11/06/20  0528   GLU 80 78 80   BUN 18 15 22*   CREATSERUM 0.41* 0.40* 0.58   GFRAA 115 116 103   GFRNAA 100 101 89   CA 7.6* 8.4* 8.4*    145 141   K 4.3 4.1 3.1*   * 116* 108   CO2 26.0 24

## 2020-11-06 NOTE — PHYSICAL THERAPY NOTE
PHYSICAL THERAPY EVALUATION - INPATIENT     Room Number: 328/328-A  Evaluation Date: 11/6/2020  Type of Evaluation: Initial   Physician Order: PT Eval and Treat    Presenting Problem: AMS/slurred speech, LLE wound s/p debridement and placement of wound va Basic Mobility Short Form. Research supports that patients with this level of impairment may benefit from LTC. However pt has 24 hour care at home and her level of mobility has not drastically changed.  Did recommend to pt that she starts to use her Jamison Bienenstock bilateral legs;  Hereditary spastic paraplegia X 20 years    • Osteoarthritis     legs, back, shoulders and knees   • OSTEOPENIA    • Other and unspecified hyperlipidemia    • OTHER DISEASES     spastic paraparesis   • OTHER DISEASES     cataracts   • OTHER Joana Roman MD at [facility] MAIN OR   • KNEE REPLACEMENT SURGERY      left  12/05   • Karon Comber N/A 10/31/2018    Performed by Nasim Cagle MD at [place] [address]  8-24-12    cysto Dr. Aneudy Arenas   • [place] STONE,>2.5CM  03/12/2020    laser li from a chair with arms (e.g., wheelchair, bedside commode, etc.): Unable   -   Moving from lying on back to sitting on the side of the bed?: A Lot   How much help from another person does the patient currently need. ..   -   Moving to and from a bed to a ch

## 2020-11-06 NOTE — OCCUPATIONAL THERAPY NOTE
OCCUPATIONAL THERAPY EVALUATION - INPATIENT     Room Number: 328/328-A  Evaluation Date: 11/6/2020  Type of Evaluation: Initial  Presenting Problem: (TIA)    Physician Order: IP Consult to Occupational Therapy  Reason for Therapy: ADL/IADL Dysfunction and conservation/work simplification techniques;ADL training;Functional transfer training; Endurance training;Patient/Family education;Patient/Family training;Equipment eval/education; Compensatory technique education       OCCUPATIONAL THERAPY MEDICAL/SOCIAL HI Surgical History:   Procedure Laterality Date   • ARTHROSCOPY OF JOINT UNLISTED Right     knee   • BACK SURGERY      spinal fusion L1-5   • CAUDAL N/A 3/26/2019    Performed by Jewels Brasher MD at [address] [place] [address] N/A 10/10/2017 w/c    Prior Level of Tift: Per pt report, she lives home with her spouse us w/c bound, was receiving home health OT services currently. Her spouse assists with slide board tfrs from bed>w/c>chair, also has a kathya lift if needed.  She requires assi a     Education Provided: Educated pt on role of OT in acute care setting, physiological benefits of functional mobility/OOB activity and POC - pt verbalized understanding.     Patient End of Session: In bed;Needs met;Call light within reach;RN aware of ses

## 2020-11-06 NOTE — PROCEDURES
[address] [facility], [address] [place] [address]      PATIENT'S NAME: Karel ADAIR   ATTENDING PHYSICIAN: Vin Aguilar.  Irma Teixeira MD   PATIENT ACCOUNT #: [de-identified] LOCATION: [address] [place] #: [id number] DATE OF BIR

## 2020-11-06 NOTE — PLAN OF CARE
Patient is a/ox4. RA. Complete care. Intermittent straight cath. Accucheck ACHS. Wound vac placed today to LLE. Potassium replaced per protocol. PT/OT worked with the pt, see note. MRI later today. Contact prec in place. Will continue to monitor.     Addend -Bladder scanning and PRN urinary straight catheterization  - Ann-Marie area care  - CT head ordered  - Q2 turn/repositioning   - PRN pain meds  - See additional Care Plan goals for specific interventions  Outcome: Progressing     Problem: NEUROLOGICAL - ADUL patient/family/discharge partner in discharge planning  - Arrange for needed discharge resources and transportation as appropriate  - Identify discharge learning needs (meds, wound care, etc)  - Arrange for interpreters to assist at discharge as needed  -

## 2020-11-06 NOTE — PROGRESS NOTES
[facility] Hospitalist Progress Note     CC: Hospital Follow up    PCP: Timmy Coleman MD       Assessment/Plan:     Principal Problem:    TIA (transient ischemic attack)  Active Problems:    Slurred speech    Katelin Edwards is a 66year old female with  pump - and straight cath at baseline)  -PT/OT     Hx DVT S/P IVC Filter   -eliquis restarted     HL  - statin     Ostomy  Collagenous colitis    Pancreatic Cystic Lesion  - has had increased ostomy output recently  -follows with Dr Carmenza Turpin, was told t Supple  Pulm: CTAB, no crackles or wheezes  CV: RRR, no murmurs  ABD: Soft, non-tender, non-distended, +BS  MSK: strength 4+/5 in upper extremities, spastic paresis in BLE  Neuro: Grossly normal, CN intact, sensory intact  Psych: Affect- normal  SKIN: warm report for this examination. This final report agrees with their preliminary findings.   Dictated by (CST): Gustavo Ayers MD on 11/04/2020 at 6:43 AM     Finalized by (CST): Gustavo Ayers MD on 11/04/2020 at 6:45 AM          Ct Tibia/fibula Left (cpt=73700)

## 2020-11-06 NOTE — CM/SW NOTE
Per discharge rounds, pt current with [facility] for a home RN. Noted that pt received wound vac today. Updated orders for [facility] were entered. [facility] liaison updated of this. PT/OT recommendations are currently pending. Anticipate PT/OT HHC at discharge.  Order/F

## 2020-11-06 NOTE — CM/SW NOTE
BLS on will call in case pt discharges this weekend. PCS form completed as well. Transportation arranged through "[employer]".      [facility]: [phone]

## 2020-11-06 NOTE — WOUND PROGRESS NOTE
Pt was seen for wound vac placement to the left inner calf wound, s/t hematoma evacuation with Dr. Sherrie Jackson on 11/5. The pt was pre medicated with pain meds per bedside RN. The dressing was removed, wound was cleansed with saline, traced and measured.  Skin

## 2020-11-07 VITALS
BODY MASS INDEX: 26 KG/M2 | RESPIRATION RATE: 20 BRPM | SYSTOLIC BLOOD PRESSURE: 96 MMHG | OXYGEN SATURATION: 99 % | TEMPERATURE: 98 F | WEIGHT: 134.5 LBS | HEART RATE: 120 BPM | DIASTOLIC BLOOD PRESSURE: 61 MMHG

## 2020-11-07 LAB
ANION GAP SERPL CALC-SCNC: 6 MMOL/L
BUN SERPL-MCNC: 18 MG/DL
BUN/CREAT SERPL: 48.6
CALCIUM SERPL-MCNC: 8.3 MG/DL
CHLORIDE SERPL-SCNC: 108 MMOL/L
CO2 SERPL-SCNC: 27 MMOL/L
CREAT SERPL-MCNC: 0.37 MG/DL
GLUCOSE SERPL-MCNC: 70 MG/DL
HCT VFR BLD CALC: 33.4 %
HGB BLD-MCNC: 10.3 G/DL
PLATELET # BLD: 157 K/MCL
POTASSIUM SERPL-SCNC: 3.9 MMOL/L
RBC # BLD: 3.93 10*6/UL
SODIUM SERPL-SCNC: 141 MMOL/L
WBC # BLD: 5.5 K/MCL

## 2020-11-07 PROCEDURE — 99231 SBSQ HOSP IP/OBS SF/LOW 25: CPT | Performed by: OTHER

## 2020-11-07 RX ORDER — VANCOMYCIN HYDROCHLORIDE 125 MG/1
125 CAPSULE ORAL EVERY 6 HOURS
Qty: 56 CAPSULE | Refills: 0 | Status: SHIPPED | OUTPATIENT
Start: 2020-11-07 | End: 2020-11-21

## 2020-11-07 RX ORDER — CEPHALEXIN 500 MG/1
500 CAPSULE ORAL 2 TIMES DAILY
Qty: 6 CAPSULE | Refills: 0 | Status: SHIPPED | OUTPATIENT
Start: 2020-11-08 | End: 2020-11-24

## 2020-11-07 RX ORDER — PREGABALIN 50 MG/1
50 CAPSULE ORAL 3 TIMES DAILY
Qty: 90 CAPSULE | Refills: 0 | Status: SHIPPED | OUTPATIENT
Start: 2020-11-07 | End: 2021-06-03

## 2020-11-07 NOTE — PLAN OF CARE
Patient had no complaints over night. Intermittent straight cath, purwick in place, IV ABX, wound vac @ 125 mmHg, replaced potassium per protocol.  Plan to d/c in AM with [facility] when med clear    Problem: Patient Centered Care  Goal: Patient preferences are kris ADULT  Goal: Achieves stable or improved neurological status  Description: INTERVENTIONS  - Assess for and report changes in neurological status  - Initiate measures to prevent increased intracranial pressure  - Maintain blood pressure and fluid volume wit post-discharge preferences of patient/family/discharge partner  - Complete POLST form as appropriate  - Assess patient's ability to be responsible for managing their own health  - Refer to Case Management Department for coordinating discharge planning if t

## 2020-11-07 NOTE — PROGRESS NOTES
LUCILA GUSTAFSON [facility]    General Surgery Progress Note  Kurtisdanika Brownens ARORA:[id number]  HD# 3       Subjective:   No new complaints denies abdominal pain, nausea and vomiting  Passing flatus and BM(s)     Exam:     General: awake and alert, in no < > 84.8 84.8 85.0   MCH 26.0 26.1   < > 25.9* 26.3 26.2   MCHC 30.9* 31.8   < > 30.6* 31.0 30.8*   RDW 16.4* 16.2*   < > 16.3* 15.9* 15.9*   NEPRELIM 7.35 7.97*  --   --   --   --    WBC 10.0 10.5   < > 6.2 6.2 5.5   .0 171.0   < > 148.0* 155.0 1

## 2020-11-07 NOTE — PROGRESS NOTES
[facility]  Neurology Progress Note    Daksha Olson Patient Status:  Inpatient    1945 MRN [id number]   Location [facility] 3W/SW Attending Dequan Rodríguez, [address] [facility] Se Day # 3 PCP Michael Newman MD     Subjective:  Lou Walsh Oral, Q6H PRN    •  cefTRIAXone Sodium (ROCEPHIN) 1 g in sodium chloride 0.9% 100 mL MBP/ADD-vantage, 1 g, Intravenous, Q24H    •  pregabalin (LYRICA) cap 50 mg, 50 mg, Oral, TID        Objective:  Blood pressure 96/61, pulse 120, temperature 98 °F (36.7 ° TSH 0.271 (L) 07/20/2020    LIP 97 08/10/2020    DDIMER <0.27 11/24/2019    ESRML 7 09/09/2019    CRP 2.19 (H) 07/06/2020    MG 1.8 11/04/2020    PHOS 2.8 04/23/2019    TROP <0.045 11/03/2020    CK 30 10/31/2019    B12 550 07/20/2020       No results found

## 2020-11-07 NOTE — PLAN OF CARE
Pt received awake and alert. NPWT at 125 to left lower extremity. Switched to personal NPWT. Cleared to discharge home. Pt aware and agreeable. Discharge instructions, medications, side effects, and follow up appointments all discussed with pt.  Pt verbaliz PRN urinary straight catheterization  - Ann-Marie area care  - CT head ordered  - Q2 turn/repositioning   - PRN pain meds  - See additional Care Plan goals for specific interventions  Outcome: Adequate for Discharge     Problem: NEUROLOGICAL - ADULT  Goal: Achi patient/family/discharge partner in discharge planning  - Arrange for needed discharge resources and transportation as appropriate  - Identify discharge learning needs (meds, wound care, etc)  - Arrange for interpreters to assist at discharge as needed  -

## 2020-11-07 NOTE — DISCHARGE SUMMARY
General Medicine Discharge Summary     Patient ID:  Corby Maguire  66year old  5/4/1945    Admit date: 11/3/2020    Discharge date and time: 11/07/20    Attending Physician: Dina Koehler MD     Primary Care Physician: Trish Johnson MD and 14 days of PO vancomycin for +c. Diff.  F/u PCP, surgery and wound clinic.      AMS - much improved  - likely 2/2 medications, increased Lyrica, vs. Infection, cellulitis  - better on 11/4AM, worse in the evening, repeat CT brain completed and negative, +BS  MSK: strength 4+/5 in upper extremities, spastic paresis in BLE  Neuro: Grossly normal, CN intact, sensory intact  Psych: Affect- normal  SKIN: warm, dry  EXT: LLE with wound vac in place, less erythema and warmth    Operative Procedures:      Imaging acetaminophen 325 MG Tabs  Commonly known as: TYLENOL     Albuterol Sulfate 108 (90 Base) MCG/ACT Aepb  Inhale 2 puffs into the lungs every 4 (four) hours as needed.      apixaban 5 MG Tabs  Commonly known as: ELIQUIS  Take 1 tablet (5 mg total) by mouth diet  Wound Care: keep wound clean and dry  Code Status: DNAR/Selective Treatment  O2: n/a    Follow-up with:    PCP   Specialist surgery       FU  Follow-up Information     Nancy Robin MD In 2 weeks.     Specialty: SURGERY, GENERAL  Why: Call for Fo

## 2020-11-16 ENCOUNTER — OFFICE VISIT (OUTPATIENT)
Dept: WOUND CARE | Facility: HOSPITAL | Age: 75
End: 2020-11-16
Attending: CLINICAL NURSE SPECIALIST
Payer: MEDICARE

## 2020-11-16 DIAGNOSIS — S81.802D WOUND OF LEFT LOWER EXTREMITY, SUBSEQUENT ENCOUNTER: Chronic | ICD-10-CM

## 2020-11-16 PROCEDURE — 29581 APPL MULTLAYER CMPRN SYS LEG: CPT

## 2020-11-16 PROCEDURE — 99214 OFFICE O/P EST MOD 30 MIN: CPT

## 2020-11-16 NOTE — PROGRESS NOTES
Subjective    Chief Complaint  This information was obtained from the patient  The patient is new to the [address] [facility] here for an initial visit for the evaluation and management of non-healing left lower leg wound.     Allergies  Cymbalta (Reaction: itching 8-14-17:  Patient was admitted to the hospital for UTI, fever and sacral pressure wound/diarrhea. Patient diagnosed with Clostridium difficile and treated with antibiotics.    Patient currently resides at [facility] and transported to  This information was obtained from the patient  Patient has a medical history of:  back pain  depression  Gastro Esoph.  Reflux Disease (GERD)  Gout  Hyperlipidemia  Menopause  Mitral Valve Prolapse  Anxiety  hereditary spastic paraplegia  hypercholesterole PHQ2 Depression Screen scale: 0=not at all, 1=several days, 2=more than half the days, 3=nearly every day: 0  Little interest or pleasure in doing things (over the last 2 weeks)?: 0  Feeling down, depressed, or hopeless (over the last 2 weeks)?: 0  Total s Wound #16 Left, Medial Lower Leg is a Full Thickness Trauma Wound and has received a status of Not Healed. Initial wound encounter measurements are 5.2cm length x 5cm width x 0.3cm depth, with an area of 26 sq cm and a volume of 7.8 cubic cm.  No tunneling Integumentary (Hair, Skin)  Skin normal color, texture with left leg wound. Neurological:  loss of protective sensation, bilateral legs paralysis . Psychiatric:  Oriented to time, place and person. Appropriate mood and affect.     Lower Extremity Asse Wound #16 (Trauma Wound) is located on the left, medial lower leg. A Multi Layer Compression Wrap procedure was performed for the lower left extremity by SUSI Valentino. A Multi-Layer was applied with .  The procedure was tolerated well with pain leve Patient verbalized understanding and agreed to plan of care. The wound care orders were sent to Home health services to manage dressing changes.         Electronic Signature(s)  Signed By: Date:  Olaf RIVAS-BC 11/16/2020 3:20:17 PM       Entered By

## 2020-11-20 ENCOUNTER — TELEPHONE (OUTPATIENT)
Dept: CARDIOLOGY | Age: 75
End: 2020-11-20

## 2020-11-24 PROBLEM — R47.81 SLURRED SPEECH: Status: RESOLVED | Noted: 2020-11-04 | Resolved: 2020-11-24

## 2020-11-24 PROBLEM — I50.43 ACUTE ON CHRONIC COMBINED SYSTOLIC AND DIASTOLIC CONGESTIVE HEART FAILURE (HCC): Status: RESOLVED | Noted: 2019-05-12 | Resolved: 2020-11-24

## 2020-11-30 ENCOUNTER — OFFICE VISIT (OUTPATIENT)
Dept: WOUND CARE | Facility: HOSPITAL | Age: 75
End: 2020-11-30
Attending: NURSE PRACTITIONER
Payer: MEDICARE

## 2020-11-30 DIAGNOSIS — S81.802D WOUND OF LEFT LOWER EXTREMITY, SUBSEQUENT ENCOUNTER: Primary | ICD-10-CM

## 2020-11-30 PROBLEM — S81.802A WOUND OF LEFT LEG: Status: ACTIVE | Noted: 2020-11-02

## 2020-11-30 PROCEDURE — 29581 APPL MULTLAYER CMPRN SYS LEG: CPT

## 2020-11-30 NOTE — PROGRESS NOTES
Subjective    Chief Complaint  This information was obtained from the patient  The patient is new to the [address] [facility] here for an initial visit for the evaluation and management of non-healing left lower leg wound.     Allergies  Cymbalta (Reaction: itching 8-14-17:  Patient was admitted to the hospital for UTI, fever and sacral pressure wound/diarrhea. Patient diagnosed with Clostridium difficile and treated with antibiotics.    Patient currently resides at [facility] and transported to  Integumentary (Hair/Skin/Nails): Dryness, Prone to Skin Tears  Neurological: Paralysis (BLE)  Endocrine:  Other (denies S/S of DM/ Hx Type 2 DM noted in chart)    Patient denies complaints or symptoms related to:  Constitutional Symptoms (General Health): C Constitutional  well developed, no acute distress. .    Musculoskeletal:  needs assisted device for ambulation, w/chair. thick nails, no clubbing, no cyanosis, foot deformity. limited ROM dede to stenosis and spastic paraplegia. left lower leg wound. .    Int Misc / Additional orders  Home health care nurse for wound care. - home care to change dressing once per week  Order needed supplies. Call [phone] Eunice Ventura DNP with any question or concerns. Supplement with a daily multivitamin.   Increase dietar Post Procedural Pain: 0  Response to Treatment: Procedure was tolerated well  Compression Layers: Multi-Layer  Compression Product Type: Comprilan  Extremity Location: Lower Left Extremity    Electronic Signature(s)  Signed By: Lorna Medina 11/30/

## 2020-12-01 ENCOUNTER — OFFICE VISIT (OUTPATIENT)
Dept: CARDIOLOGY | Age: 75
End: 2020-12-01

## 2020-12-01 VITALS
HEIGHT: 64 IN | OXYGEN SATURATION: 99 % | SYSTOLIC BLOOD PRESSURE: 100 MMHG | RESPIRATION RATE: 18 BRPM | DIASTOLIC BLOOD PRESSURE: 70 MMHG | BODY MASS INDEX: 23.9 KG/M2 | WEIGHT: 140 LBS | HEART RATE: 74 BPM

## 2020-12-01 DIAGNOSIS — I48.20 CHRONIC ATRIAL FIBRILLATION (CMD): ICD-10-CM

## 2020-12-01 DIAGNOSIS — I50.32 CHRONIC HEART FAILURE WITH PRESERVED EJECTION FRACTION (CMD): ICD-10-CM

## 2020-12-01 DIAGNOSIS — I34.0 NON-RHEUMATIC MITRAL REGURGITATION: Primary | ICD-10-CM

## 2020-12-01 PROCEDURE — 99214 OFFICE O/P EST MOD 30 MIN: CPT | Performed by: INTERNAL MEDICINE

## 2020-12-01 ASSESSMENT — PATIENT HEALTH QUESTIONNAIRE - PHQ9
CLINICAL INTERPRETATION OF PHQ2 SCORE: NO FURTHER SCREENING NEEDED
2. FEELING DOWN, DEPRESSED OR HOPELESS: NOT AT ALL
CLINICAL INTERPRETATION OF PHQ9 SCORE: NO FURTHER SCREENING NEEDED
SUM OF ALL RESPONSES TO PHQ9 QUESTIONS 1 AND 2: 0
1. LITTLE INTEREST OR PLEASURE IN DOING THINGS: NOT AT ALL
SUM OF ALL RESPONSES TO PHQ9 QUESTIONS 1 AND 2: 0

## 2020-12-07 ENCOUNTER — TELEPHONE (OUTPATIENT)
Dept: GASTROENTEROLOGY | Facility: CLINIC | Age: 75
End: 2020-12-07

## 2020-12-07 NOTE — TELEPHONE ENCOUNTER
Pt states that she was in [address] [place] [address] about 3 weeks ago due to possible stroke but it turned out to be medication issues. She also was told she had C Dif and completed medication and she no longer has diarrhea.   She just wanted to let Dr. Xavier Reese know about

## 2020-12-11 ENCOUNTER — TELEPHONE (OUTPATIENT)
Dept: CARDIOLOGY | Age: 75
End: 2020-12-11

## 2020-12-11 DIAGNOSIS — I25.10 CORONARY ARTERY DISEASE INVOLVING NATIVE CORONARY ARTERY OF NATIVE HEART WITHOUT ANGINA PECTORIS: Primary | ICD-10-CM

## 2020-12-11 DIAGNOSIS — E78.00 HYPERCHOLESTEROLEMIA: ICD-10-CM

## 2020-12-11 RX ORDER — ATORVASTATIN CALCIUM 40 MG/1
40 TABLET, FILM COATED ORAL AT BEDTIME
Qty: 90 TABLET | Refills: 0 | Status: SHIPPED | OUTPATIENT
Start: 2020-12-11 | End: 2021-03-01

## 2020-12-14 ENCOUNTER — OFFICE VISIT (OUTPATIENT)
Dept: WOUND CARE | Facility: HOSPITAL | Age: 75
End: 2020-12-14
Attending: NURSE PRACTITIONER
Payer: MEDICARE

## 2020-12-14 DIAGNOSIS — S81.802D WOUND OF LEFT LOWER EXTREMITY, SUBSEQUENT ENCOUNTER: Primary | ICD-10-CM

## 2020-12-14 PROCEDURE — 29581 APPL MULTLAYER CMPRN SYS LEG: CPT

## 2020-12-14 NOTE — PROGRESS NOTES
Subjective    Chief Complaint  This information was obtained from the patient  The patient was seen today for follow up and management of non-healing left lower leg wound. No additional concerns for today's visit.     Allergies  Cymbalta (Reaction: itching, 8-14-17:  Patient was admitted to the hospital for UTI, fever and sacral pressure wound/diarrhea. Patient diagnosed with Clostridium difficile and treated with antibiotics.    Patient currently resides at [facility] and transported to  Neurological: Paralysis (BLE)  Endocrine:  Other (denies S/S of DM/ Hx Type 2 DM noted in chart)    Patient denies complaints or symptoms related to:  Constitutional Symptoms (General Health): Chills, Fatigue, Fever, Loss of Appetite  Eyes: Vision Changes ( Respiratory:  effortless breathing. Musculoskeletal:  needs assisted device for ambulation, w/chair. thick nails, no clubbing, no cyanosis, foot deformity. limited ROM dede to stenosis and spastic paraplegia. left lower leg wound. .    Integumentary (Hair Ace wrap - x2  Compression to left leg. Elevate leg(s) as much as possible. Stockinette 3\"  Artiflex 10 cm  Artiflex 15 cm    Misc / Additional orders  Home health care nurse for wound care.  - home care to change dressing once per week  Order needed sup Response to Treatment: Procedure was tolerated well  Compression Layers: Multi-Layer  Compression Product Type: Comprilan  Extremity Location: Lower Left Extremity    Electronic Signature(s)  Signed By: Date:  Mera Courtney [facility]-BC 12/14/2020 2:16:15 PM

## 2020-12-16 NOTE — H&P
[facility] Hospitalist Team  History and Physical     ASSESSMENT / PLAN:   Darien Zhang is a 74year old female with history of hereditary spastic paraplegia, chronic straight cathing, neurogenic bladder, baclofen pump,  fecal incontinence, history of UC, now    CAD-nonobstructive/MR/HTN/HL  -angiogram from 2011 showed mild to moderate LAD disease  -continue statin and lopressor  -stop ASA with pradaxa for now   -as per cards    Sacral decub, POA  -chronic wound vac-changed 6/8-gets changed every M, W, F  - months,at least 3 months and kept telling herself she should let her PCP know about this. She noted these symptoms also began on Wednesday.  She saw her PCP yesterday and mentioned this and an EKG was done and pt was found to be in atrial fib with controll normal expansion; non labored; CTA   CARDIOVASCULAR: regular,nl S1 S2; no murmur, no gallop; no rub   ABDOMEN:  Soft, BS+; +ostomy  EXTREMITIES: left heel with superficial wound, bunny boots      PMH  Past Medical History:   Diagnosis Date   • Anemia    • Comment: Procedure: ESOPHAGOGASTRODUODENOSCOPY (EGD);                  Surgeon: Mack Olivo MD;  Location:                [address] [place] [address] ENDOSCOPY  10/27/2014: [address] NDL/CATH SPI DX/THER CMO N/A      Comment: Procedure: CERVICAL EPIDURAL;  Surge MANAGEMENT  12/8/2014: PATIENT [place] PREOPERATIVE ORDER FOR IV ANT* N/A      Comment: Procedure: CERVICAL EPIDURAL;  Surgeon:                Chantell Jaimes MD;  Location: [address] MANAGEMENT  1/5/2015: PATIENT Madelyn Tuckeranastasiya sulfate 325 (65 FE) MG Oral Tab EC Take 325 mg by mouth daily with breakfast. Disp:  Rfl:    Atorvastatin Calcium 40 MG Oral Tab Take 40 mg by mouth nightly.    Disp:  Rfl:        Soc Hx     Smoking status: Former Smoker     Smokeless tobacco: Never Used 6/7/2018  CONCLUSION: Ventilation/perfusion lung scan demonstrates no evidence for acute pulmonary embolism.   The study is grossly unchanged the previous study dated 9/20/2017     Dictated by (CST): Indio Cervantes MD on 6/07/2018 at 21:19     Approved oxygen

## 2020-12-28 ENCOUNTER — TELEPHONE (OUTPATIENT)
Dept: GASTROENTEROLOGY | Facility: CLINIC | Age: 75
End: 2020-12-28

## 2020-12-28 ENCOUNTER — LAB REQUISITION (OUTPATIENT)
Dept: LAB | Facility: HOSPITAL | Age: 75
End: 2020-12-28
Payer: MEDICARE

## 2020-12-28 ENCOUNTER — OFFICE VISIT (OUTPATIENT)
Dept: WOUND CARE | Facility: HOSPITAL | Age: 75
End: 2020-12-28
Attending: NURSE PRACTITIONER
Payer: MEDICARE

## 2020-12-28 DIAGNOSIS — S81.802D WOUND OF LEFT LOWER EXTREMITY, SUBSEQUENT ENCOUNTER: Primary | ICD-10-CM

## 2020-12-28 DIAGNOSIS — R19.7 DIARRHEA, UNSPECIFIED TYPE: Primary | ICD-10-CM

## 2020-12-28 DIAGNOSIS — I25.10 ATHEROSCLEROTIC HEART DISEASE OF NATIVE CORONARY ARTERY WITHOUT ANGINA PECTORIS: ICD-10-CM

## 2020-12-28 LAB
ALT SERPL-CCNC: 17 U/L
AST SERPL-CCNC: 20 U/L (ref 15–37)
CHOLEST SERPL-MCNC: 122 MG/DL
CHOLEST SMN-MCNC: 122 MG/DL (ref ?–200)
HDLC SERPL-MCNC: 56 MG/DL
HDLC SERPL-MCNC: 56 MG/DL (ref 40–59)
LDLC SERPL CALC-MCNC: 44 MG/DL
LDLC SERPL CALC-MCNC: 44 MG/DL (ref ?–100)
NONHDLC SERPL-MCNC: 66 MG/DL
NONHDLC SERPL-MCNC: 66 MG/DL (ref ?–130)
TRIGL SERPL-MCNC: 111 MG/DL
TRIGL SERPL-MCNC: 111 MG/DL (ref 30–149)
VLDLC SERPL CALC-MCNC: 22 MG/DL
VLDLC SERPL CALC-MCNC: 22 MG/DL (ref 0–30)

## 2020-12-28 PROCEDURE — 80061 LIPID PANEL: CPT | Performed by: INTERNAL MEDICINE

## 2020-12-28 PROCEDURE — 84450 TRANSFERASE (AST) (SGOT): CPT | Performed by: INTERNAL MEDICINE

## 2020-12-28 PROCEDURE — 29581 APPL MULTLAYER CMPRN SYS LEG: CPT

## 2020-12-28 PROCEDURE — 84460 ALANINE AMINO (ALT) (SGPT): CPT | Performed by: INTERNAL MEDICINE

## 2020-12-28 NOTE — TELEPHONE ENCOUNTER
Pt states that she has diarrhea and it smells really bad. Pt states that she had C Dif previously and wonders if it is back. Symptoms for last 2 weeks. Please call.

## 2020-12-29 RX ORDER — BUDESONIDE 3 MG/1
6 CAPSULE, COATED PELLETS ORAL EVERY MORNING
Qty: 60 CAPSULE | Refills: 1 | Status: SHIPPED | OUTPATIENT
Start: 2020-12-29 | End: 2021-02-25

## 2020-12-29 NOTE — TELEPHONE ENCOUNTER
Patient states she is experiencing watery, foul smelling diarrhea (3 to 4 times daily) x 2 weeks. Denies abdominal pain,nausea,vomiting,blood in the stool or fever. Patient states Imodium did not help in the past and she ran out of Budesonide.   Patient h

## 2020-12-30 NOTE — TELEPHONE ENCOUNTER
I spoke to [facility]. She has had foul-smelling diarrhea in her ostomy bag for about 2 weeks. She ran out of her budesonide around this time as well. She was taking 6 mg daily. She is concerned about the possibility of C. difficile.   I suspect a flare of

## 2021-01-01 ENCOUNTER — LAB ENCOUNTER (OUTPATIENT)
Dept: LAB | Facility: HOSPITAL | Age: 76
End: 2021-01-01
Attending: INTERNAL MEDICINE
Payer: MEDICARE

## 2021-01-01 ENCOUNTER — EXTERNAL RECORD (OUTPATIENT)
Dept: OTHER | Age: 76
End: 2021-01-01

## 2021-01-01 DIAGNOSIS — R19.7 DIARRHEA, UNSPECIFIED TYPE: ICD-10-CM

## 2021-01-01 PROCEDURE — 87493 C DIFF AMPLIFIED PROBE: CPT

## 2021-01-02 ENCOUNTER — TELEPHONE (OUTPATIENT)
Dept: GASTROENTEROLOGY | Facility: CLINIC | Age: 76
End: 2021-01-02

## 2021-01-02 LAB — C DIFF TOX B STL QL: POSITIVE

## 2021-01-02 RX ORDER — VANCOMYCIN HYDROCHLORIDE 125 MG/1
125 CAPSULE ORAL 4 TIMES DAILY
Qty: 56 CAPSULE | Refills: 0 | Status: SHIPPED | OUTPATIENT
Start: 2021-01-02 | End: 2021-01-04 | Stop reason: ALTCHOICE

## 2021-01-02 NOTE — TELEPHONE ENCOUNTER
ON CALL GI;  Contacted by [facility] lab, C. difficile testing positive as ordered by Dr. Sloane Celeste. Patient was contacted, she is doing okay but just states that the stools are messy and smell bad. No other symptoms.     Vancomycin 125 mg 4 times daily

## 2021-01-03 NOTE — PROGRESS NOTES
[address] Patient Status:  No patient class for patient encounter    1945 MRN [id number]   Location [facility] MD Dr. Nae Rothman is a 66 y and addition of digoxin.  Echo with normal EF, LVH and mild/mod mitral valve regurg    Problem List:  Chronic HFpEF  TIA  LLE cellulitis after laceration with swelling and hematoma  RSV pneumonitis, resolved   Recurrent UTI  Severe MR s/p mitral valve clip AST 20 12/28/2020 08:30 AM    ALT 17 12/28/2020 08:30 AM    PTT 32.6 11/03/2020 10:29 PM    INR 1.36 (H) 11/03/2020 10:29 PM    PTP 16.5 (H) 11/03/2020 10:29 PM    T4F 1.2 07/20/2020 04:32 AM    TSH 0.271 (L) 07/20/2020 04:32 AM    LIP 97 08/10/2020 03: LVOT flow): 1.0cm^2. 4. Left atrium: The atrium was severely dilated. 5. Right atrium: The atrium was severely dilated. Compared to the previous study these findings represent  improvement. Echo: 10/23/19   1. Left ventricle:  The cavity size was norm hours, purpose of clinic visits, sodium restricted diet, low sodium foods, fluid restrictions, daily weights, medication regimen s/s of atrial fib, heart failure, bronchitis exacerbation and when to call APN/clinic.  Greater than 30 minutes with this patien chest pain, shortness of breath, coughing,  wheezing, swelling, weight gain,  or weakness    Weigh yourself daily in the morning before breakfast, call if gaining 3 lbs or more overnight or more than 5 lbs in one week.     Follow 2000 mg sodium restricted , Po  Every 6 Hour if needed for pain, Disp: 100 tablet, Rfl: 3  •  ondansetron 4 MG Oral Tablet Dispersible, Take 1 tablet (4 mg total) by mouth every 4 (four) hours as needed for Nausea., Disp: 15 tablet, Rfl: 0  •  Venlafaxine HCl ER 75 MG Oral Capsule SR

## 2021-01-04 ENCOUNTER — OFFICE VISIT (OUTPATIENT)
Dept: CARDIOLOGY CLINIC | Facility: HOSPITAL | Age: 76
End: 2021-01-04
Attending: NURSE PRACTITIONER
Payer: MEDICARE

## 2021-01-04 VITALS — DIASTOLIC BLOOD PRESSURE: 72 MMHG | SYSTOLIC BLOOD PRESSURE: 110 MMHG | OXYGEN SATURATION: 98 % | HEART RATE: 92 BPM

## 2021-01-04 DIAGNOSIS — Z98.890 S/P MITRAL VALVE CLIP IMPLANTATION: ICD-10-CM

## 2021-01-04 DIAGNOSIS — E87.6 HYPOKALEMIA: ICD-10-CM

## 2021-01-04 DIAGNOSIS — I50.9 HEART FAILURE, UNSPECIFIED (HCC): ICD-10-CM

## 2021-01-04 DIAGNOSIS — I48.11 LONGSTANDING PERSISTENT ATRIAL FIBRILLATION (HCC): ICD-10-CM

## 2021-01-04 DIAGNOSIS — I50.32 CHRONIC DIASTOLIC HEART FAILURE (HCC): Primary | ICD-10-CM

## 2021-01-04 DIAGNOSIS — Z95.818 S/P MITRAL VALVE CLIP IMPLANTATION: ICD-10-CM
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
## 2021-01-04 LAB
ANION GAP SERPL CALC-SCNC: 7 MMOL/L (ref 0–18)
BUN BLD-MCNC: 25 MG/DL (ref 7–18)
BUN/CREAT SERPL: 34.7 (ref 10–20)
CALCIUM BLD-MCNC: 9.1 MG/DL (ref 8.5–10.1)
CHLORIDE SERPL-SCNC: 104 MMOL/L (ref 98–112)
CO2 SERPL-SCNC: 28 MMOL/L (ref 21–32)
CREAT BLD-MCNC: 0.72 MG/DL
DEPRECATED RDW RBC AUTO: 47.2 FL (ref 35.1–46.3)
ERYTHROCYTE [DISTWIDTH] IN BLOOD BY AUTOMATED COUNT: 15.9 % (ref 11–15)
GLUCOSE BLD-MCNC: 195 MG/DL (ref 70–99)
HCT VFR BLD AUTO: 44.1 %
HGB BLD-MCNC: 13.6 G/DL
MCH RBC QN AUTO: 25.4 PG (ref 26–34)
MCHC RBC AUTO-ENTMCNC: 30.8 G/DL (ref 31–37)
MCV RBC AUTO: 82.3 FL
NT-PROBNP SERPL-MCNC: 2149 PG/ML
NT-PROBNP SERPL-MCNC: 2149 PG/ML (ref ?–450)
OSMOLALITY SERPL CALC.SUM OF ELEC: 298 MOSM/KG (ref 275–295)
PATIENT FASTING Y/N/NP: NO
PLATELET # BLD AUTO: 181 10(3)UL (ref 150–450)
POTASSIUM SERPL-SCNC: 2.8 MMOL/L (ref 3.5–5.1)
RBC # BLD AUTO: 5.36 X10(6)UL
SODIUM SERPL-SCNC: 139 MMOL/L (ref 136–145)
WBC # BLD AUTO: 7.5 X10(3) UL (ref 4–11)

## 2021-01-04 PROCEDURE — 83880 ASSAY OF NATRIURETIC PEPTIDE: CPT | Performed by: NURSE PRACTITIONER

## 2021-01-04 PROCEDURE — 99212 OFFICE O/P EST SF 10 MIN: CPT | Performed by: NURSE PRACTITIONER

## 2021-01-04 PROCEDURE — 36415 COLL VENOUS BLD VENIPUNCTURE: CPT | Performed by: NURSE PRACTITIONER

## 2021-01-04 PROCEDURE — 85027 COMPLETE CBC AUTOMATED: CPT | Performed by: NURSE PRACTITIONER

## 2021-01-04 PROCEDURE — 99214 OFFICE O/P EST MOD 30 MIN: CPT | Performed by: NURSE PRACTITIONER

## 2021-01-04 PROCEDURE — 80048 BASIC METABOLIC PNL TOTAL CA: CPT | Performed by: NURSE PRACTITIONER

## 2021-01-04 RX ORDER — POTASSIUM CHLORIDE 20 MEQ/1
60 TABLET, EXTENDED RELEASE ORAL 2 TIMES DAILY
Qty: 360 TABLET | Refills: 1 | COMMUNITY
Start: 2021-01-04 | End: 2021-01-11

## 2021-01-04 NOTE — PATIENT INSTRUCTIONS
Take an additional kdur 40 meq po x1 today  Hold tomorrow's furosemide dose for one day     Increase potassium chloride to 60 meq bid     Continue all your same medications    Call if having any dizziness, lightheadedness, heart racing, palpitations, chest

## 2021-01-04 NOTE — TELEPHONE ENCOUNTER
Noted and agree.   Please have the patient contact us if the symptoms do not resolve with treatment or recur after treatment

## 2021-01-07 ENCOUNTER — LAB REQUISITION (OUTPATIENT)
Dept: LAB | Facility: HOSPITAL | Age: 76
End: 2021-01-07
Payer: MEDICARE

## 2021-01-07 DIAGNOSIS — I50.9 HEART FAILURE, UNSPECIFIED (HCC): ICD-10-CM

## 2021-01-07 LAB
ANION GAP SERPL CALC-SCNC: 8 MMOL/L (ref 0–18)
BUN BLD-MCNC: 19 MG/DL (ref 7–18)
BUN/CREAT SERPL: 40.4 (ref 10–20)
CALCIUM BLD-MCNC: 8.6 MG/DL (ref 8.5–10.1)
CHLORIDE SERPL-SCNC: 110 MMOL/L (ref 98–112)
CO2 SERPL-SCNC: 23 MMOL/L (ref 21–32)
CREAT BLD-MCNC: 0.47 MG/DL
GLUCOSE BLD-MCNC: 66 MG/DL (ref 70–99)
OSMOLALITY SERPL CALC.SUM OF ELEC: 292 MOSM/KG (ref 275–295)
POTASSIUM SERPL-SCNC: 4.3 MMOL/L (ref 3.5–5.1)
SODIUM SERPL-SCNC: 141 MMOL/L (ref 136–145)

## 2021-01-07 PROCEDURE — 80048 BASIC METABOLIC PNL TOTAL CA: CPT | Performed by: INTERNAL MEDICINE

## 2021-01-11 ENCOUNTER — PATIENT MESSAGE (OUTPATIENT)
Dept: CARDIOLOGY CLINIC | Facility: HOSPITAL | Age: 76
End: 2021-01-11

## 2021-01-11 DIAGNOSIS — E87.6 HYPOKALEMIA: ICD-10-CM

## 2021-01-11 RX ORDER — POTASSIUM CHLORIDE 20 MEQ/1
60 TABLET, EXTENDED RELEASE ORAL 2 TIMES DAILY
Qty: 360 TABLET | Refills: 1 | Status: SHIPPED | OUTPATIENT
Start: 2021-01-11 | End: 2021-03-08

## 2021-01-13 NOTE — TELEPHONE ENCOUNTER
Sent [facility] message to instruct patient to notify the office if symptoms do not improve with the Vancomycin.

## 2021-01-18 ENCOUNTER — APPOINTMENT (OUTPATIENT)
Dept: WOUND CARE | Facility: HOSPITAL | Age: 76
End: 2021-01-18
Payer: MEDICARE

## 2021-01-20 NOTE — TELEPHONE ENCOUNTER
Patient has not called back/ has not read [employer] message. Calling for symptom update now that vanco treatment has been completed for positive C. Dif test.     TCB.

## 2021-01-21 ENCOUNTER — TELEPHONE (OUTPATIENT)
Dept: GASTROENTEROLOGY | Facility: CLINIC | Age: 76
End: 2021-01-21

## 2021-01-21 DIAGNOSIS — Z86.19 HISTORY OF CLOSTRIDIUM DIFFICILE INFECTION: Primary | ICD-10-CM

## 2021-01-21 DIAGNOSIS — R19.4 ALTERED BOWEL HABITS: ICD-10-CM

## 2021-01-21 NOTE — TELEPHONE ENCOUNTER
Patient calling to inform Dr Juana Antoine that her C Diff symptoms has not gone away. Please call. Thank you.

## 2021-01-21 NOTE — TELEPHONE ENCOUNTER
Nursing: I see that the patient tested positive for C. difficile on 1/2/2021 and was treated with vancomycin 4 times daily x2 weeks (see telephone encounter dated 1/2/2021). If she is continue to have ongoing symptoms with concerns for persistent C. difficile infection, I would recommend repeat C. difficile testing. I will place an order for this and CC Dr. Cookie Cody on the test results.

## 2021-01-21 NOTE — TELEPHONE ENCOUNTER
Thanks Geovanna! Spoke with patient and updated her on plan of care. She is aware that the order has been placed and she will provide c dif.sample as ordered.

## 2021-01-25 ENCOUNTER — LAB ENCOUNTER (OUTPATIENT)
Dept: LAB | Facility: HOSPITAL | Age: 76
End: 2021-01-25
Attending: NURSE PRACTITIONER
Payer: MEDICARE

## 2021-01-25 DIAGNOSIS — R19.4 ALTERED BOWEL HABITS: ICD-10-CM

## 2021-01-25 DIAGNOSIS — Z86.19 HISTORY OF CLOSTRIDIUM DIFFICILE INFECTION: ICD-10-CM

## 2021-01-25 PROCEDURE — 87493 C DIFF AMPLIFIED PROBE: CPT

## 2021-01-26 DIAGNOSIS — Z23 NEED FOR VACCINATION: ICD-10-CM

## 2021-01-26 LAB — C DIFF TOX B STL QL: NEGATIVE

## 2021-01-27 ENCOUNTER — OFFICE VISIT (OUTPATIENT)
Dept: WOUND CARE | Facility: HOSPITAL | Age: 76
End: 2021-01-27
Attending: NURSE PRACTITIONER
Payer: MEDICARE

## 2021-01-27 DIAGNOSIS — S81.802D WOUND OF LEFT LOWER EXTREMITY, SUBSEQUENT ENCOUNTER: Primary | ICD-10-CM

## 2021-01-27 PROCEDURE — 97597 DBRDMT OPN WND 1ST 20 CM/<: CPT

## 2021-01-27 NOTE — PROGRESS NOTES
Subjective    Chief Complaint  This information was obtained from the patient  The patient was seen today for follow up and management of non-healing left lower leg wound. 12/28 No additional concerns for today's visit.     Allergies  Cymbalta (Reaction: it 8-14-17:  Patient was admitted to the hospital for UTI, fever and sacral pressure wound/diarrhea. Patient diagnosed with Clostridium difficile and treated with antibiotics.    Patient currently resides at [facility] and transported to  Neurological: Paralysis (BLE)  Endocrine:  Other (denies S/S of DM/ Hx Type 2 DM noted in chart)    Patient denies complaints or symptoms related to:  Constitutional Symptoms (General Health): Chills, Fatigue, Fever, Loss of Appetite  Eyes: Vision Changes ( Wound #17 Left Heel is an Unstageable Pressure Injury Obscured full-thickness skin and tissue loss Pressure Ulcer and has received a status of Not Healed.  Initial wound encounter measurements are 2.5cm length x 2.4cm width with no measurable depth, with an Height/Length: 64 in (162.56 cm), Weight: 135 lbs (61.36 kgs), BMI: 23.2, Temperature: 98.1 °F (36.72 °C), Pulse: 77 bpm, Respiratory Rate: 17 breaths/min, Blood Pressure: 102/64 mmHg, Pulse Oximetry: 98 %.     Physical Exam  Constitutional well developed, Compression Therapy:  Spandagrip  Compression to left leg. Elevate leg(s) as much as possible. Other:  Stockinette 3\"    Misc / Additional orders  Home health care nurse for wound care.  - home care to change dressing once per week  Order needed supplies Applied topical product to cirilo-wound area avoiding wound base. using Vaseline (1), Zinc Paste (1)  Applied Primary Wound Dressing.  using Hydrofera ready 4x5 (1)  Dressing secured with non-allergenic tape/stockinet/wrap. using Kerlix (1), Silk tape (1)  Co Cleansed wound and periwound with non-cytotoxic agent. using Wound Cleanser Spray (1)  Applied topical product to cirilo-wound area avoiding wound base. using Vaseline (1), Zinc Paste (1)  Applied Primary Wound Dressing.  using Hydrofera ready 4x5 (1)  Marcia Mccord

## 2021-01-27 NOTE — PROGRESS NOTES
Medication Requested: Alprazolam 0.5mg    Last Rx written: 11/16/20 with 1 refill.  Last Rx dispensed (per PDMP): 12/15/20 last refill.    Last office visit: 11/16/20  Upcoming office visit: 2/02/21    Per last office note patient was instructed to follow up in 8 weeks.     Patient due, prepped if agreed    Routed to provider/JORGE for  Review     NEPHROLOGY DAILY PROGRESS NOTE     Follow up Reason: GABRIELA     SUBJECTIVE:  BP better today, remains lethargic.   UOP appears to be improving      OBJECTIVE:    Total Intake/Output:    Intake/Output Summary (Last 24 hours) at 4/17/2019 1523  Last data filed a 50 mg Oral BID   venlafaxine (EFFEXOR-XR) 24 hr cap 75 mg Oral Daily   Metoprolol Succinate ER (Toprol XL) 24 hr tab 25 mg 25 mg Oral 2x Daily(Beta Blocker)       Medications Prior to Admission:  PREDNISONE 5 MG Oral Tab TAKE 1 TABLET BY MOUTH DAILY   VENL hyperkalemia     GABRIELA  - sCr 0.88 on admission, worsening to 2.14   - etiology may be due to prolonged renal hypoperfusion in the setting of hypotension/ sepsis and Afib RVR   - UA: + protein, large leuk esterase (324 WBCs), but in the setting of UTI   - Ur

## 2021-01-28 ENCOUNTER — TELEPHONE (OUTPATIENT)
Dept: GASTROENTEROLOGY | Facility: CLINIC | Age: 76
End: 2021-01-28

## 2021-01-28 NOTE — TELEPHONE ENCOUNTER
Spoke to patient and relayed negative C. Diff results. Per patients states symptoms have improved. States is starting to have formed stools. Denies any abdominal pain or fevers. Reports is taking Budesonide.

## 2021-01-28 NOTE — TELEPHONE ENCOUNTER
----- Message from Aletha Moffett MD sent at 1/26/2021  5:02 PM CST -----  Please inform the patient that the C. difficile toxin assay was negative. Is she still having symptoms? Is she taking any budesonide?

## 2021-02-01 ENCOUNTER — APPOINTMENT (OUTPATIENT)
Dept: WOUND CARE | Facility: HOSPITAL | Age: 76
End: 2021-02-01
Attending: INTERNAL MEDICINE
Payer: MEDICARE

## 2021-02-01 RX ORDER — FUROSEMIDE 40 MG/1
40 TABLET ORAL DAILY
Qty: 30 TABLET | Refills: 2 | Status: SHIPPED | OUTPATIENT
Start: 2021-02-01 | End: 2021-05-24

## 2021-02-01 NOTE — TELEPHONE ENCOUNTER
1/4/2021 scc    Your Appointments    Thursday February 04, 2021 10:40 AM  Ewa Oliva by 10:25 AM)  Imaging - Mammogram with [address] [facility] [address] ([facility], [address] [place] [address]) [address] [phone]

## 2021-02-08 ENCOUNTER — OFFICE VISIT (OUTPATIENT)
Dept: CARDIOLOGY CLINIC | Facility: HOSPITAL | Age: 76
End: 2021-02-08
Attending: NURSE PRACTITIONER
Payer: MEDICARE

## 2021-02-08 ENCOUNTER — OFFICE VISIT (OUTPATIENT)
Dept: WOUND CARE | Facility: HOSPITAL | Age: 76
End: 2021-02-08
Attending: NURSE PRACTITIONER
Payer: MEDICARE

## 2021-02-08 VITALS — DIASTOLIC BLOOD PRESSURE: 62 MMHG | SYSTOLIC BLOOD PRESSURE: 92 MMHG | OXYGEN SATURATION: 98 % | HEART RATE: 78 BPM

## 2021-02-08 DIAGNOSIS — Z98.890 S/P MITRAL VALVE CLIP IMPLANTATION: ICD-10-CM

## 2021-02-08 DIAGNOSIS — Z95.818 S/P MITRAL VALVE CLIP IMPLANTATION: ICD-10-CM

## 2021-02-08 DIAGNOSIS — I48.11 LONGSTANDING PERSISTENT ATRIAL FIBRILLATION (HCC): ICD-10-CM

## 2021-02-08 DIAGNOSIS — I50.32 CHRONIC DIASTOLIC HEART FAILURE (HCC): Primary | ICD-10-CM

## 2021-02-08 DIAGNOSIS — E87.6 HYPOKALEMIA: Chronic | ICD-10-CM

## 2021-02-08 DIAGNOSIS — I50.9 HEART FAILURE, UNSPECIFIED (HCC): ICD-10-CM

## 2021-02-08 DIAGNOSIS — S81.802D WOUND OF LEFT LOWER EXTREMITY, SUBSEQUENT ENCOUNTER: Primary | ICD-10-CM

## 2021-02-08 DIAGNOSIS — L89.152 PRESSURE INJURY OF SACRAL REGION, STAGE 2 (HCC): ICD-10-CM

## 2021-02-08 LAB
ANION GAP SERPL CALC-SCNC: 5 MMOL/L (ref 0–18)
BUN BLD-MCNC: 23 MG/DL (ref 7–18)
BUN/CREAT SERPL: 46 (ref 10–20)
CALCIUM BLD-MCNC: 8.9 MG/DL (ref 8.5–10.1)
CHLORIDE SERPL-SCNC: 110 MMOL/L (ref 98–112)
CO2 SERPL-SCNC: 25 MMOL/L (ref 21–32)
CREAT BLD-MCNC: 0.5 MG/DL
GLUCOSE BLD-MCNC: 107 MG/DL (ref 70–99)
OSMOLALITY SERPL CALC.SUM OF ELEC: 294 MOSM/KG (ref 275–295)
PATIENT FASTING Y/N/NP: NO
POTASSIUM SERPL-SCNC: 4.2 MMOL/L (ref 3.5–5.1)
SODIUM SERPL-SCNC: 140 MMOL/L (ref 136–145)

## 2021-02-08 PROCEDURE — 99213 OFFICE O/P EST LOW 20 MIN: CPT

## 2021-02-08 PROCEDURE — 99212 OFFICE O/P EST SF 10 MIN: CPT | Performed by: NURSE PRACTITIONER

## 2021-02-08 PROCEDURE — 36415 COLL VENOUS BLD VENIPUNCTURE: CPT | Performed by: NURSE PRACTITIONER

## 2021-02-08 PROCEDURE — 80048 BASIC METABOLIC PNL TOTAL CA: CPT | Performed by: NURSE PRACTITIONER

## 2021-02-08 PROCEDURE — 99214 OFFICE O/P EST MOD 30 MIN: CPT | Performed by: NURSE PRACTITIONER

## 2021-02-08 NOTE — PROGRESS NOTES
[address] Patient Status:  No patient class for patient encounter    1945 MRN [id number]   Location MD Dr. Mili Horvath is a metoprolol and addition of digoxin.  Echo with normal EF, LVH and mild/mod mitral valve regurg    Problem List:  Chronic HFpEF  TIA  LLE cellulitis after laceration with swelling and hematoma  RSV pneumonitis, resolved   Recurrent UTI  Severe MR s/p mitral AM    PTT 32.6 11/03/2020 10:29 PM    INR 1.36 (H) 11/03/2020 10:29 PM    PTP 16.5 (H) 11/03/2020 10:29 PM    T4F 1.2 07/20/2020 04:32 AM    TSH 0.271 (L) 07/20/2020 04:32 AM    LIP 97 08/10/2020 03:52 PM    DDIMER <0.27 11/24/2019 06:32 AM    ESRML 7 09/0 dilated. Compared to the previous study these findings represent  improvement. Echo: 10/23/19   1. Left ventricle: The cavity size was normal. Wall thickness was normal.     Systolic function was normal. The estimated ejection fraction was 60-65%. medication regimen s/s of atrial fib, heart failure, bronchitis exacerbation and when to call APN/clinic. Greater than 30 minutes with this patient providing counseling, coordination of care and education given. Patient and  receptive.     Assessment 4/6/21    Follow up with the specialty care clinic 3/8/2021          Current Outpatient Medications:   •  ezetimibe 10 MG Oral Tab, Take 1 tablet (10 mg total) by mouth daily. , Disp: 90 tablet, Rfl: 1  •  furosemide 40 MG Oral Tab, Take 1 tablet (40 mg tot into the lungs every 4 (four) hours as needed. , Disp: 1 each, Rfl: 0  •  Fluticasone Propionate 50 MCG/ACT Nasal Suspension, 1 spray by Each Nare route daily. , Disp: 1 Bottle, Rfl: 0  •  loratadine 10 MG Oral Tab, Take 10 mg by mouth daily. , Disp: , Rfl:

## 2021-02-08 NOTE — PATIENT INSTRUCTIONS
Continue all your same medications    Call if having any dizziness, lightheadedness, heart racing, palpitations, chest pain, shortness of breath, coughing,  wheezing, swelling, weight gain,  or weakness    Eat more frequent meals including some protein wit

## 2021-02-09 NOTE — PROGRESS NOTES
Subjective    Chief Complaint  This information was obtained from the patient  The patient was seen today for follow up and management of non-healing left lower leg wound.     Allergies  Cymbalta (Reaction: itching, swelling), gabapentin (Reaction: rash), G 8-14-17:  Patient was admitted to the hospital for UTI, fever and sacral pressure wound/diarrhea. Patient diagnosed with Clostridium difficile and treated with antibiotics.    Patient currently resides at [facility] and transported to  Integumentary (Hair/Skin/Nails): Dryness, Calluses/Corns, Hemosiderin Staining, Prone to Skin Tears  Neurological: Abnormal Gait (use w/chair), Paralysis (BLE)  Endocrine:  Other (denies S/S of DM/ Hx Type 2 DM noted in chart)    Patient denies complaints o The periwound skin exhibited: Dry/Scaly.  The periwound skin did not exhibit: Brawny Induration, Edema, Excoriation, Induration, Callus, Crepitus, Fluctuance, Friable, Rash, Moist, Maceration, Atrophie Simin, Cyanosis, Ecchymosis, Erythema, Hemosiderosis, Height/Length: 64 in (162.56 cm), Weight: 135 lbs (61.36 kgs), BMI: 23.2, Temperature: 98 °F (36.67 °C), Pulse: 87 bpm, Respiratory Rate: 18 breaths/min, Blood Pressure: 109/63 mmHg. Physical Exam  Constitutional: well developed, no acute distress.   Res Additional Orders: Follow-Up Appointments  Return Appointment in 2 weeks. Offloading  Pressure relief shoe / inserts / foams - needs offloading boot for bilateral feet, home health please order one for her.     Misc / Additional orders  Supplement wit

## 2021-02-12 ENCOUNTER — TELEPHONE (OUTPATIENT)
Dept: GASTROENTEROLOGY | Facility: CLINIC | Age: 76
End: 2021-02-12

## 2021-02-12 DIAGNOSIS — R19.7 DIARRHEA, UNSPECIFIED TYPE: Primary | ICD-10-CM

## 2021-02-16 ENCOUNTER — LAB ENCOUNTER (OUTPATIENT)
Dept: LAB | Facility: HOSPITAL | Age: 76
End: 2021-02-16
Attending: INTERNAL MEDICINE
Payer: MEDICARE

## 2021-02-16 DIAGNOSIS — R19.7 DIARRHEA, UNSPECIFIED TYPE: ICD-10-CM

## 2021-02-16 PROCEDURE — 87493 C DIFF AMPLIFIED PROBE: CPT

## 2021-02-17 LAB — C DIFF TOX B STL QL: POSITIVE

## 2021-02-17 RX ORDER — VANCOMYCIN HYDROCHLORIDE 125 MG/1
125 CAPSULE ORAL 4 TIMES DAILY
Qty: 56 CAPSULE | Refills: 0 | Status: SHIPPED | OUTPATIENT
Start: 2021-02-17 | End: 2021-02-26

## 2021-02-22 ENCOUNTER — OFFICE VISIT (OUTPATIENT)
Dept: WOUND CARE | Facility: HOSPITAL | Age: 76
End: 2021-02-22
Attending: NURSE PRACTITIONER
Payer: MEDICARE

## 2021-02-22 DIAGNOSIS — S81.802D WOUND OF LEFT LOWER EXTREMITY, SUBSEQUENT ENCOUNTER: Primary | ICD-10-CM

## 2021-02-22 PROCEDURE — 99213 OFFICE O/P EST LOW 20 MIN: CPT

## 2021-02-22 NOTE — PROGRESS NOTES
Subjective    Chief Complaint  This information was obtained from the patient  The patient was seen today for follow up and management of non-healing left lower leg wound.     Allergies  Cymbalta (Reaction: itching, swelling), gabapentin (Reaction: rash), G 8-14-17:  Patient was admitted to the hospital for UTI, fever and sacral pressure wound/diarrhea. Patient diagnosed with Clostridium difficile and treated with antibiotics.    Patient currently resides at [facility] and transported to  Neurological: Paralysis (BLE)  Endocrine:  Other (denies S/S of DM/ Hx Type 2 DM noted in chart)    Patient denies complaints or symptoms related to:  Constitutional Symptoms (General Health): Chills, Fatigue, Fever, Loss of Appetite  Eyes: Vision Changes ( Respiratory:effortless breathing. Musculoskeletal:No significant deformity or joint abnormality. No edema. Peripheral pulses intact, no ROM waist down with left leg/foot wound. .  Integumentary (Hair, Skin)Left medial leg wound, left heel wound, left ankle S/S of infection - monitor S&S of soft tissue infection, such fever, chills, erythema, increased draiange and foul odor. Follow-Up Appointments:  A follow-up appointment should be scheduled.       Electronic Signature(s)  Signed By: Date:  Cyndie Hines

## 2021-02-25 ENCOUNTER — TELEPHONE (OUTPATIENT)
Dept: GASTROENTEROLOGY | Facility: CLINIC | Age: 76
End: 2021-02-25

## 2021-02-25 RX ORDER — VANCOMYCIN HYDROCHLORIDE 125 MG/1
CAPSULE ORAL
Qty: 30 CAPSULE | Refills: 0 | Status: SHIPPED | OUTPATIENT
Start: 2021-02-25 | End: 2021-02-26

## 2021-02-25 RX ORDER — BUDESONIDE 3 MG/1
6 CAPSULE, COATED PELLETS ORAL EVERY MORNING
Qty: 180 CAPSULE | Refills: 1 | Status: SHIPPED | OUTPATIENT
Start: 2021-02-25 | End: 2021-10-06

## 2021-02-25 NOTE — TELEPHONE ENCOUNTER
Pt calling to update Dr that Nikki Sue is doing better with med - she needs refill on her daily med that she  takes - does not know the name of it - no the cdiff med

## 2021-02-25 NOTE — TELEPHONE ENCOUNTER
Dr. Everardo Galeana I called and spoke with the [facility], who reports feeling much better. She is currently taking the Vancomycin (started 2/17) & wanted to provide you with an update to let you know that she is doing better.     She is having ~3  softer st

## 2021-02-25 NOTE — TELEPHONE ENCOUNTER
Happy to hear that the patient has improved. I have refilled the budesonide. Once she completes the 2-week course of 4 times daily vancomycin she will taper the vancomycin over a longer period of time.  I have sent another prescription to the pharmacy to ac

## 2021-02-26 RX ORDER — VANCOMYCIN HYDROCHLORIDE 125 MG/1
CAPSULE ORAL
Qty: 30 CAPSULE | Refills: 0 | Status: CANCELLED | OUTPATIENT
Start: 2021-02-26

## 2021-02-26 NOTE — TELEPHONE ENCOUNTER
I called the patient and explained to her plan of care. She states that she went to have urine sample completed, as she is afraid she may have a UTI. Concerned about possibly having to take antibx.     I explained the tapering dose of vancomycin, however, i

## 2021-03-01 RX ORDER — ATORVASTATIN CALCIUM 40 MG/1
40 TABLET, FILM COATED ORAL AT BEDTIME
Qty: 90 TABLET | Refills: 3 | Status: SHIPPED | OUTPATIENT
Start: 2021-03-01 | End: 2021-04-06 | Stop reason: SDUPTHER

## 2021-03-01 RX ORDER — APIXABAN 5 MG/1
TABLET, FILM COATED ORAL
Qty: 180 TABLET | Refills: 2 | Status: SHIPPED | OUTPATIENT
Start: 2021-03-01

## 2021-03-08 ENCOUNTER — OFFICE VISIT (OUTPATIENT)
Dept: CARDIOLOGY CLINIC | Facility: HOSPITAL | Age: 76
End: 2021-03-08
Attending: NURSE PRACTITIONER
Payer: MEDICARE

## 2021-03-08 ENCOUNTER — OFFICE VISIT (OUTPATIENT)
Dept: WOUND CARE | Facility: HOSPITAL | Age: 76
End: 2021-03-08
Attending: NURSE PRACTITIONER
Payer: MEDICARE

## 2021-03-08 ENCOUNTER — LAB ENCOUNTER (OUTPATIENT)
Dept: LAB | Facility: HOSPITAL | Age: 76
End: 2021-03-08
Attending: NURSE PRACTITIONER
Payer: MEDICARE

## 2021-03-08 VITALS — DIASTOLIC BLOOD PRESSURE: 68 MMHG | SYSTOLIC BLOOD PRESSURE: 101 MMHG | OXYGEN SATURATION: 97 % | HEART RATE: 70 BPM

## 2021-03-08 DIAGNOSIS — Z98.890 S/P MITRAL VALVE CLIP IMPLANTATION: ICD-10-CM

## 2021-03-08 DIAGNOSIS — I48.11 LONGSTANDING PERSISTENT ATRIAL FIBRILLATION (HCC): ICD-10-CM

## 2021-03-08 DIAGNOSIS — S81.802D WOUND OF LEFT LOWER EXTREMITY, SUBSEQUENT ENCOUNTER: Primary | ICD-10-CM

## 2021-03-08 DIAGNOSIS — Z95.818 S/P MITRAL VALVE CLIP IMPLANTATION: ICD-10-CM

## 2021-03-08 DIAGNOSIS — R30.0 DYSURIA: ICD-10-CM

## 2021-03-08 DIAGNOSIS — I50.32 CHRONIC DIASTOLIC HEART FAILURE (HCC): Primary | ICD-10-CM

## 2021-03-08 DIAGNOSIS — I50.9 HEART FAILURE, UNSPECIFIED (HCC): ICD-10-CM

## 2021-03-08 DIAGNOSIS — E87.6 HYPOKALEMIA: ICD-10-CM

## 2021-03-08 LAB
ANION GAP SERPL CALC-SCNC: 4 MMOL/L (ref 0–18)
BILIRUB UR QL: NEGATIVE
BUN BLD-MCNC: 27 MG/DL (ref 7–18)
BUN/CREAT SERPL: 40.3 (ref 10–20)
CALCIUM BLD-MCNC: 9.2 MG/DL (ref 8.5–10.1)
CHLORIDE SERPL-SCNC: 108 MMOL/L (ref 98–112)
CO2 SERPL-SCNC: 28 MMOL/L (ref 21–32)
COLOR UR: YELLOW
CREAT BLD-MCNC: 0.67 MG/DL
GLUCOSE BLD-MCNC: 84 MG/DL (ref 70–99)
GLUCOSE UR-MCNC: NEGATIVE MG/DL
KETONES UR-MCNC: NEGATIVE MG/DL
NITRITE UR QL STRIP.AUTO: POSITIVE
OSMOLALITY SERPL CALC.SUM OF ELEC: 294 MOSM/KG (ref 275–295)
PATIENT FASTING Y/N/NP: NO
PH UR: 6 [PH] (ref 5–8)
POTASSIUM SERPL-SCNC: 5.2 MMOL/L (ref 3.5–5.1)
PROT UR-MCNC: NEGATIVE MG/DL
RBC #/AREA URNS AUTO: >10 /HPF
SODIUM SERPL-SCNC: 140 MMOL/L (ref 136–145)
SP GR UR STRIP: 1.03 (ref 1–1.03)
UROBILINOGEN UR STRIP-ACNC: <2

## 2021-03-08 PROCEDURE — 87086 URINE CULTURE/COLONY COUNT: CPT

## 2021-03-08 PROCEDURE — 81001 URINALYSIS AUTO W/SCOPE: CPT

## 2021-03-08 PROCEDURE — 36415 COLL VENOUS BLD VENIPUNCTURE: CPT | Performed by: NURSE PRACTITIONER

## 2021-03-08 PROCEDURE — 80048 BASIC METABOLIC PNL TOTAL CA: CPT | Performed by: NURSE PRACTITIONER

## 2021-03-08 PROCEDURE — 99213 OFFICE O/P EST LOW 20 MIN: CPT | Performed by: NURSE PRACTITIONER

## 2021-03-08 PROCEDURE — 99212 OFFICE O/P EST SF 10 MIN: CPT

## 2021-03-08 PROCEDURE — 99212 OFFICE O/P EST SF 10 MIN: CPT | Performed by: NURSE PRACTITIONER

## 2021-03-08 RX ORDER — POTASSIUM CHLORIDE 20 MEQ/1
40 TABLET, EXTENDED RELEASE ORAL 2 TIMES DAILY
Qty: 360 TABLET | Refills: 0 | Status: SHIPPED | OUTPATIENT
Start: 2021-03-08 | End: 2021-06-11

## 2021-03-08 NOTE — PROGRESS NOTES
Subjective    Chief Complaint  This information was obtained from the patient  The patient was seen today for follow up and management of non-healing left lower leg wound. No additional concerns for today.     Allergies  Cymbalta (Reaction: itching, swellin developed a sacral pressure ulcer stage 2 and was referred to the outpatient wound clinic for wound care/treatment plan. 8-14-17:  Patient was admitted to the hospital for UTI, fever and sacral pressure wound/diarrhea.  Patient diagnosed with Clostridium d Diarrhea  Genitourinary (): Urinary Incontinence  Musculoskeletal: Decreased Activity (wheelchair/ transfers with sliding board), Other (Hx: paraplegia ), Muscle Wasting  Integumentary (Hair/Skin/Nails): Dryness, Calluses/Corns, Hemosiderin Staining, Pro distress. Respiratory:effortless breathing. Cardiovascular:BLE's with no edema. Musculoskeletal:no clubbing, no cyanosis. No significant deformity or joint abnormality. No edema. Peripheral pulses intact, no ROM waist down with left leg/foot wound. .  In CWS 03/08/2021 2:02:08 PM       Entered By: Long Coto DNP, KEESHA, VERONIKA on 03/08/2021 2:01:28 PM  Treatment Notes Summary  Wound #19 (Left, Dorsal Foot)  . Wound Treatment Note  Assessed patient’s pain status and effectiveness of pain management plan.   L

## 2021-03-08 NOTE — PATIENT INSTRUCTIONS
Decrease potassium chloride to 40 meq twice daily starting tomorrow, skip afternoon dose of potassium chloride today     Continue all your same medications    Call if having any dizziness, lightheadedness, heart racing, palpitations, chest pain, shortness

## 2021-03-08 NOTE — PROGRESS NOTES
[address] Patient Status:  No patient class for patient encounter    1945 MRN [id number]   Location MD Dr. Danuta Luque is a metoprolol and addition of digoxin.  Echo with normal EF, LVH and mild/mod mitral valve regurg    Problem List:  Chronic HFpEF  TIA  LLE cellulitis after laceration with swelling and hematoma  RSV pneumonitis, resolved   Recurrent UTI  Severe MR s/p mitral 08:30 AM    PTT 32.6 11/03/2020 10:29 PM    INR 1.36 (H) 11/03/2020 10:29 PM    PTP 16.5 (H) 11/03/2020 10:29 PM    T4F 1.2 07/20/2020 04:32 AM    TSH 0.271 (L) 07/20/2020 04:32 AM    LIP 97 08/10/2020 03:52 PM    DDIMER <0.27 11/24/2019 06:32 AM    ESRML severely dilated. Compared to the previous study these findings represent  improvement. Echo: 10/23/19   1. Left ventricle:  The cavity size was normal. Wall thickness was normal.     Systolic function was normal. The estimated ejection fraction was 60- weights, medication regimen s/s of atrial fib, heart failure, bronchitis exacerbation and when to call APN/clinic. Greater than 30 minutes with this patient providing counseling, coordination of care and education given. Patient and  receptive.     A breakfast, call if gaining 3 lbs or more overnight or more than 5 lbs in one week.     Follow 2000 mg sodium restricted , heart healthy diet, Keep daily fluids at 48-64 ounces per day    Follow up with Dr. Riky Erickson 4/6/21    Follow up with the specialty care needed for pain, Disp: 100 tablet, Rfl: 3  •  ondansetron 4 MG Oral Tablet Dispersible, Take 1 tablet (4 mg total) by mouth every 4 (four) hours as needed for Nausea., Disp: 15 tablet, Rfl: 0  •  Albuterol Sulfate 108 (90 Base) MCG/ACT Inhalation Aerosol P

## 2021-03-19 ENCOUNTER — LAB REQUISITION (OUTPATIENT)
Dept: LAB | Facility: HOSPITAL | Age: 76
End: 2021-03-19
Payer: MEDICARE

## 2021-03-19 DIAGNOSIS — I50.9 HEART FAILURE, UNSPECIFIED (HCC): ICD-10-CM

## 2021-03-19 LAB
ANION GAP SERPL CALC-SCNC: 7 MMOL/L (ref 0–18)
BUN BLD-MCNC: 17 MG/DL (ref 7–18)
BUN SERPL-MCNC: 17 MG/DL
BUN/CREAT SERPL: 37 (ref 10–20)
CALCIUM BLD-MCNC: 8.7 MG/DL (ref 8.5–10.1)
CALCIUM SERPL-MCNC: 8.7 MG/DL
CHLORIDE SERPL-SCNC: 104 MMOL/L
CHLORIDE SERPL-SCNC: 104 MMOL/L (ref 98–112)
CO2 SERPL-SCNC: 28 MMOL/L (ref 21–32)
CREAT BLD-MCNC: 0.46 MG/DL
GLUCOSE BLD-MCNC: 89 MG/DL (ref 70–99)
GLUCOSE SERPL-MCNC: 89 MG/DL
OSMOLALITY SERPL CALC.SUM OF ELEC: 289 MOSM/KG (ref 275–295)
POTASSIUM SERPL-SCNC: 3.9 MMOL/L
POTASSIUM SERPL-SCNC: 3.9 MMOL/L (ref 3.5–5.1)
SODIUM SERPL-SCNC: 139 MMOL/L
SODIUM SERPL-SCNC: 139 MMOL/L (ref 136–145)

## 2021-03-19 PROCEDURE — 80048 BASIC METABOLIC PNL TOTAL CA: CPT | Performed by: INTERNAL MEDICINE

## 2021-03-22 ENCOUNTER — CLINICAL ABSTRACT (OUTPATIENT)
Dept: CARDIOLOGY | Age: 76
End: 2021-03-22

## 2021-03-22 ENCOUNTER — APPOINTMENT (OUTPATIENT)
Dept: WOUND CARE | Facility: HOSPITAL | Age: 76
End: 2021-03-22
Attending: NURSE PRACTITIONER
Payer: MEDICARE

## 2021-03-25 RX ORDER — BACLOFEN 5 MG/1
5 TABLET ORAL DAILY PRN
COMMUNITY
Start: 2021-01-19 | End: 2021-04-06

## 2021-03-25 RX ORDER — PREGABALIN 50 MG/1
50 CAPSULE ORAL 3 TIMES DAILY
COMMUNITY
Start: 2021-01-24

## 2021-04-06 ENCOUNTER — OFFICE VISIT (OUTPATIENT)
Dept: CARDIOLOGY | Age: 76
End: 2021-04-06

## 2021-04-06 VITALS
BODY MASS INDEX: 24.03 KG/M2 | HEIGHT: 64 IN | HEART RATE: 77 BPM | SYSTOLIC BLOOD PRESSURE: 108 MMHG | DIASTOLIC BLOOD PRESSURE: 66 MMHG

## 2021-04-06 DIAGNOSIS — I48.20 CHRONIC ATRIAL FIBRILLATION (CMD): ICD-10-CM

## 2021-04-06 DIAGNOSIS — I25.10 CORONARY ARTERY DISEASE INVOLVING NATIVE CORONARY ARTERY OF NATIVE HEART WITHOUT ANGINA PECTORIS: Primary | ICD-10-CM

## 2021-04-06 DIAGNOSIS — I50.32 CHRONIC HEART FAILURE WITH PRESERVED EJECTION FRACTION (CMD): ICD-10-CM

## 2021-04-06 PROCEDURE — 99214 OFFICE O/P EST MOD 30 MIN: CPT | Performed by: INTERNAL MEDICINE

## 2021-04-06 RX ORDER — POTASSIUM CHLORIDE 750 MG/1
40 TABLET, EXTENDED RELEASE ORAL 2 TIMES DAILY
Qty: 360 TABLET | Refills: 3 | Status: SHIPPED | OUTPATIENT
Start: 2021-04-06 | End: 2021-04-08

## 2021-04-06 RX ORDER — VENLAFAXINE HYDROCHLORIDE 75 MG/1
75 CAPSULE, EXTENDED RELEASE ORAL DAILY
Qty: 90 CAPSULE | Refills: 3 | Status: SHIPPED | OUTPATIENT
Start: 2021-04-06

## 2021-04-06 RX ORDER — ATORVASTATIN CALCIUM 40 MG/1
40 TABLET, FILM COATED ORAL AT BEDTIME
Qty: 90 TABLET | Refills: 3 | Status: SHIPPED | OUTPATIENT
Start: 2021-04-06

## 2021-04-06 ASSESSMENT — PATIENT HEALTH QUESTIONNAIRE - PHQ9
CLINICAL INTERPRETATION OF PHQ9 SCORE: NO FURTHER SCREENING NEEDED
CLINICAL INTERPRETATION OF PHQ2 SCORE: NO FURTHER SCREENING NEEDED
SUM OF ALL RESPONSES TO PHQ9 QUESTIONS 1 AND 2: 0
SUM OF ALL RESPONSES TO PHQ9 QUESTIONS 1 AND 2: 0
1. LITTLE INTEREST OR PLEASURE IN DOING THINGS: NOT AT ALL
2. FEELING DOWN, DEPRESSED OR HOPELESS: NOT AT ALL

## 2021-04-08 RX ORDER — POTASSIUM CHLORIDE 750 MG/1
20 TABLET, EXTENDED RELEASE ORAL 2 TIMES DAILY
Qty: 360 TABLET | Refills: 3 | Status: SHIPPED | COMMUNITY
Start: 2021-04-08

## 2021-04-30 ENCOUNTER — TELEPHONE (OUTPATIENT)
Dept: GASTROENTEROLOGY | Facility: CLINIC | Age: 76
End: 2021-04-30

## 2021-04-30 DIAGNOSIS — R19.7 DIARRHEA, UNSPECIFIED TYPE: Primary | ICD-10-CM

## 2021-04-30 RX ORDER — VANCOMYCIN HYDROCHLORIDE 125 MG/1
125 CAPSULE ORAL 4 TIMES DAILY
Qty: 56 CAPSULE | Refills: 0 | Status: ON HOLD | OUTPATIENT
Start: 2021-04-30 | End: 2021-06-01

## 2021-04-30 NOTE — TELEPHONE ENCOUNTER
I spoke to Taopi. Her diarrhea has returned over the past 48 hours, similar to her presentation in February with C. difficile. She continues to take 6 mg daily of budesonide. I am recommending that she submit a C. difficile toxin assay.   In light of t

## 2021-04-30 NOTE — TELEPHONE ENCOUNTER
Dr. Foreign Bragg I called and spoke to [facility], who is currently having multiple episodes of watery, loose stools per day. She has been experiencing this for a few days.  Reports that it has resolved with the use of Imodium, which she just purchased OTC this

## 2021-04-30 NOTE — TELEPHONE ENCOUNTER
Patient indicates she does not feel good, stomach ache, gas, dirrarhea and feels nauseated. Indicates been back for 3 days, Please call at [phone],[facility].

## 2021-05-03 ENCOUNTER — LAB ENCOUNTER (OUTPATIENT)
Dept: LAB | Facility: HOSPITAL | Age: 76
End: 2021-05-03
Attending: INTERNAL MEDICINE
Payer: MEDICARE

## 2021-05-03 ENCOUNTER — TELEPHONE (OUTPATIENT)
Dept: GASTROENTEROLOGY | Facility: CLINIC | Age: 76
End: 2021-05-03

## 2021-05-03 DIAGNOSIS — R19.7 DIARRHEA, UNSPECIFIED TYPE: ICD-10-CM

## 2021-05-03 PROCEDURE — 87493 C DIFF AMPLIFIED PROBE: CPT

## 2021-05-03 NOTE — TELEPHONE ENCOUNTER
I informed the patient of the below recommendations from Dr. Chauncey Estrada. She will refrain from taking any Imodium and bring in stool sample asap.

## 2021-05-03 NOTE — TELEPHONE ENCOUNTER
I would not take further Imodium until the stool sample is submitted and tested. If her symptoms are severe she should be seen in the ER.

## 2021-05-05 ENCOUNTER — TELEPHONE (OUTPATIENT)
Dept: GASTROENTEROLOGY | Facility: CLINIC | Age: 76
End: 2021-05-05

## 2021-05-05 ENCOUNTER — OFFICE VISIT (OUTPATIENT)
Dept: CARDIOLOGY CLINIC | Facility: HOSPITAL | Age: 76
End: 2021-05-05
Attending: NURSE PRACTITIONER
Payer: MEDICARE

## 2021-05-05 VITALS — DIASTOLIC BLOOD PRESSURE: 60 MMHG | HEART RATE: 83 BPM | SYSTOLIC BLOOD PRESSURE: 110 MMHG | OXYGEN SATURATION: 99 %

## 2021-05-05 DIAGNOSIS — E87.6 HYPOKALEMIA: Chronic | ICD-10-CM

## 2021-05-05 DIAGNOSIS — Z98.890 S/P MITRAL VALVE CLIP IMPLANTATION: ICD-10-CM

## 2021-05-05 DIAGNOSIS — Z95.818 S/P MITRAL VALVE CLIP IMPLANTATION: ICD-10-CM

## 2021-05-05 DIAGNOSIS — I50.33 ACUTE ON CHRONIC DIASTOLIC HF (HEART FAILURE) (HCC): Primary | Chronic | ICD-10-CM

## 2021-05-05 DIAGNOSIS — I50.9 HEART FAILURE, UNSPECIFIED (HCC): ICD-10-CM

## 2021-05-05 DIAGNOSIS — I48.11 LONGSTANDING PERSISTENT ATRIAL FIBRILLATION (HCC): ICD-10-CM

## 2021-05-05 PROCEDURE — 80048 BASIC METABOLIC PNL TOTAL CA: CPT | Performed by: NURSE PRACTITIONER

## 2021-05-05 PROCEDURE — 83880 ASSAY OF NATRIURETIC PEPTIDE: CPT | Performed by: NURSE PRACTITIONER

## 2021-05-05 PROCEDURE — 36415 COLL VENOUS BLD VENIPUNCTURE: CPT | Performed by: NURSE PRACTITIONER

## 2021-05-05 PROCEDURE — 99214 OFFICE O/P EST MOD 30 MIN: CPT | Performed by: NURSE PRACTITIONER

## 2021-05-05 PROCEDURE — 99212 OFFICE O/P EST SF 10 MIN: CPT | Performed by: NURSE PRACTITIONER

## 2021-05-05 NOTE — PROGRESS NOTES
[address] [facility] Patient Status:  No patient class for patient encounter    1945 MRN [id number]   Location MD Dr. Peg Childs is a metoprolol and addition of digoxin.  Echo with normal EF, LVH and mild/mod mitral valve regurg    Problem List:  Chronic HFpEF  TIA  LLE cellulitis after laceration with swelling and hematoma  RSV pneumonitis, resolved   Recurrent UTI  Severe MR s/p mitral 7.0 11/03/2020 10:29 PM    AST 20 12/28/2020 08:30 AM    ALT 17 12/28/2020 08:30 AM    PTT 32.6 11/03/2020 10:29 PM    INR 1.36 (H) 11/03/2020 10:29 PM    PTP 16.5 (H) 11/03/2020 10:29 PM    T4F 1.2 07/20/2020 04:32 AM    TSH 0.271 (L) 07/20/2020 04:32 AM represent  improvement. Echo: 10/23/19   1. Left ventricle: The cavity size was normal. Wall thickness was normal.     Systolic function was normal. The estimated ejection fraction was 60-65%.      There was no diagnostic evidence for regional wall motio bronchitis exacerbation and when to call APN/clinic. Greater than 30 minutes with this patient providing counseling, coordination of care and education given. Patient and  receptive.     Assessment:  Chronic HFpEF   -mild hypervolemia with increased weakness    Weigh yourself daily in the morning before breakfast, call if gaining 3 lbs or more overnight or more than 5 lbs in one week.     Follow 2000 mg sodium restricted , heart healthy diet, Keep daily fluids at 48-64 ounces per day    Follow up with qam / 1 tablet QHS, Disp: 60 tablet, Rfl: 5  •  traMADol HCl 50 MG Oral Tab, Increase to one  Po  Every 6 Hour if needed for pain, Disp: 100 tablet, Rfl: 3  •  ondansetron 4 MG Oral Tablet Dispersible, Take 1 tablet (4 mg total) by mouth every 4 (four) samson

## 2021-05-05 NOTE — PATIENT INSTRUCTIONS
Take an additional furosemide 40 mg dose today, then continue furosemide 40 mg one tab daily     Continue all your same medications    Call if having any dizziness, lightheadedness, heart racing, palpitations, chest pain, shortness of breath, coughing,  wh

## 2021-05-05 NOTE — TELEPHONE ENCOUNTER
I called and spoke with Laurie Aragon. I informed her that her C. Diff results came back negative and she can stop taking the Vancomycin. She verbalized understanding. Stools remain loose, but she is feeling better today.     I scheduled her to be seen in the off

## 2021-05-05 NOTE — TELEPHONE ENCOUNTER
----- Message from Edwardo Willis MD sent at 5/4/2021  4:36 PM CDT -----  Please inform Lawnazaninnelly Dee that the C. difficile test was negative. She may stop the vancomycin.   She should be seen in the office for a physical examination and to review her symp

## 2021-05-24 ENCOUNTER — TELEPHONE (OUTPATIENT)
Dept: GASTROENTEROLOGY | Facility: CLINIC | Age: 76
End: 2021-05-24

## 2021-05-24 RX ORDER — FUROSEMIDE 40 MG/1
40 TABLET ORAL DAILY
Qty: 30 TABLET | Refills: 2 | Status: ON HOLD | OUTPATIENT
Start: 2021-05-24 | End: 2021-08-14

## 2021-05-24 NOTE — TELEPHONE ENCOUNTER
Dr. Jalil Turk-    Please advise. Candie Cordero was negative on 5/5/21. Patient's appointment was cancelled & required reschedule. She rescheduled for 6/15/21 with Jenni Hernandez. Per 5/5/21 TE, you wanted to follow up and see patient within 10 days.

## 2021-05-24 NOTE — TELEPHONE ENCOUNTER
5/5/2021 last seen Camilo Allen    Your Appointments    Wednesday June 02, 2021 10:00 AM  [address] [facility] with BENITO Castaneda. [address] ([address] [facility] [address] [address]) [address]  [address] [facility]  [address] [place] [address]

## 2021-05-25 NOTE — TELEPHONE ENCOUNTER
Will check with MA to see if adequate staffing tomorrow & follow up.    3:30 PM consult appointment, so patient would need to be seen at 4 PM.

## 2021-05-27 NOTE — TELEPHONE ENCOUNTER
I cancelled patients previously made appointment with Yolette Garcia for same concern due to being seen earlier by Dr. Vikash East.

## 2021-05-29 ENCOUNTER — TELEPHONE (OUTPATIENT)
Dept: GASTROENTEROLOGY | Facility: CLINIC | Age: 76
End: 2021-05-29

## 2021-05-29 ENCOUNTER — HOSPITAL ENCOUNTER (OUTPATIENT)
Facility: HOSPITAL | Age: 76
Setting detail: OBSERVATION
Discharge: HOME | End: 2021-06-01
Attending: EMERGENCY MEDICINE | Admitting: INTERNAL MEDICINE
Payer: MEDICARE

## 2021-05-29 ENCOUNTER — APPOINTMENT (OUTPATIENT)
Dept: GENERAL RADIOLOGY | Facility: HOSPITAL | Age: 76
End: 2021-05-29
Attending: EMERGENCY MEDICINE
Payer: MEDICARE

## 2021-05-29 ENCOUNTER — APPOINTMENT (OUTPATIENT)
Dept: CT IMAGING | Facility: HOSPITAL | Age: 76
End: 2021-05-29
Attending: EMERGENCY MEDICINE
Payer: MEDICARE

## 2021-05-29 DIAGNOSIS — R11.2 NAUSEA AND VOMITING, INTRACTABILITY OF VOMITING NOT SPECIFIED, UNSPECIFIED VOMITING TYPE: Primary | ICD-10-CM

## 2021-05-29 DIAGNOSIS — R10.84 ABDOMINAL PAIN, GENERALIZED: ICD-10-CM

## 2021-05-29 PROCEDURE — 71045 X-RAY EXAM CHEST 1 VIEW: CPT | Performed by: EMERGENCY MEDICINE

## 2021-05-29 PROCEDURE — 99214 OFFICE O/P EST MOD 30 MIN: CPT | Performed by: INTERNAL MEDICINE

## 2021-05-29 PROCEDURE — 74176 CT ABD & PELVIS W/O CONTRAST: CPT | Performed by: EMERGENCY MEDICINE

## 2021-05-29 RX ORDER — PREGABALIN 50 MG/1
50 CAPSULE ORAL 3 TIMES DAILY
Status: DISCONTINUED | OUTPATIENT
Start: 2021-05-29 | End: 2021-06-01

## 2021-05-29 RX ORDER — PREDNISONE 2.5 MG
5 TABLET ORAL
Status: DISCONTINUED | OUTPATIENT
Start: 2021-05-30 | End: 2021-06-01

## 2021-05-29 RX ORDER — ONDANSETRON 2 MG/ML
4 INJECTION INTRAMUSCULAR; INTRAVENOUS ONCE
Status: COMPLETED | OUTPATIENT
Start: 2021-05-29 | End: 2021-05-29

## 2021-05-29 RX ORDER — MORPHINE SULFATE 4 MG/ML
2 INJECTION, SOLUTION INTRAMUSCULAR; INTRAVENOUS ONCE
Status: COMPLETED | OUTPATIENT
Start: 2021-05-29 | End: 2021-05-29

## 2021-05-29 RX ORDER — ACETAMINOPHEN 325 MG/1
325 TABLET ORAL EVERY 6 HOURS PRN
Status: DISCONTINUED | OUTPATIENT
Start: 2021-05-29 | End: 2021-06-01

## 2021-05-29 RX ORDER — TRAMADOL HYDROCHLORIDE 50 MG/1
50 TABLET ORAL EVERY 6 HOURS PRN
Status: DISCONTINUED | OUTPATIENT
Start: 2021-05-29 | End: 2021-06-01

## 2021-05-29 RX ORDER — EZETIMIBE 10 MG/1
10 TABLET ORAL NIGHTLY
Status: DISCONTINUED | OUTPATIENT
Start: 2021-05-29 | End: 2021-06-01

## 2021-05-29 RX ORDER — ATORVASTATIN CALCIUM 40 MG/1
40 TABLET, FILM COATED ORAL NIGHTLY
Status: DISCONTINUED | OUTPATIENT
Start: 2021-05-29 | End: 2021-06-01

## 2021-05-29 RX ORDER — METOPROLOL SUCCINATE 50 MG/1
50 TABLET, EXTENDED RELEASE ORAL
Status: DISCONTINUED | OUTPATIENT
Start: 2021-05-30 | End: 2021-06-01

## 2021-05-29 RX ORDER — BUDESONIDE 3 MG/1
6 CAPSULE, COATED PELLETS ORAL EVERY MORNING
Status: DISCONTINUED | OUTPATIENT
Start: 2021-05-30 | End: 2021-06-01

## 2021-05-29 RX ORDER — ONDANSETRON 2 MG/ML
4 INJECTION INTRAMUSCULAR; INTRAVENOUS EVERY 6 HOURS PRN
Status: DISCONTINUED | OUTPATIENT
Start: 2021-05-29 | End: 2021-06-01

## 2021-05-29 RX ORDER — SODIUM CHLORIDE 9 MG/ML
INJECTION, SOLUTION INTRAVENOUS CONTINUOUS
Status: DISCONTINUED | OUTPATIENT
Start: 2021-05-29 | End: 2021-05-30

## 2021-05-29 RX ORDER — VENLAFAXINE HYDROCHLORIDE 75 MG/1
75 CAPSULE, EXTENDED RELEASE ORAL DAILY
Status: DISCONTINUED | OUTPATIENT
Start: 2021-05-30 | End: 2021-06-01

## 2021-05-29 RX ORDER — SODIUM CHLORIDE 9 MG/ML
INJECTION, SOLUTION INTRAVENOUS CONTINUOUS
Status: DISCONTINUED | OUTPATIENT
Start: 2021-05-29 | End: 2021-05-29

## 2021-05-29 RX ORDER — VANCOMYCIN HYDROCHLORIDE 125 MG/1
125 CAPSULE ORAL EVERY 6 HOURS
Status: DISCONTINUED | OUTPATIENT
Start: 2021-05-29 | End: 2021-06-01

## 2021-05-29 NOTE — TELEPHONE ENCOUNTER
On call GI;  Diarrhea and nausea + now has abdominal pain, the pain radiates across the entire abdomen. She has not been able to eat since yesterday. No blood in the ostomy bag. Advised work-up in the emergency room.   ER contacted and clinical informa

## 2021-05-29 NOTE — CONSULTS
[facility] AND CLINICS  Report of Consultation    Ursula Jacinto Patient Status:  Observation    1945 MRN [id number]   [facility] 5SW/SE Attending Jenni James, [address] [facility] Day # 0 PCP Kari Donald MD     [facility] for [facility] ([facility]) throughout both lungs. 7. Mild cardiomegaly with coronary and peripheral atherosclerosis.  Circumaortic left renal vein.  Inferior vena cava filter.  Mitral clips.    8. Postsurgical changes of L5-S1 posterior lumbar fusion with multilevel lumbar decompre 1/11/2016   • Ulcerative colitis ([facility] [address]    • Valvular disease     mitral valve prolapse- 3 clips placed     Past Surgical History:   Procedure Laterality Date   • ARTHROSCOPY OF JOINT UNLISTED Right     knee   • BACK SURGERY      spinal fusion L1-5   • COLO Procedure: ESOPHAGOGASTRODUODENOSCOPY W/ DILATION;  Surgeon: Levy Vasquez MD;  Location: [address] [place] [address]   • PATIENT [address] [place] [address] FOR IV ANTIBIOTIC SURGICAL SITE INFECTION PROPHYLAXIS.  N/A 10/27/2014    Procedure: CERVICAL EPI Cant swallow or breath             And hives             Tolerated dye for CT with premed at [facility] AT [facility] 8/14/12  Penicillins             RASH, DIARRHEA    Comment:As a child.   Localized rash at injection site             CLASS             Patient tolerated cefepi lymphadenopathy.     Impression and Plan:  Patient Active Problem List:     Hereditary spastic paraplegia ([facility] [address]     Type 2 diabetes mellitus with diabetic neuropathy, without long-term current use of insulin (HCC)     Neurogenic bladder     Non-rheumatic mi

## 2021-05-29 NOTE — ED INITIAL ASSESSMENT (HPI)
Ems called for sob and diarrhea, spoke to GI doc who recommended pt to come to er. Per ems, pt with a fib on monitor, pt RR improved with o2. Pt upon arrival of diarrhea and llq abd pain.   When asked about sob, pt states she is a little but doesn't notic

## 2021-05-29 NOTE — PLAN OF CARE
Problem: Patient Centered Care  Goal: Patient preferences are identified and integrated in the patient's plan of care  Description: Interventions:  - What would you like us to know as we care for you?   - Provide timely, complete, and accurate informatio neutropenic period  Description: INTERVENTIONS  - Monitor WBC  - Administer growth factors as ordered  - Implement neutropenic guidelines  Outcome: Progressing     Problem: SAFETY ADULT - FALL  Goal: Free from fall injury  Description: INTERVENTIONS:  - As

## 2021-05-29 NOTE — ED PROVIDER NOTES
Patient Seen in: [facility] AND [facility] Emergency Department    History   Patient presents with:  Difficulty Breathing  Abdomen/Flank Pain    Stated Complaint:     HPI    Patient complains of  abdominal pain that began couple days ago. No appetite.   Nausea vo placed       Past Surgical History:   Procedure Laterality Date   • ARTHROSCOPY OF JOINT UNLISTED Right     knee   • BACK SURGERY      spinal fusion L1-5   • COLONOSCOPY  7/11/08 7/11/08 at [facility] and negative for polyps, UC was quiet   • COLONOSCOPY,DIAGN [facility]ICAL [place], [facility]   • PATIENT [place] PREOPERATIVE ORDER FOR IV ANTIBIOTIC SURGICAL SITE INFECTION PROPHYLAXIS.  N/A 10/27/2014    Procedure: CERVICAL EPIDURAL;  Surgeon: Bi Cox MD;  Location: [facility] FOR PAIN MANAGEMENT   • PATIENT WITH mouth once daily. pregabalin 50 MG Oral Cap,  Take 1 capsule (50 mg total) by mouth 3 (three) times daily.    nitroGLYCERIN 0.4 MG Sublingual SL Tab,  Place 0.4 mg under the tongue every 5 (five) minutes as needed for Chest pain (or sob , can take 3 doses Vaping Use      Vaping Use: Never used    Alcohol use: No      Alcohol/week: 0.0 standard drinks    Drug use: No      Review of Systems    Positive for stated complaint:   Other systems are as noted in HPI. Constitutional and vital signs reviewed.       Al Neutrophil Absolute Prelim 8.05 (*)     Neutrophil Absolute 8.05 (*)     Monocyte Absolute 1.13 (*)     All other components within normal limits   LIPASE - Normal   HEPATIC FUNCTION PANEL (7) - Normal   RAPID SARS-COV-2 BY PCR - Normal   CBC WITH DIFFE posterior paraspinous and gluteal muscular atrophy. 9. Subcentimeter cystic lesion in the pancreatic tail, which has decreased in size since prior abdominal CT from November, 2019. 10. Lesser incidental findings as above.    Dictated by (CST): My Due

## 2021-05-29 NOTE — H&P
[facility] Hospitalist H&P       CC: abdominal pain, dry heaves    PCP: Rommel Long MD    History of Present Illness:   64year old female with multiple medical problems including ulcerative colitis, mitral valve prolapse, depression, Gout, GERD, hx [facility]   • INJECTION, W/WO CONTRAST, DX/THERAPEUTIC SUBSTANCE, EPIDURAL/SUBARACHNOID; CERVICAL/THORACIC N/A 10/27/2014    Procedure: CERVICAL EPIDURAL;  Surgeon: Cathy De Jesus MD;  Location: [address] [place] [address]   • [facility] • TOTAL KNEE REPLACEMENT Left    • UPPER GI ENDOSCOPY,BIOPSY N/A 1/5/2015    Hiatal hernia.   Normal gastric/SB Bx, Procedure: ESOPHAGOGASTRODUODENOSCOPY W/ DILATION;  Surgeon: Shellie Irizarry MD;  Location: [address] [place] [address] liquid stool  Extremities: chronic venous stasis changes and abrasions   Psych: awake, no agitation  Neuro: 5/5 strength in bilateral upper and lower extremities     Diagnostic Data:    CBC/Chem  Recent Labs   Lab 05/29/21  1023   WBC 12.7*   HGB 16.5*   M 5/29/2021  CONCLUSION:  1. No focal airspace disease or significant pleural effusion. 2. Chronic interstitial prominence, which most likely relates to chronic mild interstitial edema. 3. Stable cardiomegaly with mitral clips.   4. Stable left midlung atelec CHF  -hold lasix for today     Primary osteoarthritis involving multiple joints  (primary encounter diagnosis)  Chronic steroid use  -per chart review extensive osteoarthritis on prior x-rays: spine, knees, ankle, shoulders  -tramadol and acetaminophen PRN

## 2021-05-29 NOTE — ED NOTES
Orders for admission, patient is aware of plan and ready to go upstairs. Any questions, please call ED RN 4800 CINDI Burkett  at extension [phone].     Type of COVID test sent:rapid  COVID Suspicion level: Low    Titratable drug(s) infusin  Rate:0    LOC at time of tr

## 2021-05-30 PROCEDURE — 99213 OFFICE O/P EST LOW 20 MIN: CPT | Performed by: INTERNAL MEDICINE

## 2021-05-30 RX ORDER — MAGNESIUM OXIDE 400 MG (241.3 MG MAGNESIUM) TABLET
800 TABLET ONCE
Status: COMPLETED | OUTPATIENT
Start: 2021-05-30 | End: 2021-05-30

## 2021-05-30 RX ORDER — POTASSIUM CHLORIDE 20 MEQ/1
40 TABLET, EXTENDED RELEASE ORAL EVERY 4 HOURS
Status: COMPLETED | OUTPATIENT
Start: 2021-05-30 | End: 2021-05-30

## 2021-05-30 NOTE — PROGRESS NOTES
[facility] Hospitalist Progress Note     CC: Hospital Follow up    PCP: Amy Abeeb MD       Assessment/Plan:   64year old female with multiple medical problems including ulcerative colitis, mitral valve prolapse, depression, Gout, GERD, hx of C diff status: DNR/DNI     Arabella Hidalgo DO  [facility] Hospitalist   [facility] Answering service [phone]        Subjective:     Feeling better today. Tolerating clear liquid diet. No fevers. No worsening abdominal pain.      OBJECTIVE:    Blood pressure 95/45, pulse 93, te 32   ALB 3.4         Imaging:  CT ABDOMEN+PELVIS(CPT=74176)    Result Date: 5/29/2021  CONCLUSION:  1. No evidence of bowel obstruction, perforated viscus, or drainable intra-abdominal fluid collection.   2. There is a 3.5 x 2.5 cm high density foreign body mEq Oral Q4H   • apixaban  5 mg Oral BID   • atorvastatin  40 mg Oral Nightly   • Budesonide  6 mg Oral QAM   • ezetimibe  10 mg Oral Nightly   • predniSONE  5 mg Oral Daily   • pregabalin  50 mg Oral TID   • Venlafaxine HCl ER  75 mg Oral Daily   • metopr

## 2021-05-30 NOTE — PROGRESS NOTES
[facility] [address]     Gastroenterology Progress Note    Nathan Dunlap Patient Status:  Observation    1945 MRN [id number]   Location [facility] 5SW/SE Attending Kenneth Ibarra DO   Hosp Day # 0 PCP Pauline Blair RDW 15.7* 15.9*   NEPRELIM 8.05* 5.29   WBC 12.7* 7.9   .0 150.0         Recent Labs   Lab 05/29/21  1023 05/30/21  0604   * 74   BUN 27* 22*   CREATSERUM 0.74 0.47*   GFRAA 90 109   GFRNAA 78 95   CA 9.6 8.1*   ALB 3.4  --     139 (CPT=71045)    Result Date: 5/29/2021  CONCLUSION:  1. No focal airspace disease or significant pleural effusion. 2. Chronic interstitial prominence, which most likely relates to chronic mild interstitial edema. 3. Stable cardiomegaly with mitral clips.   4

## 2021-05-30 NOTE — PLAN OF CARE
Problem: Patient Centered Care  Goal: Patient preferences are identified and integrated in the patient's plan of care  Description: Interventions:  - What would you like us to know as we care for you?  I live at home with my   - Provide timely, com Monitor for opioid side effects  - Notify MD/LIP if interventions unsuccessful or patient reports new pain  - Anticipate increased pain with activity and pre-medicate as appropriate  Outcome: Progressing     Problem: RISK FOR INFECTION - ADULT  Goal: Absen support system  Outcome: Progressing     Problem: GASTROINTESTINAL - ADULT  Goal: Minimal or absence of nausea and vomiting  Description: INTERVENTIONS:  - Maintain adequate hydration with IV or PO as ordered and tolerated  - Nasogastric tube to low interm

## 2021-05-31 PROCEDURE — 99213 OFFICE O/P EST LOW 20 MIN: CPT | Performed by: INTERNAL MEDICINE

## 2021-05-31 NOTE — PROGRESS NOTES
[facility] [address]     Gastroenterology Progress Note    [place] Perfect Patient Status:  Observation    1945 MRN [id number]   Location [facility] 5SW/SE Attending Jessica Alicia, DO   Hosp Day # 0 PCP Dany Berry 27.0   MCHC 31.7 31.7   RDW 15.7* 15.9*   NEPRELIM 8.05* 5.29   WBC 12.7* 7.9   .0 150.0         Recent Labs   Lab 05/29/21  1023 05/30/21  0604 05/30/21  1451   * 74  --    BUN 27* 22*  --    CREATSERUM 0.74 0.47*  --    GFRAA 90 109  -- AM     Finalized by (CST): King Worthy MD on 5/29/2021 at 12:11 PM          XR CHEST AP PORTABLE  (CPT=71045)    Result Date: 5/29/2021  CONCLUSION:  1. No focal airspace disease or significant pleural effusion.  2. Chronic interstitial prominence, whic

## 2021-05-31 NOTE — PROGRESS NOTES
[facility] Hospitalist Progress Note     CC: Hospital Follow up    PCP: Mercedes Rivera MD       Assessment/Plan:   64year old female with multiple medical problems including ulcerative colitis, mitral valve prolapse, depression, Gout, GERD, hx of C diff prophylaxis: eliquis BID  Code status: DNR/DNI  Dispo: seen by therapy today. She will at least need home care services. Advance diet today make sure she is tolerating. Have social work set up home care agency.  Suspect medically ready to go home Tuesday 05/30/21  0604 05/30/21  1451   * 74  --    BUN 27* 22*  --    CREATSERUM 0.74 0.47*  --    GFRAA 90 109  --    GFRNAA 78 95  --    CA 9.6 8.1*  --     139  --    K 3.5 3.0* 4.2    108  --    CO2 24.0 23.0  --        Recent Labs   Lab 05 interstitial edema. 3. Stable cardiomegaly with mitral clips. 4. Stable left midlung atelectasis/scarring.    Dictated by (CST): Graceann Duane, MD on 5/29/2021 at 11:09 AM     Finalized by (CST): Graceann Duane, MD on 5/29/2021 at 11:12 AM              Me

## 2021-05-31 NOTE — OCCUPATIONAL THERAPY NOTE
OCCUPATIONAL THERAPY EVALUATION - INPATIENT     Room Number: 554/554-A  Evaluation Date: 5/31/2021  Type of Evaluation: Initial       Physician Order: IP Consult to Occupational Therapy  Reason for Therapy: ADL/IADL Dysfunction and Discharge Planning    OC that patients with this level of impairment may benefit from ASIA. Pending progress, Pt may benefit from ASIA to maximize balance, strength for safe t/fs in home setting.   If Pt progresses, she would benefit from [facility] to address balance, strength, safety in DISEASES     cataracts   • OTHER DISEASES     secondary poycythemia   • OTHER DISEASES     lumbar spinal stenosis   • OTHER DISEASES     duplicated left renal collecting system   • OTHER DISEASES     uterine fibroids   • OTHER DISEASES     ulcerative colit N/A 10/27/2014    Procedure: CERVICAL EPIDURAL;  Surgeon: Jethro Malik MD;  Location: [address] [place] [address]   • PATIENT DOCUMENTED NOT TO HAVE EXPERIENCED ANY OF THE FOLLOWING EVENTS N/A 12/8/2014    Procedure: CERVICAL EPIDURAL;  Surgeon: Rebecca Cox Summers: Pt lives w/  in one level home w/ ramped entrance. Pt has bed on floor to lower it for t/f ht to w/c w/ sliding board; Pt may benefit from bed rail to assist moving sit<> supine.   Pt t/fs bed<> w/c using slinding board w/ assist from ASSESSMENT  Supine to Sit : Maximum assistance (x2)  Sit to Stand: Not tested  Toilet Transfer: NT-Pt does self cath, has colostomy  Shower Transfer: NT-Pt's  sponge bathes  Chair Transfer: NT    Bedroom Mobility: NT    BALANCE ASSESSMENT  Static Si

## 2021-05-31 NOTE — PLAN OF CARE
No acute changes during shift. Plan of care updated with patient at bedside. Patient worked with PT/OT. GI on consult. Problem: Patient Centered Care  Goal: Patient preferences are identified and integrated in the patient's plan of care  Description:  In influences on pain and pain management  - Manage/alleviate anxiety  - Utilize distraction and/or relaxation techniques  - Monitor for opioid side effects  - Notify MD/LIP if interventions unsuccessful or patient reports new pain  - Anticipate increased patricia post-hospital services based on physician/LIP order or complex needs related to functional status, cognitive ability or social support system  Outcome: Progressing     Problem: GASTROINTESTINAL - ADULT  Goal: Minimal or absence of nausea and vomiting  Desc

## 2021-05-31 NOTE — DIETARY NOTE
ADULT NUTRITION INITIAL ASSESSMENT    Pt is at moderate nutrition risk. Pt meets moderate malnutrition criteria.       CRITERIA FOR MALNUTRITION DIAGNOSIS:  Criteria for non-severe malnutrition diagnosis: chronic illness related to body fat mild depletion Discharge and transfer of nutrition care to new setting or provider: monitor plans    ADMITTING DIAGNOSIS:   Abdominal pain, generalized [R10.84]  Nausea and vomiting, intractability of vomiting not specified, unspecified vomiting type [R11.2]    PERTINENT 112% IBW  Usual Body Wt: 125-130 lbs per patient         103% UBW    WEIGHT HISTORY:  Patient Weight(s) for the past 336 hrs:   Weight   05/29/21 1014 61 kg (134 lb 7.7 oz)     Wt Readings from Last 10 Encounters:  05/29/21 : 61 kg (134 lb 7.7 oz)  11/07 Skin Integrity: reddened, at risk, breakdown and RN documentation reviewed. (R) foot wound. History of Sacrum wound in past.   Paralysis.     - Roscoe score 14    NUTRITION PRESCRIPTION:  Diet: Full Liquid  Oral Supplements: Provide BID     ESTIMATED NUTR

## 2021-05-31 NOTE — PHYSICAL THERAPY NOTE
PHYSICAL THERAPY EVALUATION - INPATIENT     Room Number: 554/554-A  Evaluation Date: 5/31/2021  Type of Evaluation: Initial   Physician Order: PT Eval and Treat    Presenting Problem: nausea; vomitting; c-diff  Reason for Therapy: Mobility Dysfunction and progress transfer training, safety and trunk strengthening. Patient will benefit from continued IP PT services to address these deficits in preparation for discharge.     DISCHARGE RECOMMENDATIONS  PT Discharge Recommendations: 24 hour care/supervision; renal collecting system   • OTHER DISEASES     uterine fibroids   • OTHER DISEASES     ulcerative colitis   • OTHER DISEASES     uti   • Pneumonia, organism unspecified(486)    • Reflux    • Self-catheterizes urinary bladder 1/11/2016   • Ulcerative coliti HAVE EXPERIENCED ANY OF THE FOLLOWING EVENTS N/A 12/8/2014    Procedure: CERVICAL EPIDURAL;  Surgeon: Javier Connelly MD;  Location: [facility] PAIN MANAG[facility]   • PATIENT DOCUMENTED NOT TO HAVE EXPERIENCED ANY OF THE FOLLOWING EVENTS N/A 1/5/2015    P None                PAIN ASSESSMENT  Rating:  (pt does not off rating)  Location: B LEs  Management Techniques: Repositioning    COGNITION  · Overall Cognitive Status:  WFL - within functional limits    RANGE OF MOTION AND STRENGTH ASSESSMENT  Upper extrem home with .     Goal #1 Patient is able to demonstrate supine - sit EOB @ level: moderate assistance     Goal #1   Current Status    Goal #2 Patient is able to demonstrate transfers EOB to/from [facility] at assistance level: moderate assistance with slider

## 2021-06-01 ENCOUNTER — TELEPHONE (OUTPATIENT)
Dept: GASTROENTEROLOGY | Facility: CLINIC | Age: 76
End: 2021-06-01

## 2021-06-01 VITALS
SYSTOLIC BLOOD PRESSURE: 94 MMHG | BODY MASS INDEX: 26 KG/M2 | TEMPERATURE: 98 F | RESPIRATION RATE: 18 BRPM | OXYGEN SATURATION: 98 % | WEIGHT: 134.5 LBS | DIASTOLIC BLOOD PRESSURE: 69 MMHG | HEART RATE: 90 BPM

## 2021-06-01 PROCEDURE — 99226 SUBSEQUENT OBSERVATION CARE: CPT | Performed by: PHYSICIAN ASSISTANT

## 2021-06-01 RX ORDER — VANCOMYCIN HYDROCHLORIDE 125 MG/1
125 CAPSULE ORAL EVERY 6 HOURS
Qty: 40 CAPSULE | Refills: 0 | Status: ON HOLD | OUTPATIENT
Start: 2021-06-01 | End: 2021-06-14

## 2021-06-01 NOTE — PROGRESS NOTES
Discharge RN Summary: Patient has discharge order in. Patient to discharge home. IV removed by this RN. Understands to follow up with PCP in 1 week. Patient understands to  med from pharmacy.       Scripts sent with pt: none  Electronically sent pre

## 2021-06-01 NOTE — HOME CARE LIAISON
Patient provided with list of [facility] providers from [facility], patient choice is [facility] [address] as patient has history with [facility]. Agency reserved in [facility] and contact information placed on AVS.   Financial interest disclosure provided to patient. CM Pegg

## 2021-06-01 NOTE — PROGRESS NOTES
[facility]     Gastroenterology Progress Note    Rosalio Gannon Patient Status:  Observation    1945 MRN [id number]   Location [facility] 5SW/SE Attending Mirela Riley MD   Hosp Day # 0 PCP Kait Hassan related to C. Diff infection exacerbating underlying chronic GI issues. Overall improved - patient now tolerating solid food diet.     #possible foreign body in rectosigmoid colon: CT raises question of foreign body in rectosigmoid stump - recommend flexibl

## 2021-06-01 NOTE — TELEPHONE ENCOUNTER
INPATIENT ORDERS:  - patient to be discharged today, has appt 6/4 with Dr. Dave Rizzo already    - Dr. Dave Rizzo - would you like this patient to keep her appt or r/s to a later date?  She requested you for flex sig for evaluation of rectosigmoid findi

## 2021-06-01 NOTE — PLAN OF CARE
Patient cleared for discharge my Dr. Rehan Wise. Home Health PT/OT set up. Peripheral IV removed. Mayorga catheter discontinued. Patient has printed AVS with belongings. Patient verbalizes discharge education.      Problem: Patient Centered Care  Goal: Patient pr comfort level or patient's stated pain goal  Description: INTERVENTIONS:  - Encourage pt to monitor pain and request assistance  - Assess pain using appropriate pain scale  - Administer analgesics based on type and severity of pain and evaluate response  - resources  Description: INTERVENTIONS:  - Identify barriers to discharge w/pt and caregiver  - Include patient/family/discharge partner in discharge planning  - Arrange for needed discharge resources and transportation as appropriate  - Identify discharge RN  Outcome: Completed  6/1/2021 0958 by Elie Sánchez RN  Outcome: Progressing     Problem: SKIN/TISSUE INTEGRITY - ADULT  Goal: Skin integrity remains intact  Description: INTERVENTIONS  - Assess and document risk factors for pressure ulcer development

## 2021-06-01 NOTE — PLAN OF CARE
No acute changes during shift. Patient tolerating diet. Plan of care updated with patient. Safety measures are in place.        Problem: Patient Centered Care  Goal: Patient preferences are identified and integrated in the patient's plan of care  Descriptio social influences on pain and pain management  - Manage/alleviate anxiety  - Utilize distraction and/or relaxation techniques  - Monitor for opioid side effects  - Notify MD/LIP if interventions unsuccessful or patient reports new pain  - Anticipate increa post-hospital services based on physician/LIP order or complex needs related to functional status, cognitive ability or social support system  Outcome: Progressing     Problem: GASTROINTESTINAL - ADULT  Goal: Minimal or absence of nausea and vomiting  Desc

## 2021-06-01 NOTE — PROGRESS NOTES
[facility] Hospitalist Progress Note     CC: Hospital Follow up    PCP: Kait Hassan MD       Assessment/Plan:   64year old female with multiple medical problems including ulcerative colitis, mitral valve prolapse, depression, Gout, GERD, hx of C diff prophylaxis: eliquis BID  Code status: DNR/DNI  Dispo: seen by therapy. She will at least need home care services. Advance diet tolerating.    for home care agency.      Moshe Gerardo MD   [facility] Hospitalist   [facility] Answering service [phone]        Elvia Aden 0.47*  --    GFRAA 90 109  --    GFRNAA 78 95  --    CA 9.6 8.1*  --     139  --    K 3.5 3.0* 4.2    108  --    CO2 24.0 23.0  --        Recent Labs   Lab 05/29/21  1023   ALT 29   AST 32   ALB 3.4         Imaging:  CT ABDOMEN+PELVIS(CPT=74440 midlung atelectasis/scarring.    Dictated by (CST): Ko Olmedo MD on 5/29/2021 at 11:09 AM     Finalized by (CST): Ko Olmedo MD on 5/29/2021 at 11:12 AM              Meds:     • apixaban  5 mg Oral BID   • atorvastatin  40 mg Oral Nightly   • Bud

## 2021-06-01 NOTE — DISCHARGE SUMMARY
General Medicine Discharge Summary     Patient ID:  Dmitriy Martin  64year old  5/4/1945    Admit date: 5/29/2021    Discharge date and time: No discharge date for patient encounter.  6/1/21    Attending Physician: Brook Ibarra MD     Consults: ESTEFANI depression, Gout, GERD, hx of C diff, hx of leg ulcers, hx of DVT, hereditary spastic paraplegia, diastolic CHF, neurogenic bladder,  recurrent cdif colitis and collagenous colitis s/p laparoscopic loop sigmoid colostomy in 9/2017 who presents to the hospi as below  All discharge medications have been reconciled wit current medication list.     Med list     Medication List      CHANGE how you take these medications    vancomycin HCl 125 MG Caps  Commonly known as: VANCOCIN  Take 1 capsule (125 mg total) by m traMADol HCl 50 MG Tabs  Commonly known as: ULTRAM  Increase to one  Po  Every 6 Hour if needed for pain     Venlafaxine HCl ER 75 MG Cp24  Commonly known as: EFFEXOR-XR  TAKE 1 CAPSULE(75 MG) BY MOUTH EVERY DAY     VITAMIN C OR        STOP taking these

## 2021-06-01 NOTE — CM/SW NOTE
MDO for HHC. Per chart review pt has a hx with [facility]. Lives at home with her spouse. Ref was placed in aidin, orders attached. [facility] liaison notified of dc order. / to remain available for support and/or discharge planning.

## 2021-06-02 ENCOUNTER — TELEPHONE (OUTPATIENT)
Dept: CARDIOLOGY CLINIC | Facility: HOSPITAL | Age: 76
End: 2021-06-02

## 2021-06-02 NOTE — TELEPHONE ENCOUNTER
Spoke with patient post hospital discharge, she discharged late afternoon yesterday 6/1/21. Restarted on lasix 40 mg daily, denies cp or sob. Abdominal pain and nausea improved. She is seeing GI on Friday.  Rescheduled hospital follow up appointment for Zack

## 2021-06-04 ENCOUNTER — OFFICE VISIT (OUTPATIENT)
Dept: GASTROENTEROLOGY | Facility: CLINIC | Age: 76
End: 2021-06-04
Payer: MEDICARE

## 2021-06-04 VITALS — TEMPERATURE: 99 F | HEART RATE: 99 BPM | SYSTOLIC BLOOD PRESSURE: 101 MMHG | DIASTOLIC BLOOD PRESSURE: 70 MMHG

## 2021-06-04 DIAGNOSIS — A04.71 RECURRENT CLOSTRIDIOIDES DIFFICILE DIARRHEA: Primary | ICD-10-CM

## 2021-06-04 DIAGNOSIS — R93.3 ABNORMAL CT SCAN, COLON: ICD-10-CM

## 2021-06-04 PROCEDURE — 99214 OFFICE O/P EST MOD 30 MIN: CPT | Performed by: INTERNAL MEDICINE

## 2021-06-04 NOTE — PATIENT INSTRUCTIONS
1.  Take vancomycin 4 times daily for a total of 14 days. 2.  After the above 14-day treatment, decrease the vancomycin to twice daily. 3.  Please contact me with a symptom update in about 2 weeks or sooner if symptoms flare.

## 2021-06-05 RX ORDER — VANCOMYCIN HYDROCHLORIDE 125 MG/1
CAPSULE ORAL
Qty: 60 CAPSULE | Refills: 0 | Status: ON HOLD | OUTPATIENT
Start: 2021-06-05 | End: 2021-06-14 | Stop reason: CLARIF

## 2021-06-05 NOTE — PROGRESS NOTES
HPI/Subjective:   Patient ID: Faisal Land is a 64year old female. HPI  Amy Emmanuel returns in hospital follow-up today along with her . She was last seen in March 2020.     As per previous notes, Amy Emmanuel has a history of multiple chronic med eating well. Her  relates that she is intermittently short of breath and appears so today but does states that her breathing is good. Andi Dye has frequent mucoid discharge from the rectum.     She continues on 5 mg daily of prednisone chronically mg total) by mouth once daily. 90 tablet 3   • nitroGLYCERIN 0.4 MG Sublingual SL Tab Place 0.4 mg under the tongue every 5 (five) minutes as needed for Chest pain (or sob , can take 3 doses then call Dr. Misael Chaney).      • Ascorbic Acid (VITAMIN C OR) Take 1 Constitutional:       General: She is not in acute distress. Appearance: She is well-developed. Comments: Chronically ill in appearance  Wheelchair-bound   HENT:      Head: Normocephalic and atraumatic.       Mouth/Throat:      Pharynx: No oropha enlarged.  Coronary artery calcification.  Mitral clips.  Trace right greater than left pleural effusions.  Mild bibasilar interlobular septal thickening.  Differential attenuation of the imaged lung parenchyma.  There is   no visible pulmonary or pleural muscular atrophy.  Scattered calcified granulomas overlying the posterior gluteal musculature. OTHER: No free air or fluid is seen in the abdomen or pelvis.                     Impression   CONCLUSION:   1.  No evidence of bowel obstruction, perforated vi options of a longer taper/maintenance, a trial of fidaxomycin versus fecal microbiota transplantation (FMT). Bethany Jensen would be open to the latter option. She will contact me with a symptom update in about 2 weeks sooner if there are any changes.     2. Abn

## 2021-06-11 ENCOUNTER — TELEPHONE (OUTPATIENT)
Dept: CARDIOLOGY CLINIC | Facility: HOSPITAL | Age: 76
End: 2021-06-11

## 2021-06-11 ENCOUNTER — APPOINTMENT (OUTPATIENT)
Dept: CT IMAGING | Facility: HOSPITAL | Age: 76
DRG: 292 | End: 2021-06-11
Attending: EMERGENCY MEDICINE
Payer: MEDICARE

## 2021-06-11 ENCOUNTER — APPOINTMENT (OUTPATIENT)
Dept: GENERAL RADIOLOGY | Facility: HOSPITAL | Age: 76
DRG: 292 | End: 2021-06-11
Payer: MEDICARE

## 2021-06-11 ENCOUNTER — HOSPITAL ENCOUNTER (EMERGENCY)
Facility: HOSPITAL | Age: 76
Discharge: HOME | DRG: 292 | End: 2021-06-11
Attending: EMERGENCY MEDICINE
Payer: MEDICARE

## 2021-06-11 ENCOUNTER — HOSPITAL ENCOUNTER (INPATIENT)
Facility: HOSPITAL | Age: 76
LOS: 3 days | Discharge: HOME HEALTH | DRG: 292 | End: 2021-06-14
Attending: EMERGENCY MEDICINE | Admitting: HOSPITALIST
Payer: MEDICARE

## 2021-06-11 VITALS
OXYGEN SATURATION: 97 % | BODY MASS INDEX: 23.05 KG/M2 | TEMPERATURE: 98 F | HEIGHT: 64 IN | DIASTOLIC BLOOD PRESSURE: 80 MMHG | SYSTOLIC BLOOD PRESSURE: 111 MMHG | RESPIRATION RATE: 26 BRPM | WEIGHT: 135 LBS | HEART RATE: 69 BPM

## 2021-06-11 DIAGNOSIS — I50.33 ACUTE ON CHRONIC DIASTOLIC CONGESTIVE HEART FAILURE (HCC): ICD-10-CM

## 2021-06-11 DIAGNOSIS — R10.84 ABDOMINAL PAIN, GENERALIZED: ICD-10-CM

## 2021-06-11 DIAGNOSIS — I48.91 ATRIAL FIBRILLATION AND FLUTTER (HCC): Primary | ICD-10-CM

## 2021-06-11 DIAGNOSIS — E87.6 HYPOKALEMIA: ICD-10-CM

## 2021-06-11 DIAGNOSIS — I50.9 CONGESTIVE HEART FAILURE, UNSPECIFIED HF CHRONICITY, UNSPECIFIED HEART FAILURE TYPE (HCC): Primary | ICD-10-CM

## 2021-06-11 DIAGNOSIS — I48.92 ATRIAL FIBRILLATION AND FLUTTER (HCC): Primary | ICD-10-CM

## 2021-06-11 PROCEDURE — 93010 ELECTROCARDIOGRAM REPORT: CPT | Performed by: INTERNAL MEDICINE

## 2021-06-11 PROCEDURE — 84484 ASSAY OF TROPONIN QUANT: CPT

## 2021-06-11 PROCEDURE — 93005 ELECTROCARDIOGRAM TRACING: CPT

## 2021-06-11 PROCEDURE — 85379 FIBRIN DEGRADATION QUANT: CPT | Performed by: EMERGENCY MEDICINE

## 2021-06-11 PROCEDURE — 99222 1ST HOSP IP/OBS MODERATE 55: CPT | Performed by: INTERNAL MEDICINE

## 2021-06-11 PROCEDURE — 85025 COMPLETE CBC W/AUTO DIFF WBC: CPT

## 2021-06-11 PROCEDURE — 74176 CT ABD & PELVIS W/O CONTRAST: CPT | Performed by: EMERGENCY MEDICINE

## 2021-06-11 PROCEDURE — 83880 ASSAY OF NATRIURETIC PEPTIDE: CPT | Performed by: EMERGENCY MEDICINE

## 2021-06-11 PROCEDURE — 96374 THER/PROPH/DIAG INJ IV PUSH: CPT

## 2021-06-11 PROCEDURE — 84484 ASSAY OF TROPONIN QUANT: CPT | Performed by: EMERGENCY MEDICINE

## 2021-06-11 PROCEDURE — 71045 X-RAY EXAM CHEST 1 VIEW: CPT

## 2021-06-11 PROCEDURE — 80048 BASIC METABOLIC PNL TOTAL CA: CPT

## 2021-06-11 PROCEDURE — 99284 EMERGENCY DEPT VISIT MOD MDM: CPT

## 2021-06-11 RX ORDER — DILTIAZEM HYDROCHLORIDE 5 MG/ML
5 INJECTION INTRAVENOUS ONCE
Status: COMPLETED | OUTPATIENT
Start: 2021-06-11 | End: 2021-06-11

## 2021-06-11 RX ORDER — POTASSIUM CHLORIDE 20 MEQ/1
40 TABLET, EXTENDED RELEASE ORAL ONCE
Status: COMPLETED | OUTPATIENT
Start: 2021-06-11 | End: 2021-06-11

## 2021-06-11 RX ORDER — POTASSIUM CHLORIDE 20 MEQ/1
40 TABLET, EXTENDED RELEASE ORAL EVERY 4 HOURS
Status: COMPLETED | OUTPATIENT
Start: 2021-06-11 | End: 2021-06-12

## 2021-06-11 RX ORDER — VENLAFAXINE HYDROCHLORIDE 75 MG/1
75 CAPSULE, EXTENDED RELEASE ORAL DAILY
Status: DISCONTINUED | OUTPATIENT
Start: 2021-06-12 | End: 2021-06-14

## 2021-06-11 RX ORDER — ACETAMINOPHEN 325 MG/1
325 TABLET ORAL EVERY 6 HOURS PRN
Status: DISCONTINUED | OUTPATIENT
Start: 2021-06-11 | End: 2021-06-14

## 2021-06-11 RX ORDER — ONDANSETRON 2 MG/ML
4 INJECTION INTRAMUSCULAR; INTRAVENOUS EVERY 6 HOURS PRN
Status: DISCONTINUED | OUTPATIENT
Start: 2021-06-11 | End: 2021-06-14

## 2021-06-11 RX ORDER — PREGABALIN 50 MG/1
50 CAPSULE ORAL 3 TIMES DAILY
Status: DISCONTINUED | OUTPATIENT
Start: 2021-06-11 | End: 2021-06-14

## 2021-06-11 RX ORDER — BUDESONIDE 3 MG/1
6 CAPSULE, COATED PELLETS ORAL EVERY MORNING
Status: DISCONTINUED | OUTPATIENT
Start: 2021-06-12 | End: 2021-06-14

## 2021-06-11 RX ORDER — ATORVASTATIN CALCIUM 40 MG/1
40 TABLET, FILM COATED ORAL NIGHTLY
Status: DISCONTINUED | OUTPATIENT
Start: 2021-06-11 | End: 2021-06-14

## 2021-06-11 RX ORDER — FUROSEMIDE 10 MG/ML
40 INJECTION INTRAMUSCULAR; INTRAVENOUS
Status: COMPLETED | OUTPATIENT
Start: 2021-06-12 | End: 2021-06-13

## 2021-06-11 RX ORDER — ALBUTEROL SULFATE 90 UG/1
2 AEROSOL, METERED RESPIRATORY (INHALATION) EVERY 4 HOURS PRN
Status: DISCONTINUED | OUTPATIENT
Start: 2021-06-11 | End: 2021-06-14

## 2021-06-11 RX ORDER — POTASSIUM CHLORIDE 20 MEQ/1
40 TABLET, EXTENDED RELEASE ORAL 2 TIMES DAILY
Qty: 450 TABLET | Refills: 0 | COMMUNITY
Start: 2021-06-11 | End: 2021-07-27

## 2021-06-11 RX ORDER — VANCOMYCIN HYDROCHLORIDE 125 MG/1
125 CAPSULE ORAL EVERY 6 HOURS
Status: DISCONTINUED | OUTPATIENT
Start: 2021-06-11 | End: 2021-06-14

## 2021-06-11 RX ORDER — EZETIMIBE 10 MG/1
10 TABLET ORAL DAILY
Status: DISCONTINUED | OUTPATIENT
Start: 2021-06-11 | End: 2021-06-14

## 2021-06-11 RX ORDER — CETIRIZINE HYDROCHLORIDE 10 MG/1
10 TABLET ORAL DAILY
Status: DISCONTINUED | OUTPATIENT
Start: 2021-06-12 | End: 2021-06-14

## 2021-06-11 RX ORDER — PREDNISONE 1 MG/1
5 TABLET ORAL
Status: DISCONTINUED | OUTPATIENT
Start: 2021-06-12 | End: 2021-06-14

## 2021-06-11 RX ORDER — METOPROLOL SUCCINATE 50 MG/1
50 TABLET, EXTENDED RELEASE ORAL DAILY
Status: DISCONTINUED | OUTPATIENT
Start: 2021-06-11 | End: 2021-06-14

## 2021-06-11 RX ORDER — FUROSEMIDE 10 MG/ML
40 INJECTION INTRAMUSCULAR; INTRAVENOUS ONCE
Status: COMPLETED | OUTPATIENT
Start: 2021-06-11 | End: 2021-06-11

## 2021-06-11 RX ORDER — FLUTICASONE PROPIONATE 50 MCG
1 SPRAY, SUSPENSION (ML) NASAL DAILY
Status: DISCONTINUED | OUTPATIENT
Start: 2021-06-11 | End: 2021-06-14

## 2021-06-11 NOTE — ED PROVIDER NOTES
Patient Se[facility] in: [facility] Emergency Department      History   Patient presents with:  Chest Pain Angina    Stated Complaint: cp    HPI/Subjective:   HPI    64year old female with multiple medical problems including ulcerative colitis, mitral va Osteoarthritis     legs, back, shoulders and knees   • OSTEOPENIA    • Other and unspecified hyperlipidemia    • OTHER DISEASES     spastic paraparesis   • OTHER DISEASES     cataracts   • OTHER DISEASES     secondary poycythemia   • OTHER DISEASES     lum left  12/05   • OTHER SURGICAL HISTORY  8-24-12    cysto Dr. Paris Ray   • Dwight Lackey N/A 10/27/2014    Procedure: Edgar Smiley;  Surgeon: Paula Fair MD;  Location: [address] [place] [address] Alcohol/week: 0.0 standard drinks    Drug use: No             Review of Systems    Positive for stated complaint: cp  Other systems are as noted in HPI. Constitutional and vital signs reviewed.       All other systems reviewed and negative except as noted Creatinine 0.49 (*)     BUN/CREA Ratio 51.0 (*)     Calculated Osmolality 296 (*)     All other components within normal limits   PRO BETA NATRIURETIC PEPTIDE - Abnormal; Notable for the following components:    Pro-Beta Natriuretic Peptide 2,531 (*)     A was at home. Will check 2-hour troponin.                              Disposition and Plan     Clinical Impression:  Congestive heart failure, unspecified HF chronicity, unspecified heart failure type ([facility] [address]  (primary encounter diagnosis)     Disposition:

## 2021-06-11 NOTE — TELEPHONE ENCOUNTER
Spoke with pt and . Pt just got home from ED, was given a dose of IV lasix 40 mg with kdur 40 meq x1 for fluid overload. She is still sob since getting home and having nasal congestion and abdominal pain.  She also cut her leg on wheelchair, was wrap

## 2021-06-11 NOTE — ED INITIAL ASSESSMENT (HPI)
Arrived via ems c/o cp and sob that started last night, but worse this morning. EMS gave 4 asa en route. Pt takes blood thinner for hx a.fib.

## 2021-06-11 NOTE — ED INITIAL ASSESSMENT (HPI)
Patient was seen in the ED this morning. Patient then followed up with PMD today who sent her back to the hospital. Patient having shortness of breath, weakness, and nausea.

## 2021-06-11 NOTE — CONSULTS
Cardiology (consult dictated)    Assessment:  1. Patient presents with dyspnea. Likely volume expanded. On apixaban so pulmonary embolism would not be expected. No other findings to suggest pneumonia. 2.  Chronic atrial fibrillation.     3.  Status p

## 2021-06-11 NOTE — CONSULTS
[facility]    PATIENT'S NAME: Melita Estrella   ATTENDING PHYSICIAN: Rickey Rao MD   CONSULTING PHYSICIAN: Azul Haider.  Segundo Terry MD   PATIENT ACCOUNT#:   [id number]    LOCATION:  [facility] 15 [facility] 1  MEDICAL RECORD #:   [id number]       DATE OF CYRIL respirations 29, unlabored; pulse 108, irregular; afebrile; saturating 93%. HEENT:  Unremarkable. NECK:  Supple. Jugular venous pressure is increased. Carotids are brisk without bruits. No thyromegaly.   LUNGS:  Basilar crackles, right greater than left

## 2021-06-11 NOTE — ED PROVIDER NOTES
Patient Seen in: [facility] Emergency Department      History   Patient presents with:  Difficulty Breathing    Stated Complaint: shortness of breath, weakness    HPI/Subjective:   HPI  64-year-old female with diastolic heart failure, mitral insuf colitis Colon= 5/7/12   • MENOPAUSE    • MITRAL VALVE PROLAPSE    • Neuropathy     bilateral legs;  Hereditary spastic paraplegia X 20 years    • Osteoarthritis     legs, back, shoulders and knees   • OSTEOPENIA    • Other and unspecified hyperlipidemia N/A 12/8/2014    Procedure: CERVICAL EPIDURAL;  Surgeon: Bi Cox MD;  Location: [facility] FOR PAIN MANAGEMENT   • KNEE REPLACEMENT SURGERY      left  12/05   • OTHER SURGICAL HISTORY  8-24-12    cysto Dr. Ilia Enriquez   • PATIENT Becky Becker Smokeless tobacco: Never Used      Tobacco comment: pt smoked until 23 yrs old    Vaping Use      Vaping Use: Never used    Alcohol use: No      Alcohol/week: 0.0 standard drinks    Drug use:  No             Review of Systems    Positive for stated complain HEPATIC FUNCTION PANEL (7) - Abnormal; Notable for the following components:       Result Value    Bilirubin, Direct 0.3 (*)     Albumin 3.1 (*)     All other components within normal limits   LIPASE - Abnormal; Notable for the following components:    L AP PORTABLE  (CPT=71045)    Result Date: 6/11/2021  CONCLUSION:   Diffusely coarsened interstitial markings, similar to prior, may relate to chronic interstitial edema or interstitial scarring. No evidence for acute cardiopulmonary process.   A preliminary List                          Hospital Problems     Present on Admission  Date Reviewed: 6/5/2021        ICD-10-CM Noted POA    * (Principal) Atrial fibrillation and flutter ([facility] [address] I48.91, I48.92 6/11/2021 Unknown

## 2021-06-11 NOTE — ED QUICK NOTES
Orders for admission, patient is aware of plan and ready to go upstairs. Any questions, please call ED RN Lesli Emmanuel  at extension [phone].      Type of COVID test sent: rapid - negative  COVID Suspicion level: low  Drug(s) infusing: n/a - diltiazem given  LOC

## 2021-06-12 ENCOUNTER — APPOINTMENT (OUTPATIENT)
Dept: CV DIAGNOSTICS | Facility: HOSPITAL | Age: 76
DRG: 292 | End: 2021-06-12
Attending: NURSE PRACTITIONER
Payer: MEDICARE

## 2021-06-12 PROCEDURE — 93306 TTE W/DOPPLER COMPLETE: CPT | Performed by: NURSE PRACTITIONER

## 2021-06-12 PROCEDURE — 99223 1ST HOSP IP/OBS HIGH 75: CPT | Performed by: INTERNAL MEDICINE

## 2021-06-12 PROCEDURE — 99232 SBSQ HOSP IP/OBS MODERATE 35: CPT | Performed by: INTERNAL MEDICINE

## 2021-06-12 RX ORDER — TRAMADOL HYDROCHLORIDE 50 MG/1
50 TABLET ORAL EVERY 6 HOURS PRN
Status: DISCONTINUED | OUTPATIENT
Start: 2021-06-12 | End: 2021-06-14

## 2021-06-12 NOTE — PLAN OF CARE
Patient A&O x4, with sob on exertion. Isolation precautions maintained. With colostomy to LUQ of abd draining formed stool, applied new colostomy bag. With pitting edema to BLE. Incontinent uses purewick, bladder scan at 6am 260ml.  Skin tear to posterior l puncture sites for bleeding and/or hematoma  - Assess quality of pulses, skin color and temperature  - Assess for signs of decreased coronary artery perfusion - ex.  Angina  - Evaluate fluid balance, assess for edema, trend weights  Outcome: Progressing  Go Absence of urinary retention  Description: INTERVENTIONS:  - Assess patient’s ability to void and empty bladder  - Monitor intake/output and perform bladder scan as needed  - Follow urinary retention protocol/standard of care  - Consider collaborating with hemostasis  - Assess for signs and symptoms of internal bleeding  - Monitor lab trends  - Patient is to report abnormal signs of bleeding to staff  - Avoid use of toothpicks and dental floss  - Use electric shaver for shaving  - Use soft bristle tooth brus

## 2021-06-12 NOTE — PLAN OF CARE
Vitals stable. Straight cath this morning for urinary retention. Purwick used the remainder of shift however, it kept coming out of place and pt was incontinent. Colostomy bag changed this morning, stool formed. Left calf skin tear continued to bleed.    Pr and temperature  - Assess for signs of decreased coronary artery perfusion - ex.  Angina  - Evaluate fluid balance, assess for edema, trend weights  Outcome: Progressing  Goal: Absence of cardiac arrhythmias or at baseline  Description: INTERVENTIONS:  - Co to void and empty bladder  - Monitor intake/output and perform bladder scan as needed  - Follow urinary retention protocol/standard of care  - Consider collaborating with pharmacy to review patient's medication profile  - Implement strategies to promote bl trends  - Patient is to report abnormal signs of bleeding to staff  - Avoid use of toothpicks and dental floss  - Use electric shaver for shaving  - Use soft bristle tooth brush  - Limit straining and forceful nose blowing  Outcome: Progressing     Problem

## 2021-06-12 NOTE — OCCUPATIONAL THERAPY NOTE
Communication Note to Team    Acknowledge & appreciate OT order. Chart Reviewed: Presented 6/11 with SOB/dyspnea.  hx includes  hereditary spastic paraplegia, recurrent CDiff, collagenous colitis s/p laparoscopic loop sigmoid colostomy in 9/2017    Initiate

## 2021-06-12 NOTE — H&P
[facility] Hospitalist H&P       CC: Dyspnea    PCP: Edsel Prader, MD    History of Present Illness:   64year old female with multiple medical problems including ulcerative colitis, mitral valve prolapse, depression, Gout, GERD, hx of C diff, hx of l Garcia Farias MD;  Location: [address] [place] [address] ENDOSCOPY   • FLUOR GID & Joseph Putty NDL/CATH SPI DX/THER Jed Russell Jill [address] N/A 10/27/2014    Procedure: CERVICAL EPIDURAL;  Surgeon: Tri Liu MD;  Location: [address] [place] [address] NDL/CATH SPI DX/THER Jed Russell Jill [address] N/A [facility] PREOPERATIVE ORDER FOR IV ANTIBIOTIC SURGICAL SITE INFECTION PROPHYLAXIS.  N/A 1/5/2015    Procedure: ESOPHAGOGASTRODUODENOSCOPY W/ DILATION;  Surgeon: Zulay Espinosa MD;  Location: [address] STONE,>2.5CM  03/12/2 1 tablet (50 mg total) by mouth daily. , Disp: 90 tablet, Rfl: 1  Budesonide 3 MG Oral Cap DR Particles, Take 2 capsules (6 mg total) by mouth every morning., Disp: 180 capsule, Rfl: 1  ezetimibe 10 MG Oral Tab, Take 1 tablet (10 mg total) by mouth daily. , spastic paraplegia   • Eye Problems Other         aunts/ uncles glaucoma   • Other (Other) Other          OBJECTIVE:  /80 (BP Location: Left arm)   Pulse 84   Temp 97.9 °F (36.6 °C) (Oral)   Resp 20   Ht 5' 4\" (1.626 m)   Wt 125 lb (56.7 kg) multiple prior studies. 7. Stable trace right pleural effusion, which may relate to mild CHF/fluid overload in this patient with coronary atherosclerosis status post MitraClip placement.   Interstitial changes at the lung bases are stable and could relate t chronically      Laparoscopic loop sigmoid colostomy in 9/2017  Collagenous colitis, microcytic colitis  -follows with Dr. Zahraa Alexander  -GI following here   -on budesonide daily  -C diff positive      Anxiety/depression  -resume home antidepressants     P

## 2021-06-12 NOTE — CONSULTS
[facility]  Report of Consultation    Cherie Silverio Patient Status:  Inpatient    1945 MRN [id number]   Location [facility] 3W/SW Attending Kristin Rhodes, [address] Day # 1 PCP Luis A Chao MD     Reason for [facility] and consider further assessment with physical examination and/or endoscopy, if warranted.  No associated bowel obstruction. 3. Postoperative changes again noted from a left abdominal loop colostomy creation.  Uncomplicated pancolonic diverticulosis.    4. lumbar spinal stenosis   • OTHER DISEASES     duplicated left renal collecting system   • OTHER DISEASES     uterine fibroids   • OTHER DISEASES     ulcerative colitis   • OTHER DISEASES     uti   • Pneumonia, organism unspecified(486)    • Reflux    • MANAGEMENT   • PATIENT DOCUMENTED NOT TO HAVE EXPERIENCED ANY OF THE FOLLOWING EVENTS N/A 12/8/2014    Procedure: CERVICAL EPIDURAL;  Surgeon: Polly Mora MD;  Location: [facility] FOR PAIN MANAGEMENT   • PATIENT DOCUMENTED NOT TO HAVE EXPERIENCED ANY never used smokeless tobacco. She reports that she does not drink alcohol and does not use drugs.     Allergies:    Cymbalta [Duloxetin*    ITCHING, SWELLING    Comment:Throat swelling and difficulty breathing  Gabapentin              RASH  Gnp Iodides Deco temperature 97.9 °F (36.6 °C), temperature source Oral, resp. rate 20, height 5' 4\" (1.626 m), weight 125 lb (56.7 kg), SpO2 97 %, not currently breastfeeding. General: Alert, orientated x3. Cooperative. No apparent distress.   HEENT: Exam is [facility] rectum, this may be old stool and does not appear to be causing acute issue at this time. Plan flexible sigmoidoscopy as per Dr. Keegan Ruvalcaba. Continue treatment for C dif with vancomycin. Ryland Higginbotham  6/12/2021  10:22 AM

## 2021-06-12 NOTE — PROGRESS NOTES
[facility]    Progress Note    Olam Cons Patient Status:  Inpatient    1945 MRN [id number]   Location [facility] 3W/SW Attending Marina Cedeño, [address] [facility] Day # 1 PCP Damon Pendleton MD     CARDIOLOGY ATTENDI 05/30/2021    TROP <0.045 06/11/2021    CK 30 10/31/2019    B12 550 07/20/2020       CT ABDOMEN+PELVIS(CPT=74176)    Result Date: 6/11/2021  CONCLUSION:  1. No acute intra-abdominal process.  2. Ovoid 4.2 x 2.8 x 2.6 cm high-density foreign body again noted Lead    Result Date: 6/11/2021  ECG Report  Interpretation  -------------------------- Atrial flutter-fibrillation - frequent multiform ectopic ventricular beats # types 3 -Left axis -anterior fascicular block.  ABNORMAL When compared with ECG of 06/11/2021

## 2021-06-13 PROCEDURE — 99232 SBSQ HOSP IP/OBS MODERATE 35: CPT | Performed by: INTERNAL MEDICINE

## 2021-06-13 RX ORDER — FUROSEMIDE 40 MG/1
40 TABLET ORAL
Status: DISCONTINUED | OUTPATIENT
Start: 2021-06-13 | End: 2021-06-14

## 2021-06-13 RX ORDER — POTASSIUM CHLORIDE 20 MEQ/1
40 TABLET, EXTENDED RELEASE ORAL EVERY 4 HOURS
Status: COMPLETED | OUTPATIENT
Start: 2021-06-13 | End: 2021-06-13

## 2021-06-13 NOTE — OCCUPATIONAL THERAPY NOTE
OCCUPATIONAL THERAPY EVALUATION - INPATIENT     Room Number: 343/343-A  Evaluation Date: 6/13/2021  Type of Evaluation: Initial  Presenting Problem: Presented 6/11 with SOB/dyspnea.  hx includes  hereditary spastic paraplegia, recurrent CDiff, collagenous c 53.32% has been calculated based on documentation in the [facility] '6 clicks' Inpatient Daily Activity Short Form. Research supports that patients with this level of impairment may benefit from Returning home with 24hr A & HH therapies.       DISCHARGE RECOMME OTHER DISEASES     lumbar spinal stenosis   • OTHER DISEASES     duplicated left renal collecting system   • OTHER DISEASES     uterine fibroids   • OTHER DISEASES     ulcerative colitis   • OTHER DISEASES     uti   • Pneumonia, organism unspecified(486) Adebayo Hayes MD;  Location: [address] [place] [address] Dr PAIN MANAGEMENT   • PATIENT DOCUMENTED NOT TO HAVE EXPERIENCED ANY OF THE FOLLOWING EVENTS N/A 12/8/2014    Procedure: CERVICAL EPIDURAL;  Surgeon: Kassie Santos MD;  Location: [facility] FOR PAIN MANAGEMENT   • SUSAN hereditary spastic paraplegia - w/c bound at baseline. Pt endorses she is typically able to sit EOB without support, uses slideboard with assist from spouse, has [place] available if needed.  Pt has colostomy & self-caths PRN, reports spouse assists with ADLs baseline  Upper Extremity Dressing: NT  Lower Extremity Dressing: max    Education Provided: Rehab role, POC, & recommendations   Patient End of Session: Up in chair;Needs met;Call light within reach;RN aware of session/findings    OT Goals  Patients self

## 2021-06-13 NOTE — DIETARY NOTE
ADULT NUTRITION INITIAL ASSESSMENT    Pt is at moderate nutrition risk. Pt meets moderate malnutrition criteria.       CRITERIA FOR MALNUTRITION DIAGNOSIS:  Criteria for non-severe malnutrition diagnosis: chronic illness related to energy intake less than7 DISEASES     uti   • Pneumonia, organism unspecified(486)    • Reflux    • Self-catheterizes urinary bladder 1/11/2016   • Ulcerative colitis ([facility] [address]    • Valvular disease     mitral valve prolapse- 3 clips placed     PATIENT STATUS: Initial 06/13/21: Pt adm mg Oral Daily     LABS: reviewed  Recent Labs     06/11/21  0533 06/12/21  0641 06/13/21  0424   GLU 93 89 90   BUN 25* 26* 25*   CREATSERUM 0.49* 0.42* 0.39*   CA 9.3 9.5 8.2*    139 139   K 3.4* 5.1  5.1 3.1*    106 103   CO2 34.0* 28.0 28.0 PO intake and Initiated ONS (oral nutritional supplements)  - Meals and snacks: Liberalized diet and Encouraged adequate PO intake  - Medical Food Supplements-RD added Ensure Enlive (350 calories/ 20 g protein each) Vanilla Daily.  Rational/use of oral supp

## 2021-06-13 NOTE — PLAN OF CARE
Vitals stable on room air. Tylenol given for headache. Left calf dressing changed, continues to ooze blood. Potassium replaced per protocol.    Problem: Patient Centered Care  Goal: Patient preferences are identified and integrated in the patient's plan of balance, assess for edema, trend weights  Outcome: Progressing  Goal: Absence of cardiac arrhythmias or at baseline  Description: INTERVENTIONS:  - Continuous cardiac monitoring, monitor vital signs, obtain 12 lead EKG if indicated  - Evaluate effectivenes retention protocol/standard of care  - Consider collaborating with pharmacy to review patient's medication profile  - Implement strategies to promote bladder emptying  Outcome: Progressing     Problem: METABOLIC/FLUID AND ELECTROLYTES - ADULT  Goal: Electr floss  - Use electric shaver for shaving  - Use soft bristle tooth brush  - Limit straining and forceful nose blowing  Outcome: Progressing     Problem: Impaired Swallowing  Goal: Minimize aspiration risk  Description: Interventions:  - Patient should be a

## 2021-06-13 NOTE — PROGRESS NOTES
[facility]    Progress Note    Sarika Hameed Patient Status:  Inpatient    1945 MRN [id number]   Location [facility] 3W/SW Attending Sujey Fuentes, [address] Day # 2 PCP Paulo Morataya MD     CARDIOLOGY ATTENDI <0.27 06/11/2021    ESRML 7 09/09/2019    CRP 2.19 (H) 07/06/2020    MG 2.0 05/31/2021    PHOS 3.1 05/30/2021    TROP <0.045 06/11/2021    CK 30 10/31/2019    B12 550 07/20/2020       CT ABDOMEN+PELVIS(CPT=74176)    Result Date: 6/11/2021  CONCLUSION:  1. exam   -echo EF 54% NWM mild to mod MVR increased from prior   -IV lasix 40 mg bid - cr stable - change to PO today       Atypical chest pain  -resolved   -troponin negative no acute ecg changes  --angiogram 2019 Coronary arteries no Obs disease      Persi

## 2021-06-13 NOTE — PROGRESS NOTES
[facility] Hospitalist Progress Note     CC: Hospital Follow up    PCP: Mario Cross MD       Assessment/Plan:   64year old female with multiple medical problems including ulcerative colitis, mitral valve prolapse, depression, Gout, GERD, hx of C diff [phone]     Subjective:     Breathing is getting better. She is nauseated mainly with food. Vitals are stable. Renal function stable. OBJECTIVE:    Blood pressure 122/71, pulse 94, temperature 97.7 °F (36.5 °C), temperature source Oral, resp.  rate ALB 3.1*         Imaging:  CT ABDOMEN+PELVIS(CPT=74176)    Result Date: 6/11/2021  CONCLUSION:  1. No acute intra-abdominal process.  2. Ovoid 4.2 x 2.8 x 2.6 cm high-density foreign body again noted in the rectum approximately 2.3 cm from the anal verge, mg Oral BID (Diuretic)   • apixaban  5 mg Oral BID   • atorvastatin  40 mg Oral Nightly   • ezetimibe  10 mg Oral Daily   • Metoprolol Succinate ER  50 mg Oral Daily   • furosemide  40 mg Intravenous BID (Diuretic)   • Budesonide  6 mg Oral QAM   • Flutica

## 2021-06-13 NOTE — PHYSICAL THERAPY NOTE
PHYSICAL THERAPY EVALUATION - INPATIENT     Room Number: 343/343-A  Evaluation Date: 6/13/2021  Type of Evaluation: Initial   Physician Order: PT Eval and Treat    Presenting Problem: AF /SOB / acute on chronic CHF Upper ABD pain --GI following.   [facility] for trunk control and required min assist . Pt with poor flexed posture and generalized trunk weakness. Pt participated in UE ROM activity at EOB and sit tolerance / balance activity . Pt progressed to CGA for sit at EOB. 2 person assist to place slideboard. -pending spouse ability to care for pt)    PLAN  PT Treatment Plan: Bed mobility; Endurance; Energy conservation;Patient education; Family education;Strengthening;Balance training;Transfer training  Rehab Potential : Fair  Frequency (Obs): 3-5x/week       PHY obvious hemodynamic changes were noted     Diet: advance diet as tolerated  DVT prophylaxis: eliquis BID  Code status: DNR/DNI  Dispo: likely medically stable for DC tomorrow.      Arabella Hidalgo DO  [facility] Hospitalist   [facility] Answering service [phone]    Pro Valvular disease     mitral valve prolapse- 3 clips placed       Past Surgical History  Past Surgical History:   Procedure Laterality Date   • ARTHROSCOPY OF JOINT UNLISTED Right     knee   • BACK SURGERY      spinal fusion L1-5   • COLONOSCOPY  7/11/08 ESOPHAGOGASTRODUODENOSCOPY W/ DILATION;  Surgeon: Janak Sanchez MD;  Location: [address] [place] [address]   • PATIENT [address] [place] [address] FOR IV ANTIBIOTIC SURGICAL SITE INFECTION PROPHYLAXIS.  N/A 10/27/2014    Procedure: CERVICAL EPIDURAL;  Sofie Kapadia mechanics; Relaxation;Repositioning    COGNITION  · Overall Cognitive Status:  WFL - within functional limits  · Safety Judgement:  cues needed throughout session   · Awareness of Deficits:  decreased awareness of deficits    RANGE OF MOTION AND STRENGTH AS patient questions and concerns addressed; Alarm set (up in w/c )    CURRENT GOALS    Goals to be met by:  2 weeks   Patient Goal Patient's self-stated goal is: home    Goal #1 Patient is able to demonstrate supine - sit EOB @ level: minimum assistance  Decr

## 2021-06-13 NOTE — PLAN OF CARE
Problem: Patient Centered Care  Goal: Patient preferences are identified and integrated in the patient's plan of care  Description: Interventions:  - What would you like us to know as we care for you?  Was admitted 1 week ago for C.diff flare up per ne INTERVENTIONS:  - Continuous cardiac monitoring, monitor vital signs, obtain 12 lead EKG if indicated  - Evaluate effectiveness of antiarrhythmic and heart rate control medications as ordered  - Initiate emergency measures for life threatening arrhythmias strategies to promote bladder emptying  Outcome: Progressing     Problem: METABOLIC/FLUID AND ELECTROLYTES - ADULT  Goal: Electrolytes maintained within normal limits  Description: INTERVENTIONS:  - Monitor labs and rhythm and assess patient for signs and Progressing     Problem: Impaired Swallowing  Goal: Minimize aspiration risk  Description: Interventions:  - Patient should be alert and upright for all feedings (90 degrees preferred)  - Offer food and liquids at a slow rate  - No straws  - Encourage smal

## 2021-06-13 NOTE — PROGRESS NOTES
[facility] [address]     Gastroenterology Progress Note    Darien Zhang Patient Status:  Inpatient    1945 MRN [id number]   Location [facility] 3W/SW Attending [facility], [address] Day # 2 PCP Lotus Jurado 06/11/21  0533 06/11/21  1730 06/12/21  0641 06/13/21  0424   GLU 93  --  89 90   BUN 25*  --  26* 25*   CREATSERUM 0.49*  --  0.42* 0.39*   GFRAA 108  --  114 116   GFRNAA 94  --  98 101   CA 9.3  --  9.5 8.2*   ALB  --  3.1*  --   --      --  139 1 PORTABLE  (CPT=71045)    Result Date: 6/11/2021  CONCLUSION:   Diffusely coarsened interstitial markings, similar to prior, may relate to chronic interstitial edema or interstitial scarring. No evidence for acute cardiopulmonary process.   A preliminary re

## 2021-06-14 ENCOUNTER — TELEPHONE (OUTPATIENT)
Dept: GASTROENTEROLOGY | Facility: CLINIC | Age: 76
End: 2021-06-14

## 2021-06-14 VITALS
TEMPERATURE: 98 F | BODY MASS INDEX: 21.28 KG/M2 | DIASTOLIC BLOOD PRESSURE: 66 MMHG | HEIGHT: 64 IN | OXYGEN SATURATION: 93 % | HEART RATE: 106 BPM | RESPIRATION RATE: 18 BRPM | SYSTOLIC BLOOD PRESSURE: 95 MMHG | WEIGHT: 124.63 LBS

## 2021-06-14 PROCEDURE — 99232 SBSQ HOSP IP/OBS MODERATE 35: CPT | Performed by: INTERNAL MEDICINE

## 2021-06-14 PROCEDURE — 99232 SBSQ HOSP IP/OBS MODERATE 35: CPT | Performed by: PHYSICIAN ASSISTANT

## 2021-06-14 RX ORDER — ONDANSETRON 4 MG/1
4 TABLET, ORALLY DISINTEGRATING ORAL EVERY 6 HOURS PRN
Qty: 30 TABLET | Refills: 0 | Status: SHIPPED | OUTPATIENT
Start: 2021-06-14 | End: 2021-07-14

## 2021-06-14 RX ORDER — FUROSEMIDE 40 MG/1
40 TABLET ORAL DAILY
Status: DISCONTINUED | OUTPATIENT
Start: 2021-06-15 | End: 2021-06-14

## 2021-06-14 RX ORDER — METOPROLOL SUCCINATE 50 MG/1
50 TABLET, EXTENDED RELEASE ORAL 2 TIMES DAILY
Qty: 60 TABLET | Refills: 2 | Status: ON HOLD | OUTPATIENT
Start: 2021-06-14 | End: 2021-08-17

## 2021-06-14 RX ORDER — METOPROLOL SUCCINATE 50 MG/1
50 TABLET, EXTENDED RELEASE ORAL
Status: DISCONTINUED | OUTPATIENT
Start: 2021-06-14 | End: 2021-06-14

## 2021-06-14 RX ORDER — VANCOMYCIN HYDROCHLORIDE 125 MG/1
CAPSULE ORAL
Qty: 40 CAPSULE | Refills: 0 | Status: ON HOLD | COMMUNITY
Start: 2021-06-14 | End: 2021-08-14 | Stop reason: ALTCHOICE

## 2021-06-14 RX ORDER — ONDANSETRON 4 MG/1
4 TABLET, ORALLY DISINTEGRATING ORAL EVERY 6 HOURS PRN
Status: DISCONTINUED | OUTPATIENT
Start: 2021-06-14 | End: 2021-06-14

## 2021-06-14 NOTE — PROGRESS NOTES
[facility]     Gastroenterology Progress Note    Berna Perfect Patient Status:  Inpatient    1945 MRN [id number]   Location [facility] 3W/SW Attending Jessica Alicia, DO   Hosp Day # 3 PCP Dg Leos noted outpatient f/u reports regarding LGI evaluation pending C. Diff and clinical course. Patient to f/u outpatient with Dr. Nedra Mohr.     Recommend:  -complete 2 weeks of Vancomycin 125 QID then down to BID  -outpatient LGI evaluation for foreign body

## 2021-06-14 NOTE — DISCHARGE SUMMARY
General Medicine Discharge Summary     Patient ID:  Marylen Shorter  64year old  5/4/1945    Admit date: 6/11/2021    Discharge date and time: 6/14/21    Attending Physician: DO Daphnie Mendieta Dr. Jewels Hernandez  -GI following here   -on budesonide daily  -C diff positive   -continue vancomycin QID     Anxiety/depression  -resume home antidepressants     Primary osteoarthritis involving multiple joints  (primary encounter diagnosis)  Chronic stero route daily. loratadine 10 MG Oral Tab  Take 10 mg by mouth daily. acetaminophen 325 MG Oral Tab  Take 325 mg by mouth every 6 (six) hours as needed for Pain. apixaban 5 MG Oral Tab  Take 1 tablet (5 mg total) by mouth 2 (two) times daily.     Ator

## 2021-06-14 NOTE — WOUND PROGRESS NOTE
WOUND CARE NOTE    History:  Past Medical History:   Diagnosis Date   • Anemia    • Anxiety state, unspecified    • Arrhythmia     a.fib   • BACK PAIN     scoliosis   • Back problem    • Bladder stones    • C. difficile colitis 5/12   • Cataract    • Col N/A 12/24/2016    Procedure: ESOPHAGOGASTRODUODENOSCOPY (EGD);   Surgeon: Francesco Gaffney MD;  Location: [address] [place] [address] ENDOSCOPY   • FLUOR GID & Christine Perdomo NDL/CATH SPI DX/THER Jed Russell Jill 84 N/A 10/27/2014    Procedure: CERVICAL EPIDURAL;  Surgeon: Kassie Santos MD;  [facility] Surgeon: Jethro Malik MD;  Location: [facility] FOR PAIN MANAGEMENT   • PATIENT [address] FOR IV ANTIBIOTIC SURGICAL SITE INFECTION PROPHYLAXIS.  N/A 1/5/2015    Procedure: ESOPHAGOGASTRODUODENOSCOPY W/ DILATION;  Surgeon: Nolvia Mckoy intact  Pouching system:two piece  Able to care for stoma: Yes     Allergies: Cymbalta [Duloxetine Hcl], Gabapentin, Gnp Iodides Decolorized, Penicillins, Shellfish, Shellfish-Derived Products, Tetracycline Base, Aleve [Naprelan], and Bactrim    Labs:   La

## 2021-06-14 NOTE — DISCHARGE PLANNING
Patient was provided with discharge instructions, education, and follow up information. Prescriptions were already sent electronically to patient's pharmacy.  Patient verbalizes understanding of follow up information, specifically medication dose changes, m

## 2021-06-14 NOTE — PROGRESS NOTES
[facility] Hospitalist Progress Note     CC: Hospital Follow up    PCP: Kait Hassan MD       Assessment/Plan:   64year old female with multiple medical problems including ulcerative colitis, mitral valve prolapse, depression, Gout, GERD, hx of C diff Gwen ZAPIEN  [facility] Hospitalist   [facility] Answering service [phone]     Subjective:     Breathing is stable. Laceration noted on left leg. Was bleeding last night.      OBJECTIVE:    Blood pressure 99/64, pulse 107, temperature 97.9 °F (36.6 °C), temperature opal 139  --  136   K 5.1  5.1   < > 3.1* 4.5 4.7     --  103  --  102   CO2 28.0  --  28.0  --  28.0    < > = values in this interval not displayed.        Recent Labs   Lab 06/11/21  1730   ALT 34   AST 23   ALB 3.1*         Imaging:  CT ABDOMEN+PELVIS( Oral TID   • vancomycin HCl  125 mg Oral Q6H   • Venlafaxine HCl ER  75 mg Oral Daily       ondansetron, traMADol HCl, acetaminophen, Albuterol Sulfate HFA, ondansetron HCl    Arabella Hidalgo DO

## 2021-06-14 NOTE — PLAN OF CARE
VS stable. C/o 5/10 leg pain, tramadol given with relief. Eliquis held this morning per Dr Kaitlyn Wilkerson due to bleeding on L leg. L leg dressing changed. Plan for discharge home later this afternoon.  Ambulance has been ordered for transport per patient request. H hematoma  - Assess quality of pulses, skin color and temperature  - Assess for signs of decreased coronary artery perfusion - ex.  Angina  - Evaluate fluid balance, assess for edema, trend weights  Outcome: Progressing  Goal: Absence of cardiac arrhythmias retention  Description: INTERVENTIONS:  - Assess patient’s ability to void and empty bladder  - Monitor intake/output and perform bladder scan as needed  - Follow urinary retention protocol/standard of care  - Consider collaborating with pharmacy to review for signs and symptoms of internal bleeding  - Monitor lab trends  - Patient is to report abnormal signs of bleeding to staff  - Avoid use of toothpicks and dental floss  - Use electric shaver for shaving  - Use soft bristle tooth brush  - Limit straining

## 2021-06-14 NOTE — PLAN OF CARE
VS stable. Discharge medications and follow up appts reviewd with patient. Dressing changed on LLE wound by wound care RN prior to discharge. Colostomy dressing also changed today.  called prior to discharge ensuring he was home to receive patient. signs, rhythm, and trends  - Monitor for bleeding, hypotension and signs of decreased cardiac output  - Evaluate effectiveness of vasoactive medications to optimize hemodynamic stability  - Monitor arterial and/or venous puncture sites for bleeding and/or GASTROINTESTINAL - ADULT  Goal: Minimal or absence of nausea and vomiting  Description: INTERVENTIONS:  - Maintain adequate hydration with IV or PO as ordered and tolerated  - Nasogastric tube to low intermittent suction as ordered  - Evaluate effectivenes skin integrity  - Assess and document dressing/incision, wound bed, drain sites and surrounding tissue  - Implement wound care per orders  - Initiate isolation precautions as appropriate  - Initiate Pressure Ulcer prevention bundle as indicated  6/14/2021 1740 by Jerome Jeter RN  Outcome: Completed  6/14/2021 1106 by Jerome Jeter RN  Outcome: Progressing

## 2021-06-14 NOTE — CM/SW NOTE
Per RN rounds, pt is current w/ [facility] services. Per chart review, pt was DC'd on 6/1/2021 w/ [facility]. Pt is from home w/ her  and is currently at her baseline. Enriqueta w/ [facility] notified of pt's DC today (order placed). ODALIS orders entered.     UPD

## 2021-06-14 NOTE — TELEPHONE ENCOUNTER
INPATIENT ORDERS:  - d/w Dr. Keegan Ruvalcaba - RN to please call patient in 2 weeks to see how she is doing   - needs f/u with Dr. Keegan Ruvalcaba at one point to evaluate foreign body in rectum (known) seen on CT  - please send Dr. Keegan Ruvalcaba update in 2 week

## 2021-06-14 NOTE — TELEPHONE ENCOUNTER
Noted and appreciated. Placed pt outreach message for 6/28/21 to reach out to patient for symptom update in 2 weeks.

## 2021-06-14 NOTE — PROGRESS NOTES
[facility]    Progress Note    Eva Ruiz Patient Status:  Inpatient    1945 MRN [id number]   Location [facility] 3W/SW Attending Genevieve Weinstein, [address] Day # 3 PCP Claus Dubose MD     CARDIOLOGY ATTENDI 3.1 05/30/2021    TROP <0.045 06/11/2021    CK 30 10/31/2019    B12 550 07/20/2020       No results found.         Assessment & Plan:   Acute HFpEF  -hypervolemia resolved   -echo EF 54% NWM mild to mod MVR increased from prior   -continue PO lasix 40 mg da

## 2021-06-24 ENCOUNTER — OFFICE VISIT (OUTPATIENT)
Dept: CARDIOLOGY CLINIC | Facility: HOSPITAL | Age: 76
End: 2021-06-24
Attending: NURSE PRACTITIONER
Payer: MEDICARE

## 2021-06-24 VITALS — HEART RATE: 92 BPM | OXYGEN SATURATION: 99 % | DIASTOLIC BLOOD PRESSURE: 67 MMHG | SYSTOLIC BLOOD PRESSURE: 95 MMHG

## 2021-06-24 DIAGNOSIS — I50.9 HEART FAILURE, UNSPECIFIED (HCC): ICD-10-CM

## 2021-06-24 DIAGNOSIS — S81.801D LEG WOUND, RIGHT, SUBSEQUENT ENCOUNTER: ICD-10-CM

## 2021-06-24 DIAGNOSIS — I48.91 ATRIAL FIBRILLATION AND FLUTTER (HCC): ICD-10-CM

## 2021-06-24 DIAGNOSIS — Z98.890 S/P MITRAL VALVE CLIP IMPLANTATION: ICD-10-CM

## 2021-06-24 DIAGNOSIS — I48.92 ATRIAL FIBRILLATION AND FLUTTER (HCC): ICD-10-CM

## 2021-06-24 DIAGNOSIS — E87.6 HYPOKALEMIA: ICD-10-CM

## 2021-06-24 DIAGNOSIS — Z95.818 S/P MITRAL VALVE CLIP IMPLANTATION: ICD-10-CM

## 2021-06-24 DIAGNOSIS — I34.0 NON-RHEUMATIC MITRAL REGURGITATION: ICD-10-CM

## 2021-06-24 DIAGNOSIS — I50.33 ACUTE ON CHRONIC DIASTOLIC CONGESTIVE HEART FAILURE (HCC): Primary | ICD-10-CM

## 2021-06-24 PROCEDURE — 36415 COLL VENOUS BLD VENIPUNCTURE: CPT | Performed by: NURSE PRACTITIONER

## 2021-06-24 PROCEDURE — 83880 ASSAY OF NATRIURETIC PEPTIDE: CPT | Performed by: NURSE PRACTITIONER

## 2021-06-24 PROCEDURE — 99214 OFFICE O/P EST MOD 30 MIN: CPT | Performed by: NURSE PRACTITIONER

## 2021-06-24 PROCEDURE — 80048 BASIC METABOLIC PNL TOTAL CA: CPT | Performed by: NURSE PRACTITIONER

## 2021-06-24 PROCEDURE — 99213 OFFICE O/P EST LOW 20 MIN: CPT | Performed by: NURSE PRACTITIONER

## 2021-06-24 NOTE — PROGRESS NOTES
[address] Patient Status:  No patient class for patient encounter    1945 MRN [id number]   Location MD Dr. Abdulkadir Mckeon is a 2363. + RSV , CXR noted mild fluid congestion, improved after 1 dose of IV lasix. Lactic acid level elevated. Improved with nebs, 02, IV /oral steroids. Also treated for recurrent UTI.      Admitted 12/30/19-1/2/2020 with acute cystitis     Admitted 11/24-1 06/12/2021 06:41 AM    HGB 13.0 06/12/2021 06:41 AM    HCT 41.7 06/12/2021 06:41 AM    .0 06/12/2021 06:41 AM    CREATSERUM 0.44 (L) 06/14/2021 05:03 AM    BUN 28 (H) 06/14/2021 05:03 AM     06/14/2021 05:03 AM    K 4.7 06/14/2021 05:03 AM RVS 56 mmHg    Echo: 1/23/2020, EF 55-60%, mild LVH, no wma, mild MR, severe bryan atrial  dilation    Education:  Patient and  instructed at length regarding clinic procedures, hours, purpose of clinic visits, sodium restricted diet, low sodium foods Continue all your same medications including potassium chloride 40 meq twice daily     Call if having any dizziness, lightheadedness, heart racing, palpitations, chest pain, shortness of breath, coughing,  wheezing, swelling, weight gain,  or weakness Take 1 tablet (5 mg total) by mouth once daily. , Disp: 90 tablet, Rfl: 3  •  nitroGLYCERIN 0.4 MG Sublingual SL Tab, Place 0.4 mg under the tongue every 5 (five) minutes as needed for Chest pain (or sob , can take 3 doses then call Dr. Dank Castro). , Disp: , Rf

## 2021-06-28 NOTE — TELEPHONE ENCOUNTER
Dr. Robin Lugo-    Please advise when patient should follow up. I called and spoke with Mila Dee for a symptom update as requested 2 weeks from discharge. She reports significant improvement and is feeling much better.  She has noticed that since yes

## 2021-07-01 ENCOUNTER — TELEPHONE (OUTPATIENT)
Dept: GASTROENTEROLOGY | Facility: CLINIC | Age: 76
End: 2021-07-01

## 2021-07-01 NOTE — TELEPHONE ENCOUNTER
Please make sure that the patient is drinking plenty of fluids and has adequate fiber intake. She may decrease the budesonide to 3 mg daily.   She may keep the appointment with Laxmi Riggs on July 22

## 2021-07-01 NOTE — TELEPHONE ENCOUNTER
Patient states her colostomy bag is not working well. She states she is having constipation and is having pain.  Please follow up

## 2021-07-01 NOTE — TELEPHONE ENCOUNTER
Please see patient message below. C/o hard stools difficult to pass into colostomy bag. Asking if medications need to be adjusted. OK to book [facility] F/U with Katie Medellin on 7/22? LOV 6/4/21 for recurrent C diff, abnormal abdominal CT scan.  Hx of collagenous/l

## 2021-07-01 NOTE — TELEPHONE ENCOUNTER
I called the patient. Encouraged fluid and fiber intake. Discussed foods high in fiber. Provided instructions for reduction of budesonide to 3 mg (1 capsule) daily. She verbalized her understanding. Updated med list.   Natalya Sampson F/U with Liban Mon 7/22.  A

## 2021-07-05 ENCOUNTER — HOSPITAL ENCOUNTER (INPATIENT)
Facility: HOSPITAL | Age: 76
LOS: 2 days | Discharge: HOME HEALTH | DRG: 392 | End: 2021-07-07
Attending: EMERGENCY MEDICINE | Admitting: INTERNAL MEDICINE
Payer: MEDICARE

## 2021-07-05 DIAGNOSIS — R10.30 LOWER ABDOMINAL PAIN: ICD-10-CM

## 2021-07-05 DIAGNOSIS — T18.5XXD: Primary | ICD-10-CM

## 2021-07-05 LAB
ANION GAP SERPL CALC-SCNC: 7 MMOL/L (ref 0–18)
BASOPHILS # BLD AUTO: 0.05 X10(3) UL (ref 0–0.2)
BASOPHILS NFR BLD AUTO: 0.5 %
BUN BLD-MCNC: 22 MG/DL (ref 7–18)
BUN/CREAT SERPL: 41.5 (ref 10–20)
CALCIUM BLD-MCNC: 9.4 MG/DL (ref 8.5–10.1)
CHLORIDE SERPL-SCNC: 107 MMOL/L (ref 98–112)
CO2 SERPL-SCNC: 25 MMOL/L (ref 21–32)
CREAT BLD-MCNC: 0.53 MG/DL
DEPRECATED RDW RBC AUTO: 50.4 FL (ref 35.1–46.3)
EOSINOPHIL # BLD AUTO: 0.1 X10(3) UL (ref 0–0.7)
EOSINOPHIL NFR BLD AUTO: 1 %
ERYTHROCYTE [DISTWIDTH] IN BLOOD BY AUTOMATED COUNT: 15.7 % (ref 11–15)
GLUCOSE BLD-MCNC: 100 MG/DL (ref 70–99)
HCT VFR BLD AUTO: 45.1 %
HGB BLD-MCNC: 13.4 G/DL
IMM GRANULOCYTES # BLD AUTO: 0.05 X10(3) UL (ref 0–1)
IMM GRANULOCYTES NFR BLD: 0.5 %
LYMPHOCYTES # BLD AUTO: 2.82 X10(3) UL (ref 1–4)
LYMPHOCYTES NFR BLD AUTO: 28.3 %
MCH RBC QN AUTO: 26 PG (ref 26–34)
MCHC RBC AUTO-ENTMCNC: 29.7 G/DL (ref 31–37)
MCV RBC AUTO: 87.4 FL
MONOCYTES # BLD AUTO: 0.9 X10(3) UL (ref 0.1–1)
MONOCYTES NFR BLD AUTO: 9 %
NEUTROPHILS # BLD AUTO: 6.05 X10 (3) UL (ref 1.5–7.7)
NEUTROPHILS # BLD AUTO: 6.05 X10(3) UL (ref 1.5–7.7)
NEUTROPHILS NFR BLD AUTO: 60.7 %
OSMOLALITY SERPL CALC.SUM OF ELEC: 291 MOSM/KG (ref 275–295)
PLATELET # BLD AUTO: 226 10(3)UL (ref 150–450)
POTASSIUM SERPL-SCNC: 5.1 MMOL/L (ref 3.5–5.1)
RBC # BLD AUTO: 5.16 X10(6)UL
SARS-COV-2 RNA RESP QL NAA+PROBE: NOT DETECTED
SODIUM SERPL-SCNC: 139 MMOL/L (ref 136–145)
WBC # BLD AUTO: 10 X10(3) UL (ref 4–11)

## 2021-07-05 RX ORDER — VENLAFAXINE HYDROCHLORIDE 75 MG/1
75 CAPSULE, EXTENDED RELEASE ORAL DAILY
Status: DISCONTINUED | OUTPATIENT
Start: 2021-07-06 | End: 2021-07-07

## 2021-07-05 RX ORDER — EZETIMIBE 10 MG/1
10 TABLET ORAL DAILY
Status: DISCONTINUED | OUTPATIENT
Start: 2021-07-05 | End: 2021-07-07

## 2021-07-05 RX ORDER — VANCOMYCIN HYDROCHLORIDE 125 MG/1
125 CAPSULE ORAL EVERY 12 HOURS SCHEDULED
Status: DISCONTINUED | OUTPATIENT
Start: 2021-07-05 | End: 2021-07-07

## 2021-07-05 RX ORDER — TRAMADOL HYDROCHLORIDE 50 MG/1
50 TABLET ORAL EVERY 6 HOURS PRN
Status: DISCONTINUED | OUTPATIENT
Start: 2021-07-05 | End: 2021-07-07

## 2021-07-05 RX ORDER — MELATONIN
325
Status: DISCONTINUED | OUTPATIENT
Start: 2021-07-06 | End: 2021-07-07

## 2021-07-05 RX ORDER — ONDANSETRON 2 MG/ML
4 INJECTION INTRAMUSCULAR; INTRAVENOUS EVERY 4 HOURS PRN
Status: DISCONTINUED | OUTPATIENT
Start: 2021-07-05 | End: 2021-07-05

## 2021-07-05 RX ORDER — METOPROLOL TARTRATE 5 MG/5ML
10 INJECTION INTRAVENOUS ONCE
Status: COMPLETED | OUTPATIENT
Start: 2021-07-05 | End: 2021-07-05

## 2021-07-05 RX ORDER — MORPHINE SULFATE 2 MG/ML
0.5 INJECTION, SOLUTION INTRAMUSCULAR; INTRAVENOUS EVERY 4 HOURS PRN
Status: DISCONTINUED | OUTPATIENT
Start: 2021-07-05 | End: 2021-07-07

## 2021-07-05 RX ORDER — PREDNISONE 1 MG/1
5 TABLET ORAL DAILY
Status: DISCONTINUED | OUTPATIENT
Start: 2021-07-06 | End: 2021-07-07

## 2021-07-05 RX ORDER — DEXTROSE AND SODIUM CHLORIDE 5; .45 G/100ML; G/100ML
INJECTION, SOLUTION INTRAVENOUS CONTINUOUS
Status: DISCONTINUED | OUTPATIENT
Start: 2021-07-05 | End: 2021-07-07

## 2021-07-05 RX ORDER — FLUTICASONE PROPIONATE 50 MCG
1 SPRAY, SUSPENSION (ML) NASAL DAILY
Status: DISCONTINUED | OUTPATIENT
Start: 2021-07-05 | End: 2021-07-07

## 2021-07-05 RX ORDER — ACETAMINOPHEN 325 MG/1
650 TABLET ORAL EVERY 6 HOURS PRN
Status: DISCONTINUED | OUTPATIENT
Start: 2021-07-05 | End: 2021-07-07

## 2021-07-05 RX ORDER — BISACODYL 10 MG
10 SUPPOSITORY, RECTAL RECTAL
Status: DISCONTINUED | OUTPATIENT
Start: 2021-07-05 | End: 2021-07-07

## 2021-07-05 RX ORDER — METOPROLOL TARTRATE 5 MG/5ML
5 INJECTION INTRAVENOUS EVERY 6 HOURS PRN
Status: DISCONTINUED | OUTPATIENT
Start: 2021-07-05 | End: 2021-07-07

## 2021-07-05 RX ORDER — FUROSEMIDE 40 MG/1
40 TABLET ORAL DAILY
Status: DISCONTINUED | OUTPATIENT
Start: 2021-07-06 | End: 2021-07-07

## 2021-07-05 RX ORDER — METOCLOPRAMIDE HYDROCHLORIDE 5 MG/ML
10 INJECTION INTRAMUSCULAR; INTRAVENOUS EVERY 8 HOURS PRN
Status: DISCONTINUED | OUTPATIENT
Start: 2021-07-05 | End: 2021-07-07

## 2021-07-05 RX ORDER — ONDANSETRON 2 MG/ML
4 INJECTION INTRAMUSCULAR; INTRAVENOUS EVERY 6 HOURS PRN
Status: DISCONTINUED | OUTPATIENT
Start: 2021-07-05 | End: 2021-07-07

## 2021-07-05 RX ORDER — ACETAMINOPHEN 325 MG/1
325 TABLET ORAL EVERY 6 HOURS PRN
Status: DISCONTINUED | OUTPATIENT
Start: 2021-07-05 | End: 2021-07-07

## 2021-07-05 RX ORDER — POLYETHYLENE GLYCOL 3350 17 G/17G
17 POWDER, FOR SOLUTION ORAL DAILY PRN
Status: DISCONTINUED | OUTPATIENT
Start: 2021-07-05 | End: 2021-07-07

## 2021-07-05 RX ORDER — DEXTROSE, SODIUM CHLORIDE, AND POTASSIUM CHLORIDE 5; .45; .15 G/100ML; G/100ML; G/100ML
INJECTION INTRAVENOUS CONTINUOUS
Status: DISCONTINUED | OUTPATIENT
Start: 2021-07-05 | End: 2021-07-05

## 2021-07-05 RX ORDER — SODIUM PHOSPHATE, DIBASIC AND SODIUM PHOSPHATE, MONOBASIC 7; 19 G/133ML; G/133ML
1 ENEMA RECTAL ONCE AS NEEDED
Status: DISCONTINUED | OUTPATIENT
Start: 2021-07-05 | End: 2021-07-07

## 2021-07-05 RX ORDER — ATORVASTATIN CALCIUM 40 MG/1
40 TABLET, FILM COATED ORAL NIGHTLY
Status: DISCONTINUED | OUTPATIENT
Start: 2021-07-05 | End: 2021-07-07

## 2021-07-05 RX ORDER — BUDESONIDE 3 MG/1
3 CAPSULE, COATED PELLETS ORAL EVERY MORNING
Status: DISCONTINUED | OUTPATIENT
Start: 2021-07-06 | End: 2021-07-07

## 2021-07-05 RX ORDER — METOPROLOL SUCCINATE 50 MG/1
50 TABLET, EXTENDED RELEASE ORAL 2 TIMES DAILY
Status: DISCONTINUED | OUTPATIENT
Start: 2021-07-05 | End: 2021-07-07

## 2021-07-05 RX ORDER — ALBUTEROL SULFATE 90 UG/1
2 AEROSOL, METERED RESPIRATORY (INHALATION) EVERY 4 HOURS PRN
Status: DISCONTINUED | OUTPATIENT
Start: 2021-07-05 | End: 2021-07-07

## 2021-07-05 RX ORDER — PREGABALIN 25 MG/1
50 CAPSULE ORAL 3 TIMES DAILY
Status: DISCONTINUED | OUTPATIENT
Start: 2021-07-05 | End: 2021-07-07

## 2021-07-05 RX ORDER — CETIRIZINE HYDROCHLORIDE 10 MG/1
10 TABLET ORAL DAILY
Status: DISCONTINUED | OUTPATIENT
Start: 2021-07-06 | End: 2021-07-07

## 2021-07-05 RX ORDER — NITROGLYCERIN 0.4 MG/1
0.4 TABLET SUBLINGUAL EVERY 5 MIN PRN
Status: DISCONTINUED | OUTPATIENT
Start: 2021-07-05 | End: 2021-07-07

## 2021-07-05 NOTE — ED QUICK NOTES
Orders for admission, patient is aware of plan and ready to go upstairs. Any questions, please call ED JOLYNN hudson at extension [phone].      Type of COVID test sent: Rapid Pending  COVID Suspicion level: Low    LOC at time of transport: A/O x 4    Other pertinen

## 2021-07-05 NOTE — ED INITIAL ASSESSMENT (HPI)
Patient c/o abdominal pain started yesterday, stated that she has colostomy and filled with hard stool + \"a lot of green watery+ foul smelling stool coming to her rectum\", denies fever

## 2021-07-05 NOTE — H&P
[facility] Hospitalist H&P       CC: Patient presents with:  Abdominal Pain       PCP: Xavi Pelletier MD    History of Present Illness: Patient is a 64year old female with multiple medical problems including ulcerative colitis, mitral valve prolapse, Other and unspecified hyperlipidemia    • OTHER DISEASES     spastic paraparesis   • OTHER DISEASES     cataracts   • OTHER DISEASES     secondary poycythemia   • OTHER DISEASES     lumbar spinal stenosis   • OTHER DISEASES     duplicated left renal collec • PATIENT DOCUMENTED NOT TO HAVE EXPERIENCED ANY OF THE FOLLOWING EVENTS N/A 10/27/2014    Procedure: CERVICAL EPIDURAL;  Surgeon: Cherylene Deters, MD;  Location: [facility] FOR PAIN MANAGEMENT   • PATIENT DOCUMENTED NOT TO HAVE EXPERIENCED ANY OF THE FOL 8/14/12  Penicillins             RASH, DIARRHEA    Comment:As a child.   Localized rash at injection site             CLASS             Patient tolerated cefepime 10/1/18 admission  Shellfish               ANAPHYLAXIS  Shellfish-Derived P*    ANAPHYLAXIS  T 139   K 5.1      CO2 25.0   BUN 22*   CREATSERUM 0.53*   *   CA 9.4       No results for input(s): ALT, AST, ALB, AMYLASE, LIPASE, LDH in the last 168 hours.     Invalid input(s): ALPHOS, TBIL, DBIL, TPROT    No results for input(s): TROP in th NPO    DVT Prophy: eliquis   Atrophy: IS  Lines: Piv     Dispo: pending clinical course    Outpatient records or previous hospital records reviewed. Further recommendations pending patient's clinical course.   [facility] hospitalist to continue to follow patinelly

## 2021-07-05 NOTE — ED PROVIDER NOTES
Patient Seen in: [facility] Emergency Department    History   Patient presents with:  Abdominal Pain      HPI    Patient presents to the ED complaining of pain to her lower abdomen that started yesterday.   She states that she is currently recoveri DISEASES     ulcerative colitis   • OTHER DISEASES     uti   • Pneumonia, organism unspecified(486)    • Reflux    • Self-catheterizes urinary bladder 1/11/2016   • Ulcerative colitis ([facility] [address]    • Valvular disease     mitral valve prolapse- 3 clips placed CERVICAL EPIDURAL;  Surgeon: Balwinder Siddiqi MD;  Location: [address] [place] [address]  PAIN MANAGEMENT   • PATIENT DOCUMENTED NOT TO HAVE EXPERIENCED ANY OF THE FOLLOWING EVENTS N/A 1/5/2015    Procedure: ESOPHAGOGASTRODUODENOSCOPY W/ DILATION;  Surgeon: Rukhsana French Not on file      Number of children: Not on file      Years of education: Not on file      Highest education level: Not on file    Tobacco Use      Smoking status: Former Smoker      Smokeless tobacco: Never Used      Tobacco comment: pt smoked until 20 yr distension. Palpations: Abdomen is soft. Tenderness: There is abdominal tenderness. Comments: Lower abdominal tenderness, no rebound or guarding. Colostomy bag with formed stool present in the bag. Stoma pink and well perfused.    [address] Height: 162.6 cm (5' 4\")       *I personally reviewed and interpreted all ED vitals.     Pulse Ox: 96%, Room air, Normal     Differential Diagnosis/ Diagnostic Considerations: Rectal foreign body, nonspecific bowel pain    Medical Record Review: I person

## 2021-07-06 ENCOUNTER — APPOINTMENT (OUTPATIENT)
Dept: GENERAL RADIOLOGY | Facility: HOSPITAL | Age: 76
DRG: 392 | End: 2021-07-06
Attending: INTERNAL MEDICINE
Payer: MEDICARE

## 2021-07-06 LAB
ANION GAP SERPL CALC-SCNC: 9 MMOL/L (ref 0–18)
BASOPHILS # BLD AUTO: 0.03 X10(3) UL (ref 0–0.2)
BASOPHILS NFR BLD AUTO: 0.5 %
BUN BLD-MCNC: 15 MG/DL (ref 7–18)
BUN/CREAT SERPL: 37.5 (ref 10–20)
C DIFF TOX B STL QL: POSITIVE
CALCIUM BLD-MCNC: 8.7 MG/DL (ref 8.5–10.1)
CHLORIDE SERPL-SCNC: 109 MMOL/L (ref 98–112)
CO2 SERPL-SCNC: 22 MMOL/L (ref 21–32)
CREAT BLD-MCNC: 0.4 MG/DL
DEPRECATED RDW RBC AUTO: 50.1 FL (ref 35.1–46.3)
EOSINOPHIL # BLD AUTO: 0.04 X10(3) UL (ref 0–0.7)
EOSINOPHIL NFR BLD AUTO: 0.7 %
ERYTHROCYTE [DISTWIDTH] IN BLOOD BY AUTOMATED COUNT: 15.7 % (ref 11–15)
GLUCOSE BLD-MCNC: 105 MG/DL (ref 70–99)
HAV IGM SER QL: 2.2 MG/DL (ref 1.6–2.6)
HCT VFR BLD AUTO: 38.3 %
HGB BLD-MCNC: 11.5 G/DL
IMM GRANULOCYTES # BLD AUTO: 0.03 X10(3) UL (ref 0–1)
IMM GRANULOCYTES NFR BLD: 0.5 %
LYMPHOCYTES # BLD AUTO: 2.09 X10(3) UL (ref 1–4)
LYMPHOCYTES NFR BLD AUTO: 34.4 %
MCH RBC QN AUTO: 26.2 PG (ref 26–34)
MCHC RBC AUTO-ENTMCNC: 30 G/DL (ref 31–37)
MCV RBC AUTO: 87.2 FL
MONOCYTES # BLD AUTO: 0.7 X10(3) UL (ref 0.1–1)
MONOCYTES NFR BLD AUTO: 11.5 %
NEUTROPHILS # BLD AUTO: 3.18 X10 (3) UL (ref 1.5–7.7)
NEUTROPHILS # BLD AUTO: 3.18 X10(3) UL (ref 1.5–7.7)
NEUTROPHILS NFR BLD AUTO: 52.4 %
OSMOLALITY SERPL CALC.SUM OF ELEC: 291 MOSM/KG (ref 275–295)
PHOSPHATE SERPL-MCNC: 2.6 MG/DL (ref 2.5–4.9)
PLATELET # BLD AUTO: 180 10(3)UL (ref 150–450)
POTASSIUM SERPL-SCNC: 4.3 MMOL/L (ref 3.5–5.1)
RBC # BLD AUTO: 4.39 X10(6)UL
SODIUM SERPL-SCNC: 140 MMOL/L (ref 136–145)
WBC # BLD AUTO: 6.1 X10(3) UL (ref 4–11)

## 2021-07-06 PROCEDURE — 0DCP8ZZ EXTIRPATION OF MATTER FROM RECTUM, VIA NATURAL OR ARTIFICIAL OPENING ENDOSCOPIC: ICD-10-PCS | Performed by: INTERNAL MEDICINE

## 2021-07-06 PROCEDURE — 99222 1ST HOSP IP/OBS MODERATE 55: CPT | Performed by: INTERNAL MEDICINE

## 2021-07-06 PROCEDURE — 0DBP8ZX EXCISION OF RECTUM, VIA NATURAL OR ARTIFICIAL OPENING ENDOSCOPIC, DIAGNOSTIC: ICD-10-PCS | Performed by: INTERNAL MEDICINE

## 2021-07-06 PROCEDURE — 45331 SIGMOIDOSCOPY AND BIOPSY: CPT | Performed by: INTERNAL MEDICINE

## 2021-07-06 PROCEDURE — G0500 MOD SEDAT ENDO SERVICE >5YRS: HCPCS | Performed by: INTERNAL MEDICINE

## 2021-07-06 PROCEDURE — 74018 RADEX ABDOMEN 1 VIEW: CPT | Performed by: INTERNAL MEDICINE

## 2021-07-06 RX ORDER — MIDAZOLAM HYDROCHLORIDE 1 MG/ML
INJECTION INTRAMUSCULAR; INTRAVENOUS
Status: DISCONTINUED | OUTPATIENT
Start: 2021-07-06 | End: 2021-07-06 | Stop reason: HOSPADM

## 2021-07-06 NOTE — PLAN OF CARE
Problem: Patient Centered Care  Goal: Patient preferences are identified and integrated in the patient's plan of care  Description: Interventions:  - What would you like us to know as we care for you?  Pt lives with , Lizeth Many  - Provide timely, compl INTERVENTIONS:  - Encourage pt to monitor pain and request assistance  - Assess pain using appropriate pain scale  - Administer analgesics based on type and severity of pain and evaluate response  - Implement non-pharmacological measures as appropriate and medications  - Encourage mobilization and activity  - Obtain nutritional consult as needed  - Establish a toileting routine/schedule  - Consider collaborating with pharmacy to review patient's medication profile  Outcome: Progressing     PT on contact/ente

## 2021-07-06 NOTE — CONSULTS
[facility] [address]   Gastroenterology Consultation Note    Graylin Pontiff  Patient Status:    Inpatient  Date of Admission:         7/5/2021, Hospital day #1  Attending:   Da Levin DO  PCP:     Chava Sanders MD    Reason for Co surgery    • Hypercholesterolemia    • HYPERLIPIDEMIA    • Irritable bowel syndrome    • Lymphocytic colitis Colon= 5/7/12   • MENOPAUSE    • MITRAL VALVE PROLAPSE    • Neuropathy     bilateral legs;  Hereditary spastic paraplegia X 20 years    • [facility]   • INJECTION, W/WO CONTRAST, DX/THERAPEUTIC SUBSTANCE, EPIDURAL/SUBARACHNOID; CERVICAL/THORACIC N/A 12/8/2014    Procedure: CERVICAL EPIDURAL;  Surgeon: Loni Tyson MD;  Location: [facility] FOR PAIN MANAGEMENT   • [facility], [facility]   • VALVE REPAIR       Family History   Problem Relation Age of Onset   • Cancer Father    • Heart Disorder Father    • Eye Problems Father         glaucoma   • DVT/VTE Father    • Diabetes Mother    • Diabetes Sister    • Eye Problems mg, Oral, Q6H PRN  •  apixaban (ELIQUIS) tab 5 mg, 5 mg, Oral, BID  •  atorvastatin (LIPITOR) tab 40 mg, 40 mg, Oral, Nightly  •  Budesonide (ENTOCORT EC) 24 hr cap 3 mg, 3 mg, Oral, QAM  •  furosemide (LASIX) tab 40 mg, 40 mg, Oral, Daily  •  metoprolol s Take 2 capsules (6 mg total) by mouth every morning. (Patient taking differently: Take 3 mg by mouth every morning.  ), Disp: 180 capsule, Rfl: 1  predniSONE 5 MG Oral Tab, Take 1 tablet (5 mg total) by mouth once daily. , Disp: 90 tablet, Rfl: 3, 7/4/2021 negative for severe shortness of breath  CARDIOVASCULAR:  negative for crushing sub-sternal chest pain  GASTROINTESTINAL:  see HPI  GENITOURINARY:  negative for dysuria or gross hematuria  INTEGUMENT/BREAST:  SKIN:  negative for jaundice   ALLERGIC/IMMUNOL in early June and stools had been responding to oral vancomycin taper currently on vancomycin 125 mg 2 times a day. Additionally she has been on budesonide 3mg daily maintenance for lymphocytic colitis.   The onset of her upper abdominal symptoms appear to

## 2021-07-06 NOTE — PROGRESS NOTES
[facility] Hospitalist Progress Note     CC: Hospital Follow up    PCP: Mercedes Rivera MD       Assessment/Plan:   Patient is a 64year old female with multiple medical problems including ulcerative colitis, mitral valve prolapse, depression, Gout, GERD, OBJECTIVE:    Blood pressure 116/69, pulse 108, temperature 97.7 °F (36.5 °C), temperature source Oral, resp. rate 18, height 5' 4\" (1.626 m), weight 129 lb 9.6 oz (58.8 kg), SpO2 98 %, not currently breastfeeding.     Temp:  [97.7 °F (36.5 °C)-98.6 125 mg Oral 2 times per day   • ezetimibe  10 mg Oral Daily   • Fluticasone Propionate  1 spray Each Nare Daily   • cetirizine  10 mg Oral Daily   • pregabalin  50 mg Oral TID   • Venlafaxine HCl ER  75 mg Oral Daily   • ferrous sulfate  325 mg Oral Daily

## 2021-07-06 NOTE — PROGRESS NOTES
07/06/21 1019   Wound 07/05/21 Pressure Injury Ankle Left;Lateral   Date First Assessed/Time First Assessed: 07/05/21 2200   Present on Hospital Admission: Yes  Primary Wound Type: Pressure Injury  Location: Ankle  Wound Location Orientation: Left;Later OTHER DISEASES     cataracts   • OTHER DISEASES     secondary poycythemia   • OTHER DISEASES     lumbar spinal stenosis   • OTHER DISEASES     duplicated left renal collecting system   • OTHER DISEASES     uterine fibroids   • OTHER DISEASES     ulcerative FOLLOWING EVENTS N/A 10/27/2014    Procedure: CERVICAL EPIDURAL;  Surgeon: Tri Liu MD;  Location: [address] [place] [address]   • PATIENT DOCUMENTED NOT TO HAVE EXPERIENCED ANY OF THE FOLLOWING EVENTS N/A 12/8/2014    Procedure: CERVICAL EPIDURA Dietary consult for recommendations for nutrition to optimize wound healing  Turn schedules  Heels elevated using pillows, heel wedge or heel boots to offload heels  To prevent sliding: decrease head of bed and elevate foot of bed as medical condition tiarra 06/11/2021    BILT 1.2 06/11/2021    TP 6.6 06/11/2021    AST 23 06/11/2021    ALT 34 06/11/2021    LIP 69 (L) 06/11/2021    MG 2.2 07/06/2021    PHOS 2.6 07/06/2021     No results found for: PREALBUMIN      Time Spent 30 Minutes.     Clarissa Jaimes,

## 2021-07-06 NOTE — OPERATIVE REPORT
FLEXIBLE SIGMOIDOSCOPY REPORT    Martinan Call     1945 Age 64year old   PCP Emily Garcia MD Endoscopist Jerome Vaca MD     Date of procedure: 21    Procedure: Flexible sigmoidoscopy w/biopsies     Pre-operative diagnosis: Milton Barahona Complications: none. Findings:   A digital rectal exam revealed reduced rectal tone, no masses palpated. However the mobile hard object could be palpated.  The video gastroscope was gently inserted into the rectum and identified a large hard inspissa

## 2021-07-06 NOTE — H&P (VIEW-ONLY)
[facility]   Gastroenterology Consultation Note    Simona Bravo  Patient Status:    Inpatient  Date of Admission:         7/5/2021, Hospital day #1  Attending:   Sydni Ponce DO  PCP:     Mercedes Rivera MD    Reason for Co surgery    • Hypercholesterolemia    • HYPERLIPIDEMIA    • Irritable bowel syndrome    • Lymphocytic colitis Colon= 5/7/12   • MENOPAUSE    • MITRAL VALVE PROLAPSE    • Neuropathy     bilateral legs;  Hereditary spastic paraplegia X 20 years    • [facility]   • INJECTION, W/WO CONTRAST, DX/THERAPEUTIC SUBSTANCE, EPIDURAL/SUBARACHNOID; CERVICAL/THORACIC N/A 12/8/2014    Procedure: CERVICAL EPIDURAL;  Surgeon: Nila Ibarra MD;  Location: [facility] FOR PAIN MANAG[facility]   • [facility]   • VALVE REPAIR       Family History   Problem Relation Age of Onset   • Cancer Father    • Heart Disorder Father    • Eye Problems Father         glaucoma   • DVT/VTE Father    • Diabetes Mother    • Diabetes Sister    • Eye Problems mg, Oral, Q6H PRN  •  apixaban (ELIQUIS) tab 5 mg, 5 mg, Oral, BID  •  atorvastatin (LIPITOR) tab 40 mg, 40 mg, Oral, Nightly  •  Budesonide (ENTOCORT EC) 24 hr cap 3 mg, 3 mg, Oral, QAM  •  furosemide (LASIX) tab 40 mg, 40 mg, Oral, Daily  •  metoprolol s Take 2 capsules (6 mg total) by mouth every morning. (Patient taking differently: Take 3 mg by mouth every morning.  ), Disp: 180 capsule, Rfl: 1  predniSONE 5 MG Oral Tab, Take 1 tablet (5 mg total) by mouth once daily. , Disp: 90 tablet, Rfl: 3, 7/4/2021 negative for severe shortness of breath  CARDIOVASCULAR:  negative for crushing sub-sternal chest pain  GASTROINTESTINAL:  see HPI  GENITOURINARY:  negative for dysuria or gross hematuria  INTEGUMENT/BREAST:  SKIN:  negative for jaundice   ALLERGIC/IMMUNOL in early June and stools had been responding to oral vancomycin taper currently on vancomycin 125 mg 2 times a day. Additionally she has been on budesonide 3mg daily maintenance for lymphocytic colitis.   The onset of her upper abdominal symptoms appear to

## 2021-07-06 NOTE — PHYSICAL THERAPY NOTE
PHYSICAL THERAPY EVALUATION - INPATIENT     Room Number: 562/562-A  Evaluation Date: 7/6/2021  Type of Evaluation: Initial   Physician Order: PT Eval and Treat    Presenting Problem: Abd pain--pt with multipe recent acute admission.    PMH sign for recurre fall prevention and use of call light / fxn mobility training with use of lift to chair in room as no w/c is present ,  DC planning.      Pt with max assist for bed mobility . Pt upon sitting at EOB with poor trunk control and required min assist support . prior to DC home. Therapy recommends family training visit with spouse prior to DC as needed.      Patient will benefit from continued IP PT services to address these deficits in preparation for discharge.     ss these deficits in preparation for discharg antidepressants     Primary osteoarthritis involving multiple joints  (primary encounter diagnosis)  Chronic steroid use  -per chart review extensive osteoarthritis on prior x-rays: spine, knees, ankle, shoulders  -tramadol and acetaminophen PRN  -on predn OTHER DISEASES     uti   • Pneumonia, organism unspecified(486)    • Reflux    • Self-catheterizes urinary bladder 1/11/2016   • Ulcerative colitis ([facility] [address]    • Valvular disease     mitral valve prolapse- 3 clips placed       Past Surgical History  Past Surg CERVICAL EPIDURAL;  Surgeon: Daysi Christian MD;  Location: [address] [place] [address]  PAIN MANAGEMENT   • PATIENT DOCUMENTED NOT TO HAVE EXPERIENCED ANY OF THE FOLLOWING EVENTS N/A 1/5/2015    Procedure: ESOPHAGOGASTRODUODENOSCOPY W/ DILATION;  Surgeon: Tiffany Mensah Management Techniques:  Activity promotion;Relaxation;Repositioning    COGNITION  · Overall Cognitive Status:  WFL - within functional limits    RANGE OF motion / strength     UE ROM is grossly WFL , pt with mild generalized proximal shoulder weakness not home    Goal #1 Patient is able to demonstrate supine - sit EOB @ level: moderate assistance  Decrease cg burden    Goal #1   Current Status    Goal #2 Patient is able to demonstrate transfers EOB to/from Chair/Wheelchair at assistance level: moderate assi

## 2021-07-06 NOTE — PLAN OF CARE
Problem: Patient Centered Care  Goal: Patient preferences are identified and integrated in the patient's plan of care  Description: Interventions:  - What would you like us to know as we care for you?  Home with    - Provide timely, complete, and a Assess pain using appropriate pain scale  - Administer analgesics based on type and severity of pain and evaluate response  - Implement non-pharmacological measures as appropriate and evaluate response  - Consider cultural and social influences on pain and

## 2021-07-06 NOTE — INTERVAL H&P NOTE
Pre-op Diagnosis: possible foreign body , mucous discharge    The above referenced H&P was reviewed by Moshe Storey MD on 7/6/2021, the patient was examined and no significant changes have occurred in the patient's condition since the H&P was performed.

## 2021-07-06 NOTE — OCCUPATIONAL THERAPY NOTE
OCCUPATIONAL THERAPY EVALUATION - INPATIENT     Room Number: [facility] ENDO POOL ROOM/[facility] ENDO POOL ROOM  Evaluation Date: 7/6/2021  Type of Evaluation: Initial       Physician Order: IP Consult to Occupational Therapy  Reason for Therapy: ADL/IADL Dysfunction a function and increase patient participation, safety, and independence with basic ADL and everyday activities. DISCHARGE RECOMMENDATIONS: Home with HH + 24 hour SPV           PLAN  OT Treatment Plan: ADL training;UE strengthening/ROM; Functional trans Reflux    • Self-catheterizes urinary bladder 1/11/2016   • Ulcerative colitis ([facility] [address]    • Valvular disease     mitral valve prolapse- 3 clips placed       Past Surgical History  Past Surgical History:   Procedure Laterality Date   • ARTHROSCOPY OF JOINT UN PAIN MANAGEMENT   • PATIENT DOCUMENTED NOT TO HAVE EXPERIENCED ANY OF THE FOLLOWING EVENTS N/A 1/5/2015    Procedure: ESOPHAGOGASTRODUODENOSCOPY W/ DILATION;  Surgeon: Jimmy Bush MD;  Location: [address] [place] [address]   • PATIENT [address] [facility] independent w/c mobility in the home . Pt stating ramped entrance into home.   Pt reports is attempting to work with [facility] therapy team on getting a electric w/c . Pt was receiving HH OT and HH aide was coming into home.         SUBJECTIVE  My  helps me Goals  on:    Frequency: 3-5x week    So Alvarez, OTR/L ext [phone]

## 2021-07-06 NOTE — CM/SW NOTE
SW initiated self referral for discharge planning. Per chart review, patient lives at home with her  and is current with [facility] for [facility] services. SW confirmed with [facility] liaison that patient is current with [facility]. ODALIS entered and liaison notified.

## 2021-07-06 NOTE — PROGRESS NOTES
07/06/21 1103   Wound 07/05/21 Other (comment) Buttocks Left;Right   Date First Assessed/Time First Assessed: 07/05/21 2200   Present on Hospital Admission: Yes  Primary Wound Type:  Other (comment)  Location: Buttocks  Wound Location Orientation: Left;R

## 2021-07-07 VITALS
RESPIRATION RATE: 18 BRPM | BODY MASS INDEX: 22.13 KG/M2 | WEIGHT: 129.63 LBS | DIASTOLIC BLOOD PRESSURE: 56 MMHG | TEMPERATURE: 99 F | OXYGEN SATURATION: 96 % | SYSTOLIC BLOOD PRESSURE: 116 MMHG | HEART RATE: 115 BPM | HEIGHT: 64 IN

## 2021-07-07 PROCEDURE — 99233 SBSQ HOSP IP/OBS HIGH 50: CPT | Performed by: INTERNAL MEDICINE

## 2021-07-07 RX ORDER — POLYETHYLENE GLYCOL 3350 17 G/17G
17 POWDER, FOR SOLUTION ORAL DAILY
Status: DISCONTINUED | OUTPATIENT
Start: 2021-07-07 | End: 2021-07-07

## 2021-07-07 RX ORDER — POLYETHYLENE GLYCOL 3350 17 G/17G
17 POWDER, FOR SOLUTION ORAL DAILY
Qty: 30 EACH | Refills: 1 | Status: SHIPPED | OUTPATIENT
Start: 2021-07-07 | End: 2021-10-27 | Stop reason: ALTCHOICE

## 2021-07-07 NOTE — PLAN OF CARE
Problem: Patient Centered Care  Goal: Patient preferences are identified and integrated in the patient's plan of care  Description: Interventions:  - What would you like us to know as we care for you?  Pt lives with , Lucille Olguin  - Provide timely, compl pain goal  Description: INTERVENTIONS:  - Encourage pt to monitor pain and request assistance  - Assess pain using appropriate pain scale  - Administer analgesics based on type and severity of pain and evaluate response  - Implement non-pharmacological basilia IV or PO as ordered and tolerated  - Evaluate effectiveness of GI medications  - Encourage mobilization and activity  - Obtain nutritional consult as needed  - Establish a toileting routine/schedule  - Consider collaborating with pharmacy to review patient

## 2021-07-07 NOTE — PLAN OF CARE
Problem: Patient Centered Care  Goal: Patient preferences are identified and integrated in the patient's plan of care  Description: Interventions:  - What would you like us to know as we care for you?  Pt lives with , Elinor Marin  - Provide timely, compl Encourage pt to monitor pain and request assistance  - Assess pain using appropriate pain scale  - Administer analgesics based on type and severity of pain and evaluate response  - Implement non-pharmacological measures as appropriate and evaluate response Encourage mobilization and activity  - Obtain nutritional consult as needed  - Establish a toileting routine/schedule  - Consider collaborating with pharmacy to review patient's medication profile  Outcome: Progressing       Patient is a/ox4.  Fall risk, be

## 2021-07-07 NOTE — PROGRESS NOTES
[address] [address]     Gastroenterology Progress Note    Cherie Silverio Patient Status:  Inpatient    1945 MRN [id number]   Location [facility] 5SW/SE Attending Kristin Rhodes, [facility] Day # 2 PCP Luis A Chao MD stool ball s/p removal, mild patchy erythema in the efferent limb of loop colostomy.      - miralax daily  - continue PO vanc taper  - await FS pathology  - pt asked when she could go home, no objections to discharge later today    Maulik French MD  [place]

## 2021-07-07 NOTE — PROGRESS NOTES
Discharge RN Summary: Patient has discharge order in. Patient to discharge home via ambulance. IV removed by this RN. Understands to follow up with PCP in 1 week. Patient understands to  meds from pharmacy.  Spoke with pt  anna regarding dc p

## 2021-07-07 NOTE — DISCHARGE SUMMARY
General Medicine Discharge Summary     Patient ID:  Nathan Dunlap  64year old  5/4/1945    Admit date: 7/5/2021    Discharge date and time: 7/7/21    Attending Physician: DO Zee Pittman procedure. Symptoms much improved. Patient is agreeable to going home.      Consults:   IP CONSULT TO GASTROENTEROLOGY    Operative Procedures:   Procedure(s) (LRB):  FLEXIBLE SIGMOIDOSCOPY (N/A)     Patient instructions:      Discharge medications reconcil daily. ferrous sulfate 325 (65 FE) MG Oral Tab EC  Take 325 mg by mouth daily with breakfast.    Atorvastatin Calcium 40 MG Oral Tab  Take 40 mg by mouth nightly.       ondansetron 4 MG Oral Tablet Dispersible  Take 1 tablet (4 mg total) by mouth every 6

## 2021-07-07 NOTE — PROGRESS NOTES
Ambulance for bed confied/ quadrapeligic   Address:   [address] [place] [id number]      PCS form in the chart  [facility] [phone]  Spoke with : yojana    ETA: 1 hr

## 2021-07-07 NOTE — DIETARY NOTE
Brief Nutrition Note       Pt is being discharge today. Visited in room. Patient identified at nutrition risk d/t wt loss and skin breakdown or Deep tissue pressure injury. However, pt is eating fairly well.  Pt reports ate small breakfast 50% and goo Mahamed Garza, 64 [address], [address] [place] [address]   Clinical Dietitian  [phone]  7/7/2021

## 2021-07-09 ENCOUNTER — TELEPHONE (OUTPATIENT)
Dept: GASTROENTEROLOGY | Facility: CLINIC | Age: 76
End: 2021-07-09

## 2021-07-09 NOTE — TELEPHONE ENCOUNTER
Nurse calling to informed that the pt. Has been home since Wed., and has had no stools in colostomy bag. Pt. Has had some through rectum.

## 2021-07-09 NOTE — TELEPHONE ENCOUNTER
Dr. Angella Cuellar called Monique Desir, the home health RN who was caring for Nehal Mcgraw this afternoon. She noticed no stool or air in the patient's colostomy bag and became concerned.     States that the patient was not experiencing any severe abdominal p

## 2021-07-13 NOTE — PROGRESS NOTES
[facility] Follow-up Visit    Andie in early July 2021 with complaints of upper abdominal pain and greenish mucus discharge from the rectum.   She was diagnosed with C. difficile in early June and was placed on a vancomycin taper.    + History of lymphocytic colitis for which she is on budeso flex sig performed by Dr. Ana Camarillo with IV twilight related to questionable foreign body in the rectum, mucoid rectal discharge, findings: inspissated stool (manually removed), mild rectosigmoid erythema, loss of vascular pattern, findings of prior loop colostom stenosis   • OTHER DISEASES     duplicated left renal collecting system   • OTHER DISEASES     uterine fibroids   • OTHER DISEASES     ulcerative colitis   • OTHER DISEASES     uti   • Pneumonia, organism unspecified(486)    • Reflux    • Self-catheterizes [place] FOR PAIN MANAGEMENT   • PATIENT DOCUMENTED NOT TO HAVE EXPERIENCED ANY OF THE FOLLOWING EVENTS N/A 12/8/2014    Procedure: CERVICAL EPIDURAL;  Surgeon: Cherylene Deters, MD;  Location: [address] [place] [address]   • PATIENT DOCUMENTED NOT TO HAVE History    Tobacco Use      Smoking status: Former Smoker        Quit date: 1961        Years since quittin.0      Smokeless tobacco: Never Used      Tobacco comment: pt smoked until 23 yrs old    Vaping Use      Vaping Use: Never used    Alcohol Increase to one  Po  Every 6 Hour if needed for pain 100 tablet 3   • Albuterol Sulfate 108 (90 Base) MCG/ACT Inhalation Aerosol Powder, Breath Activated Inhale 2 puffs into the lungs every 4 (four) hours as needed.  1 each 0   • Fluticasone Propionate 50 M intolerance and heat intolerance  MUSCULOSKELETAL:  negative for  joint stiffness and joint swelling  BEHAVIOR/PSYCH:  negative for depressed mood    PHYSICAL EXAM:   Blood pressure 101/61, pulse 74, height 5' 4\" (1.626 m), not currently breastfeeding. bowel movements have transitioned from watery diarrhea in the ostomy bag to a dark green soft stool. Both the patient and her spouse feel that her GI status has significantly improved since her hospital discharge.   She has some light tan discharge from th

## 2021-07-14 ENCOUNTER — OFFICE VISIT (OUTPATIENT)
Dept: CARDIOLOGY CLINIC | Facility: HOSPITAL | Age: 76
End: 2021-07-14
Attending: NURSE PRACTITIONER
Payer: MEDICARE

## 2021-07-14 VITALS — SYSTOLIC BLOOD PRESSURE: 98 MMHG | DIASTOLIC BLOOD PRESSURE: 62 MMHG | HEART RATE: 75 BPM | OXYGEN SATURATION: 98 %

## 2021-07-14 DIAGNOSIS — A49.8 CLOSTRIDIOIDES DIFFICILE INFECTION: ICD-10-CM

## 2021-07-14 DIAGNOSIS — I50.33 ACUTE ON CHRONIC DIASTOLIC CONGESTIVE HEART FAILURE (HCC): Primary | ICD-10-CM

## 2021-07-14 DIAGNOSIS — I48.11 LONGSTANDING PERSISTENT ATRIAL FIBRILLATION (HCC): ICD-10-CM

## 2021-07-14 DIAGNOSIS — Z98.890 S/P MITRAL VALVE CLIP IMPLANTATION: ICD-10-CM

## 2021-07-14 DIAGNOSIS — Z95.818 S/P MITRAL VALVE CLIP IMPLANTATION: ICD-10-CM

## 2021-07-14 DIAGNOSIS — I50.9 HEART FAILURE, UNSPECIFIED (HCC): ICD-10-CM

## 2021-07-14 DIAGNOSIS — I34.0 NON-RHEUMATIC MITRAL REGURGITATION: ICD-10-CM

## 2021-07-14 LAB
ANION GAP SERPL CALC-SCNC: 6 MMOL/L (ref 0–18)
BUN BLD-MCNC: 23 MG/DL (ref 7–18)
BUN/CREAT SERPL: 50 (ref 10–20)
CALCIUM BLD-MCNC: 8.7 MG/DL (ref 8.5–10.1)
CHLORIDE SERPL-SCNC: 110 MMOL/L (ref 98–112)
CO2 SERPL-SCNC: 23 MMOL/L (ref 21–32)
CREAT BLD-MCNC: 0.46 MG/DL
GLUCOSE BLD-MCNC: 208 MG/DL (ref 70–99)
NT-PROBNP SERPL-MCNC: 2002 PG/ML (ref ?–450)
OSMOLALITY SERPL CALC.SUM OF ELEC: 298 MOSM/KG (ref 275–295)
PATIENT FASTING Y/N/NP: NO
POTASSIUM SERPL-SCNC: 5.3 MMOL/L (ref 3.5–5.1)
SODIUM SERPL-SCNC: 139 MMOL/L (ref 136–145)

## 2021-07-14 PROCEDURE — 83880 ASSAY OF NATRIURETIC PEPTIDE: CPT | Performed by: NURSE PRACTITIONER

## 2021-07-14 PROCEDURE — 99213 OFFICE O/P EST LOW 20 MIN: CPT | Performed by: NURSE PRACTITIONER

## 2021-07-14 PROCEDURE — 99214 OFFICE O/P EST MOD 30 MIN: CPT | Performed by: NURSE PRACTITIONER

## 2021-07-14 PROCEDURE — 80048 BASIC METABOLIC PNL TOTAL CA: CPT | Performed by: NURSE PRACTITIONER

## 2021-07-14 PROCEDURE — 36415 COLL VENOUS BLD VENIPUNCTURE: CPT | Performed by: NURSE PRACTITIONER

## 2021-07-14 RX ORDER — ONDANSETRON 4 MG/1
4 TABLET, ORALLY DISINTEGRATING ORAL EVERY 6 HOURS PRN
Qty: 30 TABLET | Refills: 0 | Status: SHIPPED | OUTPATIENT
Start: 2021-07-14

## 2021-07-14 NOTE — PROGRESS NOTES
[address] Patient Status:  No patient class for patient encounter    1945 MRN [id number]   Location MD Dr. Lili Choudhury is a Diuresed 9 bs, home on furosemide 40 mg daily. Admitted 1/20-1/28/20 with increased cough, wheezing and sob with respiratory failure with hypoxemia. Pro BNP 2363. + RSV , CXR noted mild fluid congestion, improved after 1 dose of IV lasix.  Lactic acid l vomiting  Hematologic/lymphatic: negative  Musculoskeletal: see above, c/o left foot pain    Objective:  Lab Results   Component Value Date/Time    WBC 6.1 07/06/2021 05:41 AM    HGB 11.5 (L) 07/06/2021 05:41 AM    HCT 38.3 07/06/2021 05:41 AM    . 0 symmetric  Neurologic: Grossly normal    Diagnostics:   EC20 atrial fib with PVC, LAFB, Nsst chgs. , no significant changes    Echo: 21 EF 54 % mild AI, mild/mod MR- worsened, massive LA dilation, RVS 56 mmHg    Echo: 2020, EF 55-60%, mild echo, EF 60-65%  -follow up with [facility] heart clinic as directed    Chronic atrial fib  -rate controlled on increased  toprol to 50 mg twice daily   -anticoagulated on eliqius, no sign of bleeding  -HGB normal     Recurrent cdiff with abdominal pain an (two) times daily. , Disp: 450 tablet, Rfl: 0  •  pregabalin 50 MG Oral Cap, Take 1 capsule (50 mg total) by mouth 3 (three) times daily. , Disp: 90 capsule, Rfl: 5  •  furosemide 40 MG Oral Tab, Take 1 tablet (40 mg total) by mouth daily. , Disp: 30 tablet

## 2021-07-14 NOTE — PATIENT INSTRUCTIONS
Take an extra half tab of furosemide 20 mg tab today, tomorrow and Friday ( 7/16/)  for total of 60 mg .  Then resume 40 mg one tab daily     Decrease potassium to 20 meq , one tab 3 X daily -spoke with pt by phone with kidney function test results and new

## 2021-07-21 ENCOUNTER — LAB REQUISITION (OUTPATIENT)
Dept: LAB | Facility: HOSPITAL | Age: 76
End: 2021-07-21
Payer: MEDICARE

## 2021-07-21 DIAGNOSIS — I50.9 HEART FAILURE, UNSPECIFIED (HCC): ICD-10-CM

## 2021-07-21 LAB
ANION GAP SERPL CALC-SCNC: 7 MMOL/L (ref 0–18)
BUN BLD-MCNC: 19 MG/DL (ref 7–18)
BUN/CREAT SERPL: 31.7 (ref 10–20)
CALCIUM BLD-MCNC: 8.6 MG/DL (ref 8.5–10.1)
CHLORIDE SERPL-SCNC: 103 MMOL/L (ref 98–112)
CO2 SERPL-SCNC: 26 MMOL/L (ref 21–32)
CREAT BLD-MCNC: 0.6 MG/DL
GLUCOSE BLD-MCNC: 182 MG/DL (ref 70–99)
OSMOLALITY SERPL CALC.SUM OF ELEC: 289 MOSM/KG (ref 275–295)
POTASSIUM SERPL-SCNC: 3.4 MMOL/L (ref 3.5–5.1)
SODIUM SERPL-SCNC: 136 MMOL/L (ref 136–145)

## 2021-07-21 PROCEDURE — 80048 BASIC METABOLIC PNL TOTAL CA: CPT | Performed by: INTERNAL MEDICINE

## 2021-07-22 ENCOUNTER — OFFICE VISIT (OUTPATIENT)
Dept: GASTROENTEROLOGY | Facility: CLINIC | Age: 76
End: 2021-07-22
Payer: MEDICARE

## 2021-07-22 VITALS
BODY MASS INDEX: 22 KG/M2 | HEART RATE: 74 BPM | DIASTOLIC BLOOD PRESSURE: 61 MMHG | HEIGHT: 64 IN | SYSTOLIC BLOOD PRESSURE: 101 MMHG

## 2021-07-22 DIAGNOSIS — K52.839 MICROSCOPIC COLITIS, UNSPECIFIED MICROSCOPIC COLITIS TYPE: ICD-10-CM

## 2021-07-22 DIAGNOSIS — Z86.19 HISTORY OF CLOSTRIDIOIDES DIFFICILE COLITIS: Primary | ICD-10-CM

## 2021-07-22 PROCEDURE — 99215 OFFICE O/P EST HI 40 MIN: CPT | Performed by: NURSE PRACTITIONER

## 2021-07-22 NOTE — PATIENT INSTRUCTIONS
-Complete vancomycin taper  -Consider probiotics continue budesonide 3 mg daily  -Contact the office with any new or worsening GI symptoms

## 2021-07-23 ENCOUNTER — TELEPHONE (OUTPATIENT)
Dept: CARDIOLOGY | Age: 76
End: 2021-07-23

## 2021-07-27 DIAGNOSIS — E87.6 HYPOKALEMIA: ICD-10-CM

## 2021-07-27 RX ORDER — POTASSIUM CHLORIDE 20 MEQ/1
40 TABLET, EXTENDED RELEASE ORAL 2 TIMES DAILY
Qty: 450 TABLET | Refills: 0 | Status: SHIPPED | OUTPATIENT
Start: 2021-07-27 | End: 2021-08-24

## 2021-07-27 NOTE — TELEPHONE ENCOUNTER
7/14/2021 [facility]      Your Appointments    Thursday August 19, 2021  2:00 PM  [address] [facility] Ezra with BENITO Gordon. [address] ([address]) [address]  [address]

## 2021-08-02 ENCOUNTER — APPOINTMENT (OUTPATIENT)
Dept: ULTRASOUND IMAGING | Facility: HOSPITAL | Age: 76
DRG: 300 | End: 2021-08-02
Attending: EMERGENCY MEDICINE
Payer: MEDICARE

## 2021-08-02 ENCOUNTER — HOSPITAL ENCOUNTER (INPATIENT)
Facility: HOSPITAL | Age: 76
LOS: 2 days | Discharge: HOME HEALTH | DRG: 300 | End: 2021-08-06
Attending: EMERGENCY MEDICINE | Admitting: HOSPITALIST
Payer: MEDICARE

## 2021-08-02 DIAGNOSIS — L03.90 CELLULITIS, UNSPECIFIED CELLULITIS SITE: ICD-10-CM

## 2021-08-02 DIAGNOSIS — I82.402 ACUTE DEEP VEIN THROMBOSIS (DVT) OF LEFT LOWER EXTREMITY, UNSPECIFIED VEIN (HCC): Primary | ICD-10-CM

## 2021-08-02 LAB
ALBUMIN SERPL-MCNC: 2.9 G/DL (ref 3.4–5)
ALBUMIN/GLOB SERPL: 1 {RATIO} (ref 1–2)
ALP LIVER SERPL-CCNC: 82 U/L
ALT SERPL-CCNC: 31 U/L
ANION GAP SERPL CALC-SCNC: 5 MMOL/L (ref 0–18)
AST SERPL-CCNC: 17 U/L (ref 15–37)
BASOPHILS # BLD AUTO: 0.05 X10(3) UL (ref 0–0.2)
BASOPHILS NFR BLD AUTO: 0.6 %
BILIRUB SERPL-MCNC: 0.6 MG/DL (ref 0.1–2)
BUN BLD-MCNC: 24 MG/DL (ref 7–18)
BUN/CREAT SERPL: 50 (ref 10–20)
CALCIUM BLD-MCNC: 9 MG/DL (ref 8.5–10.1)
CHLORIDE SERPL-SCNC: 106 MMOL/L (ref 98–112)
CO2 SERPL-SCNC: 31 MMOL/L (ref 21–32)
CREAT BLD-MCNC: 0.48 MG/DL
DEPRECATED RDW RBC AUTO: 48.3 FL (ref 35.1–46.3)
EOSINOPHIL # BLD AUTO: 0.15 X10(3) UL (ref 0–0.7)
EOSINOPHIL NFR BLD AUTO: 1.8 %
ERYTHROCYTE [DISTWIDTH] IN BLOOD BY AUTOMATED COUNT: 15.8 % (ref 11–15)
GLOBULIN PLAS-MCNC: 2.8 G/DL (ref 2.8–4.4)
GLUCOSE BLD-MCNC: 81 MG/DL (ref 70–99)
HCT VFR BLD AUTO: 41.3 %
HGB BLD-MCNC: 12.6 G/DL
IMM GRANULOCYTES # BLD AUTO: 0.03 X10(3) UL (ref 0–1)
IMM GRANULOCYTES NFR BLD: 0.4 %
LYMPHOCYTES # BLD AUTO: 2.1 X10(3) UL (ref 1–4)
LYMPHOCYTES NFR BLD AUTO: 24.5 %
M PROTEIN MFR SERPL ELPH: 5.7 G/DL (ref 6.4–8.2)
MCH RBC QN AUTO: 26 PG (ref 26–34)
MCHC RBC AUTO-ENTMCNC: 30.5 G/DL (ref 31–37)
MCV RBC AUTO: 85.3 FL
MONOCYTES # BLD AUTO: 0.76 X10(3) UL (ref 0.1–1)
MONOCYTES NFR BLD AUTO: 8.9 %
NEUTROPHILS # BLD AUTO: 5.48 X10 (3) UL (ref 1.5–7.7)
NEUTROPHILS # BLD AUTO: 5.48 X10(3) UL (ref 1.5–7.7)
NEUTROPHILS NFR BLD AUTO: 63.8 %
OSMOLALITY SERPL CALC.SUM OF ELEC: 297 MOSM/KG (ref 275–295)
PLATELET # BLD AUTO: 192 10(3)UL (ref 150–450)
POTASSIUM SERPL-SCNC: 3.5 MMOL/L (ref 3.5–5.1)
RBC # BLD AUTO: 4.84 X10(6)UL
SARS-COV-2 RNA RESP QL NAA+PROBE: NOT DETECTED
SODIUM SERPL-SCNC: 142 MMOL/L (ref 136–145)
WBC # BLD AUTO: 8.6 X10(3) UL (ref 4–11)

## 2021-08-02 PROCEDURE — 96375 TX/PRO/DX INJ NEW DRUG ADDON: CPT

## 2021-08-02 PROCEDURE — 96372 THER/PROPH/DIAG INJ SC/IM: CPT

## 2021-08-02 PROCEDURE — 93971 EXTREMITY STUDY: CPT | Performed by: EMERGENCY MEDICINE

## 2021-08-02 PROCEDURE — 85025 COMPLETE CBC W/AUTO DIFF WBC: CPT | Performed by: EMERGENCY MEDICINE

## 2021-08-02 PROCEDURE — 80053 COMPREHEN METABOLIC PANEL: CPT | Performed by: EMERGENCY MEDICINE

## 2021-08-02 PROCEDURE — 99285 EMERGENCY DEPT VISIT HI MDM: CPT

## 2021-08-02 PROCEDURE — 96365 THER/PROPH/DIAG IV INF INIT: CPT

## 2021-08-02 RX ORDER — ENOXAPARIN SODIUM 100 MG/ML
1 INJECTION SUBCUTANEOUS ONCE
Status: COMPLETED | OUTPATIENT
Start: 2021-08-02 | End: 2021-08-02

## 2021-08-02 RX ORDER — TRAMADOL HYDROCHLORIDE 50 MG/1
50 TABLET ORAL EVERY 6 HOURS PRN
Status: DISCONTINUED | OUTPATIENT
Start: 2021-08-02 | End: 2021-08-06

## 2021-08-02 RX ORDER — FLUTICASONE PROPIONATE 50 MCG
1 SPRAY, SUSPENSION (ML) NASAL DAILY
Status: DISCONTINUED | OUTPATIENT
Start: 2021-08-03 | End: 2021-08-06

## 2021-08-02 RX ORDER — POLYETHYLENE GLYCOL 3350 17 G/17G
17 POWDER, FOR SOLUTION ORAL DAILY
Status: DISCONTINUED | OUTPATIENT
Start: 2021-08-03 | End: 2021-08-05

## 2021-08-02 RX ORDER — METOPROLOL SUCCINATE 50 MG/1
50 TABLET, EXTENDED RELEASE ORAL 2 TIMES DAILY
Status: DISCONTINUED | OUTPATIENT
Start: 2021-08-02 | End: 2021-08-06

## 2021-08-02 RX ORDER — VANCOMYCIN HYDROCHLORIDE 125 MG/1
125 CAPSULE ORAL DAILY
Status: DISCONTINUED | OUTPATIENT
Start: 2021-08-02 | End: 2021-08-02 | Stop reason: ALTCHOICE

## 2021-08-02 RX ORDER — BUDESONIDE 3 MG/1
3 CAPSULE, COATED PELLETS ORAL EVERY MORNING
Status: DISCONTINUED | OUTPATIENT
Start: 2021-08-03 | End: 2021-08-06

## 2021-08-02 RX ORDER — MELATONIN
325
Status: DISCONTINUED | OUTPATIENT
Start: 2021-08-03 | End: 2021-08-06

## 2021-08-02 RX ORDER — PREDNISONE 2.5 MG
5 TABLET ORAL
Status: DISCONTINUED | OUTPATIENT
Start: 2021-08-03 | End: 2021-08-06

## 2021-08-02 RX ORDER — METOCLOPRAMIDE HYDROCHLORIDE 5 MG/ML
10 INJECTION INTRAMUSCULAR; INTRAVENOUS EVERY 8 HOURS PRN
Status: DISCONTINUED | OUTPATIENT
Start: 2021-08-02 | End: 2021-08-06

## 2021-08-02 RX ORDER — PREGABALIN 50 MG/1
50 CAPSULE ORAL 3 TIMES DAILY
Status: DISCONTINUED | OUTPATIENT
Start: 2021-08-02 | End: 2021-08-06

## 2021-08-02 RX ORDER — ONDANSETRON 2 MG/ML
4 INJECTION INTRAMUSCULAR; INTRAVENOUS ONCE
Status: COMPLETED | OUTPATIENT
Start: 2021-08-02 | End: 2021-08-02

## 2021-08-02 RX ORDER — ACETAMINOPHEN 325 MG/1
650 TABLET ORAL EVERY 6 HOURS PRN
Status: DISCONTINUED | OUTPATIENT
Start: 2021-08-02 | End: 2021-08-06

## 2021-08-02 RX ORDER — VENLAFAXINE HYDROCHLORIDE 75 MG/1
75 CAPSULE, EXTENDED RELEASE ORAL DAILY
Status: DISCONTINUED | OUTPATIENT
Start: 2021-08-03 | End: 2021-08-06

## 2021-08-02 RX ORDER — FUROSEMIDE 40 MG/1
40 TABLET ORAL DAILY
Status: DISCONTINUED | OUTPATIENT
Start: 2021-08-03 | End: 2021-08-06

## 2021-08-02 RX ORDER — VANCOMYCIN HYDROCHLORIDE 125 MG/1
125 CAPSULE ORAL 2 TIMES DAILY
Status: DISCONTINUED | OUTPATIENT
Start: 2021-08-03 | End: 2021-08-06

## 2021-08-02 RX ORDER — EZETIMIBE 10 MG/1
10 TABLET ORAL DAILY
Status: DISCONTINUED | OUTPATIENT
Start: 2021-08-02 | End: 2021-08-06

## 2021-08-02 RX ORDER — MORPHINE SULFATE 4 MG/ML
2 INJECTION, SOLUTION INTRAMUSCULAR; INTRAVENOUS ONCE
Status: COMPLETED | OUTPATIENT
Start: 2021-08-02 | End: 2021-08-02

## 2021-08-02 RX ORDER — ONDANSETRON 2 MG/ML
4 INJECTION INTRAMUSCULAR; INTRAVENOUS EVERY 6 HOURS PRN
Status: DISCONTINUED | OUTPATIENT
Start: 2021-08-02 | End: 2021-08-06

## 2021-08-02 RX ORDER — ATORVASTATIN CALCIUM 40 MG/1
40 TABLET, FILM COATED ORAL NIGHTLY
Status: DISCONTINUED | OUTPATIENT
Start: 2021-08-02 | End: 2021-08-06

## 2021-08-02 NOTE — ED QUICK NOTES
Orders for admission, patient is aware of plan and ready to go upstairs. Any questions, please call ED JOLYNN freeman  at extension [phone].    Type of COVID test sent:rapid  COVID Suspicion level: Low    Titratable drug(s) infusing:saline locked  Rate:    LOC at t

## 2021-08-02 NOTE — H&P
[facility] Hospitalist H&P       CC: Patient presents with:  Cellulitis       PCP: Patricia Salcido MD    Date of Admission: 8/2/2021  9:49 AM    ASSESSMENT / PLAN:     Ms. Gayathri Birch is a 64year old female with multiple medical problems including ulcera and agreeing with therapeutic plan as outlined    Christina Flores MD  [facility] Hospitalist  Answering Service number: [phone]    HPI       History of Present Illness:     Ms. Silviano Shay is a 64year old female with multiple medical problems including ulce DISEASES     spastic paraparesis   • OTHER DISEASES     cataracts   • OTHER DISEASES     secondary poycythemia   • OTHER DISEASES     lumbar spinal stenosis   • OTHER DISEASES     duplicated left renal collecting system   • OTHER DISEASES     uterine fibro Aline   • PATIENT DOCUMENTED NOT TO HAVE EXPERIENCED ANY OF THE FOLLOWING EVENTS N/A 10/27/2014    Procedure: CERVICAL EPIDURAL;  Surgeon: Bi Cox MD;  Location: [facility] FOR PAIN MANAGEMENT   • PATIENT DOCUMENTED NOT TO HAVE EXPERIENCED ANY OF THE 8/14/12  Iodine (Topical)        ANAPHYLAXIS    Comment:Can't swallow or breathing. STATES TOPICAL IS NOT             AND ISSUE  Penicillins             RASH, DIARRHEA    Comment:As a child.   Localized rash at injection site             CLASS             P 08/02/21  0954   RBC 4.84   HGB 12.6   HCT 41.3   MCV 85.3   MCH 26.0   MCHC 30.5*   RDW 15.8*   NEPRELIM 5.48   WBC 8.6   .0         Recent Labs   Lab 08/02/21  0954   GLU 81   BUN 24*   CREATSERUM 0.48*   GFRAA 109   GFRNAA 94   CA 9.0

## 2021-08-02 NOTE — ED QUICK NOTES
Pt to ED from home with . Pt states that she has been having worsening pain in the lle. Both legs appear swollen and red. Multiple small openings on each leg, with larger scabbed areas on the left.  Pt was recently treated for c.diff with vanco. Pt s

## 2021-08-02 NOTE — ED PROVIDER NOTES
Patient Seen in: [facility] Emergency Department    History   Patient presents with:  Cellulitis    Stated Complaint: cellulitis    HPI    Patient presents to the emergency department with redness and pain to the left lower extremity.   She reports • OTHER DISEASES     uti   • Pneumonia, organism unspecified(486)    • Reflux    • Self-catheterizes urinary bladder 1/11/2016   • Ulcerative colitis ([facility] [address]    • Valvular disease     mitral valve prolapse- 3 clips placed       Past Surgical History:   Pro CERVICAL EPIDURAL;  Surgeon: Balwinder Siddiqi MD;  Location: [address] [place] [address]  PAIN MANAGEMENT   • PATIENT DOCUMENTED NOT TO HAVE EXPERIENCED ANY OF THE FOLLOWING EVENTS N/A 1/5/2015    Procedure: ESOPHAGOGASTRODUODENOSCOPY W/ DILATION;  Surgeon: Rukhsana French Powd Pack,  Take 17 g by mouth daily. ezetimibe 10 MG Oral Tab,  Take 1 tablet (10 mg total) by mouth daily. Venlafaxine HCl ER 75 MG Oral Capsule SR 24 Hr,  Take 1 capsule (75 mg total) by mouth daily.    vancomycin HCl 125 MG Oral Cap,  Four times a d breath  Gastrointestinal: no abdominal pain    Positive for stated complaint: cellulitis  Other systems are as noted in HPI. Constitutional and vital signs reviewed. All other systems reviewed and negative except as noted above.       Physical Exam cellulitis with dose of Rocephin we will give dose of oral vancomycin as she has history of C. difficile colitis in the past as well.   Patient on reexamination appears comfortable in no distress no deterioration        ED Course     Labs Reviewed   COMP ME Noted POA    * (Principal) Acute deep vein thrombosis (DVT) of left lower extremity, unspecified vein ([facility]) I82.402 8/2/2021     Acute deep vein thrombosis (DVT) [facility] left lower extremity [facility] I82.402 8/2/2021 Unknown    Cellulitis, unspecified cellulitis s

## 2021-08-03 ENCOUNTER — APPOINTMENT (OUTPATIENT)
Dept: GENERAL RADIOLOGY | Facility: HOSPITAL | Age: 76
DRG: 300 | End: 2021-08-03
Attending: HOSPITALIST
Payer: MEDICARE

## 2021-08-03 ENCOUNTER — APPOINTMENT (OUTPATIENT)
Dept: CT IMAGING | Facility: HOSPITAL | Age: 76
DRG: 300 | End: 2021-08-03
Attending: HOSPITALIST
Payer: MEDICARE

## 2021-08-03 LAB
ANION GAP SERPL CALC-SCNC: 6 MMOL/L (ref 0–18)
BUN BLD-MCNC: 23 MG/DL (ref 7–18)
BUN/CREAT SERPL: 46.9 (ref 10–20)
CALCIUM BLD-MCNC: 8.8 MG/DL (ref 8.5–10.1)
CHLORIDE SERPL-SCNC: 104 MMOL/L (ref 98–112)
CO2 SERPL-SCNC: 28 MMOL/L (ref 21–32)
CREAT BLD-MCNC: 0.49 MG/DL
DEPRECATED RDW RBC AUTO: 46.8 FL (ref 35.1–46.3)
ERYTHROCYTE [DISTWIDTH] IN BLOOD BY AUTOMATED COUNT: 15.6 % (ref 11–15)
GLUCOSE BLD-MCNC: 85 MG/DL (ref 70–99)
HCT VFR BLD AUTO: 35.9 %
HGB BLD-MCNC: 11.2 G/DL
MCH RBC QN AUTO: 26.3 PG (ref 26–34)
MCHC RBC AUTO-ENTMCNC: 31.2 G/DL (ref 31–37)
MCV RBC AUTO: 84.3 FL
OSMOLALITY SERPL CALC.SUM OF ELEC: 289 MOSM/KG (ref 275–295)
PLATELET # BLD AUTO: 162 10(3)UL (ref 150–450)
POTASSIUM SERPL-SCNC: 3.9 MMOL/L (ref 3.5–5.1)
RBC # BLD AUTO: 4.26 X10(6)UL
SODIUM SERPL-SCNC: 138 MMOL/L (ref 136–145)
WBC # BLD AUTO: 7.3 X10(3) UL (ref 4–11)

## 2021-08-03 PROCEDURE — 97535 SELF CARE MNGMENT TRAINING: CPT

## 2021-08-03 PROCEDURE — 73590 X-RAY EXAM OF LOWER LEG: CPT | Performed by: HOSPITALIST

## 2021-08-03 PROCEDURE — 85027 COMPLETE CBC AUTOMATED: CPT | Performed by: HOSPITALIST

## 2021-08-03 PROCEDURE — 97166 OT EVAL MOD COMPLEX 45 MIN: CPT

## 2021-08-03 PROCEDURE — 99213 OFFICE O/P EST LOW 20 MIN: CPT

## 2021-08-03 PROCEDURE — 97162 PT EVAL MOD COMPLEX 30 MIN: CPT

## 2021-08-03 PROCEDURE — 73700 CT LOWER EXTREMITY W/O DYE: CPT | Performed by: HOSPITALIST

## 2021-08-03 PROCEDURE — 97530 THERAPEUTIC ACTIVITIES: CPT

## 2021-08-03 PROCEDURE — 80048 BASIC METABOLIC PNL TOTAL CA: CPT | Performed by: HOSPITALIST

## 2021-08-03 RX ORDER — HYDROCODONE BITARTRATE AND ACETAMINOPHEN 5; 325 MG/1; MG/1
1 TABLET ORAL EVERY 6 HOURS PRN
Status: DISCONTINUED | OUTPATIENT
Start: 2021-08-03 | End: 2021-08-06

## 2021-08-03 NOTE — PLAN OF CARE
Continue to monitor labs and VS, monitor wounds (change dressings prn/wound care consult); turning pt q2h and prn, administer analgesic prn    Problem: Patient Centered Care  Goal: Patient preferences are identified and integrated in the patient's plan of pain with activity and pre-medicate as appropriate  Outcome: Progressing     Problem: RISK FOR INFECTION - ADULT  Goal: Absence of fever/infection during anticipated neutropenic period  Description: INTERVENTIONS  - Monitor WBC  - Administer growth factors INTERVENTIONS  - Assess and document risk factors for pressure ulcer development  - Assess and document skin integrity  - Monitor for areas of redness and/or skin breakdown  - Initiate interventions, skin care algorithm/standards of care as needed  Outcome

## 2021-08-03 NOTE — CM/SW NOTE
Per dc rounds pt is from home with  who is primary cg. Pt recently dc from [facility], per liaison they are able to accept back.  New ref sent per piper moore done for RN/PT/OT    / to remain available for support and/or discharg

## 2021-08-03 NOTE — CONSULTS
Hematology/Oncology Consult Note        NAME: Rosemary Salcedo - ROOM: [address] [place] [address]- - MRN: [id number] - Age: 64year old - : 1945    Reason for Consult:  DVT    Patient is a 64 y.o female who presents on this admission with left leg swelling, workup uterine fibroids   • OTHER DISEASES     ulcerative colitis   • OTHER DISEASES     uti   • Pneumonia, organism unspecified(486)    • Reflux    • Self-catheterizes urinary bladder 1/11/2016   • Ulcerative colitis ([facility] [address]    • Valvular disease     mitral va ANY OF THE FOLLOWING EVENTS N/A 12/8/2014    Procedure: CERVICAL EPIDURAL;  Surgeon: Javier Connelly MD;  Location: [address] [place] [address]   • PATIENT DOCUMENTED NOT TO HAVE EXPERIENCED ANY OF THE FOLLOWING EVENTS N/A 1/5/2015    Procedure: [facility] 1963        Years since quittin.1      Smokeless tobacco: Never Used      Tobacco comment: pt smoked until 23 yrs old    Alcohol use: No      Alcohol/week: 0.0 standard drinks    Medications:  • Miconazole Nitrate   Topical [email or web address]   • kelly Normocephalic, without obvious abnormality, atraumatic. Lungs: decreased bs bilaterally    Heart: Regular rate and rhythm, normal S1S2, no murmur. Abdomen: soft, non-tender, non-distended, positive BS.    Extremity: bilateral edema,    Skin: No rashes o

## 2021-08-03 NOTE — HOME CARE LIAISON
This is a current patient with [address]. Patient will need a ODALIS order on or before the day of DC.

## 2021-08-03 NOTE — PROGRESS NOTES
08/03/21 0918   Wound 08/02/21 Skin Tear Arm Anterior;Right   Date First Assessed/Time First Assessed: 08/02/21 1445   Present on Hospital Admission: Yes  Primary Wound Type: Skin Tear  Location: (c) Arm  Wound Location Orientation: Anterior;Right  Woun Cleansed; Topical (Barrier/Moisturizer/Ointment)   Dressing Aquacel Foam   Dressing Changed Changed   Dressing Status Dressing Changed;Removed; Old drainage   Wound Length (cm) 2.5 cm   Wound Width (cm) 0.7 cm   Wound Surface Area (cm^2) 1.75 cm^2   Fredo Hernández • Coronary atherosclerosis    • Deep vein thrombosis (HCC)     jamal filter in place.  unsure of leg   • DEPRESSION    • Depression 1/11/2016   • Dyspnea 6/7/2018   • Esophageal reflux    • GERD    • GOUT    • Hearing impairment    • Hereditary spasti MD;  Location: [address] [place] [address]   • DEYSI GICARIDAD & Dorothy Bear NDL/CATH SPI DX/THER NJX N/A 12/8/2014    Procedure: CERVICAL EPIDURAL;  Surgeon: Dave Wray MD;  Location: [facility]   • INJECTION, W/WO CONTRAST, DX/THERAPEUTI Rissa Bowden MD;  Location: [address] [place] [address]   • REMOVE BLADDER STONE,>2.5CM  03/12/2020    laser litho bladder stones   • SPINE SURGERY PROCEDURE UNLISTED      x4   • TONSILLECTOMY     • TOTAL KNEE REPLACEMENT Left    • UPPER GI ENDOSCOPY,BIOPS from previous admissions. Pt's  changed the colostomy appliance 8/2 with a Brigitte he brought from home (per pt's preference). Wounds as above. Recommend no adhesives for the right arm dressing as the skin is fragile.  The left ankle wound has some

## 2021-08-03 NOTE — OCCUPATIONAL THERAPY NOTE
OCCUPATIONAL THERAPY EVALUATION - INPATIENT     Room Number: 556/556-A  Evaluation Date: 8/3/2021  Type of Evaluation: Initial  Presenting Problem: DVT    Physician Order: IP Consult to Occupational Therapy  Reason for Therapy: ADL/IADL Dysfunction and Dis mobility. The patient is below baseline and would benefit from skilled inpatient OT to address the above deficits, maximizing patient's ability to return to prior level of function.     The patient's Approx Degree of Impairment: 56.46% has been calculated HYPERLIPIDEMIA    • Irritable bowel syndrome    • Lymphocytic colitis Colon= 5/7/12   • MENOPAUSE    • MITRAL VALVE PROLAPSE    • Neuropathy     bilateral legs;  Hereditary spastic paraplegia X 20 years    • Osteoarthritis     legs, back, shoulders and knee Location: [facility]   • INJECTION, W/WO CONTRAST, DX/THERAPEUTIC SUBSTANCE, EPIDURAL/SUBARACHNOID; CERVICAL/THORACIC N/A 12/8/2014    Procedure: CERVICAL EPIDURAL;  Surgeon: Chaitanya Tejeda MD;  Location: [address] [place] [address] Location: [address] [place] [address] SITUATION  Type of Home: House  Home Layout: One level;Ramped entrance  Lives With: Spouse        Shower/Tub and Equipment:  (bathes in w/c or bed)       Occupation/Status: retired  Hand Miguelina María    AM-PAC Score:  Score: 15  Approx Degree of Impairment: 56.46%  Standardized Score (AM-PAC Scale): 34.69  CMS Modifier (G-Code): CK    FUNCTIONAL TRANSFER ASSESSMENT  Supine to Sit : Maximum assistance  Sit to Stand: Not tested (pt slideboard or h

## 2021-08-03 NOTE — PROGRESS NOTES
[facility] Hospitalist Progress Note     CC: Hospital Follow up    PCP: Rommel Long MD       Assessment/Plan:     Principal Problem:    Acute deep vein thrombosis (DVT) of left lower extremity, unspecified vein (HCC)  Active Problems:    Acute deep ve daily.     FN:   - IVF: none  - Diet: cardiac     DVT Prophy: lovenox  Lines: PIV     Dispo: pending clinical course     Outpatient records or previous hospital records reviewed.      Further recommendations pending patient's clinical course.   [facility] 94 94   CA 9.0 8.8    138   K 3.5 3.9    104   CO2 31.0 28.0       Recent Labs   Lab 08/02/21  0954   ALT 31   AST 17   ALB 2.9*         Imaging:  XR TIBIA + FIBULA (2 VIEWS), LEFT (CPT=73590)    Result Date: 8/3/2021  CONCLUSION:  1.  No acute mg Oral BID       HYDROcodone-acetaminophen, acetaminophen, ondansetron, metoclopramide, traMADol

## 2021-08-03 NOTE — PLAN OF CARE
Primarily wheelchair bound, fragile skin, colostomy, self caths at home.   Problem: Patient Centered Care  Goal: Patient preferences are identified and integrated in the patient's plan of care  Description: Interventions:  - What would you like us to know a INTERVENTIONS:  - Identify barriers to discharge w/pt and caregiver  - Include patient/family/discharge partner in discharge planning  - Arrange for needed discharge resources and transportation as appropriate  - Identify discharge learning needs (meds, wo

## 2021-08-03 NOTE — PHYSICAL THERAPY NOTE
PHYSICAL THERAPY EVALUATION - INPATIENT     Room Number: 556/556-A  Evaluation Date: 8/3/2021  Type of Evaluation: Initial   Physician Order: PT Eval and Treat    Presenting Problem: acute L LE DVTs  Reason for Therapy: Mobility Dysfunction and Discharge being close to PLOF [facility]. Patient will benefit from continued IP PT services to address these deficits in preparation for discharge.     DISCHARGE RECOMMENDATIONS  PT Discharge Recommendations: 24 hour care/supervision;Home wi • High blood pressure    • High cholesterol    • History of blood transfusion     when patient had back surgery    • Hypercholesterolemia    • HYPERLIPIDEMIA    • Irritable bowel syndrome    • Lymphocytic colitis Colon= 5/7/12   • MENOPAUSE    • MITRAL V DX/THERAPEUTIC SUBSTANCE, EPIDURAL/SUBARACHNOID; CERVICAL/THORACIC N/A 10/27/2014    Procedure: CERVICAL EPIDURAL;  Surgeon: Abdoulaye Mathews MD;  Location: [facility] PAIN MANAG[facility]   • INJECTION, W/WO CONTRAST, DX/THERAPEUTIC SUBSTANCE, EPIDURAL/SUBA ENDOSCOPY,BIOPSY N/A 1/5/2015    Hiatal hernia.   Normal gastric/SB Bx, Procedure: ESOPHAGOGASTRODUODENOSCOPY W/ DILATION;  Surgeon: Kory Underwood MD;  Location: [address] [place] [address]  Type of Home: [place] [address] wheelchair)?: A Lot   -   Need to walk in hospital room?: Total   -   Climbing 3-5 steps with a railing?: Total     AM-PAC Score:  Raw Score: 9   Approx Degree of Impairment: 81.38%   Standardized Score (AM-PAC Scale): 30.55   CMS Modifier (G-Code):

## 2021-08-03 NOTE — PLAN OF CARE
Vitals stable. Changed wound dressings. Added wound on consult. Pt refused assessment and removal of LUQ colostomy bag, pt stated that she just changed the bag this AM. Fall precautions in place, non-slip socks on, call light within reach.  Continue to [facility] integrity remains intact  Description: INTERVENTIONS  - Assess and document risk factors for pressure ulcer development  - Assess and document skin integrity  - Monitor for areas of redness and/or skin breakdown  - Initiate interventions, skin care algorit

## 2021-08-04 LAB
ANION GAP SERPL CALC-SCNC: 9 MMOL/L (ref 0–18)
BASOPHILS # BLD AUTO: 0.03 X10(3) UL (ref 0–0.2)
BASOPHILS NFR BLD AUTO: 0.4 %
BUN BLD-MCNC: 17 MG/DL (ref 7–18)
BUN/CREAT SERPL: 41.5 (ref 10–20)
CALCIUM BLD-MCNC: 8.6 MG/DL (ref 8.5–10.1)
CHLORIDE SERPL-SCNC: 99 MMOL/L (ref 98–112)
CO2 SERPL-SCNC: 28 MMOL/L (ref 21–32)
CREAT BLD-MCNC: 0.41 MG/DL
DEPRECATED RDW RBC AUTO: 47.4 FL (ref 35.1–46.3)
EOSINOPHIL # BLD AUTO: 0.09 X10(3) UL (ref 0–0.7)
EOSINOPHIL NFR BLD AUTO: 1.2 %
ERYTHROCYTE [DISTWIDTH] IN BLOOD BY AUTOMATED COUNT: 15.6 % (ref 11–15)
GLUCOSE BLD-MCNC: 79 MG/DL (ref 70–99)
HCT VFR BLD AUTO: 32.6 %
HGB BLD-MCNC: 10 G/DL
IMM GRANULOCYTES # BLD AUTO: 0.04 X10(3) UL (ref 0–1)
IMM GRANULOCYTES NFR BLD: 0.5 %
LYMPHOCYTES # BLD AUTO: 1.88 X10(3) UL (ref 1–4)
LYMPHOCYTES NFR BLD AUTO: 24.5 %
MCH RBC QN AUTO: 26 PG (ref 26–34)
MCHC RBC AUTO-ENTMCNC: 30.7 G/DL (ref 31–37)
MCV RBC AUTO: 84.9 FL
MONOCYTES # BLD AUTO: 0.77 X10(3) UL (ref 0.1–1)
MONOCYTES NFR BLD AUTO: 10 %
NEUTROPHILS # BLD AUTO: 4.87 X10 (3) UL (ref 1.5–7.7)
NEUTROPHILS # BLD AUTO: 4.87 X10(3) UL (ref 1.5–7.7)
NEUTROPHILS NFR BLD AUTO: 63.4 %
OSMOLALITY SERPL CALC.SUM OF ELEC: 282 MOSM/KG (ref 275–295)
PLATELET # BLD AUTO: 169 10(3)UL (ref 150–450)
POTASSIUM SERPL-SCNC: 3.7 MMOL/L (ref 3.5–5.1)
RBC # BLD AUTO: 3.84 X10(6)UL
SODIUM SERPL-SCNC: 136 MMOL/L (ref 136–145)
WBC # BLD AUTO: 7.7 X10(3) UL (ref 4–11)

## 2021-08-04 PROCEDURE — 80048 BASIC METABOLIC PNL TOTAL CA: CPT | Performed by: HOSPITALIST

## 2021-08-04 PROCEDURE — 85025 COMPLETE CBC W/AUTO DIFF WBC: CPT | Performed by: HOSPITALIST

## 2021-08-04 RX ORDER — METHYLPREDNISOLONE SODIUM SUCCINATE 40 MG/ML
40 INJECTION, POWDER, LYOPHILIZED, FOR SOLUTION INTRAMUSCULAR; INTRAVENOUS EVERY 4 HOURS
Status: DISCONTINUED | OUTPATIENT
Start: 2021-08-04 | End: 2021-08-04

## 2021-08-04 RX ORDER — DIPHENHYDRAMINE HYDROCHLORIDE 50 MG/ML
50 INJECTION INTRAMUSCULAR; INTRAVENOUS ONCE
Status: DISCONTINUED | OUTPATIENT
Start: 2021-08-04 | End: 2021-08-04

## 2021-08-04 NOTE — PLAN OF CARE
Patient with increased pain and swelling in LLE. 15inches around largest part of left leg swelling. Ortho consult. IR consult.         Problem: Patient Centered Care  Goal: Patient preferences are identified and integrated in the patient's plan of care as ordered  - Implement neutropenic guidelines  Outcome: Progressing     Problem: SAFETY ADULT - FALL  Goal: Free from fall injury  Description: INTERVENTIONS:  - Assess pt frequently for physical needs  - Identify cognitive and physical deficits and behav Goal  Description: Patient's Short Term Goal: to have less pain in my left leg    Interventions:   - pain meds  - See additional Care Plan goals for specific interventions  Outcome: Not Progressing     Problem: SKIN/TISSUE INTEGRITY - ADULT  Goal: Skin int Not Progressing

## 2021-08-04 NOTE — PROGRESS NOTES
[facility] Hospitalist Progress Note     CC: Hospital Follow up    PCP: Yumiko Thomson MD       Assessment/Plan:     Principal Problem:    Acute deep vein thrombosis (DVT) of left lower extremity, unspecified vein (HCC)  Active Problems:    Acute deep ve MCI     Hx Clostridium Difficile     Hereditary spastic paraplegia (wheel chair bound, straight caths at baseline)     Atrial fibrillation  -continue eliquis, resume metoprolol     Laparoscopic loop sigmoid colostomy in 9/2017  Collagenous colitis, microcy kg)      Exam  GEN: female in NAD  HEENT: EOMI  Pulm: CTAB, no crackles or wheezes  CV: RRR, no murmurs  ABD: Soft, non-tender, non-distended, +BS  Neuro: Grossly normal, CN intact, decreased sensation of LLE  Psych: Affect- normal  SKIN: warm, dry  EXT: 1 subcutaneous hematoma at the level of proximal and mid tibial diaphysis. Follow-up imaging recommended after resolution of patient's symptoms to exclude an underlying hemorrhagic mass  Extensive soft tissue edema throughout the left lower extremity.   No a

## 2021-08-04 NOTE — PLAN OF CARE
Problem: Patient Centered Care  Goal: Patient preferences are identified and integrated in the patient's plan of care  Description: Interventions:  - What would you like us to know as we care for you? From home with  who is the primary caregiver. toileting schedule  Outcome: Progressing     Problem: DISCHARGE PLANNING  Goal: Discharge to home or other facility with appropriate resources  Description: INTERVENTIONS:  - Identify barriers to discharge w/pt and caregiver  - Include patient/family/disch appropriate pain scale  - Administer analgesics based on type and severity of pain and evaluate response  - Implement non-pharmacological measures as appropriate and evaluate response  - Consider cultural and social influences on pain and pain management

## 2021-08-04 NOTE — DIETARY NOTE
ADULT NUTRITION INITIAL ASSESSMENT    Pt is at moderate nutrition risk. Pt does not meet malnutrition criteria.       RECOMMENDATIONS TO MD:  See Nutrition Intervention    ADMITTING DIAGNOSIS:   Cellulitis, unspecified cellulitis site [L03.90]  Acute deep prolapse- 3 clips placed     PATIENT STATUS: Initial 08/04/21: Pt admit for cellulitis. PMH sig for UC. Pt assessed due to screening at risk for pressure injury. Pt reports good appetite, hungry.  Wt largely stable but with wt fluctuations likely related to on L ankle and other wounds noted per RN documentation    ANTHROPOMETRICS:  HT: 162.6 cm (5' 4\")  WT: 56.7 kg (125 lb)   BMI: Body mass index is 21.46 kg/m².   BMI CLASSIFICATION: 19-24.9 kg/m2 - WNL  IBW: 120 lbs        104% IBW  Usual Body Wt: 123 lbs tolerance of PO intake, adequacy of supplement intake, tolerance of supplement intake and for PO initiation.  - Anthropometric Measurement:      Monitor weight  - Nutrition Goals:      allow wt loss due to fluid losses, PO and supplement greater than 75% o

## 2021-08-04 NOTE — CONSULTS
[facility]    Report of Consultation    Darien Leatha Patient Status:  Observation    1945 MRN [id number]   Location [facility] 5SW/SE Attending Maria E Fuchs MD   Hosp Day # 0 PCP Patricia Salcido MD     Deni impairment    • Hereditary spastic paraplegia (HCC)    • High blood pressure    • High cholesterol    • History of blood transfusion     when patient had back surgery    • Hypercholesterolemia    • HYPERLIPIDEMIA    • Irritable bowel syndrome    • Lymphocy PAIN MANAGEMENT   • INJECTION, W/WO CONTRAST, DX/THERAPEUTIC SUBSTANCE, EPIDURAL/SUBARACHNOID; CERVICAL/THORACIC N/A 10/27/2014    Procedure: CERVICAL EPIDURAL;  Surgeon: Jia Mason MD;  Location: [facility] FOR PAIN MANAGEMENT   • INJECTION, W/WO CON TOTAL KNEE REPLACEMENT Left    • UPPER GI ENDOSCOPY,BIOPSY N/A 1/5/2015    Hiatal hernia.   Normal gastric/SB Bx, Procedure: ESOPHAGOGASTRODUODENOSCOPY W/ DILATION;  Surgeon: Stefania Alvarez MD;  Location: [address] [place] [address] breakfast  Fluticasone Propionate (FLONASE) 50 MCG/ACT nasal spray 1 spray, 1 spray, Each Nare, Daily  [Held by provider] furosemide (LASIX) tab 40 mg, 40 mg, Oral, Daily  [Held by provider] metoprolol succinate (Toprol XL) 24 hr tab 50 mg, 50 mg, Oral, BI Activated, Inhale 2 puffs into the lungs every 4 (four) hours as needed. Fluticasone Propionate 50 MCG/ACT Nasal Suspension, 1 spray by Each Nare route daily. loratadine 10 MG Oral Tab, Take 10 mg by mouth daily.   acetaminophen 325 MG Oral Tab, Take 325 tense. No extreme pain with compression of the compartments  But tender to touch. Foot and ankle has pitting edema. DP pulse palpable. No pain with passive motion of toes or ankle. At baseline, she has no motor to the left lower extremity.  She reports no s assess. See above.   1.   Dictated by (CST): Evette Simmons MD on 8/03/2021 at 1:43 PM     Finalized by (CST): Evette Simmons MD on 8/03/2021 at 1:47 PM          CT TIBIA/FIBULA LEFT (CPT=73700)    Result Date: 8/3/2021  CONCLUSION:   Large 19.3 cm subcuta

## 2021-08-05 RX ORDER — DOCUSATE SODIUM 100 MG/1
100 CAPSULE, LIQUID FILLED ORAL 2 TIMES DAILY
Status: DISCONTINUED | OUTPATIENT
Start: 2021-08-05 | End: 2021-08-06

## 2021-08-05 NOTE — PROGRESS NOTES
[facility] Hospitalist Progress Note     CC: Hospital Follow up    PCP: Kari Donald MD       Assessment/Plan:   Ms. Ilir Corbett is a 64year old female with multiple medical problems including ulcerative colitis, mitral valve prolapse, depression, Gout, straight caths at baseline)     Atrial fibrillation  -continue eliquis, resume metoprolol     Laparoscopic loop sigmoid colostomy in 9/2017  Collagenous colitis, microcytic colitis  -follows with Dr. Rolanda Mcdowell  -on budesonide daily  -C diff positive   - 32.6*   MCV 85.3 84.3 84.9   MCH 26.0 26.3 26.0   MCHC 30.5* 31.2 30.7*   RDW 15.8* 15.6* 15.6*   NEPRELIM 5.48  --  4.87   WBC 8.6 7.3 7.7   .0 162.0 169.0         Recent Labs   Lab 08/02/21  0954 08/03/21  0615 08/04/21  1255   GLU 81 85 79   BUN Elana Stockton MD on 8/03/2021 at 4:10 PM     Finalized by (CST): Elana Stockton MD on 8/03/2021 at 4:15 PM          [facility] (IFE=[id number])    Result Date: 8/2/2021  CONCLUSION:  1.  Positive for deep venous thrombosis in 1 of the patricia

## 2021-08-05 NOTE — PLAN OF CARE
Problem: Patient Centered Care  Goal: Patient preferences are identified and integrated in the patient's plan of care  Description: Interventions:  - What would you like us to know as we care for you? From home with  who is the primary caregiver. interventions  8/5/2021 0253 by Caitie Bower RN  Outcome: Progressing    Problem: RISK FOR INFECTION - ADULT  Goal: Absence of fever/infection during anticipated neutropenic period  Description: INTERVENTIONS  - Monitor WBC  - Administer growth factors RN  Outcome: Progressing      Problem: SKIN/TISSUE INTEGRITY - ADULT  Goal: Skin integrity remains intact  Description: INTERVENTIONS  - Assess and document risk factors for pressure ulcer development  - Assess and document skin integrity  - Monitor for ar in tolerated functional activity level and precautions during self-care    8/5/2021 0253 by Mahesh Borrego RN  Outcome: Progressing    Patient alert, vitals stable. Medications tolerated well. Prn pain medication given for pain control.  Patient assisted

## 2021-08-05 NOTE — PROGRESS NOTES
Hematology/Oncology follow up Note      Now with left leg hematoma and off anticoagulation    Past Medical History:   Diagnosis Date   • Anemia    • Anesthesia complication     in 84[address] \"I didnt wake up\"   • Anxiety state, unspecified    • Arrhythmia COLONOSCOPY     • COLONOSCOPY,DIAGNOSTIC  5/12    normal, random colon bx showed findings c/w lymphocytic colitis   • COLOSTOMY     • CYSTOURETHROSCOPY  1/9/09    in office   • EGD N/A 12/24/2016    Procedure: ESOPHAGOGASTRODUODENOSCOPY (EGD);   Surgeon: [id number] Location: [facility]   • PATIENT [place] PREOPERATIVE ORDER FOR IV ANTIBIOTIC SURGICAL SITE INFECTION PROPHYLAXIS.  N/A 12/8/2014    Procedure: CERVICAL EPIDURAL;  Surgeon: Ji Hernandez MD;  Location: [address] [place] [address] • [Held by provider] furosemide  40 mg Oral Daily   • [Held by provider] metoprolol succinate  50 mg Oral BID   • PEG 3350  17 g Oral Daily   • predniSONE  5 mg Oral Daily   • pregabalin  50 mg Oral TID   • venlafaxine  75 mg Oral Daily   • vancomycin  1 Lab 08/02/21  0954 08/03/21  0615 08/04/21  1255   GLU 81 85 79   BUN 24* 23* 17   CREATSERUM 0.48* 0.49* 0.41*   GFRAA 109 108 114   GFRNAA 94 94 99   CA 9.0 8.8 8.6   ALB 2.9*  --   --     138 136   K 3.5 3.9 3.7    104 99   CO2 31.0 28.0 2

## 2021-08-06 ENCOUNTER — APPOINTMENT (OUTPATIENT)
Dept: CARDIOLOGY | Age: 76
End: 2021-08-06

## 2021-08-06 VITALS
RESPIRATION RATE: 18 BRPM | SYSTOLIC BLOOD PRESSURE: 142 MMHG | BODY MASS INDEX: 21.34 KG/M2 | OXYGEN SATURATION: 95 % | HEART RATE: 60 BPM | DIASTOLIC BLOOD PRESSURE: 56 MMHG | HEIGHT: 64 IN | WEIGHT: 125 LBS | TEMPERATURE: 98 F

## 2021-08-06 NOTE — PROGRESS NOTES
Hematology/Oncology follow up Note      Reports leg with less pain. Feels well no complaints today.  Wants to go home     Past Medical History:   Diagnosis Date   • Anemia    • Anesthesia complication     in 3310V \"I didnt wake up\"   • Anxiety state, un polyps, UC was quiet   • COLONOSCOPY     • COLONOSCOPY,DIAGNOSTIC  5/12    normal, random colon bx showed findings c/w lymphocytic colitis   • COLOSTOMY     • CYSTOURETHROSCOPY  1/9/09    in office   • EGD N/A 12/24/2016    Procedure: Pauly Lester Simon Ivey MD;  Location: [facility] PAIN MANA[facility]   • PATIENT [address] FOR IV ANTIBIOTIC SURGICAL SITE INFECTION PROPHYLAXIS.  N/A 12/8/2014    Procedure: CERVICAL EPIDURAL;  Surgeon: Simon Ivey MD;  Location: [address] [place] [address] breakfast   • Fluticasone Propionate  1 spray Each Nare Daily   • [Held by provider] furosemide  40 mg Oral Daily   • [Held by provider] metoprolol succinate  50 mg Oral BID   • predniSONE  5 mg Oral Daily   • pregabalin  50 mg Oral TID   • venlafaxine  75 7. 7   .0     Recent Labs   Lab 08/02/21  0954 08/03/21  0615 08/04/21  1255   GLU 81 85 79   BUN 24* 23* 17   CREATSERUM 0.48* 0.49* 0.41*   GFRAA 109 108 114   GFRNAA 94 94 99   CA 9.0 8.8 8.6   ALB 2.9*  --   --     138 136   K 3.5 3.9 3.7 Gloria Shepherd M.D.  [facility] Hematology and Oncology

## 2021-08-06 NOTE — PLAN OF CARE
Problem: Patient Centered Care  Goal: Patient preferences are identified and integrated in the patient's plan of care  Description: Interventions:  - What would you like us to know as we care for you? From home with  who is the primary caregiver. neutropenic period  Description: INTERVENTIONS  - Monitor WBC  - Administer growth factors as ordered  - Implement neutropenic guidelines  Outcome: Progressing     Problem: SAFETY ADULT - FALL  Goal: Free from fall injury  Description: INTERVENTIONS:  - As breakdown  - Initiate interventions, skin care algorithm/standards of care as needed  Outcome: Progressing  Goal: Incision(s), wounds(s) or drain site(s) healing without S/S of infection  Description: INTERVENTIONS:  - Assess and document risk factors for

## 2021-08-06 NOTE — PROGRESS NOTES
Patient needing ambulance, max 2 assist and needs to keep leg elevated  Address[facility]   [address] [facility] [address]      PCS form in the chart  [facility] [phone]  Spoke with :Teressa Negrete

## 2021-08-06 NOTE — PROGRESS NOTES
Discharge RN Summary: Patient has discharge order in. Patient to discharge home with  and hhc. IV removed by this rn. Understands to follow up with PCP in 1 week. Patient understands to  meds with printed script.  Pt is aware she needs to hol

## 2021-08-06 NOTE — DISCHARGE SUMMARY
General Medicine Discharge Summary     Patient ID:  Tyshawn Osullivan  64year old  5/4/1945    Admit date: 8/2/2021    Discharge date and time: 8/6/21    Attending Physician: DO Zee Lujan anterior shin  - increased swelling on 8/3  - XR with no fracture  - CT fibula with large hematoma  - worsening swelling and pain on 8/4, decreased sensation  - concern for possible compartment syndrome, ortho consulted, d/w Dr. Mayra Kaiser, evaluated patient an reconciled with current medication list     Current Discharge Medication List    START taking these medications    Miconazole Nitrate 2 % External Powder  Twice daily      CONTINUE these medications which have CHANGED    apixaban 5 MG Oral Tab  5mg twice a Pain.    ferrous sulfate 325 (65 FE) MG Oral Tab EC  Take 325 mg by mouth daily with breakfast.    Atorvastatin Calcium 40 MG Oral Tab  Take 40 mg by mouth nightly. PEG 3350 17 g Oral Powd Pack  Take 17 g by mouth daily.     Ostomy Supplies Pouch Does

## 2021-08-10 ENCOUNTER — TELEPHONE (OUTPATIENT)
Dept: GASTROENTEROLOGY | Facility: CLINIC | Age: 76
End: 2021-08-10

## 2021-08-10 ENCOUNTER — TELEPHONE (OUTPATIENT)
Dept: CARDIOLOGY | Age: 76
End: 2021-08-10

## 2021-08-10 LAB
SARS-COV-2 RNA SPEC QL NAA+PROBE: NOT DETECTED
SPECIMEN SOURCE: NORMAL

## 2021-08-11 NOTE — TELEPHONE ENCOUNTER
If the patient is clinically well without diarrhea, I would decrease the vancomycin to once daily. If stable after a few weeks will contemplate cessation.

## 2021-08-11 NOTE — TELEPHONE ENCOUNTER
I spoke to [facility] where the pt is currently admitted    She is inquiring if the pt should still be taking Vanco for c diff    They have not been giving it to her while she is admitted    Pt was seen in the office on 06/04/21 for a f/u on the

## 2021-08-12 ENCOUNTER — TELEPHONE (OUTPATIENT)
Dept: CARDIOLOGY CLINIC | Facility: HOSPITAL | Age: 76
End: 2021-08-12

## 2021-08-12 NOTE — TELEPHONE ENCOUNTER
I returned call to [facility] ICU. Informed Susan Stone is not in today, but transferred to RN caring for patient. I spoke to JOLYNN Larson. Relayed below orders to RN regarding Vancomycin. Patient will be taking Vanco 125 mg once daily.     No further questions or

## 2021-08-12 NOTE — TELEPHONE ENCOUNTER
calling to inform [facility] that pt is currently admitted at [facility] and she would like a call back from [place] today she has some questions ,  would not clarify    Please call cell phone [phone]

## 2021-08-14 ENCOUNTER — HOSPITAL ENCOUNTER (INPATIENT)
Facility: HOSPITAL | Age: 76
LOS: 3 days | Discharge: HOME HEALTH | DRG: 698 | End: 2021-08-17
Attending: EMERGENCY MEDICINE | Admitting: INTERNAL MEDICINE
Payer: MEDICARE

## 2021-08-14 ENCOUNTER — APPOINTMENT (OUTPATIENT)
Dept: CT IMAGING | Facility: HOSPITAL | Age: 76
DRG: 698 | End: 2021-08-14
Attending: EMERGENCY MEDICINE
Payer: MEDICARE

## 2021-08-14 DIAGNOSIS — R10.9 ABDOMINAL PAIN: ICD-10-CM

## 2021-08-14 DIAGNOSIS — I48.91 ATRIAL FIBRILLATION WITH RAPID VENTRICULAR RESPONSE (HCC): ICD-10-CM

## 2021-08-14 DIAGNOSIS — N30.00 ACUTE CYSTITIS WITHOUT HEMATURIA: Primary | ICD-10-CM

## 2021-08-14 DIAGNOSIS — D64.9 ANEMIA, UNSPECIFIED TYPE: ICD-10-CM

## 2021-08-14 LAB
ALBUMIN SERPL-MCNC: 2.7 G/DL (ref 3.4–5)
ALP LIVER SERPL-CCNC: 97 U/L
ALT SERPL-CCNC: 19 U/L
ANION GAP SERPL CALC-SCNC: 7 MMOL/L (ref 0–18)
AST SERPL-CCNC: 19 U/L (ref 15–37)
BASOPHILS # BLD AUTO: 0.03 X10(3) UL (ref 0–0.2)
BASOPHILS NFR BLD AUTO: 0.3 %
BILIRUB DIRECT SERPL-MCNC: 0.6 MG/DL (ref 0–0.2)
BILIRUB SERPL-MCNC: 1.8 MG/DL (ref 0.1–2)
BILIRUB UR QL: NEGATIVE
BUN BLD-MCNC: 15 MG/DL (ref 7–18)
BUN/CREAT SERPL: 28.8 (ref 10–20)
C DIFF TOX B STL QL: NEGATIVE
CALCIUM BLD-MCNC: 9.1 MG/DL (ref 8.5–10.1)
CHLORIDE SERPL-SCNC: 105 MMOL/L (ref 98–112)
CO2 SERPL-SCNC: 26 MMOL/L (ref 21–32)
COLOR UR: YELLOW
CREAT BLD-MCNC: 0.52 MG/DL
DEPRECATED RDW RBC AUTO: 49.7 FL (ref 35.1–46.3)
EOSINOPHIL # BLD AUTO: 0.1 X10(3) UL (ref 0–0.7)
EOSINOPHIL NFR BLD AUTO: 1.1 %
ERYTHROCYTE [DISTWIDTH] IN BLOOD BY AUTOMATED COUNT: 16.6 % (ref 11–15)
GLUCOSE BLD-MCNC: 113 MG/DL (ref 70–99)
GLUCOSE UR-MCNC: NEGATIVE MG/DL
HCT VFR BLD AUTO: 32.1 %
HGB BLD-MCNC: 9.7 G/DL
HGB UR QL STRIP.AUTO: NEGATIVE
IMM GRANULOCYTES # BLD AUTO: 0.06 X10(3) UL (ref 0–1)
IMM GRANULOCYTES NFR BLD: 0.7 %
KETONES UR-MCNC: NEGATIVE MG/DL
LIPASE SERPL-CCNC: 49 U/L (ref 73–393)
LYMPHOCYTES # BLD AUTO: 1.97 X10(3) UL (ref 1–4)
LYMPHOCYTES NFR BLD AUTO: 21.6 %
M PROTEIN MFR SERPL ELPH: 5.7 G/DL (ref 6.4–8.2)
MCH RBC QN AUTO: 25.1 PG (ref 26–34)
MCHC RBC AUTO-ENTMCNC: 30.2 G/DL (ref 31–37)
MCV RBC AUTO: 83.2 FL
MONOCYTES # BLD AUTO: 0.98 X10(3) UL (ref 0.1–1)
MONOCYTES NFR BLD AUTO: 10.7 %
NEUTROPHILS # BLD AUTO: 5.99 X10 (3) UL (ref 1.5–7.7)
NEUTROPHILS # BLD AUTO: 5.99 X10(3) UL (ref 1.5–7.7)
NEUTROPHILS NFR BLD AUTO: 65.6 %
NITRITE UR QL STRIP.AUTO: POSITIVE
OSMOLALITY SERPL CALC.SUM OF ELEC: 288 MOSM/KG (ref 275–295)
PH UR: 7 [PH] (ref 5–8)
PLATELET # BLD AUTO: 375 10(3)UL (ref 150–450)
POTASSIUM SERPL-SCNC: 5.1 MMOL/L (ref 3.5–5.1)
PROT UR-MCNC: 30 MG/DL
RBC # BLD AUTO: 3.86 X10(6)UL
SODIUM SERPL-SCNC: 138 MMOL/L (ref 136–145)
SP GR UR STRIP: 1.02 (ref 1–1.03)
UROBILINOGEN UR STRIP-ACNC: 4
WBC # BLD AUTO: 9.1 X10(3) UL (ref 4–11)
WBC #/AREA URNS AUTO: >50 /HPF
WBC CLUMPS UR QL AUTO: PRESENT /HPF

## 2021-08-14 PROCEDURE — 74176 CT ABD & PELVIS W/O CONTRAST: CPT | Performed by: EMERGENCY MEDICINE

## 2021-08-14 PROCEDURE — 99222 1ST HOSP IP/OBS MODERATE 55: CPT | Performed by: INTERNAL MEDICINE

## 2021-08-14 RX ORDER — ONDANSETRON 4 MG/1
4 TABLET, ORALLY DISINTEGRATING ORAL EVERY 8 HOURS PRN
Qty: 6 TABLET | Refills: 0 | Status: SHIPPED | OUTPATIENT
Start: 2021-08-14 | End: 2021-09-24

## 2021-08-14 RX ORDER — BISACODYL 10 MG
10 SUPPOSITORY, RECTAL RECTAL
Status: DISCONTINUED | OUTPATIENT
Start: 2021-08-14 | End: 2021-08-17

## 2021-08-14 RX ORDER — PREGABALIN 50 MG/1
50 CAPSULE ORAL 3 TIMES DAILY
Status: DISCONTINUED | OUTPATIENT
Start: 2021-08-14 | End: 2021-08-17

## 2021-08-14 RX ORDER — EZETIMIBE 10 MG/1
10 TABLET ORAL DAILY
Status: DISCONTINUED | OUTPATIENT
Start: 2021-08-14 | End: 2021-08-17

## 2021-08-14 RX ORDER — ONDANSETRON 2 MG/ML
4 INJECTION INTRAMUSCULAR; INTRAVENOUS EVERY 6 HOURS PRN
Status: DISCONTINUED | OUTPATIENT
Start: 2021-08-14 | End: 2021-08-17

## 2021-08-14 RX ORDER — POLYETHYLENE GLYCOL 3350 17 G/17G
17 POWDER, FOR SOLUTION ORAL DAILY PRN
Status: DISCONTINUED | OUTPATIENT
Start: 2021-08-14 | End: 2021-08-17

## 2021-08-14 RX ORDER — ASCORBIC ACID 500 MG
500 TABLET ORAL DAILY
Status: DISCONTINUED | OUTPATIENT
Start: 2021-08-14 | End: 2021-08-17

## 2021-08-14 RX ORDER — CIPROFLOXACIN 250 MG/1
250 TABLET, FILM COATED ORAL 2 TIMES DAILY
Qty: 14 TABLET | Refills: 0 | Status: SHIPPED | OUTPATIENT
Start: 2021-08-14 | End: 2021-08-17

## 2021-08-14 RX ORDER — BUMETANIDE 1 MG/1
1 TABLET ORAL DAILY
Status: DISCONTINUED | OUTPATIENT
Start: 2021-08-14 | End: 2021-08-17

## 2021-08-14 RX ORDER — PREDNISONE 1 MG/1
5 TABLET ORAL DAILY
Status: DISCONTINUED | OUTPATIENT
Start: 2021-08-14 | End: 2021-08-17

## 2021-08-14 RX ORDER — DILTIAZEM HYDROCHLORIDE 5 MG/ML
10 INJECTION INTRAVENOUS ONCE
Status: DISCONTINUED | OUTPATIENT
Start: 2021-08-14 | End: 2021-08-17

## 2021-08-14 RX ORDER — TRAMADOL HYDROCHLORIDE 50 MG/1
50 TABLET ORAL EVERY 6 HOURS PRN
Status: DISCONTINUED | OUTPATIENT
Start: 2021-08-14 | End: 2021-08-17

## 2021-08-14 RX ORDER — ATORVASTATIN CALCIUM 40 MG/1
40 TABLET, FILM COATED ORAL NIGHTLY
Status: DISCONTINUED | OUTPATIENT
Start: 2021-08-14 | End: 2021-08-17

## 2021-08-14 RX ORDER — VENLAFAXINE HYDROCHLORIDE 75 MG/1
75 CAPSULE, EXTENDED RELEASE ORAL DAILY
Status: DISCONTINUED | OUTPATIENT
Start: 2021-08-14 | End: 2021-08-17

## 2021-08-14 RX ORDER — FLUTICASONE PROPIONATE 50 MCG
1 SPRAY, SUSPENSION (ML) NASAL DAILY
Status: DISCONTINUED | OUTPATIENT
Start: 2021-08-14 | End: 2021-08-17

## 2021-08-14 RX ORDER — MELATONIN
3 NIGHTLY PRN
Status: DISCONTINUED | OUTPATIENT
Start: 2021-08-14 | End: 2021-08-17

## 2021-08-14 RX ORDER — VANCOMYCIN HYDROCHLORIDE 125 MG/1
125 CAPSULE ORAL 2 TIMES DAILY
Status: DISCONTINUED | OUTPATIENT
Start: 2021-08-14 | End: 2021-08-17

## 2021-08-14 RX ORDER — ALBUTEROL SULFATE 90 UG/1
2 AEROSOL, METERED RESPIRATORY (INHALATION) EVERY 4 HOURS PRN
Status: DISCONTINUED | OUTPATIENT
Start: 2021-08-14 | End: 2021-08-17

## 2021-08-14 RX ORDER — METOPROLOL SUCCINATE 50 MG/1
50 TABLET, EXTENDED RELEASE ORAL 2 TIMES DAILY
Status: DISCONTINUED | OUTPATIENT
Start: 2021-08-14 | End: 2021-08-16

## 2021-08-14 RX ORDER — ACETAMINOPHEN 325 MG/1
650 TABLET ORAL EVERY 6 HOURS PRN
Status: DISCONTINUED | OUTPATIENT
Start: 2021-08-14 | End: 2021-08-17

## 2021-08-14 RX ORDER — MELATONIN
325
Status: DISCONTINUED | OUTPATIENT
Start: 2021-08-14 | End: 2021-08-17

## 2021-08-14 RX ORDER — VANCOMYCIN HYDROCHLORIDE 125 MG/1
125 CAPSULE ORAL DAILY
Status: DISCONTINUED | OUTPATIENT
Start: 2021-08-15 | End: 2021-08-14

## 2021-08-14 RX ORDER — CETIRIZINE HYDROCHLORIDE 10 MG/1
10 TABLET ORAL DAILY
Status: DISCONTINUED | OUTPATIENT
Start: 2021-08-14 | End: 2021-08-17

## 2021-08-14 RX ORDER — BUMETANIDE 1 MG/1
1 TABLET ORAL DAILY
COMMUNITY
End: 2021-10-22

## 2021-08-14 RX ORDER — METOCLOPRAMIDE HYDROCHLORIDE 5 MG/ML
10 INJECTION INTRAMUSCULAR; INTRAVENOUS EVERY 8 HOURS PRN
Status: DISCONTINUED | OUTPATIENT
Start: 2021-08-14 | End: 2021-08-17

## 2021-08-14 RX ORDER — BUDESONIDE 3 MG/1
3 CAPSULE, COATED PELLETS ORAL EVERY MORNING
Status: DISCONTINUED | OUTPATIENT
Start: 2021-08-14 | End: 2021-08-17

## 2021-08-14 RX ORDER — ONDANSETRON 2 MG/ML
4 INJECTION INTRAMUSCULAR; INTRAVENOUS ONCE
Status: COMPLETED | OUTPATIENT
Start: 2021-08-14 | End: 2021-08-14

## 2021-08-14 NOTE — H&P
[facility] Hospitalist H&P     CC: Patient presents with:  Abdomen/Flank Pain       PCP: Jyoti Murphy MD      Assessment and Plan     Ms. Soledad Eli is a 64 year old female with multiple medical problems including ulcerative colitis, mitral valve prol outpatient hematologist who she can follow-up with  -Has IVC filter  -For now continue Eliquis    Hereditary spastic paraplegia   -wheel chair bound, straight caths at baseline     Laparoscopic loop sigmoid colostomy in 9/2017  Collagenous colitis, microcy cdif colitis and collagenous colitis s/p laparoscopic loop sigmoid colostomy in 9/2017 with recent admission on 8-21 for left lower extremity DVT complicated by hematoma with Eliquis held and then admitted to [facility] on 8/9/2021 for shortness of breath and transfusion     when patient had back surgery    • Hypercholesterolemia    • HYPERLIPIDEMIA    • Irritable bowel syndrome    • Lymphocytic colitis Colon= 5/7/12   • MENOPAUSE    • MITRAL VALVE PROLAPSE    • Neuropathy     bilateral legs;  Hereditary spastic Procedure: CERVICAL EPIDURAL;  Surgeon: Paula Fair MD;  Location: [address] [place] [address]   • INJECTION, W/WO CONTRAST, DX/THERAPEUTIC SUBSTANCE, EPIDURAL/SUBARACHNOID; CERVICAL/THORACIC N/A 12/8/2014    Procedure: CERVICAL EPIDURAL;  Surgeon: ESOPHAGOGASTRODUODENOSCOPY W/ DILATION;  Surgeon: Sarai Jiang MD;  Location: [address] [place] [address]   • VALVE REPAIR          ALL:    Cymbalta [Duloxetin*    ITCHING, SWELLING    Comment:Throat swelling and difficulty breathing  Gabapentin Other (Other) Daughter         spastic paraplegia   • Eye Problems Other         aunts/ uncles glaucoma   • Other (Other) Other            Objective     Temp: 97.8 °F (36.6 °C)  Pulse: 97  Resp: 31  BP: 115/74    Exam  Gen: No acute distress, alert and janice 19.3 cm subcutaneous hematoma at the level of proximal and mid tibial diaphysis.   Follow-up imaging recommended after resolution of patient's symptoms to exclude an underlying hemorrhagic mass  Extensive soft tissue edema throughout the left lower extremit

## 2021-08-14 NOTE — PROGRESS NOTES
08/14/21 1310   Clinical Encounter Type   Visited With Patient   Routine Visit Introduction  (Consult)   Yarsani Encounters   Spiritual Requests During Visit / Hospitalization Sacrament of the [address]

## 2021-08-14 NOTE — CONSULTS
[facility]    PATIENT'S NAME: Melita Estrella   ATTENDING PHYSICIAN: Bill Leung MD   CONSULTING PHYSICIAN: Kenzie Reilly MD   PATIENT ACCOUNT#:   [de-identified]    LOCATION:  [address] [place] [address]  MEDICAL RECORD #:   [id number]       DATE OF B was just discharged from [facility], where she had presented for shortness of breath. I cannot explain why she is having the vomiting, will have her be evaluated. 2.   Atrial fibrillation, somewhat rapid. Add IV Cardizem if rates increase.   This has been a

## 2021-08-14 NOTE — ED INITIAL ASSESSMENT (HPI)
Patient is here with abdominal pain and vomiting since yesterday, at 1500. Patient was not able to keep food. Patient was discharged from [facility] yesterday. Patient was there for shortness of breath and afib.

## 2021-08-14 NOTE — CM/SW NOTE
09: 25AM  Pt is a READMISSION that presents w/ AFIB w/ RVR    Pt is known to SW/CM department from previous admissions. Pt is from home w/ her  and is WC bound at baseline.  Per SW note in January 2020: pt typically completes w/c transfers w/ a sli

## 2021-08-14 NOTE — PLAN OF CARE
Patient AxOx4. VSS. C/o abd pain, tramadol PRN. Patient nausea, but no vomitting, does not always tolerated PO medication. Cdiff negative. Wounds present & patient has ostomy--wound care consulted. Patient off cards gtt, rates between 80-90s.  Plans to disc baseline  Description: INTERVENTIONS:  - Continuous cardiac monitoring, monitor vital signs, obtain 12 lead EKG if indicated  - Evaluate effectiveness of antiarrhythmic and heart rate control medications as ordered  - Initiate emergency measures for life t and perform bladder scan as needed  - Follow urinary retention protocol/standard of care  - Consider collaborating with pharmacy to review patient's medication profile  - Implement strategies to promote bladder emptying  Outcome: Progressing     Problem: S that affect risk of falls.   - Romeo fall precautions as indicated by assessment.  - Educate pt/family on patient safety including physical limitations  - Instruct pt to call for assistance with activity based on assessment  - Modify environment to redu

## 2021-08-14 NOTE — PROGRESS NOTES
65 y/o female with hx afib, ulcerative colitis, colostomy, mitral clip recent C Diff infection presents with rapid afib, vomiting andabdominal pain. Hemodynamically compensated with mild MR on exam. Recent echo Ef 50%. Prelim CT abdomen nothing obvious.  Mamie Quant

## 2021-08-14 NOTE — CONSULTS
[facility]   Gastroenterology Consultation Note     Rosemary Salcedo  Patient Status:    Inpatient  Date of Admission:         8/14/2021, Hospital day #0  Attending:   Ute Crockett MD  PCP:     MD Earnest Barillas transfusion     when patient had back surgery    • Hypercholesterolemia    • HYPERLIPIDEMIA    • Irritable bowel syndrome    • Lymphocytic colitis Colon= 5/7/12   • MENOPAUSE    • MITRAL VALVE PROLAPSE    • Neuropathy     bilateral legs;  Hereditary spastic CERVICAL EPIDURAL;  Surgeon: Chantell Jaimes MD;  Location: [facility] PAIN MANAG[facility]   • INJECTION, W/WO CONTRAST, DX/THERAPEUTIC SUBSTANCE, EPIDURAL/SUBARACHNOID; CERVICAL/THORACIC N/A 12/8/2014    Procedure: CERVICAL EPIDURAL;  Surgeon: Jaz Stein ESOPHAGOGASTRODUODENOSCOPY W/ DILATION;  Surgeon: Idalia Rodriguez MD;  Location: [address] [place] [address]   • VALVE REPAIR       Family History   Problem Relation Age of Onset   • Cancer Father    • Heart Disorder Father    • Eye Problems Father         gl Oral, Q6H PRN  •  ondansetron (ZOFRAN) injection 4 mg, 4 mg, Intravenous, Q6H PRN  •  metoclopramide (REGLAN) injection 10 mg, 10 mg, Intravenous, Q8H PRN  •  PEG 3350 (MIRALAX) powder packet 17 g, 17 g, Oral, Daily PRN  •  magnesium hydroxide (MILK OF MAG exudates or lesions  Neck: no lymphadenopathy; thyroid is not enlarged and without nodules  CV: RRR  Resp: non-labored breathing  Abd: soft, non-tender, non-distended; ostomy in place and unremarakble  Ext: no lower extremity swelling  Neuro: Alert, Jcarlos Carlos gout, hypertension, hyperlipidemia, OA, spastic paraglegia, wheelchair-bound who was admitted 8/14 after presenting with abdominal pain and vomiting    Found to afib with RVR. Being treated for UTI also.     Recent admission in July noted with complaints of

## 2021-08-14 NOTE — HOME CARE LIAISON
This patient is current with [facility]. Patient will need a ODALIS order on or before the day of DC.  Services: RN/PT

## 2021-08-15 LAB
ANION GAP SERPL CALC-SCNC: 6 MMOL/L (ref 0–18)
BASOPHILS # BLD AUTO: 0.03 X10(3) UL (ref 0–0.2)
BASOPHILS NFR BLD AUTO: 0.4 %
BUN BLD-MCNC: 15 MG/DL (ref 7–18)
BUN/CREAT SERPL: 32.6 (ref 10–20)
CALCIUM BLD-MCNC: 8.4 MG/DL (ref 8.5–10.1)
CHLORIDE SERPL-SCNC: 103 MMOL/L (ref 98–112)
CO2 SERPL-SCNC: 28 MMOL/L (ref 21–32)
CREAT BLD-MCNC: 0.46 MG/DL
DEPRECATED RDW RBC AUTO: 49.1 FL (ref 35.1–46.3)
EOSINOPHIL # BLD AUTO: 0.1 X10(3) UL (ref 0–0.7)
EOSINOPHIL NFR BLD AUTO: 1.5 %
ERYTHROCYTE [DISTWIDTH] IN BLOOD BY AUTOMATED COUNT: 16.5 % (ref 11–15)
GLUCOSE BLD-MCNC: 78 MG/DL (ref 70–99)
HCT VFR BLD AUTO: 28.1 %
HGB BLD-MCNC: 8.4 G/DL
IMM GRANULOCYTES # BLD AUTO: 0.05 X10(3) UL (ref 0–1)
IMM GRANULOCYTES NFR BLD: 0.7 %
LYMPHOCYTES # BLD AUTO: 1.62 X10(3) UL (ref 1–4)
LYMPHOCYTES NFR BLD AUTO: 23.7 %
MCH RBC QN AUTO: 24.8 PG (ref 26–34)
MCHC RBC AUTO-ENTMCNC: 29.9 G/DL (ref 31–37)
MCV RBC AUTO: 82.9 FL
MONOCYTES # BLD AUTO: 0.63 X10(3) UL (ref 0.1–1)
MONOCYTES NFR BLD AUTO: 9.2 %
NEUTROPHILS # BLD AUTO: 4.4 X10 (3) UL (ref 1.5–7.7)
NEUTROPHILS # BLD AUTO: 4.4 X10(3) UL (ref 1.5–7.7)
NEUTROPHILS NFR BLD AUTO: 64.5 %
OSMOLALITY SERPL CALC.SUM OF ELEC: 284 MOSM/KG (ref 275–295)
PLATELET # BLD AUTO: 275 10(3)UL (ref 150–450)
POTASSIUM SERPL-SCNC: 3.6 MMOL/L (ref 3.5–5.1)
RBC # BLD AUTO: 3.39 X10(6)UL
SODIUM SERPL-SCNC: 137 MMOL/L (ref 136–145)
WBC # BLD AUTO: 6.8 X10(3) UL (ref 4–11)

## 2021-08-15 PROCEDURE — 99232 SBSQ HOSP IP/OBS MODERATE 35: CPT | Performed by: INTERNAL MEDICINE

## 2021-08-15 NOTE — PROGRESS NOTES
[facility] Hospitalist Progress Note     PCP: Edilma Diaz MD    CC: Follow up       Assessment/Plan:   Ms. Camilo Gonzalez is a 64 year old female with multiple medical problems including ulcerative colitis, mitral valve prolapse, depression, Gout, GERD,  Jeromy  -Has an outpatient hematologist who she can follow-up with  -Has IVC filter  -For now continue Eliquis     Hereditary spastic paraplegia   -wheel chair bound, straight caths at baseline     Laparoscopic loop sigmoid colostomy in 9/2017  Collagenous (61.2 kg)      Exam  Gen: No acute distress, alert and oriented x3  Neck Supple, no JVD  Pulm: Lungs clear, normal respiratory effort, No wheezing or crackles  CV: Heart irreg, irreg  Abd: Abdomen soft, NT , colostomy with stool  MSK: No Lower extremity ed arthroplasty as discussed.  2. Of note, there is a new moderate area of soft tissue prominence at the anterolateral aspect of the midportion of the left leg as discussed above with a focal opacity in the subcutaneous tissue measuring 6.8 x 3.8 x 1.7 cm in s be secondary to recent intervention versus cystitis. Correlation with urinalysis suggested. Mild pulmonary edema, partially seen. Mild wall thickening of the distal esophagus at the gastroesophageal junction may be related to esophagitis.  If clinically

## 2021-08-15 NOTE — PROGRESS NOTES
[facility]     Gastroenterology Progress Note    Cathlean Call Patient Status:  Inpatient    1945 MRN [id number]   Location [facility] 3W/SW Attending Geeta Shields MD   Hosp Day # 1 PCP [facility] There is bladder wall thickening. Findings may be secondary to recent intervention versus cystitis. Correlation with urinalysis suggested. Mild pulmonary edema, partially seen.   Mild wall thickening of the distal esophagus at the gastroesophageal juncti findings of prior loop colostomy, rectal biopsies showed nonspecific active/chronic colitis. Review of consult note with Dr. Felice Reyez 7/6/21 seems to note similar complaints to this current admission.  Recently seen outpatient by Liana Bañuelos GI APRN 7/22/21 in [place] [place], [facility] - Gastroenterology  8/15/2021

## 2021-08-15 NOTE — PLAN OF CARE
Problem: Patient Centered Care  Goal: Patient preferences are identified and integrated in the patient's plan of care  Description: Interventions:  - What would you like us to know as we care for you?   - Provide timely, complete, and accurate informatio administer replacement therapy as ordered  Outcome: Progressing     Problem: GASTROINTESTINAL - ADULT  Goal: Minimal or absence of nausea and vomiting  Description: INTERVENTIONS:  - Maintain adequate hydration with IV or PO as ordered and tolerated  - Luis Eduardo wounds(s) or drain site(s) healing without S/S of infection  Description: INTERVENTIONS:  - Assess and document risk factors for pressure ulcer development  - Assess and document skin integrity  - Assess and document dressing/incision, wound bed, drain sit appropriate  - Consider OT/PT consult to assist with strengthening/mobility  - Encourage toileting schedule  Outcome: Progressing     Problem: DISCHARGE PLANNING  Goal: Discharge to home or other facility with appropriate resources  Description: INTERVENTI

## 2021-08-15 NOTE — OCCUPATIONAL THERAPY NOTE
OCCUPATIONAL THERAPY EVALUATION - INPATIENT     Room Number: EVP20/QIX57-P  Evaluation Date: 8/15/2021  Type of Evaluation: Initial  Presenting Problem: abdominal pain and vomitting    Physician Order: IP Consult to Occupational Therapy  Reason for Therapy mob, functional transfers, ADLs. The patient is below baseline and would benefit from skilled inpatient OT to address the above deficits, maximizing patient's ability to return to prior level of function. The patient's Approx Degree of Impairment: 46. legs, back, shoulders and knees   • OSTEOPENIA    • Other and unspecified hyperlipidemia    • OTHER DISEASES     spastic paraparesis   • OTHER DISEASES     cataracts   • OTHER DISEASES     secondary poycythemia   • OTHER DISEASES     lumbar spinal stenosis [place] FOR PAIN MANAGEMENT   • KNEE REPLACEMENT SURGERY      left  12/05   • OTHER SURGICAL HISTORY  8-24-12    cysto Dr. Parish Gutierrez   • Lissette Brothers N/A 10/27/2014    Procedure: CERVICAL EPIDURAL;  [facility] [place] board, kathya lift)       Hand Dominance: Left  Drives: No  Patient Regularly Uses: None    Stairs in Home: none, ramp  Use of Assistive Device(s): w/c, slide board    Prior Level of [place]: Pt lives with her .  Pt reports she is w/c bound at ba taking off regular lower body clothing?: A Lot  -   Bathing (including washing, rinsing, drying)?: A Lot  -   Toileting, which includes using toilet, bedpan or urinal? : A Little  -   Putting on and taking off regular upper body clothing?: A Little  -   Ta

## 2021-08-15 NOTE — PLAN OF CARE
Problem: Patient Centered Care  Goal: Patient preferences are identified and integrated in the patient's plan of care  Description: Interventions:  - What would you like us to know as we care for you?  HOME WITH FAMILY  - Provide timely, complete, and acc ordered  - Initiate emergency measures for life threatening arrhythmias  - Monitor electrolytes and administer replacement therapy as ordered  Outcome: Progressing     Problem: GASTROINTESTINAL - ADULT  Goal: Minimal or absence of nausea and vomiting  Desc bladder emptying  Outcome: Progressing     Problem: SKIN/TISSUE INTEGRITY - ADULT  Goal: Incision(s), wounds(s) or drain site(s) healing without S/S of infection  Description: INTERVENTIONS:  - Assess and document risk factors for pressure ulcer developmen activity based on assessment  - Modify environment to reduce risk of injury  - Provide assistive devices as appropriate  - Consider OT/PT consult to assist with strengthening/mobility  - Encourage toileting schedule  Outcome: Progressing     Problem: [facility]

## 2021-08-15 NOTE — PHYSICAL THERAPY NOTE
PHYSICAL THERAPY EVALUATION - INPATIENT     Room Number: FOU38/LOG58-R  Evaluation Date: 8/15/2021  Type of Evaluation: Initial   Physician Order: PT Eval and Treat (FMS)    Presenting Problem: abdominal pain, SOB->A-fib with RVR, EGD scheduled for tomorr benefit from LTC. However pt is close to her functional baseline and should be able to d/c home with her ; pt does have a kathya lift she can use at home if she is weaker.        DISCHARGE RECOMMENDATIONS  PT Discharge Recommendations: Home with home DISEASES     spastic paraparesis   • OTHER DISEASES     cataracts   • OTHER DISEASES     secondary poycythemia   • OTHER DISEASES     lumbar spinal stenosis   • OTHER DISEASES     duplicated left renal collecting system   • OTHER DISEASES     uterine fibro 3-65-23    cysto Dr. Kelly Ax   • PATIENT DOCUMENTED NOT TO HAVE EXPERIENCED ANY OF THE FOLLOWING EVENTS N/A 10/27/2014    Procedure: CERVICAL EPIDURAL;  Surgeon: Sarai Parada MD;  Location: [address] [place] [address]   • PATIENT DOCUMENTED NOT TO HAVE E baseline pt performs slide board transfers with the help of her  and then she is able to self-propel the w/c with her BUE's, has no movement in her legs    SUBJECTIVE  \"I don't know why I have this pain. \"     PHYSICAL THERAPY EXAMINATION     OBJEC Mobility: Max A    Transfers: Deferred    Exercise/Education Provided:  Bed mobility  Functional activity tolerated    Patient End of Session: Needs met;Call light within reach; All patient questions and concerns addressed; Alarm set; In bed;RN aware of sessi

## 2021-08-15 NOTE — PROGRESS NOTES
LUCILA GUSTAFSON [facility]     Cardiology Progress Note    Subjective:  No chest pain or shortness of breath.     Objective:  BP 93/58 (BP Location: Right arm)   Pulse 98   Temp 98.5 °F (36.9 °C) (Oral)   Resp 24   Ht 5' 4\" (1.626 m)   Wt 132 lb 14.4 oz (6 unremarkable    Agree with assessment and plan above, work-up for nausea and vomiting for GI however hemoglobin continues to trend down without show from a GI standpoint. Hold Eliquis. Level of Care:  Follow-up Lee Ann Lai 222, MD  Interventional Cardio

## 2021-08-15 NOTE — PLAN OF CARE
Patient transferred from [facility] to room 541 in stable condition. Mayorga placed per order. Patient updated on plan of care and is agreeable. Safety precautions maintained. Call light placed within reach. Patient oriented to unit. Will continue to monitor. cardiac monitoring, monitor vital signs, obtain 12 lead EKG if indicated  - Evaluate effectiveness of antiarrhythmic and heart rate control medications as ordered  - Initiate emergency measures for life threatening arrhythmias  - Monitor electrolytes and a urinary retention protocol/standard of care  - Consider collaborating with pharmacy to review patient's medication profile  - Implement strategies to promote bladder emptying  Outcome: Progressing     Problem: SKIN/TISSUE INTEGRITY - ADULT  Goal: Incision( precautions as indicated by assessment.  - Educate pt/family on patient safety including physical limitations  - Instruct pt to call for assistance with activity based on assessment  - Modify environment to reduce risk of injury  - Provide assistive device Patient's belongings returned

## 2021-08-16 ENCOUNTER — ANESTHESIA (OUTPATIENT)
Dept: ENDOSCOPY | Facility: HOSPITAL | Age: 76
DRG: 698 | End: 2021-08-16
Payer: MEDICARE

## 2021-08-16 ENCOUNTER — APPOINTMENT (OUTPATIENT)
Dept: CT IMAGING | Facility: HOSPITAL | Age: 76
DRG: 698 | End: 2021-08-16
Attending: STUDENT IN AN ORGANIZED HEALTH CARE EDUCATION/TRAINING PROGRAM
Payer: MEDICARE

## 2021-08-16 ENCOUNTER — ANESTHESIA EVENT (OUTPATIENT)
Dept: ENDOSCOPY | Facility: HOSPITAL | Age: 76
DRG: 698 | End: 2021-08-16
Payer: MEDICARE

## 2021-08-16 LAB
ANION GAP SERPL CALC-SCNC: 6 MMOL/L (ref 0–18)
BASOPHILS # BLD AUTO: 0.03 X10(3) UL (ref 0–0.2)
BASOPHILS NFR BLD AUTO: 0.5 %
BUN BLD-MCNC: 15 MG/DL (ref 7–18)
BUN/CREAT SERPL: 51.7 (ref 10–20)
CALCIUM BLD-MCNC: 7.9 MG/DL (ref 8.5–10.1)
CHLORIDE SERPL-SCNC: 103 MMOL/L (ref 98–112)
CO2 SERPL-SCNC: 26 MMOL/L (ref 21–32)
CREAT BLD-MCNC: 0.29 MG/DL
DEPRECATED RDW RBC AUTO: 46.5 FL (ref 35.1–46.3)
EOSINOPHIL # BLD AUTO: 0.12 X10(3) UL (ref 0–0.7)
EOSINOPHIL NFR BLD AUTO: 1.8 %
ERYTHROCYTE [DISTWIDTH] IN BLOOD BY AUTOMATED COUNT: 16.5 % (ref 11–15)
GLUCOSE BLD-MCNC: 78 MG/DL (ref 70–99)
HAV IGM SER QL: 1.8 MG/DL (ref 1.6–2.6)
HCT VFR BLD AUTO: 27.6 %
HGB BLD-MCNC: 8.7 G/DL
IMM GRANULOCYTES # BLD AUTO: 0.03 X10(3) UL (ref 0–1)
IMM GRANULOCYTES NFR BLD: 0.5 %
LYMPHOCYTES # BLD AUTO: 1.58 X10(3) UL (ref 1–4)
LYMPHOCYTES NFR BLD AUTO: 23.9 %
MCH RBC QN AUTO: 24.9 PG (ref 26–34)
MCHC RBC AUTO-ENTMCNC: 31.5 G/DL (ref 31–37)
MCV RBC AUTO: 79.1 FL
MONOCYTES # BLD AUTO: 0.55 X10(3) UL (ref 0.1–1)
MONOCYTES NFR BLD AUTO: 8.3 %
NEUTROPHILS # BLD AUTO: 4.29 X10 (3) UL (ref 1.5–7.7)
NEUTROPHILS # BLD AUTO: 4.29 X10(3) UL (ref 1.5–7.7)
NEUTROPHILS NFR BLD AUTO: 65 %
OSMOLALITY SERPL CALC.SUM OF ELEC: 280 MOSM/KG (ref 275–295)
PLATELET # BLD AUTO: 204 10(3)UL (ref 150–450)
POTASSIUM SERPL-SCNC: 2.8 MMOL/L (ref 3.5–5.1)
POTASSIUM SERPL-SCNC: 3.1 MMOL/L (ref 3.5–5.1)
RBC # BLD AUTO: 3.49 X10(6)UL
SODIUM SERPL-SCNC: 135 MMOL/L (ref 136–145)
WBC # BLD AUTO: 6.6 X10(3) UL (ref 4–11)

## 2021-08-16 PROCEDURE — 43239 EGD BIOPSY SINGLE/MULTIPLE: CPT | Performed by: INTERNAL MEDICINE

## 2021-08-16 PROCEDURE — 0DB68ZX EXCISION OF STOMACH, VIA NATURAL OR ARTIFICIAL OPENING ENDOSCOPIC, DIAGNOSTIC: ICD-10-PCS | Performed by: INTERNAL MEDICINE

## 2021-08-16 PROCEDURE — 0DB98ZX EXCISION OF DUODENUM, VIA NATURAL OR ARTIFICIAL OPENING ENDOSCOPIC, DIAGNOSTIC: ICD-10-PCS | Performed by: INTERNAL MEDICINE

## 2021-08-16 PROCEDURE — 73700 CT LOWER EXTREMITY W/O DYE: CPT | Performed by: STUDENT IN AN ORGANIZED HEALTH CARE EDUCATION/TRAINING PROGRAM

## 2021-08-16 PROCEDURE — 99232 SBSQ HOSP IP/OBS MODERATE 35: CPT | Performed by: PHYSICIAN ASSISTANT

## 2021-08-16 RX ORDER — METOPROLOL SUCCINATE 25 MG/1
25 TABLET, EXTENDED RELEASE ORAL
Status: DISCONTINUED | OUTPATIENT
Start: 2021-08-16 | End: 2021-08-17

## 2021-08-16 RX ORDER — SODIUM CHLORIDE, SODIUM LACTATE, POTASSIUM CHLORIDE, CALCIUM CHLORIDE 600; 310; 30; 20 MG/100ML; MG/100ML; MG/100ML; MG/100ML
INJECTION, SOLUTION INTRAVENOUS CONTINUOUS PRN
Status: DISCONTINUED | OUTPATIENT
Start: 2021-08-16 | End: 2021-08-16 | Stop reason: SURG

## 2021-08-16 RX ADMIN — SODIUM CHLORIDE, SODIUM LACTATE, POTASSIUM CHLORIDE, CALCIUM CHLORIDE: 600; 310; 30; 20 INJECTION, SOLUTION INTRAVENOUS at 15:33:00

## 2021-08-16 NOTE — PROGRESS NOTES
[facility]     Gastroenterology Progress Note    Harjinder Dukes Patient Status:  Inpatient    1945 MRN [id number]   Location [facility] 5SW/SE Attending Moriah Casey, [address] [facility] Day # 2 PCP Arturo Mealing evaluation notes negative C. Diff 8/14. CT A/P on admission with diverticulosis, no mention of diverticulitis, gas within bladder lumen in urinary bladder with associated thickening, mild pulmonary edema and mild wall thickening of the esophagus.  Coarse ca 103 08/16/2021    CO2 26.0 08/16/2021    GLU 78 08/16/2021    CA 7.9 (L) 08/16/2021    ALB 2.7 (L) 08/14/2021    ALKPHO 97 08/14/2021    BILT 1.8 08/14/2021    TP 5.7 (L) 08/14/2021    AST 19 08/14/2021    ALT 19 08/14/2021    PTT 32.6 11/03/2020    INR 1.

## 2021-08-16 NOTE — CONSULTS
[facility] AND [facility] Infectious Disease  Report of Consultation    Dmitriy Martin Patient Status:  Inpatient    1945 MRN [id number]   Location [facility] 5SW/SE Attending Marcelino Gutierrez MD   Hosp Day # 2 PCP Xavi Pelletier • Other and unspecified hyperlipidemia    • OTHER DISEASES     spastic paraparesis   • OTHER DISEASES     cataracts   • OTHER DISEASES     secondary poycythemia   • OTHER DISEASES     lumbar spinal stenosis   • OTHER DISEASES     duplicated left renal co OTHER SURGICAL HISTORY  8-24-12    cysto Dr. Ga Confucianism   • Cristina Musa N/A 10/27/2014    Procedure: CERVICAL EPIDURAL;  Surgeon: Ranjit Serra MD;  Location: [address] [place] [address]   • PATIENT Cancer Sister [de-identified]   • Other (Other) Sister    • Other (Other) Daughter         spastic paraplegia   • Eye Problems Other         aunts/ uncles glaucoma   • Other (Other) Other       reports that she quit smoking about 60 years ago.  She has never u Inhalation, Q4H PRN  •  ascorbic acid (VITAMIN C) tab 500 mg, 500 mg, Oral, Daily  •  atorvastatin (LIPITOR) tab 40 mg, 40 mg, Oral, Nightly  •  Budesonide (ENTOCORT EC) 24 hr cap 3 mg, 3 mg, Oral, QAM  •  bumetanide (BUMEX) tab 1 mg, 1 mg, Oral, Daily  • 98 %   08/15/21 2200 (!) 89/52 98 °F (36.7 °C) Oral — 18 96 %   08/15/21 1903 (!) 84/38 — — — — —   08/15/21 1629 (!) 83/47 97.3 °F (36.3 °C) Oral 91 18 98 %   08/15/21 1435 94/44 98.1 °F (36.7 °C) Oral 81 18 99 %   08/15/21 1322 100/60 — — — — —       Int UA with active sediment, ESBL klebsiella isolated similar to previous  - IV meropenem day #1     2.  h/o relapsed, recurrent c.diff in the setting of known collaginous colitis and a h/o ostomy  - Current c.diff negative  - Continue p.o. vancomycin prophy

## 2021-08-16 NOTE — PLAN OF CARE
Dr Fei Cash notified of pt BP 89/52, pt is asymptomatic, advice to continue to monitor pt BP. Pt is in no acute distress.

## 2021-08-16 NOTE — DIETARY NOTE
ADULT NUTRITION INITIAL ASSESSMENT    Pt is at high nutrition risk. Pt meets severe malnutrition criteria.       CRITERIA FOR MALNUTRITION DIAGNOSIS:  Criteria for severe malnutrition diagnosis: acute illness/injury related to energy intake less than 50% f colitis   • OTHER DISEASES     uti   • Pneumonia, organism unspecified(486)    • Reflux    • Self-catheterizes urinary bladder 1/11/2016   • Ulcerative colitis ([facility] [address]    • Valvular disease     mitral valve prolapse- 3 clips placed     PATIENT STATUS: Initia chloride  40 mEq Intravenous Once   • dilTIAZem HCl  10 mg Intravenous Once   • ascorbic acid  500 mg Oral Daily   • atorvastatin  40 mg Oral Nightly   • Budesonide  3 mg Oral QAM   • bumetanide  1 mg Oral Daily   • ezetimibe  10 mg Oral Daily   • ferrous kg (135 lb)  05/29/21 : 61 kg (134 lb 7.7 oz)  11/07/20 : 61 kg (134 lb 8 oz)  10/31/20 : 62.6 kg (138 lb 0.1 oz)  10/29/20 : 62.6 kg (138 lb)    NUTRITION DIAGNOSIS/PROBLEM:  1. Malnutrition related to altered GI function as evidenced by energy intake les

## 2021-08-16 NOTE — ANESTHESIA PREPROCEDURE EVALUATION
Anesthesia PreOp Note    HPI:     Cherie Silverio is a 64year old female who presents for preoperative consultation requested by:  Melida Menendez MD    Date of Surgery: 8/14/2021 - 8/16/2021    Procedure(s):  ESOPHAGOGASTRODUODENOSCOPY (EGD)  Indicati (transient ischemic attack)         Date Noted: 11/03/2020      Wound of left leg         Date Noted: 11/02/2020      Acute embolism and thrombosis of deep vein of left lower extremity [facility])         Date Noted: 08/13/2020      Colostomy in place [facility]) hyperlipidemia    • OTHER DISEASES     spastic paraparesis   • OTHER DISEASES     cataracts   • OTHER DISEASES     secondary poycythemia   • OTHER DISEASES     lumbar spinal stenosis   • OTHER DISEASES     duplicated left renal collecting system   • OTHER 6-68-47    cysto Dr. Alvarez Postal   • PATIENT DOCUMENTED NOT TO HAVE EXPERIENCED ANY OF THE FOLLOWING EVENTS N/A 10/27/2014    Procedure: CERVICAL EPIDURAL;  Surgeon: Cherylene Deters, MD;  Location: [address] [place] [address]   • PATIENT DOCUMENTED NOT TO HAVE E at Unknown time  potassium chloride 20 MEQ Oral Tab CR, Take 2 tablets (40 mEq total) by mouth 2 (two) times daily. , Disp: 450 tablet, Rfl: 0, 8/13/2021 at Unknown time  ondansetron 4 MG Oral Tablet Dispersible, Take 1 tablet (4 mg total) by mouth every 6 MCG/ACT Nasal Suspension, 1 spray by Each Nare route daily. , Disp: 1 Bottle, Rfl: 0, 8/13/2021 at Unknown time  loratadine 10 MG Oral Tab, Take 10 mg by mouth daily. , Disp: , Rfl: , 8/13/2021 at Unknown time  acetaminophen 325 MG Oral Tab, Take 325 mg by m PRN, Marcelino Gutierrez MD  ascorbic acid (VITAMIN C) tab 500 mg, 500 mg, Oral, Daily, Marcelino Gutierrez MD, Stopped at 08/16/21 0930  atorvastatin (LIPITOR) tab 40 mg, 40 mg, Oral, Nightly, Marcelino Gutierrez MD, 40 mg at 08/15/21 2010  Budesonide (ENTOCORT Nausea., Disp: 6 tablet, Rfl: 0          Cymbalta [Duloxetin*    ITCHING, SWELLING    Comment:Throat swelling and difficulty breathing  Gabapentin              RASH  Gnp Iodides Decolor*        Comment:Iodine based dyes  Cant swallow or breath Other Topics      Concerns:        Not on file    Social History Narrative      Not on file    Social Determinants of Health  Financial Resource Strain:       Difficulty of Paying Living Expenses:   Food Insecurity:       Worried About Running Out of Food (36.5 °C) 98 °F (36.7 °C)   TempSrc:  Oral Oral Tympanic   SpO2:  98% 93% 99%   Weight:       Height:            Anesthesia Evaluation     Patient summary reviewed and Nursing notes reviewed    No history of anesthetic complications   Airway   Mallampati: Discussed With:  Patient  Discussed plan with:  Surgeon      I have informed [place] and/or legal guardian or family member of the nature of the anesthetic plan, benefits, risks including possible dental damage if relevant, major complications,

## 2021-08-16 NOTE — OPERATIVE REPORT
ESOPHAGOGASTRODUODENOSCOPY (EGD) REPORT    Faisal Lyon     1945 Age 64year old   PCP Rayshawn Cagle MD Endoscopist Tala Reeder MD     Date of procedure: 21    Procedure: EGD w/biopsies    Pre-operative diagnosis: epigastric patricia incisura) were taken with cold forceps for histology. 3. Duodenum: The duodenal mucosa appeared normal in the 1st and 2nd portion of the duodenum. Random biopsies taken for histology with cold forceps.      We then withdrew the instrument from the patie

## 2021-08-16 NOTE — PROGRESS NOTES
[facility] Hospitalist Progress Note     PCP: Esa Del Castillo MD    CC: Follow up       Assessment/Plan:   Ms. Tonia Asher is a 64 year old female with multiple medical problems including ulcerative colitis, mitral valve prolapse, depression, Gout, GERD,  daily dosing and complicated by hematoma, Eliquis was held and then resumed over at [address] [place] [address] an outpatient hematologist who she can follow-up with  -Has IVC filter  -For now continue Eliquis     Hereditary spastic paraplegia   -wheel chair boundyumiko 130 lb (59 kg)  08/02/21 0955 : 125 lb (56.7 kg)  07/05/21 1738 : 129 lb 9.6 oz (58.8 kg)  07/05/21 1243 : 135 lb (61.2 kg)      Exam  Gen: No acute distress, alert and oriented x3  Neck Supple, no JVD  Pulm: Lungs clear, normal respiratory effort, No whee TROP in the last 168 hours. Imaging:XR TIBIA + FIBULA (2 VIEWS), LEFT (CPT=73590)    Result Date: 8/3/2021  CONCLUSION:  1. No acute appearing fracture or dislocation. Moderate diffuse bony de ossification.   Partially visualized left knee arthroplasty esophagus at the gastroesophageal junction may be related to esophagitis. If clinically indicated, further evaluation with direct visualization could be performed. Multiple other incidental findings as described in the body of the report.   Preliminary rep

## 2021-08-16 NOTE — PLAN OF CARE
Patient with no acute changes during shift. Patient evaluated by wound care. EGD done. Safety precautions maintained and call light kept within reach.  at bedside. Will continue to monitor.    Problem: Patient Centered Care  Goal: Patient preferences if indicated  - Evaluate effectiveness of antiarrhythmic and heart rate control medications as ordered  - Initiate emergency measures for life threatening arrhythmias  - Monitor electrolytes and administer replacement therapy as ordered  Outcome: Progressi collaborating with pharmacy to review patient's medication profile  - Implement strategies to promote bladder emptying  Outcome: Progressing     Problem: SKIN/TISSUE INTEGRITY - ADULT  Goal: Incision(s), wounds(s) or drain site(s) healing without S/S of in pt/family on patient safety including physical limitations  - Instruct pt to call for assistance with activity based on assessment  - Modify environment to reduce risk of injury  - Provide assistive devices as appropriate  - Consider OT/PT consult to zuleika

## 2021-08-16 NOTE — PLAN OF CARE
Problem: Patient Centered Care  Goal: Patient preferences are identified and integrated in the patient's plan of care  Description: Interventions:  - What would you like us to know as we care for you?  Pt from home with   - Provide timely, complete arrhythmias  - Monitor electrolytes and administer replacement therapy as ordered  Outcome: Progressing     Problem: GASTROINTESTINAL - ADULT  Goal: Minimal or absence of nausea and vomiting  Description: INTERVENTIONS:  - Maintain adequate hydration with INTEGRITY - ADULT  Goal: Incision(s), wounds(s) or drain site(s) healing without S/S of infection  Description: INTERVENTIONS:  - Assess and document risk factors for pressure ulcer development  - Assess and document skin integrity  - Assess and document d injury  - Provide assistive devices as appropriate  - Consider OT/PT consult to assist with strengthening/mobility  - Encourage toileting schedule  Outcome: Progressing     Problem: DISCHARGE PLANNING  Goal: Discharge to home or other facility with appropr

## 2021-08-16 NOTE — ANESTHESIA POSTPROCEDURE EVALUATION
Patient: Ana Paula Sánchez    Procedure Summary     Date: 08/16/21 Room / Location: [address] [place] [address] ENDOSCOPY 05 / [address] [place] [address] ENDOSCOPY    Anesthesia Start: 8687 Anesthesia Stop: 1200    Procedure: ESOPHAGOGASTRODUODENOSCOPY (EGD) (N/A ) Diagnosis:       Abdominal pain

## 2021-08-16 NOTE — PROGRESS NOTES
[facility]  Cardiology Progress Note    Linda Alfaro Patient Status:  Inpatient    1945 MRN [id number]   Location [facility] 5SW/SE Attending Sylvia Masterson MD   Hosp Day # 2 PCP MD Jasmyn Alvarez Saul Daily   • cetirizine  10 mg Oral Daily   • Miconazole Nitrate   Topical [email or web address]   • predniSONE  5 mg Oral Daily   • pregabalin  50 mg Oral TID   • venlafaxine  75 mg Oral Daily   • metoprolol succinate  50 mg Oral BID   • vancomycin  125 mg Oral There is bladder wall thickening. Findings may be secondary to recent intervention versus cystitis. Correlation with urinalysis suggested. Mild pulmonary edema, partially seen.   Mild wall thickening of the distal esophagus at the gastroesophageal juncti [address] [facility]  8/16/2021

## 2021-08-16 NOTE — PROGRESS NOTES
08/16/21 1355   Wound 08/02/21 Skin Tear Arm Anterior;Right   Date First Assessed/Time First Assessed: 08/02/21 1445   Present on Hospital Admission: Yes  Primary Wound Type: Skin Tear  Location: (c) Arm  Wound Location Orientation: Anterior;Right  Woun BACK PAIN     scoliosis   • Back problem    • Bladder stones    • C. difficile colitis 5/12   • Cataract    • Colitis    • Congestive heart disease ([facility] [address]    • Coronary atherosclerosis    • Deep vein thrombosis (HCC)     jamal filter in place.  unsure o Hoda Pendleton MD;  Location: [address] [place] [address] ENDOSCOPY   • FLUOR GID & Christine Castroton NDL/CATH SPI DX/THER Jed Russell Jill [address] N/A 10/27/2014    Procedure: CERVICAL EPIDURAL;  Surgeon: Kassie Santos MD;  Location: [address] [place] [address] NDL/CATH SPI DX/THER Jed Russell Jill [address] N/A [facility] PREOPERATIVE ORDER FOR IV ANTIBIOTIC SURGICAL SITE INFECTION PROPHYLAXIS.  N/A 1/5/2015    Procedure: ESOPHAGOGASTRODUODENOSCOPY W/ DILATION;  Surgeon: Jimmy Bush MD;  Location: [address] STONE,>2.5CM  03/12/2 slough in the left ankle wound. Pt does not like heel boots but is agreeable to elevate heels off of bed with a pillow.      Ostomy:  Stoma location: LUQ  Stoma type: colostomy   Ostomy output: semi-formed stool  Stoma height:   Peristomal skin:   Pouching

## 2021-08-17 ENCOUNTER — MED REC SCAN ONLY (OUTPATIENT)
Dept: GASTROENTEROLOGY | Facility: CLINIC | Age: 76
End: 2021-08-17

## 2021-08-17 ENCOUNTER — APPOINTMENT (OUTPATIENT)
Dept: PICC SERVICES | Facility: HOSPITAL | Age: 76
DRG: 698 | End: 2021-08-17
Attending: INTERNAL MEDICINE
Payer: MEDICARE

## 2021-08-17 VITALS
RESPIRATION RATE: 18 BRPM | BODY MASS INDEX: 22.68 KG/M2 | TEMPERATURE: 99 F | WEIGHT: 132.88 LBS | HEART RATE: 95 BPM | HEIGHT: 64 IN | DIASTOLIC BLOOD PRESSURE: 52 MMHG | SYSTOLIC BLOOD PRESSURE: 92 MMHG | OXYGEN SATURATION: 98 %

## 2021-08-17 LAB
ANION GAP SERPL CALC-SCNC: 7 MMOL/L (ref 0–18)
BUN BLD-MCNC: 14 MG/DL (ref 7–18)
BUN/CREAT SERPL: 38.9 (ref 10–20)
CALCIUM BLD-MCNC: 8.2 MG/DL (ref 8.5–10.1)
CHLORIDE SERPL-SCNC: 106 MMOL/L (ref 98–112)
CO2 SERPL-SCNC: 27 MMOL/L (ref 21–32)
CREAT BLD-MCNC: 0.36 MG/DL
GLUCOSE BLD-MCNC: 83 MG/DL (ref 70–99)
HGB BLD-MCNC: 8.7 G/DL
OSMOLALITY SERPL CALC.SUM OF ELEC: 290 MOSM/KG (ref 275–295)
POTASSIUM SERPL-SCNC: 2.9 MMOL/L (ref 3.5–5.1)
SODIUM SERPL-SCNC: 140 MMOL/L (ref 136–145)

## 2021-08-17 PROCEDURE — 99232 SBSQ HOSP IP/OBS MODERATE 35: CPT | Performed by: PHYSICIAN ASSISTANT

## 2021-08-17 PROCEDURE — 05H933Z INSERTION OF INFUSION DEVICE INTO RIGHT BRACHIAL VEIN, PERCUTANEOUS APPROACH: ICD-10-PCS | Performed by: HOSPITALIST

## 2021-08-17 RX ORDER — ERTAPENEM 1 G/1
1 INJECTION, POWDER, LYOPHILIZED, FOR SOLUTION INTRAMUSCULAR; INTRAVENOUS DAILY
Qty: 14 EACH | Refills: 0 | Status: ON HOLD | OUTPATIENT
Start: 2021-08-17 | End: 2021-12-21

## 2021-08-17 RX ORDER — FLUCONAZOLE 200 MG/1
400 TABLET ORAL ONCE
Status: DISCONTINUED | OUTPATIENT
Start: 2021-08-17 | End: 2021-08-17

## 2021-08-17 RX ORDER — LIDOCAINE HYDROCHLORIDE 10 MG/ML
5 INJECTION, SOLUTION EPIDURAL; INFILTRATION; INTRACAUDAL; PERINEURAL ONCE
Status: COMPLETED | OUTPATIENT
Start: 2021-08-17 | End: 2021-08-17

## 2021-08-17 RX ORDER — METOPROLOL SUCCINATE 25 MG/1
25 TABLET, EXTENDED RELEASE ORAL
Qty: 30 TABLET | Refills: 3 | Status: SHIPPED | OUTPATIENT
Start: 2021-08-18 | End: 2021-10-27 | Stop reason: ALTCHOICE

## 2021-08-17 RX ORDER — POTASSIUM CHLORIDE 20 MEQ/1
40 TABLET, EXTENDED RELEASE ORAL EVERY 4 HOURS
Status: COMPLETED | OUTPATIENT
Start: 2021-08-17 | End: 2021-08-17

## 2021-08-17 NOTE — PLAN OF CARE
VSS, blood pressures low, MD aware and ok with it. Potassium replaced today, patient on home potassium so MD is aware and ok with no redraw before discharge. Midline placed this morning, flushing well and blood return noted. First dose of invanz IV today. quality of pulses, skin color and temperature  - Assess for signs of decreased coronary artery perfusion - ex.  Angina  - Evaluate fluid balance, assess for edema, trend weights  Outcome: Adequate for Discharge  Goal: Absence of cardiac arrhythmias or at ba moisture  - Encourage food from home; allow for food preferences  - Enhance eating environment  Outcome: Adequate for Discharge     Problem: GENITOURINARY - ADULT  Goal: Absence of urinary retention  Description: INTERVENTIONS:  - Assess patient’s ability period  Description: INTERVENTIONS  - Monitor WBC  - Administer growth factors as ordered  - Implement neutropenic guidelines  Outcome: Adequate for Discharge     Problem: SAFETY ADULT - FALL  Goal: Free from fall injury  Description: INTERVENTIONS:  - Ass

## 2021-08-17 NOTE — DISCHARGE SUMMARY
[facility] Internal Medicine Discharge Summary   Patient ID:  Faisal Land  [id number]  89 year old  5/4/1945    Admit date: 8/14/2021    Discharge date and time: 8/17/2021    Attending Physician: Sydnee Sapp MD     Primary Care Physician: Jessica Pérez sure you understand how and when to take each. * ondansetron 4 MG Tbdp  Commonly known as: ZOFRAN-ODT  Take 1 tablet (4 mg total) by mouth every 8 (eight) hours as needed for Nausea. What changed:  You were already taking a medication with the same nam Take 1 capsule (75 mg total) by mouth daily.      VITAMIN C OR           Where to Get Your Medications      These medications were sent to [place] [address] [address] AT [facility], [phone], [phone], And severe mitral regurg, and moderate tricuspid regurg.     No fevers. No cough. Currently does not feel short of breath or have chest pain. Just feels very weak. Hospital Course  Ms. Katiuska Khan is a 64 year old female with multiple medica XL.  -Further diuresis per cardiology  -Patient is back on Eliquis     Chronic abdominal pain, per patient worsening over the last few weeks  History of recurrent C. difficile  -CT on 6/11/2021 with no acute findings and no acute findings on CT on admissio TO CARDIOLOGY  IP CONSULT TO SOCIAL WORK  IP CONSULT TO GASTROENTEROLOGY  IP CONSULT TO SOCIAL WORK  IP CONSULT TO SOCIAL WORK  IP CONSULT TO SPIRITUAL CARE  NURSING CONSULT TO DIETITIAN  CONSULT TO WOUND OSTOMY  IP CONSULT TO INFECTIOUS DISEASE  IP CONSUL could suggest a focal fluid collection, hematoma or abscess. This should be clinically palpable. Recommend follow-up CT scanning with contrast to further assess. See above.   1.   Dictated by (CST): Kay Mendoza MD on 8/03/2021 at 1:43 PM     Finalized masslike hematoma of lateral left lower leg with maximum dimension of 19.5 cm. Slight increase in size of the hematoma. Otherwise no significant change.     Dictated by (CST): Rashmi Starr MD on 8/16/2021 at 11:05 PM     Finalized by (CST): Rashmi Starr and posterior compartment calf musculature, unchanged. There is diffuse subcutaneous edema seen within the anterior and medial calf soft tissues which has progressed since the prior exams. No intramuscular mass or loculated collection. OTHER: Negative. (CST):  Shannon Hollingsworth MD on 8/02/2021 at 11:08 AM          CT ABDOMEN+PELVIS(CPT=74176)    Result Date: 8/14/2021  PROCEDURE:   CT ABDOMEN+PELVIS(CPT=74176)  COMPARISON:   [facility], CT ABDOMEN+PELVIS (OXM=08861), 6/11/2021, 6:02 PM.  Marquise Macdams thickening, nonspecific. Hypertrophic changes of the pelvic fat suggestive of lipomatosis. No significant mass effect. BONES:   There is degenerative disease of the thoracic and lumbar spine.  Degenerative changes are seen within the sacroiliac joints and Patient had opportunity to ask questions and state understand and agree with therapeutic plan as outlined    Note: This chart was prepared using voice recognition software and may contain unintended word substitution errors.

## 2021-08-17 NOTE — PROGRESS NOTES
Spoke with Dr. Mack Rios, ok with discontinuing cowan, no need for home use. Patient straight catherizes at home, no need for voiding trial in hospital.     Spoke with patient and she is ok with continuing straight catherizing schedule at home.

## 2021-08-17 NOTE — PROGRESS NOTES
[address]     Gastroenterology Progress Note    Norma Scott Patient Status:  Inpatient    1945 MRN [id number]   Location [facility] 5SW/SE Attending Sari Ramirez,  [facility]  Day # 3 PCP Melody Red negative C. Diff 8/14. CT A/P on admission with diverticulosis, no mention of diverticulitis, gas within bladder lumen in urinary bladder with associated thickening, mild pulmonary edema and mild wall thickening of the esophagus.  Coarse calcifications seen TIBIA/FIBULA LEFT (CPT=73700)    Result Date: 8/16/2021  CONCLUSION:  1. Large subcutaneous masslike hematoma of lateral left lower leg with maximum dimension of 19.5 cm. Slight increase in size of the hematoma. Otherwise no significant change.     Dictate

## 2021-08-17 NOTE — CM/SW NOTE
SW following for discharge planning. SHERRELL received MDO for outpatient IV antibiotics. Per chart review, patient is current with [facility] and has history of IV abx at home w/ [facility].     SW obtained script and initiated infusion referral

## 2021-08-17 NOTE — PROGRESS NOTES
LUCILA GUSTAFSON [facility]     Cardiology Progress Note    Subjective:  No chest pain or shortness of breath.     Objective:  BP 92/56 (BP Location: Left arm)   Pulse 92   Temp 97.2 °F (36.2 °C) (Oral)   Resp 18   Ht 5' 4\" (1.626 m)   Wt 132 lb 14.4 oz (60 colostomy  · Hx spastic paraplegia/neurogenic bladder    Plan:  · Continue to hold eliquis, resume when ok w GI; outpatient evaluation for watchman device  · Oral metoprolol for rate control  · Bumex 1 mg PO daily, clinically euvolemic  · Replace K per pro

## 2021-08-17 NOTE — PROGRESS NOTES
Discharge RN Summary: Patient has discharge order in. Patient to discharge home w/[facility] via ambulance. Understands to follow up with PCP in 1 week, cards as directed, ID in 2 weeks, and GI as needed. Patient understands to  meds from pharmacy.  Per pt

## 2021-08-17 NOTE — PHYSICAL THERAPY NOTE
PHYSICAL THERAPY TREATMENT NOTE - INPATIENT     Room Number: 950/926-T       Presenting Problem: abdominal pain, SOB->A-fib with RVR, EGD scheduled for tomorrow (Hx of spastic paraplegia, neurogenic bladder, colostomy)    Problem List  Principal Problem: Monitor  Resp: 18  BP: 92/56  BP Location: Left arm  BP Method: Automatic  Patient Position: Lying    O2 WALK  Oxygen Therapy  SPO2% on Room Air at Rest: 95    AM-PAC '6-Clicks' INPATIENT SHORT FORM - BASIC MOBILITY  How much difficulty does the patient cu activity/exercise instructions provided to patient in preparation for discharge.    Goal #5   Current Status In slow progress   Goal #6    Goal #6  Current Status

## 2021-08-18 NOTE — PROGRESS NOTES
[facility] AND [facility] Infectious Disease  Progress Note    Linda Alfaro Patient Status:  Inpatient    1945 MRN [id number]   Location [facility] 5SW/SE Attending No att. providers found   [facility] Day # 3 PCP Juanita Brantley MD the report.       Assessment and Plan:     1.  Recurrent  infection with h/o same  - UA with active sediment, ESBL klebsiella isolated similar to previous  - IV meropenem day #2     2.  h/o relapsed, recurrent c.diff in the setting of known collaginous c

## 2021-08-23 ENCOUNTER — LAB REQUISITION (OUTPATIENT)
Dept: LAB | Facility: HOSPITAL | Age: 76
End: 2021-08-23
Payer: MEDICARE

## 2021-08-23 DIAGNOSIS — D64.9 ANEMIA, UNSPECIFIED: ICD-10-CM

## 2021-08-23 LAB
ALBUMIN SERPL-MCNC: 2.6 G/DL (ref 3.4–5)
ALBUMIN/GLOB SERPL: 1.3 {RATIO} (ref 1–2)
ALP LIVER SERPL-CCNC: 87 U/L
ALT SERPL-CCNC: 16 U/L
ANION GAP SERPL CALC-SCNC: 2 MMOL/L (ref 0–18)
AST SERPL-CCNC: 23 U/L (ref 15–37)
BASOPHILS # BLD AUTO: 0.04 X10(3) UL (ref 0–0.2)
BASOPHILS NFR BLD AUTO: 0.7 %
BILIRUB SERPL-MCNC: 1 MG/DL (ref 0.1–2)
BUN BLD-MCNC: 15 MG/DL (ref 7–18)
CALCIUM BLD-MCNC: 8.3 MG/DL (ref 8.5–10.1)
CHLORIDE SERPL-SCNC: 110 MMOL/L (ref 98–112)
CO2 SERPL-SCNC: 29 MMOL/L (ref 21–32)
CREAT BLD-MCNC: 0.28 MG/DL
CRP SERPL-MCNC: 0.72 MG/DL (ref ?–0.3)
EOSINOPHIL # BLD AUTO: 0.14 X10(3) UL (ref 0–0.7)
EOSINOPHIL NFR BLD AUTO: 2.3 %
ERYTHROCYTE [DISTWIDTH] IN BLOOD BY AUTOMATED COUNT: 18.9 %
GLOBULIN PLAS-MCNC: 2 G/DL (ref 2.8–4.4)
GLUCOSE BLD-MCNC: 63 MG/DL (ref 70–99)
HCT VFR BLD AUTO: 35.3 %
HGB BLD-MCNC: 10.1 G/DL
IMM GRANULOCYTES # BLD AUTO: 0.02 X10(3) UL (ref 0–1)
IMM GRANULOCYTES NFR BLD: 0.3 %
LYMPHOCYTES # BLD AUTO: 1.98 X10(3) UL (ref 1–4)
LYMPHOCYTES NFR BLD AUTO: 33 %
M PROTEIN MFR SERPL ELPH: 4.6 G/DL (ref 6.4–8.2)
MCH RBC QN AUTO: 24.6 PG (ref 26–34)
MCHC RBC AUTO-ENTMCNC: 28.6 G/DL (ref 31–37)
MCV RBC AUTO: 85.9 FL
MONOCYTES # BLD AUTO: 0.63 X10(3) UL (ref 0.1–1)
MONOCYTES NFR BLD AUTO: 10.5 %
NEUTROPHILS # BLD AUTO: 3.19 X10 (3) UL (ref 1.5–7.7)
NEUTROPHILS # BLD AUTO: 3.19 X10(3) UL (ref 1.5–7.7)
NEUTROPHILS NFR BLD AUTO: 53.2 %
OSMOLALITY SERPL CALC.SUM OF ELEC: 291 MOSM/KG (ref 275–295)
PLATELET # BLD AUTO: 264 10(3)UL (ref 150–450)
POTASSIUM SERPL-SCNC: 5.2 MMOL/L (ref 3.5–5.1)
RBC # BLD AUTO: 4.11 X10(6)UL
SODIUM SERPL-SCNC: 141 MMOL/L (ref 136–145)
WBC # BLD AUTO: 6 X10(3) UL (ref 4–11)

## 2021-08-23 PROCEDURE — 85025 COMPLETE CBC W/AUTO DIFF WBC: CPT | Performed by: INTERNAL MEDICINE

## 2021-08-23 PROCEDURE — 86140 C-REACTIVE PROTEIN: CPT | Performed by: INTERNAL MEDICINE

## 2021-08-23 PROCEDURE — 80053 COMPREHEN METABOLIC PANEL: CPT | Performed by: INTERNAL MEDICINE

## 2021-08-24 ENCOUNTER — OFFICE VISIT (OUTPATIENT)
Dept: CARDIOLOGY CLINIC | Facility: HOSPITAL | Age: 76
End: 2021-08-24
Attending: NURSE PRACTITIONER
Payer: MEDICARE

## 2021-08-24 VITALS — DIASTOLIC BLOOD PRESSURE: 69 MMHG | OXYGEN SATURATION: 99 % | HEART RATE: 102 BPM | SYSTOLIC BLOOD PRESSURE: 104 MMHG

## 2021-08-24 DIAGNOSIS — G11.4 HEREDITARY SPASTIC PARAPLEGIA (HCC): ICD-10-CM

## 2021-08-24 DIAGNOSIS — E87.6 HYPOKALEMIA: ICD-10-CM

## 2021-08-24 DIAGNOSIS — I50.33 ACUTE ON CHRONIC DIASTOLIC CONGESTIVE HEART FAILURE (HCC): ICD-10-CM

## 2021-08-24 DIAGNOSIS — Z98.890 S/P MITRAL VALVE CLIP IMPLANTATION: ICD-10-CM

## 2021-08-24 DIAGNOSIS — I48.91 ATRIAL FIBRILLATION WITH RVR (HCC): Primary | ICD-10-CM

## 2021-08-24 DIAGNOSIS — Z95.818 S/P MITRAL VALVE CLIP IMPLANTATION: ICD-10-CM

## 2021-08-24 DIAGNOSIS — I48.11 LONGSTANDING PERSISTENT ATRIAL FIBRILLATION (HCC): ICD-10-CM

## 2021-08-24 DIAGNOSIS — I48.91 ATRIAL FIBRILLATION WITH RAPID VENTRICULAR RESPONSE (HCC): ICD-10-CM

## 2021-08-24 PROCEDURE — 99214 OFFICE O/P EST MOD 30 MIN: CPT | Performed by: NURSE PRACTITIONER

## 2021-08-24 PROCEDURE — 99213 OFFICE O/P EST LOW 20 MIN: CPT | Performed by: NURSE PRACTITIONER

## 2021-08-24 RX ORDER — POTASSIUM CHLORIDE 20 MEQ/1
40 TABLET, EXTENDED RELEASE ORAL 2 TIMES DAILY
Qty: 180 TABLET | Refills: 1 | COMMUNITY
Start: 2021-08-24

## 2021-08-24 RX ORDER — DIGOXIN 125 MCG
125 TABLET ORAL DAILY
Qty: 90 TABLET | Refills: 0 | Status: SHIPPED | OUTPATIENT
Start: 2021-08-24 | End: 2022-01-11

## 2021-08-24 RX ORDER — DIGOXIN 125 MCG
125 TABLET ORAL DAILY
Qty: 30 TABLET | Refills: 1 | Status: SHIPPED | OUTPATIENT
Start: 2021-08-24 | End: 2021-08-24

## 2021-08-24 NOTE — PATIENT INSTRUCTIONS
Decrease potassium chloride to 20 meq one tablet twice daily , call if having recurrent diarrhea     Begin digoxin 0.125 mg tablet daily     Continue all your same medications    Call if having any dizziness, lightheadedness, heart racing, palpitations, ch

## 2021-08-24 NOTE — PROGRESS NOTES
[address] Patient Status:  No patient class for patient encounter    1945 MRN [id number]   Location MD Dr. Peg Childs is a weeks. Chest CT noted RLL infiltrate and fluid congestion. Pro bnp >2000. improved with IV abx and IV diuretics. + ESBL UTI. Home on IV ertapenem x 14 days and lasix 40 mg daily. Out pt chest CT and PFT's recommended.      Admitted on 2/23-2/25/2020 with N Having intermitted sob and rapid heart rates and episodes of left sided chest pressure for a few minutes with elevated heart rates. . Denies dizziness, orthopnea, bloating. LLE swelling in left lateral calf unchanged, no pain.    Need and contribution of in Clinical weights: unable to stand  Discharge wt: 8/18/21- 132  Discharge wt: 6/14/21 -124  Discharge wt: 11/7/20- 134    General appearance: alert, appears stated age and cooperative  Neck: no  JVD at 90 degrees  Lungs: clear bilaterally, diminished bi oz/day and limit sodium to 2 mg/day  -consider cardiomems, pt not interested at this time  - repeat echo showed normal EF, mitral clip present with mild-mod regurgitation.    -follow up with  Dr. Silvia Lugo as scheduled  -follow up with the specialty care clini Follow up with Dr. Fidel De León in 2 weeks. Current Outpatient Medications:   •  metoprolol tartrate 25 MG Oral Tab, Take 25 mg by mouth daily. , Disp: , Rfl:   •  vancomycin 50 mg/ml Oral Solution, Take 2.5 mL (125 mg total) by mouth 2 (two) times d tablet (5 mg total) by mouth once daily. , Disp: 90 tablet, Rfl: 3  •  nitroGLYCERIN 0.4 MG Sublingual SL Tab, Place 0.4 mg under the tongue every 5 (five) minutes as needed for Chest pain (or sob , can take 3 doses then call Dr. Larissa Gallo). , Disp: , Rfl:   •

## 2021-08-30 ENCOUNTER — LAB REQUISITION (OUTPATIENT)
Dept: LAB | Facility: HOSPITAL | Age: 76
End: 2021-08-30
Payer: MEDICARE

## 2021-08-30 DIAGNOSIS — N30.00 ACUTE CYSTITIS WITHOUT HEMATURIA: ICD-10-CM

## 2021-08-30 LAB
ALBUMIN SERPL-MCNC: 2.7 G/DL (ref 3.4–5)
ALBUMIN/GLOB SERPL: 1.3 {RATIO} (ref 1–2)
ALP LIVER SERPL-CCNC: 89 U/L
ALT SERPL-CCNC: 18 U/L
ANION GAP SERPL CALC-SCNC: 9 MMOL/L (ref 0–18)
AST SERPL-CCNC: 29 U/L (ref 15–37)
BASOPHILS # BLD AUTO: 0.04 X10(3) UL (ref 0–0.2)
BASOPHILS NFR BLD AUTO: 0.6 %
BILIRUB SERPL-MCNC: 0.8 MG/DL (ref 0.1–2)
BUN BLD-MCNC: 16 MG/DL (ref 7–18)
CALCIUM BLD-MCNC: 8.1 MG/DL (ref 8.5–10.1)
CHLORIDE SERPL-SCNC: 102 MMOL/L (ref 98–112)
CO2 SERPL-SCNC: 29 MMOL/L (ref 21–32)
CREAT BLD-MCNC: 0.34 MG/DL
CRP SERPL-MCNC: 1.05 MG/DL (ref ?–0.3)
DIGOXIN SERPL-MCNC: 0.1 NG/ML (ref 0.8–2)
EOSINOPHIL # BLD AUTO: 0.08 X10(3) UL (ref 0–0.7)
EOSINOPHIL NFR BLD AUTO: 1.2 %
ERYTHROCYTE [DISTWIDTH] IN BLOOD BY AUTOMATED COUNT: 18.8 %
GLOBULIN PLAS-MCNC: 2.1 G/DL (ref 2.8–4.4)
GLUCOSE BLD-MCNC: 115 MG/DL (ref 70–99)
HCT VFR BLD AUTO: 38.7 %
HGB BLD-MCNC: 11 G/DL
IMM GRANULOCYTES # BLD AUTO: 0.02 X10(3) UL (ref 0–1)
IMM GRANULOCYTES NFR BLD: 0.3 %
LYMPHOCYTES # BLD AUTO: 1.54 X10(3) UL (ref 1–4)
LYMPHOCYTES NFR BLD AUTO: 22.4 %
M PROTEIN MFR SERPL ELPH: 4.8 G/DL (ref 6.4–8.2)
MCH RBC QN AUTO: 24.3 PG (ref 26–34)
MCHC RBC AUTO-ENTMCNC: 28.4 G/DL (ref 31–37)
MCV RBC AUTO: 85.6 FL
MONOCYTES # BLD AUTO: 0.52 X10(3) UL (ref 0.1–1)
MONOCYTES NFR BLD AUTO: 7.6 %
NEUTROPHILS # BLD AUTO: 4.66 X10 (3) UL (ref 1.5–7.7)
NEUTROPHILS # BLD AUTO: 4.66 X10(3) UL (ref 1.5–7.7)
NEUTROPHILS NFR BLD AUTO: 67.9 %
OSMOLALITY SERPL CALC.SUM OF ELEC: 292 MOSM/KG (ref 275–295)
PLATELET # BLD AUTO: 181 10(3)UL (ref 150–450)
POTASSIUM SERPL-SCNC: 3.1 MMOL/L (ref 3.5–5.1)
RBC # BLD AUTO: 4.52 X10(6)UL
SODIUM SERPL-SCNC: 140 MMOL/L (ref 136–145)
WBC # BLD AUTO: 6.9 X10(3) UL (ref 4–11)

## 2021-08-30 PROCEDURE — 86140 C-REACTIVE PROTEIN: CPT | Performed by: INTERNAL MEDICINE

## 2021-08-30 PROCEDURE — 85025 COMPLETE CBC W/AUTO DIFF WBC: CPT | Performed by: INTERNAL MEDICINE

## 2021-08-30 PROCEDURE — 80053 COMPREHEN METABOLIC PANEL: CPT | Performed by: INTERNAL MEDICINE

## 2021-08-30 PROCEDURE — 80162 ASSAY OF DIGOXIN TOTAL: CPT | Performed by: INTERNAL MEDICINE

## 2021-08-31 ENCOUNTER — TELEPHONE (OUTPATIENT)
Dept: GASTROENTEROLOGY | Facility: CLINIC | Age: 76
End: 2021-08-31

## 2021-08-31 ENCOUNTER — TELEPHONE (OUTPATIENT)
Dept: CARDIOLOGY CLINIC | Facility: HOSPITAL | Age: 76
End: 2021-08-31

## 2021-08-31 DIAGNOSIS — I48.11 LONGSTANDING PERSISTENT ATRIAL FIBRILLATION (HCC): Primary | ICD-10-CM

## 2021-08-31 DIAGNOSIS — R13.10 DYSPHAGIA, UNSPECIFIED TYPE: Primary | ICD-10-CM

## 2021-08-31 NOTE — TELEPHONE ENCOUNTER
The patient should schedule a videofluoroscopic swallowing study and an esophagram on the same date if possible and arrange a follow-up visit in the office.   I have entered the orders

## 2021-08-31 NOTE — TELEPHONE ENCOUNTER
Dr. Tommy Contreras-    Please advise. I called and spoke to Ayleen. She states she had a particularly terrible time this morning swallowing her pills.     States that she has about 15 pills everyday that she needs to take, and she washes them down with En

## 2021-08-31 NOTE — TELEPHONE ENCOUNTER
Pt states Dr Mya Akers did an EGD on her a little over a week ago in the hospital, and she can barely eat because her stomach is sick, she is having a lot of nausea without throwing up.

## 2021-08-31 NOTE — TELEPHONE ENCOUNTER
Spoke with pt,  Serenity Vizcarra and Prashant Gibson regarding update. Pt has not been taking digoxin, they were unsure if she should stop any other medications before starting. She saw Dr. Jeanette Tompkins on 8/26/21 who recommended close follow up with afib/HF clinic.  HR

## 2021-09-02 NOTE — TELEPHONE ENCOUNTER
I called and spoke with Ynes Burdick. I informed her of the orders placed, and we discussed what this study will include and why it is being ordered. She verbalizes understanding and is agreeable to doing so.  I provided her with the phone number to call cent

## 2021-09-02 NOTE — PROGRESS NOTES
Decrease potassium supplement as per cardiology, continue to monitor. Anemia improving.   See [address] comment

## 2021-09-07 NOTE — TELEPHONE ENCOUNTER
She could see Kenny Ward on that day or she may reschedule the video fluoroscopic swallowing study per her preference.

## 2021-09-07 NOTE — TELEPHONE ENCOUNTER
Dr. Pollo Lee I followed up on this to see when patient was able to get scheduled for videofluoroscopic swallow study/esophagram. She is scheduled for 10/12/21, which falls on a Tuesday, and therefore you would not be able to see her in clinic on t

## 2021-09-08 NOTE — TELEPHONE ENCOUNTER
Patient contacted and states she wants to keep both appointments  on the same day due her being in a wheelchair and her  having a hard time getting her out of the house.   I advised the patient to call us if she is running late at her first appointme

## 2021-09-14 ENCOUNTER — TELEPHONE (OUTPATIENT)
Dept: CARDIOLOGY CLINIC | Facility: HOSPITAL | Age: 76
End: 2021-09-14

## 2021-09-14 NOTE — TELEPHONE ENCOUNTER
Spoke with [facility] RN with patient update. Heart rate 70 bpm, /60. Patient having continued nausea on and off. Taking digoxin 0.125 mg daily. Dig level today 0.9. Home health RN seeing patient 1-2 times weekly.   We will co

## 2021-09-14 NOTE — TELEPHONE ENCOUNTER
Patient and  called specialty care clinic to cancel her appointment, patient was too weak to come to the appointment and would like to reschedule.

## 2021-09-20 ENCOUNTER — TELEPHONE (OUTPATIENT)
Dept: GASTROENTEROLOGY | Facility: CLINIC | Age: 76
End: 2021-09-20

## 2021-09-20 DIAGNOSIS — R19.7 DIARRHEA, UNSPECIFIED TYPE: Primary | ICD-10-CM

## 2021-09-20 NOTE — TELEPHONE ENCOUNTER
Pt thinks she may have C-diff again, Diarrhea and smell she recognizes it because she had it before and she wants Dr to order the test so she knows for sure.

## 2021-09-20 NOTE — TELEPHONE ENCOUNTER
Patient has an extensive history of + C. Dif infection. Per OV note with Katy Floras on 7/22/21, consider Dificid vs fecal transplant. Recommend probiotics.     I called and spoke to the patient, who has noticed that stool \"Smells bad, is real watery, and I

## 2021-09-20 NOTE — TELEPHONE ENCOUNTER
Noted and appreciated. I called and spoke to Michael Sapp and relayed the below message per Dr. Ar Celeste. Her  will be obtaining stool kit later this afternoon and she will return back to lab ASAP.

## 2021-09-22 ENCOUNTER — LAB ENCOUNTER (OUTPATIENT)
Dept: LAB | Facility: HOSPITAL | Age: 76
End: 2021-09-22
Attending: INTERNAL MEDICINE
Payer: MEDICARE

## 2021-09-22 DIAGNOSIS — R19.7 DIARRHEA, UNSPECIFIED TYPE: ICD-10-CM

## 2021-09-22 PROCEDURE — 83993 ASSAY FOR CALPROTECTIN FECAL: CPT

## 2021-09-22 PROCEDURE — 87493 C DIFF AMPLIFIED PROBE: CPT

## 2021-09-24 LAB — C DIFF TOX B STL QL: NEGATIVE

## 2021-09-25 ENCOUNTER — TELEPHONE (OUTPATIENT)
Dept: GASTROENTEROLOGY | Facility: CLINIC | Age: 76
End: 2021-09-25

## 2021-09-25 LAB — CALPROTECTIN, FECAL: 239 UG/G

## 2021-09-25 NOTE — TELEPHONE ENCOUNTER
----- Message from Fay Flowers MD sent at 9/24/2021  5:33 PM CDT -----  GI RNs: Please inform Josh Roger or her  that the C. difficile toxin assay was negative. We are awaiting the fecal calprotectin.

## 2021-09-27 ENCOUNTER — OFFICE VISIT (OUTPATIENT)
Dept: WOUND CARE | Facility: HOSPITAL | Age: 76
End: 2021-09-27
Attending: CLINICAL NURSE SPECIALIST
Payer: MEDICARE

## 2021-09-27 VITALS
TEMPERATURE: 98 F | BODY MASS INDEX: 23 KG/M2 | RESPIRATION RATE: 16 BRPM | SYSTOLIC BLOOD PRESSURE: 119 MMHG | HEART RATE: 61 BPM | DIASTOLIC BLOOD PRESSURE: 61 MMHG | HEIGHT: 64 IN | OXYGEN SATURATION: 97 %

## 2021-09-27 DIAGNOSIS — S81.802A WOUND OF LEFT LOWER EXTREMITY, INITIAL ENCOUNTER: ICD-10-CM

## 2021-09-27 DIAGNOSIS — S51.002A ELBOW WOUND, LEFT, INITIAL ENCOUNTER: ICD-10-CM

## 2021-09-27 DIAGNOSIS — S31.829A WOUND OF LEFT BUTTOCK, INITIAL ENCOUNTER: Primary | ICD-10-CM

## 2021-09-27 DIAGNOSIS — S91.002A ANKLE WOUND, LEFT, INITIAL ENCOUNTER: ICD-10-CM

## 2021-09-27 PROCEDURE — 99214 OFFICE O/P EST MOD 30 MIN: CPT | Performed by: CLINICAL NURSE SPECIALIST

## 2021-09-27 NOTE — TELEPHONE ENCOUNTER
I called and relayed negative C. Dif results to Genie Becerra.     Dr. Topher Corea-    Fecal Calprotectin results available:    Component   Ref Range & Units 9/22/21 10:00 PM   Calprotectin, Fecal   <=49 ug/g 239 High

## 2021-09-27 NOTE — PROGRESS NOTES
Jessica Hastings is a 64year old female. Patient presents with:  Wound Care: Patient is here for an initial evaluation and mangement of  her wounds on her left posterior lower leg, left ankle, left elbow and ischium .   HPI  Patient is a Wound Depth (cm) 0.3 cm 09/27/21 1431   Wound Volume (cm^3) 8.04 cm^3 09/27/21 1431   Margins Attached edges 09/27/21 1431   Non-staged Wound Description Full thickness 09/27/21 1431   Ann-Marie-wound Assessment Clean; Dry; Intact;  Swelling 09/27/21 1431   Wo Saline/Wound  09/27/21 1431   Dressing Xeroform 09/27/21 1431   Dressing Changed New 09/27/21 1431   Dressing Status Clean; Dry; Intact 09/27/21 1431   Wound Length (cm) 0.3 cm 09/27/21 1431   Wound Width (cm) 0.5 cm 09/27/21 1431   Wound Surface Ar 09/27/21 1 Traumatic Leg Posterior; Lower; Left (Active)   Date First Assessed: 09/27/21    Wound Number (Wound Clinic Only): 1  Primary Wound Type: Traumatic  Location: Leg  Wound Location Orientation: Posterior; Lower;  Left      Assessments 9/27/2021  2: %   Wound Bed Epithelium (%) 0 %   Wound Bed Slough (%) 0 %   Wound Bed Eschar (%) 0 %   Wound Odor None   State of Healing Fully granulated       No Linked orders to display       Wound 09/27/21 3 Elbow Left (Active)   Date First Assessed: 09/27/21    Wou Wound Bed Epithelium (%) 0 %   Wound Bed Slough (%) 0 %   Wound Bed Eschar (%) 0 %   Wound Odor None   State of Healing Fully granulated       No Linked orders to display        Vital Signs   09/27/21  1430   BP: 119/61   Pulse: 61   Resp: 16   Temp: 98. ([address]     Longstanding persistent atrial fibrillation ([address]     Colostomy in place [facility])     Acute embolism and thrombosis of deep vein of left lower extremity ([address]     Wound of left leg     TIA (transient ischemic attack)     Fall, initial encounter

## 2021-09-28 ENCOUNTER — TELEPHONE (OUTPATIENT)
Dept: WOUND CARE | Facility: HOSPITAL | Age: 76
End: 2021-09-28

## 2021-09-29 NOTE — TELEPHONE ENCOUNTER
I spoke to the patient. The stools are \"real runny\". The C. difficile toxin assay was negative. The fecal calprotectin is elevated. She is no longer taking budesonide. I am recommending that we resume 9 mg daily.   She will contact me with a symptom

## 2021-09-29 NOTE — TELEPHONE ENCOUNTER
I left a message on the patient's home voicemail that I was calling with test results. I will contact the patient in the next few days.

## 2021-10-03 PROBLEM — J96.01 ACUTE RESPIRATORY FAILURE WITH HYPOXIA (HCC): Status: ACTIVE | Noted: 2021-10-03

## 2021-10-03 PROBLEM — I82.402 ACUTE EMBOLISM AND THROMBOSIS OF DEEP VEIN OF LEFT LOWER EXTREMITY (HCC): Status: RESOLVED | Noted: 2020-08-13 | Resolved: 2021-10-03

## 2021-10-03 PROBLEM — S80.12XA HEMATOMA OF LEFT LOWER LEG: Status: ACTIVE | Noted: 2021-10-03

## 2021-10-03 PROBLEM — F33.41 MAJOR DEPRESSIVE DISORDER, RECURRENT, IN PARTIAL REMISSION (HCC): Status: ACTIVE | Noted: 2021-10-03

## 2021-10-03 PROBLEM — I82.402 ACUTE DEEP VEIN THROMBOSIS (DVT) OF LEFT LOWER EXTREMITY, UNSPECIFIED VEIN (HCC): Status: RESOLVED | Noted: 2021-08-02 | Resolved: 2021-10-03

## 2021-10-03 PROBLEM — I50.33 ACUTE ON CHRONIC DIASTOLIC HF (HEART FAILURE) (HCC): Chronic | Status: RESOLVED | Noted: 2021-05-05 | Resolved: 2021-10-03

## 2021-10-04 ENCOUNTER — LAB ENCOUNTER (OUTPATIENT)
Dept: LAB | Facility: HOSPITAL | Age: 76
End: 2021-10-04
Attending: INTERNAL MEDICINE
Payer: MEDICARE

## 2021-10-04 DIAGNOSIS — R30.0 DYSURIA: ICD-10-CM

## 2021-10-04 PROCEDURE — 87077 CULTURE AEROBIC IDENTIFY: CPT

## 2021-10-04 PROCEDURE — 87186 SC STD MICRODIL/AGAR DIL: CPT

## 2021-10-04 PROCEDURE — 81001 URINALYSIS AUTO W/SCOPE: CPT

## 2021-10-04 PROCEDURE — 87086 URINE CULTURE/COLONY COUNT: CPT

## 2021-10-05 ENCOUNTER — TELEPHONE (OUTPATIENT)
Dept: GASTROENTEROLOGY | Facility: CLINIC | Age: 76
End: 2021-10-05

## 2021-10-05 NOTE — TELEPHONE ENCOUNTER
Pt calling to let  know she is taking Budesinide 3 mg - she is almost out     Also taking Vancomycin     She is still having loose bowel movements

## 2021-10-06 RX ORDER — BUDESONIDE 3 MG/1
9 CAPSULE, COATED PELLETS ORAL EVERY MORNING
Qty: 90 CAPSULE | Refills: 2 | Status: SHIPPED | OUTPATIENT
Start: 2021-10-06 | End: 2022-01-11

## 2021-10-06 NOTE — TELEPHONE ENCOUNTER
Dr. Sherrlyn Harrison called in to provide condition update since adjustments to budesonide were instructed. Currently taking budesonide 9 mg, as recommended 09/29/2021 and restarted PO vancomycin appox 4 days ago (c diff prophylaxis).      Stat

## 2021-10-07 NOTE — TELEPHONE ENCOUNTER
I spoke to Truman. Symptoms as below. She is currently taking 6 mg daily of budesonide and 125 mg of vancomycin 3 times daily. I have recommended the followin. Increase budesonide 9 mg daily (prescription sent).   2.  Decrease vancomycin to 125

## 2021-10-07 NOTE — PROGRESS NOTES
I reviewed and discussed the results with the patient over the phone. Discussed with patient will start ciprofloxacin 500 mg twice a day for 5 days. Prescription ordered into pharmacy.

## 2021-10-09 ENCOUNTER — LAB ENCOUNTER (OUTPATIENT)
Dept: LAB | Facility: HOSPITAL | Age: 76
End: 2021-10-09
Attending: INTERNAL MEDICINE
Payer: MEDICARE

## 2021-10-09 DIAGNOSIS — R13.10 DYSPHAGIA, UNSPECIFIED TYPE: ICD-10-CM

## 2021-10-11 NOTE — PROGRESS NOTES
[facility], [facility] - Gastroenterology                                                                                                               Reason for consult: f/u    Requesting physician or provider: Pauline Blair MD    Patient presents w removed), mild rectosigmoid erythema, loss of vascular pattern, findings of prior loop colostomy, rectal biopsies showed nonspecific active/chronic colitis    Wt Readings from Last 6 Encounters:  09/29/21 : 130 lb (59 kg)  08/15/21 : 132 lb 14.4 oz (60.3 k Ulcerative colitis ([facility] [address]    • Valvular disease     mitral valve prolapse- 3 clips placed      Past Surgical History:   Procedure Laterality Date   • ARTHROSCOPY OF JOINT UNLISTED Right     knee   • BACK SURGERY      spinal fusion L1-5   • COLON SURGERY ANY OF THE FOLLOWING EVENTS N/A 1/5/2015    Procedure: ESOPHAGOGASTRODUODENOSCOPY W/ DILATION;  Surgeon: Shellie Irizarry MD;  Location: [address] [place] [address]   • [facility] FOR IV ANTIBIOTIC SURGICAL SITE INFECTION PROPHYLAXIS. use: No      Alcohol/week: 0.0 standard drinks    Drug use: No       Medications (Active prior to today's visit):  Current Outpatient Medications   Medication Sig Dispense Refill   • docusate sodium 100 MG Oral Cap Take 100 mg by mouth 2 (two) times daily. Each Nare route daily. 1 Bottle 0   • loratadine 10 MG Oral Tab Take 10 mg by mouth daily. • acetaminophen 325 MG Oral Tab Take 325 mg by mouth every 6 (six) hours as needed for Pain.      • ferrous sulfate 325 (65 FE) MG Oral Tab EC Take 325 mg by mout chest pain, palpitations  GASTROINTESTINAL: See HPI  GENITOURINARY: Negative for dysuria and frequency  MUSCULOSKELETAL: Negative for arthralgias and myalgias  NEUROLOGICAL: Negative for dizziness and headaches  BEHAVIOR/PSYCH: Negative for anxiety and poo mg daily. Ostomy output c/w baseline. Currently on cipro for UTI. She denies rectal discharge. Recommend she continue complete antibiotic therapy for UTI. Plan for budesonide 9 mg x 4-6 weeks and attempt to taper.   Anticipate may need on-going vancomy

## 2021-10-12 ENCOUNTER — TELEPHONE (OUTPATIENT)
Dept: GASTROENTEROLOGY | Facility: CLINIC | Age: 76
End: 2021-10-12

## 2021-10-12 ENCOUNTER — HOSPITAL ENCOUNTER (OUTPATIENT)
Dept: GENERAL RADIOLOGY | Facility: HOSPITAL | Age: 76
Discharge: HOME | End: 2021-10-12
Attending: INTERNAL MEDICINE
Payer: MEDICARE

## 2021-10-12 ENCOUNTER — OFFICE VISIT (OUTPATIENT)
Dept: GASTROENTEROLOGY | Facility: CLINIC | Age: 76
End: 2021-10-12
Payer: MEDICARE

## 2021-10-12 VITALS
HEART RATE: 68 BPM | SYSTOLIC BLOOD PRESSURE: 120 MMHG | DIASTOLIC BLOOD PRESSURE: 72 MMHG | HEIGHT: 64 IN | BODY MASS INDEX: 22 KG/M2

## 2021-10-12 DIAGNOSIS — R13.10 DYSPHAGIA, UNSPECIFIED TYPE: ICD-10-CM

## 2021-10-12 DIAGNOSIS — K52.839 MICROSCOPIC COLITIS, UNSPECIFIED MICROSCOPIC COLITIS TYPE: Primary | ICD-10-CM

## 2021-10-12 DIAGNOSIS — Z91.89 AT RISK FOR ASPIRATION: Primary | ICD-10-CM

## 2021-10-12 DIAGNOSIS — A49.8 CLOSTRIDIUM DIFFICILE INFECTION: ICD-10-CM

## 2021-10-12 PROCEDURE — 92611 MOTION FLUOROSCOPY/SWALLOW: CPT

## 2021-10-12 PROCEDURE — 99214 OFFICE O/P EST MOD 30 MIN: CPT | Performed by: NURSE PRACTITIONER

## 2021-10-12 PROCEDURE — 74230 X-RAY XM SWLNG FUNCJ C+: CPT | Performed by: INTERNAL MEDICINE

## 2021-10-12 RX ORDER — DOCUSATE SODIUM 100 MG/1
100 CAPSULE, LIQUID FILLED ORAL 2 TIMES DAILY
Status: ON HOLD | COMMUNITY
End: 2021-12-21

## 2021-10-12 NOTE — PATIENT INSTRUCTIONS
Diet Recommendations:  Solids: Regular  Liquids:  Thin    Recommended compensatory strategies:   Sit upright  Small sips  Hold liquids in mouth for 1 second before swallowing  Cough several times during meals to expel any liquids which may have entered airw

## 2021-10-12 NOTE — PATIENT INSTRUCTIONS
-budesonide 9 mg (3 tabs daily) x 4 weeks and then attempt to taper  -continue vancomycin 125 mg twice daily  -finish cipro  -contact office if worsening of diarrhea  -await full report from speech therapy from swallow study

## 2021-10-12 NOTE — PROGRESS NOTES
ADULT VIDEOFLUOROSCOPIC SWALLOWING STUDY       ADULT VIDEOFLUOROSCOPIC SWALLOWING STUDY:   Referring Physician: Anni      Radiologist: Dr. René Rios  Diagnosis: dysphagia    Date of Service: 10/12/2021     PATIENT SUMMARY   Chief Complaint: The p DISEASES     uterine fibroids   • OTHER DISEASES     ulcerative colitis   • OTHER DISEASES     uti   • Pneumonia, organism unspecified(486)    • Reflux    • Self-catheterizes urinary bladder 1/11/2016   • Ulcerative colitis ([facility]    • Valvular disease Penetration Aspiration Scale: 5/8. Material entered the airway, contacted the vocal cords and no attempt was made to eject.     Overall Impression: Mild oropharyngeal dysphagia characterized by premature spillage, delayed epiglottic closure and larynge apparent satisfaction. INTERDISCIPLINARY COMMUNICATION  Reviewed results with Radiologist; agreement verbalized.         Patient/Family/Caregiver was advised of these findings, precautions, recommendations and treatment options and has agreed to active

## 2021-10-12 NOTE — TELEPHONE ENCOUNTER
Pt determined to be at risk for aspiration after vfss and dysphagia therapy recommended. Order placed.

## 2021-10-19 ENCOUNTER — APPOINTMENT (OUTPATIENT)
Dept: GENERAL RADIOLOGY | Facility: HOSPITAL | Age: 76
End: 2021-10-19
Attending: EMERGENCY MEDICINE
Payer: MEDICARE

## 2021-10-19 ENCOUNTER — HOSPITAL ENCOUNTER (EMERGENCY)
Facility: HOSPITAL | Age: 76
Discharge: HOME | End: 2021-10-19
Attending: EMERGENCY MEDICINE
Payer: MEDICARE

## 2021-10-19 ENCOUNTER — OFFICE VISIT (OUTPATIENT)
Dept: WOUND CARE | Facility: HOSPITAL | Age: 76
End: 2021-10-19
Attending: CLINICAL NURSE SPECIALIST
Payer: MEDICARE

## 2021-10-19 VITALS
SYSTOLIC BLOOD PRESSURE: 117 MMHG | OXYGEN SATURATION: 93 % | RESPIRATION RATE: 20 BRPM | HEIGHT: 64 IN | DIASTOLIC BLOOD PRESSURE: 77 MMHG | WEIGHT: 134 LBS | TEMPERATURE: 98 F | BODY MASS INDEX: 22.88 KG/M2 | HEART RATE: 69 BPM

## 2021-10-19 VITALS
SYSTOLIC BLOOD PRESSURE: 117 MMHG | TEMPERATURE: 97 F | OXYGEN SATURATION: 96 % | HEART RATE: 87 BPM | RESPIRATION RATE: 18 BRPM | DIASTOLIC BLOOD PRESSURE: 63 MMHG

## 2021-10-19 DIAGNOSIS — R07.9 CHEST PAIN OF UNCERTAIN ETIOLOGY: Primary | ICD-10-CM

## 2021-10-19 DIAGNOSIS — R07.1 CHEST PAIN ON BREATHING: Primary | ICD-10-CM

## 2021-10-19 PROCEDURE — 71045 X-RAY EXAM CHEST 1 VIEW: CPT | Performed by: EMERGENCY MEDICINE

## 2021-10-19 PROCEDURE — 80048 BASIC METABOLIC PNL TOTAL CA: CPT | Performed by: EMERGENCY MEDICINE

## 2021-10-19 PROCEDURE — 84484 ASSAY OF TROPONIN QUANT: CPT | Performed by: EMERGENCY MEDICINE

## 2021-10-19 PROCEDURE — 36415 COLL VENOUS BLD VENIPUNCTURE: CPT

## 2021-10-19 PROCEDURE — 99284 EMERGENCY DEPT VISIT MOD MDM: CPT

## 2021-10-19 PROCEDURE — 93005 ELECTROCARDIOGRAM TRACING: CPT

## 2021-10-19 PROCEDURE — 85025 COMPLETE CBC W/AUTO DIFF WBC: CPT | Performed by: EMERGENCY MEDICINE

## 2021-10-19 PROCEDURE — 99213 OFFICE O/P EST LOW 20 MIN: CPT | Performed by: CLINICAL NURSE SPECIALIST

## 2021-10-19 PROCEDURE — 93010 ELECTROCARDIOGRAM REPORT: CPT | Performed by: EMERGENCY MEDICINE

## 2021-10-19 NOTE — ED PROVIDER NOTES
Patient Seen in: [facility] AND [facility] Emergency Department    History   Patient presents with:  Chest Pain Angina      HPI    64-year-old female presents the ER with complaints of left-sided chest pain for 1 week.   Patient states that she thinks that her pain Self-catheterizes urinary bladder 1/11/2016   • Ulcerative colitis ([address]    • Valvular disease     mitral valve prolapse- 3 clips placed       History reviewed.    Past Surgical History:   Procedure Laterality Date   • ARTHROSCOPY OF JOINT UNLISTED Right FOR PAIN MANAGEMENT   • PATIENT DOCUMENTED NOT TO HAVE EXPERIENCED ANY OF THE FOLLOWING EVENTS N/A 1/5/2015    Procedure: ESOPHAGOGASTRODUODENOSCOPY W/ DILATION;  Surgeon: Daylin Temple MD;  Location: [address] [place] [address]   • PATIENT [facility] Years since quittin.3      Smokeless tobacco: Never Used      Tobacco comment: pt smoked until 23 yrs old    Vaping Use      Vaping Use: Never used    Substance and Sexual Activity      Alcohol use: No        Alcohol/week: 0.0 standard drinks      Joel Palpations: Abdomen is soft. Musculoskeletal:         General: Normal range of motion. Cervical back: Normal range of motion and neck supple. Skin:     General: Skin is warm and dry.    Neurological:      Mental Status: She is alert and oriented to (CST): Jules Flores MD on 10/19/2021 at 4:43 PM          ED Medications Administered: Medications - No data to display      MDM      10/19/21  1341 10/19/21  1530   BP: 115/64 117/77   Pulse: 70 69   Resp: 18 20   Temp: 98 °F (36.7 °C)    SpO2: 95% 93 diagnosis)    Disposition:  Discharge    Follow-up:  Marrianne Sandifer, [phone] [facility] [phone]    Schedule an appointment as soon as possible for a visit  If symptoms worsen      Medications Prescribed:  Alejandro

## 2021-10-19 NOTE — ED INITIAL ASSESSMENT (HPI)
Chest pain for the past week. Son states that is may be from transporting the patient from bed to chair.

## 2021-10-19 NOTE — PROGRESS NOTES
Melody Carson is a 64year old female. No chief complaint on file.     Patient presents with:  Wound Care: Patient is here for an initial evaluation and mangement of  her wounds on her left posterior lower leg, left ankle, left elbow a Cleansed; Saline/Wound  09/27/21 1431   Dressing Xeroform 09/27/21 1431   Dressing Changed New 09/27/21 1431   Dressing Status Clean;Dry; Intact 09/27/21 1431   Wound Length (cm) 4 cm 09/27/21 1431   Wound Width (cm) 6.7 cm 09/27/21 1431   Wound Surfa State of Healing Fully granulated 09/27/21 1431       Wound 09/27/21 3 Elbow Left (Active)   Wound Image   09/27/21 1431   Site Assessment Clean;Dry; Intact 09/27/21 1431   Drainage Amount Small 09/27/21 1431   Drainage Description Sanguineous 09/27/21 14 Wound Bed Granulation (%) 100 % 09/27/21 1431   Wound Bed Epithelium (%) 0 % 09/27/21 1431   Wound Bed Slough (%) 0 % 09/27/21 1431   Wound Bed Eschar (%) 0 % 09/27/21 1431   Wound Odor None 09/27/21 1431   State of Healing Fully granulated 09/27/21 1431 clip implantation     Chronic diastolic heart failure (HCC)     Simple chronic bronchitis (HCC)     Longstanding persistent atrial fibrillation (HCC)     Colostomy in place [facility])     Wound of left leg     TIA (transient ischemic attack)     Fall, initial e

## 2021-10-25 ENCOUNTER — OFFICE VISIT (OUTPATIENT)
Dept: WOUND CARE | Facility: HOSPITAL | Age: 76
End: 2021-10-25
Attending: CLINICAL NURSE SPECIALIST
Payer: MEDICARE

## 2021-10-25 VITALS
SYSTOLIC BLOOD PRESSURE: 110 MMHG | DIASTOLIC BLOOD PRESSURE: 49 MMHG | OXYGEN SATURATION: 94 % | RESPIRATION RATE: 20 BRPM | TEMPERATURE: 97 F

## 2021-10-25 DIAGNOSIS — S91.002D WOUND OF LEFT ANKLE, SUBSEQUENT ENCOUNTER: ICD-10-CM

## 2021-10-25 DIAGNOSIS — S51.002A ELBOW WOUND, LEFT, INITIAL ENCOUNTER: ICD-10-CM

## 2021-10-25 DIAGNOSIS — S81.802D WOUND OF LEFT LOWER EXTREMITY, SUBSEQUENT ENCOUNTER: ICD-10-CM

## 2021-10-25 DIAGNOSIS — S81.802D OPEN WOUND OF LEFT LOWER EXTREMITY, SUBSEQUENT ENCOUNTER: Primary | ICD-10-CM

## 2021-10-25 DIAGNOSIS — S31.809A WOUND OF BUTTOCK, UNSPECIFIED LATERALITY, INITIAL ENCOUNTER: ICD-10-CM

## 2021-10-25 PROBLEM — S91.002A WOUND OF LEFT ANKLE: Status: ACTIVE | Noted: 2021-10-25

## 2021-10-25 PROBLEM — S81.802A OPEN WOUND OF LEFT LOWER EXTREMITY: Status: ACTIVE | Noted: 2021-10-25

## 2021-10-25 PROCEDURE — 99214 OFFICE O/P EST MOD 30 MIN: CPT | Performed by: CLINICAL NURSE SPECIALIST

## 2021-10-25 NOTE — PROGRESS NOTES
Yaritza Zurita is a 64year old female.     Patient presents with:  Wound Care: left leg, left ankle, ischeal and elbow    HPI  Patient is a 47-year-old female with a history of anemia, arrhythmia, back pain, CHF, coronary atherosclerosis, Edema; Granulation tissue; Moist;Red 10/25/21 1458   Closure Not approximated; Sutures 10/25/21 1458   Drainage Amount Scant 10/25/21 1458   Drainage Description Serosanguineous 10/25/21 1458   Treatments Cleansed; Topical (Barrier/Moisturizer/Ointment) 10/25/ Ann-Marie-wound Assessment Clean;Dry; Intact 10/25/21 1458   Wound Granulation Tissue Red 09/27/21 1431   Wound Bed Granulation (%) 0 % 10/25/21 1458   Wound Bed Epithelium (%) 100 % 10/25/21 1458   Wound Bed Slough (%) 0 % 10/25/21 1458   Wound Bed Eschar (%) 10/25/21 1458   Wound Surface Area (cm^2) 0.36 cm^2 10/25/21 1458   Wound Depth (cm) 0.1 cm 10/25/21 1458   Wound Volume (cm^3) 0.036 cm^3 10/25/21 1458   Non-staged Wound Description Full thickness 10/25/21 1458   Ann-Marie-wound Assessment Clean;Dry; Intact 10 Wound Length (cm) 1.7 cm 10/25/21 1458   Wound Width (cm) 0.7 cm 10/25/21 1458   Wound Surface Area (cm^2) 1.19 cm^2 10/25/21 1458   Wound Depth (cm) 0.1 cm 10/25/21 1458   Wound Volume (cm^3) 0.119 cm^3 10/25/21 1458   Ann-Marie-wound Assessment Clean;Dry; In present.      Encounter Diagnosis  Elbow wound, left, initial encounter    Problem List  Patient Active Problem List:     Hereditary spastic paraplegia (HCC)     Neurogenic bladder     Non-rheumatic mitral regurgitation     S/P mitral valve clip implantatio hours and as needed. Discussed with spouse the above wound care and dressing changes. Patient has home health care services and [facility] to change dressings twice per week. Spouse to apply zinc paste every 12 hours.   Instructed patient to follow-up in outpat

## 2021-10-27 PROBLEM — J96.01 ACUTE RESPIRATORY FAILURE WITH HYPOXIA (HCC): Status: RESOLVED | Noted: 2021-10-03 | Resolved: 2021-10-27

## 2021-10-27 PROBLEM — I50.33 ACUTE ON CHRONIC DIASTOLIC CONGESTIVE HEART FAILURE (HCC): Status: RESOLVED | Noted: 2021-06-11 | Resolved: 2021-10-27

## 2021-11-02 ENCOUNTER — TELEPHONE (OUTPATIENT)
Dept: GASTROENTEROLOGY | Facility: CLINIC | Age: 76
End: 2021-11-02

## 2021-11-02 NOTE — TELEPHONE ENCOUNTER
Patient was seen by her PCP, Dr. Max Restrepo and was advised to undergo GI workup for dysphagia. Patient was seen by PCP for ED visit follow up for chest pain/angina.

## 2021-11-02 NOTE — TELEPHONE ENCOUNTER
Dr. Khari Nuñez-    Please advise on next steps for [facility] & if EGD is appropriate at this time. I called and spoke with [facility]. She is feeling well, but still struggles with dysphagia.  She is working with Speech Language Pathology, who she states re

## 2021-11-04 NOTE — TELEPHONE ENCOUNTER
I left a message on the patient's voicemail. I have attempted to reach out to speech therapy to review the swallowing study to determine whether an endoscopy will be helpful. The patient had an upper endoscopy a few months prior which was unrevealing.   O

## 2021-11-05 NOTE — TELEPHONE ENCOUNTER
Dr. Alfredo Chairez called the Speech Therapy Dept and spoke with Edna Ng. She informed me that Elissa Puga is in today.  I left a voicemail for her to call back your office extension or return my call at the RN triage extension in order to discuss the

## 2021-11-24 ENCOUNTER — LAB ENCOUNTER (OUTPATIENT)
Dept: LAB | Facility: HOSPITAL | Age: 76
End: 2021-11-24
Attending: INTERNAL MEDICINE
Payer: MEDICARE

## 2021-11-24 DIAGNOSIS — R30.0 DYSURIA: ICD-10-CM

## 2021-11-24 PROCEDURE — 87086 URINE CULTURE/COLONY COUNT: CPT

## 2021-11-24 PROCEDURE — 81001 URINALYSIS AUTO W/SCOPE: CPT

## 2021-12-06 ENCOUNTER — HOSPITAL ENCOUNTER (OUTPATIENT)
Dept: GENERAL RADIOLOGY | Facility: HOSPITAL | Age: 76
Discharge: HOME | End: 2021-12-06
Attending: CLINICAL NURSE SPECIALIST
Payer: MEDICARE

## 2021-12-06 ENCOUNTER — OFFICE VISIT (OUTPATIENT)
Dept: WOUND CARE | Facility: HOSPITAL | Age: 76
End: 2021-12-06
Attending: CLINICAL NURSE SPECIALIST
Payer: MEDICARE

## 2021-12-06 ENCOUNTER — LAB ENCOUNTER (OUTPATIENT)
Dept: LAB | Facility: HOSPITAL | Age: 76
End: 2021-12-06
Attending: CLINICAL NURSE SPECIALIST
Payer: MEDICARE

## 2021-12-06 DIAGNOSIS — S91.002A WOUND OF LEFT ANKLE, INITIAL ENCOUNTER: ICD-10-CM

## 2021-12-06 DIAGNOSIS — M79.89 LEG SWELLING: ICD-10-CM

## 2021-12-06 DIAGNOSIS — S31.819A WOUND OF RIGHT BUTTOCK, INITIAL ENCOUNTER: ICD-10-CM

## 2021-12-06 DIAGNOSIS — S81.802A OPEN WOUND OF LEFT LOWER EXTREMITY, INITIAL ENCOUNTER: ICD-10-CM

## 2021-12-06 DIAGNOSIS — S91.002A ANKLE WOUND, LEFT, INITIAL ENCOUNTER: Primary | ICD-10-CM

## 2021-12-06 DIAGNOSIS — S31.809A WOUND OF BUTTOCK, UNSPECIFIED LATERALITY, INITIAL ENCOUNTER: ICD-10-CM

## 2021-12-06 DIAGNOSIS — L89.323 PRESSURE INJURY OF LEFT ISCHIUM, STAGE 3 (HCC): ICD-10-CM

## 2021-12-06 DIAGNOSIS — L89.152 PRESSURE INJURY OF SACRAL REGION, STAGE 2 (HCC): ICD-10-CM

## 2021-12-06 DIAGNOSIS — S91.002A ANKLE WOUND, LEFT, INITIAL ENCOUNTER: ICD-10-CM

## 2021-12-06 LAB
CRP SERPL-MCNC: 3.21 MG/DL (ref ?–0.3)
ERYTHROCYTE [SEDIMENTATION RATE] IN BLOOD: 12 MM/HR

## 2021-12-06 PROCEDURE — 86140 C-REACTIVE PROTEIN: CPT

## 2021-12-06 PROCEDURE — 99214 OFFICE O/P EST MOD 30 MIN: CPT | Performed by: CLINICAL NURSE SPECIALIST

## 2021-12-06 PROCEDURE — 85652 RBC SED RATE AUTOMATED: CPT

## 2021-12-06 PROCEDURE — 36415 COLL VENOUS BLD VENIPUNCTURE: CPT

## 2021-12-06 PROCEDURE — 97597 DBRDMT OPN WND 1ST 20 CM/<: CPT | Performed by: CLINICAL NURSE SPECIALIST

## 2021-12-06 PROCEDURE — 73590 X-RAY EXAM OF LOWER LEG: CPT | Performed by: CLINICAL NURSE SPECIALIST

## 2021-12-06 NOTE — PROGRESS NOTES
Mary Rodgers is a 64year old female.     Patient presents with:  Wound Care: left buttock, right buttock, left ischeal, left lower leg and left ankle    Patient presents with:  Wound Care: Patient is here for an initial evaluation and m hearing loss and trouble swallowing. Respiratory: Negative for cough, shortness of breath and wheezing. Cardiovascular: Negative for chest pain. Musculoskeletal: Positive for back pain and gait problem. Skin: Positive for wound.    Neurological: N Assessment Clean;Dry;Fragile;Painful 12/06/21 1320   Closure Not approximated 12/06/21 1320   Drainage Amount Small 12/06/21 1320   Drainage Description Serosanguineous 12/06/21 1320   Treatments Saline/Wound  12/06/21 1320   Dressing Changed Change Injury Leg Left; Lower; Posterior (Active)   Wound Image   12/06/21 1320   Site Assessment Clean;Dry;Moist;Painful 12/06/21 1320   Closure Not approximated 12/06/21 1320   Drainage Amount Moderate 12/06/21 1320   Drainage Description Serosanguineous 12/06/21 12/06/21 1320   Wound Odor None 12/06/21 1320   Exposed Structure Bone 12/06/21 1320       Compression Wrap 12/06/21 Leg Left; Lower (Active)       Compression Wrap 12/06/21 Leg Left; Lower; Posterior (Active)   Response to Treatment Well tolerated 12/06/21 1 0.3  Percent debrided: 50%  Surface Area (cm^2): 15.4  Area debrided (cm^2): 7.7  Volume (cm^3): 4.62  Devitalized tissue debrided: biofilm, exudate, fibrin and necrotic debris  Instrument(s) utilized: blade, scissors and forceps  Bleeding: small  Hemostas Colostomy in place [facility])     Wound of left leg     TIA (transient ischemic attack)     Fall, initial encounter     Hypokalemia     Nausea and vomiting     Nausea and vomiting, intractability of vomiting not specified, unspecified vomiting type     Abdomi promote wound healing. Orders  Orders Placed This Encounter      C-Reactive Protein      Sed Rate, Michelleren (Automated)      Debridement        Follow-Up  Return in about 2 weeks (around 12/20/2021) for APN 30 min x1.

## 2021-12-07 ENCOUNTER — HOSPITAL ENCOUNTER (EMERGENCY)
Facility: HOSPITAL | Age: 76
Discharge: HOME | End: 2021-12-07
Attending: EMERGENCY MEDICINE
Payer: MEDICARE

## 2021-12-07 ENCOUNTER — APPOINTMENT (OUTPATIENT)
Dept: GENERAL RADIOLOGY | Facility: HOSPITAL | Age: 76
End: 2021-12-07
Attending: EMERGENCY MEDICINE
Payer: MEDICARE

## 2021-12-07 VITALS
SYSTOLIC BLOOD PRESSURE: 112 MMHG | WEIGHT: 139 LBS | OXYGEN SATURATION: 97 % | DIASTOLIC BLOOD PRESSURE: 51 MMHG | TEMPERATURE: 99 F | HEART RATE: 95 BPM | HEIGHT: 64 IN | BODY MASS INDEX: 23.73 KG/M2 | RESPIRATION RATE: 22 BRPM

## 2021-12-07 DIAGNOSIS — S82.875A CLOSED NONDISPLACED PILON FRACTURE OF LEFT TIBIA, INITIAL ENCOUNTER: Primary | ICD-10-CM

## 2021-12-07 PROCEDURE — 29515 APPLICATION SHORT LEG SPLINT: CPT

## 2021-12-07 PROCEDURE — 73630 X-RAY EXAM OF FOOT: CPT | Performed by: EMERGENCY MEDICINE

## 2021-12-07 PROCEDURE — 99283 EMERGENCY DEPT VISIT LOW MDM: CPT

## 2021-12-07 PROCEDURE — 73610 X-RAY EXAM OF ANKLE: CPT | Performed by: EMERGENCY MEDICINE

## 2021-12-07 NOTE — ED PROVIDER NOTES
Patient Seen in: [facility] AND [facility] Emergency Department    History   Patient presents with:  Abnormal Result      HPI    66-year-old female presents the ER for evaluation of distal tibia fracture.   Patient with past medical history of paraplegia and neurop ulcerative colitis   • OTHER DISEASES     uti   • Pneumonia, organism unspecified(486)    • Reflux    • Self-catheterizes urinary bladder 1/11/2016   • Ulcerative colitis ([facility] [address]    • Valvular disease     mitral valve prolapse- 3 clips placed       History r EVENTS N/A 12/8/2014    Procedure: CERVICAL EPIDURAL;  Surgeon: Abdoulaye Mathews MD;  Location: [facility] FOR PAIN MANAGEMENT   • PATIENT DOCUMENTED NOT TO HAVE EXPERIENCED ANY OF THE FOLLOWING EVENTS N/A 1/5/2015    Procedure: ESOPHAGOGASTRODUODENOSCOPY W Marital status:     Tobacco Use      Smoking status: Former Smoker        Quit date: 1961        Years since quittin.4      Smokeless tobacco: Never Used      Tobacco comment: pt smoked until 23 yrs old    Vaping Use      Vaping Use: Never breath sounds. Abdominal:      General: Bowel sounds are normal.      Palpations: Abdomen is soft. Musculoskeletal:         General: Normal range of motion. Cervical back: Normal range of motion and neck supple.         Legs:    Skin:     General: interpreted all ED vitals.   I also personally reviewed all labs and imaging if ordered    Pulse Ox: 93%, Room air, Normal     Monitor Interpretation:   normal sinus rhythm    Differential Diagnosis/ Diagnostic Considerations: Tibial fracture, ankle fractur

## 2021-12-07 NOTE — ED INITIAL ASSESSMENT (HPI)
Pt arrived from home via EMS, received call from doctors office to come to the ER due fx in the left leg. Pt had xray's done yesterday. Pt does not recall falling. Pt states she has decrease sensation to left leg/ Pt a/ox3. Pt is on Eliquis.

## 2021-12-07 NOTE — PROGRESS NOTES
Was notified by Radiologist regarding results of left leg xray yesterday showed fracture of patient's left tibia. Patient needs to follow-up with orthopedic for stabilizing leg and patient to have no weightbearing on left leg.   Notified primary care Dr. Angela Kaufman

## 2021-12-08 NOTE — ED QUICK NOTES
Pt provided and explained d/c instructions, at-home care, follow-up, and otc rx. Pt in nad at this time. Iv access d/c. Vss. A&ox3. Belongings with pt. All questions and concerns addressed.

## 2021-12-08 NOTE — ED QUICK NOTES
Pt verbalized need to void. Pt states she self-catheterizes. Pt moved to another hallway area with use of privacy curtain to allow for self-catheterize. Pt provided straight cath kit to self-catheterize.

## 2021-12-13 ENCOUNTER — TELEPHONE (OUTPATIENT)
Dept: GASTROENTEROLOGY | Facility: CLINIC | Age: 76
End: 2021-12-13

## 2021-12-13 NOTE — TELEPHONE ENCOUNTER
Pt states stool is not going into Colostomy bag. Pt states stool is thick and gets all over her body. Pt states it burns around the colostomy area and is painful.

## 2021-12-13 NOTE — TELEPHONE ENCOUNTER
Dr. Nedra Mohr-    Please advise. I spoke with Cinthia Jennings regarding the below:    Yesterday afternoon, her \"ostomy bag popped off. \" Per patient, this has happened in the past. She and her spouse have both tried to place a new ostomy bag, but she states

## 2021-12-13 NOTE — TELEPHONE ENCOUNTER
Noted and appreciated. I called and informed Mitch Stringer of the below, which she is agreeable to do.

## 2021-12-13 NOTE — TELEPHONE ENCOUNTER
She may stay off the vancomycin. I would continue the budesonide at current dose. Please have the patient contact us with an update after Jessica, sooner if diarrhea develops.

## 2021-12-18 NOTE — TELEPHONE ENCOUNTER
GI RNs: I did not get in touch with with the speech therapist.  Please contact the patient next week. Is there cricopharyngeal dysfunction seen on the swallowing study that would be improved by dilatation?

## 2021-12-20 ENCOUNTER — HOSPITAL ENCOUNTER (INPATIENT)
Facility: HOSPITAL | Age: 76
LOS: 2 days | Discharge: HOME HEALTH | DRG: 193 | End: 2021-12-23
Attending: EMERGENCY MEDICINE | Admitting: INTERNAL MEDICINE
Payer: MEDICARE

## 2021-12-20 ENCOUNTER — APPOINTMENT (OUTPATIENT)
Dept: GENERAL RADIOLOGY | Facility: HOSPITAL | Age: 76
DRG: 193 | End: 2021-12-20
Attending: EMERGENCY MEDICINE
Payer: MEDICARE

## 2021-12-20 DIAGNOSIS — J18.9 COMMUNITY ACQUIRED PNEUMONIA, UNSPECIFIED LATERALITY: Primary | ICD-10-CM

## 2021-12-20 LAB
ANION GAP SERPL CALC-SCNC: 6 MMOL/L (ref 0–18)
BASOPHILS # BLD AUTO: 0.02 X10(3) UL (ref 0–0.2)
BASOPHILS NFR BLD AUTO: 0.2 %
BUN BLD-MCNC: 21 MG/DL (ref 7–18)
BUN/CREAT SERPL: 38.9 (ref 10–20)
CALCIUM BLD-MCNC: 8.7 MG/DL (ref 8.5–10.1)
CHLORIDE SERPL-SCNC: 105 MMOL/L (ref 98–112)
CO2 SERPL-SCNC: 28 MMOL/L (ref 21–32)
CREAT BLD-MCNC: 0.54 MG/DL
DEPRECATED RDW RBC AUTO: 49.6 FL (ref 35.1–46.3)
EOSINOPHIL # BLD AUTO: 0.02 X10(3) UL (ref 0–0.7)
EOSINOPHIL NFR BLD AUTO: 0.2 %
ERYTHROCYTE [DISTWIDTH] IN BLOOD BY AUTOMATED COUNT: 16.4 % (ref 11–15)
GLUCOSE BLD-MCNC: 133 MG/DL (ref 70–99)
HCT VFR BLD AUTO: 44 %
HGB BLD-MCNC: 13.8 G/DL
IMM GRANULOCYTES # BLD AUTO: 0.04 X10(3) UL (ref 0–1)
IMM GRANULOCYTES NFR BLD: 0.4 %
LYMPHOCYTES # BLD AUTO: 1.71 X10(3) UL (ref 1–4)
LYMPHOCYTES NFR BLD AUTO: 19 %
MCH RBC QN AUTO: 26.1 PG (ref 26–34)
MCHC RBC AUTO-ENTMCNC: 31.4 G/DL (ref 31–37)
MCV RBC AUTO: 83.2 FL
MONOCYTES # BLD AUTO: 0.66 X10(3) UL (ref 0.1–1)
MONOCYTES NFR BLD AUTO: 7.3 %
NEUTROPHILS # BLD AUTO: 6.57 X10 (3) UL (ref 1.5–7.7)
NEUTROPHILS # BLD AUTO: 6.57 X10(3) UL (ref 1.5–7.7)
NEUTROPHILS NFR BLD AUTO: 72.9 %
NT-PROBNP SERPL-MCNC: 1655 PG/ML (ref ?–450)
OSMOLALITY SERPL CALC.SUM OF ELEC: 293 MOSM/KG (ref 275–295)
PLATELET # BLD AUTO: 239 10(3)UL (ref 150–450)
POTASSIUM SERPL-SCNC: 4.2 MMOL/L (ref 3.5–5.1)
RBC # BLD AUTO: 5.29 X10(6)UL
SODIUM SERPL-SCNC: 139 MMOL/L (ref 136–145)
WBC # BLD AUTO: 9 X10(3) UL (ref 4–11)

## 2021-12-20 PROCEDURE — 80048 BASIC METABOLIC PNL TOTAL CA: CPT | Performed by: EMERGENCY MEDICINE

## 2021-12-20 PROCEDURE — 96365 THER/PROPH/DIAG IV INF INIT: CPT

## 2021-12-20 PROCEDURE — 83880 ASSAY OF NATRIURETIC PEPTIDE: CPT

## 2021-12-20 PROCEDURE — 85025 COMPLETE CBC W/AUTO DIFF WBC: CPT | Performed by: EMERGENCY MEDICINE

## 2021-12-20 PROCEDURE — 83880 ASSAY OF NATRIURETIC PEPTIDE: CPT | Performed by: EMERGENCY MEDICINE

## 2021-12-20 PROCEDURE — 71045 X-RAY EXAM CHEST 1 VIEW: CPT | Performed by: EMERGENCY MEDICINE

## 2021-12-20 PROCEDURE — 80048 BASIC METABOLIC PNL TOTAL CA: CPT

## 2021-12-20 PROCEDURE — 96375 TX/PRO/DX INJ NEW DRUG ADDON: CPT

## 2021-12-20 PROCEDURE — 85025 COMPLETE CBC W/AUTO DIFF WBC: CPT

## 2021-12-20 PROCEDURE — 93005 ELECTROCARDIOGRAM TRACING: CPT

## 2021-12-20 PROCEDURE — 99285 EMERGENCY DEPT VISIT HI MDM: CPT

## 2021-12-20 PROCEDURE — 36415 COLL VENOUS BLD VENIPUNCTURE: CPT

## 2021-12-20 PROCEDURE — 93010 ELECTROCARDIOGRAM REPORT: CPT | Performed by: EMERGENCY MEDICINE

## 2021-12-21 PROBLEM — J18.9 COMMUNITY ACQUIRED PNEUMONIA: Status: ACTIVE | Noted: 2021-12-21

## 2021-12-21 PROBLEM — J18.9 COMMUNITY ACQUIRED PNEUMONIA, UNSPECIFIED LATERALITY: Status: ACTIVE | Noted: 2021-12-21

## 2021-12-21 LAB
ANION GAP SERPL CALC-SCNC: 8 MMOL/L (ref 0–18)
BASOPHILS # BLD AUTO: 0.03 X10(3) UL (ref 0–0.2)
BASOPHILS NFR BLD AUTO: 0.3 %
BUN BLD-MCNC: 18 MG/DL (ref 7–18)
BUN/CREAT SERPL: 50 (ref 10–20)
C DIFF TOX B STL QL: NEGATIVE
CALCIUM BLD-MCNC: 9 MG/DL (ref 8.5–10.1)
CHLORIDE SERPL-SCNC: 108 MMOL/L (ref 98–112)
CO2 SERPL-SCNC: 24 MMOL/L (ref 21–32)
CREAT BLD-MCNC: 0.36 MG/DL
DEPRECATED RDW RBC AUTO: 50.3 FL (ref 35.1–46.3)
EOSINOPHIL # BLD AUTO: 0.07 X10(3) UL (ref 0–0.7)
EOSINOPHIL NFR BLD AUTO: 0.8 %
ERYTHROCYTE [DISTWIDTH] IN BLOOD BY AUTOMATED COUNT: 16.5 % (ref 11–15)
GLUCOSE BLD-MCNC: 86 MG/DL (ref 70–99)
HCT VFR BLD AUTO: 43.4 %
HGB BLD-MCNC: 13.6 G/DL
IMM GRANULOCYTES # BLD AUTO: 0.06 X10(3) UL (ref 0–1)
IMM GRANULOCYTES NFR BLD: 0.7 %
L PNEUMO AG UR QL: NEGATIVE
LACTATE SERPL-SCNC: 1.5 MMOL/L (ref 0.4–2)
LYMPHOCYTES # BLD AUTO: 2.01 X10(3) UL (ref 1–4)
LYMPHOCYTES NFR BLD AUTO: 22.3 %
MAGNESIUM SERPL-MCNC: 1.9 MG/DL (ref 1.6–2.6)
MCH RBC QN AUTO: 26.3 PG (ref 26–34)
MCHC RBC AUTO-ENTMCNC: 31.3 G/DL (ref 31–37)
MCV RBC AUTO: 83.8 FL
MONOCYTES # BLD AUTO: 0.68 X10(3) UL (ref 0.1–1)
MONOCYTES NFR BLD AUTO: 7.5 %
NEUTROPHILS # BLD AUTO: 6.16 X10 (3) UL (ref 1.5–7.7)
NEUTROPHILS # BLD AUTO: 6.16 X10(3) UL (ref 1.5–7.7)
NEUTROPHILS NFR BLD AUTO: 68.4 %
OSMOLALITY SERPL CALC.SUM OF ELEC: 291 MOSM/KG (ref 275–295)
PLATELET # BLD AUTO: 212 10(3)UL (ref 150–450)
POTASSIUM SERPL-SCNC: 3.1 MMOL/L (ref 3.5–5.1)
POTASSIUM SERPL-SCNC: 3.6 MMOL/L (ref 3.5–5.1)
PROCALCITONIN SERPL-MCNC: 0.07 NG/ML (ref ?–0.16)
RBC # BLD AUTO: 5.18 X10(6)UL
SARS-COV-2 RNA RESP QL NAA+PROBE: NOT DETECTED
SODIUM SERPL-SCNC: 140 MMOL/L (ref 136–145)
STREP PNEUMO ANTIGEN, URINE: NEGATIVE
TROPONIN I HIGH SENSITIVITY: 36 NG/L
WBC # BLD AUTO: 9 X10(3) UL (ref 4–11)

## 2021-12-21 PROCEDURE — 97530 THERAPEUTIC ACTIVITIES: CPT

## 2021-12-21 PROCEDURE — 92610 EVALUATE SWALLOWING FUNCTION: CPT

## 2021-12-21 PROCEDURE — 94640 AIRWAY INHALATION TREATMENT: CPT

## 2021-12-21 PROCEDURE — 84132 ASSAY OF SERUM POTASSIUM: CPT | Performed by: STUDENT IN AN ORGANIZED HEALTH CARE EDUCATION/TRAINING PROGRAM

## 2021-12-21 PROCEDURE — 87040 BLOOD CULTURE FOR BACTERIA: CPT | Performed by: EMERGENCY MEDICINE

## 2021-12-21 PROCEDURE — 87449 NOS EACH ORGANISM AG IA: CPT | Performed by: INTERNAL MEDICINE

## 2021-12-21 PROCEDURE — 87493 C DIFF AMPLIFIED PROBE: CPT | Performed by: INTERNAL MEDICINE

## 2021-12-21 PROCEDURE — 83605 ASSAY OF LACTIC ACID: CPT | Performed by: EMERGENCY MEDICINE

## 2021-12-21 PROCEDURE — 99213 OFFICE O/P EST LOW 20 MIN: CPT

## 2021-12-21 PROCEDURE — 97162 PT EVAL MOD COMPLEX 30 MIN: CPT

## 2021-12-21 PROCEDURE — 85025 COMPLETE CBC W/AUTO DIFF WBC: CPT | Performed by: INTERNAL MEDICINE

## 2021-12-21 PROCEDURE — 80048 BASIC METABOLIC PNL TOTAL CA: CPT | Performed by: INTERNAL MEDICINE

## 2021-12-21 PROCEDURE — 84484 ASSAY OF TROPONIN QUANT: CPT | Performed by: EMERGENCY MEDICINE

## 2021-12-21 PROCEDURE — 92526 ORAL FUNCTION THERAPY: CPT

## 2021-12-21 PROCEDURE — 83735 ASSAY OF MAGNESIUM: CPT | Performed by: STUDENT IN AN ORGANIZED HEALTH CARE EDUCATION/TRAINING PROGRAM

## 2021-12-21 PROCEDURE — 84145 PROCALCITONIN (PCT): CPT | Performed by: STUDENT IN AN ORGANIZED HEALTH CARE EDUCATION/TRAINING PROGRAM

## 2021-12-21 RX ORDER — VANCOMYCIN HYDROCHLORIDE 125 MG/1
125 CAPSULE ORAL ONCE
Status: COMPLETED | OUTPATIENT
Start: 2021-12-21 | End: 2021-12-21

## 2021-12-21 RX ORDER — PREDNISONE 1 MG/1
5 TABLET ORAL DAILY
Status: DISCONTINUED | OUTPATIENT
Start: 2021-12-21 | End: 2021-12-23

## 2021-12-21 RX ORDER — BENZONATATE 100 MG/1
200 CAPSULE ORAL 3 TIMES DAILY PRN
Status: DISCONTINUED | OUTPATIENT
Start: 2021-12-21 | End: 2021-12-23

## 2021-12-21 RX ORDER — BUDESONIDE 3 MG/1
9 CAPSULE, COATED PELLETS ORAL EVERY MORNING
Status: DISCONTINUED | OUTPATIENT
Start: 2021-12-21 | End: 2021-12-23

## 2021-12-21 RX ORDER — POTASSIUM CHLORIDE 20 MEQ/1
40 TABLET, EXTENDED RELEASE ORAL EVERY 4 HOURS
Status: COMPLETED | OUTPATIENT
Start: 2021-12-21 | End: 2021-12-22

## 2021-12-21 RX ORDER — ALBUTEROL SULFATE 90 UG/1
2 AEROSOL, METERED RESPIRATORY (INHALATION) EVERY 4 HOURS PRN
Status: DISCONTINUED | OUTPATIENT
Start: 2021-12-21 | End: 2021-12-23

## 2021-12-21 RX ORDER — ATORVASTATIN CALCIUM 40 MG/1
40 TABLET, FILM COATED ORAL NIGHTLY
Status: DISCONTINUED | OUTPATIENT
Start: 2021-12-21 | End: 2021-12-23

## 2021-12-21 RX ORDER — IPRATROPIUM BROMIDE AND ALBUTEROL SULFATE 2.5; .5 MG/3ML; MG/3ML
3 SOLUTION RESPIRATORY (INHALATION) EVERY 6 HOURS PRN
Status: DISCONTINUED | OUTPATIENT
Start: 2021-12-21 | End: 2021-12-23

## 2021-12-21 RX ORDER — BUMETANIDE 1 MG/1
1 TABLET ORAL ONCE
Status: COMPLETED | OUTPATIENT
Start: 2021-12-21 | End: 2021-12-21

## 2021-12-21 RX ORDER — MELATONIN
325
Status: DISCONTINUED | OUTPATIENT
Start: 2021-12-21 | End: 2021-12-23

## 2021-12-21 RX ORDER — VANCOMYCIN HYDROCHLORIDE 125 MG/1
125 CAPSULE ORAL DAILY
Status: DISCONTINUED | OUTPATIENT
Start: 2021-12-21 | End: 2021-12-23

## 2021-12-21 RX ORDER — ACETAMINOPHEN 325 MG/1
650 TABLET ORAL EVERY 6 HOURS PRN
Status: DISCONTINUED | OUTPATIENT
Start: 2021-12-21 | End: 2021-12-23

## 2021-12-21 RX ORDER — POTASSIUM CHLORIDE 20 MEQ/1
40 TABLET, EXTENDED RELEASE ORAL EVERY 4 HOURS
Status: COMPLETED | OUTPATIENT
Start: 2021-12-21 | End: 2021-12-21

## 2021-12-21 RX ORDER — CETIRIZINE HYDROCHLORIDE 10 MG/1
10 TABLET ORAL DAILY
Status: DISCONTINUED | OUTPATIENT
Start: 2021-12-21 | End: 2021-12-23

## 2021-12-21 RX ORDER — BUMETANIDE 1 MG/1
1 TABLET ORAL DAILY
Status: DISCONTINUED | OUTPATIENT
Start: 2021-12-21 | End: 2021-12-23

## 2021-12-21 RX ORDER — IPRATROPIUM BROMIDE AND ALBUTEROL SULFATE 2.5; .5 MG/3ML; MG/3ML
3 SOLUTION RESPIRATORY (INHALATION) ONCE
Status: COMPLETED | OUTPATIENT
Start: 2021-12-21 | End: 2021-12-21

## 2021-12-21 RX ORDER — DIGOXIN 125 MCG
125 TABLET ORAL DAILY
Status: DISCONTINUED | OUTPATIENT
Start: 2021-12-21 | End: 2021-12-23

## 2021-12-21 RX ORDER — FLUTICASONE PROPIONATE 50 MCG
1 SPRAY, SUSPENSION (ML) NASAL DAILY
Status: DISCONTINUED | OUTPATIENT
Start: 2021-12-21 | End: 2021-12-21

## 2021-12-21 RX ORDER — PREGABALIN 25 MG/1
50 CAPSULE ORAL 3 TIMES DAILY
Status: DISCONTINUED | OUTPATIENT
Start: 2021-12-21 | End: 2021-12-23

## 2021-12-21 RX ORDER — EZETIMIBE 10 MG/1
10 TABLET ORAL DAILY
Status: DISCONTINUED | OUTPATIENT
Start: 2021-12-21 | End: 2021-12-23

## 2021-12-21 RX ORDER — VENLAFAXINE HYDROCHLORIDE 75 MG/1
75 CAPSULE, EXTENDED RELEASE ORAL DAILY
Status: DISCONTINUED | OUTPATIENT
Start: 2021-12-21 | End: 2021-12-23

## 2021-12-21 NOTE — ED QUICK NOTES
Orders for admission, patient is aware of plan and ready to go upstairs. Any questions, please call ED RN Radha Carrion  at extension [phone].    Type of COVID test sent: Rapid  COVID Suspicion level: Low      LOC at time of transport: a&o 4/4

## 2021-12-21 NOTE — H&P
[facility] Hospitalist H&P       CC: Patient presents with:  Cough/URI  Congestive Heart Failure       PCP: Gabriela Judd MD    History of Present Illness:  Ms. Yusra Faith is a 64 year old female with multiple medical problems including ulcerative coli Lymphocytic colitis Colon= 5/7/12   • MENOPAUSE    • MITRAL VALVE PROLAPSE    • Neuropathy     bilateral legs;  Hereditary spastic paraplegia X 20 years    • Osteoarthritis     legs, back, shoulders and knees   • OSTEOPENIA    • Other and unspecified hyperl DX/THERAPEUTIC SUBSTANCE, EPIDURAL/SUBARACHNOID; CERVICAL/THORACIC N/A 12/8/2014    Procedure: CERVICAL EPIDURAL;  Surgeon: Marlen Mendoza MD;  Location: [address] [place] [address]   • KNEE REPLACEMENT SURGERY      left  12/05   • OTHER SURGICAL HISTO ALL:    Cymbalta [Duloxetin*    ITCHING, SWELLING    Comment:Throat swelling and difficulty breathing  Gabapentin              RASH, UNKNOWN  Gnp Iodides Decolor*        Comment:Iodine based dyes  Cant swallow or breath             And hives Oral Cap, Take 1 capsule (50 mg total) by mouth 3 (three) times daily. , Disp: 90 capsule, Rfl: 5  nitroGLYCERIN 0.4 MG Sublingual SL Tab, Place 0.4 mg under the tongue every 5 (five) minutes as needed for Chest pain (or sob , can take 3 doses then call  ill-appearing   Head:  Normocephalic, without obvious abnormality, atraumatic. Eyes:  Sclera anicteric, No conjunctival pallor, EOMs intact. Nose: Nares normal. Septum midline. Mucosa normal. No drainage.    Throat: Lips, mucosa, and tongue normal. Marcene Pulse Dictated by (CST): Bre King MD on 12/21/2021 at 6:36 AM     Finalized by (CST): Bre King MD on 12/21/2021 at 6:38 AM              ASSESSMENT / PLAN:   Ms. Tyler Andujar is a 64 year old female with multiple medical problems including ulcerati outlined    Thank Wesley [place] [facility] Hospitalist  Answering Service number: [phone]

## 2021-12-21 NOTE — ED QUICK NOTES
Rounding Completed    Plan of Care reviewed. Waiting for inpatient bed. Elimination needs assessed. Antibiotics initiated. Patient resting comfortably and watching TV. No complaints at this time. Bed is locked and in lowest position.  Call light withi

## 2021-12-21 NOTE — PHYSICAL THERAPY NOTE
PHYSICAL THERAPY EVALUATION - INPATIENT     Room Number: 102/102-A  Evaluation Date: 12/21/2021  Type of Evaluation: Initial   Physician Order: PT Eval and Treat    Presenting Problem: CAP / wounds sacral area and left LE wound care team is following Starr Blanton roll sequencing. Pt required initially min to mod support for safe sit at EOB. Pt was provided bedrails for UE support in sitting. Once pt has UE support of rails she was able to support self at EOB with SBA to CGA .   Bed raised to keep pt non WB on and \"Liliana \" pick her up and lift her to the chair. Pt has a kathya lift but spouse afraid to use it. Pt reports assisted dressing , indep colostomy care , intermittent assist for self cathing recently , 2 person dependent transfers .  Pt with hx of fal aware of patient status post session. Patient will benefit from continued IP PT services to address these deficits in preparation for discharge.     ( during session therapy did bump pt outside of left eye as sitting at EOB initially  , RN was informed for Cuco Velazquez is a 64 year old female with multiple medical problems including ulcerative colitis, A. fib on Eliquis mitral valve prolapse, depression, Gout, GERD, history of Clostridium difficile, history of leg ulcers, hx of DVT and IVC filter, hereditary sp acquired pneumonia, unspecified laterality  Active Problems:    Community acquired pneumonia      Past Medical History  Past Medical History:   Diagnosis Date   • Anemia    • Anesthesia complication     in 0667P \"I didnt wake up\"   • Anxiety state, unspe [facility] and negative for polyps, UC was quiet   • COLONOSCOPY     • COLONOSCOPY,DIAGNOSTIC  5/12    normal, random colon bx showed findings c/w lymphocytic colitis   • COLOSTOMY     • CYSTOURETHROSCOPY  1/9/09    in office   • EGD N/A 12/24/2016    Procedure: EPIDURAL;  Surgeon: Cecilia Fried MD;  Location: [facility] FOR PAIN MANAGEMENT   • PATIENT [address] FOR IV ANTIBIOTIC SURGICAL SITE INFECTION PROPHYLAXIS.  N/A 12/8/2014    Procedure: CERVICAL EPIDURAL;  Surgeon: Cecilia Fried MD;  L WFL - within functional limits  · Awareness of Deficits:  decreased awareness of deficits    RANGE OF MOTION AND STRENGTH ASSESSMENT    B UE ROM and strength grossly WFL , pt with decrease trunk strength and posture as noted in sitting and with bed mobilit Goal #1 Patient is able to demonstrate supine - sit EOB @ level: moderate assistance     Goal #1   Current Status    Goal #2 If proper chair in room and lift --- dependent lift transfer to chair with proper cushion in place for skin protections for impro

## 2021-12-21 NOTE — PROGRESS NOTES
12/21/21 0849   [REMOVED] Wound 12/06/21 8 Pressure Injury Buttocks Left   Final Assessment Date: 12/21/21  Date First Assessed: 12/06/21   Present on Hospital Admission: Yes   Wound Number (Wound Clinic Only): 8  Primary Wound Type: Pressure Injury  Lo Clean;Bleeding;Fragile; Moist;Painful;Red   Wound Granulation Tissue Red   Wound Odor None   State of Healing Early/partial granulation   Wound 12/21/21 Pressure Injury Ankle Left;Lateral   Date First Assessed/Time First Assessed: 12/21/21 5711   Present on up needed Yes       WOUND CARE NOTE    History:  Past Medical History:   Diagnosis Date   • Anemia    • Anesthesia complication     in 5095B \"I didnt wake up\"   • Anxiety state, unspecified    • Arrhythmia     a.fib   • BACK PAIN     scoliosis   • Back p normal, random colon bx showed findings c/w lymphocytic colitis   • COLOSTOMY     • CYSTOURETHROSCOPY  1/9/09    in office   • EGD N/A 12/24/2016    Procedure: ESOPHAGOGASTRODUODENOSCOPY (EGD);   Surgeon: Robert Neal MD;  Location: [address] [place] [address] [facility] PREOPERATIVE ORDER FOR IV ANTIBIOTIC SURGICAL SITE INFECTION PROPHYLAXIS.  N/A 12/8/2014    Procedure: CERVICAL EPIDURAL;  Surgeon: Neena Agarwal MD;  Location: [facility] FOR PAIN MANAGEMENT   • PATIENT [address] FOR IV ANTIBIO Score: 14    Chart Reviewed: yes    Wound(s):  Pt known to wound services from previous admissions. Pt last injured her left lower leg in August?. There is a fracture which was recently diagnosed.  The stage 2 pressure ulcer to the ankle and the non healing

## 2021-12-21 NOTE — ED INITIAL ASSESSMENT (HPI)
Patient with history of CHF, here after being seen here on 12/7 for cough. Reports \"my doctor wants to see if I have extra fluid on my lungs\".  Denies shortness of breath

## 2021-12-21 NOTE — SLP NOTE
ADULT SWALLOWING EVALUATION    ASSESSMENT    ASSESSMENT/OVERALL IMPRESSION:  PPE REQUIRED. THIS THERAPIST WORE GLOVES, GOWN, DROPLET MASK, AND GOGGLES FOR DURATION OF EVALUATION. HANDS WASHED UPON ENTRANCE/EXIT.     SLP BSSE orders received and acknowledged recommendations remain on whiteboard. Pt and pt's RN v/u to all recommendations and POC. PLAN: SLP to f/u x2-3 meal assessment, monitor CXR, and repeat VFSS if clinically warranted.       RECOMMENDATIONS   Diet Recommendations - Solids: Regular  Diet Re • Other and unspecified hyperlipidemia    • OTHER DISEASES     spastic paraparesis   • OTHER DISEASES     cataracts   • OTHER DISEASES     secondary poycythemia   • OTHER DISEASES     lumbar spinal stenosis   • OTHER DISEASES     duplicated left renal co AM       Finalized by (CST): Brook Black MD on 12/21/2021 at 6:38 AM            OBJECTIVE   ORAL MOTOR EXAMINATION  Dentition: Functional  Symmetry: Reduced left facial  Strength: Reduced left facial     Range of Motion: Within Functional Limits  Rat Established Goals:  (2-3)  Follow Up Needed (Documentation Required): Yes  SLP Follow-up Date: 12/22/21    Thank you for your referral.   If you have any questions, please contact GERSON [place] [phone] Pathologist  Ph

## 2021-12-21 NOTE — PLAN OF CARE
Pt rec'd from ER. Reports continued lower left chest pain at level of lowest ribs. She reports this pain is most noted when she takes a deep breath. Lungs diminished with rhonchi to the left.   Pt in afib on tele with frequent PVCs and occaisional runs o relieved.       Interventions:   - antibiotics  - prn tylenol  - See additional Care Plan goals for specific interventions  Outcome: Progressing     Problem: CARDIOVASCULAR - ADULT  Goal: Maintains optimal cardiac output and hemodynamic stability  Descripti collaborating with pharmacy to review patient's medication profile  - Implement strategies to promote bladder emptying  Outcome: Progressing     Problem: SKIN/TISSUE INTEGRITY - ADULT  Goal: Incision(s), wounds(s) or drain site(s) healing without S/S of in

## 2021-12-21 NOTE — ED PROVIDER NOTES
Patient Seen in: [facility] AND [facility] Emergency Department      History   Patient presents with:  Cough/URI  Congestive Heart Failure    Stated Complaint: cough    Subjective:   HPI    Patient is a 77-year-old female who presents with 5 days of cough produc • OTHER DISEASES     uterine fibroids   • OTHER DISEASES     ulcerative colitis   • OTHER DISEASES     uti   • Pneumonia, organism unspecified(486)    • Reflux    • Self-catheterizes urinary bladder 1/11/2016   • [facility]    • Valvular di DOCUMENTED NOT TO HAVE EXPERIENCED ANY OF THE FOLLOWING EVENTS N/A 12/8/2014    Procedure: CERVICAL EPIDURAL;  Surgeon: Jose Goodman MD;  Location: [facility] FOR PAIN MANAGEMENT   • PATIENT DOCUMENTED NOT TO HAVE EXPERIENCED ANY OF THE FOLLOWING EVENTS systems reviewed and negative except as noted above.     Physical Exam     ED Triage Vitals   BP 12/20/21 2152 129/67   Pulse 12/20/21 2152 74   Resp 12/20/21 2152 18   Temp 12/20/21 2153 97.3 °F (36.3 °C)   Temp src 12/20/21 2153 Temporal   SpO2 12/20/21 2 result                 Please view results for these tests on the individual orders. RAINBOW DRAW LAVENDER   RAINBOW DRAW LIGHT GREEN   BLOOD CULTURE   BLOOD CULTURE     EKG    Rate, intervals and axes as noted on EKG Report.   Rate: 81  Rhythm: Atrial Fi am    Follow-up:  No follow-up provider specified. We recommend that you schedule follow up care with a primary care provider within the next three months to obtain basic health screening including reassessment of your blood pressure.       Medications Pre

## 2021-12-21 NOTE — CM/SW NOTE
12/21/21 1300   CM/SW Referral Data   Referral Source Social Work (self-referral)   Reason for Referral Discharge planning   Informant Patient   Pertinent Medical Hx   Does patient have an established PCP?  Yes  Radha Galloway)   Patient Info   Patient' utilize it. SW provided update based on assessment w/ pt today as well as previous admissions. SHERRELL also contacted 104And[address]as Burkett (ext [phone]) and requested they f/up w/ pt and pt's  again to re-assess if pt would qualify for any [facility] services.     03:30P

## 2021-12-22 LAB
ADENOVIRUS PCR:: NOT DETECTED
ANION GAP SERPL CALC-SCNC: 6 MMOL/L (ref 0–18)
B PARAPERT DNA SPEC QL NAA+PROBE: NOT DETECTED
B PERT DNA SPEC QL NAA+PROBE: NOT DETECTED
BASOPHILS # BLD AUTO: 0.04 X10(3) UL (ref 0–0.2)
BASOPHILS NFR BLD AUTO: 0.5 %
BUN BLD-MCNC: 18 MG/DL (ref 7–18)
BUN/CREAT SERPL: 34.6 (ref 10–20)
C PNEUM DNA SPEC QL NAA+PROBE: NOT DETECTED
CALCIUM BLD-MCNC: 8.6 MG/DL (ref 8.5–10.1)
CHLORIDE SERPL-SCNC: 112 MMOL/L (ref 98–112)
CO2 SERPL-SCNC: 25 MMOL/L (ref 21–32)
CORONAVIRUS 229E PCR:: NOT DETECTED
CORONAVIRUS HKU1 PCR:: NOT DETECTED
CORONAVIRUS NL63 PCR:: NOT DETECTED
CORONAVIRUS OC43 PCR:: NOT DETECTED
CREAT BLD-MCNC: 0.52 MG/DL
DEPRECATED RDW RBC AUTO: 50.9 FL (ref 35.1–46.3)
EOSINOPHIL # BLD AUTO: 0.09 X10(3) UL (ref 0–0.7)
EOSINOPHIL NFR BLD AUTO: 1.1 %
ERYTHROCYTE [DISTWIDTH] IN BLOOD BY AUTOMATED COUNT: 16.6 % (ref 11–15)
FLUAV RNA SPEC QL NAA+PROBE: NOT DETECTED
FLUBV RNA SPEC QL NAA+PROBE: NOT DETECTED
GLUCOSE BLD-MCNC: 92 MG/DL (ref 70–99)
HCT VFR BLD AUTO: 42.6 %
HGB BLD-MCNC: 13 G/DL
IMM GRANULOCYTES # BLD AUTO: 0.05 X10(3) UL (ref 0–1)
IMM GRANULOCYTES NFR BLD: 0.6 %
LYMPHOCYTES # BLD AUTO: 2.38 X10(3) UL (ref 1–4)
LYMPHOCYTES NFR BLD AUTO: 29.2 %
MCH RBC QN AUTO: 25.7 PG (ref 26–34)
MCHC RBC AUTO-ENTMCNC: 30.5 G/DL (ref 31–37)
MCV RBC AUTO: 84.4 FL
METAPNEUMOVIRUS PCR:: NOT DETECTED
MONOCYTES # BLD AUTO: 0.68 X10(3) UL (ref 0.1–1)
MONOCYTES NFR BLD AUTO: 8.3 %
MYCOPLASMA PNEUMONIA PCR:: NOT DETECTED
NEUTROPHILS # BLD AUTO: 4.92 X10 (3) UL (ref 1.5–7.7)
NEUTROPHILS # BLD AUTO: 4.92 X10(3) UL (ref 1.5–7.7)
NEUTROPHILS NFR BLD AUTO: 60.3 %
OSMOLALITY SERPL CALC.SUM OF ELEC: 298 MOSM/KG (ref 275–295)
PARAINFLUENZA 1 PCR:: NOT DETECTED
PARAINFLUENZA 2 PCR:: NOT DETECTED
PARAINFLUENZA 3 PCR:: NOT DETECTED
PARAINFLUENZA 4 PCR:: NOT DETECTED
PLATELET # BLD AUTO: 197 10(3)UL (ref 150–450)
POTASSIUM SERPL-SCNC: 5 MMOL/L (ref 3.5–5.1)
RBC # BLD AUTO: 5.05 X10(6)UL
RHINOVIRUS/ENTERO PCR:: DETECTED
RSV RNA SPEC QL NAA+PROBE: NOT DETECTED
SARS-COV-2 RNA NPH QL NAA+NON-PROBE: NOT DETECTED
SODIUM SERPL-SCNC: 143 MMOL/L (ref 136–145)
WBC # BLD AUTO: 8.2 X10(3) UL (ref 4–11)

## 2021-12-22 PROCEDURE — 0202U NFCT DS 22 TRGT SARS-COV-2: CPT | Performed by: HOSPITALIST

## 2021-12-22 PROCEDURE — 92526 ORAL FUNCTION THERAPY: CPT

## 2021-12-22 PROCEDURE — 80048 BASIC METABOLIC PNL TOTAL CA: CPT | Performed by: STUDENT IN AN ORGANIZED HEALTH CARE EDUCATION/TRAINING PROGRAM

## 2021-12-22 PROCEDURE — S0171 BUMETANIDE 0.5 MG: HCPCS | Performed by: HOSPITALIST

## 2021-12-22 PROCEDURE — 85025 COMPLETE CBC W/AUTO DIFF WBC: CPT | Performed by: STUDENT IN AN ORGANIZED HEALTH CARE EDUCATION/TRAINING PROGRAM

## 2021-12-22 RX ORDER — BUMETANIDE 0.25 MG/ML
1 INJECTION, SOLUTION INTRAMUSCULAR; INTRAVENOUS ONCE
Status: COMPLETED | OUTPATIENT
Start: 2021-12-22 | End: 2021-12-22

## 2021-12-22 NOTE — DIETARY NOTE
ADULT NUTRITION INITIAL ASSESSMENT    Pt is at high nutrition risk. Pt meets severe malnutrition criteria.       CRITERIA FOR MALNUTRITION DIAGNOSIS:  Criteria for severe malnutrition diagnosis: chronic illness related to energy intake less than 75% for gr unspecified(486)    • Reflux    • Self-catheterizes urinary bladder 1/11/2016   • Ulcerative colitis ([facility] [address]    • Valvular disease     mitral valve prolapse- 3 clips placed     PATIENT STATUS: Initial 12/22/21: Pt admit for PNA.  PMH sig for CHF, colitis with 1.9  --   --   --      --   --  140  --  143   K 4.2   < >  --  3.1* 3.6 5.0     --   --  108  --  112   CO2 28.0  --   --  24.0  --  25.0   OSMOCALC 293  --   --  291  --  298*    < > = values in this interval not displayed.        NUTRITION RE Cardiac, No Straw; Fluid Consistency: Thin Liquids ; Texture Consistency: Regular; Is Patient on Accuchecks?  No     - RD Malnutrition Care Plan: Initiated ONS (oral nutritional supplements)  - Meals and snacks: Encouraged adequate PO intake  - Medical Food

## 2021-12-22 NOTE — PROGRESS NOTES
[facility] Hospitalist Progress Note     CC: Hospital Follow up    PCP: María Elena Martin MD       Assessment/Plan:     Principal Problem:    Community acquired pneumonia, unspecified laterality  Active Problems:    Community acquired pneum breathing has improved, no fevers, still with cough, no nausea or vomiting    OBJECTIVE:    Blood pressure 97/59, pulse 79, temperature 97.3 °F (36.3 °C), temperature source Oral, resp.  rate 18, height 5' 4\" (1.626 m), weight 123 lb (55.8 kg), SpO2 95 %, input(s): ALPHOS, TBIL, DBIL, TPROT      Imaging:  XR CHEST AP PORTABLE  (CPT=71045)    Result Date: 12/21/2021  CONCLUSION:   Mild left basilar opacity which may reflect atelectasis or pneumonia. Recommend short-term follow-up chest radiographs.   Cardiom

## 2021-12-22 NOTE — PLAN OF CARE
Patient alert. Reports no pain. Afib on tele; with potassium replacement PVCs and runs of non sustained vtach have become less frequent. Pt breathing freely, but does become SOB with activity. Colostomy intact, draining dark green stool.   Dressings to Consider collaborating with pharmacy to review patient's medication profile  Outcome: Progressing     Problem: GENITOURINARY - ADULT  Goal: Absence of urinary retention  Description: INTERVENTIONS:  - Assess patient’s ability to void and empty bladder  - M

## 2021-12-22 NOTE — CDS QUERY
How to answer this Query:  1.) Click \"Edit button\" on the toolbar.  2.) Type an \"X\" in the bracket for the diagnosis that applies. (You may also add additional clinical details as you feel necessary to substantiate your response).    3.) Finally click \ mass:       Loss of subcutaneous fat:       Localized or generalized fluid accumulation that may sometimes mask weight loss:       Diminished functional status as measured by hand  strength:      Other:SEE KALEY FITZPATRICK FOR FURTHER DETAILS    If you have any

## 2021-12-22 NOTE — SLP NOTE
SPEECH DAILY NOTE - INPATIENT    ASSESSMENT & PLAN   ASSESSMENT  PPE REQUIRED. THIS THERAPIST WORE GLOVES, GOWN, GOWN, DROPLET MASK, AND GOGGLES FOR DURATION OF EVALUATION. HANDS WASHED UPON ENTRANCE/EXIT.     SLP f/u for ongoing dysphagia tx/meal assessmen preliminary report was issued by the [address] [place] [address] Radiology teleradiology service.  There are no major discrepancies.       Dictated by (CST): Chantal Menjivar MD on 12/21/2021 at 6:36 AM       Finalized by (CST): Chantal Menjivar MD on 12/21/2021 at 6:38 AM   bites/sips while upright in bed. No straws utilized.    In Progress     FOLLOW UP  Follow Up Needed (Documentation Required): Yes  SLP Follow-up Date: 12/23/21  Number of Visits to Meet Established Goals:  (2-3)    Session: 1 following BSE    If you have an

## 2021-12-23 VITALS
HEIGHT: 64 IN | OXYGEN SATURATION: 95 % | HEART RATE: 76 BPM | RESPIRATION RATE: 18 BRPM | SYSTOLIC BLOOD PRESSURE: 110 MMHG | BODY MASS INDEX: 21 KG/M2 | DIASTOLIC BLOOD PRESSURE: 59 MMHG | WEIGHT: 123 LBS | TEMPERATURE: 98 F

## 2021-12-23 LAB
ANION GAP SERPL CALC-SCNC: 8 MMOL/L (ref 0–18)
BASOPHILS # BLD AUTO: 0.04 X10(3) UL (ref 0–0.2)
BASOPHILS NFR BLD AUTO: 0.5 %
BUN BLD-MCNC: 13 MG/DL (ref 7–18)
BUN/CREAT SERPL: 32.5 (ref 10–20)
CALCIUM BLD-MCNC: 8.6 MG/DL (ref 8.5–10.1)
CHLORIDE SERPL-SCNC: 107 MMOL/L (ref 98–112)
CO2 SERPL-SCNC: 25 MMOL/L (ref 21–32)
CREAT BLD-MCNC: 0.4 MG/DL
DEPRECATED RDW RBC AUTO: 49.3 FL (ref 35.1–46.3)
EOSINOPHIL # BLD AUTO: 0.12 X10(3) UL (ref 0–0.7)
EOSINOPHIL NFR BLD AUTO: 1.4 %
ERYTHROCYTE [DISTWIDTH] IN BLOOD BY AUTOMATED COUNT: 15.9 % (ref 11–15)
GLUCOSE BLD-MCNC: 87 MG/DL (ref 70–99)
HCT VFR BLD AUTO: 40.6 %
HGB BLD-MCNC: 12.6 G/DL
IMM GRANULOCYTES # BLD AUTO: 0.07 X10(3) UL (ref 0–1)
IMM GRANULOCYTES NFR BLD: 0.8 %
LYMPHOCYTES # BLD AUTO: 2.45 X10(3) UL (ref 1–4)
LYMPHOCYTES NFR BLD AUTO: 27.7 %
MAGNESIUM SERPL-MCNC: 2 MG/DL (ref 1.6–2.6)
MCH RBC QN AUTO: 26.1 PG (ref 26–34)
MCHC RBC AUTO-ENTMCNC: 31 G/DL (ref 31–37)
MCV RBC AUTO: 84.1 FL
MONOCYTES # BLD AUTO: 0.65 X10(3) UL (ref 0.1–1)
MONOCYTES NFR BLD AUTO: 7.3 %
NEUTROPHILS # BLD AUTO: 5.53 X10 (3) UL (ref 1.5–7.7)
NEUTROPHILS # BLD AUTO: 5.53 X10(3) UL (ref 1.5–7.7)
NEUTROPHILS NFR BLD AUTO: 62.3 %
OSMOLALITY SERPL CALC.SUM OF ELEC: 289 MOSM/KG (ref 275–295)
PLATELET # BLD AUTO: 203 10(3)UL (ref 150–450)
POTASSIUM SERPL-SCNC: 3.6 MMOL/L (ref 3.5–5.1)
RBC # BLD AUTO: 4.83 X10(6)UL
SODIUM SERPL-SCNC: 140 MMOL/L (ref 136–145)
WBC # BLD AUTO: 8.9 X10(3) UL (ref 4–11)

## 2021-12-23 PROCEDURE — 80048 BASIC METABOLIC PNL TOTAL CA: CPT | Performed by: HOSPITALIST

## 2021-12-23 PROCEDURE — 85025 COMPLETE CBC W/AUTO DIFF WBC: CPT | Performed by: HOSPITALIST

## 2021-12-23 PROCEDURE — 83735 ASSAY OF MAGNESIUM: CPT | Performed by: HOSPITALIST

## 2021-12-23 RX ORDER — POTASSIUM CHLORIDE 20 MEQ/1
40 TABLET, EXTENDED RELEASE ORAL EVERY 4 HOURS
Status: COMPLETED | OUTPATIENT
Start: 2021-12-23 | End: 2021-12-23

## 2021-12-23 NOTE — DISCHARGE SUMMARY
General Medicine Discharge Summary     Patient ID:  Aristeo Bey  64year old  5/4/1945    Admit date: 12/20/2021    Discharge date and time: 12/23/2021    Attending Physician: Karma Youssef MD     Consults: CONSULT TO  Basilia Martin TO problems including ulcerative colitis, A. fib on Eliquis mitral valve prolapse, depression, Gout, GERD, history of Clostridium difficile, history of leg ulcers, hx of DVT and IVC filter, hereditary spastic paraplegia, chronic diastolic CHF with severe MR a preliminary report was issued by the [address] [place] [address] Radiology teleradiology service. There are no major discrepancies.       Dictated by (CST): Gwendolyn Mcintosh MD on 12/21/2021 at 6:36 AM     Finalized by (CST): Gwendolyn Mcintosh MD on 12/21/2021 at 6:38 AM DELTASONE  TAKE 1 TABLET(5 MG) BY MOUTH EVERY DAY     pregabalin 50 MG Caps  Commonly known as: LYRICA  Take 1 capsule (50 mg total) by mouth 3 (three) times daily.      venlafaxine 75 MG Cp24  Commonly known as: EFFEXOR-XR  Take 1 capsule (75 mg total) by

## 2021-12-23 NOTE — PLAN OF CARE
Patient denies any complaints of pain. Vital signs within range, no fever noted. Seen by attending MD, order to discharge home received. Telemetry and saline lock discontinued.  Discharge instructions given and discussed with  patient which includes follow medications to optimize hemodynamic stability  - Monitor arterial and/or venous puncture sites for bleeding and/or hematoma  - Assess quality of pulses, skin color and temperature  - Assess for signs of decreased coronary artery perfusion - ex.  Angina  - E integrity  - Assess and document dressing/incision, wound bed, drain sites and surrounding tissue  - Implement wound care per orders  - Initiate isolation precautions as appropriate  - Initiate Pressure Ulcer prevention bundle as indicated  Outcome: Swapna Ho

## 2021-12-23 NOTE — CM/SW NOTE
12/23/21 0943   Discharge disposition   Expected discharge disposition Home-Health   Post Acute Care Provider Home   Additional Home Care/Hospice Provider Residential   Discharge transportation [facility]     Per chart review, pt has DC order fo

## 2021-12-23 NOTE — TELEPHONE ENCOUNTER
Dr. China Penaloza-    To clarify:Did you want me to contact the speech therapist in regards to this or the patient?

## 2021-12-23 NOTE — PLAN OF CARE
Patient was tested for respiratory panel and came back positive for Rhinovirus. Antibiotics discontinued. Pt remains in afib on tele with controlled rates, only had one episode of aivr to vtach (5 beats).  Purewick in place, pt given extra dose of IV bumex temperature  - Assess for signs of decreased coronary artery perfusion - ex.  Angina  - Evaluate fluid balance, assess for edema, trend weights  Outcome: Progressing  Goal: Absence of cardiac arrhythmias or at baseline  Description: INTERVENTIONS:  - Contin prevention bundle as indicated  Outcome: Progressing

## 2021-12-24 NOTE — TELEPHONE ENCOUNTER
I tried to call our speech therapy department at [phone]. They are closed for the day. RN please follow up on Monday.

## 2021-12-27 NOTE — TELEPHONE ENCOUNTER
I tried to call Speech therapy at [phone]. I spoke to Sanjiv and she transferred me to [facility] and gave me her number which is [phone]. I left a message for Irean to call back RN direct extension.   Per [employer], she is in office [place]

## 2021-12-29 NOTE — TELEPHONE ENCOUNTER
I left another detailed message for Irena with Speech therapy to call me back with RN direct extension.

## 2021-12-30 NOTE — TELEPHONE ENCOUNTER
I tried to call Irena with speech therapy again. It went straight to voicemail-I already left her two messages to call me back.

## 2021-12-30 NOTE — TELEPHONE ENCOUNTER
states pt has upper abdominal pain and loss of appetite.  Please call [phone]
GI RN staff: Please have the patient repeat a fecal calprotectin. I have placed the order. Please add the patient onto my office schedule next week. She should complete the fecal calprotectin prior to this visit.
Left detailed message on patient's home number to go to the ER if symptoms severe or call back on Monday after 8 am.   Unable to leave message on cell number. Line busy. Please follow up on Monday. Thank you.
Patient and spouse contacted, reviewed below. Will get specimen container tomorrow. Accepted appt with Dr Jewels Bunch [facility] March 20 , instructed to arrive by 3:15pm and given detailed directions to [facility].
Please see triage info below. Pt experiencing nausea, decreased appetite, bloating, distension, diarrhea. Developing new pressure ulcer. Would you like to see her for an appointment? Geovanna has a few opening next week but none this week. Thanks.      LOV wi
[General Appearance - Well Developed] : well developed
[General Appearance - Well Nourished] : well nourished
[Normal Appearance] : normal appearance
98
[Well Groomed] : well groomed
[General Appearance - In No Acute Distress] : no acute distress
[Abdomen Soft] : soft
[Edema] : no peripheral edema
[] : no respiratory distress
[Respiration, Rhythm And Depth] : normal respiratory rhythm and effort
[Exaggerated Use Of Accessory Muscles For Inspiration] : no accessory muscle use
[Oriented To Time, Place, And Person] : oriented to person, place, and time
[FreeTextEntry1] : walks with a cane
[No Focal Deficits] : no focal deficits

## 2022-01-01 ENCOUNTER — TELEPHONE (OUTPATIENT)
Dept: GASTROENTEROLOGY | Facility: CLINIC | Age: 77
End: 2022-01-01

## 2022-01-01 DIAGNOSIS — R19.7 DIARRHEA, UNSPECIFIED TYPE: Primary | ICD-10-CM

## 2022-01-04 NOTE — TELEPHONE ENCOUNTER
Attempted to contact Irena at her direct phone number provided: [phone]. Call went directly to [employer]. I provided direct RN extension to call back.

## 2022-01-05 ENCOUNTER — OFFICE VISIT (OUTPATIENT)
Dept: WOUND CARE | Facility: HOSPITAL | Age: 77
End: 2022-01-05
Attending: CLINICAL NURSE SPECIALIST
Payer: MEDICARE

## 2022-01-05 VITALS
DIASTOLIC BLOOD PRESSURE: 59 MMHG | HEART RATE: 87 BPM | SYSTOLIC BLOOD PRESSURE: 123 MMHG | RESPIRATION RATE: 20 BRPM | TEMPERATURE: 97 F | OXYGEN SATURATION: 97 %

## 2022-01-05 DIAGNOSIS — S81.802D WOUND OF LEFT LOWER EXTREMITY, SUBSEQUENT ENCOUNTER: ICD-10-CM

## 2022-01-05 DIAGNOSIS — S91.002A ANKLE WOUND, LEFT, INITIAL ENCOUNTER: ICD-10-CM

## 2022-01-05 DIAGNOSIS — L89.152 PRESSURE INJURY OF SACRAL REGION, STAGE 2 (HCC): ICD-10-CM

## 2022-01-05 DIAGNOSIS — S31.829D WOUND OF LEFT BUTTOCK, SUBSEQUENT ENCOUNTER: ICD-10-CM

## 2022-01-05 PROCEDURE — 99214 OFFICE O/P EST MOD 30 MIN: CPT | Performed by: CLINICAL NURSE SPECIALIST

## 2022-01-05 NOTE — PROGRESS NOTES
Emile Mendoza is a 64year old female.     Patient presents with:  Wound Care    Patient presents with:  Wound Care: Patient is here for an initial evaluation and mangement of  her wounds on her left posterior lower leg, left ankle, left the articular surface distally.  No additional fractures are seen but the bones are considerably   demineralized.  Total left knee arthroplasty is partially visualized and appears intact. SOFT TISSUES:      There is soft tissue swelling.  Vascular calcifi % -579 01/05/22 1143   Ann-Marie-wound Assessment Clean;Dry;Excoriated; Maceration;Painful 12/06/21 1320   Wound Granulation Tissue Pink 12/06/21 1320   Wound Bed Granulation (%) 90 % 01/05/22 1143   Wound Bed Epithelium (%) 0 % 01/05/22 [address] [facility]   Dressing Status Dressing Changed; Intact;Dry;Clean 01/05/22 1143   Wound Length (cm) 1.5 cm 01/05/22 1143   Wound Width (cm) 0.8 cm 01/05/22 1143   Wound Surface Area (cm^2) 1.2 cm^2 01/05/22 1143   Wound Depth (cm) 0.1 cm 01/05/22 1143   Wound Volum RASH  Sulfa Antibiotics       UNKNOWN  Bactrim                 RASH    Comment:Periorbital dermatitis 24 hours into course    Assessment   Left leg and left ankle wounds with slough/fibrin tissue impairing wound healing.  Left ischium wound with fibrin/biof Open wound of left lower extremity     Wound of right buttock     Pressure injury of left ischium, stage 3 (HCC)     Pressure injury of sacral region, stage 2 (HCC)     Leg swelling     Community acquired pneumonia     Community acquired pneumonia, unspec

## 2022-01-07 NOTE — PROGRESS NOTES
Que Estrella from residential home health care called regarding did not receive wound care orders. Reviewed wound care/dressing changes with home health care nurse. Rerouted wound care orders to residential home health care agency.

## 2022-01-11 ENCOUNTER — TELEPHONE (OUTPATIENT)
Dept: GASTROENTEROLOGY | Facility: CLINIC | Age: 77
End: 2022-01-11

## 2022-01-11 RX ORDER — BUDESONIDE 3 MG/1
9 CAPSULE, COATED PELLETS ORAL EVERY MORNING
Qty: 90 CAPSULE | Refills: 2 | Status: SHIPPED | OUTPATIENT
Start: 2022-01-11

## 2022-01-11 RX ORDER — DIGOXIN 125 MCG
125 TABLET ORAL DAILY
Qty: 90 TABLET | Refills: 1 | Status: SHIPPED | OUTPATIENT
Start: 2022-01-11

## 2022-01-11 NOTE — TELEPHONE ENCOUNTER
Dr. Juana Antoine-    Please advise. Patient is calling with update, as advised to do so on 12-13-21. She now has \"cramping along lower abdomen and yesterday felt sick to stomach yesterday, but that has passed. \"     She has been noticing that there

## 2022-01-11 NOTE — TELEPHONE ENCOUNTER
Pt. States that she is not feeling well, having pressure and having abdominal pain, and pt. States that she has cholecystomy bag and that the stool is coming out rectally and liquid.

## 2022-01-11 NOTE — TELEPHONE ENCOUNTER
I contacted and spoke directly with [facility]. I relayed the message from Dr. Tremaine Arciniega below. She will following instructions accordingly and call the office within the next several weeks with symptom update.

## 2022-01-11 NOTE — TELEPHONE ENCOUNTER
I would have no objection to low-dose Imodium trying to avoid constipation. If the Imodium is successful, I would prefer that we try to decrease the budesonide to 6 mg daily. In the interim I have refilled the prescription.   Please have the patient conta

## 2022-01-11 NOTE — TELEPHONE ENCOUNTER
Tosha John 1 minute ago (12:42 PM)       pt. requesting to get a new Rx for 90 day supply with refills for Budesonide medication.

## 2022-01-11 NOTE — TELEPHONE ENCOUNTER
Dr. Breanna Covarrubias-    We are having difficulty in reaching Felton Anna to further discuss Lexus Lara' swallowing study findings. I did leave another voicemail on her personal extensions voicemail box again this morning.

## 2022-01-13 PROBLEM — R49.9 HOARSENESS OR CHANGING VOICE: Status: ACTIVE | Noted: 2022-01-13

## 2022-01-13 PROBLEM — R49.9 HOARSENESS OR CHANGING VOICE: Status: ACTIVE | Noted: 2022-01-01

## 2022-01-13 NOTE — TELEPHONE ENCOUNTER
Dr. Grey Garcia returned my call. She was out of office for several weeks and has slowly been catching up on her emails and voicemail.      She is aware of the patient's name, , and reasoning for calling, and os available to discuss her s

## 2022-01-14 NOTE — TELEPHONE ENCOUNTER
Discussed with Chele Dill yesterday. There are no signs of cricopharyngeal dysfunction on the videofluoroscopic swallowing study hence endoscopy and dilatation would likely not be of benefit.

## 2022-01-16 PROBLEM — J18.9 COMMUNITY ACQUIRED PNEUMONIA, UNSPECIFIED LATERALITY: Status: RESOLVED | Noted: 2021-12-21 | Resolved: 2022-01-16

## 2022-01-16 PROBLEM — I48.91 ATRIAL FIBRILLATION WITH RAPID VENTRICULAR RESPONSE (HCC): Status: RESOLVED | Noted: 2021-08-14 | Resolved: 2022-01-16

## 2022-01-16 PROBLEM — J18.9 COMMUNITY ACQUIRED PNEUMONIA, UNSPECIFIED LATERALITY: Status: RESOLVED | Noted: 2021-12-21 | Resolved: 2022-01-01

## 2022-01-16 PROBLEM — G82.20 PARAPLEGIA, UNSPECIFIED (HCC): Status: ACTIVE | Noted: 2022-01-16

## 2022-01-16 PROBLEM — I48.91 ATRIAL FIBRILLATION WITH RAPID VENTRICULAR RESPONSE (HCC): Status: RESOLVED | Noted: 2021-08-14 | Resolved: 2022-01-01

## 2022-01-16 PROBLEM — G82.20 PARAPLEGIA, UNSPECIFIED (HCC): Status: RESOLVED | Noted: 2022-01-01 | Resolved: 2022-01-01

## 2022-01-16 PROBLEM — G82.20 PARAPLEGIA, UNSPECIFIED (HCC): Status: ACTIVE | Noted: 2022-01-01

## 2022-01-16 PROBLEM — G82.20 PARAPLEGIA, UNSPECIFIED (HCC): Status: RESOLVED | Noted: 2022-01-16 | Resolved: 2022-01-16

## 2022-01-17 ENCOUNTER — APPOINTMENT (OUTPATIENT)
Dept: SPEECH THERAPY | Facility: HOSPITAL | Age: 77
End: 2022-01-17
Attending: INTERNAL MEDICINE
Payer: MEDICARE

## 2022-01-17 ENCOUNTER — TELEPHONE (OUTPATIENT)
Dept: GASTROENTEROLOGY | Facility: CLINIC | Age: 77
End: 2022-01-17

## 2022-01-18 ENCOUNTER — TELEPHONE (OUTPATIENT)
Dept: GASTROENTEROLOGY | Facility: CLINIC | Age: 77
End: 2022-01-18

## 2022-01-18 ENCOUNTER — OFFICE VISIT (OUTPATIENT)
Dept: SPEECH THERAPY | Facility: HOSPITAL | Age: 77
End: 2022-01-18
Attending: INTERNAL MEDICINE
Payer: MEDICARE

## 2022-01-18 PROCEDURE — 92526 ORAL FUNCTION THERAPY: CPT

## 2022-01-18 NOTE — PROGRESS NOTES
THERAPY SESSION NOTE     Diagnosis: Dysphagia  Authorized # of Visits: 6   # Visits Remainin  Certification Ends:             Fall Risk: standard        Precautions: Covid PPE          Pain Rating: Pt reports no pain 0/10  Subjective: Pt arri following dysphagia exercises to 90% accuracy 20 repetitions 2-3x/day. Laryngeal adduction  Laryngeal elevation  Effortful swallow  Lingual exercises  Mendelsohn exercises    Plan: Continue therapy per goals above. Continue HEP tasks daily.     Skilled Se

## 2022-01-18 NOTE — TELEPHONE ENCOUNTER
Refill request    Current Outpatient Medications   Medication Sig Dispense Refill   • bumetanide 1 MG Oral Tab Take 1 tablet (1 mg total) by mouth daily.  90 tablet 0

## 2022-01-19 ENCOUNTER — LAB ENCOUNTER (OUTPATIENT)
Dept: LAB | Facility: HOSPITAL | Age: 77
End: 2022-01-19
Attending: CLINICAL NURSE SPECIALIST
Payer: MEDICARE

## 2022-01-19 ENCOUNTER — OFFICE VISIT (OUTPATIENT)
Dept: WOUND CARE | Facility: HOSPITAL | Age: 77
End: 2022-01-19
Attending: CLINICAL NURSE SPECIALIST
Payer: MEDICARE

## 2022-01-19 ENCOUNTER — HOSPITAL ENCOUNTER (OUTPATIENT)
Dept: GENERAL RADIOLOGY | Facility: HOSPITAL | Age: 77
Discharge: HOME | End: 2022-01-19
Attending: CLINICAL NURSE SPECIALIST
Payer: MEDICARE

## 2022-01-19 VITALS
HEART RATE: 86 BPM | SYSTOLIC BLOOD PRESSURE: 138 MMHG | RESPIRATION RATE: 17 BRPM | OXYGEN SATURATION: 97 % | TEMPERATURE: 98 F | DIASTOLIC BLOOD PRESSURE: 60 MMHG

## 2022-01-19 DIAGNOSIS — S31.829A WOUND OF LEFT BUTTOCK, INITIAL ENCOUNTER: ICD-10-CM

## 2022-01-19 DIAGNOSIS — L89.323 PRESSURE INJURY OF LEFT ISCHIUM, STAGE 3 (HCC): ICD-10-CM

## 2022-01-19 DIAGNOSIS — S81.802D WOUND OF LEFT LOWER EXTREMITY, SUBSEQUENT ENCOUNTER: Primary | ICD-10-CM

## 2022-01-19 DIAGNOSIS — L89.524 PRESSURE INJURY OF LEFT ANKLE, STAGE 4 (HCC): ICD-10-CM

## 2022-01-19 DIAGNOSIS — S81.802D WOUND OF LEFT LOWER EXTREMITY, SUBSEQUENT ENCOUNTER: ICD-10-CM

## 2022-01-19 DIAGNOSIS — S31.809A WOUND OF BUTTOCK, UNSPECIFIED LATERALITY, INITIAL ENCOUNTER: ICD-10-CM

## 2022-01-19 DIAGNOSIS — S91.109A OPEN WOUND OF TOE, INITIAL ENCOUNTER: ICD-10-CM

## 2022-01-19 LAB
CRP SERPL-MCNC: 1.29 MG/DL (ref ?–0.3)
ERYTHROCYTE [SEDIMENTATION RATE] IN BLOOD: 7 MM/HR

## 2022-01-19 PROCEDURE — 86140 C-REACTIVE PROTEIN: CPT

## 2022-01-19 PROCEDURE — 36415 COLL VENOUS BLD VENIPUNCTURE: CPT

## 2022-01-19 PROCEDURE — 99214 OFFICE O/P EST MOD 30 MIN: CPT | Performed by: CLINICAL NURSE SPECIALIST

## 2022-01-19 PROCEDURE — 73590 X-RAY EXAM OF LOWER LEG: CPT | Performed by: CLINICAL NURSE SPECIALIST

## 2022-01-19 PROCEDURE — 85652 RBC SED RATE AUTOMATED: CPT

## 2022-01-19 PROCEDURE — 73630 X-RAY EXAM OF FOOT: CPT | Performed by: CLINICAL NURSE SPECIALIST

## 2022-01-19 NOTE — TELEPHONE ENCOUNTER
Dr Angela Larose    I spoke to [place] over the phone    She states the stool coming from her stoma bag has a foul odor     She is getting stool from her rectum and stoma bag. The stool from her rectum has increased in volume    The stool from her rectum is watery & the stool in her stoma bag is loose but not as watery    She is emptying her bag a lot more often    She can hardly eat food due to her stomach pains    She was in the hospital 12/21-12/23 and did receive rocephin zithromax & vancomycin     Do you want to order stool cultures?

## 2022-01-19 NOTE — TELEPHONE ENCOUNTER
I spoke to the pt's  Rosa Church. They are at the [place] [address] getting X-rays right now.     They will stop at the lab and  the stool kits for the cultures ordered by Dr Tremaine Arciniega

## 2022-01-19 NOTE — TELEPHONE ENCOUNTER
We should send a repeat C. difficile toxin assay and a fecal calprotectin. I have placed the orders.

## 2022-01-19 NOTE — PROGRESS NOTES
Kwesi Gann is a 64year old female.     Patient presents with:  Wound Care    Patient presents with:  Wound Care: Patient is here for an initial evaluation and mangement of  her wounds on her left posterior lower leg, left ankle, left the articular surface distally.  No additional fractures are seen but the bones are considerably   demineralized.  Total left knee arthroplasty is partially visualized and appears intact. SOFT TISSUES:      There is soft tissue swelling.  Vascular calcifi 01/05/22 1143   Wound Volume (cm^3) 1.14 cm^3 01/05/22 1143   Wound Healing % -579 01/05/22 1143   Ann-Marie-wound Assessment Clean;Dry;Excoriated; Maceration;Painful 12/06/21 1320   Wound Granulation Tissue Pink 12/06/21 1320   Wound Bed Granulation (%) 90 % 01 Treatments Cleansed; Honey Gel 01/05/22 1143   Dressing Tape 01/05/22 1143   Dressing Changed Changed 01/05/22 1143   Dressing Status Dressing Changed; Intact;Dry;Clean 01/05/22 1143   Wound Length (cm) 1.5 cm 01/05/22 1143   Wound Width (cm) 0.8 cm 01/05/ Site Assessment Dry; Intact; Purple 01/19/22 1338   Drainage Amount None 01/19/22 1338   Treatments Saline/Wound ;Cleansed 01/19/22 1338   Wound Length (cm) 1 cm 01/19/22 1338   Wound Width (cm) 0.6 cm 01/19/22 1338   Wound Surface Area (cm^2) 0.6 c ANAPHYLAXIS    Comment:Can't swallow or breathing. STATES TOPICAL IS NOT             AND ISSUE  Penicillins             RASH, DIARRHEA    Comment:As a child.   Localized rash at injection site             CLASS             Patient tolerated cefepime 10/1/18 diastolic heart failure (HCC)     Simple chronic bronchitis (HCC)     Longstanding persistent atrial fibrillation (HCC)     Colostomy in place [facility])     Wound of left leg     TIA (transient ischemic attack)     Fall, initial encounter     Hypokalemia     N to follow up with Dr. Shagufta Sanches vascular. Patient to have xray and labs drawn today. Patient to follow up in outpatient wound clinic in 2 weeks. Patient and spouse verbalized understanding of instructions.                  Orders  Orders Placed This Encou

## 2022-01-20 ENCOUNTER — LAB ENCOUNTER (OUTPATIENT)
Dept: LAB | Facility: HOSPITAL | Age: 77
End: 2022-01-20
Attending: INTERNAL MEDICINE
Payer: MEDICARE

## 2022-01-20 DIAGNOSIS — R19.7 DIARRHEA, UNSPECIFIED TYPE: ICD-10-CM

## 2022-01-20 PROCEDURE — 87493 C DIFF AMPLIFIED PROBE: CPT

## 2022-01-20 PROCEDURE — 83993 ASSAY FOR CALPROTECTIN FECAL: CPT

## 2022-01-20 RX ORDER — BUMETANIDE 1 MG/1
1 TABLET ORAL DAILY
Qty: 90 TABLET | Refills: 0 | Status: SHIPPED | OUTPATIENT
Start: 2022-01-20

## 2022-01-21 LAB — C DIFF TOX B STL QL: NEGATIVE

## 2022-01-21 NOTE — TELEPHONE ENCOUNTER
C Dif and Fecal Calprotectin testing completed yesterday. C Dif toxin assay results have come back, but Fecal Calprotectin still pending. Will await provider review of results, as well as results from fecal calprotectin.

## 2022-01-24 ENCOUNTER — TELEPHONE (OUTPATIENT)
Dept: GASTROENTEROLOGY | Facility: CLINIC | Age: 77
End: 2022-01-24

## 2022-01-24 ENCOUNTER — APPOINTMENT (OUTPATIENT)
Dept: SPEECH THERAPY | Facility: HOSPITAL | Age: 77
End: 2022-01-24
Attending: INTERNAL MEDICINE
Payer: MEDICARE

## 2022-01-24 ENCOUNTER — TELEPHONE (OUTPATIENT)
Dept: SPEECH THERAPY | Facility: HOSPITAL | Age: 77
End: 2022-01-24

## 2022-01-24 LAB — CALPROTECTIN, FECAL: 283 UG/G

## 2022-01-24 NOTE — TELEPHONE ENCOUNTER
----- Message from [place] MD sent at 1/23/2022  2:53 PM CST -----  GI RNs: Please inform the patient that the C. difficile toxin assay was negative. Good news. We are awaiting the fecal calprotectin (stool inflammation test).

## 2022-01-24 NOTE — TELEPHONE ENCOUNTER
Left (detailed) voicemail, ok per JAMA, to [address] her of negative C. Dif results. Also informed her that we are still awaiting the results for the fecal calprotectin stool study, and will inform her as soon as the results are back/reviewed.  I

## 2022-01-25 ENCOUNTER — TELEPHONE (OUTPATIENT)
Dept: WOUND CARE | Facility: HOSPITAL | Age: 77
End: 2022-01-25

## 2022-01-25 ENCOUNTER — TELEPHONE (OUTPATIENT)
Dept: PHYSICAL THERAPY | Facility: HOSPITAL | Age: 77
End: 2022-01-25

## 2022-01-25 ENCOUNTER — APPOINTMENT (OUTPATIENT)
Dept: SPEECH THERAPY | Facility: HOSPITAL | Age: 77
End: 2022-01-25
Attending: INTERNAL MEDICINE
Payer: MEDICARE

## 2022-01-25 NOTE — TELEPHONE ENCOUNTER
Dr. Shahida Morales I contacted and spoke with Ruben Rush, who confirmed that she is still taking Budesonide #3 capsules by mouth every morning. Stool remains watery, however, slight improvement over the last several days.

## 2022-01-25 NOTE — TELEPHONE ENCOUNTER
I spoke to Ayleen. She feels that her diarrhea has improved (she describes increased stool volume both in the ostomy and per rectum). She is taking 9 mg daily of budesonide. She has a remote history of ulcerative colitis treated with tapering courses of prednisone. She took an Imodium over the weekend with increased cramping and loose stools which helped. It is difficult to ascertain whether Katelin's symptoms are due to microscopic colitis versus macroscopic inflammatory bowel disease. I am hesitant to recommend a colonoscopy (especially if symptoms have improved) in light of the patient's age, functional status and comorbidities. I would like to see Ayleen in the office in follow-up in the next few weeks to further discuss. If her symptoms worsen in the interim she will contact us. GI RNs: Please arrange for the patient to be seen in the office in the next 2 weeks.

## 2022-01-26 NOTE — TELEPHONE ENCOUNTER
As illustrated below, please refer to TE dated 01/17/2022. MD discussion with patient and recommendations addressed in alternative encounter.

## 2022-01-27 ENCOUNTER — HOSPITAL ENCOUNTER (EMERGENCY)
Facility: HOSPITAL | Age: 77
Discharge: HOME | End: 2022-01-28
Attending: EMERGENCY MEDICINE
Payer: MEDICARE

## 2022-01-27 ENCOUNTER — TELEPHONE (OUTPATIENT)
Dept: GASTROENTEROLOGY | Facility: CLINIC | Age: 77
End: 2022-01-27

## 2022-01-27 DIAGNOSIS — R11.2 NAUSEA VOMITING AND DIARRHEA: Primary | ICD-10-CM

## 2022-01-27 DIAGNOSIS — R19.7 NAUSEA VOMITING AND DIARRHEA: Primary | ICD-10-CM

## 2022-01-27 LAB
ANION GAP SERPL CALC-SCNC: 8 MMOL/L (ref 0–18)
BASOPHILS # BLD AUTO: 0.04 X10(3) UL (ref 0–0.2)
BASOPHILS NFR BLD AUTO: 0.4 %
BUN BLD-MCNC: 25 MG/DL (ref 7–18)
BUN/CREAT SERPL: 39.7 (ref 10–20)
CALCIUM BLD-MCNC: 9.6 MG/DL (ref 8.5–10.1)
CHLORIDE SERPL-SCNC: 102 MMOL/L (ref 98–112)
CO2 SERPL-SCNC: 26 MMOL/L (ref 21–32)
CREAT BLD-MCNC: 0.63 MG/DL
DEPRECATED RDW RBC AUTO: 48.4 FL (ref 35.1–46.3)
EOSINOPHIL # BLD AUTO: 0.05 X10(3) UL (ref 0–0.7)
EOSINOPHIL NFR BLD AUTO: 0.4 %
ERYTHROCYTE [DISTWIDTH] IN BLOOD BY AUTOMATED COUNT: 16.1 % (ref 11–15)
GLUCOSE BLD-MCNC: 126 MG/DL (ref 70–99)
HCT VFR BLD AUTO: 46.9 %
HGB BLD-MCNC: 14.5 G/DL
IMM GRANULOCYTES # BLD AUTO: 0.07 X10(3) UL (ref 0–1)
IMM GRANULOCYTES NFR BLD: 0.6 %
LYMPHOCYTES # BLD AUTO: 2.3 X10(3) UL (ref 1–4)
LYMPHOCYTES NFR BLD AUTO: 20.3 %
MCH RBC QN AUTO: 25.7 PG (ref 26–34)
MCHC RBC AUTO-ENTMCNC: 30.9 G/DL (ref 31–37)
MCV RBC AUTO: 83 FL
MONOCYTES # BLD AUTO: 0.79 X10(3) UL (ref 0.1–1)
MONOCYTES NFR BLD AUTO: 7 %
NEUTROPHILS # BLD AUTO: 8.07 X10 (3) UL (ref 1.5–7.7)
NEUTROPHILS # BLD AUTO: 8.07 X10(3) UL (ref 1.5–7.7)
NEUTROPHILS NFR BLD AUTO: 71.3 %
OSMOLALITY SERPL CALC.SUM OF ELEC: 288 MOSM/KG (ref 275–295)
PLATELET # BLD AUTO: 212 10(3)UL (ref 150–450)
POTASSIUM SERPL-SCNC: 3.1 MMOL/L (ref 3.5–5.1)
RBC # BLD AUTO: 5.65 X10(6)UL
SODIUM SERPL-SCNC: 136 MMOL/L (ref 136–145)
WBC # BLD AUTO: 11.3 X10(3) UL (ref 4–11)

## 2022-01-27 PROCEDURE — 96374 THER/PROPH/DIAG INJ IV PUSH: CPT

## 2022-01-27 PROCEDURE — 85025 COMPLETE CBC W/AUTO DIFF WBC: CPT

## 2022-01-27 PROCEDURE — 80048 BASIC METABOLIC PNL TOTAL CA: CPT

## 2022-01-27 PROCEDURE — 80048 BASIC METABOLIC PNL TOTAL CA: CPT | Performed by: EMERGENCY MEDICINE

## 2022-01-27 PROCEDURE — 99285 EMERGENCY DEPT VISIT HI MDM: CPT

## 2022-01-27 PROCEDURE — 96361 HYDRATE IV INFUSION ADD-ON: CPT

## 2022-01-27 PROCEDURE — 85025 COMPLETE CBC W/AUTO DIFF WBC: CPT | Performed by: EMERGENCY MEDICINE

## 2022-01-27 RX ORDER — ACETAMINOPHEN 500 MG
1000 TABLET ORAL ONCE
Status: COMPLETED | OUTPATIENT
Start: 2022-01-27 | End: 2022-01-27

## 2022-01-27 RX ORDER — ONDANSETRON 2 MG/ML
4 INJECTION INTRAMUSCULAR; INTRAVENOUS ONCE
Status: COMPLETED | OUTPATIENT
Start: 2022-01-27 | End: 2022-01-27

## 2022-01-27 RX ORDER — ONDANSETRON 8 MG/1
8 TABLET, ORALLY DISINTEGRATING ORAL EVERY 4 HOURS PRN
Qty: 10 TABLET | Refills: 0 | Status: SHIPPED | OUTPATIENT
Start: 2022-01-27 | End: 2022-02-03

## 2022-01-27 NOTE — TELEPHONE ENCOUNTER
I called and spoke with Sergey Guajardo to assist her in scheduling an office visit appointment within the next two weeks. She unfortunately is not feeling well today, and has been experiencing some nausea. She has been Drinking liquids mixed [place]. Her calendar was in the other room, so now was not a good time to book an appointment. She states that her  will be home later, and she will return call to the office in order to schedule an appointment. CSS- I tentatively scheduled the patient for an office visit with Dr. Norma Wolfe on 02-07-22 at 4:00 PM. Please confirm this works for the patient and route back upon her call back. Thank you!

## 2022-01-28 VITALS
WEIGHT: 130 LBS | BODY MASS INDEX: 22.2 KG/M2 | TEMPERATURE: 99 F | OXYGEN SATURATION: 97 % | HEART RATE: 75 BPM | HEIGHT: 64 IN | DIASTOLIC BLOOD PRESSURE: 57 MMHG | SYSTOLIC BLOOD PRESSURE: 110 MMHG | RESPIRATION RATE: 16 BRPM

## 2022-01-28 NOTE — CM/SW NOTE
Rc'd call from [facility] with Updated ETA of 30-40MIN. Jaqui Stevenson RN notified to please notify Mary Pedersen RN of the above.

## 2022-01-28 NOTE — CM/SW NOTE
Called by Ghada Max RN to arrange discharge transportation for patient home as patient is bed bound. TREMAYNE Gallardo RN confirmed pt's home address is correct on the face sheet and is the address patient would like to be discharged home to.     ERCM arrange

## 2022-01-28 NOTE — ED INITIAL ASSESSMENT (HPI)
Patient to ER from home via ems with c/o nausea starting yesterday. Patient states whenever she tries to eat or drink anything she feels like it will come back up. Also reports right sided abdominal pain. Patient has colostomy bag on left side.

## 2022-01-28 NOTE — ED PROVIDER NOTES
Patient Seen in: [facility] Emergency Department    History   Patient presents with:  Nausea/Vomiting/Diarrhea      HPI    The patient presents to the ED from home via EMS for nausea starting yesterday.  She states that she tries to drink and eat b Self-catheterizes urinary bladder 1/11/2016   • Ulcerative colitis ([address]    • Valvular disease     mitral valve prolapse- 3 clips placed       History reviewed.    Past Surgical History:   Procedure Laterality Date   • ARTHROSCOPY OF JOINT UNLISTED Right FOR PAIN MANAGEMENT   • PATIENT DOCUMENTED NOT TO HAVE EXPERIENCED ANY OF THE FOLLOWING EVENTS N/A 1/5/2015    Procedure: ESOPHAGOGASTRODUODENOSCOPY W/ DILATION;  Surgeon: Vinny Gómez MD;  Location: [address] [place] [address]   • PATIENT 1958 [facility] Years since quittin.6      Smokeless tobacco: Never Used      Tobacco comment: pt smoked until 23 yrs old    Vaping Use      Vaping Use: Never used    Substance and Sexual Activity      Alcohol use: No        Alcohol/week: 0.0 standard drinks      Joel upper quadrant   Musculoskeletal:         General: No deformity. Skin:     General: Skin is warm and dry. Neurological:      Mental Status: She is alert and oriented to person, place, and time.    Psychiatric:         Mood and Affect: Mood normal. Resp: 18 18 18 16   Temp:       TempSrc:       SpO2: 96% 95% 96% 96%   Weight:       Height:         *I personally reviewed and interpreted all ED vitals.     Pulse Ox: 96%, Room air, Normal     Monitor Interpretation:   normal sinus rhythm    Differentia

## 2022-01-28 NOTE — TELEPHONE ENCOUNTER
Nader Harkins. .. Ms. Sagar Hernandes called back and had me paged me as the physician on-call. As per Dr. Nahum Morales notes, she states that she has history of ?colitis and colostomy. By Dr. Nahum Morales notes, this colostomy was for fecal incontinence, not ulcerative colitis. She apparently has history of recurring C. difficile diarrhea. She has been hospitalized in the past year. She paged me tonight a physician on call stating she has been feeling very ill past few days. She was somewhat vague. Sounds like she is experiencing nausea. She feels sick, something is wrong with her belly, output from her ostomy has changed. She feels so sick that she states she cannot take anything by mouth including her medications. She had difficulty explaining to me if this is happened before and what previously has been found when she has the symptoms. Looks like she has been seen and evaluated by our group here as an inpatient at least four times in the past year. C. difficile assay came back negative last week 1/20/2022; also negative  9/22/2021, 5/3/2021. Last positive here was 2/16/2021. I told [employer] that at this point with such complex history and significant symptoms including concern for unable to take anything by mouth that she should come in to the emergency department. She is wheelchair dependent and gets transported by ambulance. I warned her that ambulance may take her to [facility] rather than here if they suspect this to be an emergency. I advised her to explain that we are concerned with abdominal process, dehydration, should be safe to transport her to our Emergency Department.

## 2022-01-31 ENCOUNTER — OFFICE VISIT (OUTPATIENT)
Dept: SPEECH THERAPY | Facility: HOSPITAL | Age: 77
End: 2022-01-31
Attending: INTERNAL MEDICINE
Payer: MEDICARE

## 2022-01-31 ENCOUNTER — TELEPHONE (OUTPATIENT)
Dept: SPEECH THERAPY | Facility: HOSPITAL | Age: 77
End: 2022-01-31

## 2022-01-31 ENCOUNTER — TELEPHONE (OUTPATIENT)
Dept: GASTROENTEROLOGY | Facility: CLINIC | Age: 77
End: 2022-01-31

## 2022-01-31 PROCEDURE — 92526 ORAL FUNCTION THERAPY: CPT

## 2022-01-31 PROCEDURE — 92507 TX SP LANG VOICE COMM INDIV: CPT

## 2022-01-31 NOTE — TELEPHONE ENCOUNTER
Noted.    I contacted patient's cell, and spoke with , Orlando Mcneill, who takes her to her appointments. As per JAMA, okay to disclose information. Patient is at speech therapy appointment. Orlando Mcneill is going to  the stool testing kit for [place] at this time at the [facility]. He is aware that he/Katelin should contact the office should Ayleen continue to experience irritation around stoma site.

## 2022-01-31 NOTE — PROGRESS NOTES
THERAPY SESSION NOTE     Diagnosis: Dysphagia  Authorized # of Visits: 6   # Visits Remainin  Certification Ends:             Fall Risk: standard        Precautions: Covid PPE          Pain Rating: Pt reports no pain 0/10  Subjective: Pt arri accuracy 20 repetitions 2-3x/day. · Laryngeal adduction  · Laryngeal elevation  · Effortful swallow  · Lingual exercises  · Mendelsohn exercises    Plan: Continue therapy per goals above. Continue HEP tasks daily.     Skilled Services: dysphagia therapy

## 2022-01-31 NOTE — TELEPHONE ENCOUNTER
I entered the order for the C. difficile test.  If the irritation around the stoma continues the patient should be evaluated by the ostomy nurse.

## 2022-02-01 ENCOUNTER — LAB ENCOUNTER (OUTPATIENT)
Dept: LAB | Facility: HOSPITAL | Age: 77
End: 2022-02-01
Attending: INTERNAL MEDICINE
Payer: MEDICARE

## 2022-02-01 PROCEDURE — 87493 C DIFF AMPLIFIED PROBE: CPT

## 2022-02-03 ENCOUNTER — TELEPHONE (OUTPATIENT)
Dept: GASTROENTEROLOGY | Facility: CLINIC | Age: 77
End: 2022-02-03

## 2022-02-03 NOTE — TELEPHONE ENCOUNTER
I called and spoke to Pepe Tuttle. She is feeling better, although still experiencing some loose stools. She is surprised, but relieved that her C. Dif results have become back as negative. Pepe Tuttle will be going to the wound clinic tomorrow and mention the recent irritation around her stoma site. She will continue to monitor her symptoms, and is scheduled for an office visit with Dr. Antwan Penaloza on Monday, 2-7-22.

## 2022-02-03 NOTE — TELEPHONE ENCOUNTER
----- Message from Nilsa Velasquez MD sent at 2/2/2022 11:13 AM CST -----  Please inform the patient that the C. difficile toxin assay remains negative.

## 2022-02-04 ENCOUNTER — OFFICE VISIT (OUTPATIENT)
Dept: WOUND CARE | Facility: HOSPITAL | Age: 77
End: 2022-02-04
Attending: CLINICAL NURSE SPECIALIST
Payer: MEDICARE

## 2022-02-04 VITALS
RESPIRATION RATE: 18 BRPM | OXYGEN SATURATION: 100 % | SYSTOLIC BLOOD PRESSURE: 92 MMHG | TEMPERATURE: 98 F | DIASTOLIC BLOOD PRESSURE: 47 MMHG | HEART RATE: 83 BPM

## 2022-02-04 DIAGNOSIS — S91.109D UNSPECIFIED OPEN WOUND OF UNSPECIFIED TOE(S) WITHOUT DAMAGE TO NAIL, SUBSEQUENT ENCOUNTER: ICD-10-CM

## 2022-02-04 DIAGNOSIS — S91.002D WOUND OF LEFT ANKLE, SUBSEQUENT ENCOUNTER: ICD-10-CM

## 2022-02-04 DIAGNOSIS — S91.109D OPEN WOUND OF TOE, SUBSEQUENT ENCOUNTER: ICD-10-CM

## 2022-02-04 DIAGNOSIS — S81.802D WOUND OF LEFT LOWER EXTREMITY, SUBSEQUENT ENCOUNTER: ICD-10-CM

## 2022-02-04 DIAGNOSIS — S31.829D WOUND OF LEFT BUTTOCK, SUBSEQUENT ENCOUNTER: ICD-10-CM

## 2022-02-04 PROCEDURE — 97597 DBRDMT OPN WND 1ST 20 CM/<: CPT | Performed by: CLINICAL NURSE SPECIALIST

## 2022-02-07 ENCOUNTER — OFFICE VISIT (OUTPATIENT)
Dept: SPEECH THERAPY | Facility: HOSPITAL | Age: 77
End: 2022-02-07
Attending: INTERNAL MEDICINE
Payer: MEDICARE

## 2022-02-07 ENCOUNTER — OFFICE VISIT (OUTPATIENT)
Dept: GASTROENTEROLOGY | Facility: CLINIC | Age: 77
End: 2022-02-07
Payer: MEDICARE

## 2022-02-07 VITALS — DIASTOLIC BLOOD PRESSURE: 63 MMHG | SYSTOLIC BLOOD PRESSURE: 114 MMHG | HEART RATE: 52 BPM

## 2022-02-07 DIAGNOSIS — K52.832 LYMPHOCYTIC COLITIS: ICD-10-CM

## 2022-02-07 DIAGNOSIS — K94.00 COLOSTOMY COMPLICATION (HCC): ICD-10-CM

## 2022-02-07 DIAGNOSIS — R19.7 DIARRHEA, UNSPECIFIED TYPE: Primary | ICD-10-CM

## 2022-02-07 PROCEDURE — 99213 OFFICE O/P EST LOW 20 MIN: CPT | Performed by: INTERNAL MEDICINE

## 2022-02-07 PROCEDURE — 92526 ORAL FUNCTION THERAPY: CPT

## 2022-02-07 RX ORDER — CHOLESTYRAMINE
POWDER (GRAM) MISCELLANEOUS
Qty: 25 G | Refills: 1 | Status: SHIPPED | OUTPATIENT
Start: 2022-02-07

## 2022-02-07 NOTE — TELEPHONE ENCOUNTER
Spoke to Dr. Roscoe Wallace, who is requesting patient be moved to 4:45 PM apt today, instead of 4:15 PM.    LMTCB. CSS- Please confirm if able to come into the office at 4:45 PM instead of 4:15 PM today with Dr. Roscoe Wallace.     Thank you

## 2022-02-07 NOTE — TELEPHONE ENCOUNTER
NEDRATCB.    I left a detailed voicemail asking that the patient return my call to confirm this appointment change.     CSS-Please route back if patient returns RN call and confirms okay to switch appointment from 4:15 PM to 4:45 PM.

## 2022-02-09 RX ORDER — POTASSIUM CHLORIDE 20 MEQ/1
40 TABLET, EXTENDED RELEASE ORAL 2 TIMES DAILY
Qty: 180 TABLET | Refills: 3 | Status: ON HOLD | OUTPATIENT
Start: 2022-02-09 | End: 2022-02-18

## 2022-02-14 ENCOUNTER — TELEPHONE (OUTPATIENT)
Dept: PHYSICAL THERAPY | Facility: HOSPITAL | Age: 77
End: 2022-02-14

## 2022-02-14 ENCOUNTER — APPOINTMENT (OUTPATIENT)
Dept: GENERAL RADIOLOGY | Facility: HOSPITAL | Age: 77
DRG: 699 | End: 2022-02-14
Attending: EMERGENCY MEDICINE
Payer: MEDICARE

## 2022-02-14 ENCOUNTER — HOSPITAL ENCOUNTER (INPATIENT)
Facility: HOSPITAL | Age: 77
LOS: 4 days | Discharge: HOME HEALTH | DRG: 699 | End: 2022-02-18
Attending: EMERGENCY MEDICINE | Admitting: HOSPITALIST
Payer: MEDICARE

## 2022-02-14 ENCOUNTER — APPOINTMENT (OUTPATIENT)
Dept: SPEECH THERAPY | Facility: HOSPITAL | Age: 77
End: 2022-02-14
Attending: INTERNAL MEDICINE
Payer: MEDICARE

## 2022-02-14 DIAGNOSIS — E87.6 HYPOKALEMIA: ICD-10-CM

## 2022-02-14 DIAGNOSIS — N30.00 ACUTE CYSTITIS WITHOUT HEMATURIA: Primary | ICD-10-CM

## 2022-02-14 DIAGNOSIS — R19.7 DIARRHEA, UNSPECIFIED TYPE: ICD-10-CM

## 2022-02-14 LAB
ADENOVIRUS F 40/41 PCR: NEGATIVE
ALBUMIN SERPL-MCNC: 2.9 G/DL (ref 3.4–5)
ALP LIVER SERPL-CCNC: 67 U/L
ALT SERPL-CCNC: 17 U/L
ANION GAP SERPL CALC-SCNC: 7 MMOL/L (ref 0–18)
AST SERPL-CCNC: 12 U/L (ref 15–37)
ASTROVIRUS PCR: NEGATIVE
BASOPHILS # BLD AUTO: 0.02 X10(3) UL (ref 0–0.2)
BASOPHILS NFR BLD AUTO: 0.2 %
BILIRUB DIRECT SERPL-MCNC: 0.2 MG/DL (ref 0–0.2)
BILIRUB SERPL-MCNC: 0.7 MG/DL (ref 0.1–2)
BILIRUB UR QL: NEGATIVE
BUN BLD-MCNC: 16 MG/DL (ref 7–18)
BUN/CREAT SERPL: 32 (ref 10–20)
C CAYETANENSIS DNA SPEC QL NAA+PROBE: NEGATIVE
C DIFF TOX B STL QL: NEGATIVE
CALCIUM BLD-MCNC: 9.1 MG/DL (ref 8.5–10.1)
CAMPY SP DNA.DIARRHEA STL QL NAA+PROBE: NEGATIVE
CHLORIDE SERPL-SCNC: 102 MMOL/L (ref 98–112)
CO2 SERPL-SCNC: 28 MMOL/L (ref 21–32)
COLOR UR: YELLOW
CORTIS SERPL-MCNC: 2.5 UG/DL
CREAT BLD-MCNC: 0.5 MG/DL
CRYPTOSP DNA SPEC QL NAA+PROBE: NEGATIVE
DEPRECATED RDW RBC AUTO: 49.6 FL (ref 35.1–46.3)
EAEC PAA PLAS AGGR+AATA ST NAA+NON-PRB: NEGATIVE
EC STX1+STX2 + H7 FLIC SPEC NAA+PROBE: NEGATIVE
ENTAMOEBA HISTOLYTICA PCR: NEGATIVE
EPEC EAE GENE STL QL NAA+NON-PROBE: NEGATIVE
ERYTHROCYTE [DISTWIDTH] IN BLOOD BY AUTOMATED COUNT: 16.2 % (ref 11–15)
ETEC LTA+ST1A+ST1B TOX ST NAA+NON-PROBE: NEGATIVE
GIARDIA LAMBLIA PCR: NEGATIVE
GLUCOSE BLD-MCNC: 104 MG/DL (ref 70–99)
GLUCOSE UR-MCNC: NEGATIVE MG/DL
HCT VFR BLD AUTO: 45.7 %
IMM GRANULOCYTES # BLD AUTO: 0.06 X10(3) UL (ref 0–1)
IMM GRANULOCYTES NFR BLD: 0.6 %
LYMPHOCYTES # BLD AUTO: 1.36 X10(3) UL (ref 1–4)
LYMPHOCYTES NFR BLD AUTO: 12.7 %
MAGNESIUM SERPL-MCNC: 1.9 MG/DL (ref 1.6–2.6)
MCH RBC QN AUTO: 27 PG (ref 26–34)
MCHC RBC AUTO-ENTMCNC: 31.7 G/DL (ref 31–37)
MCV RBC AUTO: 84.9 FL
MONOCYTES # BLD AUTO: 0.76 X10(3) UL (ref 0.1–1)
MONOCYTES NFR BLD AUTO: 7.1 %
NEUTROPHILS # BLD AUTO: 8.44 X10 (3) UL (ref 1.5–7.7)
NEUTROPHILS # BLD AUTO: 8.44 X10(3) UL (ref 1.5–7.7)
NEUTROPHILS NFR BLD AUTO: 79.1 %
NITRITE UR QL STRIP.AUTO: NEGATIVE
NOROVIRUS GI/GII PCR: NEGATIVE
OSMOLALITY SERPL CALC.SUM OF ELEC: 285 MOSM/KG (ref 275–295)
P SHIGELLOIDES DNA STL QL NAA+PROBE: NEGATIVE
PH UR: 5 [PH] (ref 5–8)
PLATELET # BLD AUTO: 234 10(3)UL (ref 150–450)
POTASSIUM SERPL-SCNC: 2.9 MMOL/L (ref 3.5–5.1)
POTASSIUM SERPL-SCNC: 4.1 MMOL/L (ref 3.5–5.1)
PROT SERPL-MCNC: 6 G/DL (ref 6.4–8.2)
PROT UR-MCNC: 30 MG/DL
RBC # BLD AUTO: 5.38 X10(6)UL
ROTAVIRUS A PCR: NEGATIVE
SALMONELLA DNA SPEC QL NAA+PROBE: NEGATIVE
SAPOVIRUS PCR: NEGATIVE
SARS-COV-2 RNA RESP QL NAA+PROBE: NOT DETECTED
SHIGELLA SP+EIEC IPAH ST NAA+NON-PROBE: NEGATIVE
SODIUM SERPL-SCNC: 137 MMOL/L (ref 136–145)
SP GR UR STRIP: 1.03 (ref 1–1.03)
UROBILINOGEN UR STRIP-ACNC: <2
V CHOLERAE DNA SPEC QL NAA+PROBE: NEGATIVE
VIBRIO DNA SPEC NAA+PROBE: NEGATIVE
WBC # BLD AUTO: 10.7 X10(3) UL (ref 4–11)
YERSINIA DNA SPEC NAA+PROBE: NEGATIVE

## 2022-02-14 PROCEDURE — 71045 X-RAY EXAM CHEST 1 VIEW: CPT | Performed by: EMERGENCY MEDICINE

## 2022-02-14 PROCEDURE — 99222 1ST HOSP IP/OBS MODERATE 55: CPT | Performed by: INTERNAL MEDICINE

## 2022-02-14 RX ORDER — ACETAMINOPHEN 325 MG/1
650 TABLET ORAL EVERY 6 HOURS PRN
Status: DISCONTINUED | OUTPATIENT
Start: 2022-02-14 | End: 2022-02-18

## 2022-02-14 RX ORDER — ALBUTEROL SULFATE 90 UG/1
2 AEROSOL, METERED RESPIRATORY (INHALATION) EVERY 4 HOURS PRN
Status: DISCONTINUED | OUTPATIENT
Start: 2022-02-14 | End: 2022-02-18

## 2022-02-14 RX ORDER — POTASSIUM CHLORIDE 1.5 G/1.77G
20 POWDER, FOR SOLUTION ORAL ONCE
Status: COMPLETED | OUTPATIENT
Start: 2022-02-14 | End: 2022-02-14

## 2022-02-14 RX ORDER — BUDESONIDE 3 MG/1
9 CAPSULE, COATED PELLETS ORAL EVERY MORNING
Status: DISCONTINUED | OUTPATIENT
Start: 2022-02-15 | End: 2022-02-14

## 2022-02-14 RX ORDER — PANTOPRAZOLE SODIUM 40 MG/1
40 TABLET, DELAYED RELEASE ORAL
Status: DISCONTINUED | OUTPATIENT
Start: 2022-02-15 | End: 2022-02-18

## 2022-02-14 RX ORDER — ONDANSETRON 2 MG/ML
4 INJECTION INTRAMUSCULAR; INTRAVENOUS ONCE
Status: COMPLETED | OUTPATIENT
Start: 2022-02-14 | End: 2022-02-14

## 2022-02-14 RX ORDER — BUDESONIDE 3 MG/1
9 CAPSULE, COATED PELLETS ORAL DAILY
Status: DISCONTINUED | OUTPATIENT
Start: 2022-02-15 | End: 2022-02-18

## 2022-02-14 RX ORDER — ATORVASTATIN CALCIUM 40 MG/1
40 TABLET, FILM COATED ORAL NIGHTLY
Status: DISCONTINUED | OUTPATIENT
Start: 2022-02-14 | End: 2022-02-18

## 2022-02-14 RX ORDER — VANCOMYCIN HYDROCHLORIDE 125 MG/1
125 CAPSULE ORAL DAILY
Status: DISCONTINUED | OUTPATIENT
Start: 2022-02-15 | End: 2022-02-18

## 2022-02-14 RX ORDER — DIGOXIN 125 MCG
125 TABLET ORAL DAILY
Status: DISCONTINUED | OUTPATIENT
Start: 2022-02-15 | End: 2022-02-18

## 2022-02-14 RX ORDER — ONDANSETRON 2 MG/ML
4 INJECTION INTRAMUSCULAR; INTRAVENOUS EVERY 6 HOURS PRN
Status: DISCONTINUED | OUTPATIENT
Start: 2022-02-14 | End: 2022-02-18

## 2022-02-14 RX ORDER — METHYLPREDNISOLONE SODIUM SUCCINATE 40 MG/ML
20 INJECTION, POWDER, LYOPHILIZED, FOR SOLUTION INTRAMUSCULAR; INTRAVENOUS ONCE
Status: COMPLETED | OUTPATIENT
Start: 2022-02-14 | End: 2022-02-14

## 2022-02-14 RX ORDER — METOCLOPRAMIDE HYDROCHLORIDE 5 MG/ML
10 INJECTION INTRAMUSCULAR; INTRAVENOUS EVERY 8 HOURS PRN
Status: DISCONTINUED | OUTPATIENT
Start: 2022-02-14 | End: 2022-02-18

## 2022-02-14 RX ORDER — SODIUM CHLORIDE 9 MG/ML
INJECTION, SOLUTION INTRAVENOUS ONCE
Status: COMPLETED | OUTPATIENT
Start: 2022-02-14 | End: 2022-02-14

## 2022-02-14 RX ORDER — CETIRIZINE HYDROCHLORIDE 10 MG/1
10 TABLET ORAL DAILY
Status: DISCONTINUED | OUTPATIENT
Start: 2022-02-14 | End: 2022-02-18

## 2022-02-14 RX ORDER — SODIUM CHLORIDE 9 MG/ML
INJECTION, SOLUTION INTRAVENOUS CONTINUOUS
Status: DISCONTINUED | OUTPATIENT
Start: 2022-02-14 | End: 2022-02-16

## 2022-02-14 RX ORDER — POTASSIUM CHLORIDE 20 MEQ/1
40 TABLET, EXTENDED RELEASE ORAL ONCE
Status: COMPLETED | OUTPATIENT
Start: 2022-02-14 | End: 2022-02-14

## 2022-02-14 NOTE — ED INITIAL ASSESSMENT (HPI)
Pt states she's had diarrhea, sinus infection, UTI, and no appetite since Friday. Pt adds her colostomy is full of liquid. Pt also adds she was started om cipro for her UTI but it made her itch.  Pt states her DR told her she needs to be admitted to the hospital.

## 2022-02-15 ENCOUNTER — APPOINTMENT (OUTPATIENT)
Dept: PICC SERVICES | Facility: HOSPITAL | Age: 77
DRG: 699 | End: 2022-02-15
Attending: HOSPITALIST
Payer: MEDICARE

## 2022-02-15 LAB
ANION GAP SERPL CALC-SCNC: 4 MMOL/L (ref 0–18)
BUN BLD-MCNC: 12 MG/DL (ref 7–18)
BUN/CREAT SERPL: 40 (ref 10–20)
CALCIUM BLD-MCNC: 8.7 MG/DL (ref 8.5–10.1)
CHLORIDE SERPL-SCNC: 108 MMOL/L (ref 98–112)
CO2 SERPL-SCNC: 26 MMOL/L (ref 21–32)
CORTIS SERPL-MCNC: 6.3 UG/DL
CREAT BLD-MCNC: 0.3 MG/DL
DEPRECATED RDW RBC AUTO: 49.6 FL (ref 35.1–46.3)
ERYTHROCYTE [DISTWIDTH] IN BLOOD BY AUTOMATED COUNT: 16.1 % (ref 11–15)
GLUCOSE BLD-MCNC: 141 MG/DL (ref 70–99)
HCT VFR BLD AUTO: 39 %
HGB BLD-MCNC: 12.1 G/DL
MAGNESIUM SERPL-MCNC: 1.7 MG/DL (ref 1.6–2.6)
MCH RBC QN AUTO: 26.1 PG (ref 26–34)
MCHC RBC AUTO-ENTMCNC: 31 G/DL (ref 31–37)
OSMOLALITY SERPL CALC.SUM OF ELEC: 288 MOSM/KG (ref 275–295)
PLATELET # BLD AUTO: 184 10(3)UL (ref 150–450)
POTASSIUM SERPL-SCNC: 4.5 MMOL/L (ref 3.5–5.1)
RBC # BLD AUTO: 4.63 X10(6)UL
SODIUM SERPL-SCNC: 138 MMOL/L (ref 136–145)
WBC # BLD AUTO: 6.2 X10(3) UL (ref 4–11)

## 2022-02-15 PROCEDURE — 99232 SBSQ HOSP IP/OBS MODERATE 35: CPT | Performed by: INTERNAL MEDICINE

## 2022-02-15 RX ORDER — MAGNESIUM SULFATE HEPTAHYDRATE 40 MG/ML
2 INJECTION, SOLUTION INTRAVENOUS ONCE
Status: COMPLETED | OUTPATIENT
Start: 2022-02-15 | End: 2022-02-15

## 2022-02-15 RX ORDER — HYDROXYZINE HYDROCHLORIDE 10 MG/1
10 TABLET, FILM COATED ORAL 3 TIMES DAILY PRN
Status: DISCONTINUED | OUTPATIENT
Start: 2022-02-15 | End: 2022-02-18

## 2022-02-15 RX ORDER — DIPHENHYDRAMINE HYDROCHLORIDE 12.5 MG/5ML
25 SOLUTION ORAL 4 TIMES DAILY PRN
Status: DISCONTINUED | OUTPATIENT
Start: 2022-02-15 | End: 2022-02-18

## 2022-02-15 RX ORDER — CHOLESTYRAMINE
1 POWDER (GRAM) MISCELLANEOUS DAILY
Status: CANCELLED | OUTPATIENT
Start: 2022-02-15

## 2022-02-15 NOTE — HOME CARE LIAISON
This patient is current with [facility]. Patient will need a ODALIS order for RN/OT, and whatever services have been added in the hospital before DC.

## 2022-02-15 NOTE — PLAN OF CARE
I pharm Norberto inquiring on end date and pull picc. Pt seen in hosp by Dr Krause and Dr Marlow, no f/u was scheduled. Expected end date is 10/30/21.    Sent text page to Dr Krause.   Pt. Alert, oriented, vitals stable. C/o itching, new orders for prn hydroxyzine. New orders for clear liquid diet, and npo at midnight for possible procedure in am.  Assisted with adls, kept clean and dry, ostomy care provided. Orders for straight cath r/t urine retention, patient at home has been straight cathing herself. Problem: Patient Centered Care  Goal: Patient preferences are identified and integrated in the patient's plan of care  Description: Interventions:  - What would you like us to know as we care for you? From home with  and HH.  - Provide timely, complete, and accurate information to patient/family  - Incorporate patient and family knowledge, values, beliefs, and cultural backgrounds into the planning and delivery of care  - Encourage patient/family to participate in care and decision-making at the level they choose  - Honor patient and family perspectives and choices  Outcome: Progressing     Problem: Patient/Family Goals  Goal: Patient/Family Long Term Goal  Description: Patient's Long Term Goal: return home.     Interventions:  - ***  - See additional Care Plan goals for specific interventions  Outcome: Progressing  Goal: Patient/Family Short Term Goal  Description: Patient's Short Term Goal: ***    Interventions:   - ***  - See additional Care Plan goals for specific interventions  Outcome: Progressing     Problem: PAIN - ADULT  Goal: Verbalizes/displays adequate comfort level or patient's stated pain goal  Description: INTERVENTIONS:  - Encourage pt to monitor pain and request assistance  - Assess pain using appropriate pain scale  - Administer analgesics based on type and severity of pain and evaluate response  - Implement non-pharmacological measures as appropriate and evaluate response  - Consider cultural and social influences on pain and pain management  - Manage/alleviate anxiety  - Utilize distraction and/or relaxation techniques  - Monitor for opioid side effects  - Notify MD/LIP if interventions unsuccessful or patient reports new pain  - Anticipate increased pain with activity and pre-medicate as appropriate  Outcome: Progressing     Problem: RISK FOR INFECTION - ADULT  Goal: Absence of fever/infection during anticipated neutropenic period  Description: INTERVENTIONS  - Monitor WBC  - Administer growth factors as ordered  - Implement neutropenic guidelines  Outcome: Progressing     Problem: SAFETY ADULT - FALL  Goal: Free from fall injury  Description: INTERVENTIONS:  - Assess pt frequently for physical needs  - Identify cognitive and physical deficits and behaviors that affect risk of falls.   - [place] fall precautions as indicated by assessment.  - Educate pt/family on patient safety including physical limitations  - Instruct pt to call for assistance with activity based on assessment  - Modify environment to reduce risk of injury  - Provide assistive devices as appropriate  - Consider OT/PT consult to assist with strengthening/mobility  - Encourage toileting schedule  Outcome: Progressing     Problem: DISCHARGE PLANNING  Goal: Discharge to home or other facility with appropriate resources  Description: INTERVENTIONS:  - Identify barriers to discharge w/pt and caregiver  - Include patient/family/discharge partner in discharge planning  - Arrange for needed discharge resources and transportation as appropriate  - Identify discharge learning needs (meds, wound care, etc)  - Arrange for interpreters to assist at discharge as needed  - Consider post-discharge preferences of patient/family/discharge partner  - Complete POLST form as appropriate  - Assess patient's ability to be responsible for managing their own health  - Refer to Case Management Department for coordinating discharge planning if the patient needs post-hospital services based on physician/LIP order or complex needs related to functional status, cognitive ability or social support system  Outcome: Progressing     Problem: GASTROINTESTINAL - ADULT  Goal: Maintains or returns to baseline bowel function  Description: INTERVENTIONS:  - Assess bowel function  - Maintain adequate hydration with IV or PO as ordered and tolerated  - Evaluate effectiveness of GI medications  - Encourage mobilization and activity  - Obtain nutritional consult as needed  - Establish a toileting routine/schedule  - Consider collaborating with pharmacy to review patient's medication profile  Outcome: Progressing

## 2022-02-15 NOTE — PLAN OF CARE
Pt admitted to floor from ED. Navigator completed. All dressings on wounds removed and new dressings placed. Wound put on consult. IV fluids started. All safety measures are in place. Call light is within reach. Problem: Patient Centered Care  Goal: Patient preferences are identified and integrated in the patient's plan of care  Description: Interventions:  - What would you like us to know as we care for you?  From home with   - Provide timely, complete, and accurate information to patient/family  - Incorporate patient and family knowledge, values, beliefs, and cultural backgrounds into the planning and delivery of care  - Encourage patient/family to participate in care and decision-making at the level they choose  - Honor patient and family perspectives and choices  Outcome: Progressing     Problem: Patient/Family Goals  Goal: Patient/Family Long Term Goal  Description: Patient's Long Term Goal:     Interventions:  -   - See additional Care Plan goals for specific interventions  Outcome: Progressing  Goal: Patient/Family Short Term Goal  Description: Patient's Short Term Goal:     Interventions:   -   - See additional Care Plan goals for specific interventions  Outcome: Progressing     Problem: PAIN - ADULT  Goal: Verbalizes/displays adequate comfort level or patient's stated pain goal  Description: INTERVENTIONS:  - Encourage pt to monitor pain and request assistance  - Assess pain using appropriate pain scale  - Administer analgesics based on type and severity of pain and evaluate response  - Implement non-pharmacological measures as appropriate and evaluate response  - Consider cultural and social influences on pain and pain management  - Manage/alleviate anxiety  - Utilize distraction and/or relaxation techniques  - Monitor for opioid side effects  - Notify MD/LIP if interventions unsuccessful or patient reports new pain  - Anticipate increased pain with activity and pre-medicate as appropriate  Outcome: Progressing     Problem: RISK FOR INFECTION - ADULT  Goal: Absence of fever/infection during anticipated neutropenic period  Description: INTERVENTIONS  - Monitor WBC  - Administer growth factors as ordered  - Implement neutropenic guidelines  Outcome: Progressing     Problem: SAFETY ADULT - FALL  Goal: Free from fall injury  Description: INTERVENTIONS:  - Assess pt frequently for physical needs  - Identify cognitive and physical deficits and behaviors that affect risk of falls.   - [place] fall precautions as indicated by assessment.  - Educate pt/family on patient safety including physical limitations  - Instruct pt to call for assistance with activity based on assessment  - Modify environment to reduce risk of injury  - Provide assistive devices as appropriate  - Consider OT/PT consult to assist with strengthening/mobility  - Encourage toileting schedule  Outcome: Progressing     Problem: DISCHARGE PLANNING  Goal: Discharge to home or other facility with appropriate resources  Description: INTERVENTIONS:  - Identify barriers to discharge w/pt and caregiver  - Include patient/family/discharge partner in discharge planning  - Arrange for needed discharge resources and transportation as appropriate  - Identify discharge learning needs (meds, wound care, etc)  - Arrange for interpreters to assist at discharge as needed  - Consider post-discharge preferences of patient/family/discharge partner  - Complete POLST form as appropriate  - Assess patient's ability to be responsible for managing their own health  - Refer to Case Management Department for coordinating discharge planning if the patient needs post-hospital services based on physician/LIP order or complex needs related to functional status, cognitive ability or social support system  Outcome: Progressing     Problem: GASTROINTESTINAL - ADULT  Goal: Maintains or returns to baseline bowel function  Description: INTERVENTIONS:  - Assess bowel function  - Maintain adequate hydration with IV or PO as ordered and tolerated  - Evaluate effectiveness of GI medications  - Encourage mobilization and activity  - Obtain nutritional consult as needed  - Establish a toileting routine/schedule  - Consider collaborating with pharmacy to review patient's medication profile  Outcome: Progressing

## 2022-02-15 NOTE — ED QUICK NOTES
Orders for admission, patient is aware of plan and ready to go upstairs. Any questions, please call ED RN [address] at extension [phone].      Patient Covid vaccination status: Fully vaccinated     COVID Test Ordered in ED: Rapid SARS-CoV-2 by PCR    COVID Suspicion at Admission: N/A    Running Infusions:  potassium chloride    Mental Status/LOC at time of transport: aox4    Other pertinent information:   CIWA score: N/A   NIH score:  N/A

## 2022-02-15 NOTE — CM/SW NOTE
SW initiated self referral for home health. Patient is current w/ [facility], current status confirmed with [facility] liaison. ODALIS order entered. Plan: Home w/ , [facility] pending recommendations and medical clearance    Addendum 2/18/2022:  SW received MDO for discharge and MDO for discharge planning. Patient was discussed during rounds and RN reports patient is requesting additional services at home. Notified [facility] liaison of [place]. SW met with patient to confirm discharge plan. Patient confirmed that she lives at home with her . Patient reports that [facility] and plans to continue services. Patient reports having a wheelchair and power chair. Patient confirmed goal to return home w/  and [facility] at [place]. Patient is requesting information on caregiver services. Patient requesting ambulance transport home. SW provided patient w/ caregiver resource. Plan: Home w/ , [facility], caregiver resources  Ambulance arranged via [facility] for next available (ETA 550PM), PCS complete. RN informed. Patient updated. SW/CM to remain available for support and/or discharge planning.      NADIA Romero, [facility]   [id number]

## 2022-02-16 ENCOUNTER — ANESTHESIA EVENT (OUTPATIENT)
Dept: ENDOSCOPY | Facility: HOSPITAL | Age: 77
DRG: 699 | End: 2022-02-16
Payer: MEDICARE

## 2022-02-16 ENCOUNTER — ANESTHESIA (OUTPATIENT)
Dept: ENDOSCOPY | Facility: HOSPITAL | Age: 77
DRG: 699 | End: 2022-02-16
Payer: MEDICARE

## 2022-02-16 LAB
ANION GAP SERPL CALC-SCNC: 8 MMOL/L (ref 0–18)
BUN BLD-MCNC: 7 MG/DL (ref 7–18)
BUN/CREAT SERPL: 23.3 (ref 10–20)
CALCIUM BLD-MCNC: 7.9 MG/DL (ref 8.5–10.1)
CHLORIDE SERPL-SCNC: 109 MMOL/L (ref 98–112)
CO2 SERPL-SCNC: 24 MMOL/L (ref 21–32)
CORTIS SERPL-MCNC: 3.6 UG/DL
CREAT BLD-MCNC: 0.3 MG/DL
DEPRECATED RDW RBC AUTO: 50.8 FL (ref 35.1–46.3)
GLUCOSE BLD-MCNC: 70 MG/DL (ref 70–99)
HCT VFR BLD AUTO: 37.2 %
HGB BLD-MCNC: 11.6 G/DL
MAGNESIUM SERPL-MCNC: 2.1 MG/DL (ref 1.6–2.6)
MCH RBC QN AUTO: 27 PG (ref 26–34)
MCHC RBC AUTO-ENTMCNC: 31.2 G/DL (ref 31–37)
MCV RBC AUTO: 86.7 FL
OSMOLALITY SERPL CALC.SUM OF ELEC: 288 MOSM/KG (ref 275–295)
PLATELET # BLD AUTO: 191 10(3)UL (ref 150–450)
POTASSIUM SERPL-SCNC: 3.4 MMOL/L (ref 3.5–5.1)
RBC # BLD AUTO: 4.29 X10(6)UL
SODIUM SERPL-SCNC: 141 MMOL/L (ref 136–145)
WBC # BLD AUTO: 6.1 X10(3) UL (ref 4–11)

## 2022-02-16 PROCEDURE — 0DBL8ZZ EXCISION OF TRANSVERSE COLON, VIA NATURAL OR ARTIFICIAL OPENING ENDOSCOPIC: ICD-10-PCS | Performed by: INTERNAL MEDICINE

## 2022-02-16 PROCEDURE — 44404 COLONOSCOPY W/INJECTION: CPT | Performed by: INTERNAL MEDICINE

## 2022-02-16 PROCEDURE — 0DBH8ZZ EXCISION OF CECUM, VIA NATURAL OR ARTIFICIAL OPENING ENDOSCOPIC: ICD-10-PCS | Performed by: INTERNAL MEDICINE

## 2022-02-16 PROCEDURE — 0DBK8ZZ EXCISION OF ASCENDING COLON, VIA NATURAL OR ARTIFICIAL OPENING ENDOSCOPIC: ICD-10-PCS | Performed by: INTERNAL MEDICINE

## 2022-02-16 PROCEDURE — 0DBM8ZZ EXCISION OF DESCENDING COLON, VIA NATURAL OR ARTIFICIAL OPENING ENDOSCOPIC: ICD-10-PCS | Performed by: INTERNAL MEDICINE

## 2022-02-16 PROCEDURE — 44389 COLONOSCOPY WITH BIOPSY: CPT | Performed by: INTERNAL MEDICINE

## 2022-02-16 PROCEDURE — 44394 COLONOSCOPY W/SNARE: CPT | Performed by: INTERNAL MEDICINE

## 2022-02-16 PROCEDURE — 0DBN8ZZ EXCISION OF SIGMOID COLON, VIA NATURAL OR ARTIFICIAL OPENING ENDOSCOPIC: ICD-10-PCS | Performed by: INTERNAL MEDICINE

## 2022-02-16 PROCEDURE — 0DBE8ZX EXCISION OF LARGE INTESTINE, VIA NATURAL OR ARTIFICIAL OPENING ENDOSCOPIC, DIAGNOSTIC: ICD-10-PCS | Performed by: INTERNAL MEDICINE

## 2022-02-16 RX ORDER — LIDOCAINE HYDROCHLORIDE 10 MG/ML
INJECTION, SOLUTION EPIDURAL; INFILTRATION; INTRACAUDAL; PERINEURAL AS NEEDED
Status: DISCONTINUED | OUTPATIENT
Start: 2022-02-16 | End: 2022-02-16 | Stop reason: SURG

## 2022-02-16 RX ORDER — POTASSIUM CHLORIDE 1.5 G/1.77G
40 POWDER, FOR SOLUTION ORAL ONCE
Status: DISCONTINUED | OUTPATIENT
Start: 2022-02-16 | End: 2022-02-18

## 2022-02-16 RX ORDER — POTASSIUM CHLORIDE 20 MEQ/1
40 TABLET, EXTENDED RELEASE ORAL ONCE
Status: COMPLETED | OUTPATIENT
Start: 2022-02-16 | End: 2022-02-16

## 2022-02-16 RX ORDER — NICOTINE POLACRILEX 4 MG
15 LOZENGE BUCCAL
Status: DISCONTINUED | OUTPATIENT
Start: 2022-02-16 | End: 2022-02-18

## 2022-02-16 RX ORDER — NALOXONE HYDROCHLORIDE 0.4 MG/ML
80 INJECTION, SOLUTION INTRAMUSCULAR; INTRAVENOUS; SUBCUTANEOUS AS NEEDED
Status: ACTIVE | OUTPATIENT
Start: 2022-02-16 | End: 2022-02-17

## 2022-02-16 RX ORDER — SODIUM CHLORIDE, SODIUM LACTATE, POTASSIUM CHLORIDE, CALCIUM CHLORIDE 600; 310; 30; 20 MG/100ML; MG/100ML; MG/100ML; MG/100ML
INJECTION, SOLUTION INTRAVENOUS CONTINUOUS PRN
Status: DISCONTINUED | OUTPATIENT
Start: 2022-02-16 | End: 2022-02-16 | Stop reason: SURG

## 2022-02-16 RX ORDER — DEXTROSE MONOHYDRATE 25 G/50ML
50 INJECTION, SOLUTION INTRAVENOUS
Status: DISCONTINUED | OUTPATIENT
Start: 2022-02-16 | End: 2022-02-18

## 2022-02-16 RX ORDER — NICOTINE POLACRILEX 4 MG
30 LOZENGE BUCCAL
Status: DISCONTINUED | OUTPATIENT
Start: 2022-02-16 | End: 2022-02-18

## 2022-02-16 RX ORDER — SODIUM CHLORIDE, SODIUM LACTATE, POTASSIUM CHLORIDE, CALCIUM CHLORIDE 600; 310; 30; 20 MG/100ML; MG/100ML; MG/100ML; MG/100ML
INJECTION, SOLUTION INTRAVENOUS CONTINUOUS
Status: DISCONTINUED | OUTPATIENT
Start: 2022-02-16 | End: 2022-02-18

## 2022-02-16 RX ADMIN — LIDOCAINE HYDROCHLORIDE 25 MG: 10 INJECTION, SOLUTION EPIDURAL; INFILTRATION; INTRACAUDAL; PERINEURAL at 10:37:00

## 2022-02-16 RX ADMIN — SODIUM CHLORIDE, SODIUM LACTATE, POTASSIUM CHLORIDE, CALCIUM CHLORIDE: 600; 310; 30; 20 INJECTION, SOLUTION INTRAVENOUS at 10:30:00

## 2022-02-16 RX ADMIN — SODIUM CHLORIDE, SODIUM LACTATE, POTASSIUM CHLORIDE, CALCIUM CHLORIDE: 600; 310; 30; 20 INJECTION, SOLUTION INTRAVENOUS at 11:24:00

## 2022-02-16 NOTE — PLAN OF CARE
No acute changes overnight. Pt drinking prep for colonoscopy later today. Finished 2L at [phone], will drink 1-2L at 0600. Pt was straight cathed per order. All safety measures are in place. Call light is within reach. Problem: Patient Centered Care  Goal: Patient preferences are identified and integrated in the patient's plan of care  Description: Interventions:  - What would you like us to know as we care for you?  I'm from home with my   - Provide timely, complete, and accurate information to patient/family  - Incorporate patient and family knowledge, values, beliefs, and cultural backgrounds into the planning and delivery of care  - Encourage patient/family to participate in care and decision-making at the level they choose  - Honor patient and family perspectives and choices  Outcome: Progressing     Problem: Patient/Family Goals  Goal: Patient/Family Long Term Goal  Description: Patient's Long Term Goal:     Interventions:  -   - See additional Care Plan goals for specific interventions  Outcome: Progressing  Goal: Patient/Family Short Term Goal  Description: Patient's Short Term Goal:     Interventions:   -   - See additional Care Plan goals for specific interventions  Outcome: Progressing     Problem: PAIN - ADULT  Goal: Verbalizes/displays adequate comfort level or patient's stated pain goal  Description: INTERVENTIONS:  - Encourage pt to monitor pain and request assistance  - Assess pain using appropriate pain scale  - Administer analgesics based on type and severity of pain and evaluate response  - Implement non-pharmacological measures as appropriate and evaluate response  - Consider cultural and social influences on pain and pain management  - Manage/alleviate anxiety  - Utilize distraction and/or relaxation techniques  - Monitor for opioid side effects  - Notify MD/LIP if interventions unsuccessful or patient reports new pain  - Anticipate increased pain with activity and pre-medicate as appropriate  Outcome: Progressing     Problem: RISK FOR INFECTION - ADULT  Goal: Absence of fever/infection during anticipated neutropenic period  Description: INTERVENTIONS  - Monitor WBC  - Administer growth factors as ordered  - Implement neutropenic guidelines  Outcome: Progressing     Problem: SAFETY ADULT - FALL  Goal: Free from fall injury  Description: INTERVENTIONS:  - Assess pt frequently for physical needs  - Identify cognitive and physical deficits and behaviors that affect risk of falls.   - [place] fall precautions as indicated by assessment.  - Educate pt/family on patient safety including physical limitations  - Instruct pt to call for assistance with activity based on assessment  - Modify environment to reduce risk of injury  - Provide assistive devices as appropriate  - Consider OT/PT consult to assist with strengthening/mobility  - Encourage toileting schedule  Outcome: Progressing     Problem: DISCHARGE PLANNING  Goal: Discharge to home or other facility with appropriate resources  Description: INTERVENTIONS:  - Identify barriers to discharge w/pt and caregiver  - Include patient/family/discharge partner in discharge planning  - Arrange for needed discharge resources and transportation as appropriate  - Identify discharge learning needs (meds, wound care, etc)  - Arrange for interpreters to assist at discharge as needed  - Consider post-discharge preferences of patient/family/discharge partner  - Complete POLST form as appropriate  - Assess patient's ability to be responsible for managing their own health  - Refer to Case Management Department for coordinating discharge planning if the patient needs post-hospital services based on physician/LIP order or complex needs related to functional status, cognitive ability or social support system  Outcome: Progressing     Problem: GASTROINTESTINAL - ADULT  Goal: Maintains or returns to baseline bowel function  Description: INTERVENTIONS:  - Assess bowel function  - Maintain adequate hydration with IV or PO as ordered and tolerated  - Evaluate effectiveness of GI medications  - Encourage mobilization and activity  - Obtain nutritional consult as needed  - Establish a toileting routine/schedule  - Consider collaborating with pharmacy to review patient's medication profile  Outcome: Progressing

## 2022-02-16 NOTE — INTERVAL H&P NOTE
Pre-op Diagnosis: diarhea, H/o colitis    The above referenced H&P was reviewed by Brandee Sosa MD on 2/16/2022, the patient was examined and no significant changes have occurred in the patient's condition since the H&P was performed. I discussed with the patient and/or legal representative the potential benefits, risks and side effects of this procedure; the likelihood of the patient achieving goals; and potential problems that might occur during recuperation. I discussed reasonable alternatives to the procedure, including risks, benefits and side effects related to the alternatives and risks related to not receiving this procedure. We will proceed with procedure as planned.

## 2022-02-16 NOTE — SLP NOTE
SPEECH PATHOLOGY NOTE    Pt NPO for a procedure this morning. SLP plan to re-attempt dysphagia therapy as pt able to participate and schedule allows.       Claudette Bellini, MA, CCC-SLP  Speech and Language Pathologist

## 2022-02-16 NOTE — PLAN OF CARE
Wound dressings changed. Frequent rounding and repositioning. Bed in lowest position and safety precautions in place.    Problem: Patient Centered Care  Goal: Patient preferences are identified and integrated in the patient's plan of care  Description: Interventions:  - What would you like us to know as we care for you?   - Provide timely, complete, and accurate information to patient/family  - Incorporate patient and family knowledge, values, beliefs, and cultural backgrounds into the planning and delivery of care  - Encourage patient/family to participate in care and decision-making at the level they choose  - Honor patient and family perspectives and choices  Outcome: Progressing     Problem: Patient/Family Goals  Goal: Patient/Family Long Term Goal  Description: Patient's Long Term Goal:     Interventions:  - See additional Care Plan goals for specific interventions  Outcome: Progressing  Goal: Patient/Family Short Term Goal  Description: Patient's Short Term Goal:     Interventions:   - See additional Care Plan goals for specific interventions  Outcome: Progressing     Problem: PAIN - ADULT  Goal: Verbalizes/displays adequate comfort level or patient's stated pain goal  Description: INTERVENTIONS:  - Encourage pt to monitor pain and request assistance  - Assess pain using appropriate pain scale  - Administer analgesics based on type and severity of pain and evaluate response  - Implement non-pharmacological measures as appropriate and evaluate response  - Consider cultural and social influences on pain and pain management  - Manage/alleviate anxiety  - Utilize distraction and/or relaxation techniques  - Monitor for opioid side effects  - Notify MD/LIP if interventions unsuccessful or patient reports new pain  - Anticipate increased pain with activity and pre-medicate as appropriate  Outcome: Progressing     Problem: RISK FOR INFECTION - ADULT  Goal: Absence of fever/infection during anticipated neutropenic period  Description: INTERVENTIONS  - Monitor WBC  - Administer growth factors as ordered  - Implement neutropenic guidelines  Outcome: Progressing     Problem: SAFETY ADULT - FALL  Goal: Free from fall injury  Description: INTERVENTIONS:  - Assess pt frequently for physical needs  - Identify cognitive and physical deficits and behaviors that affect risk of falls.   - [place] fall precautions as indicated by assessment.  - Educate pt/family on patient safety including physical limitations  - Instruct pt to call for assistance with activity based on assessment  - Modify environment to reduce risk of injury  - Provide assistive devices as appropriate  - Consider OT/PT consult to assist with strengthening/mobility  - Encourage toileting schedule  Outcome: Progressing     Problem: DISCHARGE PLANNING  Goal: Discharge to home or other facility with appropriate resources  Description: INTERVENTIONS:  - Identify barriers to discharge w/pt and caregiver  - Include patient/family/discharge partner in discharge planning  - Arrange for needed discharge resources and transportation as appropriate  - Identify discharge learning needs (meds, wound care, etc)  - Arrange for interpreters to assist at discharge as needed  - Consider post-discharge preferences of patient/family/discharge partner  - Complete POLST form as appropriate  - Assess patient's ability to be responsible for managing their own health  - Refer to Case Management Department for coordinating discharge planning if the patient needs post-hospital services based on physician/LIP order or complex needs related to functional status, cognitive ability or social support system  Outcome: Progressing     Problem: GASTROINTESTINAL - ADULT  Goal: Maintains or returns to baseline bowel function  Description: INTERVENTIONS:  - Assess bowel function  - Maintain adequate hydration with IV or PO as ordered and tolerated  - Evaluate effectiveness of GI medications  - Encourage mobilization and activity  - Obtain nutritional consult as needed  - Establish a toileting routine/schedule  - Consider collaborating with pharmacy to review patient's medication profile  Outcome: Progressing

## 2022-02-16 NOTE — ANESTHESIA POSTPROCEDURE EVALUATION
Patient: Iris Mas    Procedure Summary     Date: 02/16/22 Room / Location: [address] [place] [address] ENDOSCOPY 04 / [facility] ENDOSCOPY    Anesthesia Start: 4326 Anesthesia Stop: 3281    Procedure: COLONOSCOPY (N/A ) Diagnosis: (polyps, diverticulosis)    Surgeons: Dominique James MD Anesthesiologist: Anabelle White CRNA    Anesthesia Type: MAC ASA Status: 3          Anesthesia Type: MAC    PACU Vitals    BP (!) 81/49 (02/16/22 1138)    Temp      Pulse 72 (02/16/22 1138)   Resp 15 (02/16/22 1138)    SpO2 97 % (02/16/22 1138)        [facility] AN Post Evaluation:   Patient Evaluated in PACU  Patient Participation: complete - patient participated  Level of Consciousness: awake and alert  Pain Score: 0  Pain Management: adequate  Airway Patency:patent  Yes    Cardiovascular Status: blood pressure returned to baseline  Respiratory Status: acceptable and room air  Postoperative Hydration acceptable      Jackson Sheth CRNA  2/16/2022 11:41 AM

## 2022-02-16 NOTE — OPERATIVE REPORT
[facility] Endoscopy Report      Date of Procedure:  02/16/22      Preoperative Diagnosis:  1. Diarrhea  2. Personal history of microscopic colitis  3. Possible remote history of ulcerative colitis      Postoperative Diagnosis:  1. Multiple colon polyps  2. Sigmoid colon diverticulosis      Procedure:    Colonoscopy via ostomy with polypectomy and random biopsies      Surgeon:  Raj Duvall M.D. Anesthesia:  Monitored anesthesia care  Cecal withdrawal time: 34 minutes  EBL:  Insignificant      Brief History: This is a 64year old female who carries a diagnosis of ulcerative colitis in the remote past and most recently microscopic colitis. She also has had episodes of C. difficile infection. She has had refractory increased stool output despite treatment for C. difficile and microscopic colitis. In light of the above colonoscopy is being performed to evaluate. The patient has a loop left-sided colostomy. The distal aspect of the loop was recently examined by Dr. Etelvina Guerra hence this portion of the examination was not repeated today. Technique:  After informed consent, the patient was placed in the supine position. An Olympus variable stiffness 190 series HD pediatric colonoscope was inserted into the superior aspect of the loop colostomy and advanced under direct vision by following the lumen to the terminal ileum. The colon was examined upon withdrawal in the supine position. Findings:  The preparation of the colon was very good. The terminal ileum was examined for several cm and visually normal.  The ileocecal valve was well preserved. The visualized colonic mucosa from the cecum to the colostomy was normal with an intact vascular pattern. There were no significant signs of scar deformity nor was there active inflammation present. There were #11 polyps seen within the colon which removed as follows:    1.   In the cecum (#1) and ascending colon (#4) there were (#5) 3-6 mm sessile polyps which were cold snare excised and retrieved. 2.  In the transverse colon there was a 5 mm sessile polyp which was cold snare excised and retrieved. 3. In a redundant loop of probable descending colon there were #4 polyps. #3 were 3 mm in size and one approximately 1 cm in size. The smaller polyps were cold snare excised. The larger polyp was injected with Orise and excised using snare cautery with fragments retrieved. 4.  In the sigmoid colon there was a 5 mm sessile polyp which was cold snare excised and retrieved. There appeared to be a small hyperplastic polyp near the colostomy which was left undisturbed. Inspection of all sites revealed no evidence of ongoing bleeding. There were several diverticula seen in the sigmoid colon without current signs of complication. There were no other colonic polyps, mass lesions, vascular anomalies or signs of inflammation seen. Random biopsies were taken upon withdrawal to exclude a microscopic colitis. The procedure was well tolerated without immediate complication. Impression:  1. Multiple colon polyps (#11) all appearing adenomatous. 2.  Sigmoid colon diverticulosis    Recommendations:  1. Standard post polypectomy instructions given. 2.  May resume anticoagulation either tonight or tomorrow morning depending on cardiovascular risk. 3.  Follow-up biopsy results. 4.  Further recommendations pending biopsy and clinical course.         Scottie Rodriguez MD  2/16/2022

## 2022-02-17 ENCOUNTER — APPOINTMENT (OUTPATIENT)
Dept: ULTRASOUND IMAGING | Facility: HOSPITAL | Age: 77
DRG: 699 | End: 2022-02-17
Attending: HOSPITALIST
Payer: MEDICARE

## 2022-02-17 LAB
ANION GAP SERPL CALC-SCNC: 5 MMOL/L (ref 0–18)
BUN BLD-MCNC: 13 MG/DL (ref 7–18)
BUN/CREAT SERPL: 36.1 (ref 10–20)
CALCIUM BLD-MCNC: 8.5 MG/DL (ref 8.5–10.1)
CHLORIDE SERPL-SCNC: 111 MMOL/L (ref 98–112)
CO2 SERPL-SCNC: 25 MMOL/L (ref 21–32)
CORTIS SERPL-MCNC: 3.8 UG/DL
CREAT BLD-MCNC: 0.36 MG/DL
DEPRECATED RDW RBC AUTO: 50.1 FL (ref 35.1–46.3)
ERYTHROCYTE [DISTWIDTH] IN BLOOD BY AUTOMATED COUNT: 16 % (ref 11–15)
GLUCOSE BLD-MCNC: 91 MG/DL (ref 70–99)
HCT VFR BLD AUTO: 37.5 %
HGB BLD-MCNC: 11.8 G/DL
MCH RBC QN AUTO: 26.8 PG (ref 26–34)
MCHC RBC AUTO-ENTMCNC: 31.5 G/DL (ref 31–37)
MCV RBC AUTO: 85.2 FL
OSMOLALITY SERPL CALC.SUM OF ELEC: 292 MOSM/KG (ref 275–295)
PLATELET # BLD AUTO: 171 10(3)UL (ref 150–450)
POTASSIUM SERPL-SCNC: 3.9 MMOL/L (ref 3.5–5.1)
POTASSIUM SERPL-SCNC: 3.9 MMOL/L (ref 3.5–5.1)
RBC # BLD AUTO: 4.4 X10(6)UL
SODIUM SERPL-SCNC: 141 MMOL/L (ref 136–145)
WBC # BLD AUTO: 5.9 X10(3) UL (ref 4–11)

## 2022-02-17 PROCEDURE — 93971 EXTREMITY STUDY: CPT | Performed by: HOSPITALIST

## 2022-02-17 PROCEDURE — 99232 SBSQ HOSP IP/OBS MODERATE 35: CPT | Performed by: INTERNAL MEDICINE

## 2022-02-17 NOTE — PLAN OF CARE
Tylenol PRN. Wound dressings changed. Frequent rounding and repositioning. Bed in lowest position and safety precautions in place. Bladder scan x2.    Problem: Patient Centered Care  Goal: Patient preferences are identified and integrated in the patient's plan of care  Description: Interventions:  - What would you like us to know as we care for you?   - Provide timely, complete, and accurate information to patient/family  - Incorporate patient and family knowledge, values, beliefs, and cultural backgrounds into the planning and delivery of care  - Encourage patient/family to participate in care and decision-making at the level they choose  - Honor patient and family perspectives and choices  Outcome: Progressing     Problem: Patient/Family Goals  Goal: Patient/Family Long Term Goal  Description: Patient's Long Term Goal: to go home    Interventions:  - wound care  - iv abx  - See additional Care Plan goals for specific interventions  Outcome: Progressing  Goal: Patient/Family Short Term Goal  Description: Patient's Short Term Goal: to feel better     Interventions:   - pain management  -follow md interventions   - See additional Care Plan goals for specific interventions  Outcome: Progressing     Problem: PAIN - ADULT  Goal: Verbalizes/displays adequate comfort level or patient's stated pain goal  Description: INTERVENTIONS:  - Encourage pt to monitor pain and request assistance  - Assess pain using appropriate pain scale  - Administer analgesics based on type and severity of pain and evaluate response  - Implement non-pharmacological measures as appropriate and evaluate response  - Consider cultural and social influences on pain and pain management  - Manage/alleviate anxiety  - Utilize distraction and/or relaxation techniques  - Monitor for opioid side effects  - Notify MD/LIP if interventions unsuccessful or patient reports new pain  - Anticipate increased pain with activity and pre-medicate as appropriate  Outcome: Progressing     Problem: RISK FOR INFECTION - ADULT  Goal: Absence of fever/infection during anticipated neutropenic period  Description: INTERVENTIONS  - Monitor WBC  - Administer growth factors as ordered  - Implement neutropenic guidelines  Outcome: Progressing     Problem: SAFETY ADULT - FALL  Goal: Free from fall injury  Description: INTERVENTIONS:  - Assess pt frequently for physical needs  - Identify cognitive and physical deficits and behaviors that affect risk of falls.   - [place] fall precautions as indicated by assessment.  - Educate pt/family on patient safety including physical limitations  - Instruct pt to call for assistance with activity based on assessment  - Modify environment to reduce risk of injury  - Provide assistive devices as appropriate  - Consider OT/PT consult to assist with strengthening/mobility  - Encourage toileting schedule  Outcome: Progressing     Problem: DISCHARGE PLANNING  Goal: Discharge to home or other facility with appropriate resources  Description: INTERVENTIONS:  - Identify barriers to discharge w/pt and caregiver  - Include patient/family/discharge partner in discharge planning  - Arrange for needed discharge resources and transportation as appropriate  - Identify discharge learning needs (meds, wound care, etc)  - Arrange for interpreters to assist at discharge as needed  - Consider post-discharge preferences of patient/family/discharge partner  - Complete POLST form as appropriate  - Assess patient's ability to be responsible for managing their own health  - Refer to Case Management Department for coordinating discharge planning if the patient needs post-hospital services based on physician/LIP order or complex needs related to functional status, cognitive ability or social support system  Outcome: Progressing     Problem: GASTROINTESTINAL - ADULT  Goal: Maintains or returns to baseline bowel function  Description: INTERVENTIONS:  - Assess bowel function  - Maintain adequate hydration with IV or PO as ordered and tolerated  - Evaluate effectiveness of GI medications  - Encourage mobilization and activity  - Obtain nutritional consult as needed  - Establish a toileting routine/schedule  - Consider collaborating with pharmacy to review patient's medication profile  Outcome: Progressing

## 2022-02-17 NOTE — PLAN OF CARE
Problem: Patient Centered Care  Goal: Patient preferences are identified and integrated in the patient's plan of care  Description: Interventions:  - What would you like us to know as we care for you?  Home with   - Provide timely, complete, and accurate information to patient/family  - Incorporate patient and family knowledge, values, beliefs, and cultural backgrounds into the planning and delivery of care  - Encourage patient/family to participate in care and decision-making at the level they choose  - Honor patient and family perspectives and choices  Outcome: Progressing     Problem: Patient/Family Goals  Goal: Patient/Family Long Term Goal  Description: Patient's Long Term Goal: to go home    Interventions:  - wound care  - iv abx  - See additional Care Plan goals for specific interventions  Outcome: Progressing  Goal: Patient/Family Short Term Goal  Description: Patient's Short Term Goal: to feel better     Interventions:   - pain management  -follow md interventions   - See additional Care Plan goals for specific interventions  Outcome: Progressing     Problem: PAIN - ADULT  Goal: Verbalizes/displays adequate comfort level or patient's stated pain goal  Description: INTERVENTIONS:  - Encourage pt to monitor pain and request assistance  - Assess pain using appropriate pain scale  - Administer analgesics based on type and severity of pain and evaluate response  - Implement non-pharmacological measures as appropriate and evaluate response  - Consider cultural and social influences on pain and pain management  - Manage/alleviate anxiety  - Utilize distraction and/or relaxation techniques  - Monitor for opioid side effects  - Notify MD/LIP if interventions unsuccessful or patient reports new pain  - Anticipate increased pain with activity and pre-medicate as appropriate  Outcome: Progressing     Problem: RISK FOR INFECTION - ADULT  Goal: Absence of fever/infection during anticipated neutropenic period  Description: INTERVENTIONS  - Monitor WBC  - Administer growth factors as ordered  - Implement neutropenic guidelines  Outcome: Progressing     Problem: SAFETY ADULT - FALL  Goal: Free from fall injury  Description: INTERVENTIONS:  - Assess pt frequently for physical needs  - Identify cognitive and physical deficits and behaviors that affect risk of falls. - [place] fall precautions as indicated by assessment.  - Educate pt/family on patient safety including physical limitations  - Instruct pt to call for assistance with activity based on assessment  - Modify environment to reduce risk of injury  - Provide assistive devices as appropriate  - Consider OT/PT consult to assist with strengthening/mobility  - Encourage toileting schedule  Outcome: Progressing     Problem: DISCHARGE PLANNING  Goal: Discharge to home or other facility with appropriate resources  Description: INTERVENTIONS:  - Identify barriers to discharge w/pt and caregiver  - Include patient/family/discharge partner in discharge planning  - Arrange for needed discharge resources and transportation as appropriate  - Identify discharge learning needs (meds, wound care, etc)  - Arrange for interpreters to assist at discharge as needed  - Consider post-discharge preferences of patient/family/discharge partner  - Complete POLST form as appropriate  - Assess patient's ability to be responsible for managing their own health  - Refer to Case Management Department for coordinating discharge planning if the patient needs post-hospital services based on physician/LIP order or complex needs related to functional status, cognitive ability or social support system  Outcome: Progressing     Vitals stable, straight cath, wound care, patient calling appropriately, will continue to monitor.

## 2022-02-18 ENCOUNTER — MED REC SCAN ONLY (OUTPATIENT)
Dept: GASTROENTEROLOGY | Facility: CLINIC | Age: 77
End: 2022-02-18

## 2022-02-18 VITALS
TEMPERATURE: 98 F | RESPIRATION RATE: 18 BRPM | BODY MASS INDEX: 23.9 KG/M2 | WEIGHT: 140 LBS | HEART RATE: 94 BPM | OXYGEN SATURATION: 95 % | DIASTOLIC BLOOD PRESSURE: 58 MMHG | SYSTOLIC BLOOD PRESSURE: 134 MMHG | HEIGHT: 64 IN

## 2022-02-18 LAB
ANION GAP SERPL CALC-SCNC: 5 MMOL/L (ref 0–18)
BUN BLD-MCNC: 15 MG/DL (ref 7–18)
BUN/CREAT SERPL: 44.1 (ref 10–20)
CALCIUM BLD-MCNC: 8.8 MG/DL (ref 8.5–10.1)
CHLORIDE SERPL-SCNC: 107 MMOL/L (ref 98–112)
CO2 SERPL-SCNC: 29 MMOL/L (ref 21–32)
CREAT BLD-MCNC: 0.34 MG/DL
DEPRECATED RDW RBC AUTO: 52.6 FL (ref 35.1–46.3)
ERYTHROCYTE [DISTWIDTH] IN BLOOD BY AUTOMATED COUNT: 16.5 % (ref 11–15)
GLUCOSE BLD-MCNC: 104 MG/DL (ref 70–99)
HCT VFR BLD AUTO: 40.6 %
HGB BLD-MCNC: 12.4 G/DL
MCH RBC QN AUTO: 26.8 PG (ref 26–34)
MCHC RBC AUTO-ENTMCNC: 30.5 G/DL (ref 31–37)
MCV RBC AUTO: 87.7 FL
OSMOLALITY SERPL CALC.SUM OF ELEC: 293 MOSM/KG (ref 275–295)
PLATELET # BLD AUTO: 176 10(3)UL (ref 150–450)
POTASSIUM SERPL-SCNC: 4 MMOL/L (ref 3.5–5.1)
RBC # BLD AUTO: 4.63 X10(6)UL
SODIUM SERPL-SCNC: 141 MMOL/L (ref 136–145)
WBC # BLD AUTO: 6.6 X10(3) UL (ref 4–11)

## 2022-02-18 RX ORDER — POTASSIUM CHLORIDE 20 MEQ/1
40 TABLET, EXTENDED RELEASE ORAL DAILY
Qty: 180 TABLET | Refills: 3 | Status: SHIPPED | COMMUNITY
Start: 2022-02-18

## 2022-02-18 RX ORDER — VANCOMYCIN HYDROCHLORIDE 125 MG/1
125 CAPSULE ORAL DAILY
Qty: 5 CAPSULE | Refills: 0 | Status: SHIPPED | OUTPATIENT
Start: 2022-02-18 | End: 2022-02-23

## 2022-02-18 RX ORDER — CEFDINIR 300 MG/1
300 CAPSULE ORAL 2 TIMES DAILY
Qty: 20 CAPSULE | Refills: 0 | Status: SHIPPED | OUTPATIENT
Start: 2022-02-18 | End: 2022-02-18

## 2022-02-18 RX ORDER — CEFDINIR 300 MG/1
300 CAPSULE ORAL 2 TIMES DAILY
Qty: 10 CAPSULE | Refills: 0 | Status: SHIPPED | OUTPATIENT
Start: 2022-02-19 | End: 2022-03-02

## 2022-02-18 RX ORDER — VANCOMYCIN HYDROCHLORIDE 125 MG/1
125 CAPSULE ORAL DAILY
Qty: 10 CAPSULE | Refills: 0 | Status: SHIPPED | OUTPATIENT
Start: 2022-02-18 | End: 2022-02-18

## 2022-02-18 NOTE — SPIRITUAL CARE NOTE
Pt sitting up in bed in a well lit room, watching TV. Pt being treated for cystitis.  introduced herself and the SCT,l while offering radical hospitality, which the pt requested prayer at bedside which the  provided. Pt also requested Faith anoiting, which the  ordered. Pt expressed some anxiety and fear about going home because she doesn't have confidence her  can help her. She requested to speak with a SW.  reported this to her RN.

## 2022-02-18 NOTE — PLAN OF CARE
Received pt in stable condition, PRN Benadryl given per pt request for itching of the skin. Pt O2 at 95% on RA. Eliquis currently still on hold. Repositioning every 2 hours. Colostomy care completed, intact & patent. Frequent rounding on pt. Will continue to monitor for any new clinical changes/updates. Problem: Patient Centered Care  Goal: Patient preferences are identified and integrated in the patient's plan of care  Description: Interventions:  - What would you like us to know as we care for you? I come from home with my , with home health services. My  helps take care of me.   - Provide timely, complete, and accurate information to patient/family  - Incorporate patient and family knowledge, values, beliefs, and cultural backgrounds into the planning and delivery of care  - Encourage patient/family to participate in care and decision-making at the level they choose  - Honor patient and family perspectives and choices  Outcome: Progressing     Problem: Patient/Family Goals  Goal: Patient/Family Long Term Goal  Description: Patient's Long Term Goal: to go home    Interventions:  - wound care  - iv abx  - See additional Care Plan goals for specific interventions  Outcome: Progressing  Goal: Patient/Family Short Term Goal  Description: Patient's Short Term Goal: to feel better     Interventions:   - pain management  -follow md interventions   - See additional Care Plan goals for specific interventions  Outcome: Progressing     Problem: PAIN - ADULT  Goal: Verbalizes/displays adequate comfort level or patient's stated pain goal  Description: INTERVENTIONS:  - Encourage pt to monitor pain and request assistance  - Assess pain using appropriate pain scale  - Administer analgesics based on type and severity of pain and evaluate response  - Implement non-pharmacological measures as appropriate and evaluate response  - Consider cultural and social influences on pain and pain management  - Manage/alleviate anxiety  - Utilize distraction and/or relaxation techniques  - Monitor for opioid side effects  - Notify MD/LIP if interventions unsuccessful or patient reports new pain  - Anticipate increased pain with activity and pre-medicate as appropriate  Outcome: Progressing     Problem: RISK FOR INFECTION - ADULT  Goal: Absence of fever/infection during anticipated neutropenic period  Description: INTERVENTIONS  - Monitor WBC  - Administer growth factors as ordered  - Implement neutropenic guidelines  Outcome: Progressing     Problem: SAFETY ADULT - FALL  Goal: Free from fall injury  Description: INTERVENTIONS:  - Assess pt frequently for physical needs  - Identify cognitive and physical deficits and behaviors that affect risk of falls.   - [place] fall precautions as indicated by assessment.  - Educate pt/family on patient safety including physical limitations  - Instruct pt to call for assistance with activity based on assessment  - Modify environment to reduce risk of injury  - Provide assistive devices as appropriate  - Consider OT/PT consult to assist with strengthening/mobility  - Encourage toileting schedule  Outcome: Progressing     Problem: DISCHARGE PLANNING  Goal: Discharge to home or other facility with appropriate resources  Description: INTERVENTIONS:  - Identify barriers to discharge w/pt and caregiver  - Include patient/family/discharge partner in discharge planning  - Arrange for needed discharge resources and transportation as appropriate  - Identify discharge learning needs (meds, wound care, etc)  - Arrange for interpreters to assist at discharge as needed  - Consider post-discharge preferences of patient/family/discharge partner  - Complete POLST form as appropriate  - Assess patient's ability to be responsible for managing their own health  - Refer to Case Management Department for coordinating discharge planning if the patient needs post-hospital services based on physician/LIP order or complex needs related to functional status, cognitive ability or social support system  Outcome: Progressing     Problem: GASTROINTESTINAL - ADULT  Goal: Maintains or returns to baseline bowel function  Description: INTERVENTIONS:  - Assess bowel function  - Maintain adequate hydration with IV or PO as ordered and tolerated  - Evaluate effectiveness of GI medications  - Encourage mobilization and activity  - Obtain nutritional consult as needed  - Establish a toileting routine/schedule  - Consider collaborating with pharmacy to review patient's medication profile  Outcome: Progressing

## 2022-02-19 LAB — ADRENOCORTICOTROPIC HORMONE: 8.1 PG/ML

## 2022-02-19 NOTE — PLAN OF CARE
Patient is alert and oriented x4. On room air, denies SOB, vitals stable. Educated patient on plan of care, general admission education. Call light within reach. Bed in lowest position. All needs addressed. Hourly rounding maintained. Patient given AVS and discharge paperwork including script. IV removed and pt straight cathed per request, 625 ml clear yellow urine removed. Problem: Patient Centered Care  Goal: Patient preferences are identified and integrated in the patient's plan of care  Description: Interventions:  - What would you like us to know as we care for you? Home with  with HHC.   - Provide timely, complete, and accurate information to patient/family  - Incorporate patient and family knowledge, values, beliefs, and cultural backgrounds into the planning and delivery of care  - Encourage patient/family to participate in care and decision-making at the level they choose  - Honor patient and family perspectives and choices  2/18/2022 1810 by Huma Grady RN  Outcome: Completed  2/18/2022 1134 by Huma Grady RN  Outcome: Progressing     Problem: Patient/Family Goals  Goal: Patient/Family Long Term Goal  Description: Patient's Long Term Goal: to go home    Interventions:  - wound care  - iv abx  - See additional Care Plan goals for specific interventions  Outcome: Completed  Goal: Patient/Family Short Term Goal  Description: Patient's Short Term Goal: to feel better     Interventions:   - pain management  -follow md interventions   - See additional Care Plan goals for specific interventions  Outcome: Completed     Problem: PAIN - ADULT  Goal: Verbalizes/displays adequate comfort level or patient's stated pain goal  Description: INTERVENTIONS:  - Encourage pt to monitor pain and request assistance  - Assess pain using appropriate pain scale  - Administer analgesics based on type and severity of pain and evaluate response  - Implement non-pharmacological measures as appropriate and evaluate response  - Consider cultural and social influences on pain and pain management  - Manage/alleviate anxiety  - Utilize distraction and/or relaxation techniques  - Monitor for opioid side effects  - Notify MD/LIP if interventions unsuccessful or patient reports new pain  - Anticipate increased pain with activity and pre-medicate as appropriate  Outcome: Completed     Problem: RISK FOR INFECTION - ADULT  Goal: Absence of fever/infection during anticipated neutropenic period  Description: INTERVENTIONS  - Monitor WBC  - Administer growth factors as ordered  - Implement neutropenic guidelines  Outcome: Completed     Problem: SAFETY ADULT - FALL  Goal: Free from fall injury  Description: INTERVENTIONS:  - Assess pt frequently for physical needs  - Identify cognitive and physical deficits and behaviors that affect risk of falls.   - [place] fall precautions as indicated by assessment.  - Educate pt/family on patient safety including physical limitations  - Instruct pt to call for assistance with activity based on assessment  - Modify environment to reduce risk of injury  - Provide assistive devices as appropriate  - Consider OT/PT consult to assist with strengthening/mobility  - Encourage toileting schedule  2/18/2022 1810 by Kevin Burdick RN  Outcome: Completed  2/18/2022 1134 by Kevin Burdick RN  Outcome: Progressing     Problem: DISCHARGE PLANNING  Goal: Discharge to home or other facility with appropriate resources  Description: INTERVENTIONS:  - Identify barriers to discharge w/pt and caregiver  - Include patient/family/discharge partner in discharge planning  - Arrange for needed discharge resources and transportation as appropriate  - Identify discharge learning needs (meds, wound care, etc)  - Arrange for interpreters to assist at discharge as needed  - Consider post-discharge preferences of patient/family/discharge partner  - Complete POLST form as appropriate  - Assess patient's ability to be responsible for managing their own health  - Refer to Case Management Department for coordinating discharge planning if the patient needs post-hospital services based on physician/LIP order or complex needs related to functional status, cognitive ability or social support system  Outcome: Completed     Problem: GASTROINTESTINAL - ADULT  Goal: Maintains or returns to baseline bowel function  Description: INTERVENTIONS:  - Assess bowel function  - Maintain adequate hydration with IV or PO as ordered and tolerated  - Evaluate effectiveness of GI medications  - Encourage mobilization and activity  - Obtain nutritional consult as needed  - Establish a toileting routine/schedule  - Consider collaborating with pharmacy to review patient's medication profile  Outcome: Completed

## 2022-02-21 ENCOUNTER — APPOINTMENT (OUTPATIENT)
Dept: SPEECH THERAPY | Facility: HOSPITAL | Age: 77
End: 2022-02-21
Attending: INTERNAL MEDICINE
Payer: MEDICARE

## 2022-02-21 ENCOUNTER — TELEPHONE (OUTPATIENT)
Dept: PHYSICAL THERAPY | Facility: HOSPITAL | Age: 77
End: 2022-02-21

## 2022-02-23 ENCOUNTER — LAB REQUISITION (OUTPATIENT)
Dept: LAB | Facility: HOSPITAL | Age: 77
End: 2022-02-23
Payer: MEDICARE

## 2022-02-23 ENCOUNTER — TELEPHONE (OUTPATIENT)
Dept: PHYSICAL THERAPY | Facility: HOSPITAL | Age: 77
End: 2022-02-23

## 2022-02-23 LAB
ANION GAP SERPL CALC-SCNC: 5 MMOL/L (ref 0–18)
BUN BLD-MCNC: 15 MG/DL (ref 7–18)
CALCIUM BLD-MCNC: 8.5 MG/DL (ref 8.5–10.1)
CHLORIDE SERPL-SCNC: 105 MMOL/L (ref 98–112)
CO2 SERPL-SCNC: 28 MMOL/L (ref 21–32)
CREAT BLD-MCNC: 0.26 MG/DL
GLUCOSE BLD-MCNC: 66 MG/DL (ref 70–99)
OSMOLALITY SERPL CALC.SUM OF ELEC: 285 MOSM/KG (ref 275–295)
POTASSIUM SERPL-SCNC: 4.7 MMOL/L (ref 3.5–5.1)
SODIUM SERPL-SCNC: 138 MMOL/L (ref 136–145)

## 2022-02-23 PROCEDURE — 80048 BASIC METABOLIC PNL TOTAL CA: CPT | Performed by: INTERNAL MEDICINE

## 2022-02-24 ENCOUNTER — HOSPITAL ENCOUNTER (INPATIENT)
Facility: HOSPITAL | Age: 77
LOS: 6 days | Discharge: NURSING, REHAB OR LONG-TERM CARE | DRG: 592 | End: 2022-03-02
Attending: EMERGENCY MEDICINE | Admitting: STUDENT IN AN ORGANIZED HEALTH CARE EDUCATION/TRAINING PROGRAM
Payer: MEDICARE

## 2022-02-24 ENCOUNTER — OFFICE VISIT (OUTPATIENT)
Dept: WOUND CARE | Facility: HOSPITAL | Age: 77
End: 2022-02-24
Attending: CLINICAL NURSE SPECIALIST
Payer: MEDICARE

## 2022-02-24 DIAGNOSIS — L89.524 PRESSURE INJURY OF LEFT ANKLE, STAGE 4 (HCC): ICD-10-CM

## 2022-02-24 DIAGNOSIS — L08.9 INFECTED SKIN ULCER WITH NECROSIS OF MUSCLE (HCC): Primary | ICD-10-CM

## 2022-02-24 DIAGNOSIS — S31.829D WOUND OF LEFT BUTTOCK, SUBSEQUENT ENCOUNTER: ICD-10-CM

## 2022-02-24 DIAGNOSIS — S31.819D WOUND OF RIGHT BUTTOCK, SUBSEQUENT ENCOUNTER: ICD-10-CM

## 2022-02-24 DIAGNOSIS — S81.802D WOUND OF LEFT LOWER EXTREMITY, SUBSEQUENT ENCOUNTER: ICD-10-CM

## 2022-02-24 DIAGNOSIS — L89.323 PRESSURE INJURY OF LEFT ISCHIUM, STAGE 3 (HCC): ICD-10-CM

## 2022-02-24 DIAGNOSIS — M79.89 LEG SWELLING: ICD-10-CM

## 2022-02-24 DIAGNOSIS — S91.002A ANKLE WOUND, LEFT, INITIAL ENCOUNTER: ICD-10-CM

## 2022-02-24 DIAGNOSIS — S91.109D OPEN WOUND OF TOE, SUBSEQUENT ENCOUNTER: Primary | ICD-10-CM

## 2022-02-24 DIAGNOSIS — S91.002D WOUND OF LEFT ANKLE, SUBSEQUENT ENCOUNTER: ICD-10-CM

## 2022-02-24 DIAGNOSIS — L98.493 INFECTED SKIN ULCER WITH NECROSIS OF MUSCLE (HCC): Primary | ICD-10-CM

## 2022-02-24 LAB
ANION GAP SERPL CALC-SCNC: 4 MMOL/L (ref 0–18)
BASOPHILS # BLD AUTO: 0.02 X10(3) UL (ref 0–0.2)
BASOPHILS NFR BLD AUTO: 0.2 %
BUN BLD-MCNC: 17 MG/DL (ref 7–18)
BUN/CREAT SERPL: 22.7 (ref 10–20)
CALCIUM BLD-MCNC: 8.9 MG/DL (ref 8.5–10.1)
CHLORIDE SERPL-SCNC: 105 MMOL/L (ref 98–112)
CO2 SERPL-SCNC: 30 MMOL/L (ref 21–32)
CREAT BLD-MCNC: 0.75 MG/DL
DEPRECATED RDW RBC AUTO: 53.4 FL (ref 35.1–46.3)
EOSINOPHIL # BLD AUTO: 0 X10(3) UL (ref 0–0.7)
EOSINOPHIL NFR BLD AUTO: 0 %
ERYTHROCYTE [DISTWIDTH] IN BLOOD BY AUTOMATED COUNT: 17 % (ref 11–15)
GLUCOSE BLD-MCNC: 136 MG/DL (ref 70–99)
HCT VFR BLD AUTO: 47.1 %
HGB BLD-MCNC: 14 G/DL
IMM GRANULOCYTES # BLD AUTO: 0.07 X10(3) UL (ref 0–1)
IMM GRANULOCYTES NFR BLD: 0.6 %
LYMPHOCYTES # BLD AUTO: 1.18 X10(3) UL (ref 1–4)
LYMPHOCYTES NFR BLD AUTO: 10.3 %
MCH RBC QN AUTO: 26.2 PG (ref 26–34)
MCHC RBC AUTO-ENTMCNC: 29.7 G/DL (ref 31–37)
MCV RBC AUTO: 88.2 FL
MONOCYTES # BLD AUTO: 0.63 X10(3) UL (ref 0.1–1)
MONOCYTES NFR BLD AUTO: 5.5 %
NEUTROPHILS # BLD AUTO: 9.54 X10 (3) UL (ref 1.5–7.7)
NEUTROPHILS # BLD AUTO: 9.54 X10(3) UL (ref 1.5–7.7)
NEUTROPHILS NFR BLD AUTO: 83.4 %
OSMOLALITY SERPL CALC.SUM OF ELEC: 292 MOSM/KG (ref 275–295)
PLATELET # BLD AUTO: 255 10(3)UL (ref 150–450)
POTASSIUM SERPL-SCNC: 4.2 MMOL/L (ref 3.5–5.1)
RBC # BLD AUTO: 5.34 X10(6)UL
SARS-COV-2 RNA RESP QL NAA+PROBE: NOT DETECTED
SODIUM SERPL-SCNC: 139 MMOL/L (ref 136–145)
WBC # BLD AUTO: 11.4 X10(3) UL (ref 4–11)

## 2022-02-24 PROCEDURE — 80048 BASIC METABOLIC PNL TOTAL CA: CPT

## 2022-02-24 PROCEDURE — 99214 OFFICE O/P EST MOD 30 MIN: CPT | Performed by: CLINICAL NURSE SPECIALIST

## 2022-02-24 PROCEDURE — 85025 COMPLETE CBC W/AUTO DIFF WBC: CPT

## 2022-02-24 PROCEDURE — 87205 SMEAR GRAM STAIN: CPT | Performed by: EMERGENCY MEDICINE

## 2022-02-24 PROCEDURE — 87077 CULTURE AEROBIC IDENTIFY: CPT | Performed by: EMERGENCY MEDICINE

## 2022-02-24 PROCEDURE — 87186 SC STD MICRODIL/AGAR DIL: CPT | Performed by: EMERGENCY MEDICINE

## 2022-02-24 PROCEDURE — 96375 TX/PRO/DX INJ NEW DRUG ADDON: CPT

## 2022-02-24 PROCEDURE — 87070 CULTURE OTHR SPECIMN AEROBIC: CPT | Performed by: EMERGENCY MEDICINE

## 2022-02-24 PROCEDURE — 99285 EMERGENCY DEPT VISIT HI MDM: CPT

## 2022-02-24 PROCEDURE — 80048 BASIC METABOLIC PNL TOTAL CA: CPT | Performed by: EMERGENCY MEDICINE

## 2022-02-24 PROCEDURE — 85025 COMPLETE CBC W/AUTO DIFF WBC: CPT | Performed by: EMERGENCY MEDICINE

## 2022-02-24 PROCEDURE — 96365 THER/PROPH/DIAG IV INF INIT: CPT

## 2022-02-24 RX ORDER — DIGOXIN 125 MCG
125 TABLET ORAL DAILY
Status: DISCONTINUED | OUTPATIENT
Start: 2022-02-25 | End: 2022-03-02

## 2022-02-24 RX ORDER — FLUTICASONE PROPIONATE 50 MCG
1 SPRAY, SUSPENSION (ML) NASAL DAILY
Status: DISCONTINUED | OUTPATIENT
Start: 2022-02-25 | End: 2022-03-02

## 2022-02-24 RX ORDER — SODIUM CHLORIDE 9 MG/ML
INJECTION, SOLUTION INTRAVENOUS CONTINUOUS
Status: DISCONTINUED | OUTPATIENT
Start: 2022-02-24 | End: 2022-02-28

## 2022-02-24 RX ORDER — ONDANSETRON 2 MG/ML
4 INJECTION INTRAMUSCULAR; INTRAVENOUS EVERY 6 HOURS PRN
Status: DISCONTINUED | OUTPATIENT
Start: 2022-02-24 | End: 2022-03-02

## 2022-02-24 RX ORDER — ATORVASTATIN CALCIUM 40 MG/1
40 TABLET, FILM COATED ORAL NIGHTLY
Status: DISCONTINUED | OUTPATIENT
Start: 2022-02-24 | End: 2022-03-02

## 2022-02-24 RX ORDER — PANTOPRAZOLE SODIUM 40 MG/1
40 TABLET, DELAYED RELEASE ORAL
Refills: 12 | Status: DISCONTINUED | OUTPATIENT
Start: 2022-02-25 | End: 2022-03-02

## 2022-02-24 RX ORDER — VENLAFAXINE HYDROCHLORIDE 75 MG/1
75 CAPSULE, EXTENDED RELEASE ORAL DAILY
Status: DISCONTINUED | OUTPATIENT
Start: 2022-02-25 | End: 2022-03-02

## 2022-02-24 RX ORDER — SENNOSIDES 8.6 MG
17.2 TABLET ORAL NIGHTLY PRN
Status: DISCONTINUED | OUTPATIENT
Start: 2022-02-24 | End: 2022-03-02

## 2022-02-24 RX ORDER — ACETAMINOPHEN 325 MG/1
650 TABLET ORAL EVERY 6 HOURS PRN
Status: DISCONTINUED | OUTPATIENT
Start: 2022-02-24 | End: 2022-03-02

## 2022-02-24 RX ORDER — BUMETANIDE 1 MG/1
1 TABLET ORAL DAILY
Status: DISCONTINUED | OUTPATIENT
Start: 2022-02-25 | End: 2022-03-01

## 2022-02-24 RX ORDER — VANCOMYCIN HYDROCHLORIDE
25 ONCE
Status: COMPLETED | OUTPATIENT
Start: 2022-02-24 | End: 2022-02-24

## 2022-02-24 RX ORDER — EZETIMIBE 10 MG/1
10 TABLET ORAL DAILY
Status: DISCONTINUED | OUTPATIENT
Start: 2022-02-25 | End: 2022-03-02

## 2022-02-24 RX ORDER — PREGABALIN 25 MG/1
50 CAPSULE ORAL 3 TIMES DAILY
Status: DISCONTINUED | OUTPATIENT
Start: 2022-02-24 | End: 2022-03-02

## 2022-02-24 RX ORDER — BUDESONIDE 3 MG/1
9 CAPSULE, COATED PELLETS ORAL EVERY MORNING
Status: DISCONTINUED | OUTPATIENT
Start: 2022-02-25 | End: 2022-03-02

## 2022-02-24 RX ORDER — METOCLOPRAMIDE HYDROCHLORIDE 5 MG/ML
10 INJECTION INTRAMUSCULAR; INTRAVENOUS EVERY 8 HOURS PRN
Status: DISCONTINUED | OUTPATIENT
Start: 2022-02-24 | End: 2022-03-02

## 2022-02-24 RX ORDER — SODIUM PHOSPHATE, DIBASIC AND SODIUM PHOSPHATE, MONOBASIC 7; 19 G/133ML; G/133ML
1 ENEMA RECTAL ONCE AS NEEDED
Status: DISCONTINUED | OUTPATIENT
Start: 2022-02-24 | End: 2022-03-02

## 2022-02-24 RX ORDER — CETIRIZINE HYDROCHLORIDE 10 MG/1
10 TABLET ORAL DAILY
Status: DISCONTINUED | OUTPATIENT
Start: 2022-02-25 | End: 2022-03-02

## 2022-02-24 RX ORDER — DIPHENHYDRAMINE HCL 25 MG
50 CAPSULE ORAL EVERY 8 HOURS PRN
Status: DISCONTINUED | OUTPATIENT
Start: 2022-02-24 | End: 2022-03-02

## 2022-02-24 RX ORDER — BISACODYL 10 MG
10 SUPPOSITORY, RECTAL RECTAL
Status: DISCONTINUED | OUTPATIENT
Start: 2022-02-24 | End: 2022-03-02

## 2022-02-24 RX ORDER — ENOXAPARIN SODIUM 100 MG/ML
40 INJECTION SUBCUTANEOUS DAILY
Status: DISCONTINUED | OUTPATIENT
Start: 2022-02-25 | End: 2022-02-24

## 2022-02-24 RX ORDER — POLYETHYLENE GLYCOL 3350 17 G/17G
17 POWDER, FOR SOLUTION ORAL DAILY PRN
Status: DISCONTINUED | OUTPATIENT
Start: 2022-02-24 | End: 2022-03-02

## 2022-02-24 NOTE — ED INITIAL ASSESSMENT (HPI)
Patient presents with wounds to left leg and to left side of buttocks. Left leg pain. Worsening x 1 year intermittently   Denies fevers.     Saw wound care today

## 2022-02-25 LAB
ANION GAP SERPL CALC-SCNC: 4 MMOL/L (ref 0–18)
BASOPHILS # BLD AUTO: 0.03 X10(3) UL (ref 0–0.2)
BASOPHILS NFR BLD AUTO: 0.5 %
BUN BLD-MCNC: 17 MG/DL (ref 7–18)
BUN/CREAT SERPL: 33.3 (ref 10–20)
CALCIUM BLD-MCNC: 8 MG/DL (ref 8.5–10.1)
CHLORIDE SERPL-SCNC: 107 MMOL/L (ref 98–112)
CO2 SERPL-SCNC: 29 MMOL/L (ref 21–32)
CREAT BLD-MCNC: 0.51 MG/DL
DEPRECATED RDW RBC AUTO: 52 FL (ref 35.1–46.3)
EOSINOPHIL # BLD AUTO: 0.09 X10(3) UL (ref 0–0.7)
EOSINOPHIL NFR BLD AUTO: 1.4 %
ERYTHROCYTE [DISTWIDTH] IN BLOOD BY AUTOMATED COUNT: 16.5 % (ref 11–15)
GLUCOSE BLD-MCNC: 74 MG/DL (ref 70–99)
HCT VFR BLD AUTO: 39 %
HGB BLD-MCNC: 11.7 G/DL
IMM GRANULOCYTES # BLD AUTO: 0.04 X10(3) UL (ref 0–1)
IMM GRANULOCYTES NFR BLD: 0.6 %
LYMPHOCYTES # BLD AUTO: 1.31 X10(3) UL (ref 1–4)
LYMPHOCYTES NFR BLD AUTO: 20.6 %
MAGNESIUM SERPL-MCNC: 1.8 MG/DL (ref 1.6–2.6)
MCH RBC QN AUTO: 26 PG (ref 26–34)
MCHC RBC AUTO-ENTMCNC: 30 G/DL (ref 31–37)
MCV RBC AUTO: 86.7 FL
MONOCYTES # BLD AUTO: 0.55 X10(3) UL (ref 0.1–1)
MONOCYTES NFR BLD AUTO: 8.6 %
NEUTROPHILS # BLD AUTO: 4.34 X10 (3) UL (ref 1.5–7.7)
NEUTROPHILS # BLD AUTO: 4.34 X10(3) UL (ref 1.5–7.7)
NEUTROPHILS NFR BLD AUTO: 68.3 %
OSMOLALITY SERPL CALC.SUM OF ELEC: 290 MOSM/KG (ref 275–295)
PHOSPHATE SERPL-MCNC: 3.3 MG/DL (ref 2.5–4.9)
PLATELET # BLD AUTO: 202 10(3)UL (ref 150–450)
POTASSIUM SERPL-SCNC: 3.9 MMOL/L (ref 3.5–5.1)
RBC # BLD AUTO: 4.5 X10(6)UL
WBC # BLD AUTO: 6.4 X10(3) UL (ref 4–11)

## 2022-02-25 PROCEDURE — 99213 OFFICE O/P EST LOW 20 MIN: CPT

## 2022-02-25 PROCEDURE — 80048 BASIC METABOLIC PNL TOTAL CA: CPT | Performed by: INTERNAL MEDICINE

## 2022-02-25 PROCEDURE — 85025 COMPLETE CBC W/AUTO DIFF WBC: CPT | Performed by: INTERNAL MEDICINE

## 2022-02-25 PROCEDURE — 84100 ASSAY OF PHOSPHORUS: CPT | Performed by: STUDENT IN AN ORGANIZED HEALTH CARE EDUCATION/TRAINING PROGRAM

## 2022-02-25 PROCEDURE — 83735 ASSAY OF MAGNESIUM: CPT | Performed by: STUDENT IN AN ORGANIZED HEALTH CARE EDUCATION/TRAINING PROGRAM

## 2022-02-25 PROCEDURE — 92610 EVALUATE SWALLOWING FUNCTION: CPT

## 2022-02-25 RX ORDER — MAGNESIUM OXIDE 400 MG (241.3 MG MAGNESIUM) TABLET
400 TABLET ONCE
Status: COMPLETED | OUTPATIENT
Start: 2022-02-25 | End: 2022-02-25

## 2022-02-25 RX ORDER — VANCOMYCIN HYDROCHLORIDE 125 MG/1
125 CAPSULE ORAL DAILY
Status: DISCONTINUED | OUTPATIENT
Start: 2022-02-25 | End: 2022-03-02

## 2022-02-25 NOTE — CM/SW NOTE
Patient is current with Livier Wolfe (RN and OT). Confirmed with [facility] SW. ODALIS placed. 1122am  Patient discussed during rounds, patient and family requesting SNF placement. Tentative referrals pending in [place], need to follow up with choice list when avail. DON requested from [address].     240pm  SNF list avail to share with patient. Met with patient at bedside, introduced self and role. Patient confirmed that she is interested in SNF placement as she does not feel strong enough to return home at this time. Patient agreeable to looking over SNF list. Patient inquired about [facility] ([facility] [address] [place] [address]). States that she was there a few years ago and would be interested in going back if willing to accept. SW notified patient that referral will be sent to The [address] [place] [address], however requested patient to have a plan B choice from provided choice list in the case that they cannot accept. Patient understood and agreed. States her  will be here soon and they will review list together. Referral sent to The [address] [place] [address] upon requested, notified that they are a preference of the patient, awaiting review and response. PLAN: ASIA- choice list at bedside, awaiting chosen facility. RUFUS campoverde. (pref [facility]).       Hayden Bond, MSW, [facility]    [address] Yariel Crawley, MSW, [facility]    [id number]

## 2022-02-25 NOTE — ED QUICK NOTES
Orders for admission, patient is aware of plan and ready to go upstairs. Any questions, please call ED RN Stephane Ruiz at extension [phone].      Patient Covid vaccination status: Fully vaccinated     COVID Test Ordered in ED: Rapid SARS-CoV-2 by PCR    COVID Suspicion at Admission: N/A    Running Infusions:  None    Mental Status/LOC at time of transport: A&Ox4    Other pertinent information: Pt intermittently self catheterizes herself, has colostomy, W/c bound d/t fx of LE      CIWA score: N/A   NIH score:  N/A

## 2022-02-25 NOTE — PLAN OF CARE
Problem: Patient Centered Care  Goal: Patient preferences are identified and integrated in the patient's plan of care  Description: Interventions:  - What would you like us to know as we care for you?   - Provide timely, complete, and accurate information to patient/family  - Incorporate patient and family knowledge, values, beliefs, and cultural backgrounds into the planning and delivery of care  - Encourage patient/family to participate in care and decision-making at the level they choose  - Honor patient and family perspectives and choices  Outcome: Progressing     Problem: PAIN - ADULT  Goal: Verbalizes/displays adequate comfort level or patient's stated pain goal  Description: INTERVENTIONS:  - Encourage pt to monitor pain and request assistance  - Assess pain using appropriate pain scale  - Administer analgesics based on type and severity of pain and evaluate response  - Implement non-pharmacological measures as appropriate and evaluate response  - Consider cultural and social influences on pain and pain management  - Manage/alleviate anxiety  - Utilize distraction and/or relaxation techniques  - Monitor for opioid side effects  - Notify MD/LIP if interventions unsuccessful or patient reports new pain  - Anticipate increased pain with activity and pre-medicate as appropriate  Outcome: Progressing     Problem: RISK FOR INFECTION - ADULT  Goal: Absence of fever/infection during anticipated neutropenic period  Description: INTERVENTIONS  - Monitor WBC  - Administer growth factors as ordered  - Implement neutropenic guidelines  Outcome: Progressing     Problem: SAFETY ADULT - FALL  Goal: Free from fall injury  Description: INTERVENTIONS:  - Assess pt frequently for physical needs  - Identify cognitive and physical deficits and behaviors that affect risk of falls.   - [place] fall precautions as indicated by assessment.  - Educate pt/family on patient safety including physical limitations  - Instruct pt to call for assistance with activity based on assessment  - Modify environment to reduce risk of injury  - Provide assistive devices as appropriate  - Consider OT/PT consult to assist with strengthening/mobility  - Encourage toileting schedule  Outcome: Progressing     Problem: DISCHARGE PLANNING  Goal: Discharge to home or other facility with appropriate resources  Description: INTERVENTIONS:  - Identify barriers to discharge w/pt and caregiver  - Include patient/family/discharge partner in discharge planning  - Arrange for needed discharge resources and transportation as appropriate  - Identify discharge learning needs (meds, wound care, etc)  - Arrange for interpreters to assist at discharge as needed  - Consider post-discharge preferences of patient/family/discharge partner  - Complete POLST form as appropriate  - Assess patient's ability to be responsible for managing their own health  - Refer to Case Management Department for coordinating discharge planning if the patient needs post-hospital services based on physician/LIP order or complex needs related to functional status, cognitive ability or social support system  Outcome: Progressing     A/O x4. [place], pt states her hearing aids are broken so she left them at home. Wheelchair bound at baseline. Pt has hx of neurogenic bladder and straight caths at home however refused straight cath tonight. Instead requested purewick stating it worked well for her during her last admission here. Pt reported bilat leg pain, improved with repositioning. PRN tylenol given as well. PRN Benadryl for c/o itching. Pt states she sees speech therapist outpatient for some difficulty swallowing however able to swallow pills one at a time. Fall precautions in place, call light within reach. Plan for pt to dc to SNF for PT and wound care.

## 2022-02-26 ENCOUNTER — APPOINTMENT (OUTPATIENT)
Dept: NUCLEAR MEDICINE | Facility: HOSPITAL | Age: 77
DRG: 592 | End: 2022-02-26
Attending: INTERNAL MEDICINE
Payer: MEDICARE

## 2022-02-26 LAB
ANION GAP SERPL CALC-SCNC: 5 MMOL/L (ref 0–18)
BASOPHILS # BLD AUTO: 0.04 X10(3) UL (ref 0–0.2)
BASOPHILS NFR BLD AUTO: 0.5 %
BUN/CREAT SERPL: 31.6 (ref 10–20)
CALCIUM BLD-MCNC: 8.2 MG/DL (ref 8.5–10.1)
CHLORIDE SERPL-SCNC: 110 MMOL/L (ref 98–112)
CO2 SERPL-SCNC: 29 MMOL/L (ref 21–32)
CREAT BLD-MCNC: 0.38 MG/DL
DEPRECATED RDW RBC AUTO: 50.8 FL (ref 35.1–46.3)
EOSINOPHIL # BLD AUTO: 0.14 X10(3) UL (ref 0–0.7)
EOSINOPHIL NFR BLD AUTO: 1.7 %
ERYTHROCYTE [DISTWIDTH] IN BLOOD BY AUTOMATED COUNT: 16.5 % (ref 11–15)
GLUCOSE BLD-MCNC: 78 MG/DL (ref 70–99)
HCT VFR BLD AUTO: 40.8 %
HGB BLD-MCNC: 12.1 G/DL
IMM GRANULOCYTES # BLD AUTO: 0.05 X10(3) UL (ref 0–1)
IMM GRANULOCYTES NFR BLD: 0.6 %
LYMPHOCYTES # BLD AUTO: 1.6 X10(3) UL (ref 1–4)
LYMPHOCYTES NFR BLD AUTO: 19.5 %
MAGNESIUM SERPL-MCNC: 1.6 MG/DL (ref 1.6–2.6)
MCH RBC QN AUTO: 25.6 PG (ref 26–34)
MCHC RBC AUTO-ENTMCNC: 29.7 G/DL (ref 31–37)
MCV RBC AUTO: 86.4 FL
MONOCYTES # BLD AUTO: 0.63 X10(3) UL (ref 0.1–1)
MONOCYTES NFR BLD AUTO: 7.7 %
NEUTROPHILS # BLD AUTO: 5.73 X10 (3) UL (ref 1.5–7.7)
NEUTROPHILS # BLD AUTO: 5.73 X10(3) UL (ref 1.5–7.7)
NEUTROPHILS NFR BLD AUTO: 70 %
OSMOLALITY SERPL CALC.SUM OF ELEC: 297 MOSM/KG (ref 275–295)
PLATELET # BLD AUTO: 203 10(3)UL (ref 150–450)
POTASSIUM SERPL-SCNC: 3.3 MMOL/L (ref 3.5–5.1)
RBC # BLD AUTO: 4.72 X10(6)UL
SODIUM SERPL-SCNC: 144 MMOL/L (ref 136–145)
WBC # BLD AUTO: 8.2 X10(3) UL (ref 4–11)

## 2022-02-26 PROCEDURE — 97166 OT EVAL MOD COMPLEX 45 MIN: CPT

## 2022-02-26 PROCEDURE — 97163 PT EVAL HIGH COMPLEX 45 MIN: CPT

## 2022-02-26 PROCEDURE — 97530 THERAPEUTIC ACTIVITIES: CPT

## 2022-02-26 PROCEDURE — 78315 BONE IMAGING 3 PHASE: CPT | Performed by: INTERNAL MEDICINE

## 2022-02-26 PROCEDURE — 85025 COMPLETE CBC W/AUTO DIFF WBC: CPT | Performed by: STUDENT IN AN ORGANIZED HEALTH CARE EDUCATION/TRAINING PROGRAM

## 2022-02-26 PROCEDURE — 83735 ASSAY OF MAGNESIUM: CPT | Performed by: STUDENT IN AN ORGANIZED HEALTH CARE EDUCATION/TRAINING PROGRAM

## 2022-02-26 PROCEDURE — 80048 BASIC METABOLIC PNL TOTAL CA: CPT | Performed by: STUDENT IN AN ORGANIZED HEALTH CARE EDUCATION/TRAINING PROGRAM

## 2022-02-26 RX ORDER — HEPARIN SODIUM 5000 [USP'U]/ML
5000 INJECTION, SOLUTION INTRAVENOUS; SUBCUTANEOUS EVERY 8 HOURS SCHEDULED
Status: DISCONTINUED | OUTPATIENT
Start: 2022-02-26 | End: 2022-02-28

## 2022-02-26 RX ORDER — MAGNESIUM SULFATE HEPTAHYDRATE 40 MG/ML
2 INJECTION, SOLUTION INTRAVENOUS ONCE
Status: COMPLETED | OUTPATIENT
Start: 2022-02-26 | End: 2022-02-26

## 2022-02-27 LAB
ANION GAP SERPL CALC-SCNC: 6 MMOL/L (ref 0–18)
BASOPHILS # BLD AUTO: 0.02 X10(3) UL (ref 0–0.2)
BASOPHILS NFR BLD AUTO: 0.3 %
BUN BLD-MCNC: 11 MG/DL (ref 7–18)
BUN/CREAT SERPL: 36.7 (ref 10–20)
CALCIUM BLD-MCNC: 7.8 MG/DL (ref 8.5–10.1)
CHLORIDE SERPL-SCNC: 113 MMOL/L (ref 98–112)
CO2 SERPL-SCNC: 26 MMOL/L (ref 21–32)
CREAT BLD-MCNC: 0.3 MG/DL
DEPRECATED RDW RBC AUTO: 51 FL (ref 35.1–46.3)
EOSINOPHIL # BLD AUTO: 0.08 X10(3) UL (ref 0–0.7)
ERYTHROCYTE [DISTWIDTH] IN BLOOD BY AUTOMATED COUNT: 16.4 % (ref 11–15)
GLUCOSE BLD-MCNC: 75 MG/DL (ref 70–99)
HCT VFR BLD AUTO: 38.2 %
HGB BLD-MCNC: 11.7 G/DL
IMM GRANULOCYTES # BLD AUTO: 0.04 X10(3) UL (ref 0–1)
IMM GRANULOCYTES NFR BLD: 0.7 %
LYMPHOCYTES # BLD AUTO: 1.7 X10(3) UL (ref 1–4)
LYMPHOCYTES NFR BLD AUTO: 27.9 %
MAGNESIUM SERPL-MCNC: 1.9 MG/DL (ref 1.6–2.6)
MCH RBC QN AUTO: 26.1 PG (ref 26–34)
MCHC RBC AUTO-ENTMCNC: 30.6 G/DL (ref 31–37)
MCV RBC AUTO: 85.3 FL
MONOCYTES # BLD AUTO: 0.49 X10(3) UL (ref 0.1–1)
MONOCYTES NFR BLD AUTO: 8 %
NEUTROPHILS # BLD AUTO: 3.76 X10 (3) UL (ref 1.5–7.7)
NEUTROPHILS # BLD AUTO: 3.76 X10(3) UL (ref 1.5–7.7)
NEUTROPHILS NFR BLD AUTO: 61.8 %
OSMOLALITY SERPL CALC.SUM OF ELEC: 298 MOSM/KG (ref 275–295)
PLATELET # BLD AUTO: 200 10(3)UL (ref 150–450)
POTASSIUM SERPL-SCNC: 3.3 MMOL/L (ref 3.5–5.1)
RBC # BLD AUTO: 4.48 X10(6)UL
SODIUM SERPL-SCNC: 145 MMOL/L (ref 136–145)
VANCOMYCIN PEAK SERPL-MCNC: 24.3 UG/ML (ref 30–50)
WBC # BLD AUTO: 6.1 X10(3) UL (ref 4–11)

## 2022-02-27 PROCEDURE — 80048 BASIC METABOLIC PNL TOTAL CA: CPT | Performed by: STUDENT IN AN ORGANIZED HEALTH CARE EDUCATION/TRAINING PROGRAM

## 2022-02-27 PROCEDURE — 83735 ASSAY OF MAGNESIUM: CPT | Performed by: STUDENT IN AN ORGANIZED HEALTH CARE EDUCATION/TRAINING PROGRAM

## 2022-02-27 PROCEDURE — 84132 ASSAY OF SERUM POTASSIUM: CPT | Performed by: STUDENT IN AN ORGANIZED HEALTH CARE EDUCATION/TRAINING PROGRAM

## 2022-02-27 PROCEDURE — 85025 COMPLETE CBC W/AUTO DIFF WBC: CPT | Performed by: STUDENT IN AN ORGANIZED HEALTH CARE EDUCATION/TRAINING PROGRAM

## 2022-02-27 PROCEDURE — 80202 ASSAY OF VANCOMYCIN: CPT | Performed by: STUDENT IN AN ORGANIZED HEALTH CARE EDUCATION/TRAINING PROGRAM

## 2022-02-27 RX ORDER — POTASSIUM CHLORIDE 20 MEQ/1
40 TABLET, EXTENDED RELEASE ORAL ONCE
Status: COMPLETED | OUTPATIENT
Start: 2022-02-27 | End: 2022-02-27

## 2022-02-28 ENCOUNTER — APPOINTMENT (OUTPATIENT)
Dept: GENERAL RADIOLOGY | Facility: HOSPITAL | Age: 77
DRG: 592 | End: 2022-02-28
Attending: INTERNAL MEDICINE
Payer: MEDICARE

## 2022-02-28 ENCOUNTER — APPOINTMENT (OUTPATIENT)
Dept: GENERAL RADIOLOGY | Facility: HOSPITAL | Age: 77
DRG: 592 | End: 2022-02-28
Attending: STUDENT IN AN ORGANIZED HEALTH CARE EDUCATION/TRAINING PROGRAM
Payer: MEDICARE

## 2022-02-28 ENCOUNTER — APPOINTMENT (OUTPATIENT)
Dept: PICC SERVICES | Facility: HOSPITAL | Age: 77
DRG: 592 | End: 2022-02-28
Attending: INTERNAL MEDICINE
Payer: MEDICARE

## 2022-02-28 ENCOUNTER — APPOINTMENT (OUTPATIENT)
Dept: ULTRASOUND IMAGING | Facility: HOSPITAL | Age: 77
DRG: 592 | End: 2022-02-28
Attending: STUDENT IN AN ORGANIZED HEALTH CARE EDUCATION/TRAINING PROGRAM
Payer: MEDICARE

## 2022-02-28 LAB
ANION GAP SERPL CALC-SCNC: 5 MMOL/L (ref 0–18)
BASOPHILS # BLD AUTO: 0.02 X10(3) UL (ref 0–0.2)
BASOPHILS NFR BLD AUTO: 0.3 %
BUN BLD-MCNC: 10 MG/DL (ref 7–18)
BUN/CREAT SERPL: 33.3 (ref 10–20)
CALCIUM BLD-MCNC: 8.1 MG/DL (ref 8.5–10.1)
CHLORIDE SERPL-SCNC: 114 MMOL/L (ref 98–112)
CO2 SERPL-SCNC: 25 MMOL/L (ref 21–32)
CREAT BLD-MCNC: 0.3 MG/DL
DEPRECATED RDW RBC AUTO: 51.7 FL (ref 35.1–46.3)
EOSINOPHIL # BLD AUTO: 0.1 X10(3) UL (ref 0–0.7)
EOSINOPHIL NFR BLD AUTO: 1.5 %
ERYTHROCYTE [DISTWIDTH] IN BLOOD BY AUTOMATED COUNT: 16.5 % (ref 11–15)
GLUCOSE BLD-MCNC: 92 MG/DL (ref 70–99)
HCT VFR BLD AUTO: 38.7 %
HGB BLD-MCNC: 12 G/DL
IMM GRANULOCYTES # BLD AUTO: 0.04 X10(3) UL (ref 0–1)
IMM GRANULOCYTES NFR BLD: 0.6 %
LYMPHOCYTES # BLD AUTO: 1.73 X10(3) UL (ref 1–4)
LYMPHOCYTES NFR BLD AUTO: 25.3 %
MAGNESIUM SERPL-MCNC: 1.5 MG/DL (ref 1.6–2.6)
MCH RBC QN AUTO: 26.7 PG (ref 26–34)
MCHC RBC AUTO-ENTMCNC: 31 G/DL (ref 31–37)
MCV RBC AUTO: 86 FL
MONOCYTES # BLD AUTO: 0.6 X10(3) UL (ref 0.1–1)
MONOCYTES NFR BLD AUTO: 8.8 %
NEUTROPHILS # BLD AUTO: 4.36 X10 (3) UL (ref 1.5–7.7)
NEUTROPHILS # BLD AUTO: 4.36 X10(3) UL (ref 1.5–7.7)
NEUTROPHILS NFR BLD AUTO: 63.5 %
OSMOLALITY SERPL CALC.SUM OF ELEC: 297 MOSM/KG (ref 275–295)
PLATELET # BLD AUTO: 202 10(3)UL (ref 150–450)
POTASSIUM SERPL-SCNC: 3.4 MMOL/L (ref 3.5–5.1)
POTASSIUM SERPL-SCNC: 3.4 MMOL/L (ref 3.5–5.1)
RBC # BLD AUTO: 4.5 X10(6)UL
SODIUM SERPL-SCNC: 144 MMOL/L (ref 136–145)
WBC # BLD AUTO: 6.9 X10(3) UL (ref 4–11)

## 2022-02-28 PROCEDURE — 36573 INSJ PICC RS&I 5 YR+: CPT

## 2022-02-28 PROCEDURE — 83735 ASSAY OF MAGNESIUM: CPT | Performed by: INTERNAL MEDICINE

## 2022-02-28 PROCEDURE — 80048 BASIC METABOLIC PNL TOTAL CA: CPT | Performed by: STUDENT IN AN ORGANIZED HEALTH CARE EDUCATION/TRAINING PROGRAM

## 2022-02-28 PROCEDURE — 93971 EXTREMITY STUDY: CPT | Performed by: STUDENT IN AN ORGANIZED HEALTH CARE EDUCATION/TRAINING PROGRAM

## 2022-02-28 PROCEDURE — 71046 X-RAY EXAM CHEST 2 VIEWS: CPT | Performed by: STUDENT IN AN ORGANIZED HEALTH CARE EDUCATION/TRAINING PROGRAM

## 2022-02-28 PROCEDURE — 71045 X-RAY EXAM CHEST 1 VIEW: CPT | Performed by: INTERNAL MEDICINE

## 2022-02-28 PROCEDURE — 84132 ASSAY OF SERUM POTASSIUM: CPT | Performed by: STUDENT IN AN ORGANIZED HEALTH CARE EDUCATION/TRAINING PROGRAM

## 2022-02-28 PROCEDURE — S0171 BUMETANIDE 0.5 MG: HCPCS | Performed by: INTERNAL MEDICINE

## 2022-02-28 PROCEDURE — 92526 ORAL FUNCTION THERAPY: CPT

## 2022-02-28 PROCEDURE — 02HV33Z INSERTION OF INFUSION DEVICE INTO SUPERIOR VENA CAVA, PERCUTANEOUS APPROACH: ICD-10-PCS | Performed by: STUDENT IN AN ORGANIZED HEALTH CARE EDUCATION/TRAINING PROGRAM

## 2022-02-28 PROCEDURE — 85025 COMPLETE CBC W/AUTO DIFF WBC: CPT | Performed by: STUDENT IN AN ORGANIZED HEALTH CARE EDUCATION/TRAINING PROGRAM

## 2022-02-28 RX ORDER — POTASSIUM CHLORIDE 20 MEQ/1
40 TABLET, EXTENDED RELEASE ORAL ONCE
Status: COMPLETED | OUTPATIENT
Start: 2022-02-28 | End: 2022-02-28

## 2022-02-28 RX ORDER — LIDOCAINE HYDROCHLORIDE 10 MG/ML
5 INJECTION, SOLUTION EPIDURAL; INFILTRATION; INTRACAUDAL; PERINEURAL
Status: COMPLETED | OUTPATIENT
Start: 2022-02-28 | End: 2022-02-28

## 2022-02-28 RX ORDER — MAGNESIUM OXIDE 400 MG (241.3 MG MAGNESIUM) TABLET
800 TABLET ONCE
Status: COMPLETED | OUTPATIENT
Start: 2022-02-28 | End: 2022-02-28

## 2022-02-28 RX ORDER — BUMETANIDE 0.25 MG/ML
1 INJECTION, SOLUTION INTRAMUSCULAR; INTRAVENOUS ONCE
Status: COMPLETED | OUTPATIENT
Start: 2022-02-28 | End: 2022-02-28

## 2022-02-28 NOTE — CM/SW NOTE
SW met with patient at bedside, notified that The [address] (her preference) is able to accept. Patient states that she will discuss options with her , will notify SHERRELL of final choice.      NADIA Nur, [facility]    [id number]

## 2022-02-28 NOTE — SLP NOTE
SPEECH DAILY NOTE - INPATIENT  ASSESSMENT & PLAN   ASSESSMENT    PPE REQUIRED. THIS THERAPIST WORE GLOVES, DROPLET MASK, AND GOGGLES FOR DURATION OF EVALUATION. HANDS WASHED UPON ENTRANCE/EXIT. SLP f/u for ongoing dysphagia tx and meal assessment per recommendations of BSE. RN reports pt tolerates diet and medication well with no overt clinical s/s aspiration. Pt denies any swallowing challenges. Pt positioned upright in bed. Pt afebrile, tolerating room air oxygen status 95% prior to the start of oral trials. O2 status unable to be monitored for entire duration of session d/t equipment malfunction c/b inadequate waveform. SLP reviewed aspiration precautions and safe swallowing compensatory strategies with the patient. Safe swallow guidelines remain written on the white board. Patient v/u. Provided assistance as needed, pt tolerates regular and thin liquids via sup with no overt clinical signs/symptoms of aspiration. Pt with shortness of breath, however this was observed at baseline/prior to PO trials and pt reports this is chronic. Pt is known to this clinician in OP setting and SOB was observed at baseline t/o OP dysphagia tx. Clinician provided preemptive cues for use of bolus hold strategy and intermittent cues for use of volitional cough while consuming thin liquids. SLP to f/u with dysphagia tx and meal assessment x1-2, monitor CXR, and VFSS if any overt CSA and/or decline in CXR. RN alerted with results and recommendations. Dysphagia Treatment:   Nava reviewed and completed x5  Mendelsohn reviewed and completed x5  Tongue control completed x5  Laryngeal adduction reviewed and completed x5  Laryngeal elevation reviewed and completed x5    No CXR on file from this admission. Diet Recommendations - Solids: Regular  Diet Recommendations - Liquids: Thin Liquids    Compensatory Strategies Recommended: Slow rate;Small bites and sips; No straws (bolus hold and then swallow; volitional cough)  Aspiration Precautions: Upright position; Slow rate;Small bites and sips; No straw  Medication Administration Recommendations: One pill at a time; Whole in puree    Patient Experiencing Pain: No              Discharge Recommendations  Discharge Recommendations/Plan: Undetermined    Treatment Plan  Treatment Plan/Recommendations: Aspiration precautions; Dysphagia therapy    Interdisciplinary Communication: Discussed with RN          GOALS  Goal #1 The patient will tolerate REGULAR  consistency and THIN liquids without overt signs or symptoms of aspiration with 100% accuracy over 1-2 session(s). Pt consumed regular consistency and thin liquids with no overt clinical s/s of aspiration observed x1 session. In Progress   Goal #2 The patient will utilize compensatory strategies as outlined by  BSSE (clinical evaluation) including Slow rate, Small bites, Multiple swallows, Alternate liquids/solids, No straws, intermittent VOLITIONAL cough with thin liquids, with PRN feeding  assistance 90% of the time across 1-2 sessions. Clinician provided preemptive cues for use of bolus hold and intermittent cues for use of volitional cough as pt consumed thin liquids. Reviewed all strategies and aspiration precautions with pt -- pt v/u.  x1 session. In Progress   Goal #3 The patient will complete dysphagia exercises focusing on improving laryngeal and pharyngeal swallowing skills with 90% accuracy within 1-2 sessions with mild cues. Clinician reviewed and completed dysphagia exercises with pt with up to moderate assistance required.    In Progress     FOLLOW UP  Follow Up Needed (Documentation Required): Yes  SLP Follow-up Date: 03/01/22  Number of Visits to Meet Established Goals: 2    Session: 1 following BSE    If you have any questions, please contact Evelia BLANK CF-SLP  Martin Carlisle, MS CF-SLP   Speech Language Pathologist   [facility]   [phone]

## 2022-03-01 ENCOUNTER — APPOINTMENT (OUTPATIENT)
Dept: SPEECH THERAPY | Facility: HOSPITAL | Age: 77
End: 2022-03-01
Attending: INTERNAL MEDICINE
Payer: MEDICARE

## 2022-03-01 ENCOUNTER — APPOINTMENT (OUTPATIENT)
Dept: CV DIAGNOSTICS | Facility: HOSPITAL | Age: 77
DRG: 592 | End: 2022-03-01
Attending: INTERNAL MEDICINE
Payer: MEDICARE

## 2022-03-01 LAB
ANION GAP SERPL CALC-SCNC: 3 MMOL/L (ref 0–18)
BASOPHILS # BLD AUTO: 0.03 X10(3) UL (ref 0–0.2)
BASOPHILS NFR BLD AUTO: 0.4 %
BUN BLD-MCNC: 13 MG/DL (ref 7–18)
BUN/CREAT SERPL: 35.1 (ref 10–20)
CALCIUM BLD-MCNC: 7.8 MG/DL (ref 8.5–10.1)
CHLORIDE SERPL-SCNC: 109 MMOL/L (ref 98–112)
CO2 SERPL-SCNC: 30 MMOL/L (ref 21–32)
CREAT BLD-MCNC: 0.37 MG/DL
DEPRECATED RDW RBC AUTO: 51.1 FL (ref 35.1–46.3)
EOSINOPHIL # BLD AUTO: 0.12 X10(3) UL (ref 0–0.7)
EOSINOPHIL NFR BLD AUTO: 1.6 %
ERYTHROCYTE [DISTWIDTH] IN BLOOD BY AUTOMATED COUNT: 16.4 % (ref 11–15)
GLUCOSE BLD-MCNC: 92 MG/DL (ref 70–99)
HCT VFR BLD AUTO: 39.3 %
HGB BLD-MCNC: 12.2 G/DL
IMM GRANULOCYTES # BLD AUTO: 0.04 X10(3) UL (ref 0–1)
IMM GRANULOCYTES NFR BLD: 0.5 %
LYMPHOCYTES # BLD AUTO: 1.71 X10(3) UL (ref 1–4)
LYMPHOCYTES NFR BLD AUTO: 23.1 %
MAGNESIUM SERPL-MCNC: 1.5 MG/DL (ref 1.6–2.6)
MCH RBC QN AUTO: 26.4 PG (ref 26–34)
MCHC RBC AUTO-ENTMCNC: 31 G/DL (ref 31–37)
MCV RBC AUTO: 85.1 FL
MONOCYTES # BLD AUTO: 0.55 X10(3) UL (ref 0.1–1)
MONOCYTES NFR BLD AUTO: 7.4 %
NEUTROPHILS # BLD AUTO: 4.95 X10 (3) UL (ref 1.5–7.7)
NEUTROPHILS NFR BLD AUTO: 67 %
OSMOLALITY SERPL CALC.SUM OF ELEC: 294 MOSM/KG (ref 275–295)
PLATELET # BLD AUTO: 184 10(3)UL (ref 150–450)
POTASSIUM SERPL-SCNC: 3.7 MMOL/L (ref 3.5–5.1)
POTASSIUM SERPL-SCNC: 3.7 MMOL/L (ref 3.5–5.1)
RBC # BLD AUTO: 4.62 X10(6)UL
SODIUM SERPL-SCNC: 142 MMOL/L (ref 136–145)
WBC # BLD AUTO: 7.4 X10(3) UL (ref 4–11)

## 2022-03-01 PROCEDURE — S0171 BUMETANIDE 0.5 MG: HCPCS | Performed by: INTERNAL MEDICINE

## 2022-03-01 PROCEDURE — 93306 TTE W/DOPPLER COMPLETE: CPT | Performed by: INTERNAL MEDICINE

## 2022-03-01 PROCEDURE — 83735 ASSAY OF MAGNESIUM: CPT | Performed by: STUDENT IN AN ORGANIZED HEALTH CARE EDUCATION/TRAINING PROGRAM

## 2022-03-01 PROCEDURE — 92526 ORAL FUNCTION THERAPY: CPT

## 2022-03-01 PROCEDURE — 80048 BASIC METABOLIC PNL TOTAL CA: CPT | Performed by: INTERNAL MEDICINE

## 2022-03-01 PROCEDURE — 84132 ASSAY OF SERUM POTASSIUM: CPT | Performed by: STUDENT IN AN ORGANIZED HEALTH CARE EDUCATION/TRAINING PROGRAM

## 2022-03-01 PROCEDURE — 85025 COMPLETE CBC W/AUTO DIFF WBC: CPT | Performed by: STUDENT IN AN ORGANIZED HEALTH CARE EDUCATION/TRAINING PROGRAM

## 2022-03-01 RX ORDER — ERTAPENEM 1 G/1
1 INJECTION, POWDER, LYOPHILIZED, FOR SOLUTION INTRAMUSCULAR; INTRAVENOUS DAILY
Qty: 41 EACH | Refills: 0 | Status: SHIPPED | OUTPATIENT
Start: 2022-03-01 | End: 2022-04-11

## 2022-03-01 RX ORDER — BUMETANIDE 1 MG/1
2 TABLET ORAL DAILY
Status: DISCONTINUED | OUTPATIENT
Start: 2022-03-02 | End: 2022-03-02

## 2022-03-01 RX ORDER — POTASSIUM CHLORIDE 20 MEQ/1
40 TABLET, EXTENDED RELEASE ORAL EVERY 4 HOURS
Status: COMPLETED | OUTPATIENT
Start: 2022-03-01 | End: 2022-03-01

## 2022-03-01 RX ORDER — VANCOMYCIN HYDROCHLORIDE 125 MG/1
125 CAPSULE ORAL DAILY
Qty: 41 CAPSULE | Refills: 0 | Status: SHIPPED | OUTPATIENT
Start: 2022-03-01 | End: 2022-04-11

## 2022-03-01 RX ORDER — MAGNESIUM OXIDE 400 MG (241.3 MG MAGNESIUM) TABLET
800 TABLET ONCE
Status: COMPLETED | OUTPATIENT
Start: 2022-03-01 | End: 2022-03-01

## 2022-03-01 RX ORDER — MAGNESIUM SULFATE HEPTAHYDRATE 40 MG/ML
2 INJECTION, SOLUTION INTRAVENOUS ONCE
Status: COMPLETED | OUTPATIENT
Start: 2022-03-01 | End: 2022-03-01

## 2022-03-01 RX ORDER — BUMETANIDE 0.25 MG/ML
2 INJECTION, SOLUTION INTRAMUSCULAR; INTRAVENOUS ONCE
Status: COMPLETED | OUTPATIENT
Start: 2022-03-01 | End: 2022-03-01

## 2022-03-01 NOTE — CM/SW NOTE
Patient discussed in rounds, likely cleared for DC today. SW notified by patient preferred SNF ([facility] [address]) that they cannot accept patient if she is discharged on Cubicin. Message sent to medical team to confirm. Will obtain alternate choice from patient. Message sent to alternate SNF options to ensure that they can accept patient on Cubicin. Awaiting review and response. 435pm  Notified by [employer], no [facility] facilities are able to accept with Cubicin. Additional referrals added to [place] for review. Facilities currently reviewing:   [facility] to provide alternate options to patient once known. PLAN: ASIA- attempting to locate a facility willing to accept patient on Cubicin. Additional referrals pending, Need to provide alternate options to patient once there are accepting facilities.        NADIA Foreman, [facility]    [id number]

## 2022-03-02 VITALS
BODY MASS INDEX: 22.23 KG/M2 | WEIGHT: 130.19 LBS | HEART RATE: 82 BPM | TEMPERATURE: 98 F | SYSTOLIC BLOOD PRESSURE: 124 MMHG | DIASTOLIC BLOOD PRESSURE: 69 MMHG | HEIGHT: 64 IN | OXYGEN SATURATION: 91 % | RESPIRATION RATE: 20 BRPM

## 2022-03-02 LAB
ANION GAP SERPL CALC-SCNC: 4 MMOL/L (ref 0–18)
BASOPHILS # BLD AUTO: 0.03 X10(3) UL (ref 0–0.2)
BASOPHILS NFR BLD AUTO: 0.4 %
BUN BLD-MCNC: 14 MG/DL (ref 7–18)
BUN/CREAT SERPL: 40 (ref 10–20)
CALCIUM BLD-MCNC: 8.2 MG/DL (ref 8.5–10.1)
CHLORIDE SERPL-SCNC: 107 MMOL/L (ref 98–112)
CO2 SERPL-SCNC: 30 MMOL/L (ref 21–32)
CREAT BLD-MCNC: 0.35 MG/DL
DEPRECATED RDW RBC AUTO: 50.8 FL (ref 35.1–46.3)
EOSINOPHIL # BLD AUTO: 0.15 X10(3) UL (ref 0–0.7)
EOSINOPHIL NFR BLD AUTO: 1.8 %
ERYTHROCYTE [DISTWIDTH] IN BLOOD BY AUTOMATED COUNT: 16.3 % (ref 11–15)
GLUCOSE BLD-MCNC: 86 MG/DL (ref 70–99)
HCT VFR BLD AUTO: 38.3 %
HGB BLD-MCNC: 12 G/DL
IMM GRANULOCYTES # BLD AUTO: 0.04 X10(3) UL (ref 0–1)
IMM GRANULOCYTES NFR BLD: 0.5 %
LYMPHOCYTES # BLD AUTO: 1.74 X10(3) UL (ref 1–4)
LYMPHOCYTES NFR BLD AUTO: 21.2 %
MAGNESIUM SERPL-MCNC: 1.9 MG/DL (ref 1.6–2.6)
MCH RBC QN AUTO: 26.5 PG (ref 26–34)
MCHC RBC AUTO-ENTMCNC: 31.3 G/DL (ref 31–37)
MCV RBC AUTO: 84.7 FL
MONOCYTES # BLD AUTO: 0.68 X10(3) UL (ref 0.1–1)
MONOCYTES NFR BLD AUTO: 8.3 %
NEUTROPHILS # BLD AUTO: 5.58 X10 (3) UL (ref 1.5–7.7)
NEUTROPHILS # BLD AUTO: 5.58 X10(3) UL (ref 1.5–7.7)
NEUTROPHILS NFR BLD AUTO: 67.8 %
OSMOLALITY SERPL CALC.SUM OF ELEC: 292 MOSM/KG (ref 275–295)
PLATELET # BLD AUTO: 185 10(3)UL (ref 150–450)
POTASSIUM SERPL-SCNC: 4.2 MMOL/L (ref 3.5–5.1)
RBC # BLD AUTO: 4.52 X10(6)UL
SODIUM SERPL-SCNC: 141 MMOL/L (ref 136–145)
WBC # BLD AUTO: 8.2 X10(3) UL (ref 4–11)

## 2022-03-02 PROCEDURE — 83735 ASSAY OF MAGNESIUM: CPT | Performed by: STUDENT IN AN ORGANIZED HEALTH CARE EDUCATION/TRAINING PROGRAM

## 2022-03-02 PROCEDURE — 85025 COMPLETE CBC W/AUTO DIFF WBC: CPT | Performed by: STUDENT IN AN ORGANIZED HEALTH CARE EDUCATION/TRAINING PROGRAM

## 2022-03-02 PROCEDURE — 80048 BASIC METABOLIC PNL TOTAL CA: CPT | Performed by: NURSE PRACTITIONER

## 2022-03-02 RX ORDER — BUMETANIDE 1 MG/1
2 TABLET ORAL DAILY
Qty: 90 TABLET | Refills: 0 | Status: SHIPPED | COMMUNITY
Start: 2022-03-02

## 2022-03-02 NOTE — CM/SW NOTE
At this time, only accepting facilities that can take patient with Cubicin are [facility] and [facility]. SW met with patient at bedside, provided alternate options. Patient expressed frustration that she cannot go to The [address] [place] [address]. SW notified patient that it would be possible for her to request a transfer to [facility] once her medication regimen is completed, patient understood and is agreeable to [facility]. SW reserved [facility], requested bed for today, awaiting review and response, final med clear. PLAN[facility] for [facility]. PCS DONE. RN # for report is .     NADIA Cooper, [facility]    [id number]

## 2022-03-05 ENCOUNTER — APPOINTMENT (OUTPATIENT)
Dept: GENERAL RADIOLOGY | Facility: HOSPITAL | Age: 77
DRG: 391 | End: 2022-03-05
Attending: STUDENT IN AN ORGANIZED HEALTH CARE EDUCATION/TRAINING PROGRAM
Payer: MEDICARE

## 2022-03-05 ENCOUNTER — HOSPITAL ENCOUNTER (INPATIENT)
Facility: HOSPITAL | Age: 77
LOS: 5 days | Discharge: NURSING, REHAB OR LONG-TERM CARE | DRG: 391 | End: 2022-03-10
Attending: STUDENT IN AN ORGANIZED HEALTH CARE EDUCATION/TRAINING PROGRAM | Admitting: INTERNAL MEDICINE
Payer: MEDICARE

## 2022-03-05 DIAGNOSIS — R13.10 DYSPHAGIA, UNSPECIFIED TYPE: Primary | ICD-10-CM

## 2022-03-05 DIAGNOSIS — R07.9 CHEST PAIN OF UNCERTAIN ETIOLOGY: ICD-10-CM

## 2022-03-05 DIAGNOSIS — R10.13 DYSPEPSIA: ICD-10-CM

## 2022-03-05 LAB
ALBUMIN SERPL-MCNC: 2.5 G/DL (ref 3.4–5)
ALP LIVER SERPL-CCNC: 69 U/L
ALT SERPL-CCNC: 15 U/L
ANION GAP SERPL CALC-SCNC: 6 MMOL/L (ref 0–18)
AST SERPL-CCNC: 15 U/L (ref 15–37)
BASOPHILS # BLD AUTO: 0.03 X10(3) UL (ref 0–0.2)
BASOPHILS NFR BLD AUTO: 0.3 %
BILIRUB DIRECT SERPL-MCNC: 0.3 MG/DL (ref 0–0.2)
BILIRUB SERPL-MCNC: 1.1 MG/DL (ref 0.1–2)
BUN BLD-MCNC: 13 MG/DL (ref 7–18)
BUN/CREAT SERPL: 29.5 (ref 10–20)
CALCIUM BLD-MCNC: 8.7 MG/DL (ref 8.5–10.1)
CHLORIDE SERPL-SCNC: 100 MMOL/L (ref 98–112)
CO2 SERPL-SCNC: 31 MMOL/L (ref 21–32)
CREAT BLD-MCNC: 0.44 MG/DL
DEPRECATED RDW RBC AUTO: 50.1 FL (ref 35.1–46.3)
EOSINOPHIL # BLD AUTO: 0.29 X10(3) UL (ref 0–0.7)
EOSINOPHIL NFR BLD AUTO: 2.9 %
ERYTHROCYTE [DISTWIDTH] IN BLOOD BY AUTOMATED COUNT: 16.2 % (ref 11–15)
GLUCOSE BLD-MCNC: 96 MG/DL (ref 70–99)
HCT VFR BLD AUTO: 45.6 %
IMM GRANULOCYTES # BLD AUTO: 0.04 X10(3) UL (ref 0–1)
IMM GRANULOCYTES NFR BLD: 0.4 %
LIPASE SERPL-CCNC: 42 U/L (ref 73–393)
LYMPHOCYTES # BLD AUTO: 1.64 X10(3) UL (ref 1–4)
LYMPHOCYTES NFR BLD AUTO: 16.5 %
MCH RBC QN AUTO: 26.1 PG (ref 26–34)
MCHC RBC AUTO-ENTMCNC: 30.5 G/DL (ref 31–37)
MCV RBC AUTO: 85.6 FL
MONOCYTES # BLD AUTO: 0.88 X10(3) UL (ref 0.1–1)
MONOCYTES NFR BLD AUTO: 8.8 %
NEUTROPHILS # BLD AUTO: 7.07 X10 (3) UL (ref 1.5–7.7)
NEUTROPHILS # BLD AUTO: 7.07 X10(3) UL (ref 1.5–7.7)
NEUTROPHILS NFR BLD AUTO: 71.1 %
NT-PROBNP SERPL-MCNC: 1307 PG/ML (ref ?–450)
OSMOLALITY SERPL CALC.SUM OF ELEC: 284 MOSM/KG (ref 275–295)
PLATELET # BLD AUTO: 201 10(3)UL (ref 150–450)
POTASSIUM SERPL-SCNC: 3.4 MMOL/L (ref 3.5–5.1)
PROT SERPL-MCNC: 5.9 G/DL (ref 6.4–8.2)
RBC # BLD AUTO: 5.33 X10(6)UL
SARS-COV-2 RNA RESP QL NAA+PROBE: NOT DETECTED
SODIUM SERPL-SCNC: 137 MMOL/L (ref 136–145)
TROPONIN I HIGH SENSITIVITY: 37 NG/L
TROPONIN I HIGH SENSITIVITY: 40 NG/L
WBC # BLD AUTO: 10 X10(3) UL (ref 4–11)

## 2022-03-05 PROCEDURE — 71045 X-RAY EXAM CHEST 1 VIEW: CPT | Performed by: STUDENT IN AN ORGANIZED HEALTH CARE EDUCATION/TRAINING PROGRAM

## 2022-03-05 RX ORDER — SODIUM CHLORIDE 9 MG/ML
INJECTION, SOLUTION INTRAVENOUS CONTINUOUS
Status: DISCONTINUED | OUTPATIENT
Start: 2022-03-05 | End: 2022-03-06

## 2022-03-05 RX ORDER — HYDROMORPHONE HYDROCHLORIDE 1 MG/ML
0.8 INJECTION, SOLUTION INTRAMUSCULAR; INTRAVENOUS; SUBCUTANEOUS EVERY 2 HOUR PRN
Status: DISCONTINUED | OUTPATIENT
Start: 2022-03-05 | End: 2022-03-06

## 2022-03-05 RX ORDER — ERTAPENEM 1 G/1
1 INJECTION, POWDER, LYOPHILIZED, FOR SOLUTION INTRAMUSCULAR; INTRAVENOUS DAILY
Status: DISCONTINUED | OUTPATIENT
Start: 2022-03-05 | End: 2022-03-05 | Stop reason: ALTCHOICE

## 2022-03-05 RX ORDER — ALBUTEROL SULFATE 90 UG/1
2 AEROSOL, METERED RESPIRATORY (INHALATION) EVERY 4 HOURS PRN
Status: DISCONTINUED | OUTPATIENT
Start: 2022-03-05 | End: 2022-03-10

## 2022-03-05 RX ORDER — ONDANSETRON 2 MG/ML
4 INJECTION INTRAMUSCULAR; INTRAVENOUS EVERY 6 HOURS PRN
Status: DISCONTINUED | OUTPATIENT
Start: 2022-03-05 | End: 2022-03-10

## 2022-03-05 RX ORDER — ACETAMINOPHEN 500 MG
1000 TABLET ORAL ONCE
Status: COMPLETED | OUTPATIENT
Start: 2022-03-05 | End: 2022-03-05

## 2022-03-05 RX ORDER — FLUTICASONE PROPIONATE 50 MCG
1 SPRAY, SUSPENSION (ML) NASAL DAILY
Status: DISCONTINUED | OUTPATIENT
Start: 2022-03-05 | End: 2022-03-10

## 2022-03-05 RX ORDER — HEPARIN SODIUM 5000 [USP'U]/ML
5000 INJECTION, SOLUTION INTRAVENOUS; SUBCUTANEOUS EVERY 8 HOURS SCHEDULED
Status: DISCONTINUED | OUTPATIENT
Start: 2022-03-05 | End: 2022-03-06

## 2022-03-05 RX ORDER — HYDROMORPHONE HYDROCHLORIDE 1 MG/ML
0.4 INJECTION, SOLUTION INTRAMUSCULAR; INTRAVENOUS; SUBCUTANEOUS EVERY 2 HOUR PRN
Status: DISCONTINUED | OUTPATIENT
Start: 2022-03-05 | End: 2022-03-06

## 2022-03-05 RX ORDER — HYDROMORPHONE HYDROCHLORIDE 1 MG/ML
0.2 INJECTION, SOLUTION INTRAMUSCULAR; INTRAVENOUS; SUBCUTANEOUS EVERY 2 HOUR PRN
Status: DISCONTINUED | OUTPATIENT
Start: 2022-03-05 | End: 2022-03-06

## 2022-03-05 NOTE — ED INITIAL ASSESSMENT (HPI)
Pt c/o burning chest pain since this morning. sts she was recently here for a heart test and was told \"my heart is fine\": and is unsure of the test she had. Pt also sts having trouble keeping food down. Denies n/v, sts difficulty  to swallow food once it's in her mouth.

## 2022-03-06 LAB
APTT PPP: 113.2 SECONDS (ref 23.3–35.6)
APTT PPP: 41 SECONDS (ref 23.3–35.6)
APTT PPP: 85 SECONDS (ref 23.3–35.6)
BASOPHILS # BLD AUTO: 0.03 X10(3) UL (ref 0–0.2)
BASOPHILS NFR BLD AUTO: 0.3 %
BUN BLD-MCNC: 16 MG/DL (ref 7–18)
BUN/CREAT SERPL: 39 (ref 10–20)
CALCIUM BLD-MCNC: 8.6 MG/DL (ref 8.5–10.1)
CHLORIDE SERPL-SCNC: 102 MMOL/L (ref 98–112)
CK SERPL-CCNC: 22 U/L
CO2 SERPL-SCNC: 29 MMOL/L (ref 21–32)
CREAT BLD-MCNC: 0.41 MG/DL
DEPRECATED RDW RBC AUTO: 49.1 FL (ref 35.1–46.3)
EOSINOPHIL # BLD AUTO: 0.31 X10(3) UL (ref 0–0.7)
EOSINOPHIL NFR BLD AUTO: 3 %
ERYTHROCYTE [DISTWIDTH] IN BLOOD BY AUTOMATED COUNT: 15.9 % (ref 11–15)
GLUCOSE BLD-MCNC: 84 MG/DL (ref 70–99)
HCT VFR BLD AUTO: 43.8 %
HGB BLD-MCNC: 13.3 G/DL
IMM GRANULOCYTES # BLD AUTO: 0.03 X10(3) UL (ref 0–1)
LYMPHOCYTES # BLD AUTO: 1.54 X10(3) UL (ref 1–4)
LYMPHOCYTES NFR BLD AUTO: 14.9 %
MCH RBC QN AUTO: 25.8 PG (ref 26–34)
MCHC RBC AUTO-ENTMCNC: 30.4 G/DL (ref 31–37)
MCV RBC AUTO: 85 FL
MONOCYTES # BLD AUTO: 0.72 X10(3) UL (ref 0.1–1)
MONOCYTES NFR BLD AUTO: 7 %
NEUTROPHILS # BLD AUTO: 7.72 X10 (3) UL (ref 1.5–7.7)
NEUTROPHILS # BLD AUTO: 7.72 X10(3) UL (ref 1.5–7.7)
NEUTROPHILS NFR BLD AUTO: 74.5 %
OSMOLALITY SERPL CALC.SUM OF ELEC: 288 MOSM/KG (ref 275–295)
PLATELET # BLD AUTO: 187 10(3)UL (ref 150–450)
POTASSIUM SERPL-SCNC: 3.3 MMOL/L (ref 3.5–5.1)
RBC # BLD AUTO: 5.15 X10(6)UL
SODIUM SERPL-SCNC: 139 MMOL/L (ref 136–145)
WBC # BLD AUTO: 10.4 X10(3) UL (ref 4–11)

## 2022-03-06 PROCEDURE — 99222 1ST HOSP IP/OBS MODERATE 55: CPT | Performed by: INTERNAL MEDICINE

## 2022-03-06 RX ORDER — EZETIMIBE 10 MG/1
10 TABLET ORAL DAILY
Status: DISCONTINUED | OUTPATIENT
Start: 2022-03-06 | End: 2022-03-10

## 2022-03-06 RX ORDER — VENLAFAXINE HYDROCHLORIDE 37.5 MG/1
75 CAPSULE, EXTENDED RELEASE ORAL DAILY
Status: DISCONTINUED | OUTPATIENT
Start: 2022-03-06 | End: 2022-03-10

## 2022-03-06 RX ORDER — HEPARIN SODIUM AND DEXTROSE 10000; 5 [USP'U]/100ML; G/100ML
INJECTION INTRAVENOUS CONTINUOUS
Status: DISCONTINUED | OUTPATIENT
Start: 2022-03-06 | End: 2022-03-07

## 2022-03-06 RX ORDER — BUMETANIDE 1 MG/1
2 TABLET ORAL DAILY
Status: DISCONTINUED | OUTPATIENT
Start: 2022-03-06 | End: 2022-03-09

## 2022-03-06 RX ORDER — HEPARIN SODIUM AND DEXTROSE 10000; 5 [USP'U]/100ML; G/100ML
12 INJECTION INTRAVENOUS ONCE
Status: COMPLETED | OUTPATIENT
Start: 2022-03-06 | End: 2022-03-06

## 2022-03-06 RX ORDER — ACETAMINOPHEN 325 MG/1
650 TABLET ORAL EVERY 6 HOURS PRN
Status: DISCONTINUED | OUTPATIENT
Start: 2022-03-06 | End: 2022-03-10

## 2022-03-06 RX ORDER — BUDESONIDE 3 MG/1
9 CAPSULE, COATED PELLETS ORAL EVERY MORNING
Status: DISCONTINUED | OUTPATIENT
Start: 2022-03-06 | End: 2022-03-10

## 2022-03-06 NOTE — PROGRESS NOTES
Therapeutic interchange from Invanz 1 gm Q24 to Meropenem 500 mg q8h per P&T approved protocol.     Kassie Block, PharmD

## 2022-03-07 ENCOUNTER — APPOINTMENT (OUTPATIENT)
Dept: GENERAL RADIOLOGY | Facility: HOSPITAL | Age: 77
DRG: 391 | End: 2022-03-07
Attending: HOSPITALIST
Payer: MEDICARE

## 2022-03-07 ENCOUNTER — ANESTHESIA (OUTPATIENT)
Dept: ENDOSCOPY | Facility: HOSPITAL | Age: 77
DRG: 391 | End: 2022-03-07
Payer: MEDICARE

## 2022-03-07 ENCOUNTER — ANESTHESIA EVENT (OUTPATIENT)
Dept: ENDOSCOPY | Facility: HOSPITAL | Age: 77
DRG: 391 | End: 2022-03-07
Payer: MEDICARE

## 2022-03-07 LAB
ANION GAP SERPL CALC-SCNC: 5 MMOL/L (ref 0–18)
APTT PPP: 37.9 SECONDS (ref 23.3–35.6)
APTT PPP: 82.6 SECONDS (ref 23.3–35.6)
BUN BLD-MCNC: 13 MG/DL (ref 7–18)
BUN/CREAT SERPL: 39.4 (ref 10–20)
CALCIUM BLD-MCNC: 7.9 MG/DL (ref 8.5–10.1)
CHLORIDE SERPL-SCNC: 103 MMOL/L (ref 98–112)
CO2 SERPL-SCNC: 31 MMOL/L (ref 21–32)
CREAT BLD-MCNC: 0.33 MG/DL
DEPRECATED RDW RBC AUTO: 49.5 FL (ref 35.1–46.3)
ERYTHROCYTE [DISTWIDTH] IN BLOOD BY AUTOMATED COUNT: 16 % (ref 11–15)
GLUCOSE BLD-MCNC: 104 MG/DL (ref 70–99)
HCT VFR BLD AUTO: 40.2 %
MAGNESIUM SERPL-MCNC: 1.5 MG/DL (ref 1.6–2.6)
MCH RBC QN AUTO: 26.2 PG (ref 26–34)
MCHC RBC AUTO-ENTMCNC: 30.8 G/DL (ref 31–37)
MCV RBC AUTO: 85 FL
OSMOLALITY SERPL CALC.SUM OF ELEC: 288 MOSM/KG (ref 275–295)
PHOSPHATE SERPL-MCNC: 2.5 MG/DL (ref 2.5–4.9)
PLATELET # BLD AUTO: 170 10(3)UL (ref 150–450)
POTASSIUM SERPL-SCNC: 2.8 MMOL/L (ref 3.5–5.1)
POTASSIUM SERPL-SCNC: 3.6 MMOL/L (ref 3.5–5.1)
PTH-INTACT SERPL-MCNC: 80.3 PG/ML (ref 18.5–88)
RBC # BLD AUTO: 4.73 X10(6)UL
SODIUM SERPL-SCNC: 139 MMOL/L (ref 136–145)
VIT D+METAB SERPL-MCNC: 20.8 NG/ML (ref 30–100)
WBC # BLD AUTO: 8.3 X10(3) UL (ref 4–11)

## 2022-03-07 PROCEDURE — 0DJ08ZZ INSPECTION OF UPPER INTESTINAL TRACT, VIA NATURAL OR ARTIFICIAL OPENING ENDOSCOPIC: ICD-10-PCS | Performed by: INTERNAL MEDICINE

## 2022-03-07 PROCEDURE — 71045 X-RAY EXAM CHEST 1 VIEW: CPT | Performed by: HOSPITALIST

## 2022-03-07 PROCEDURE — 43235 EGD DIAGNOSTIC BRUSH WASH: CPT | Performed by: INTERNAL MEDICINE

## 2022-03-07 RX ORDER — PANTOPRAZOLE SODIUM 40 MG/1
40 TABLET, DELAYED RELEASE ORAL
Status: DISCONTINUED | OUTPATIENT
Start: 2022-03-08 | End: 2022-03-10

## 2022-03-07 RX ORDER — POTASSIUM CHLORIDE 14.9 MG/ML
20 INJECTION INTRAVENOUS ONCE
Status: DISCONTINUED | OUTPATIENT
Start: 2022-03-08 | End: 2022-03-07

## 2022-03-07 RX ORDER — LIDOCAINE HYDROCHLORIDE 10 MG/ML
INJECTION, SOLUTION EPIDURAL; INFILTRATION; INTRACAUDAL; PERINEURAL AS NEEDED
Status: DISCONTINUED | OUTPATIENT
Start: 2022-03-07 | End: 2022-03-07 | Stop reason: SURG

## 2022-03-07 RX ORDER — DOXEPIN HYDROCHLORIDE 50 MG/1
1 CAPSULE ORAL DAILY
Status: DISCONTINUED | OUTPATIENT
Start: 2022-03-07 | End: 2022-03-10

## 2022-03-07 RX ORDER — POTASSIUM CHLORIDE 14.9 MG/ML
20 INJECTION INTRAVENOUS ONCE
Status: COMPLETED | OUTPATIENT
Start: 2022-03-07 | End: 2022-03-07

## 2022-03-07 RX ORDER — POTASSIUM CHLORIDE 14.9 MG/ML
20 INJECTION INTRAVENOUS ONCE
Status: COMPLETED | OUTPATIENT
Start: 2022-03-08 | End: 2022-03-08

## 2022-03-07 RX ADMIN — LIDOCAINE HYDROCHLORIDE 50 MG: 10 INJECTION, SOLUTION EPIDURAL; INFILTRATION; INTRACAUDAL; PERINEURAL at 13:47:00

## 2022-03-07 NOTE — PRE-SEDATION ASSESSMENT
Physician Pre-Sedation Assessment    Pre-Sedation Assessment:    Sedation History: Previous Sedation with No Complications and Airway Assessed    Cardiac: normal S1, S2  Respiratory: breath sounds clear bilaterally   Abdomen: soft, BS (+), non-tender    ASA Classification: 3.  Patient with severe systemic disease    Plan: mac Sedation

## 2022-03-07 NOTE — SLP NOTE
SLP attempt this AM. Per RN, pt to remain NPO for EGD this PM. SLP to follow up as pt cleared for PO and as pt appropriate. Please contact SLP with any questions, concerns, and/or change in status. Thank you. Alayna Tristan  Speech Language Pathologist  Phone Number Ext. [phone]

## 2022-03-07 NOTE — ANESTHESIA POSTPROCEDURE EVALUATION
Patient: Angelina Bachelor    Procedure Summary     Date: 03/07/22 Room / Location: [address] [place] [address] ENDOSCOPY 04 / [address] [place] [address] ENDOSCOPY    Anesthesia Start: 7569 Anesthesia Stop: 2725    Procedure: ESOPHAGOGASTRODUODENOSCOPY (EGD) (N/A ) Diagnosis: (Hiatal Hernia )    Surgeons: Hortencia Kaba MD Anesthesiologist: Salma Olivia MD    Anesthesia Type: general ASA Status: 3          Anesthesia Type: general    Vitals Value Taken Time   BP 97/46 03/07/22 1402   Temp  03/07/22 1402   Pulse 83 03/07/22 1402   Resp 31 03/07/22 1402   SpO2 96 % 03/07/22 1402   Vitals shown include unvalidated device data.     [address] [place] [address] AN Post Evaluation:   Patient Evaluated in PACU (endo)  Patient Participation: complete - patient cannot participate  Level of Consciousness: sleepy but conscious  Pain Score: 0  Pain Management: adequate  Airway Patency:patent  Dental exam unchanged from preop  Yes    Cardiovascular Status: acceptable  Respiratory Status: acceptable  Postoperative Hydration acceptable      Marty Harrington MD  3/7/2022 2:02 PM

## 2022-03-07 NOTE — CM/SW NOTE
03/07/22 1200   RACHID/SHERRELL Referral Data   Referral Source    Reason for Referral Discharge planning;Readmission   Informant Patient;EMR;Clinical Staff Member   Readmission Assessment   Factors that patient feels contributed to this readmission Acute/Chronic Clinical Presentation   Pt's living situation prior to admission? Subacute rehab   Pt's level of independence at discharge? Some assist (mod)   Pt. received education on diagnoses at time of discharge? Yes   Did you know who and how to call someone if you felt worse? Yes   Did any new symptoms or issues develop after you were discharged? Yes   ----Post D/C symptoms: Symptoms/issue related to previous hospitalization Yes   Did you understand your discharge instructions? Yes   Were medications taken as indicated on discharge instructions? Yes   Did you have a follow-up appointment scheduled at time of discharge? No   Was full assessment completed by SHERRELL/RACHID on prior admission? Yes   Was the recommended discharge plan achieved? Yes   Was pt. discharged w/out services? No   Pertinent Medical Hx   Does patient have an established PCP? Yes  (Apoorva Dias)   Significant Past Medical/Mental Health Hx HF, anxiety, depression   Patient Info   Patient's Current Mental Status at Time of Assessment Alert;Oriented   Patient's [address]   Patient lives with Spouse/Significant other   Patient Status Prior to Admission   Independent with ADLs and Mobility Yes   Discharge Needs   Anticipated D/C needs Subacute rehab   Services Requested   DON Screen Requested No - Current within 90 days   Choice of Post-Acute Provider   Informed patient of right to choose their preferred provider Yes     Pt discussed during nursing rounds. Dx dysphagia, osteomyelitis, wounds, on IV abx. From [facility], pt does not want to return due to care issues. ASIA referrals sent in [facility] per her request. Pt was home w/spouse prior to previous hospitalization. Plan: ASIA pending facility choice and medical clearance. / to remain available for support and/or discharge planning.      CALLIE Whittaker    [phone]

## 2022-03-07 NOTE — INTERVAL H&P NOTE
Pre-op Diagnosis: dysphagia    The above referenced H&P was reviewed by April Aviles MD on 3/7/2022, the patient was examined and no significant changes have occurred in the patient's condition since the H&P was performed. I discussed with the patient and/or legal representative the potential benefits, risks and side effects of this procedure; the likelihood of the patient achieving goals; and potential problems that might occur during recuperation. I discussed reasonable alternatives to the procedure, including risks, benefits and side effects related to the alternatives and risks related to not receiving this procedure. We will proceed with procedure as planned.

## 2022-03-07 NOTE — PROGRESS NOTES
Ptt resulted from 11:45 draw time, per protocol, Per Dr. Bill Taylor, heparin stopped at 09:30 for endoscopyt today. [id number].   Awaiting endo today for EGD with Dr. Bill Taylor

## 2022-03-07 NOTE — PROGRESS NOTES
Pt. Now going for Endo, no c/o voiced. Cowan with cloudy, yellow urine. Came to hospital with cowan catheter.

## 2022-03-07 NOTE — OPERATIVE REPORT
ESOPHAGOGASTRODUODENOSCOPY (EGD) REPORT    Hermelinda Villalpando     1945 Age 64year old   PCP María Elena Martin MD Endoscopist Tiki Cagle MD     Date of procedure: 22      Procedure: EGD     Pre-operative diagnosis: dysphagia    Post-operative diagnosis: see impression    Medications: MAC    Complications: none    Procedure:  Informed consent was obtained from the patient after the risks of the procedure were discussed, including but not limited to bleeding, perforation, aspiration, infection, or possibility of a missed lesion. After discussions of the risks/benefits and alternatives to this procedure, as well as the planned sedation, the patient was placed in the left lateral decubitus position and begun on continuous blood pressure pulse oximetry and EKG monitoring and this was maintained throughout the procedure. Once an adequate level of sedation was obtained a bite block was placed. Then the lubricated tip of the [id number] diagnostic video upper endoscope was inserted and advanced using direct visualization into the posterior pharynx and ultimately into the esophagus, stomach, and duodenum. Complications: None    Findings:      1. Esophagus: The squamocolumnar junction was noted at 36 cm and appeared normal. The diaphragmatic pinch was noted noted at 38 cm from the incisors. Thus a 2 cm sliding hiatal hernia. The esophagus appeared normal throughout its entire length with no evidence of rings, webs or luminal narrowing. There was no evidence of erosions, ulcerations, varices, or masses. 2. Stomach: We then entered the stomach. Gastric mucosa appeared normal in the forward view with no evidence of erythema, erosions, or ulcerations. There was no evidence of any luminal or submucosal masses. A retroflexed view allowed examination of the angularis, cardia and fundus and these areas also appeared normal with a patulous cardia.      3. Duodenum: The duodenal mucosa appeared normal in the 1st and 2nd portion of the duodenum. We then withdrew the instrument from the patient who tolerated the procedure well. Impression:   1. A 3 cm sliding hiatal hernia otherwise normal EGD  2. No structural esophageal findings to explain dysphagia. Recommend:  1. Return patient to hospital vo for ongoing care. 2. Continue ppi  3. Continue to work with SLP for dx oropharyngeal dysphagia  4. Ok to resume anticoagulation    >>>If tissue was sampled/removed and you have not received your pathology results either by phone or letter within 2 weeks, please call our office at [phone].     Specimens:  none    Blood loss: <1 ml      June Palma MD  [facility], [facility]

## 2022-03-08 ENCOUNTER — APPOINTMENT (OUTPATIENT)
Dept: CT IMAGING | Facility: HOSPITAL | Age: 77
DRG: 391 | End: 2022-03-08
Attending: INTERNAL MEDICINE
Payer: MEDICARE

## 2022-03-08 ENCOUNTER — APPOINTMENT (OUTPATIENT)
Dept: SPEECH THERAPY | Facility: HOSPITAL | Age: 77
End: 2022-03-08
Attending: INTERNAL MEDICINE
Payer: MEDICARE

## 2022-03-08 LAB
MAGNESIUM SERPL-MCNC: 2.4 MG/DL (ref 1.6–2.6)
PHOSPHATE SERPL-MCNC: 2.7 MG/DL (ref 2.5–4.9)
POTASSIUM SERPL-SCNC: 4.1 MMOL/L (ref 3.5–5.1)

## 2022-03-08 PROCEDURE — 74176 CT ABD & PELVIS W/O CONTRAST: CPT | Performed by: INTERNAL MEDICINE

## 2022-03-08 PROCEDURE — 71250 CT THORAX DX C-: CPT | Performed by: INTERNAL MEDICINE

## 2022-03-08 PROCEDURE — 99232 SBSQ HOSP IP/OBS MODERATE 35: CPT | Performed by: INTERNAL MEDICINE

## 2022-03-08 RX ORDER — METOCLOPRAMIDE HYDROCHLORIDE 5 MG/ML
10 INJECTION INTRAMUSCULAR; INTRAVENOUS EVERY 6 HOURS PRN
Status: DISCONTINUED | OUTPATIENT
Start: 2022-03-08 | End: 2022-03-10

## 2022-03-08 NOTE — CM/SW NOTE
From [employer] where patient is refusing to return due to care issues. Pt provided new ASIA list of accepting facilities and has chosen [facility] for [facility]IA at [employer]. Facility reserved in [facility] and notified that patient will be ready for dc in 1-2 days.     CALLIE Dietz    [phone]

## 2022-03-08 NOTE — PLAN OF CARE
Called PharmacistMaritza to verify/confirm k+ phos ordered and levels per protocol, she said it's ok.

## 2022-03-09 ENCOUNTER — APPOINTMENT (OUTPATIENT)
Dept: GENERAL RADIOLOGY | Facility: HOSPITAL | Age: 77
DRG: 391 | End: 2022-03-09
Attending: STUDENT IN AN ORGANIZED HEALTH CARE EDUCATION/TRAINING PROGRAM
Payer: MEDICARE

## 2022-03-09 LAB
ANION GAP SERPL CALC-SCNC: 6 MMOL/L (ref 0–18)
BUN BLD-MCNC: 11 MG/DL (ref 7–18)
BUN/CREAT SERPL: 55 (ref 10–20)
CALCIUM BLD-MCNC: 8.2 MG/DL (ref 8.5–10.1)
CHLORIDE SERPL-SCNC: 104 MMOL/L (ref 98–112)
CO2 SERPL-SCNC: 31 MMOL/L (ref 21–32)
CREAT BLD-MCNC: 0.2 MG/DL
DEPRECATED RDW RBC AUTO: 47.6 FL (ref 35.1–46.3)
ERYTHROCYTE [DISTWIDTH] IN BLOOD BY AUTOMATED COUNT: 15.8 % (ref 11–15)
GLUCOSE BLD-MCNC: 84 MG/DL (ref 70–99)
HCT VFR BLD AUTO: 34.4 %
HGB BLD-MCNC: 10.8 G/DL
MAGNESIUM SERPL-MCNC: 1.8 MG/DL (ref 1.6–2.6)
MCH RBC QN AUTO: 26 PG (ref 26–34)
MCHC RBC AUTO-ENTMCNC: 31.4 G/DL (ref 31–37)
MCV RBC AUTO: 82.7 FL
OSMOLALITY SERPL CALC.SUM OF ELEC: 291 MOSM/KG (ref 275–295)
PLATELET # BLD AUTO: 156 10(3)UL (ref 150–450)
POTASSIUM SERPL-SCNC: 2.8 MMOL/L (ref 3.5–5.1)
RBC # BLD AUTO: 4.16 X10(6)UL
SODIUM SERPL-SCNC: 141 MMOL/L (ref 136–145)
WBC # BLD AUTO: 7.1 X10(3) UL (ref 4–11)

## 2022-03-09 PROCEDURE — 74230 X-RAY XM SWLNG FUNCJ C+: CPT | Performed by: STUDENT IN AN ORGANIZED HEALTH CARE EDUCATION/TRAINING PROGRAM

## 2022-03-09 PROCEDURE — 99232 SBSQ HOSP IP/OBS MODERATE 35: CPT | Performed by: PHYSICIAN ASSISTANT

## 2022-03-09 RX ORDER — MAGNESIUM SULFATE HEPTAHYDRATE 40 MG/ML
2 INJECTION, SOLUTION INTRAVENOUS ONCE
Status: COMPLETED | OUTPATIENT
Start: 2022-03-09 | End: 2022-03-09

## 2022-03-09 RX ORDER — BUMETANIDE 0.25 MG/ML
2 INJECTION, SOLUTION INTRAMUSCULAR; INTRAVENOUS EVERY 12 HOURS
Status: DISCONTINUED | OUTPATIENT
Start: 2022-03-09 | End: 2022-03-09

## 2022-03-09 RX ORDER — POTASSIUM CHLORIDE 29.8 MG/ML
40 INJECTION INTRAVENOUS ONCE
Status: COMPLETED | OUTPATIENT
Start: 2022-03-09 | End: 2022-03-09

## 2022-03-09 RX ORDER — BUMETANIDE 0.25 MG/ML
1 INJECTION, SOLUTION INTRAMUSCULAR; INTRAVENOUS EVERY 12 HOURS
Status: DISCONTINUED | OUTPATIENT
Start: 2022-03-09 | End: 2022-03-10

## 2022-03-09 NOTE — RESTORATIVE THERAPY
RESTORATIVE CARE TREATMENT NOTE    Presenting Problem  Presenting Problem: dysphagia  Presenting Problem:  (dysphagia)  Presenting Problem: Swallow    Precautions  Precautions: Bed/chair alarm  Precautions: Aspiration    Weight Bearing Restriction  Weight Bearing Restriction: None                   SUNDAY MONDAY TUESDAY WEDNESDAY THURSDAY FRIDAY SATURDAY   TRANSFERS          Bed <-> Chair          Sit <-> Stand     N/A      AMBULATION          Distance          Assist Device          EXERCISES          UE Set     x 20 AROM      LE Set          VITALS          HR Pre          HR Post          BP Pre          BP Post            Comments: Patient in bed supine, alert, motivated and agreed to ROM exercises. RN approved participation in Restorative. PPE worn: gloves, mask, gown, goggles. Patient was able to follow all instruction and performed  AROM to both UE: shoulder, elbow and hands x 20 reps. Patient left as found supine in comfortable position as possible, all needs and call light within reach, RN aware.

## 2022-03-09 NOTE — CM/SW NOTE
DIMAS received for POLST. Pt has POLST in medical record dated 8/18/2021. CM placed copy in patient's chart, MD made aware. Dr. Linda Julio notified he also completed POLST w/patient on 3/8/22.     Pooja Card, BSN    [phone]

## 2022-03-10 VITALS
DIASTOLIC BLOOD PRESSURE: 42 MMHG | SYSTOLIC BLOOD PRESSURE: 113 MMHG | TEMPERATURE: 98 F | BODY MASS INDEX: 21.37 KG/M2 | RESPIRATION RATE: 18 BRPM | HEIGHT: 64 IN | WEIGHT: 125.19 LBS | OXYGEN SATURATION: 95 % | HEART RATE: 92 BPM

## 2022-03-10 LAB
MAGNESIUM SERPL-MCNC: 2 MG/DL (ref 1.6–2.6)
POTASSIUM SERPL-SCNC: 2.9 MMOL/L (ref 3.5–5.1)
SARS-COV-2 RNA RESP QL NAA+PROBE: NOT DETECTED

## 2022-03-10 PROCEDURE — 99232 SBSQ HOSP IP/OBS MODERATE 35: CPT | Performed by: PHYSICIAN ASSISTANT

## 2022-03-10 RX ORDER — BUMETANIDE 1 MG/1
2 TABLET ORAL DAILY
Status: DISCONTINUED | OUTPATIENT
Start: 2022-03-10 | End: 2022-03-10

## 2022-03-10 RX ORDER — POTASSIUM CHLORIDE 14.9 MG/ML
20 INJECTION INTRAVENOUS ONCE
Status: COMPLETED | OUTPATIENT
Start: 2022-03-10 | End: 2022-03-10

## 2022-03-10 RX ORDER — CETIRIZINE HYDROCHLORIDE 5 MG/1
5 TABLET ORAL ONCE
Status: COMPLETED | OUTPATIENT
Start: 2022-03-10 | End: 2022-03-10

## 2022-03-10 NOTE — DIETARY NOTE
Brief Nutrition Update Note    Update 03/10/22:  Pt seen for follow up. Per SLP note, VFSS completed yesterday 3/9, diet upgraded from puree/nectar thick to minced/moist and thin liquids. PO intake of 60% at dinner last night. PO intake variable prior to diet change, ~51% average intake x 6 meals since last visit. Fair-good PO intake of ONS. Will continue to monitor for improvement of PO intake with new diet changes. No s/s of N/V noted. Last BM on 3/9. Nutrition Diagnosis:   Severe Malnutrition related to Chronic - Physiological causes increasing nutrient needs due to illness or condition  as evidenced by poor appetite and po likely r/t dysphagia & dysphagia diet, energy intake <50% of estimated nutrition needs, 11% significant wt loss, with moderate muscle and fat mass depletion. Nutrition Diagnosis Progress:    Improvement (unresolved)      Will continue to monitor and follow up per protocol.     Beth Chong MS, [address] [facility], [address] [place] [address]  Clinical Nutrition  [phone]

## 2022-03-10 NOTE — CM/SW NOTE
03/10/22 1117   Discharge disposition   Expected discharge disposition [id number] Provider   ([facility])   Discharge transportation [facility]     Pt discussed during nursing rounds. Pt is stable for dc today. MD dc order entered. PT/OT recommending ASIA, [address] is chosen provider at [facility]. [facility] scheduled for 5:30pm ,    Number for nurse report is [phone]. Plan: BT of [place] for ASIA today. / to remain available for support and/or discharge planning.      Ashwin Pemberton, BSN    [phone]

## 2022-03-14 ENCOUNTER — MED REC SCAN ONLY (OUTPATIENT)
Dept: GASTROENTEROLOGY | Facility: CLINIC | Age: 77
End: 2022-03-14

## 2022-03-16 ENCOUNTER — EXTERNAL FACILITY (OUTPATIENT)
Dept: PULMONOLOGY | Facility: CLINIC | Age: 77
End: 2022-03-16

## 2022-03-16 PROCEDURE — 99306 1ST NF CARE HIGH MDM 50: CPT | Performed by: PHYSICIAN ASSISTANT

## 2022-03-23 ENCOUNTER — EXTERNAL FACILITY (OUTPATIENT)
Dept: PULMONOLOGY | Facility: CLINIC | Age: 77
End: 2022-03-23

## 2022-03-23 PROCEDURE — 99308 SBSQ NF CARE LOW MDM 20: CPT | Performed by: PHYSICIAN ASSISTANT

## 2022-03-30 ENCOUNTER — EXTERNAL FACILITY (OUTPATIENT)
Dept: PULMONOLOGY | Facility: CLINIC | Age: 77
End: 2022-03-30

## 2022-03-30 PROCEDURE — 99307 SBSQ NF CARE SF MDM 10: CPT | Performed by: PHYSICIAN ASSISTANT

## 2022-04-01 ENCOUNTER — EXTERNAL FACILITY (OUTPATIENT)
Dept: INTERNAL MEDICINE CLINIC | Facility: SKILLED NURSING FACILITY | Age: 77
End: 2022-04-01

## 2022-04-03 PROBLEM — K52.831 COLLAGENOUS COLITIS: Status: ACTIVE | Noted: 2022-01-01

## 2022-04-03 PROBLEM — K52.831 COLLAGENOUS COLITIS: Status: ACTIVE | Noted: 2022-04-03

## 2022-04-06 ENCOUNTER — EXTERNAL FACILITY (OUTPATIENT)
Dept: PULMONOLOGY | Facility: CLINIC | Age: 77
End: 2022-04-06

## 2022-04-06 PROCEDURE — 99307 SBSQ NF CARE SF MDM 10: CPT | Performed by: PHYSICIAN ASSISTANT

## 2022-04-07 ENCOUNTER — OFFICE VISIT (OUTPATIENT)
Dept: GASTROENTEROLOGY | Facility: CLINIC | Age: 77
End: 2022-04-07
Payer: MEDICARE

## 2022-04-07 ENCOUNTER — LAB ENCOUNTER (OUTPATIENT)
Dept: LAB | Facility: HOSPITAL | Age: 77
End: 2022-04-07
Attending: NURSE PRACTITIONER
Payer: MEDICARE

## 2022-04-07 VITALS — DIASTOLIC BLOOD PRESSURE: 66 MMHG | TEMPERATURE: 99 F | HEART RATE: 70 BPM | SYSTOLIC BLOOD PRESSURE: 118 MMHG

## 2022-04-07 DIAGNOSIS — K52.839 MICROSCOPIC COLITIS, UNSPECIFIED MICROSCOPIC COLITIS TYPE: ICD-10-CM

## 2022-04-07 DIAGNOSIS — R13.10 DYSPHAGIA, UNSPECIFIED TYPE: Primary | ICD-10-CM

## 2022-04-07 DIAGNOSIS — K74.60 CIRRHOSIS OF LIVER WITHOUT ASCITES, UNSPECIFIED HEPATIC CIRRHOSIS TYPE (HCC): ICD-10-CM

## 2022-04-07 LAB
AFP-TM SERPL-MCNC: 3.4 NG/ML (ref ?–8)
HAV AB SER QL IA: NONREACTIVE
HBV CORE AB SERPL QL IA: NONREACTIVE
HBV SURFACE AB SER QL: NONREACTIVE
HBV SURFACE AB SERPL IA-ACNC: <3.1 MIU/ML
HBV SURFACE AG SERPL QL IA: NONREACTIVE
HCV AB SERPL QL IA: NONREACTIVE

## 2022-04-07 PROCEDURE — 87340 HEPATITIS B SURFACE AG IA: CPT

## 2022-04-07 PROCEDURE — 99215 OFFICE O/P EST HI 40 MIN: CPT | Performed by: NURSE PRACTITIONER

## 2022-04-07 PROCEDURE — 86704 HEP B CORE ANTIBODY TOTAL: CPT

## 2022-04-07 PROCEDURE — 86803 HEPATITIS C AB TEST: CPT

## 2022-04-07 PROCEDURE — 82105 ALPHA-FETOPROTEIN SERUM: CPT

## 2022-04-07 PROCEDURE — 86706 HEP B SURFACE ANTIBODY: CPT

## 2022-04-07 PROCEDURE — 80503 PATH CLIN CONSLTJ SF 5-20: CPT

## 2022-04-07 PROCEDURE — 36415 COLL VENOUS BLD VENIPUNCTURE: CPT

## 2022-04-07 PROCEDURE — 86708 HEPATITIS A ANTIBODY: CPT

## 2022-04-08 ENCOUNTER — TELEPHONE (OUTPATIENT)
Dept: GASTROENTEROLOGY | Facility: CLINIC | Age: 77
End: 2022-04-08

## 2022-04-08 NOTE — TELEPHONE ENCOUNTER
Snapshot updated. 6 month lab and ultrasound recall entered into patient outreach in [place].     Next due 10/7/2022

## 2022-04-13 ENCOUNTER — EXTERNAL FACILITY (OUTPATIENT)
Dept: PULMONOLOGY | Facility: CLINIC | Age: 77
End: 2022-04-13

## 2022-04-13 PROCEDURE — 99307 SBSQ NF CARE SF MDM 10: CPT | Performed by: PHYSICIAN ASSISTANT

## 2022-04-26 ENCOUNTER — TELEPHONE (OUTPATIENT)
Dept: GASTROENTEROLOGY | Facility: CLINIC | Age: 77
End: 2022-04-26

## 2022-04-26 NOTE — TELEPHONE ENCOUNTER
Dary Lei-    Patient is requesting repeat testing for C. Dif due to ~ 1 week of \"c. Dif symptoms. \"     She has been emptying her bag 4 times daily of liquid stool. Also describes increased amount of air forming in bag, as per previous experiences, which will cause bag to \"pop off. \"    Denies any fever, or severe abdominal pain. Had therapy at home today, in which RN confirmed patient was afebrile. Please place orders for patient to complete repeat C. Dif testing if appropriate, and per patient,  Sandi Salinas will  stool testing kit from [facility] lab tomorrow for patient to complete sample and return to lab accordingly.     Thank you

## 2022-04-26 NOTE — TELEPHONE ENCOUNTER
Pt states \" I have a very watery stool and I think I have C. Diff again. \" Pt requesting a lab order to test for c.diff.  Please call [phone]

## 2022-04-27 NOTE — TELEPHONE ENCOUNTER
Spoke with Shanelle. Relayed c diff orders placed & able to submit at her earliest convenience. Aware on where she needs to go. Typically goes to [facility].  will  stool kit at some point today. No additional questions / concerns. Appreciative for call.

## 2022-04-28 ENCOUNTER — LAB ENCOUNTER (OUTPATIENT)
Dept: LAB | Facility: HOSPITAL | Age: 77
End: 2022-04-28
Attending: NURSE PRACTITIONER
Payer: MEDICARE

## 2022-04-28 DIAGNOSIS — R19.7 DIARRHEA, UNSPECIFIED TYPE: ICD-10-CM

## 2022-04-28 PROCEDURE — 87493 C DIFF AMPLIFIED PROBE: CPT

## 2022-04-29 ENCOUNTER — TELEPHONE (OUTPATIENT)
Dept: GASTROENTEROLOGY | Facility: CLINIC | Age: 77
End: 2022-04-29

## 2022-04-29 LAB — C DIFF TOX B STL QL: POSITIVE

## 2022-04-29 RX ORDER — VANCOMYCIN HYDROCHLORIDE 125 MG/1
125 CAPSULE ORAL 4 TIMES DAILY
Qty: 56 CAPSULE | Refills: 1 | Status: SHIPPED | OUTPATIENT
Start: 2022-04-29

## 2022-04-29 NOTE — TELEPHONE ENCOUNTER
Dr. Ana Luisa Hanson from lab called. Patient's C diff is positive. Please advise on next steps.     Thank you

## 2022-04-29 NOTE — TELEPHONE ENCOUNTER
I spoke to the patient's . The patient was recently treated with IV antibiotics for a pneumonia and osteomyelitis. She was treated with vancomycin prophylaxis. She has recently noted increased stool frequency and fluidity through the colostomy. She emptied the colostomy bag 4 times today. The C. difficile toxin assay was positive. I am recommending a 14-day course of vancomycin 4 times daily. Pulsed/tapered withdrawal after that. I would give strong consideration to a fecal transplant. We discussed the rationale. The patient's  wishes to assess how she does on the vancomycin before making the decision. Prescription sent to the pharmacy.

## 2022-05-02 NOTE — PROGRESS NOTES
[facility] Dosing Service  Antibiotic Dosing    Eloisa Cuadra is a 66year old for whom pharmacy is dosing Meropenem for treatment of UTI.      Allergies: is allergic to cymbalta [duloxetine hcl]; gabapentin; gnp iodides decolorized; penicillins; sh 2. Minimal bilateral posteriorly layering pleural effusions. 3. Stable cardiomegaly with extensive coronary artery calcifications. Assessment/Plan: Recommended changing ceftriaxone due to resistance to meropenem 500mg IVPB q12h.     Pharmacy will contin Improved

## 2022-05-10 ENCOUNTER — OFFICE VISIT (OUTPATIENT)
Dept: OPHTHALMOLOGY | Facility: CLINIC | Age: 77
End: 2022-05-10
Payer: MEDICARE

## 2022-05-10 DIAGNOSIS — H25.042 POSTERIOR SUBCAPSULAR AGE-RELATED CATARACT OF LEFT EYE: Primary | ICD-10-CM

## 2022-05-10 DIAGNOSIS — Z96.1 PSEUDOPHAKIA, RIGHT EYE: ICD-10-CM

## 2022-05-10 PROCEDURE — 92004 COMPRE OPH EXAM NEW PT 1/>: CPT | Performed by: OPHTHALMOLOGY

## 2022-05-10 NOTE — ASSESSMENT & PLAN NOTE
Discussed with patient that cataract in left eye is advanced enough at this time to consider surgery; it would be patient's choice. Discussed options such as surgery or change of glasses RX. Discussed surgical risks, benefits, alternatives and recovery. Patient understands that changing glasses will not significantly improve vision and elects to have surgery. As Dr. Lakia Tello is no longer performing cataract surgery, a referral to [facility] was offered. Patient agrees to be referred, so we will send complete exam and information to them and the patient will be contacted. Information on [address] [place] [address] ophthalmology given and reviewed with the patient.

## 2022-05-10 NOTE — PATIENT INSTRUCTIONS
Pseudophakia, right eye  No treatment. Posterior subcapsular age-related cataract of left eye  Discussed with patient that cataract in left eye is advanced enough at this time to consider surgery; it would be patient's choice. Discussed options such as surgery or change of glasses RX. Discussed surgical risks, benefits, alternatives and recovery. Patient understands that changing glasses will not significantly improve vision and elects to have surgery. As Dr. Yamilet Stinson is no longer performing cataract surgery, a referral to [facility] was offered. Patient agrees to be referred, so we will send complete exam and information to them and the patient will be contacted. Information on [address] [place] [address] ophthalmology given and reviewed with the patient.

## 2022-05-20 ENCOUNTER — HOSPITAL ENCOUNTER (EMERGENCY)
Facility: HOSPITAL | Age: 77
Discharge: HOME | End: 2022-05-20
Attending: EMERGENCY MEDICINE
Payer: MEDICARE

## 2022-05-20 VITALS
SYSTOLIC BLOOD PRESSURE: 132 MMHG | DIASTOLIC BLOOD PRESSURE: 68 MMHG | WEIGHT: 123.44 LBS | OXYGEN SATURATION: 98 % | HEART RATE: 89 BPM | BODY MASS INDEX: 21 KG/M2 | RESPIRATION RATE: 18 BRPM | TEMPERATURE: 98 F

## 2022-05-20 DIAGNOSIS — L89.153 SACRAL DECUBITUS ULCER, STAGE III (HCC): Primary | ICD-10-CM

## 2022-05-20 DIAGNOSIS — L03.317 CELLULITIS OF BUTTOCK: ICD-10-CM

## 2022-05-20 LAB
ANION GAP SERPL CALC-SCNC: 9 MMOL/L (ref 0–18)
BASOPHILS # BLD AUTO: 0.03 X10(3) UL (ref 0–0.2)
BASOPHILS NFR BLD AUTO: 0.3 %
BUN BLD-MCNC: 21 MG/DL (ref 7–18)
BUN/CREAT SERPL: 43.8 (ref 10–20)
CALCIUM BLD-MCNC: 9 MG/DL (ref 8.5–10.1)
CHLORIDE SERPL-SCNC: 104 MMOL/L (ref 98–112)
CO2 SERPL-SCNC: 26 MMOL/L (ref 21–32)
CREAT BLD-MCNC: 0.48 MG/DL
DEPRECATED RDW RBC AUTO: 54.9 FL (ref 35.1–46.3)
EOSINOPHIL # BLD AUTO: 0.1 X10(3) UL (ref 0–0.7)
EOSINOPHIL NFR BLD AUTO: 1.1 %
ERYTHROCYTE [DISTWIDTH] IN BLOOD BY AUTOMATED COUNT: 18.6 % (ref 11–15)
GLUCOSE BLD-MCNC: 199 MG/DL (ref 70–99)
HCT VFR BLD AUTO: 40.8 %
HGB BLD-MCNC: 12.3 G/DL
IMM GRANULOCYTES # BLD AUTO: 0.05 X10(3) UL (ref 0–1)
IMM GRANULOCYTES NFR BLD: 0.5 %
LACTATE SERPL-SCNC: 2.3 MMOL/L (ref 0.4–2)
LYMPHOCYTES # BLD AUTO: 1.5 X10(3) UL (ref 1–4)
LYMPHOCYTES NFR BLD AUTO: 16.4 %
MCH RBC QN AUTO: 24.8 PG (ref 26–34)
MCHC RBC AUTO-ENTMCNC: 30.1 G/DL (ref 31–37)
MCV RBC AUTO: 82.3 FL
MONOCYTES # BLD AUTO: 0.6 X10(3) UL (ref 0.1–1)
MONOCYTES NFR BLD AUTO: 6.5 %
NEUTROPHILS # BLD AUTO: 6.89 X10 (3) UL (ref 1.5–7.7)
NEUTROPHILS # BLD AUTO: 6.89 X10(3) UL (ref 1.5–7.7)
NEUTROPHILS NFR BLD AUTO: 75.2 %
OSMOLALITY SERPL CALC.SUM OF ELEC: 297 MOSM/KG (ref 275–295)
PLATELET # BLD AUTO: 205 10(3)UL (ref 150–450)
POTASSIUM SERPL-SCNC: 3.5 MMOL/L (ref 3.5–5.1)
RBC # BLD AUTO: 4.96 X10(6)UL
SARS-COV-2 RNA RESP QL NAA+PROBE: NOT DETECTED
SODIUM SERPL-SCNC: 139 MMOL/L (ref 136–145)
WBC # BLD AUTO: 9.2 X10(3) UL (ref 4–11)

## 2022-05-20 PROCEDURE — 87040 BLOOD CULTURE FOR BACTERIA: CPT | Performed by: EMERGENCY MEDICINE

## 2022-05-20 PROCEDURE — 80048 BASIC METABOLIC PNL TOTAL CA: CPT | Performed by: EMERGENCY MEDICINE

## 2022-05-20 PROCEDURE — 87070 CULTURE OTHR SPECIMN AEROBIC: CPT | Performed by: EMERGENCY MEDICINE

## 2022-05-20 PROCEDURE — 85025 COMPLETE CBC W/AUTO DIFF WBC: CPT | Performed by: EMERGENCY MEDICINE

## 2022-05-20 PROCEDURE — 87186 SC STD MICRODIL/AGAR DIL: CPT | Performed by: EMERGENCY MEDICINE

## 2022-05-20 PROCEDURE — 96366 THER/PROPH/DIAG IV INF ADDON: CPT

## 2022-05-20 PROCEDURE — 87205 SMEAR GRAM STAIN: CPT | Performed by: EMERGENCY MEDICINE

## 2022-05-20 PROCEDURE — 87077 CULTURE AEROBIC IDENTIFY: CPT | Performed by: EMERGENCY MEDICINE

## 2022-05-20 PROCEDURE — 83605 ASSAY OF LACTIC ACID: CPT | Performed by: EMERGENCY MEDICINE

## 2022-05-20 PROCEDURE — 96365 THER/PROPH/DIAG IV INF INIT: CPT

## 2022-05-20 PROCEDURE — 99284 EMERGENCY DEPT VISIT MOD MDM: CPT

## 2022-05-20 PROCEDURE — 87150 DNA/RNA AMPLIFIED PROBE: CPT | Performed by: EMERGENCY MEDICINE

## 2022-05-20 RX ORDER — BUMETANIDE 1 MG/1
1 TABLET ORAL DAILY
Status: CANCELLED | OUTPATIENT
Start: 2022-05-21

## 2022-05-20 RX ORDER — BUDESONIDE 3 MG/1
9 CAPSULE, COATED PELLETS ORAL EVERY MORNING
Status: CANCELLED | OUTPATIENT
Start: 2022-05-21

## 2022-05-20 RX ORDER — VENLAFAXINE HYDROCHLORIDE 75 MG/1
75 CAPSULE, EXTENDED RELEASE ORAL DAILY
Status: CANCELLED | OUTPATIENT
Start: 2022-05-20

## 2022-05-20 RX ORDER — HYDROCODONE BITARTRATE AND ACETAMINOPHEN 5; 325 MG/1; MG/1
2 TABLET ORAL EVERY 4 HOURS PRN
Status: CANCELLED | OUTPATIENT
Start: 2022-05-20

## 2022-05-20 RX ORDER — PREGABALIN 50 MG/1
50 CAPSULE ORAL 3 TIMES DAILY
Status: CANCELLED | OUTPATIENT
Start: 2022-05-21

## 2022-05-20 RX ORDER — EZETIMIBE 10 MG/1
10 TABLET ORAL DAILY
Status: CANCELLED | OUTPATIENT
Start: 2022-05-21

## 2022-05-20 RX ORDER — MELATONIN
325
Status: CANCELLED | OUTPATIENT
Start: 2022-05-21

## 2022-05-20 RX ORDER — VANCOMYCIN HYDROCHLORIDE 125 MG/1
125 CAPSULE ORAL 4 TIMES DAILY
Status: CANCELLED | OUTPATIENT
Start: 2022-05-20

## 2022-05-20 RX ORDER — BISACODYL 10 MG
10 SUPPOSITORY, RECTAL RECTAL
Status: CANCELLED | OUTPATIENT
Start: 2022-05-20

## 2022-05-20 RX ORDER — ACETAMINOPHEN 325 MG/1
650 TABLET ORAL EVERY 6 HOURS PRN
Status: CANCELLED | OUTPATIENT
Start: 2022-05-20

## 2022-05-20 RX ORDER — HYDROCODONE BITARTRATE AND ACETAMINOPHEN 5; 325 MG/1; MG/1
1 TABLET ORAL EVERY 4 HOURS PRN
Status: CANCELLED | OUTPATIENT
Start: 2022-05-20

## 2022-05-20 RX ORDER — SODIUM PHOSPHATE, DIBASIC AND SODIUM PHOSPHATE, MONOBASIC 7; 19 G/133ML; G/133ML
1 ENEMA RECTAL ONCE AS NEEDED
Status: CANCELLED | OUTPATIENT
Start: 2022-05-20

## 2022-05-20 RX ORDER — CETIRIZINE HYDROCHLORIDE 10 MG/1
10 TABLET ORAL DAILY
Status: CANCELLED | OUTPATIENT
Start: 2022-05-21

## 2022-05-20 RX ORDER — ACETAMINOPHEN 325 MG/1
650 TABLET ORAL EVERY 4 HOURS PRN
Status: CANCELLED | OUTPATIENT
Start: 2022-05-20

## 2022-05-20 RX ORDER — PANTOPRAZOLE SODIUM 40 MG/1
40 TABLET, DELAYED RELEASE ORAL
Refills: 12 | Status: CANCELLED | OUTPATIENT
Start: 2022-05-21

## 2022-05-20 RX ORDER — DIGOXIN 250 MCG
125 TABLET ORAL DAILY
Status: CANCELLED | OUTPATIENT
Start: 2022-05-21

## 2022-05-20 RX ORDER — SENNOSIDES 8.6 MG
17.2 TABLET ORAL NIGHTLY PRN
Status: CANCELLED | OUTPATIENT
Start: 2022-05-20

## 2022-05-20 RX ORDER — POLYETHYLENE GLYCOL 3350 17 G/17G
17 POWDER, FOR SOLUTION ORAL DAILY PRN
Status: CANCELLED | OUTPATIENT
Start: 2022-05-20

## 2022-05-20 RX ORDER — ATORVASTATIN CALCIUM 40 MG/1
40 TABLET, FILM COATED ORAL NIGHTLY
Status: CANCELLED | OUTPATIENT
Start: 2022-05-20

## 2022-05-20 NOTE — ED NOTES
I did not participate in the management of this patient. Patient was admitted to the medicine service. Dr. Alka Hall spoke to me stating that surgery evaluated this patient who did not believe patient required a debridement nor did surgery suspect infection. Hospitalist also did not believe patient had an ongoing infection. Patient kindly requested discharge. I was asked by the medicine team to kindly discharge from the emergency department. Dr. Alka Hall will place a note in the system. Strict return instructions given. Patient encouraged to follow-up with primary care provider in the next few days. Advised to return to the emergency department for any worsening symptoms      Patient is non toxic appearing, is in no distress, hemodynamically stable. Pt agrees and is aware of plan.        Monika Brannon MD  Emergency Medicine Physician  [facility]

## 2022-05-20 NOTE — ED QUICK NOTES
Pt does not want to be admitted, Pt spoke to th and verbalized that she want to go home and follow up as outpatient wound clinic.  Dr Sergey Horta made aware

## 2022-05-20 NOTE — ED INITIAL ASSESSMENT (HPI)
Wound to left buttock/hip for \"long time\"  Notes began to bleed today   Called wound care today who she is not currently seeing and told to come in.   Denies fevers    Bandage done by home health care nurse

## 2022-05-20 NOTE — ED QUICK NOTES
Orders for admission, patient is aware of plan and ready to go upstairs. Any questions, please call ED RN Amanda Leger at extension [phone].      Patient Covid vaccination status: Fully vaccinated     COVID Test Ordered in ED: Rapid SARS-CoV-2 by PCR  Neg    COVID Suspicion at Admission: N/A    Running Infusions:  None    Mental Status/LOC at time of transport: A/Ox4    Other pertinent information:   CIWA score: N/A   NIH score:  N/A

## 2022-05-21 NOTE — ED QUICK NOTES
Pt provided with discharge instruction, verbalized understand for plan of care at home and follow up. All question and concerns addressed prior to discharge. Pt verbalized discharge instruction and follow up aptt.

## 2022-05-22 ENCOUNTER — HOSPITAL ENCOUNTER (INPATIENT)
Facility: HOSPITAL | Age: 77
LOS: 3 days | Discharge: HOME HEALTH | End: 2022-05-25
Attending: EMERGENCY MEDICINE | Admitting: INTERNAL MEDICINE
Payer: MEDICARE

## 2022-05-22 DIAGNOSIS — R78.81 BACTEREMIA: Primary | ICD-10-CM

## 2022-05-22 LAB
ANION GAP SERPL CALC-SCNC: 5 MMOL/L (ref 0–18)
BASOPHILS # BLD AUTO: 0.02 X10(3) UL (ref 0–0.2)
BASOPHILS NFR BLD AUTO: 0.2 %
BILIRUB UR QL: NEGATIVE
BUN BLD-MCNC: 19 MG/DL (ref 7–18)
BUN/CREAT SERPL: 38.8 (ref 10–20)
CALCIUM BLD-MCNC: 8.7 MG/DL (ref 8.5–10.1)
CHLORIDE SERPL-SCNC: 103 MMOL/L (ref 98–112)
CO2 SERPL-SCNC: 31 MMOL/L (ref 21–32)
COLOR UR: YELLOW
CREAT BLD-MCNC: 0.49 MG/DL
DEPRECATED RDW RBC AUTO: 56.4 FL (ref 35.1–46.3)
EOSINOPHIL # BLD AUTO: 0.1 X10(3) UL (ref 0–0.7)
EOSINOPHIL NFR BLD AUTO: 1.2 %
ERYTHROCYTE [DISTWIDTH] IN BLOOD BY AUTOMATED COUNT: 18.7 % (ref 11–15)
GLUCOSE BLD-MCNC: 195 MG/DL (ref 70–99)
GLUCOSE UR-MCNC: NEGATIVE MG/DL
HCT VFR BLD AUTO: 41.5 %
HGB BLD-MCNC: 12.4 G/DL
HGB UR QL STRIP.AUTO: NEGATIVE
IMM GRANULOCYTES # BLD AUTO: 0.06 X10(3) UL (ref 0–1)
IMM GRANULOCYTES NFR BLD: 0.7 %
KETONES UR-MCNC: NEGATIVE MG/DL
LACTATE SERPL-SCNC: 2.1 MMOL/L (ref 0.4–2)
LACTATE SERPL-SCNC: 2.4 MMOL/L (ref 0.4–2)
LACTATE SERPL-SCNC: 2.5 MMOL/L (ref 0.4–2)
LYMPHOCYTES # BLD AUTO: 1.56 X10(3) UL (ref 1–4)
LYMPHOCYTES NFR BLD AUTO: 18.3 %
MCH RBC QN AUTO: 25 PG (ref 26–34)
MCHC RBC AUTO-ENTMCNC: 29.9 G/DL (ref 31–37)
MCV RBC AUTO: 83.7 FL
MONOCYTES # BLD AUTO: 0.34 X10(3) UL (ref 0.1–1)
MONOCYTES NFR BLD AUTO: 4 %
NEUTROPHILS # BLD AUTO: 6.46 X10 (3) UL (ref 1.5–7.7)
NEUTROPHILS # BLD AUTO: 6.46 X10(3) UL (ref 1.5–7.7)
NEUTROPHILS NFR BLD AUTO: 75.6 %
NITRITE UR QL STRIP.AUTO: NEGATIVE
OSMOLALITY SERPL CALC.SUM OF ELEC: 296 MOSM/KG (ref 275–295)
PH UR: 6 [PH] (ref 5–8)
PLATELET # BLD AUTO: 194 10(3)UL (ref 150–450)
POTASSIUM SERPL-SCNC: 3.1 MMOL/L (ref 3.5–5.1)
PROT UR-MCNC: 30 MG/DL
RBC # BLD AUTO: 4.96 X10(6)UL
SARS-COV-2 RNA RESP QL NAA+PROBE: NOT DETECTED
SODIUM SERPL-SCNC: 139 MMOL/L (ref 136–145)
SP GR UR STRIP: 1.02 (ref 1–1.03)
UROBILINOGEN UR STRIP-ACNC: 2
VIT C UR-MCNC: 40 MG/DL
WBC # BLD AUTO: 8.5 X10(3) UL (ref 4–11)

## 2022-05-22 PROCEDURE — 87040 BLOOD CULTURE FOR BACTERIA: CPT | Performed by: EMERGENCY MEDICINE

## 2022-05-22 PROCEDURE — 81001 URINALYSIS AUTO W/SCOPE: CPT | Performed by: EMERGENCY MEDICINE

## 2022-05-22 PROCEDURE — 36415 COLL VENOUS BLD VENIPUNCTURE: CPT

## 2022-05-22 PROCEDURE — 96374 THER/PROPH/DIAG INJ IV PUSH: CPT

## 2022-05-22 PROCEDURE — 80048 BASIC METABOLIC PNL TOTAL CA: CPT | Performed by: EMERGENCY MEDICINE

## 2022-05-22 PROCEDURE — 85025 COMPLETE CBC W/AUTO DIFF WBC: CPT | Performed by: EMERGENCY MEDICINE

## 2022-05-22 PROCEDURE — 87086 URINE CULTURE/COLONY COUNT: CPT | Performed by: EMERGENCY MEDICINE

## 2022-05-22 PROCEDURE — 83605 ASSAY OF LACTIC ACID: CPT | Performed by: EMERGENCY MEDICINE

## 2022-05-22 PROCEDURE — 96361 HYDRATE IV INFUSION ADD-ON: CPT

## 2022-05-22 PROCEDURE — 93005 ELECTROCARDIOGRAM TRACING: CPT

## 2022-05-22 PROCEDURE — 93010 ELECTROCARDIOGRAM REPORT: CPT | Performed by: EMERGENCY MEDICINE

## 2022-05-22 RX ORDER — POLYETHYLENE GLYCOL 3350 17 G/17G
17 POWDER, FOR SOLUTION ORAL DAILY PRN
Status: DISCONTINUED | OUTPATIENT
Start: 2022-05-22 | End: 2022-05-25

## 2022-05-22 RX ORDER — ALBUTEROL SULFATE 90 UG/1
2 AEROSOL, METERED RESPIRATORY (INHALATION) EVERY 4 HOURS PRN
Status: DISCONTINUED | OUTPATIENT
Start: 2022-05-22 | End: 2022-05-25

## 2022-05-22 RX ORDER — CETIRIZINE HYDROCHLORIDE 10 MG/1
10 TABLET ORAL DAILY
Status: DISCONTINUED | OUTPATIENT
Start: 2022-05-23 | End: 2022-05-25

## 2022-05-22 RX ORDER — ATORVASTATIN CALCIUM 40 MG/1
40 TABLET, FILM COATED ORAL NIGHTLY
Status: DISCONTINUED | OUTPATIENT
Start: 2022-05-22 | End: 2022-05-25

## 2022-05-22 RX ORDER — FLUTICASONE PROPIONATE 50 MCG
1 SPRAY, SUSPENSION (ML) NASAL DAILY
Status: DISCONTINUED | OUTPATIENT
Start: 2022-05-22 | End: 2022-05-25

## 2022-05-22 RX ORDER — VANCOMYCIN HYDROCHLORIDE 125 MG/1
125 CAPSULE ORAL 2 TIMES DAILY
Status: DISCONTINUED | OUTPATIENT
Start: 2022-05-22 | End: 2022-05-25

## 2022-05-22 RX ORDER — SENNOSIDES 8.6 MG
17.2 TABLET ORAL NIGHTLY PRN
Status: DISCONTINUED | OUTPATIENT
Start: 2022-05-22 | End: 2022-05-25

## 2022-05-22 RX ORDER — PANTOPRAZOLE SODIUM 40 MG/1
40 TABLET, DELAYED RELEASE ORAL
Refills: 12 | Status: DISCONTINUED | OUTPATIENT
Start: 2022-05-23 | End: 2022-05-25

## 2022-05-22 RX ORDER — BISACODYL 10 MG
10 SUPPOSITORY, RECTAL RECTAL
Status: DISCONTINUED | OUTPATIENT
Start: 2022-05-22 | End: 2022-05-25

## 2022-05-22 RX ORDER — EZETIMIBE 10 MG/1
10 TABLET ORAL DAILY
Status: DISCONTINUED | OUTPATIENT
Start: 2022-05-23 | End: 2022-05-25

## 2022-05-22 RX ORDER — ACETAMINOPHEN 325 MG/1
650 TABLET ORAL EVERY 6 HOURS PRN
Status: DISCONTINUED | OUTPATIENT
Start: 2022-05-22 | End: 2022-05-25

## 2022-05-22 RX ORDER — MELATONIN
325
Status: DISCONTINUED | OUTPATIENT
Start: 2022-05-23 | End: 2022-05-25

## 2022-05-22 RX ORDER — HYDROCODONE BITARTRATE AND ACETAMINOPHEN 5; 325 MG/1; MG/1
1 TABLET ORAL EVERY 4 HOURS PRN
Status: DISCONTINUED | OUTPATIENT
Start: 2022-05-22 | End: 2022-05-25

## 2022-05-22 RX ORDER — PREGABALIN 50 MG/1
50 CAPSULE ORAL 2 TIMES DAILY
Status: DISCONTINUED | OUTPATIENT
Start: 2022-05-22 | End: 2022-05-25

## 2022-05-22 RX ORDER — POTASSIUM CHLORIDE 20 MEQ/1
40 TABLET, EXTENDED RELEASE ORAL ONCE
Status: COMPLETED | OUTPATIENT
Start: 2022-05-22 | End: 2022-05-22

## 2022-05-22 RX ORDER — CHOLESTYRAMINE LIGHT 4 G/5.7G
2 POWDER, FOR SUSPENSION ORAL DAILY
Status: DISCONTINUED | OUTPATIENT
Start: 2022-05-23 | End: 2022-05-23

## 2022-05-22 RX ORDER — HYDROCODONE BITARTRATE AND ACETAMINOPHEN 5; 325 MG/1; MG/1
2 TABLET ORAL EVERY 4 HOURS PRN
Status: DISCONTINUED | OUTPATIENT
Start: 2022-05-22 | End: 2022-05-25

## 2022-05-22 RX ORDER — ACETAMINOPHEN 325 MG/1
650 TABLET ORAL EVERY 4 HOURS PRN
Status: DISCONTINUED | OUTPATIENT
Start: 2022-05-22 | End: 2022-05-25

## 2022-05-22 RX ORDER — VANCOMYCIN HYDROCHLORIDE 125 MG/1
125 CAPSULE ORAL DAILY
Status: DISCONTINUED | OUTPATIENT
Start: 2022-05-22 | End: 2022-05-22

## 2022-05-22 RX ORDER — ONDANSETRON 2 MG/ML
4 INJECTION INTRAMUSCULAR; INTRAVENOUS EVERY 6 HOURS PRN
Status: DISCONTINUED | OUTPATIENT
Start: 2022-05-22 | End: 2022-05-25

## 2022-05-22 RX ORDER — VENLAFAXINE HYDROCHLORIDE 75 MG/1
75 CAPSULE, EXTENDED RELEASE ORAL DAILY
Status: DISCONTINUED | OUTPATIENT
Start: 2022-05-22 | End: 2022-05-25

## 2022-05-22 RX ORDER — METOCLOPRAMIDE HYDROCHLORIDE 5 MG/ML
10 INJECTION INTRAMUSCULAR; INTRAVENOUS EVERY 8 HOURS PRN
Status: DISCONTINUED | OUTPATIENT
Start: 2022-05-22 | End: 2022-05-25

## 2022-05-22 RX ORDER — BUDESONIDE 3 MG/1
9 CAPSULE, COATED PELLETS ORAL EVERY MORNING
Status: DISCONTINUED | OUTPATIENT
Start: 2022-05-23 | End: 2022-05-25

## 2022-05-22 RX ORDER — DIGOXIN 125 MCG
125 TABLET ORAL DAILY
Status: DISCONTINUED | OUTPATIENT
Start: 2022-05-23 | End: 2022-05-25

## 2022-05-22 RX ORDER — SODIUM PHOSPHATE, DIBASIC AND SODIUM PHOSPHATE, MONOBASIC 7; 19 G/133ML; G/133ML
1 ENEMA RECTAL ONCE AS NEEDED
Status: DISCONTINUED | OUTPATIENT
Start: 2022-05-22 | End: 2022-05-25

## 2022-05-22 RX ORDER — BUMETANIDE 1 MG/1
2 TABLET ORAL DAILY
Status: DISCONTINUED | OUTPATIENT
Start: 2022-05-23 | End: 2022-05-25

## 2022-05-23 LAB
ANION GAP SERPL CALC-SCNC: 3 MMOL/L (ref 0–18)
BUN BLD-MCNC: 16 MG/DL (ref 7–18)
BUN/CREAT SERPL: 39 (ref 10–20)
CALCIUM BLD-MCNC: 8.5 MG/DL (ref 8.5–10.1)
CHLORIDE SERPL-SCNC: 111 MMOL/L (ref 98–112)
CO2 SERPL-SCNC: 28 MMOL/L (ref 21–32)
CREAT BLD-MCNC: 0.41 MG/DL
DEPRECATED RDW RBC AUTO: 57.4 FL (ref 35.1–46.3)
ERYTHROCYTE [DISTWIDTH] IN BLOOD BY AUTOMATED COUNT: 18.9 % (ref 11–15)
GLUCOSE BLD-MCNC: 104 MG/DL (ref 70–99)
HCT VFR BLD AUTO: 38.3 %
HGB BLD-MCNC: 11.4 G/DL
MCH RBC QN AUTO: 24.8 PG (ref 26–34)
MCHC RBC AUTO-ENTMCNC: 29.8 G/DL (ref 31–37)
MCV RBC AUTO: 83.3 FL
OSMOLALITY SERPL CALC.SUM OF ELEC: 295 MOSM/KG (ref 275–295)
PLATELET # BLD AUTO: 182 10(3)UL (ref 150–450)
POTASSIUM SERPL-SCNC: 4.9 MMOL/L (ref 3.5–5.1)
POTASSIUM SERPL-SCNC: 4.9 MMOL/L (ref 3.5–5.1)
RBC # BLD AUTO: 4.6 X10(6)UL
SODIUM SERPL-SCNC: 142 MMOL/L (ref 136–145)
WBC # BLD AUTO: 6.6 X10(3) UL (ref 4–11)

## 2022-05-23 PROCEDURE — 97166 OT EVAL MOD COMPLEX 45 MIN: CPT

## 2022-05-23 PROCEDURE — 97530 THERAPEUTIC ACTIVITIES: CPT

## 2022-05-23 PROCEDURE — 99211 OFF/OP EST MAY X REQ PHY/QHP: CPT

## 2022-05-23 PROCEDURE — 97605 NEG PRS WND THER DME<=50SQCM: CPT

## 2022-05-23 PROCEDURE — 51701 INSERT BLADDER CATHETER: CPT

## 2022-05-23 PROCEDURE — 97161 PT EVAL LOW COMPLEX 20 MIN: CPT

## 2022-05-23 PROCEDURE — 85027 COMPLETE CBC AUTOMATED: CPT | Performed by: INTERNAL MEDICINE

## 2022-05-23 PROCEDURE — 84132 ASSAY OF SERUM POTASSIUM: CPT | Performed by: INTERNAL MEDICINE

## 2022-05-23 PROCEDURE — 80048 BASIC METABOLIC PNL TOTAL CA: CPT | Performed by: INTERNAL MEDICINE

## 2022-05-23 NOTE — CM/SW NOTE
SW received MDO for home health and wound vac. SW met with patient at bedside to discuss discharge planning. Patient confirmed address on facesheet. Patient lives at home with her . Patient reports that her  assists with all ADLs/IADLs. Patient reports that she is working on hiring caregiver. Patient reports having a hospital bed, wheelchair, power chair. Patient reports current [facility] RN and OT w/ [facility]  and prefers to continue with agency for services. Patient reports history of ASIA. Patient reports that her goal is to return home with  and [facility]. [facility] liaison confirmed that patient is current w/ agency for RN and OT. SW entered [facility] for wound vac. Plan: Home w/ [facility], [facility] pending medical clearance    Addendum 5/24/2022:  MDO present for discharge. SHERRELL discussed w/ Wound Care RN status of home wound vac. Per Wound Care RN, order and delivery are pending for home wound vac. SHERRELL notified MD and RN and requested update on [place] pending wound vac order and delivery or patient to follow up in outpatient wound clinic for wound vac. Awaiting response. Per MD in regards to wound vac at [place], \"Patient should be able to leave. Wound is chronic, and not infected, should be able to follow up with wound clinic\". Updated RN, Wound Care RN, and [facility] liaison. Plan: Home / [facility], Follow up Outpatient in [address] [place] [address]     Addendum 5/25/2022:  MDO present for discharge. SHERRELL met with patient at bedside and confirmed plan for home / [facility] and follow up in outpatient wound clinic. Patient requesting ambulance transport home. SHERRELL notified by [facility] liaison that patient is followed for community palliative care. SW entered order for community palliative care and referred to [facility]. Notifed of [place] today. SHERRELL notified [facility] liaison of [place].     SHERRELL received call from outpatient wound clinic and spoke w/ Gayle Mcarthur who confirmed that patient has outpatient wound clinic appointment for 6/3/22. Plan: Home w/ , [facility], [address], [facility]  Ambulance arranged via [facility] for 5 PM. PCS complete. RN informed. SW/CM to remain available for support and/or discharge planning.      NADIA Miller, [facility]   [id number]

## 2022-05-23 NOTE — HOME CARE LIAISON
This patient is current with [facility]. Patient will need a ODALIS order for RN/OT at [facility].

## 2022-05-24 PROBLEM — R78.81 BACTEREMIA: Status: RESOLVED | Noted: 2022-05-22 | Resolved: 2022-05-24

## 2022-05-24 PROBLEM — R78.81 BACTEREMIA: Status: RESOLVED | Noted: 2022-01-01 | Resolved: 2022-01-01

## 2022-05-24 LAB
ANION GAP SERPL CALC-SCNC: 6 MMOL/L (ref 0–18)
BUN BLD-MCNC: 17 MG/DL (ref 7–18)
BUN/CREAT SERPL: 41.5 (ref 10–20)
CALCIUM BLD-MCNC: 9 MG/DL (ref 8.5–10.1)
CHLORIDE SERPL-SCNC: 105 MMOL/L (ref 98–112)
CO2 SERPL-SCNC: 30 MMOL/L (ref 21–32)
CREAT BLD-MCNC: 0.41 MG/DL
DEPRECATED RDW RBC AUTO: 55.6 FL (ref 35.1–46.3)
ERYTHROCYTE [DISTWIDTH] IN BLOOD BY AUTOMATED COUNT: 19.2 % (ref 11–15)
GLUCOSE BLD-MCNC: 99 MG/DL (ref 70–99)
HCT VFR BLD AUTO: 40.7 %
HGB BLD-MCNC: 12.3 G/DL
MCH RBC QN AUTO: 24.8 PG (ref 26–34)
MCHC RBC AUTO-ENTMCNC: 30.2 G/DL (ref 31–37)
MCV RBC AUTO: 82.1 FL
OSMOLALITY SERPL CALC.SUM OF ELEC: 294 MOSM/KG (ref 275–295)
PLATELET # BLD AUTO: 184 10(3)UL (ref 150–450)
POTASSIUM SERPL-SCNC: 3.7 MMOL/L (ref 3.5–5.1)
RBC # BLD AUTO: 4.96 X10(6)UL
SODIUM SERPL-SCNC: 141 MMOL/L (ref 136–145)
WBC # BLD AUTO: 7.5 X10(3) UL (ref 4–11)

## 2022-05-24 PROCEDURE — 80048 BASIC METABOLIC PNL TOTAL CA: CPT | Performed by: INTERNAL MEDICINE

## 2022-05-24 PROCEDURE — 85027 COMPLETE CBC AUTOMATED: CPT | Performed by: INTERNAL MEDICINE

## 2022-05-24 NOTE — RESTORATIVE THERAPY
RESTORATIVE CARE TREATMENT NOTE    Presenting Problem  Presenting Problem: sacral decubitus ulcer stage II, cellulitis of buttock, bacteremia  Presenting Problem: bacteremia       Precautions  Precautions: Colostomy; Bed/chair alarm; Other (Comment)       Weight Bearing Restriction  Weight Bearing Restriction: None                   SUNDAY MONDAY TUESDAY WEDNESDAY THURSDAY FRIDAY SATURDAY   TRANSFERS          Bed <-> Chair          Sit <-> Stand    N/A       AMBULATION          Distance          Assist Device          EXERCISES          UE Set    x 15       LE Set          VITALS          HR Pre          HR Post          BP Pre          BP Post            Comments: Consulted with RN prior to seeing patient. I found patient in the bed sleeping. During  ROM exercises patient woke up and was able assist and follow command with exercises. Performed AROM to both shoulders, elbow and hands x 15. Patient left as found in comfortable position as possible, with all needs and call light within reach, RN aware.

## 2022-05-24 NOTE — PROGRESS NOTES
05/24/22 0705   Negative Pressure Wound Therapy Other (Comment) Left   Placement Date: 05/23/22   Inserted by: Flynn Huitron RN  Wound Type: Pressure ulcer: stage III  Location: (c) Other (Comment)  Wound Location Orientation: Left   Wound photographed/measured No   Machine Status (On) Yes   Site Assessment CALI   Ann-Marie-wound Assessment CALI   Unit Type KCI vac Ulta   Cycle Continuous; On   Target Pressure (mmHg) 125   Drainage Description Sanguineous   Dressing Status Intact   Canister Changed No   Wound Follow Up   Follow up needed Yes   Comfort and Environment Interventions   Specialty Bed/Mattress First step ([facility])

## 2022-05-25 VITALS
RESPIRATION RATE: 16 BRPM | HEIGHT: 64 IN | WEIGHT: 123.44 LBS | OXYGEN SATURATION: 94 % | HEART RATE: 89 BPM | SYSTOLIC BLOOD PRESSURE: 120 MMHG | DIASTOLIC BLOOD PRESSURE: 45 MMHG | TEMPERATURE: 97 F | BODY MASS INDEX: 21.07 KG/M2

## 2022-05-25 LAB
ANION GAP SERPL CALC-SCNC: 7 MMOL/L (ref 0–18)
BUN BLD-MCNC: 20 MG/DL (ref 7–18)
BUN/CREAT SERPL: 57.1 (ref 10–20)
CALCIUM BLD-MCNC: 8.8 MG/DL (ref 8.5–10.1)
CHLORIDE SERPL-SCNC: 105 MMOL/L (ref 98–112)
CO2 SERPL-SCNC: 29 MMOL/L (ref 21–32)
CREAT BLD-MCNC: 0.35 MG/DL
DEPRECATED RDW RBC AUTO: 53.9 FL (ref 35.1–46.3)
ERYTHROCYTE [DISTWIDTH] IN BLOOD BY AUTOMATED COUNT: 18.8 % (ref 11–15)
GLUCOSE BLD-MCNC: 116 MG/DL (ref 70–99)
HCT VFR BLD AUTO: 37.2 %
HGB BLD-MCNC: 11.6 G/DL
MCH RBC QN AUTO: 25.1 PG (ref 26–34)
MCHC RBC AUTO-ENTMCNC: 31.2 G/DL (ref 31–37)
MCV RBC AUTO: 80.5 FL
OSMOLALITY SERPL CALC.SUM OF ELEC: 296 MOSM/KG (ref 275–295)
PLATELET # BLD AUTO: 189 10(3)UL (ref 150–450)
POTASSIUM SERPL-SCNC: 3.1 MMOL/L (ref 3.5–5.1)
RBC # BLD AUTO: 4.62 X10(6)UL
SODIUM SERPL-SCNC: 141 MMOL/L (ref 136–145)
WBC # BLD AUTO: 7.3 X10(3) UL (ref 4–11)

## 2022-05-25 PROCEDURE — 85027 COMPLETE CBC AUTOMATED: CPT | Performed by: INTERNAL MEDICINE

## 2022-05-25 PROCEDURE — 80048 BASIC METABOLIC PNL TOTAL CA: CPT | Performed by: INTERNAL MEDICINE

## 2022-05-25 RX ORDER — POTASSIUM CHLORIDE 20 MEQ/1
40 TABLET, EXTENDED RELEASE ORAL ONCE
Status: COMPLETED | OUTPATIENT
Start: 2022-05-25 | End: 2022-05-25

## 2022-05-25 NOTE — PALLIATIVE CARE NOTE
[facility] Liaison received referral for community palliative care. Waiting to receive insurance verification before pursing.      Brayan Raleigh  Palliative Liaison  [id number]

## 2022-06-03 ENCOUNTER — OFFICE VISIT (OUTPATIENT)
Dept: WOUND CARE | Facility: HOSPITAL | Age: 77
End: 2022-06-03
Attending: NURSE PRACTITIONER
Payer: MEDICARE

## 2022-06-03 VITALS
HEART RATE: 66 BPM | WEIGHT: 125 LBS | TEMPERATURE: 98 F | RESPIRATION RATE: 17 BRPM | OXYGEN SATURATION: 97 % | BODY MASS INDEX: 21.34 KG/M2 | SYSTOLIC BLOOD PRESSURE: 119 MMHG | HEIGHT: 64 IN | DIASTOLIC BLOOD PRESSURE: 57 MMHG

## 2022-06-03 DIAGNOSIS — L89.620 PRESSURE INJURY OF LEFT HEEL, UNSTAGEABLE (HCC): ICD-10-CM

## 2022-06-03 DIAGNOSIS — L89.152 PRESSURE INJURY OF SACRAL REGION, STAGE 2 (HCC): ICD-10-CM

## 2022-06-03 DIAGNOSIS — S91.002A ANKLE WOUND, LEFT, INITIAL ENCOUNTER: ICD-10-CM

## 2022-06-03 DIAGNOSIS — S81.802A OPEN WOUND OF LEFT LOWER EXTREMITY, INITIAL ENCOUNTER: Primary | ICD-10-CM

## 2022-06-03 DIAGNOSIS — L89.323 PRESSURE INJURY OF LEFT ISCHIUM, STAGE 3 (HCC): ICD-10-CM

## 2022-06-03 DIAGNOSIS — S91.002A WOUND OF LEFT ANKLE, INITIAL ENCOUNTER: ICD-10-CM

## 2022-06-03 PROCEDURE — 99214 OFFICE O/P EST MOD 30 MIN: CPT | Performed by: CLINICAL NURSE SPECIALIST

## 2022-06-14 ENCOUNTER — TELEPHONE (OUTPATIENT)
Dept: GASTROENTEROLOGY | Facility: CLINIC | Age: 77
End: 2022-06-14

## 2022-06-14 NOTE — TELEPHONE ENCOUNTER
Dr. Gera Estrada with [facility]. She is taking Budesonide 3 mg (9 mg total daily) & wondering if she should continue to do so. Emptying colostomy bag #2 times per day. No longer experiencing loose stool output or \"filling up of air\" in ostomy bag as per previous. Informed to continue Budesonide 9 mg as of LOV 4-7-22 documentation. Currently being treated for bed sore & will be getting wound vac tomorrow, but feels well otherwise.

## 2022-06-14 NOTE — TELEPHONE ENCOUNTER
Please have Katelin decrease the budesonide to 6 mg daily. She should contact us if the diarrhea returns.

## 2022-06-15 NOTE — TELEPHONE ENCOUNTER
X 2 attempts to reach [place]. Picks up phone than disconnects. Plan to await call back and/or follow up.

## 2022-06-15 NOTE — TELEPHONE ENCOUNTER
Received transfer from [facility] Staff. Nathen Damon called back. Discussed MD's complete response as outlined. Verbalized understanding and read-back correct dosage needs to initiate at this point in time. Reiterated contacting office if diarrhea returns. No further questions or concerns at present.

## 2022-06-16 ENCOUNTER — OFFICE VISIT (OUTPATIENT)
Dept: WOUND CARE | Facility: HOSPITAL | Age: 77
End: 2022-06-16
Attending: CLINICAL NURSE SPECIALIST
Payer: MEDICARE

## 2022-06-16 VITALS
HEART RATE: 92 BPM | DIASTOLIC BLOOD PRESSURE: 56 MMHG | SYSTOLIC BLOOD PRESSURE: 128 MMHG | RESPIRATION RATE: 18 BRPM | TEMPERATURE: 99 F | OXYGEN SATURATION: 95 %

## 2022-06-16 DIAGNOSIS — L89.524 PRESSURE INJURY OF LEFT ANKLE, STAGE 4 (HCC): ICD-10-CM

## 2022-06-16 DIAGNOSIS — L89.323 PRESSURE INJURY OF LEFT ISCHIUM, STAGE 3 (HCC): ICD-10-CM

## 2022-06-16 DIAGNOSIS — S31.829S WOUND OF LEFT BUTTOCK, SEQUELA: ICD-10-CM

## 2022-06-16 DIAGNOSIS — S81.802S OPEN WOUND OF LEFT LOWER EXTREMITY, SEQUELA: ICD-10-CM

## 2022-06-16 DIAGNOSIS — L89.152 PRESSURE INJURY OF SACRAL REGION, STAGE 2 (HCC): ICD-10-CM

## 2022-06-16 DIAGNOSIS — S91.002S WOUND OF LEFT ANKLE, SEQUELA: ICD-10-CM

## 2022-06-16 DIAGNOSIS — S31.819S WOUND OF RIGHT BUTTOCK, SEQUELA: ICD-10-CM

## 2022-06-16 PROCEDURE — 99214 OFFICE O/P EST MOD 30 MIN: CPT | Performed by: CLINICAL NURSE SPECIALIST

## 2022-06-17 ENCOUNTER — APPOINTMENT (OUTPATIENT)
Dept: CT IMAGING | Facility: HOSPITAL | Age: 77
End: 2022-06-17
Attending: EMERGENCY MEDICINE
Payer: MEDICARE

## 2022-06-17 ENCOUNTER — HOSPITAL ENCOUNTER (EMERGENCY)
Facility: HOSPITAL | Age: 77
Discharge: HOME | End: 2022-06-17
Attending: EMERGENCY MEDICINE
Payer: MEDICARE

## 2022-06-17 VITALS
HEIGHT: 64 IN | DIASTOLIC BLOOD PRESSURE: 74 MMHG | OXYGEN SATURATION: 100 % | RESPIRATION RATE: 20 BRPM | SYSTOLIC BLOOD PRESSURE: 136 MMHG | BODY MASS INDEX: 23.05 KG/M2 | WEIGHT: 135 LBS | HEART RATE: 82 BPM | TEMPERATURE: 98 F

## 2022-06-17 DIAGNOSIS — R19.7 NAUSEA VOMITING AND DIARRHEA: Primary | ICD-10-CM

## 2022-06-17 DIAGNOSIS — R11.2 NAUSEA VOMITING AND DIARRHEA: Primary | ICD-10-CM

## 2022-06-17 LAB
ALBUMIN SERPL-MCNC: 2.7 G/DL (ref 3.4–5)
ALBUMIN/GLOB SERPL: 0.8 {RATIO} (ref 1–2)
ALP LIVER SERPL-CCNC: 85 U/L
ALT SERPL-CCNC: 18 U/L
ANION GAP SERPL CALC-SCNC: 7 MMOL/L (ref 0–18)
AST SERPL-CCNC: 15 U/L (ref 15–37)
BASOPHILS # BLD AUTO: 0.03 X10(3) UL (ref 0–0.2)
BASOPHILS NFR BLD AUTO: 0.3 %
BILIRUB SERPL-MCNC: 0.8 MG/DL (ref 0.1–2)
BUN BLD-MCNC: 13 MG/DL (ref 7–18)
BUN/CREAT SERPL: 28.3 (ref 10–20)
CALCIUM BLD-MCNC: 9 MG/DL (ref 8.5–10.1)
CHLORIDE SERPL-SCNC: 109 MMOL/L (ref 98–112)
CO2 SERPL-SCNC: 24 MMOL/L (ref 21–32)
CREAT BLD-MCNC: 0.46 MG/DL
DEPRECATED RDW RBC AUTO: 59.4 FL (ref 35.1–46.3)
EOSINOPHIL # BLD AUTO: 0.15 X10(3) UL (ref 0–0.7)
EOSINOPHIL NFR BLD AUTO: 1.6 %
ERYTHROCYTE [DISTWIDTH] IN BLOOD BY AUTOMATED COUNT: 19.6 % (ref 11–15)
GLOBULIN PLAS-MCNC: 3.2 G/DL (ref 2.8–4.4)
GLUCOSE BLD-MCNC: 106 MG/DL (ref 70–99)
HCT VFR BLD AUTO: 45 %
HGB BLD-MCNC: 13.6 G/DL
IMM GRANULOCYTES # BLD AUTO: 0.06 X10(3) UL (ref 0–1)
IMM GRANULOCYTES NFR BLD: 0.6 %
LIPASE SERPL-CCNC: 51 U/L (ref 73–393)
LYMPHOCYTES # BLD AUTO: 2.4 X10(3) UL (ref 1–4)
LYMPHOCYTES NFR BLD AUTO: 25.6 %
MCH RBC QN AUTO: 25.4 PG (ref 26–34)
MCHC RBC AUTO-ENTMCNC: 30.2 G/DL (ref 31–37)
MCV RBC AUTO: 84.1 FL
MONOCYTES # BLD AUTO: 0.57 X10(3) UL (ref 0.1–1)
MONOCYTES NFR BLD AUTO: 6.1 %
NEUTROPHILS # BLD AUTO: 6.18 X10 (3) UL (ref 1.5–7.7)
NEUTROPHILS # BLD AUTO: 6.18 X10(3) UL (ref 1.5–7.7)
NEUTROPHILS NFR BLD AUTO: 65.8 %
OSMOLALITY SERPL CALC.SUM OF ELEC: 291 MOSM/KG (ref 275–295)
PLATELET # BLD AUTO: 213 10(3)UL (ref 150–450)
POTASSIUM SERPL-SCNC: 3.3 MMOL/L (ref 3.5–5.1)
PROT SERPL-MCNC: 5.9 G/DL (ref 6.4–8.2)
RBC # BLD AUTO: 5.35 X10(6)UL
SODIUM SERPL-SCNC: 140 MMOL/L (ref 136–145)
WBC # BLD AUTO: 9.4 X10(3) UL (ref 4–11)

## 2022-06-17 PROCEDURE — 96375 TX/PRO/DX INJ NEW DRUG ADDON: CPT

## 2022-06-17 PROCEDURE — 87045 FECES CULTURE AEROBIC BACT: CPT | Performed by: EMERGENCY MEDICINE

## 2022-06-17 PROCEDURE — 96374 THER/PROPH/DIAG INJ IV PUSH: CPT

## 2022-06-17 PROCEDURE — 99284 EMERGENCY DEPT VISIT MOD MDM: CPT

## 2022-06-17 PROCEDURE — 80053 COMPREHEN METABOLIC PANEL: CPT | Performed by: EMERGENCY MEDICINE

## 2022-06-17 PROCEDURE — 87046 STOOL CULTR AEROBIC BACT EA: CPT | Performed by: EMERGENCY MEDICINE

## 2022-06-17 PROCEDURE — 96361 HYDRATE IV INFUSION ADD-ON: CPT

## 2022-06-17 PROCEDURE — C9113 INJ PANTOPRAZOLE SODIUM, VIA: HCPCS | Performed by: EMERGENCY MEDICINE

## 2022-06-17 PROCEDURE — 83690 ASSAY OF LIPASE: CPT | Performed by: EMERGENCY MEDICINE

## 2022-06-17 PROCEDURE — 87427 SHIGA-LIKE TOXIN AG IA: CPT | Performed by: EMERGENCY MEDICINE

## 2022-06-17 PROCEDURE — 74176 CT ABD & PELVIS W/O CONTRAST: CPT | Performed by: EMERGENCY MEDICINE

## 2022-06-17 PROCEDURE — 85025 COMPLETE CBC W/AUTO DIFF WBC: CPT | Performed by: EMERGENCY MEDICINE

## 2022-06-17 RX ORDER — ONDANSETRON 4 MG/1
4 TABLET, ORALLY DISINTEGRATING ORAL EVERY 8 HOURS PRN
Qty: 15 TABLET | Refills: 0 | Status: SHIPPED | OUTPATIENT
Start: 2022-06-17 | End: 2022-06-22

## 2022-06-17 RX ORDER — FAMOTIDINE 20 MG/1
20 TABLET, FILM COATED ORAL 2 TIMES DAILY
Qty: 28 TABLET | Refills: 0 | Status: SHIPPED | OUTPATIENT
Start: 2022-06-17 | End: 2022-07-01

## 2022-06-17 RX ORDER — ONDANSETRON 2 MG/ML
4 INJECTION INTRAMUSCULAR; INTRAVENOUS ONCE
Status: COMPLETED | OUTPATIENT
Start: 2022-06-17 | End: 2022-06-17

## 2022-06-17 RX ORDER — POTASSIUM CHLORIDE 1.5 G/1.77G
40 POWDER, FOR SOLUTION ORAL ONCE
Status: COMPLETED | OUTPATIENT
Start: 2022-06-17 | End: 2022-06-17

## 2022-06-17 NOTE — TELEPHONE ENCOUNTER
Dr Carmenza Turpin, Please refer to 9/24 encounter. Patient contacted, states she tried the Imodium and GasX recommended but continues to have watery diarrhea stools and gas in colostomy back. Estimates 4/5 stools daily, and now also has nausea.  Denies abdomi Apixaban/Eliquis - Compliance/Apixaban/Eliquis - Dietary Advice/Apixaban/Eliquis - Follow up monitoring/Apixaban/Eliquis - Potential for adverse drug reactions and interactions

## 2022-06-17 NOTE — ED INITIAL ASSESSMENT (HPI)
Pt to ED with c/o intermittent abdominal pain, nausea, one episode of vomiting, and progressive weakness x2  days. Pt has wound vac to left buttock area. Pt states onset of symptoms after a meal 2 days ago.

## 2022-06-19 ENCOUNTER — LAB REQUISITION (OUTPATIENT)
Dept: LAB | Facility: HOSPITAL | Age: 77
End: 2022-06-19
Payer: MEDICARE

## 2022-06-19 DIAGNOSIS — N39.0 URINARY TRACT INFECTION, SITE NOT SPECIFIED: ICD-10-CM

## 2022-06-19 PROCEDURE — 87077 CULTURE AEROBIC IDENTIFY: CPT | Performed by: INTERNAL MEDICINE

## 2022-06-19 PROCEDURE — 87186 SC STD MICRODIL/AGAR DIL: CPT | Performed by: INTERNAL MEDICINE

## 2022-06-19 PROCEDURE — 87086 URINE CULTURE/COLONY COUNT: CPT | Performed by: INTERNAL MEDICINE

## 2022-06-21 ENCOUNTER — TELEPHONE (OUTPATIENT)
Dept: GASTROENTEROLOGY | Facility: CLINIC | Age: 77
End: 2022-06-21

## 2022-06-22 RX ORDER — BUDESONIDE 3 MG/1
6 CAPSULE, COATED PELLETS ORAL EVERY MORNING
Qty: 60 CAPSULE | Refills: 2 | Status: SHIPPED | OUTPATIENT
Start: 2022-06-22 | End: 2022-06-24

## 2022-06-23 ENCOUNTER — TELEPHONE (OUTPATIENT)
Dept: GASTROENTEROLOGY | Facility: CLINIC | Age: 77
End: 2022-06-23

## 2022-06-23 NOTE — TELEPHONE ENCOUNTER
Dr. Chepe Noble and spoke with Estuardo Mccauley. Since decreasing dosage of Budesonide from 9 mg daily to 6 mg daily as instructed to do so on 6-14, she has began experiencing return of symptoms. Had to change ostomy bag twice today due to excessive filling of air and popping off. Also, states refill was sent in for Budesonide #90 tablets, but is requesting #180 tablets for full 3 month supply per the request of her home health RN that comes daily to assist pt with medications and wound changes. Please advise if wanting Estuardo Mccauley to go back to taking Budesonide 9 mg daily & send in addition #90 tablets if appropriate. Thank you!

## 2022-06-23 NOTE — TELEPHONE ENCOUNTER
Pt called to speak to RN about a problem with her colostomy bag. Please call. DM (diabetes mellitus)

## 2022-06-24 RX ORDER — BUDESONIDE 3 MG/1
6 CAPSULE, COATED PELLETS ORAL EVERY MORNING
Qty: 180 CAPSULE | Refills: 1 | Status: SHIPPED | OUTPATIENT
Start: 2022-06-24

## 2022-06-24 NOTE — TELEPHONE ENCOUNTER
GI RNs: Is the patient experiencing increased stool output or only increased air output from the colostomy? If the latter I would not increase the budesonide. If the former I would increase back to 9 mg daily and if symptoms continue a fecal calprotectin and C. difficile toxin assay should be obtained. Please let me know if we are increasing the dose and I will modify the prescription quantity and instructions accordingly.

## 2022-06-24 NOTE — TELEPHONE ENCOUNTER
Dr. Malini Mccarthy- nothing further needed. Spoke with Юлия Borges, who confirms that she is only experiencing increased air output in colostomy bag, not increased stool output. Explained that she should continue on current Budesonide 6 mg and contact the office should she begin to experience increased stool output as well. She is aware that the lower quantity of tablets sent in was due to the decreased Budesonide dosage.

## 2022-06-30 NOTE — TELEPHONE ENCOUNTER
Lakia Ryan for C. Dif and fecal calprotectin to be done as discussed in previous messages in TE from 6-23-22, if diarrhea and increase output persists. Please sign off if appropriate. Will await call back from pt/follow up accordingly.

## 2022-06-30 NOTE — TELEPHONE ENCOUNTER
Spoke with Shanelle. Has had increased stool output since she ate bbq ribs several days prior. Noticed increased gas yesterday, to the point where she was having difficulty eating and loss of appetite. Increased output has continued, so she no longer thinks it is caused by the bbq she ate several days prior. Aware that orders were placed for c dif and fecal calprotectin testing, and plans to  the stool testing kits asap.

## 2022-07-05 ENCOUNTER — LAB ENCOUNTER (OUTPATIENT)
Dept: LAB | Facility: HOSPITAL | Age: 77
End: 2022-07-05
Attending: INTERNAL MEDICINE
Payer: MEDICARE

## 2022-07-05 DIAGNOSIS — R19.7 DIARRHEA, UNSPECIFIED TYPE: ICD-10-CM

## 2022-07-05 PROCEDURE — 87493 C DIFF AMPLIFIED PROBE: CPT

## 2022-07-05 PROCEDURE — 83993 ASSAY FOR CALPROTECTIN FECAL: CPT

## 2022-07-07 LAB — C DIFF TOX B STL QL: NEGATIVE

## 2022-07-08 ENCOUNTER — HOSPITAL ENCOUNTER (EMERGENCY)
Facility: HOSPITAL | Age: 77
Discharge: HOME | End: 2022-07-08
Attending: EMERGENCY MEDICINE
Payer: MEDICARE

## 2022-07-08 VITALS
HEIGHT: 64 IN | TEMPERATURE: 99 F | DIASTOLIC BLOOD PRESSURE: 79 MMHG | RESPIRATION RATE: 18 BRPM | BODY MASS INDEX: 21.17 KG/M2 | WEIGHT: 124 LBS | HEART RATE: 96 BPM | SYSTOLIC BLOOD PRESSURE: 122 MMHG | OXYGEN SATURATION: 94 %

## 2022-07-08 DIAGNOSIS — U07.1 COVID-19: Primary | ICD-10-CM

## 2022-07-08 PROCEDURE — 99283 EMERGENCY DEPT VISIT LOW MDM: CPT

## 2022-07-08 RX ORDER — ACETAMINOPHEN 500 MG
1000 TABLET ORAL ONCE
Status: COMPLETED | OUTPATIENT
Start: 2022-07-08 | End: 2022-07-08

## 2022-07-08 NOTE — ED INITIAL ASSESSMENT (HPI)
Pt arrive in ER per [facility] ambulance for evaluation, pt is covid positive 2 days ago

## 2022-07-08 NOTE — ED QUICK NOTES
Pt dcd to home per [facility] ambulance, discharge instruction given and voices understanding, denies any concern

## 2022-07-09 ENCOUNTER — TELEPHONE (OUTPATIENT)
Dept: GASTROENTEROLOGY | Facility: CLINIC | Age: 77
End: 2022-07-09

## 2022-07-09 NOTE — TELEPHONE ENCOUNTER
Adriana Bach contacted me as the on-call physician. She has been diagnosed with COVID-19 and was recently in the ED. She has noted a marked increase in colostomy output \"filling the bag\". The ostomy output is liquid. Imodium has not helped. A recent C. difficile toxin assay was negative. The fecal calprotectin is pending. She is taking 6 mg daily of budesonide. I am recommending that Katelin increase to 9 mg daily pending the fecal calprotectin result. She was also advised to push p.o. fluids.

## 2022-07-11 ENCOUNTER — LAB REQUISITION (OUTPATIENT)
Dept: LAB | Facility: HOSPITAL | Age: 77
End: 2022-07-11
Payer: MEDICARE

## 2022-07-11 DIAGNOSIS — N39.0 URINARY TRACT INFECTION, SITE NOT SPECIFIED: ICD-10-CM

## 2022-07-11 LAB
BILIRUB UR QL: NEGATIVE
CALPROTECTIN, FECAL: 336 UG/G
COLOR UR: YELLOW
GLUCOSE UR-MCNC: NEGATIVE MG/DL
HGB UR QL STRIP.AUTO: NEGATIVE
NITRITE UR QL STRIP.AUTO: POSITIVE
PH UR: 5 [PH] (ref 5–8)
PROT UR-MCNC: 30 MG/DL
SP GR UR STRIP: 1.03 (ref 1–1.03)
UROBILINOGEN UR STRIP-ACNC: <2
VIT C UR-MCNC: 40 MG/DL

## 2022-07-11 PROCEDURE — 81001 URINALYSIS AUTO W/SCOPE: CPT

## 2022-07-11 PROCEDURE — 87086 URINE CULTURE/COLONY COUNT: CPT

## 2022-07-11 PROCEDURE — 87088 URINE BACTERIA CULTURE: CPT

## 2022-07-11 PROCEDURE — 87186 SC STD MICRODIL/AGAR DIL: CPT

## 2022-07-13 ENCOUNTER — TELEPHONE (OUTPATIENT)
Dept: GASTROENTEROLOGY | Facility: CLINIC | Age: 77
End: 2022-07-13

## 2022-07-13 NOTE — TELEPHONE ENCOUNTER
----- Message from Sandie Crain MD sent at 7/12/2022  6:39 PM CDT -----  GI RNs: Please inform Gris Moore that the stool inflammation test was elevated which likely corresponds to increased activity of the colitis. The increased dose of budesonide up to 9 mg daily should help. Please have Gris Moore let us know how she is doing.

## 2022-07-13 NOTE — TELEPHONE ENCOUNTER
Dr. Norma Wolfe,    I spoke to Tyner and reviewed your note below. She has increased the Budesonide to 9mg daily. The past few days were a bit better, though still watery. Notes today she had a bad day. Her bag was full, liquid/watery output. She also couldn't hold it so some stool came out of her rectum. She took 4 imodium and is feeling much better.

## 2022-07-13 NOTE — TELEPHONE ENCOUNTER
I would anticipate continued improvement over the next several days. Please have Estuardo Mccauley contact us next week with an update.

## 2022-07-14 NOTE — TELEPHONE ENCOUNTER
I spoke to Ayleen and relayed the below message. She has been taking Imodium as needed and feeling a little better slowly. Pt will contact the office if her symptoms do not improve within the next several days with increaed budesonide/update office on symptoms accordingly.

## 2022-07-18 ENCOUNTER — TELEPHONE (OUTPATIENT)
Dept: GASTROENTEROLOGY | Facility: CLINIC | Age: 77
End: 2022-07-18

## 2022-07-18 RX ORDER — VANCOMYCIN HYDROCHLORIDE 125 MG/1
125 CAPSULE ORAL DAILY
Qty: 14 CAPSULE | Refills: 0 | Status: SHIPPED | OUTPATIENT
Start: 2022-07-18

## 2022-07-18 RX ORDER — BUDESONIDE 3 MG/1
9 CAPSULE, COATED PELLETS ORAL EVERY MORNING
Qty: 180 CAPSULE | Refills: 1 | Status: SHIPPED | OUTPATIENT
Start: 2022-07-18

## 2022-07-18 NOTE — TELEPHONE ENCOUNTER
Patient states she has been taking antibiotics and wanted to know if she should take Vancomycin. Also has additional questions regarding Budesonide. Please call. Thank you.

## 2022-07-18 NOTE — TELEPHONE ENCOUNTER
I have refilled the budesonide to reflect the increased dose. Yes, I would recommend that the patient take 1 vancomycin capsule daily during the nitrofurantoin treatment and for 7 days following. I have placed the order as well.

## 2022-07-18 NOTE — TELEPHONE ENCOUNTER
Dr. Glenna Hutchins I spoke with Joe Russ. She is currently being treated for a UTI with Nitrofurantoin x 7 days. Calling to see if she needs to take Vancomycin alongside antibiotic therapy for UTI. Also requesting new Budesonide refill to be sent in, as she only has #5 left due to increase dosage from 6 mg to 9 mg daily.     I pended a new order for Budesonide 9 mg to be sent if appropriate per her request.

## 2022-07-18 NOTE — TELEPHONE ENCOUNTER
Spoke with Estefania Cdaena & informed her of the below message. She verbalizes understanding and was appreciative of the call back with further instruction.

## 2022-07-25 ENCOUNTER — LAB REQUISITION (OUTPATIENT)
Dept: LAB | Facility: HOSPITAL | Age: 77
End: 2022-07-25
Payer: MEDICARE

## 2022-07-25 DIAGNOSIS — N39.0 URINARY TRACT INFECTION, SITE NOT SPECIFIED: ICD-10-CM

## 2022-07-25 LAB
BILIRUB UR QL: NEGATIVE
COLOR UR: YELLOW
GLUCOSE UR-MCNC: NEGATIVE MG/DL
HGB UR QL STRIP.AUTO: NEGATIVE
KETONES UR-MCNC: NEGATIVE MG/DL
LEUKOCYTE ESTERASE UR QL STRIP.AUTO: NEGATIVE
NITRITE UR QL STRIP.AUTO: NEGATIVE
PH UR: 6 [PH] (ref 5–8)
PROT UR-MCNC: NEGATIVE MG/DL
SP GR UR STRIP: 1.01 (ref 1–1.03)
UROBILINOGEN UR STRIP-ACNC: 0.2

## 2022-07-25 PROCEDURE — 81003 URINALYSIS AUTO W/O SCOPE: CPT | Performed by: INTERNAL MEDICINE

## 2022-07-29 ENCOUNTER — APPOINTMENT (OUTPATIENT)
Dept: WOUND CARE | Facility: HOSPITAL | Age: 77
End: 2022-07-29
Attending: NURSE PRACTITIONER
Payer: MEDICARE

## 2022-08-05 ENCOUNTER — LAB ENCOUNTER (OUTPATIENT)
Dept: LAB | Facility: HOSPITAL | Age: 77
End: 2022-08-05
Attending: NURSE PRACTITIONER
Payer: MEDICARE

## 2022-08-05 ENCOUNTER — OFFICE VISIT (OUTPATIENT)
Dept: WOUND CARE | Facility: HOSPITAL | Age: 77
End: 2022-08-05
Attending: NURSE PRACTITIONER
Payer: MEDICARE

## 2022-08-05 VITALS
TEMPERATURE: 98 F | SYSTOLIC BLOOD PRESSURE: 129 MMHG | DIASTOLIC BLOOD PRESSURE: 59 MMHG | RESPIRATION RATE: 17 BRPM | HEART RATE: 91 BPM | OXYGEN SATURATION: 92 %

## 2022-08-05 DIAGNOSIS — S81.802D OPEN WOUND OF LEFT LOWER LEG, SUBSEQUENT ENCOUNTER: ICD-10-CM

## 2022-08-05 DIAGNOSIS — L89.323 PRESSURE INJURY OF LEFT ISCHIUM, STAGE 3 (HCC): ICD-10-CM

## 2022-08-05 DIAGNOSIS — S91.002S ANKLE WOUND, LEFT, SEQUELA: ICD-10-CM

## 2022-08-05 DIAGNOSIS — R35.0 FREQUENT URINATION: Primary | ICD-10-CM

## 2022-08-05 DIAGNOSIS — R35.0 FREQUENT URINATION: ICD-10-CM

## 2022-08-05 LAB
ALBUMIN SERPL-MCNC: 2.4 G/DL (ref 3.4–5)
ALBUMIN/GLOB SERPL: 0.6 {RATIO} (ref 1–2)
ALP LIVER SERPL-CCNC: 112 U/L
ALT SERPL-CCNC: 110 U/L
ANION GAP SERPL CALC-SCNC: 7 MMOL/L (ref 0–18)
AST SERPL-CCNC: 58 U/L (ref 15–37)
BASOPHILS # BLD AUTO: 0.04 X10(3) UL (ref 0–0.2)
BASOPHILS NFR BLD AUTO: 0.4 %
BILIRUB SERPL-MCNC: 0.8 MG/DL (ref 0.1–2)
BILIRUB UR QL: NEGATIVE
BUN BLD-MCNC: 17 MG/DL (ref 7–18)
BUN/CREAT SERPL: 37.8 (ref 10–20)
CALCIUM BLD-MCNC: 9 MG/DL (ref 8.5–10.1)
CHLORIDE SERPL-SCNC: 110 MMOL/L (ref 98–112)
CO2 SERPL-SCNC: 22 MMOL/L (ref 21–32)
COLOR UR: YELLOW
CREAT BLD-MCNC: 0.45 MG/DL
CRP SERPL-MCNC: 14 MG/DL (ref ?–0.3)
DEPRECATED RDW RBC AUTO: 55.1 FL (ref 35.1–46.3)
EOSINOPHIL # BLD AUTO: 0.19 X10(3) UL (ref 0–0.7)
EOSINOPHIL NFR BLD AUTO: 1.8 %
ERYTHROCYTE [DISTWIDTH] IN BLOOD BY AUTOMATED COUNT: 17.5 % (ref 11–15)
FASTING STATUS PATIENT QL REPORTED: NO
GFR SERPLBLD BASED ON 1.73 SQ M-ARVRAT: 98 ML/MIN/1.73M2 (ref 60–?)
GLOBULIN PLAS-MCNC: 3.9 G/DL (ref 2.8–4.4)
GLUCOSE BLD-MCNC: 126 MG/DL (ref 70–99)
GLUCOSE UR-MCNC: NEGATIVE MG/DL
HCT VFR BLD AUTO: 42.1 %
HGB BLD-MCNC: 12.3 G/DL
HGB UR QL STRIP.AUTO: NEGATIVE
IMM GRANULOCYTES # BLD AUTO: 0.05 X10(3) UL (ref 0–1)
IMM GRANULOCYTES NFR BLD: 0.5 %
KETONES UR-MCNC: NEGATIVE MG/DL
LYMPHOCYTES # BLD AUTO: 1.6 X10(3) UL (ref 1–4)
LYMPHOCYTES NFR BLD AUTO: 15.2 %
MCH RBC QN AUTO: 25.2 PG (ref 26–34)
MCHC RBC AUTO-ENTMCNC: 29.2 G/DL (ref 31–37)
MCV RBC AUTO: 86.1 FL
MONOCYTES # BLD AUTO: 0.86 X10(3) UL (ref 0.1–1)
MONOCYTES NFR BLD AUTO: 8.2 %
NEUTROPHILS # BLD AUTO: 7.76 X10 (3) UL (ref 1.5–7.7)
NEUTROPHILS # BLD AUTO: 7.76 X10(3) UL (ref 1.5–7.7)
NEUTROPHILS NFR BLD AUTO: 73.9 %
NITRITE UR QL STRIP.AUTO: POSITIVE
OSMOLALITY SERPL CALC.SUM OF ELEC: 291 MOSM/KG (ref 275–295)
PH UR: 7.5 [PH] (ref 5–8)
PLATELET # BLD AUTO: 196 10(3)UL (ref 150–450)
POTASSIUM SERPL-SCNC: 4 MMOL/L (ref 3.5–5.1)
PROT SERPL-MCNC: 6.3 G/DL (ref 6.4–8.2)
RBC # BLD AUTO: 4.89 X10(6)UL
RBC #/AREA URNS AUTO: >10 /HPF
RBC #/AREA URNS AUTO: >10 /HPF
SODIUM SERPL-SCNC: 139 MMOL/L (ref 136–145)
SP GR UR STRIP: 1.02 (ref 1–1.03)
UROBILINOGEN UR STRIP-ACNC: 0.2
WBC # BLD AUTO: 10.5 X10(3) UL (ref 4–11)
WBC #/AREA URNS AUTO: >50 /HPF
WBC #/AREA URNS AUTO: >50 /HPF

## 2022-08-05 PROCEDURE — 85025 COMPLETE CBC W/AUTO DIFF WBC: CPT

## 2022-08-05 PROCEDURE — 87086 URINE CULTURE/COLONY COUNT: CPT

## 2022-08-05 PROCEDURE — 81015 MICROSCOPIC EXAM OF URINE: CPT

## 2022-08-05 PROCEDURE — 36415 COLL VENOUS BLD VENIPUNCTURE: CPT

## 2022-08-05 PROCEDURE — 86140 C-REACTIVE PROTEIN: CPT

## 2022-08-05 PROCEDURE — 99214 OFFICE O/P EST MOD 30 MIN: CPT | Performed by: NURSE PRACTITIONER

## 2022-08-05 PROCEDURE — 81001 URINALYSIS AUTO W/SCOPE: CPT

## 2022-08-05 PROCEDURE — 80053 COMPREHEN METABOLIC PANEL: CPT

## 2022-08-08 ENCOUNTER — TELEPHONE (OUTPATIENT)
Dept: WOUND CARE | Facility: HOSPITAL | Age: 77
End: 2022-08-08

## 2022-08-08 ENCOUNTER — TELEPHONE (OUTPATIENT)
Dept: GASTROENTEROLOGY | Facility: CLINIC | Age: 77
End: 2022-08-08

## 2022-08-08 ENCOUNTER — HOSPITAL ENCOUNTER (INPATIENT)
Facility: HOSPITAL | Age: 77
LOS: 11 days | Discharge: NURSING, REHAB OR LONG-TERM CARE | End: 2022-08-19
Attending: EMERGENCY MEDICINE | Admitting: HOSPITALIST
Payer: MEDICARE

## 2022-08-08 DIAGNOSIS — A04.72 CLOSTRIDIUM DIFFICILE DIARRHEA: ICD-10-CM

## 2022-08-08 DIAGNOSIS — L89.329 PRESSURE INJURY OF SKIN OF LEFT BUTTOCK, UNSPECIFIED INJURY STAGE: Primary | ICD-10-CM

## 2022-08-08 DIAGNOSIS — R19.7 DIARRHEA, UNSPECIFIED TYPE: Primary | ICD-10-CM

## 2022-08-08 DIAGNOSIS — R62.7 FAILURE TO THRIVE IN ADULT: ICD-10-CM

## 2022-08-08 DIAGNOSIS — U07.1 COVID: ICD-10-CM

## 2022-08-08 LAB
ANION GAP SERPL CALC-SCNC: 11 MMOL/L (ref 0–18)
BASOPHILS # BLD AUTO: 0.04 X10(3) UL (ref 0–0.2)
BASOPHILS NFR BLD AUTO: 0.3 %
BUN BLD-MCNC: 16 MG/DL (ref 7–18)
BUN/CREAT SERPL: 40 (ref 10–20)
C DIFF TOX B STL QL: POSITIVE
CALCIUM BLD-MCNC: 9.3 MG/DL (ref 8.5–10.1)
CHLORIDE SERPL-SCNC: 110 MMOL/L (ref 98–112)
CO2 SERPL-SCNC: 20 MMOL/L (ref 21–32)
CREAT BLD-MCNC: 0.4 MG/DL
DEPRECATED RDW RBC AUTO: 49.8 FL (ref 35.1–46.3)
EOSINOPHIL # BLD AUTO: 0.16 X10(3) UL (ref 0–0.7)
EOSINOPHIL NFR BLD AUTO: 1.2 %
ERYTHROCYTE [DISTWIDTH] IN BLOOD BY AUTOMATED COUNT: 17.1 % (ref 11–15)
GFR SERPLBLD BASED ON 1.73 SQ M-ARVRAT: 101 ML/MIN/1.73M2 (ref 60–?)
GLUCOSE BLD-MCNC: 112 MG/DL (ref 70–99)
HCT VFR BLD AUTO: 41 %
HGB BLD-MCNC: 12.8 G/DL
IMM GRANULOCYTES # BLD AUTO: 0.08 X10(3) UL (ref 0–1)
IMM GRANULOCYTES NFR BLD: 0.6 %
LACTATE SERPL-SCNC: 1.7 MMOL/L (ref 0.4–2)
LYMPHOCYTES # BLD AUTO: 1.89 X10(3) UL (ref 1–4)
LYMPHOCYTES NFR BLD AUTO: 14.3 %
MCH RBC QN AUTO: 25.2 PG (ref 26–34)
MCHC RBC AUTO-ENTMCNC: 31.2 G/DL (ref 31–37)
MCV RBC AUTO: 80.9 FL
MONOCYTES # BLD AUTO: 1.06 X10(3) UL (ref 0.1–1)
MONOCYTES NFR BLD AUTO: 8 %
NEUTROPHILS # BLD AUTO: 10.01 X10 (3) UL (ref 1.5–7.7)
NEUTROPHILS # BLD AUTO: 10.01 X10(3) UL (ref 1.5–7.7)
NEUTROPHILS NFR BLD AUTO: 75.6 %
OSMOLALITY SERPL CALC.SUM OF ELEC: 294 MOSM/KG (ref 275–295)
PLATELET # BLD AUTO: 289 10(3)UL (ref 150–450)
POTASSIUM SERPL-SCNC: 3.8 MMOL/L (ref 3.5–5.1)
RBC # BLD AUTO: 5.07 X10(6)UL
SARS-COV-2 RNA RESP QL NAA+PROBE: DETECTED
SODIUM SERPL-SCNC: 141 MMOL/L (ref 136–145)
WBC # BLD AUTO: 13.2 X10(3) UL (ref 4–11)

## 2022-08-08 PROCEDURE — 87040 BLOOD CULTURE FOR BACTERIA: CPT | Performed by: EMERGENCY MEDICINE

## 2022-08-08 PROCEDURE — 87493 C DIFF AMPLIFIED PROBE: CPT | Performed by: EMERGENCY MEDICINE

## 2022-08-08 PROCEDURE — 80048 BASIC METABOLIC PNL TOTAL CA: CPT | Performed by: EMERGENCY MEDICINE

## 2022-08-08 PROCEDURE — 99285 EMERGENCY DEPT VISIT HI MDM: CPT

## 2022-08-08 PROCEDURE — 85025 COMPLETE CBC W/AUTO DIFF WBC: CPT | Performed by: EMERGENCY MEDICINE

## 2022-08-08 PROCEDURE — 96361 HYDRATE IV INFUSION ADD-ON: CPT

## 2022-08-08 PROCEDURE — 83605 ASSAY OF LACTIC ACID: CPT | Performed by: EMERGENCY MEDICINE

## 2022-08-08 PROCEDURE — 96365 THER/PROPH/DIAG IV INF INIT: CPT

## 2022-08-08 PROCEDURE — 36415 COLL VENOUS BLD VENIPUNCTURE: CPT

## 2022-08-08 PROCEDURE — 96367 TX/PROPH/DG ADDL SEQ IV INF: CPT

## 2022-08-08 RX ORDER — PREGABALIN 50 MG/1
50 CAPSULE ORAL 2 TIMES DAILY
Status: DISCONTINUED | OUTPATIENT
Start: 2022-08-08 | End: 2022-08-19

## 2022-08-08 RX ORDER — BENZONATATE 100 MG/1
200 CAPSULE ORAL 3 TIMES DAILY PRN
Status: DISCONTINUED | OUTPATIENT
Start: 2022-08-08 | End: 2022-08-19

## 2022-08-08 RX ORDER — BUDESONIDE 3 MG/1
9 CAPSULE, COATED PELLETS ORAL EVERY MORNING
Status: DISCONTINUED | OUTPATIENT
Start: 2022-08-09 | End: 2022-08-19

## 2022-08-08 RX ORDER — ONDANSETRON 2 MG/ML
4 INJECTION INTRAMUSCULAR; INTRAVENOUS EVERY 6 HOURS PRN
Status: DISCONTINUED | OUTPATIENT
Start: 2022-08-08 | End: 2022-08-19

## 2022-08-08 RX ORDER — MELATONIN
325
Status: DISCONTINUED | OUTPATIENT
Start: 2022-08-09 | End: 2022-08-19

## 2022-08-08 RX ORDER — SODIUM CHLORIDE 9 MG/ML
INJECTION, SOLUTION INTRAVENOUS CONTINUOUS
Status: DISCONTINUED | OUTPATIENT
Start: 2022-08-08 | End: 2022-08-09

## 2022-08-08 RX ORDER — DIGOXIN 125 MCG
125 TABLET ORAL DAILY
Status: DISCONTINUED | OUTPATIENT
Start: 2022-08-08 | End: 2022-08-19

## 2022-08-08 RX ORDER — CHOLESTYRAMINE
0.5 POWDER (GRAM) MISCELLANEOUS DAILY
Status: DISCONTINUED | OUTPATIENT
Start: 2022-08-09 | End: 2022-08-09

## 2022-08-08 RX ORDER — PANTOPRAZOLE SODIUM 20 MG/1
20 TABLET, DELAYED RELEASE ORAL
Status: DISCONTINUED | OUTPATIENT
Start: 2022-08-09 | End: 2022-08-19

## 2022-08-08 RX ORDER — CETIRIZINE HYDROCHLORIDE 5 MG/1
5 TABLET ORAL DAILY
Status: DISCONTINUED | OUTPATIENT
Start: 2022-08-08 | End: 2022-08-19

## 2022-08-08 RX ORDER — MELATONIN
3 NIGHTLY PRN
Status: DISCONTINUED | OUTPATIENT
Start: 2022-08-08 | End: 2022-08-19

## 2022-08-08 RX ORDER — CETIRIZINE HYDROCHLORIDE 10 MG/1
10 TABLET ORAL DAILY
Status: DISCONTINUED | OUTPATIENT
Start: 2022-08-08 | End: 2022-08-08 | Stop reason: DRUGHIGH

## 2022-08-08 RX ORDER — VANCOMYCIN HYDROCHLORIDE
25 ONCE
Status: COMPLETED | OUTPATIENT
Start: 2022-08-08 | End: 2022-08-08

## 2022-08-08 RX ORDER — ACETAMINOPHEN 500 MG
500 TABLET ORAL EVERY 4 HOURS PRN
Status: DISCONTINUED | OUTPATIENT
Start: 2022-08-08 | End: 2022-08-19

## 2022-08-08 RX ORDER — VANCOMYCIN HYDROCHLORIDE 125 MG/1
125 CAPSULE ORAL DAILY
Status: DISCONTINUED | OUTPATIENT
Start: 2022-08-08 | End: 2022-08-09

## 2022-08-08 RX ORDER — ATORVASTATIN CALCIUM 40 MG/1
40 TABLET, FILM COATED ORAL NIGHTLY
Status: DISCONTINUED | OUTPATIENT
Start: 2022-08-08 | End: 2022-08-19

## 2022-08-08 RX ORDER — VENLAFAXINE HYDROCHLORIDE 75 MG/1
75 CAPSULE, EXTENDED RELEASE ORAL DAILY
Status: DISCONTINUED | OUTPATIENT
Start: 2022-08-08 | End: 2022-08-19

## 2022-08-08 NOTE — TELEPHONE ENCOUNTER
Called/informed Katelin. She will be coming to wound care clinic for evaluation of worsening wound on left buttocks today, and is aware that order has been placed for C. Dif testing, which she will obtain stool kit after her apt. Aware office will contact her with results/next steps pending C. Dif test results.

## 2022-08-08 NOTE — TELEPHONE ENCOUNTER
Dr. Junior Led-    Please advise. Do you want her to complete another C. Dif test?    Spoke with Shree Joseph, patient's spouse, on behalf of patient. States that she has been experiencing increased amount of watery diarrhea for \"quite some time,\" but has worsened within the last few days to the point that it is \"blowing off\" her bag. Also having watery stool via rectum. Confirmed that she is still taking Budesonide 9 mg daily.

## 2022-08-08 NOTE — TELEPHONE ENCOUNTER
Patient's  is calling because the patient is having very water diarrhea. Patient's  states it has knocked off her bag and is coming out of the rectum as well.

## 2022-08-08 NOTE — ED INITIAL ASSESSMENT (HPI)
Patient presents with diarrhea out of colostomy bag/ wounds/lethargy from home. Sent my doctor. Family reports patient's wound is black so the wound vac was taken off by wound clinic. Denies fevers.

## 2022-08-08 NOTE — ED QUICK NOTES
Orders for admission, patient is aware of plan and ready to go upstairs. Any questions, please call ED RN Nolvia at [phone].      Patient Covid vaccination status: Fully vaccinated     COVID Test Ordered in ED: Rapid SARS-CoV-2 by PCR    COVID Suspicion at Admission: Low clinical suspicion for COVID    Running Infusions:  None    Mental Status/LOC at time of transport: A&Ox4/4    Other pertinent information:   CIWA score: N/A   NIH score:  N/A

## 2022-08-09 LAB
ANION GAP SERPL CALC-SCNC: 9 MMOL/L (ref 0–18)
BASOPHILS # BLD AUTO: 0.02 X10(3) UL (ref 0–0.2)
BASOPHILS NFR BLD AUTO: 0.2 %
BILIRUB UR QL: NEGATIVE
BUN BLD-MCNC: 9 MG/DL (ref 7–18)
BUN/CREAT SERPL: 37.5 (ref 10–20)
CALCIUM BLD-MCNC: 8.3 MG/DL (ref 8.5–10.1)
CHLORIDE SERPL-SCNC: 112 MMOL/L (ref 98–112)
CO2 SERPL-SCNC: 19 MMOL/L (ref 21–32)
COLOR UR: YELLOW
CREAT BLD-MCNC: 0.24 MG/DL
DEPRECATED RDW RBC AUTO: 50.1 FL (ref 35.1–46.3)
EOSINOPHIL # BLD AUTO: 0.15 X10(3) UL (ref 0–0.7)
EOSINOPHIL NFR BLD AUTO: 1.8 %
ERYTHROCYTE [DISTWIDTH] IN BLOOD BY AUTOMATED COUNT: 17.1 % (ref 11–15)
GFR SERPLBLD BASED ON 1.73 SQ M-ARVRAT: 114 ML/MIN/1.73M2 (ref 60–?)
GLUCOSE BLD-MCNC: 65 MG/DL (ref 70–99)
GLUCOSE BLDC GLUCOMTR-MCNC: 129 MG/DL (ref 70–99)
GLUCOSE BLDC GLUCOMTR-MCNC: 68 MG/DL (ref 70–99)
GLUCOSE UR-MCNC: NEGATIVE MG/DL
HCT VFR BLD AUTO: 36 %
HGB BLD-MCNC: 11.1 G/DL
IMM GRANULOCYTES # BLD AUTO: 0.03 X10(3) UL (ref 0–1)
IMM GRANULOCYTES NFR BLD: 0.4 %
KETONES UR-MCNC: NEGATIVE MG/DL
LYMPHOCYTES # BLD AUTO: 1.49 X10(3) UL (ref 1–4)
LYMPHOCYTES NFR BLD AUTO: 17.6 %
MAGNESIUM SERPL-MCNC: 1.7 MG/DL (ref 1.6–2.6)
MCH RBC QN AUTO: 25 PG (ref 26–34)
MCHC RBC AUTO-ENTMCNC: 30.8 G/DL (ref 31–37)
MCV RBC AUTO: 81.1 FL
MONOCYTES # BLD AUTO: 0.64 X10(3) UL (ref 0.1–1)
MONOCYTES NFR BLD AUTO: 7.5 %
NEUTROPHILS # BLD AUTO: 6.16 X10 (3) UL (ref 1.5–7.7)
NEUTROPHILS # BLD AUTO: 6.16 X10(3) UL (ref 1.5–7.7)
NEUTROPHILS NFR BLD AUTO: 72.5 %
NITRITE UR QL STRIP.AUTO: NEGATIVE
OSMOLALITY SERPL CALC.SUM OF ELEC: 287 MOSM/KG (ref 275–295)
PH UR: 8.5 [PH] (ref 5–8)
PLATELET # BLD AUTO: 243 10(3)UL (ref 150–450)
POTASSIUM SERPL-SCNC: 3.2 MMOL/L (ref 3.5–5.1)
RBC # BLD AUTO: 4.44 X10(6)UL
SODIUM SERPL-SCNC: 140 MMOL/L (ref 136–145)
SP GR UR STRIP: 1.01 (ref 1–1.03)
UROBILINOGEN UR STRIP-ACNC: 0.2
WBC # BLD AUTO: 8.5 X10(3) UL (ref 4–11)

## 2022-08-09 PROCEDURE — 85025 COMPLETE CBC W/AUTO DIFF WBC: CPT | Performed by: HOSPITALIST

## 2022-08-09 PROCEDURE — XW033E5 INTRODUCTION OF REMDESIVIR ANTI-INFECTIVE INTO PERIPHERAL VEIN, PERCUTANEOUS APPROACH, NEW TECHNOLOGY GROUP 5: ICD-10-PCS | Performed by: HOSPITALIST

## 2022-08-09 PROCEDURE — 82962 GLUCOSE BLOOD TEST: CPT

## 2022-08-09 PROCEDURE — 81015 MICROSCOPIC EXAM OF URINE: CPT | Performed by: HOSPITALIST

## 2022-08-09 PROCEDURE — 87088 URINE BACTERIA CULTURE: CPT | Performed by: HOSPITALIST

## 2022-08-09 PROCEDURE — 80048 BASIC METABOLIC PNL TOTAL CA: CPT | Performed by: HOSPITALIST

## 2022-08-09 PROCEDURE — 83735 ASSAY OF MAGNESIUM: CPT | Performed by: HOSPITALIST

## 2022-08-09 PROCEDURE — 87086 URINE CULTURE/COLONY COUNT: CPT | Performed by: HOSPITALIST

## 2022-08-09 PROCEDURE — 87186 SC STD MICRODIL/AGAR DIL: CPT | Performed by: HOSPITALIST

## 2022-08-09 PROCEDURE — 81001 URINALYSIS AUTO W/SCOPE: CPT | Performed by: HOSPITALIST

## 2022-08-09 PROCEDURE — 99213 OFFICE O/P EST LOW 20 MIN: CPT

## 2022-08-09 RX ORDER — POTASSIUM CHLORIDE 20 MEQ/1
40 TABLET, EXTENDED RELEASE ORAL ONCE
Status: COMPLETED | OUTPATIENT
Start: 2022-08-09 | End: 2022-08-09

## 2022-08-09 RX ORDER — DEXTROSE AND SODIUM CHLORIDE 5; .45 G/100ML; G/100ML
INJECTION, SOLUTION INTRAVENOUS CONTINUOUS
Status: ACTIVE | OUTPATIENT
Start: 2022-08-09 | End: 2022-08-09

## 2022-08-09 RX ORDER — DEXTROSE AND SODIUM CHLORIDE 5; .45 G/100ML; G/100ML
INJECTION, SOLUTION INTRAVENOUS CONTINUOUS
Status: DISCONTINUED | OUTPATIENT
Start: 2022-08-09 | End: 2022-08-09

## 2022-08-09 RX ORDER — SODIUM HYPOCHLORITE 1.25 MG/ML
SOLUTION TOPICAL 2 TIMES DAILY
Status: DISCONTINUED | OUTPATIENT
Start: 2022-08-09 | End: 2022-08-19

## 2022-08-09 RX ORDER — MAGNESIUM OXIDE 400 MG/1
400 TABLET ORAL ONCE
Status: COMPLETED | OUTPATIENT
Start: 2022-08-09 | End: 2022-08-09

## 2022-08-09 RX ORDER — VANCOMYCIN HYDROCHLORIDE 125 MG/1
125 CAPSULE ORAL EVERY 6 HOURS
Status: DISCONTINUED | OUTPATIENT
Start: 2022-08-09 | End: 2022-08-19

## 2022-08-09 NOTE — PROGRESS NOTES
General surgery    Attempted to see patient, she was diagnosed as COVID-positive during this admission, full PPE not available so I did not go into the room. I did review the wounds with Xin Dye from wound care, I did review the pictures. We will plan for excisional debridement at the bedside tomorrow morning. Hold anticoagulation. Full consult to follow.

## 2022-08-09 NOTE — PLAN OF CARE
No c/o pain. Dressing changed. Repositioned. Mayorga and colostomy patent. Oral intake encouraged. Patient updated on plan of care. Continues on day 1 out of 3 of remdesivir. Plan for possible excisional debridement at bedside tomorrow morning by surgery. Safety precautions maintained. Call light within reach.        Problem: Patient Centered Care  Goal: Patient preferences are identified and integrated in the patient's plan of care  Description: Interventions:  - What would you like us to know as we care for you?   - Provide timely, complete, and accurate information to patient/family  - Incorporate patient and family knowledge, values, beliefs, and cultural backgrounds into the planning and delivery of care  - Encourage patient/family to participate in care and decision-making at the level they choose  - Honor patient and family perspectives and choices  Outcome: Progressing     Problem: Patient Centered Care  Goal: Patient preferences are identified and integrated in the patient's plan of care  Description: Interventions:  - What would you like us to know as we care for you?   - Provide timely, complete, and accurate information to patient/family  - Incorporate patient and family knowledge, values, beliefs, and cultural backgrounds into the planning and delivery of care  - Encourage patient/family to participate in care and decision-making at the level they choose  - Honor patient and family perspectives and choices  Outcome: Progressing

## 2022-08-09 NOTE — PROGRESS NOTES
Pt's code status dnar/comfort per the chart record, endorsed to wayne Guillen Mc, she will follow up with AM hospitalist.

## 2022-08-09 NOTE — PROGRESS NOTES
[facility] Pharmacy Note:  Ct Becerra is a 76year old patient admitted 8/8/2022  1:53 PM who is COVID (+) and has been been started on Remdesivir as of 8/9/22. Pertinent Patient Lab Values:  Estimated GFR:   No results for input(s): Ivan Perry in the last 72 hours. Alkaline Phosphate:   Recent Labs   Lab 08/05/22  1246   ALKPHO 112       ALT:   Recent Labs   Lab 08/05/22  1246   *       AST:   Recent Labs   Lab 08/05/22  1246   AST 58*       Bilirubin:   Recent Labs   Lab 08/05/22  1246   BILT 0.8       eGFR is above 30mL/min. ALT is less than ten times the upper limit of normal.      Recommendation: Continue therapy as is.       Jessica Braswell PharmD  8/9/2022  10:47 AM

## 2022-08-09 NOTE — PLAN OF CARE
Problem: SKIN INTEGRITY  Goal: Incision/wound heals without complications  Description: Interventions:  - Assess wound bed/incision and surrounding skin tissue  - Collaborate with physician/APN and implement wound/incision site care and dressing changes as ordered  - Position infant to avoid placing pressure on wound  - Enterostomal/Wound therapy consult as indicated for ostomies/wounds/G-tubes  Outcome: Progressing     Problem: PAIN - ADULT  Goal: Verbalizes/displays adequate comfort level or patient's stated pain goal  Description: INTERVENTIONS:  - Encourage pt to monitor pain and request assistance  - Assess pain using appropriate pain scale  - Administer analgesics based on type and severity of pain and evaluate response  - Implement non-pharmacological measures as appropriate and evaluate response  - Consider cultural and social influences on pain and pain management  - Manage/alleviate anxiety  - Utilize distraction and/or relaxation techniques  - Monitor for opioid side effects  - Notify MD/LIP if interventions unsuccessful or patient reports new pain  - Anticipate increased pain with activity and pre-medicate as appropriate  Outcome: Progressing     Problem: RISK FOR INFECTION - ADULT  Goal: Absence of fever/infection during anticipated neutropenic period  Description: INTERVENTIONS  - Monitor WBC  - Administer growth factors as ordered  - Implement neutropenic guidelines  Outcome: Progressing     Problem: SAFETY ADULT - FALL  Goal: Free from fall injury  Description: INTERVENTIONS:  - Assess pt frequently for physical needs  - Identify cognitive and physical deficits and behaviors that affect risk of falls.   - [place] fall precautions as indicated by assessment.  - Educate pt/family on patient safety including physical limitations  - Instruct pt to call for assistance with activity based on assessment  - Modify environment to reduce risk of injury  - Provide assistive devices as appropriate  - Consider OT/PT consult to assist with strengthening/mobility  - Encourage toileting schedule  Outcome: Progressing     Problem: DISCHARGE PLANNING  Goal: Discharge to home or other facility with appropriate resources  Description: INTERVENTIONS:  - Identify barriers to discharge w/pt and caregiver  - Include patient/family/discharge partner in discharge planning  - Arrange for needed discharge resources and transportation as appropriate  - Identify discharge learning needs (meds, wound care, etc)  - Arrange for interpreters to assist at discharge as needed  - Consider post-discharge preferences of patient/family/discharge partner  - Complete POLST form as appropriate  - Assess patient's ability to be responsible for managing their own health  - Refer to Case Management Department for coordinating discharge planning if the patient needs post-hospital services based on physician/LIP order or complex needs related to functional status, cognitive ability or social support system  Outcome: Progressing  Patient is resting comfortably in bed, denies pain,IVF infusing, remote tele on, safety/fall precautions in place, wound care to evaluate today. VSS, all needs met at this time. COVID  and Cdiff precautions/isolation in place.

## 2022-08-09 NOTE — PHYSICAL THERAPY NOTE
Physical order received via functional mobility screening. Per chart review, pt requires dependency for ADLs and has a kathya lift, power w/c, manual w/c, and hospital bed. Pt receives 24 hour care from spouse and friend. Will complete current orders as pt has no skilled inpatient PT needs. Thank you.        Ashlyn العراقي, PT

## 2022-08-09 NOTE — PROGRESS NOTES
[facility] Pharmacy Note:  Age Dose Adjustment for Cetirizine (ZYRTEC)    Chel Kaye has been prescribed Cetirizine (Zyrtec) 10 mg orally daily. Estimated Creatinine Clearance: 98.5 mL/min (A) (based on SCr of 0.4 mg/dL (L)). The dose has been changed to 5 mg orally daily per P&T approved protocol. Pharmacy will follow and resume original order if renal function improves.       Thank you,  Murray Whitney, PharmD  8/8/2022  8:07 PM  [address]: [phone]

## 2022-08-09 NOTE — OCCUPATIONAL THERAPY NOTE
OT order received via functional mobility screening. Per chart review, pt requires dependency for ADLs and has a kathya lift, power w/c, manual w/c, and hospital bed. Pt receives 24 hour care from spouse and friend. Will complete current orders as pt has no skilled inpatient OT needs.     Tita Mcdaniel   Occupational Therapist  [address] [place] [address]

## 2022-08-09 NOTE — PROGRESS NOTES
[facility] Dosing Service    Initial Pharmacokinetic Consult for Vancomycin AUC Dosing    Antonella Hui is a 76year old patient who is being treated for cellulitis. Pharmacy has been asked to dose vancomycin by Dr Carl Castro. Weights:  Ideal body weight: 54.7 kg (120 lb 9.5 oz)  Adjusted ideal body weight: 55.6 kg (122 lb 7.6 oz)  Actual weight:  56.8 kg (125 lb 4.8 oz)    Labs:  Lab Results   Component Value Date    CREATSERUM 0.24 08/09/2022    CREATSERUM 0.40 08/08/2022      CrCl:  Estimated Creatinine Clearance: 164.1 mL/min (A) (based on SCr of 0.24 mg/dL (L)). Based on the above:    1. This patient has received a loading dose of Vancomycin  1500 mg IVPB (25mg/kg, capped at 2000 mg) x 1 dose. This will be followed by 1000 mg Q 18 hours based upon actual body weight of 56.8 kg and renal function. 2. Vancomycin peak and trough will be obtained at steady state in order to calculate AUC24. Goal AUC24 is 400-600 mg-h/L.    3. Pharmacy will order SCr as clinically indicated while on vancomycin to assess renal function. 4. Pharmacy will follow and monitor renal function, toxicity and efficacy. We appreciate the opportunity to assist in the care of this patient.     Johanna Blackwood PharmD  8/9/2022  10:43 AM  [address]: [phone]

## 2022-08-09 NOTE — CM/SW NOTE
MDO to SHERRELL for [facility] services. Per chart review, pt resides at home with her spouse and he assists with all ADLs/IADLs. Patient reports having a hospital bed, wheelchair, power chair. Pt confirmed as current w/[facility] (RN/PT/OT/[facility]HA) and [facility]. ODALIS entered. SNF hx w/[facility] [place].  Pt has complete POLST form in Epic media. SHERRELL uploaded [facility] referral/F2F via [employer]. SHERRELL/RACHID to remain available for support and/or discharge planning.      NADIA Felder, [facility]  Social Work   THB:#[id number]

## 2022-08-10 LAB
ANION GAP SERPL CALC-SCNC: 6 MMOL/L (ref 0–18)
BASOPHILS # BLD AUTO: 0.02 X10(3) UL (ref 0–0.2)
BASOPHILS NFR BLD AUTO: 0.2 %
BUN BLD-MCNC: 10 MG/DL (ref 7–18)
BUN/CREAT SERPL: 37 (ref 10–20)
CALCIUM BLD-MCNC: 8.1 MG/DL (ref 8.5–10.1)
CHLORIDE SERPL-SCNC: 114 MMOL/L (ref 98–112)
CO2 SERPL-SCNC: 22 MMOL/L (ref 21–32)
CREAT BLD-MCNC: 0.27 MG/DL
DEPRECATED RDW RBC AUTO: 49.1 FL (ref 35.1–46.3)
EOSINOPHIL # BLD AUTO: 0.25 X10(3) UL (ref 0–0.7)
EOSINOPHIL NFR BLD AUTO: 2.8 %
ERYTHROCYTE [DISTWIDTH] IN BLOOD BY AUTOMATED COUNT: 16.8 % (ref 11–15)
GFR SERPLBLD BASED ON 1.73 SQ M-ARVRAT: 111 ML/MIN/1.73M2 (ref 60–?)
GLUCOSE BLD-MCNC: 93 MG/DL (ref 70–99)
HCT VFR BLD AUTO: 36.2 %
HGB BLD-MCNC: 11.3 G/DL
IMM GRANULOCYTES # BLD AUTO: 0.05 X10(3) UL (ref 0–1)
IMM GRANULOCYTES NFR BLD: 0.6 %
LYMPHOCYTES # BLD AUTO: 1.31 X10(3) UL (ref 1–4)
LYMPHOCYTES NFR BLD AUTO: 14.9 %
MAGNESIUM SERPL-MCNC: 1.5 MG/DL (ref 1.6–2.6)
MCH RBC QN AUTO: 25 PG (ref 26–34)
MCHC RBC AUTO-ENTMCNC: 31.2 G/DL (ref 31–37)
MCV RBC AUTO: 80.1 FL
MONOCYTES # BLD AUTO: 0.5 X10(3) UL (ref 0.1–1)
MONOCYTES NFR BLD AUTO: 5.7 %
NEUTROPHILS # BLD AUTO: 6.67 X10 (3) UL (ref 1.5–7.7)
NEUTROPHILS # BLD AUTO: 6.67 X10(3) UL (ref 1.5–7.7)
NEUTROPHILS NFR BLD AUTO: 75.8 %
OSMOLALITY SERPL CALC.SUM OF ELEC: 293 MOSM/KG (ref 275–295)
PLATELET # BLD AUTO: 228 10(3)UL (ref 150–450)
POTASSIUM SERPL-SCNC: 2.9 MMOL/L (ref 3.5–5.1)
POTASSIUM SERPL-SCNC: 2.9 MMOL/L (ref 3.5–5.1)
POTASSIUM SERPL-SCNC: 3.3 MMOL/L (ref 3.5–5.1)
RBC # BLD AUTO: 4.52 X10(6)UL
SODIUM SERPL-SCNC: 142 MMOL/L (ref 136–145)
WBC # BLD AUTO: 8.8 X10(3) UL (ref 4–11)

## 2022-08-10 PROCEDURE — 99213 OFFICE O/P EST LOW 20 MIN: CPT

## 2022-08-10 PROCEDURE — 84132 ASSAY OF SERUM POTASSIUM: CPT | Performed by: HOSPITALIST

## 2022-08-10 PROCEDURE — 0JB70ZZ EXCISION OF BACK SUBCUTANEOUS TISSUE AND FASCIA, OPEN APPROACH: ICD-10-PCS | Performed by: SURGERY

## 2022-08-10 PROCEDURE — 85025 COMPLETE CBC W/AUTO DIFF WBC: CPT | Performed by: HOSPITALIST

## 2022-08-10 PROCEDURE — 83735 ASSAY OF MAGNESIUM: CPT | Performed by: HOSPITALIST

## 2022-08-10 PROCEDURE — 80048 BASIC METABOLIC PNL TOTAL CA: CPT | Performed by: HOSPITALIST

## 2022-08-10 RX ORDER — POTASSIUM CHLORIDE 20 MEQ/1
40 TABLET, EXTENDED RELEASE ORAL EVERY 4 HOURS
Status: COMPLETED | OUTPATIENT
Start: 2022-08-10 | End: 2022-08-10

## 2022-08-10 RX ORDER — MAGNESIUM OXIDE 400 MG/1
800 TABLET ORAL ONCE
Status: COMPLETED | OUTPATIENT
Start: 2022-08-10 | End: 2022-08-10

## 2022-08-10 NOTE — PROGRESS NOTES
08/10/22 1015   Wound 08/08/22 2 Pressure Injury Ischium Left   Date First Assessed: 08/08/22   Present on Hospital Admission: Yes   Wound Number (Wound Clinic Only): 2  Primary Wound Type: Pressure Injury  Location: Ischium  Wound Location Orientation: Left   Site Assessment Clean;Fragile;Granulation tissue; Moist;Painful;Red   Closure Not approximated   Drainage Amount Scant   Drainage Description Sanguineous   Treatments Cleansed; Saline/Wound ;Dakins   Dressing 4x4s; Aquacel Foam   Dressing Changed Changed   Dressing Status Dressing Changed;Removed; Old drainage   Wound Length (cm) 3 cm   Wound Width (cm) 2.6 cm   Wound Surface Area (cm^2) 7.8 cm^2   Wound Depth (cm) 2.3 cm   Wound Volume (cm^3) 17.94 cm^3   Wound Healing % -498   Ann-Marie-wound Assessment Clean;Dry; Intact;Fragile;Pink   Wound Granulation Tissue Red   Wound Odor None   Exposed Structure Bone   Tunneling 12:00  3.6 cm   Pressure Injury Stage 4   Wound Follow Up   Follow up needed Yes   Comfort and Environment Interventions   Specialty Bed/Mattress First step ([facility])   pt seen with Dr. Kishan Coleman for debridement of the left ischial ulcer. Necrotic and slough tissues removed, wound cleansed with saline then packed with dakins moistened gauze. Bedside RN updated to continue Dakins dressings q 12 to the wound. Pt tolerated tx well. Pillow placed under the left hip and to elevate the heels off of mattress.

## 2022-08-10 NOTE — PROGRESS NOTES
Patient current with Residential Palliative [facility].      [facility]  Residential Liaison  [id number]

## 2022-08-10 NOTE — PLAN OF CARE
No c/o pain. Continues on day 2 out of 3 of Remdesivir. Continues on IV antibiotics. Repositioned. Mayorga and colostomy patent and intact. Oral intake encouraged. Dressings changed. Patient updated on plan of care. Safety precautions maintained. Call light within reach.        Problem: Patient Centered Care  Goal: Patient preferences are identified and integrated in the patient's plan of care  Description: Interventions:  - What would you like us to know as we care for you?  - Provide timely, complete, and accurate information to patient/family  - Incorporate patient and family knowledge, values, beliefs, and cultural backgrounds into the planning and delivery of care  - Encourage patient/family to participate in care and decision-making at the level they choose  - Honor patient and family perspectives and choices  Outcome: Progressing     Problem: MUSCULOSKELETAL  Goal: Maintain proper body alignment to prevent postural asymmetries  Description: Interventions:  - Support and protect proper body alignment using appropriate developmental positioning devices   - Provide supportive boundaries  - Instruct learner in use of splints, slings, braces, or positioning devices and any necessary precautions  - Promote hand-to-mouth and ability to self calm  - Expose to a variety of positions to prevent development of fixed postural patterns  - Promote flexed body  - Consult OT/PT as ordered  Outcome: Progressing  Goal: 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  Description: Interventions:  - Support and protect affected body parts per physician/APN orders  - Instruct learner in use of positioning devices and any necessary precautions  - Consult OT/PT as ordered  Outcome: Progressing     Problem: SKIN INTEGRITY  Goal: Skin integrity remains intact  Description: Interventions:  - Assess for areas of redness and/or skin breakdown  - Assess vascular sites hourly  - Change oxygen saturation probe minimum once per shift  - Provide humidity as ordered and per policy  - If on oxygen support, assess nasal septum area for skin breakdown and replace protective barrier if needed  - Change diapers as needed  - Minimize the use of adhesives  Outcome: Progressing  Goal: Incision/wound heals without complications  Description: Interventions:  - Assess wound bed/incision and surrounding skin tissue  - Collaborate with physician/APN and implement wound/incision site care and dressing changes as ordered  - Position infant to avoid placing pressure on wound  - Enterostomal/Wound therapy consult as indicated for ostomies/wounds/G-tubes  Outcome: Progressing     Problem: PAIN - ADULT  Goal: Verbalizes/displays adequate comfort level or patient's stated pain goal  Description: INTERVENTIONS:  - Encourage pt to monitor pain and request assistance  - Assess pain using appropriate pain scale  - Administer analgesics based on type and severity of pain and evaluate response  - Implement non-pharmacological measures as appropriate and evaluate response  - Consider cultural and social influences on pain and pain management  - Manage/alleviate anxiety  - Utilize distraction and/or relaxation techniques  - Monitor for opioid side effects  - Notify MD/LIP if interventions unsuccessful or patient reports new pain  - Anticipate increased pain with activity and pre-medicate as appropriate  Outcome: Progressing     Problem: RISK FOR INFECTION - ADULT  Goal: Absence of fever/infection during anticipated neutropenic period  Description: INTERVENTIONS  - Monitor WBC  - Administer growth factors as ordered  - Implement neutropenic guidelines  Outcome: Progressing     Problem: SAFETY ADULT - FALL  Goal: Free from fall injury  Description: INTERVENTIONS:  - Assess pt frequently for physical needs  - Identify cognitive and physical deficits and behaviors that affect risk of falls.   - [place] fall precautions as indicated by assessment.  - Educate pt/family on patient safety including physical limitations  - Instruct pt to call for assistance with activity based on assessment  - Modify environment to reduce risk of injury  - Provide assistive devices as appropriate  - Consider OT/PT consult to assist with strengthening/mobility  - Encourage toileting schedule  Outcome: Progressing     Problem: DISCHARGE PLANNING  Goal: Discharge to home or other facility with appropriate resources  Description: INTERVENTIONS:  - Identify barriers to discharge w/pt and caregiver  - Include patient/family/discharge partner in discharge planning  - Arrange for needed discharge resources and transportation as appropriate  - Identify discharge learning needs (meds, wound care, etc)  - Arrange for interpreters to assist at discharge as needed  - Consider post-discharge preferences of patient/family/discharge partner  - Complete POLST form as appropriate  - Assess patient's ability to be responsible for managing their own health  - Refer to Case Management Department for coordinating discharge planning if the patient needs post-hospital services based on physician/LIP order or complex needs related to functional status, cognitive ability or social support system  Outcome: Progressing

## 2022-08-10 NOTE — PLAN OF CARE
Problem: MUSCULOSKELETAL  Goal: Maintain proper body alignment to prevent postural asymmetries  Description: Interventions:  - Support and protect proper body alignment using appropriate developmental positioning devices   - Provide supportive boundaries  - Instruct learner in use of splints, slings, braces, or positioning devices and any necessary precautions  - Promote hand-to-mouth and ability to self calm  - Expose to a variety of positions to prevent development of fixed postural patterns  - Promote flexed body  - Consult OT/PT as ordered  Outcome: Progressing  Goal: 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  Description: Interventions:  - Support and protect affected body parts per physician/APN orders  - Instruct learner in use of positioning devices and any necessary precautions  - Consult OT/PT as ordered  Outcome: Progressing     Problem: SKIN INTEGRITY  Goal: Skin integrity remains intact  Description: Interventions:  - Assess for areas of redness and/or skin breakdown  - Assess vascular sites hourly  - Change oxygen saturation probe minimum once per shift  - Provide humidity as ordered and per policy  - If on oxygen support, assess nasal septum area for skin breakdown and replace protective barrier if needed  - Change diapers as needed  - Minimize the use of adhesives  Outcome: Progressing  Goal: Incision/wound heals without complications  Description: Interventions:  - Assess wound bed/incision and surrounding skin tissue  - Collaborate with physician/APN and implement wound/incision site care and dressing changes as ordered  - Position infant to avoid placing pressure on wound  - Enterostomal/Wound therapy consult as indicated for ostomies/wounds/G-tubes  Outcome: Progressing     Problem: PAIN - ADULT  Goal: Verbalizes/displays adequate comfort level or patient's stated pain goal  Description: INTERVENTIONS:  - Encourage pt to monitor pain and request assistance  - Assess pain using appropriate pain scale  - Administer analgesics based on type and severity of pain and evaluate response  - Implement non-pharmacological measures as appropriate and evaluate response  - Consider cultural and social influences on pain and pain management  - Manage/alleviate anxiety  - Utilize distraction and/or relaxation techniques  - Monitor for opioid side effects  - Notify MD/LIP if interventions unsuccessful or patient reports new pain  - Anticipate increased pain with activity and pre-medicate as appropriate  Outcome: Progressing     Problem: RISK FOR INFECTION - ADULT  Goal: Absence of fever/infection during anticipated neutropenic period  Description: INTERVENTIONS  - Monitor WBC  - Administer growth factors as ordered  - Implement neutropenic guidelines  Outcome: Progressing     Problem: SAFETY ADULT - FALL  Goal: Free from fall injury  Description: INTERVENTIONS:  - Assess pt frequently for physical needs  - Identify cognitive and physical deficits and behaviors that affect risk of falls. - [place] fall precautions as indicated by assessment.  - Educate pt/family on patient safety including physical limitations  - Instruct pt to call for assistance with activity based on assessment  - Modify environment to reduce risk of injury  - Provide assistive devices as appropriate  - Consider OT/PT consult to assist with strengthening/mobility  - Encourage toileting schedule  Outcome: Progressing     Patient resting comfortably in bed, room air, covid and cdiff isolation precautions in place. VSS, denies pain, cowan care, q2 turns, air mattress placed.  All needs met at this time

## 2022-08-11 ENCOUNTER — APPOINTMENT (OUTPATIENT)
Dept: GENERAL RADIOLOGY | Facility: HOSPITAL | Age: 77
End: 2022-08-11
Attending: INTERNAL MEDICINE
Payer: MEDICARE

## 2022-08-11 LAB
ANION GAP SERPL CALC-SCNC: 5 MMOL/L (ref 0–18)
BASOPHILS # BLD AUTO: 0.03 X10(3) UL (ref 0–0.2)
BASOPHILS NFR BLD AUTO: 0.3 %
BUN BLD-MCNC: 10 MG/DL (ref 7–18)
BUN/CREAT SERPL: 45.5 (ref 10–20)
CALCIUM BLD-MCNC: 8.7 MG/DL (ref 8.5–10.1)
CHLORIDE SERPL-SCNC: 115 MMOL/L (ref 98–112)
CO2 SERPL-SCNC: 23 MMOL/L (ref 21–32)
CREAT BLD-MCNC: 0.22 MG/DL
DEPRECATED RDW RBC AUTO: 51.2 FL (ref 35.1–46.3)
EOSINOPHIL # BLD AUTO: 0.33 X10(3) UL (ref 0–0.7)
EOSINOPHIL NFR BLD AUTO: 3.5 %
ERYTHROCYTE [DISTWIDTH] IN BLOOD BY AUTOMATED COUNT: 17.2 % (ref 11–15)
GFR SERPLBLD BASED ON 1.73 SQ M-ARVRAT: 116 ML/MIN/1.73M2 (ref 60–?)
GLUCOSE BLD-MCNC: 86 MG/DL (ref 70–99)
HCT VFR BLD AUTO: 37.2 %
HGB BLD-MCNC: 11.2 G/DL
IMM GRANULOCYTES # BLD AUTO: 0.05 X10(3) UL (ref 0–1)
IMM GRANULOCYTES NFR BLD: 0.5 %
LYMPHOCYTES # BLD AUTO: 1.32 X10(3) UL (ref 1–4)
LYMPHOCYTES NFR BLD AUTO: 14 %
MAGNESIUM SERPL-MCNC: 1.7 MG/DL (ref 1.6–2.6)
MCH RBC QN AUTO: 24.7 PG (ref 26–34)
MCHC RBC AUTO-ENTMCNC: 30.1 G/DL (ref 31–37)
MCV RBC AUTO: 81.9 FL
MONOCYTES # BLD AUTO: 0.5 X10(3) UL (ref 0.1–1)
MONOCYTES NFR BLD AUTO: 5.3 %
NEUTROPHILS # BLD AUTO: 7.21 X10 (3) UL (ref 1.5–7.7)
NEUTROPHILS # BLD AUTO: 7.21 X10(3) UL (ref 1.5–7.7)
NEUTROPHILS NFR BLD AUTO: 76.4 %
OSMOLALITY SERPL CALC.SUM OF ELEC: 294 MOSM/KG (ref 275–295)
PLATELET # BLD AUTO: 264 10(3)UL (ref 150–450)
POTASSIUM SERPL-SCNC: 4.3 MMOL/L (ref 3.5–5.1)
POTASSIUM SERPL-SCNC: 4.5 MMOL/L (ref 3.5–5.1)
RBC # BLD AUTO: 4.54 X10(6)UL
SODIUM SERPL-SCNC: 143 MMOL/L (ref 136–145)
VANCOMYCIN PEAK SERPL-MCNC: 47.9 UG/ML (ref 30–50)
WBC # BLD AUTO: 9.4 X10(3) UL (ref 4–11)

## 2022-08-11 PROCEDURE — 80202 ASSAY OF VANCOMYCIN: CPT | Performed by: HOSPITALIST

## 2022-08-11 PROCEDURE — 80048 BASIC METABOLIC PNL TOTAL CA: CPT | Performed by: HOSPITALIST

## 2022-08-11 PROCEDURE — 71046 X-RAY EXAM CHEST 2 VIEWS: CPT | Performed by: INTERNAL MEDICINE

## 2022-08-11 PROCEDURE — 83735 ASSAY OF MAGNESIUM: CPT | Performed by: HOSPITALIST

## 2022-08-11 PROCEDURE — 85025 COMPLETE CBC W/AUTO DIFF WBC: CPT | Performed by: HOSPITALIST

## 2022-08-11 PROCEDURE — 84132 ASSAY OF SERUM POTASSIUM: CPT | Performed by: HOSPITALIST

## 2022-08-11 RX ORDER — MAGNESIUM SULFATE HEPTAHYDRATE 40 MG/ML
2 INJECTION, SOLUTION INTRAVENOUS ONCE
Status: COMPLETED | OUTPATIENT
Start: 2022-08-11 | End: 2022-08-12

## 2022-08-11 RX ORDER — VANCOMYCIN HYDROCHLORIDE 125 MG/1
125 CAPSULE ORAL DAILY
Status: DISCONTINUED | OUTPATIENT
Start: 2022-09-22 | End: 2022-08-19

## 2022-08-11 RX ORDER — VANCOMYCIN HYDROCHLORIDE 125 MG/1
125 CAPSULE ORAL 3 TIMES DAILY
Status: DISCONTINUED | OUTPATIENT
Start: 2022-09-02 | End: 2022-08-19

## 2022-08-11 RX ORDER — VANCOMYCIN HYDROCHLORIDE 125 MG/1
125 CAPSULE ORAL EVERY OTHER DAY
Status: DISCONTINUED | OUTPATIENT
Start: 2022-10-03 | End: 2022-08-19

## 2022-08-11 RX ORDER — VANCOMYCIN HYDROCHLORIDE 125 MG/1
125 CAPSULE ORAL 2 TIMES DAILY
Status: DISCONTINUED | OUTPATIENT
Start: 2022-09-12 | End: 2022-08-19

## 2022-08-11 RX ORDER — MAGNESIUM SULFATE HEPTAHYDRATE 40 MG/ML
2 INJECTION, SOLUTION INTRAVENOUS ONCE
Status: COMPLETED | OUTPATIENT
Start: 2022-08-11 | End: 2022-08-11

## 2022-08-11 NOTE — PLAN OF CARE
C/o sob this am with pain throughout chest notified MD, CXR completed, no other orders at this time. resolved without intervention. Currently patient resting comfortably, refused lunch, encouraged oral intake as tolerated. Repositioning and pericare provided. IV merrem, vanco and rdv day 3 administered. Heel lift boots placed. Safety and isolation precautions maintained. Will continue to monitor. Problem: Patient Centered Care  Goal: Patient preferences are identified and integrated in the patient's plan of care  Description: Interventions:  - What would you like us to know as we care for you?  Wishes to go home, NOT to a facility  - Provide timely, complete, and accurate information to patient/family  - Incorporate patient and family knowledge, values, beliefs, and cultural backgrounds into the planning and delivery of care  - Encourage patient/family to participate in care and decision-making at the level they choose  - Honor patient and family perspectives and choices  Outcome: Progressing     Problem: MUSCULOSKELETAL  Goal: Maintain proper body alignment to prevent postural asymmetries  Description: Interventions:  - Support and protect proper body alignment using appropriate developmental positioning devices   - Provide supportive boundaries  - Instruct learner in use of splints, slings, braces, or positioning devices and any necessary precautions  - Promote hand-to-mouth and ability to self calm  - Expose to a variety of positions to prevent development of fixed postural patterns  - Promote flexed body  - Consult OT/PT as ordered  Outcome: Progressing  Goal: 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  Description: Interventions:  - Support and protect affected body parts per physician/APN orders  - Instruct learner in use of positioning devices and any necessary precautions  - Consult OT/PT as ordered  Outcome: Progressing     Problem: SKIN INTEGRITY  Goal: Skin integrity remains intact  Description: Interventions:  - Assess for areas of redness and/or skin breakdown  - Assess vascular sites hourly  - Change oxygen saturation probe minimum once per shift  - Provide humidity as ordered and per policy  - If on oxygen support, assess nasal septum area for skin breakdown and replace protective barrier if needed  - Change diapers as needed  - Minimize the use of adhesives  Outcome: Progressing  Goal: Incision/wound heals without complications  Description: Interventions:  - Assess wound bed/incision and surrounding skin tissue  - Collaborate with physician/APN and implement wound/incision site care and dressing changes as ordered  - Position infant to avoid placing pressure on wound  - Enterostomal/Wound therapy consult as indicated for ostomies/wounds/G-tubes  Outcome: Progressing     Problem: PAIN - ADULT  Goal: Verbalizes/displays adequate comfort level or patient's stated pain goal  Description: INTERVENTIONS:  - Encourage pt to monitor pain and request assistance  - Assess pain using appropriate pain scale  - Administer analgesics based on type and severity of pain and evaluate response  - Implement non-pharmacological measures as appropriate and evaluate response  - Consider cultural and social influences on pain and pain management  - Manage/alleviate anxiety  - Utilize distraction and/or relaxation techniques  - Monitor for opioid side effects  - Notify MD/LIP if interventions unsuccessful or patient reports new pain  - Anticipate increased pain with activity and pre-medicate as appropriate  Outcome: Progressing     Problem: RISK FOR INFECTION - ADULT  Goal: Absence of fever/infection during anticipated neutropenic period  Description: INTERVENTIONS  - Monitor WBC  - Administer growth factors as ordered  - Implement neutropenic guidelines  Outcome: Progressing     Problem: SAFETY ADULT - FALL  Goal: Free from fall injury  Description: INTERVENTIONS:  - Assess pt frequently for physical needs  - Identify cognitive and physical deficits and behaviors that affect risk of falls.   - [place] fall precautions as indicated by assessment.  - Educate pt/family on patient safety including physical limitations  - Instruct pt to call for assistance with activity based on assessment  - Modify environment to reduce risk of injury  - Provide assistive devices as appropriate  - Consider OT/PT consult to assist with strengthening/mobility  - Encourage toileting schedule  Outcome: Progressing     Problem: DISCHARGE PLANNING  Goal: Discharge to home or other facility with appropriate resources  Description: INTERVENTIONS:  - Identify barriers to discharge w/pt and caregiver  - Include patient/family/discharge partner in discharge planning  - Arrange for needed discharge resources and transportation as appropriate  - Identify discharge learning needs (meds, wound care, etc)  - Arrange for interpreters to assist at discharge as needed  - Consider post-discharge preferences of patient/family/discharge partner  - Complete POLST form as appropriate  - Assess patient's ability to be responsible for managing their own health  - Refer to Case Management Department for coordinating discharge planning if the patient needs post-hospital services based on physician/LIP order or complex needs related to functional status, cognitive ability or social support system  Outcome: Progressing

## 2022-08-11 NOTE — CM/SW NOTE
Per RN rounds, pt may need ASIA at DC. PT/OT to pao. PASRR submitted. Referrals for ASIA sent proactively. PLAN: pending medical course & therapy assessments     SW remains available for support and/or discharge planning. Please do not hesitate to call/chat SW if further DC needs arise.      Eldon Lundborg MSW, [place], [place]   Ext [phone]

## 2022-08-11 NOTE — PLAN OF CARE
Pt alert. Reports some abd and LE pain. Prn tylenol given with good results. Turned and positioned q2h for comfort and skin integrity. Call light within easy reach. VSS. No acute distress noted.     Problem: MUSCULOSKELETAL  Goal: Maintain proper body alignment to prevent postural asymmetries  Description: Interventions:  - Support and protect proper body alignment using appropriate developmental positioning devices   - Provide supportive boundaries  - Instruct learner in use of splints, slings, braces, or positioning devices and any necessary precautions  - Promote hand-to-mouth and ability to self calm  - Expose to a variety of positions to prevent development of fixed postural patterns  - Promote flexed body  - Consult OT/PT as ordered  Outcome: Progressing     Problem: SKIN INTEGRITY  Goal: Skin integrity remains intact  Description: Interventions:  - Assess for areas of redness and/or skin breakdown  - Assess vascular sites hourly  - Change oxygen saturation probe minimum once per shift  - Provide humidity as ordered and per policy  - If on oxygen support, assess nasal septum area for skin breakdown and replace protective barrier if needed  - Change diapers as needed  - Minimize the use of adhesives  Outcome: Progressing     Problem: PAIN - ADULT  Goal: Verbalizes/displays adequate comfort level or patient's stated pain goal  Description: INTERVENTIONS:  - Encourage pt to monitor pain and request assistance  - Assess pain using appropriate pain scale  - Administer analgesics based on type and severity of pain and evaluate response  - Implement non-pharmacological measures as appropriate and evaluate response  - Consider cultural and social influences on pain and pain management  - Manage/alleviate anxiety  - Utilize distraction and/or relaxation techniques  - Monitor for opioid side effects  - Notify MD/LIP if interventions unsuccessful or patient reports new pain  - Anticipate increased pain with activity and pre-medicate as appropriate  Outcome: Progressing     Problem: SAFETY ADULT - FALL  Goal: Free from fall injury  Description: INTERVENTIONS:  - Assess pt frequently for physical needs  - Identify cognitive and physical deficits and behaviors that affect risk of falls.   - [place] fall precautions as indicated by assessment.  - Educate pt/family on patient safety including physical limitations  - Instruct pt to call for assistance with activity based on assessment  - Modify environment to reduce risk of injury  - Provide assistive devices as appropriate  - Consider OT/PT consult to assist with strengthening/mobility  - Encourage toileting schedule  Outcome: Progressing

## 2022-08-12 ENCOUNTER — APPOINTMENT (OUTPATIENT)
Dept: PICC SERVICES | Facility: HOSPITAL | Age: 77
End: 2022-08-12
Attending: INTERNAL MEDICINE
Payer: MEDICARE

## 2022-08-12 LAB
ANION GAP SERPL CALC-SCNC: 8 MMOL/L (ref 0–18)
BASOPHILS # BLD AUTO: 0.02 X10(3) UL (ref 0–0.2)
BASOPHILS NFR BLD AUTO: 0.2 %
BUN BLD-MCNC: 12 MG/DL (ref 7–18)
BUN/CREAT SERPL: 70.6 (ref 10–20)
CALCIUM BLD-MCNC: 8.1 MG/DL (ref 8.5–10.1)
CHLORIDE SERPL-SCNC: 110 MMOL/L (ref 98–112)
CO2 SERPL-SCNC: 25 MMOL/L (ref 21–32)
CREAT BLD-MCNC: 0.17 MG/DL
DEPRECATED RDW RBC AUTO: 50.2 FL (ref 35.1–46.3)
EOSINOPHIL # BLD AUTO: 0.22 X10(3) UL (ref 0–0.7)
EOSINOPHIL NFR BLD AUTO: 2.4 %
ERYTHROCYTE [DISTWIDTH] IN BLOOD BY AUTOMATED COUNT: 17.1 % (ref 11–15)
GFR SERPLBLD BASED ON 1.73 SQ M-ARVRAT: 124 ML/MIN/1.73M2 (ref 60–?)
GLUCOSE BLD-MCNC: 82 MG/DL (ref 70–99)
HCT VFR BLD AUTO: 37.3 %
HGB BLD-MCNC: 11.6 G/DL
IMM GRANULOCYTES # BLD AUTO: 0.06 X10(3) UL (ref 0–1)
IMM GRANULOCYTES NFR BLD: 0.6 %
LYMPHOCYTES # BLD AUTO: 1.51 X10(3) UL (ref 1–4)
LYMPHOCYTES NFR BLD AUTO: 16.3 %
MAGNESIUM SERPL-MCNC: 2.2 MG/DL (ref 1.6–2.6)
MCH RBC QN AUTO: 25.2 PG (ref 26–34)
MCHC RBC AUTO-ENTMCNC: 31.1 G/DL (ref 31–37)
MCV RBC AUTO: 81.1 FL
MONOCYTES # BLD AUTO: 0.54 X10(3) UL (ref 0.1–1)
MONOCYTES NFR BLD AUTO: 5.8 %
NEUTROPHILS # BLD AUTO: 6.89 X10 (3) UL (ref 1.5–7.7)
NEUTROPHILS # BLD AUTO: 6.89 X10(3) UL (ref 1.5–7.7)
NEUTROPHILS NFR BLD AUTO: 74.7 %
OSMOLALITY SERPL CALC.SUM OF ELEC: 295 MOSM/KG (ref 275–295)
PLATELET # BLD AUTO: 264 10(3)UL (ref 150–450)
POTASSIUM SERPL-SCNC: 3.3 MMOL/L (ref 3.5–5.1)
RBC # BLD AUTO: 4.6 X10(6)UL
SODIUM SERPL-SCNC: 143 MMOL/L (ref 136–145)
VANCOMYCIN PEAK SERPL-MCNC: 23.5 UG/ML (ref 30–50)
VANCOMYCIN TROUGH SERPL-MCNC: 11 UG/ML (ref 10–20)
WBC # BLD AUTO: 9.2 X10(3) UL (ref 4–11)

## 2022-08-12 PROCEDURE — 83735 ASSAY OF MAGNESIUM: CPT | Performed by: INTERNAL MEDICINE

## 2022-08-12 PROCEDURE — 80202 ASSAY OF VANCOMYCIN: CPT | Performed by: INTERNAL MEDICINE

## 2022-08-12 PROCEDURE — 97530 THERAPEUTIC ACTIVITIES: CPT

## 2022-08-12 PROCEDURE — 36410 VNPNXR 3YR/> PHY/QHP DX/THER: CPT

## 2022-08-12 PROCEDURE — 97162 PT EVAL MOD COMPLEX 30 MIN: CPT

## 2022-08-12 PROCEDURE — 85025 COMPLETE CBC W/AUTO DIFF WBC: CPT | Performed by: INTERNAL MEDICINE

## 2022-08-12 PROCEDURE — 80048 BASIC METABOLIC PNL TOTAL CA: CPT | Performed by: INTERNAL MEDICINE

## 2022-08-12 PROCEDURE — 97166 OT EVAL MOD COMPLEX 45 MIN: CPT

## 2022-08-12 PROCEDURE — 80202 ASSAY OF VANCOMYCIN: CPT | Performed by: HOSPITALIST

## 2022-08-12 RX ORDER — FUROSEMIDE 10 MG/ML
20 INJECTION INTRAMUSCULAR; INTRAVENOUS ONCE
Status: COMPLETED | OUTPATIENT
Start: 2022-08-12 | End: 2022-08-12

## 2022-08-12 RX ORDER — BENZONATATE 100 MG/1
100 CAPSULE ORAL 3 TIMES DAILY
Status: DISCONTINUED | OUTPATIENT
Start: 2022-08-12 | End: 2022-08-19

## 2022-08-12 RX ORDER — ALBUTEROL SULFATE 90 UG/1
2 AEROSOL, METERED RESPIRATORY (INHALATION) EVERY 4 HOURS PRN
Status: DISCONTINUED | OUTPATIENT
Start: 2022-08-12 | End: 2022-08-19

## 2022-08-12 NOTE — PLAN OF CARE
Problem: Patient Centered Care  Goal: Patient preferences are identified and integrated in the patient's plan of care  Description: Interventions:  - What would you like us to know as we care for you?  I am from home with my   - Provide timely, complete, and accurate information to patient/family  - Incorporate patient and family knowledge, values, beliefs, and cultural backgrounds into the planning and delivery of care  - Encourage patient/family to participate in care and decision-making at the level they choose  - Honor patient and family perspectives and choices  Outcome: Progressing     Problem: MUSCULOSKELETAL  Goal: Maintain proper body alignment to prevent postural asymmetries  Description: Interventions:  - Support and protect proper body alignment using appropriate developmental positioning devices   - Provide supportive boundaries  - Instruct learner in use of splints, slings, braces, or positioning devices and any necessary precautions  - Promote hand-to-mouth and ability to self calm  - Expose to a variety of positions to prevent development of fixed postural patterns  - Promote flexed body  - Consult OT/PT as ordered  Outcome: Progressing  Goal: 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  Description: Interventions:  - Support and protect affected body parts per physician/APN orders  - Instruct learner in use of positioning devices and any necessary precautions  - Consult OT/PT as ordered  Outcome: Progressing     Problem: SKIN INTEGRITY  Goal: Skin integrity remains intact  Description: Interventions:  - Assess for areas of redness and/or skin breakdown  - Assess vascular sites hourly  - Change oxygen saturation probe minimum once per shift  - Provide humidity as ordered and per policy  - If on oxygen support, assess nasal septum area for skin breakdown and replace protective barrier if needed  - Change diapers as needed  - Minimize the use of adhesives  Outcome: Progressing  Goal: Incision/wound heals without complications  Description: Interventions:  - Assess wound bed/incision and surrounding skin tissue  - Collaborate with physician/APN and implement wound/incision site care and dressing changes as ordered  - Position infant to avoid placing pressure on wound  - Enterostomal/Wound therapy consult as indicated for ostomies/wounds/G-tubes  Outcome: Progressing     Problem: PAIN - ADULT  Goal: Verbalizes/displays adequate comfort level or patient's stated pain goal  Description: INTERVENTIONS:  - Encourage pt to monitor pain and request assistance  - Assess pain using appropriate pain scale  - Administer analgesics based on type and severity of pain and evaluate response  - Implement non-pharmacological measures as appropriate and evaluate response  - Consider cultural and social influences on pain and pain management  - Manage/alleviate anxiety  - Utilize distraction and/or relaxation techniques  - Monitor for opioid side effects  - Notify MD/LIP if interventions unsuccessful or patient reports new pain  - Anticipate increased pain with activity and pre-medicate as appropriate  Outcome: Progressing     Problem: RISK FOR INFECTION - ADULT  Goal: Absence of fever/infection during anticipated neutropenic period  Description: INTERVENTIONS  - Monitor WBC  - Administer growth factors as ordered  - Implement neutropenic guidelines  Outcome: Progressing     Problem: SAFETY ADULT - FALL  Goal: Free from fall injury  Description: INTERVENTIONS:  - Assess pt frequently for physical needs  - Identify cognitive and physical deficits and behaviors that affect risk of falls.   - [place] fall precautions as indicated by assessment.  - Educate pt/family on patient safety including physical limitations  - Instruct pt to call for assistance with activity based on assessment  - Modify environment to reduce risk of injury  - Provide assistive devices as appropriate  - Consider OT/PT consult to assist with strengthening/mobility  - Encourage toileting schedule  Outcome: Progressing     Problem: DISCHARGE PLANNING  Goal: Discharge to home or other facility with appropriate resources  Description: INTERVENTIONS:  - Identify barriers to discharge w/pt and caregiver  - Include patient/family/discharge partner in discharge planning  - Arrange for needed discharge resources and transportation as appropriate  - Identify discharge learning needs (meds, wound care, etc)  - Arrange for interpreters to assist at discharge as needed  - Consider post-discharge preferences of patient/family/discharge partner  - Complete POLST form as appropriate  - Assess patient's ability to be responsible for managing their own health  - Refer to Case Management Department for coordinating discharge planning if the patient needs post-hospital services based on physician/LIP order or complex needs related to functional status, cognitive ability or social support system  Outcome: Progressing

## 2022-08-13 LAB
ANION GAP SERPL CALC-SCNC: 5 MMOL/L (ref 0–18)
BASOPHILS # BLD AUTO: 0.03 X10(3) UL (ref 0–0.2)
BASOPHILS NFR BLD AUTO: 0.3 %
BUN BLD-MCNC: 12 MG/DL (ref 7–18)
BUN/CREAT SERPL: 54.5 (ref 10–20)
CALCIUM BLD-MCNC: 8.1 MG/DL (ref 8.5–10.1)
CHLORIDE SERPL-SCNC: 107 MMOL/L (ref 98–112)
CO2 SERPL-SCNC: 27 MMOL/L (ref 21–32)
CREAT BLD-MCNC: 0.22 MG/DL
DEPRECATED RDW RBC AUTO: 49.9 FL (ref 35.1–46.3)
EOSINOPHIL # BLD AUTO: 0.26 X10(3) UL (ref 0–0.7)
EOSINOPHIL NFR BLD AUTO: 2.6 %
ERYTHROCYTE [DISTWIDTH] IN BLOOD BY AUTOMATED COUNT: 17.2 % (ref 11–15)
GFR SERPLBLD BASED ON 1.73 SQ M-ARVRAT: 116 ML/MIN/1.73M2 (ref 60–?)
GLUCOSE BLD-MCNC: 98 MG/DL (ref 70–99)
HCT VFR BLD AUTO: 37.7 %
HGB BLD-MCNC: 11.8 G/DL
IMM GRANULOCYTES # BLD AUTO: 0.06 X10(3) UL (ref 0–1)
IMM GRANULOCYTES NFR BLD: 0.6 %
LYMPHOCYTES # BLD AUTO: 1.63 X10(3) UL (ref 1–4)
LYMPHOCYTES NFR BLD AUTO: 16.5 %
MAGNESIUM SERPL-MCNC: 2 MG/DL (ref 1.6–2.6)
MCH RBC QN AUTO: 25.3 PG (ref 26–34)
MCHC RBC AUTO-ENTMCNC: 31.3 G/DL (ref 31–37)
MCV RBC AUTO: 80.7 FL
MONOCYTES # BLD AUTO: 0.53 X10(3) UL (ref 0.1–1)
MONOCYTES NFR BLD AUTO: 5.4 %
NEUTROPHILS # BLD AUTO: 7.36 X10 (3) UL (ref 1.5–7.7)
NEUTROPHILS # BLD AUTO: 7.36 X10(3) UL (ref 1.5–7.7)
NEUTROPHILS NFR BLD AUTO: 74.6 %
OSMOLALITY SERPL CALC.SUM OF ELEC: 288 MOSM/KG (ref 275–295)
PLATELET # BLD AUTO: 271 10(3)UL (ref 150–450)
POTASSIUM SERPL-SCNC: 3 MMOL/L (ref 3.5–5.1)
POTASSIUM SERPL-SCNC: 3 MMOL/L (ref 3.5–5.1)
POTASSIUM SERPL-SCNC: 4.3 MMOL/L (ref 3.5–5.1)
RBC # BLD AUTO: 4.67 X10(6)UL
SODIUM SERPL-SCNC: 139 MMOL/L (ref 136–145)
WBC # BLD AUTO: 9.9 X10(3) UL (ref 4–11)

## 2022-08-13 PROCEDURE — 84132 ASSAY OF SERUM POTASSIUM: CPT | Performed by: INTERNAL MEDICINE

## 2022-08-13 PROCEDURE — 80048 BASIC METABOLIC PNL TOTAL CA: CPT | Performed by: INTERNAL MEDICINE

## 2022-08-13 PROCEDURE — 85025 COMPLETE CBC W/AUTO DIFF WBC: CPT | Performed by: INTERNAL MEDICINE

## 2022-08-13 PROCEDURE — 83735 ASSAY OF MAGNESIUM: CPT | Performed by: INTERNAL MEDICINE

## 2022-08-13 RX ORDER — POTASSIUM CHLORIDE 20 MEQ/1
40 TABLET, EXTENDED RELEASE ORAL EVERY 4 HOURS
Status: COMPLETED | OUTPATIENT
Start: 2022-08-13 | End: 2022-08-13

## 2022-08-13 RX ORDER — FUROSEMIDE 10 MG/ML
20 INJECTION INTRAMUSCULAR; INTRAVENOUS ONCE
Status: COMPLETED | OUTPATIENT
Start: 2022-08-13 | End: 2022-08-13

## 2022-08-13 NOTE — PLAN OF CARE
Problem: Patient Centered Care  Goal: Patient preferences are identified and integrated in the patient's plan of care  Description: Interventions:  - What would you like us to know as we care for you? Pt states she performs straight catheterization at home.   - Provide timely, complete, and accurate information to patient/family  - Incorporate patient and family knowledge, values, beliefs, and cultural backgrounds into the planning and delivery of care  - Encourage patient/family to participate in care and decision-making at the level they choose  - Honor patient and family perspectives and choices  Outcome: Progressing     Problem: MUSCULOSKELETAL  Goal: Maintain proper body alignment to prevent postural asymmetries  Description: Interventions:  - Support and protect proper body alignment using appropriate developmental positioning devices   - Provide supportive boundaries  - Instruct learner in use of splints, slings, braces, or positioning devices and any necessary precautions  - Promote hand-to-mouth and ability to self calm  - Expose to a variety of positions to prevent development of fixed postural patterns  - Promote flexed body  - Consult OT/PT as ordered  Outcome: Progressing  Goal: 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  Description: Interventions:  - Support and protect affected body parts per physician/APN orders  - Instruct learner in use of positioning devices and any necessary precautions  - Consult OT/PT as ordered  Outcome: Progressing     Problem: SKIN INTEGRITY  Goal: Skin integrity remains intact  Description: Interventions:  - Assess for areas of redness and/or skin breakdown  - Assess vascular sites hourly  - Change oxygen saturation probe minimum once per shift  - Provide humidity as ordered and per policy  - If on oxygen support, assess nasal septum area for skin breakdown and replace protective barrier if needed  - Change diapers as needed  - Minimize the use of adhesives  Outcome: Progressing  Goal: Incision/wound heals without complications  Description: Interventions:  - Assess wound bed/incision and surrounding skin tissue  - Collaborate with physician/APN and implement wound/incision site care and dressing changes as ordered  - Position infant to avoid placing pressure on wound  - Enterostomal/Wound therapy consult as indicated for ostomies/wounds/G-tubes  Outcome: Progressing     Problem: PAIN - ADULT  Goal: Verbalizes/displays adequate comfort level or patient's stated pain goal  Description: INTERVENTIONS:  - Encourage pt to monitor pain and request assistance  - Assess pain using appropriate pain scale  - Administer analgesics based on type and severity of pain and evaluate response  - Implement non-pharmacological measures as appropriate and evaluate response  - Consider cultural and social influences on pain and pain management  - Manage/alleviate anxiety  - Utilize distraction and/or relaxation techniques  - Monitor for opioid side effects  - Notify MD/LIP if interventions unsuccessful or patient reports new pain  - Anticipate increased pain with activity and pre-medicate as appropriate  Outcome: Progressing     Problem: RISK FOR INFECTION - ADULT  Goal: Absence of fever/infection during anticipated neutropenic period  Description: INTERVENTIONS  - Monitor WBC  - Administer growth factors as ordered  - Implement neutropenic guidelines  Outcome: Progressing     Problem: SAFETY ADULT - FALL  Goal: Free from fall injury  Description: INTERVENTIONS:  - Assess pt frequently for physical needs  - Identify cognitive and physical deficits and behaviors that affect risk of falls.   - [place] fall precautions as indicated by assessment.  - Educate pt/family on patient safety including physical limitations  - Instruct pt to call for assistance with activity based on assessment  - Modify environment to reduce risk of injury  - Provide assistive devices as appropriate  - Consider OT/PT consult to assist with strengthening/mobility  - Encourage toileting schedule  Outcome: Progressing     Problem: DISCHARGE PLANNING  Goal: Discharge to home or other facility with appropriate resources  Description: INTERVENTIONS:  - Identify barriers to discharge w/pt and caregiver  - Include patient/family/discharge partner in discharge planning  - Arrange for needed discharge resources and transportation as appropriate  - Identify discharge learning needs (meds, wound care, etc)  - Arrange for interpreters to assist at discharge as needed  - Consider post-discharge preferences of patient/family/discharge partner  - Complete POLST form as appropriate  - Assess patient's ability to be responsible for managing their own health  - Refer to Case Management Department for coordinating discharge planning if the patient needs post-hospital services based on physician/LIP order or complex needs related to functional status, cognitive ability or social support system  Outcome: Progressing

## 2022-08-13 NOTE — PLAN OF CARE
Problem: Patient Centered Care  Goal: Patient preferences are identified and integrated in the patient's plan of care  Description: Interventions:  - What would you like us to know as we care for you? Home with  and caregiver.   - Provide timely, complete, and accurate information to patient/family  - Incorporate patient and family knowledge, values, beliefs, and cultural backgrounds into the planning and delivery of care  - Encourage patient/family to participate in care and decision-making at the level they choose  - Honor patient and family perspectives and choices  Outcome: Progressing     Problem: MUSCULOSKELETAL  Goal: Maintain proper body alignment to prevent postural asymmetries  Description: Interventions:  - Support and protect proper body alignment using appropriate developmental positioning devices   - Provide supportive boundaries  - Instruct learner in use of splints, slings, braces, or positioning devices and any necessary precautions  - Promote hand-to-mouth and ability to self calm  - Expose to a variety of positions to prevent development of fixed postural patterns  - Promote flexed body  - Consult OT/PT as ordered  Outcome: Progressing  Goal: 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  Description: Interventions:  - Support and protect affected body parts per physician/APN orders  - Instruct learner in use of positioning devices and any necessary precautions  - Consult OT/PT as ordered  Outcome: Progressing     Problem: SKIN INTEGRITY  Goal: Skin integrity remains intact  Description: Interventions:  - Assess for areas of redness and/or skin breakdown  - Assess vascular sites hourly  - Change oxygen saturation probe minimum once per shift  - Provide humidity as ordered and per policy  - If on oxygen support, assess nasal septum area for skin breakdown and replace protective barrier if needed  - Change diapers as needed  - Minimize the use of adhesives  Outcome: Progressing  Goal: Incision/wound heals without complications  Description: Interventions:  - Assess wound bed/incision and surrounding skin tissue  - Collaborate with physician/APN and implement wound/incision site care and dressing changes as ordered  - Position infant to avoid placing pressure on wound  - Enterostomal/Wound therapy consult as indicated for ostomies/wounds/G-tubes  Outcome: Progressing     Problem: PAIN - ADULT  Goal: Verbalizes/displays adequate comfort level or patient's stated pain goal  Description: INTERVENTIONS:  - Encourage pt to monitor pain and request assistance  - Assess pain using appropriate pain scale  - Administer analgesics based on type and severity of pain and evaluate response  - Implement non-pharmacological measures as appropriate and evaluate response  - Consider cultural and social influences on pain and pain management  - Manage/alleviate anxiety  - Utilize distraction and/or relaxation techniques  - Monitor for opioid side effects  - Notify MD/LIP if interventions unsuccessful or patient reports new pain  - Anticipate increased pain with activity and pre-medicate as appropriate  Outcome: Progressing     Problem: RISK FOR INFECTION - ADULT  Goal: Absence of fever/infection during anticipated neutropenic period  Description: INTERVENTIONS  - Monitor WBC  - Administer growth factors as ordered  - Implement neutropenic guidelines  Outcome: Progressing     Problem: SAFETY ADULT - FALL  Goal: Free from fall injury  Description: INTERVENTIONS:  - Assess pt frequently for physical needs  - Identify cognitive and physical deficits and behaviors that affect risk of falls.   - [place] fall precautions as indicated by assessment.  - Educate pt/family on patient safety including physical limitations  - Instruct pt to call for assistance with activity based on assessment  - Modify environment to reduce risk of injury  - Provide assistive devices as appropriate  - Consider OT/PT consult to assist with strengthening/mobility  - Encourage toileting schedule  Outcome: Progressing     Problem: DISCHARGE PLANNING  Goal: Discharge to home or other facility with appropriate resources  Description: INTERVENTIONS:  - Identify barriers to discharge w/pt and caregiver  - Include patient/family/discharge partner in discharge planning  - Arrange for needed discharge resources and transportation as appropriate  - Identify discharge learning needs (meds, wound care, etc)  - Arrange for interpreters to assist at discharge as needed  - Consider post-discharge preferences of patient/family/discharge partner  - Complete POLST form as appropriate  - Assess patient's ability to be responsible for managing their own health  - Refer to Case Management Department for coordinating discharge planning if the patient needs post-hospital services based on physician/LIP order or complex needs related to functional status, cognitive ability or social support system  Outcome: Progressing   Patient A/O x4. PT/OT work with her and put her up in the chair. Later with lift she returned to the bed and per MD- midline was inserted-Rt. upper arm for long term IV antibiotic. PT/OT recommended ASIA's.

## 2022-08-14 LAB
ANION GAP SERPL CALC-SCNC: 4 MMOL/L (ref 0–18)
BASOPHILS # BLD AUTO: 0.02 X10(3) UL (ref 0–0.2)
BASOPHILS NFR BLD AUTO: 0.3 %
BUN BLD-MCNC: 10 MG/DL (ref 7–18)
BUN/CREAT SERPL: 50 (ref 10–20)
CALCIUM BLD-MCNC: 7.9 MG/DL (ref 8.5–10.1)
CHLORIDE SERPL-SCNC: 106 MMOL/L (ref 98–112)
CO2 SERPL-SCNC: 31 MMOL/L (ref 21–32)
CREAT BLD-MCNC: 0.2 MG/DL
DEPRECATED RDW RBC AUTO: 51.3 FL (ref 35.1–46.3)
EOSINOPHIL # BLD AUTO: 0.14 X10(3) UL (ref 0–0.7)
EOSINOPHIL NFR BLD AUTO: 1.8 %
ERYTHROCYTE [DISTWIDTH] IN BLOOD BY AUTOMATED COUNT: 17.3 % (ref 11–15)
GFR SERPLBLD BASED ON 1.73 SQ M-ARVRAT: 119 ML/MIN/1.73M2 (ref 60–?)
GLUCOSE BLD-MCNC: 89 MG/DL (ref 70–99)
HCT VFR BLD AUTO: 38.3 %
HGB BLD-MCNC: 11.6 G/DL
IMM GRANULOCYTES # BLD AUTO: 0.05 X10(3) UL (ref 0–1)
IMM GRANULOCYTES NFR BLD: 0.6 %
LYMPHOCYTES # BLD AUTO: 1.85 X10(3) UL (ref 1–4)
LYMPHOCYTES NFR BLD AUTO: 23.9 %
MAGNESIUM SERPL-MCNC: 1.7 MG/DL (ref 1.6–2.6)
MCH RBC QN AUTO: 24.9 PG (ref 26–34)
MCHC RBC AUTO-ENTMCNC: 30.3 G/DL (ref 31–37)
MCV RBC AUTO: 82.2 FL
MONOCYTES # BLD AUTO: 0.53 X10(3) UL (ref 0.1–1)
MONOCYTES NFR BLD AUTO: 6.9 %
NEUTROPHILS # BLD AUTO: 5.14 X10 (3) UL (ref 1.5–7.7)
NEUTROPHILS # BLD AUTO: 5.14 X10(3) UL (ref 1.5–7.7)
NEUTROPHILS NFR BLD AUTO: 66.5 %
OSMOLALITY SERPL CALC.SUM OF ELEC: 291 MOSM/KG (ref 275–295)
PLATELET # BLD AUTO: 281 10(3)UL (ref 150–450)
POTASSIUM SERPL-SCNC: 3.4 MMOL/L (ref 3.5–5.1)
POTASSIUM SERPL-SCNC: 4.8 MMOL/L (ref 3.5–5.1)
RBC # BLD AUTO: 4.66 X10(6)UL
SODIUM SERPL-SCNC: 141 MMOL/L (ref 136–145)
WBC # BLD AUTO: 7.7 X10(3) UL (ref 4–11)

## 2022-08-14 PROCEDURE — 85025 COMPLETE CBC W/AUTO DIFF WBC: CPT | Performed by: INTERNAL MEDICINE

## 2022-08-14 PROCEDURE — 84132 ASSAY OF SERUM POTASSIUM: CPT | Performed by: INTERNAL MEDICINE

## 2022-08-14 PROCEDURE — 83735 ASSAY OF MAGNESIUM: CPT | Performed by: INTERNAL MEDICINE

## 2022-08-14 PROCEDURE — 80048 BASIC METABOLIC PNL TOTAL CA: CPT | Performed by: INTERNAL MEDICINE

## 2022-08-14 RX ORDER — POTASSIUM CHLORIDE 20 MEQ/1
40 TABLET, EXTENDED RELEASE ORAL EVERY 4 HOURS
Status: COMPLETED | OUTPATIENT
Start: 2022-08-14 | End: 2022-08-14

## 2022-08-14 RX ORDER — MAGNESIUM OXIDE 400 MG/1
400 TABLET ORAL ONCE
Status: COMPLETED | OUTPATIENT
Start: 2022-08-14 | End: 2022-08-14

## 2022-08-14 NOTE — PLAN OF CARE
Problem: Patient Centered Care  Goal: Patient preferences are identified and integrated in the patient's plan of care  Description: Interventions:  - What would you like us to know as we care for you?  From home with   - Provide timely, complete, and accurate information to patient/family  - Incorporate patient and family knowledge, values, beliefs, and cultural backgrounds into the planning and delivery of care  - Encourage patient/family to participate in care and decision-making at the level they choose  - Honor patient and family perspectives and choices  Outcome: Progressing     Problem: MUSCULOSKELETAL  Goal: Maintain proper body alignment to prevent postural asymmetries  Description: Interventions:  - Support and protect proper body alignment using appropriate developmental positioning devices   - Provide supportive boundaries  - Instruct learner in use of splints, slings, braces, or positioning devices and any necessary precautions  - Promote hand-to-mouth and ability to self calm  - Expose to a variety of positions to prevent development of fixed postural patterns  - Promote flexed body  - Consult OT/PT as ordered  Outcome: Progressing  Goal: 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  Description: Interventions:  - Support and protect affected body parts per physician/APN orders  - Instruct learner in use of positioning devices and any necessary precautions  - Consult OT/PT as ordered  Outcome: Progressing     Problem: SKIN INTEGRITY  Goal: Skin integrity remains intact  Description: Interventions:  - Assess for areas of redness and/or skin breakdown  - Assess vascular sites hourly  - Change oxygen saturation probe minimum once per shift  - Provide humidity as ordered and per policy  - If on oxygen support, assess nasal septum area for skin breakdown and replace protective barrier if needed  - Change diapers as needed  - Minimize the use of adhesives  Outcome: Progressing  Goal: Incision/wound Simi Patino is a 39 year old female with past medical history significant for Depression, anxiety disorder, Chronic Lower back Pain and a witnessed seizure in the past (5/8/14) here today for follow up regarding her multiple medical conditions.      Patient is due for repeat screening mammogram.  She has an order in chart.  Patient will call and schedule this in the future.    Patient is up-to-date with her Pap smear.  Her last was October 26, 2021 and normal.  She was HPV negative.    Patient is reporting that she has some weight gain over the past month or 2.  She has been trying to lose weight and she is planning on going down to [place] for vacation.  Historically in the past patient never had a problem losing weight with increased exercises and a very strict diet.  Patient has been doing this and has not lost any weight.  Due to her history of thyroid problems I will order a TSH today.    Patient's most recent thyroid ultrasound shows persistent thyroid past.  Patient had a recent appointment with endocrinology who reports no need for biopsies.    Patient's last lipid profile was in July of last year and acceptable    CHOLESTEROL, TOTAL (mg/dL)   Date Value   07/24/2021 189     HDL CHOLESTEROL (mg/dL)   Date Value   07/24/2021 83     LDL CHOL, CALCULATED (mg/dL)   Date Value   07/24/2021 90     TRIGLYCERIDES (mg/dL)   Date Value   07/24/2021 81     Patient's last fasting glucose was normal at 97 in July of last year.    Patient's anxiety has been well controlled.  Patient reports that she is taking an occasional Xanax.  Per chart review she is taking 1 Xanax about every 3 days on average.  Advised her that this is acceptable but she has to be careful not to start taking this more often.  Patient understands and agrees with the plan.  When patient is due for refill I will give her 30 tablets of Xanax.    Patient also has some chronic degenerative disc disease in her lumbar spine with a disc protrusion.   Patient is taking ibuprofen and Tylenol daily and occasionally needs to take a Norco.  I reviewed her use today and it appears she is taking 1 pill approximately every 3 days.  Patient will be careful with this and I will give her a refill when she calls.    I spent greater than 30 minutes reviewing patients labs, performing an exam and evaluation, counseling her on her medical conditions, ordering tests and meds and documenting clinical information in the electronic medical record.     Immunization History   Administered Date(s) Administered   • COVID-19 Moderna Vaccine 08/18/2021, 09/17/2021   • DT: Child type diphtheria/tetanus 01/26/2008   • Influenza, Unspecified Formulation 09/26/2018   • Td:Adult type tetanus/diphtheria 01/26/2008   • Tdap 05/24/2019     ALLERGIES:  Patient has no known allergies.  Current Outpatient Medications   Medication Sig Dispense Refill   • ALPRAZolam (XANAX) 0.25 MG tablet Take 1 tablet by mouth daily as needed for Anxiety. 10 tablet 0   • HYDROcodone-acetaminophen (NORCO)  MG per tablet Take 1 tablet by mouth every 6 hours as needed for Pain. 20 tablet 0   • ondansetron (ZOFRAN ODT) 4 MG disintegrating tablet      • azelastine (ASTELIN) 0.1 % nasal spray Spray 2 sprays in each nostril 2 times daily. Use in each nostril as directed 3 each 0     No current facility-administered medications for this visit.     Past Surgical History:  Leep x 2 (1996 and 2009)  Cholecystectomy in May 2014    Social History:   + Tobacco (6 cigarettes daily - was smoking heavier in the past)  + Alcohol (5-6 glasses of wine per month per patent)  , 2 children  Admin at [employer]    Family History:   Father, alive, 60, + mild MI's, + angioplasties in the past  Mother, alive, 60, good health, Osteoporosis  MGF and MGM with CAD and CVA's  MGM with melanoma, Scleroderma  MGF with severe osteoporosis  PGM with Diabetes  No known family history of Breast cancer, ovarian or uterine  heals without complications  Description: Interventions:  - Assess wound bed/incision and surrounding skin tissue  - Collaborate with physician/APN and implement wound/incision site care and dressing changes as ordered  - Position infant to avoid placing pressure on wound  - Enterostomal/Wound therapy consult as indicated for ostomies/wounds/G-tubes  Outcome: Progressing     Problem: PAIN - ADULT  Goal: Verbalizes/displays adequate comfort level or patient's stated pain goal  Description: INTERVENTIONS:  - Encourage pt to monitor pain and request assistance  - Assess pain using appropriate pain scale  - Administer analgesics based on type and severity of pain and evaluate response  - Implement non-pharmacological measures as appropriate and evaluate response  - Consider cultural and social influences on pain and pain management  - Manage/alleviate anxiety  - Utilize distraction and/or relaxation techniques  - Monitor for opioid side effects  - Notify MD/LIP if interventions unsuccessful or patient reports new pain  - Anticipate increased pain with activity and pre-medicate as appropriate  Outcome: Progressing     Problem: RISK FOR INFECTION - ADULT  Goal: Absence of fever/infection during anticipated neutropenic period  Description: INTERVENTIONS  - Monitor WBC  - Administer growth factors as ordered  - Implement neutropenic guidelines  Outcome: Progressing     Problem: SAFETY ADULT - FALL  Goal: Free from fall injury  Description: INTERVENTIONS:  - Assess pt frequently for physical needs  - Identify cognitive and physical deficits and behaviors that affect risk of falls.   - [place] fall precautions as indicated by assessment.  - Educate pt/family on patient safety including physical limitations  - Instruct pt to call for assistance with activity based on assessment  - Modify environment to reduce risk of injury  - Provide assistive devices as appropriate  - Consider OT/PT consult to assist with cancers  PGF with Cancer (lung)  No known family history of colon cancer    ROS:  GENERAL: no recent weight changes, + anxiety, no current depression, no fatigue, no fevers, no chills, no heat or cold intolerances, + chronic lower back pain  HEENT: no headache, no dizziness, no visual problems, no ear pain, no difficulty swallowing, no sore throat, no nasal or sinus congestion, no cough  PULMONARY: no sob, no wheezing, no pain with deep inspiration  CARDIAC: no chest pain, no palpitations  GI: no abdominal pain, no changes in bowel habits, no diarrhea, no constipation, no hematochezia, no hemmorhoids  : no changes in bladder function, no dysuria, no frequency, no incontinence, no discharge  EXTREM: no joint concerns, no peripheral edema  NEURO: no difficulties with movement or sensation, no memory problems, no numbness or tingling in any area of body.     P/E:  /80   Pulse 69   Temp 97.5 °F (36.4 °C) (Temporal)   Resp 18   Wt 83.9 kg (185 lb)   LMP 04/22/2021   BMI 28.98 kg/m²   BSA 1.96 m²    General: A&Ox3, nad, healthy appearing  Heent: perrla, eomi, op clear, no lad, no nasal congestion, neck supple  Lungs: cta b, no r/r/w, no dullness to percussion, good air movement  CV: rrr, no murmurs, S1 and S2 wnl, no S3, no S4, non-displaced PMI  ABD: soft, nt, nd, +bs, no masses, no hsm, no guarding  EXTREM: no c/c/e, pulses full and equal    A/P:  Thyroid mass  (primary encounter diagnosis)  Stable and doing well status post recent ultrasound.  I will order a TSH today.    Weight gain  Patient with reported weight gain and difficulty losing weight.  I will order TSH and CBC today.  She will return to have these drawn in the near future.    Anxiety disorder, unspecified type  Stable and doing well with only occasional use of Xanax.  Advised her to use this very sparingly and not limited using it more than once every 3 days on average.  I will give her 30 pills with her next refill when due.  Patient agrees  strengthening/mobility  - Encourage toileting schedule  Outcome: Progressing     Problem: DISCHARGE PLANNING  Goal: Discharge to home or other facility with appropriate resources  Description: INTERVENTIONS:  - Identify barriers to discharge w/pt and caregiver  - Include patient/family/discharge partner in discharge planning  - Arrange for needed discharge resources and transportation as appropriate  - Identify discharge learning needs (meds, wound care, etc)  - Arrange for interpreters to assist at discharge as needed  - Consider post-discharge preferences of patient/family/discharge partner  - Complete POLST form as appropriate  - Assess patient's ability to be responsible for managing their own health  - Refer to Case Management Department for coordinating discharge planning if the patient needs post-hospital services based on physician/LIP order or complex needs related to functional status, cognitive ability or social support system  Outcome: Progressing     Problem: RESPIRATORY - ADULT  Goal: Achieves optimal ventilation and oxygenation  Description: INTERVENTIONS:  - Assess for changes in respiratory status  - Assess for changes in mentation and behavior  - Position to facilitate oxygenation and minimize respiratory effort  - Oxygen supplementation based on oxygen saturation or ABGs  - Provide Smoking Cessation handout, if applicable  - Encourage broncho-pulmonary hygiene including cough, deep breathe, Incentive Spirometry  - Assess the need for suctioning and perform as needed  - Assess and instruct to report SOB or any respiratory difficulty  - Respiratory Therapy support as indicated  - Manage/alleviate anxiety  - Monitor for signs/symptoms of CO2 retention  Outcome: Progressing     Problem: GASTROINTESTINAL - ADULT  Goal: Minimal or absence of nausea and vomiting  Description: INTERVENTIONS:  - Maintain adequate hydration with IV or PO as ordered and tolerated  - Nasogastric tube to low intermittent suction as ordered  - Evaluate effectiveness of ordered antiemetic medications  - Provide nonpharmacologic comfort measures as appropriate  - Advance diet as tolerated, if ordered  - Obtain nutritional consult as needed  - Evaluate fluid balance  Outcome: Progressing  Goal: Maintains or returns to baseline bowel function  Description: INTERVENTIONS:  - Assess bowel function  - Maintain adequate hydration with IV or PO as ordered and tolerated  - Evaluate effectiveness of GI medications  - Encourage mobilization and activity  - Obtain nutritional consult as needed  - Establish a toileting routine/schedule  - Consider collaborating with pharmacy to review patient's medication profile  Outcome: Progressing     Problem: SKIN/TISSUE INTEGRITY - ADULT  Goal: Skin integrity remains intact  Description: INTERVENTIONS  - Assess and document risk factors for pressure ulcer development  - Assess and document skin integrity  - Monitor for areas of redness and/or skin breakdown  - Initiate interventions, skin care algorithm/standards of care as needed  Outcome: Progressing  Goal: Incision(s), wounds(s) or drain site(s) healing without S/S of infection  Description: INTERVENTIONS:  - Assess and document risk factors for pressure ulcer development  - Assess and document skin integrity  - Assess and document dressing/incision, wound bed, drain sites and surrounding tissue  - Implement wound care per orders  - Initiate isolation precautions as appropriate  - Initiate Pressure Ulcer prevention bundle as indicated  Outcome: Progressing     Problem: Patient/Family Goals  Goal: Patient/Family Long Term Goal  Description: Patient's Long Term Goal: Discharge from hospital    Interventions:  - Monitor vital signs  - Monitor appropriate labs  - Wound care  - Pain management as needed  - Administer medications per order  - Follow MD orders  - Diagnostics per order  - Update / inform patient and family on plan of care  - Discharge planning  - to monitor her use very carefully.    Mild major depression (CMS/HCC)  Stable and doing well on no current medications.  Patient denies any significant current depression.    Lumbar degenerative disc disease / Chronic bilateral low back pain without sciatica  Stable and doing well on ibuprofen and Tylenol daily.  Patient does report she has to take an occasional Norco and she may need to take this once or twice a day for few days due to severe back pain flareups.  Patient on average is not using more than 1 pill every 3 days.  I encouraged her to continue to use this sparingly as if she starts using it more regularly I will not be able to refill her medications.  Patient understands and agrees with plan.    Routine medical exam  Patient will return for repeat blood work.    Electronically signed by: Chacorta Givens MD  4/26/2022     See additional Care Plan goals for specific interventions  Outcome: Progressing  Goal: Patient/Family Short Term Goal  Description: Patient's Short Term Goal: Feel better    Interventions:   - Monitor vital signs  - Monitor appropriate labs  - Wound care  - Pain management as needed  - Administer medications per order  - Follow MD orders  - Diagnostics per order  - Update / inform patient and family on plan of care  - See additional Care Plan goals for specific interventions  Outcome: Progressing     No acute changes during the shift. Vital signs are stable. Denies pain. Up in the chair almost throughout the shift. [place] care provided. Colostomy bag in place, clean and intact. Wound dressing changed. On IV Merrem and Vancomycin. Safety  precautions in place.

## 2022-08-14 NOTE — PLAN OF CARE
Problem: Patient Centered Care  Goal: Patient preferences are identified and integrated in the patient's plan of care  Description: Interventions:  - What would you like us to know as we care for you?  From home with   - Provide timely, complete, and accurate information to patient/family  - Incorporate patient and family knowledge, values, beliefs, and cultural backgrounds into the planning and delivery of care  - Encourage patient/family to participate in care and decision-making at the level they choose  - Honor patient and family perspectives and choices  Outcome: Progressing     Problem: MUSCULOSKELETAL  Goal: Maintain proper body alignment to prevent postural asymmetries  Description: Interventions:  - Support and protect proper body alignment using appropriate developmental positioning devices   - Provide supportive boundaries  - Instruct learner in use of splints, slings, braces, or positioning devices and any necessary precautions  - Promote hand-to-mouth and ability to self calm  - Expose to a variety of positions to prevent development of fixed postural patterns  - Promote flexed body  - Consult OT/PT as ordered  Outcome: Progressing  Goal: 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  Description: Interventions:  - Support and protect affected body parts per physician/APN orders  - Instruct learner in use of positioning devices and any necessary precautions  - Consult OT/PT as ordered  Outcome: Progressing     Problem: SKIN INTEGRITY  Goal: Skin integrity remains intact  Description: Interventions:  - Assess for areas of redness and/or skin breakdown  - Assess vascular sites hourly  - Change oxygen saturation probe minimum once per shift  - Provide humidity as ordered and per policy  - If on oxygen support, assess nasal septum area for skin breakdown and replace protective barrier if needed  - Change diapers as needed  - Minimize the use of adhesives  Outcome: Progressing  Goal: Incision/wound heals without complications  Description: Interventions:  - Assess wound bed/incision and surrounding skin tissue  - Collaborate with physician/APN and implement wound/incision site care and dressing changes as ordered  - Position infant to avoid placing pressure on wound  - Enterostomal/Wound therapy consult as indicated for ostomies/wounds/G-tubes  Outcome: Progressing     Problem: PAIN - ADULT  Goal: Verbalizes/displays adequate comfort level or patient's stated pain goal  Description: INTERVENTIONS:  - Encourage pt to monitor pain and request assistance  - Assess pain using appropriate pain scale  - Administer analgesics based on type and severity of pain and evaluate response  - Implement non-pharmacological measures as appropriate and evaluate response  - Consider cultural and social influences on pain and pain management  - Manage/alleviate anxiety  - Utilize distraction and/or relaxation techniques  - Monitor for opioid side effects  - Notify MD/LIP if interventions unsuccessful or patient reports new pain  - Anticipate increased pain with activity and pre-medicate as appropriate  Outcome: Progressing     Problem: RISK FOR INFECTION - ADULT  Goal: Absence of fever/infection during anticipated neutropenic period  Description: INTERVENTIONS  - Monitor WBC  - Administer growth factors as ordered  - Implement neutropenic guidelines  Outcome: Progressing     Problem: SAFETY ADULT - FALL  Goal: Free from fall injury  Description: INTERVENTIONS:  - Assess pt frequently for physical needs  - Identify cognitive and physical deficits and behaviors that affect risk of falls.   - [place] fall precautions as indicated by assessment.  - Educate pt/family on patient safety including physical limitations  - Instruct pt to call for assistance with activity based on assessment  - Modify environment to reduce risk of injury  - Provide assistive devices as appropriate  - Consider OT/PT consult to assist with strengthening/mobility  - Encourage toileting schedule  Outcome: Progressing     Problem: DISCHARGE PLANNING  Goal: Discharge to home or other facility with appropriate resources  Description: INTERVENTIONS:  - Identify barriers to discharge w/pt and caregiver  - Include patient/family/discharge partner in discharge planning  - Arrange for needed discharge resources and transportation as appropriate  - Identify discharge learning needs (meds, wound care, etc)  - Arrange for interpreters to assist at discharge as needed  - Consider post-discharge preferences of patient/family/discharge partner  - Complete POLST form as appropriate  - Assess patient's ability to be responsible for managing their own health  - Refer to Case Management Department for coordinating discharge planning if the patient needs post-hospital services based on physician/LIP order or complex needs related to functional status, cognitive ability or social support system  Outcome: Progressing     Problem: RESPIRATORY - ADULT  Goal: Achieves optimal ventilation and oxygenation  Description: INTERVENTIONS:  - Assess for changes in respiratory status  - Assess for changes in mentation and behavior  - Position to facilitate oxygenation and minimize respiratory effort  - Oxygen supplementation based on oxygen saturation or ABGs  - Provide Smoking Cessation handout, if applicable  - Encourage broncho-pulmonary hygiene including cough, deep breathe, Incentive Spirometry  - Assess the need for suctioning and perform as needed  - Assess and instruct to report SOB or any respiratory difficulty  - Respiratory Therapy support as indicated  - Manage/alleviate anxiety  - Monitor for signs/symptoms of CO2 retention  Outcome: Progressing     Problem: GASTROINTESTINAL - ADULT  Goal: Minimal or absence of nausea and vomiting  Description: INTERVENTIONS:  - Maintain adequate hydration with IV or PO as ordered and tolerated  - Nasogastric tube to low intermittent suction as ordered  - Evaluate effectiveness of ordered antiemetic medications  - Provide nonpharmacologic comfort measures as appropriate  - Advance diet as tolerated, if ordered  - Obtain nutritional consult as needed  - Evaluate fluid balance  Outcome: Progressing  Goal: Maintains or returns to baseline bowel function  Description: INTERVENTIONS:  - Assess bowel function  - Maintain adequate hydration with IV or PO as ordered and tolerated  - Evaluate effectiveness of GI medications  - Encourage mobilization and activity  - Obtain nutritional consult as needed  - Establish a toileting routine/schedule  - Consider collaborating with pharmacy to review patient's medication profile  Outcome: Progressing     Problem: SKIN/TISSUE INTEGRITY - ADULT  Goal: Skin integrity remains intact  Description: INTERVENTIONS  - Assess and document risk factors for pressure ulcer development  - Assess and document skin integrity  - Monitor for areas of redness and/or skin breakdown  - Initiate interventions, skin care algorithm/standards of care as needed  Outcome: Progressing  Goal: Incision(s), wounds(s) or drain site(s) healing without S/S of infection  Description: INTERVENTIONS:  - Assess and document risk factors for pressure ulcer development  - Assess and document skin integrity  - Assess and document dressing/incision, wound bed, drain sites and surrounding tissue  - Implement wound care per orders  - Initiate isolation precautions as appropriate  - Initiate Pressure Ulcer prevention bundle as indicated  Outcome: Progressing     Problem: Patient/Family Goals  Goal: Patient/Family Long Term Goal  Description: Patient's Long Term Goal: Discharge from hospital    Interventions:  - Monitor vital signs  - Monitor appropriate labs  - Wound care  - Pain management as needed  - Administer medications per order  - Follow MD orders  - Diagnostics per order  - Update / inform patient and family on plan of care  - Discharge planning  - See additional Care Plan goals for specific interventions  Outcome: Progressing  Goal: Patient/Family Short Term Goal  Description: Patient's Short Term Goal: Feel better    Interventions:   - Monitor vital signs  - Monitor appropriate labs  - Wound care  - Pain management as needed  - Administer medications per order  - Follow MD orders  - Diagnostics per order  - Update / inform patient and family on plan of care  - See additional Care Plan goals for specific interventions  Outcome: Progressing      Monitoring vital signs- stable at this time. Wound care provided per order. No acute changes noted at this moment. Safety and fall precautions maintained- bed alarm on, bed locked in lowest position, call light within reach. Frequent rounding by nursing staff.

## 2022-08-15 ENCOUNTER — APPOINTMENT (OUTPATIENT)
Dept: PICC SERVICES | Facility: HOSPITAL | Age: 77
End: 2022-08-15
Attending: INTERNAL MEDICINE
Payer: MEDICARE

## 2022-08-15 LAB
ANION GAP SERPL CALC-SCNC: 7 MMOL/L (ref 0–18)
BASOPHILS # BLD AUTO: 0.03 X10(3) UL (ref 0–0.2)
BASOPHILS NFR BLD AUTO: 0.3 %
BUN BLD-MCNC: 16 MG/DL (ref 7–18)
BUN/CREAT SERPL: 55.2 (ref 10–20)
CALCIUM BLD-MCNC: 8.5 MG/DL (ref 8.5–10.1)
CHLORIDE SERPL-SCNC: 107 MMOL/L (ref 98–112)
CO2 SERPL-SCNC: 29 MMOL/L (ref 21–32)
CREAT BLD-MCNC: 0.29 MG/DL
DEPRECATED RDW RBC AUTO: 51.8 FL (ref 35.1–46.3)
EOSINOPHIL # BLD AUTO: 0.15 X10(3) UL (ref 0–0.7)
EOSINOPHIL NFR BLD AUTO: 1.7 %
ERYTHROCYTE [DISTWIDTH] IN BLOOD BY AUTOMATED COUNT: 17.3 % (ref 11–15)
GFR SERPLBLD BASED ON 1.73 SQ M-ARVRAT: 109 ML/MIN/1.73M2 (ref 60–?)
GLUCOSE BLD-MCNC: 120 MG/DL (ref 70–99)
HCT VFR BLD AUTO: 39.1 %
HGB BLD-MCNC: 11.6 G/DL
IMM GRANULOCYTES # BLD AUTO: 0.08 X10(3) UL (ref 0–1)
IMM GRANULOCYTES NFR BLD: 0.9 %
LYMPHOCYTES # BLD AUTO: 1.76 X10(3) UL (ref 1–4)
LYMPHOCYTES NFR BLD AUTO: 20.1 %
MAGNESIUM SERPL-MCNC: 1.8 MG/DL (ref 1.6–2.6)
MCH RBC QN AUTO: 24.5 PG (ref 26–34)
MCHC RBC AUTO-ENTMCNC: 29.7 G/DL (ref 31–37)
MCV RBC AUTO: 82.7 FL
MONOCYTES # BLD AUTO: 0.52 X10(3) UL (ref 0.1–1)
MONOCYTES NFR BLD AUTO: 5.9 %
NEUTROPHILS # BLD AUTO: 6.21 X10 (3) UL (ref 1.5–7.7)
NEUTROPHILS # BLD AUTO: 6.21 X10(3) UL (ref 1.5–7.7)
NEUTROPHILS NFR BLD AUTO: 71.1 %
OSMOLALITY SERPL CALC.SUM OF ELEC: 298 MOSM/KG (ref 275–295)
PLATELET # BLD AUTO: 291 10(3)UL (ref 150–450)
POTASSIUM SERPL-SCNC: 3.9 MMOL/L (ref 3.5–5.1)
RBC # BLD AUTO: 4.73 X10(6)UL
SODIUM SERPL-SCNC: 143 MMOL/L (ref 136–145)
WBC # BLD AUTO: 8.8 X10(3) UL (ref 4–11)

## 2022-08-15 PROCEDURE — 83735 ASSAY OF MAGNESIUM: CPT | Performed by: INTERNAL MEDICINE

## 2022-08-15 PROCEDURE — 85025 COMPLETE CBC W/AUTO DIFF WBC: CPT | Performed by: INTERNAL MEDICINE

## 2022-08-15 PROCEDURE — 80048 BASIC METABOLIC PNL TOTAL CA: CPT | Performed by: INTERNAL MEDICINE

## 2022-08-15 RX ORDER — VANCOMYCIN HYDROCHLORIDE 125 MG/1
125 CAPSULE ORAL 4 TIMES DAILY
Status: DISCONTINUED | OUTPATIENT
Start: 2022-08-15 | End: 2022-08-15

## 2022-08-15 RX ORDER — CALCIUM CARBONATE 200(500)MG
500 TABLET,CHEWABLE ORAL
Status: DISCONTINUED | OUTPATIENT
Start: 2022-08-15 | End: 2022-08-19

## 2022-08-15 RX ORDER — FUROSEMIDE 10 MG/ML
20 INJECTION INTRAMUSCULAR; INTRAVENOUS ONCE
Status: COMPLETED | OUTPATIENT
Start: 2022-08-15 | End: 2022-08-15

## 2022-08-15 RX ORDER — MAGNESIUM OXIDE 400 MG/1
400 TABLET ORAL ONCE
Status: COMPLETED | OUTPATIENT
Start: 2022-08-15 | End: 2022-08-15

## 2022-08-15 RX ORDER — VANCOMYCIN/0.9 % SOD CHLORIDE 1 G/100 ML
1 PLASTIC BAG, INJECTION (ML) INTRAVENOUS
Qty: 12 EACH | Refills: 0 | Status: SHIPPED | OUTPATIENT
Start: 2022-08-15 | End: 2022-08-24

## 2022-08-15 NOTE — PLAN OF CARE
Patient uses call light appropriately. Wound dressing done. VSS O2 for comfort. Will monitor   Problem: Patient Centered Care  Goal: Patient preferences are identified and integrated in the patient's plan of care  Description: Interventions:  - What would you like us to know as we care for you?  From home with   - Provide timely, complete, and accurate information to patient/family  - Incorporate patient and family knowledge, values, beliefs, and cultural backgrounds into the planning and delivery of care  - Encourage patient/family to participate in care and decision-making at the level they choose  - Honor patient and family perspectives and choices  Outcome: Progressing     Problem: MUSCULOSKELETAL  Goal: Maintain proper body alignment to prevent postural asymmetries  Description: Interventions:  - Support and protect proper body alignment using appropriate developmental positioning devices   - Provide supportive boundaries  - Instruct learner in use of splints, slings, braces, or positioning devices and any necessary precautions  - Promote hand-to-mouth and ability to self calm  - Expose to a variety of positions to prevent development of fixed postural patterns  - Promote flexed body  - Consult OT/PT as ordered  Outcome: Progressing  Goal: 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  Description: Interventions:  - Support and protect affected body parts per physician/APN orders  - Instruct learner in use of positioning devices and any necessary precautions  - Consult OT/PT as ordered  Outcome: Progressing     Problem: SKIN INTEGRITY  Goal: Skin integrity remains intact  Description: Interventions:  - Assess for areas of redness and/or skin breakdown  - Assess vascular sites hourly  - Change oxygen saturation probe minimum once per shift  - Provide humidity as ordered and per policy  - If on oxygen support, assess nasal septum area for skin breakdown and replace protective barrier if needed  - Change diapers as needed  - Minimize the use of adhesives  Outcome: Progressing  Goal: Incision/wound heals without complications  Description: Interventions:  - Assess wound bed/incision and surrounding skin tissue  - Collaborate with physician/APN and implement wound/incision site care and dressing changes as ordered  - Position infant to avoid placing pressure on wound  - Enterostomal/Wound therapy consult as indicated for ostomies/wounds/G-tubes  Outcome: Progressing     Problem: PAIN - ADULT  Goal: Verbalizes/displays adequate comfort level or patient's stated pain goal  Description: INTERVENTIONS:  - Encourage pt to monitor pain and request assistance  - Assess pain using appropriate pain scale  - Administer analgesics based on type and severity of pain and evaluate response  - Implement non-pharmacological measures as appropriate and evaluate response  - Consider cultural and social influences on pain and pain management  - Manage/alleviate anxiety  - Utilize distraction and/or relaxation techniques  - Monitor for opioid side effects  - Notify MD/LIP if interventions unsuccessful or patient reports new pain  - Anticipate increased pain with activity and pre-medicate as appropriate  Outcome: Progressing

## 2022-08-15 NOTE — VASCULAR ACCESS
Here to evaluate midline for \"not flushing\" Catheter noted to be kinked at insertion site. Removed dressing and straightened catheter. Now flushing easily/good blood return. New sterile dressing applied. RN notified.

## 2022-08-15 NOTE — PLAN OF CARE
Problem: Patient Centered Care  Goal: Patient preferences are identified and integrated in the patient's plan of care  Description: Interventions:  - What would you like us to know as we care for you?  From home with   - Provide timely, complete, and accurate information to patient/family  - Incorporate patient and family knowledge, values, beliefs, and cultural backgrounds into the planning and delivery of care  - Encourage patient/family to participate in care and decision-making at the level they choose  - Honor patient and family perspectives and choices  Outcome: Progressing     Problem: MUSCULOSKELETAL  Goal: Maintain proper body alignment to prevent postural asymmetries  Description: Interventions:  - Support and protect proper body alignment using appropriate developmental positioning devices   - Provide supportive boundaries  - Instruct learner in use of splints, slings, braces, or positioning devices and any necessary precautions  - Promote hand-to-mouth and ability to self calm  - Expose to a variety of positions to prevent development of fixed postural patterns  - Promote flexed body  - Consult OT/PT as ordered  Outcome: Progressing  Goal: 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  Description: Interventions:  - Support and protect affected body parts per physician/APN orders  - Instruct learner in use of positioning devices and any necessary precautions  - Consult OT/PT as ordered  Outcome: Progressing     Problem: SKIN INTEGRITY  Goal: Skin integrity remains intact  Description: Interventions:  - Assess for areas of redness and/or skin breakdown  - Assess vascular sites hourly  - Change oxygen saturation probe minimum once per shift  - Provide humidity as ordered and per policy  - If on oxygen support, assess nasal septum area for skin breakdown and replace protective barrier if needed  - Change diapers as needed  - Minimize the use of adhesives  Outcome: Progressing  Goal: Incision/wound heals without complications  Description: Interventions:  - Assess wound bed/incision and surrounding skin tissue  - Collaborate with physician/APN and implement wound/incision site care and dressing changes as ordered  - Position infant to avoid placing pressure on wound  - Enterostomal/Wound therapy consult as indicated for ostomies/wounds/G-tubes  Outcome: Progressing     Problem: PAIN - ADULT  Goal: Verbalizes/displays adequate comfort level or patient's stated pain goal  Description: INTERVENTIONS:  - Encourage pt to monitor pain and request assistance  - Assess pain using appropriate pain scale  - Administer analgesics based on type and severity of pain and evaluate response  - Implement non-pharmacological measures as appropriate and evaluate response  - Consider cultural and social influences on pain and pain management  - Manage/alleviate anxiety  - Utilize distraction and/or relaxation techniques  - Monitor for opioid side effects  - Notify MD/LIP if interventions unsuccessful or patient reports new pain  - Anticipate increased pain with activity and pre-medicate as appropriate  Outcome: Progressing     Problem: RISK FOR INFECTION - ADULT  Goal: Absence of fever/infection during anticipated neutropenic period  Description: INTERVENTIONS  - Monitor WBC  - Administer growth factors as ordered  - Implement neutropenic guidelines  Outcome: Progressing     Problem: SAFETY ADULT - FALL  Goal: Free from fall injury  Description: INTERVENTIONS:  - Assess pt frequently for physical needs  - Identify cognitive and physical deficits and behaviors that affect risk of falls.   - [place] fall precautions as indicated by assessment.  - Educate pt/family on patient safety including physical limitations  - Instruct pt to call for assistance with activity based on assessment  - Modify environment to reduce risk of injury  - Provide assistive devices as appropriate  - Consider OT/PT consult to assist with strengthening/mobility  - Encourage toileting schedule  Outcome: Progressing     Problem: DISCHARGE PLANNING  Goal: Discharge to home or other facility with appropriate resources  Description: INTERVENTIONS:  - Identify barriers to discharge w/pt and caregiver  - Include patient/family/discharge partner in discharge planning  - Arrange for needed discharge resources and transportation as appropriate  - Identify discharge learning needs (meds, wound care, etc)  - Arrange for interpreters to assist at discharge as needed  - Consider post-discharge preferences of patient/family/discharge partner  - Complete POLST form as appropriate  - Assess patient's ability to be responsible for managing their own health  - Refer to Case Management Department for coordinating discharge planning if the patient needs post-hospital services based on physician/LIP order or complex needs related to functional status, cognitive ability or social support system  Outcome: Progressing     Problem: RESPIRATORY - ADULT  Goal: Achieves optimal ventilation and oxygenation  Description: INTERVENTIONS:  - Assess for changes in respiratory status  - Assess for changes in mentation and behavior  - Position to facilitate oxygenation and minimize respiratory effort  - Oxygen supplementation based on oxygen saturation or ABGs  - Provide Smoking Cessation handout, if applicable  - Encourage broncho-pulmonary hygiene including cough, deep breathe, Incentive Spirometry  - Assess the need for suctioning and perform as needed  - Assess and instruct to report SOB or any respiratory difficulty  - Respiratory Therapy support as indicated  - Manage/alleviate anxiety  - Monitor for signs/symptoms of CO2 retention  Outcome: Progressing     Problem: GASTROINTESTINAL - ADULT  Goal: Minimal or absence of nausea and vomiting  Description: INTERVENTIONS:  - Maintain adequate hydration with IV or PO as ordered and tolerated  - Nasogastric tube to low intermittent suction as ordered  - Evaluate effectiveness of ordered antiemetic medications  - Provide nonpharmacologic comfort measures as appropriate  - Advance diet as tolerated, if ordered  - Obtain nutritional consult as needed  - Evaluate fluid balance  Outcome: Progressing  Goal: Maintains or returns to baseline bowel function  Description: INTERVENTIONS:  - Assess bowel function  - Maintain adequate hydration with IV or PO as ordered and tolerated  - Evaluate effectiveness of GI medications  - Encourage mobilization and activity  - Obtain nutritional consult as needed  - Establish a toileting routine/schedule  - Consider collaborating with pharmacy to review patient's medication profile  Outcome: Progressing     Problem: SKIN/TISSUE INTEGRITY - ADULT  Goal: Skin integrity remains intact  Description: INTERVENTIONS  - Assess and document risk factors for pressure ulcer development  - Assess and document skin integrity  - Monitor for areas of redness and/or skin breakdown  - Initiate interventions, skin care algorithm/standards of care as needed  Outcome: Progressing  Goal: Incision(s), wounds(s) or drain site(s) healing without S/S of infection  Description: INTERVENTIONS:  - Assess and document risk factors for pressure ulcer development  - Assess and document skin integrity  - Assess and document dressing/incision, wound bed, drain sites and surrounding tissue  - Implement wound care per orders  - Initiate isolation precautions as appropriate  - Initiate Pressure Ulcer prevention bundle as indicated  Outcome: Progressing     Problem: Patient/Family Goals  Goal: Patient/Family Long Term Goal  Description: Patient's Long Term Goal: Discharge from hospital    Interventions:  - Monitor vital signs  - Monitor appropriate labs  - Wound care  - Pain management as needed  - Administer medications per order  - Follow MD orders  - Diagnostics per order  - Update / inform patient and family on plan of care  - Discharge planning  - See additional Care Plan goals for specific interventions  Outcome: Progressing  Goal: Patient/Family Short Term Goal  Description: Patient's Short Term Goal: Feel better    Interventions:   - Monitor vital signs  - Monitor appropriate labs  - Wound care  - Pain management as needed  - Administer medications per order  - Follow MD orders  - Diagnostics per order  - Update / inform patient and family on plan of care  - See additional Care Plan goals for specific interventions  Outcome: Progressing     Vital signs are stable. Denies pain. IV Lasix given, with good urine output. [place] and colostomy bag in place, intact and draining well. Wound dressing changed. Safety precautions in place.

## 2022-08-15 NOTE — CM/SW NOTE
CM provided pt and spouse w/list of accepting [facility] facilities at bedside, both agreeable to review and provide choice later today. CM will follow up w/patient for choice. Plan: [facility] pending facility choice and medical clearance.     MORALES GriffithsN    [phone]

## 2022-08-16 LAB
ANION GAP SERPL CALC-SCNC: 4 MMOL/L (ref 0–18)
BASOPHILS # BLD AUTO: 0.04 X10(3) UL (ref 0–0.2)
BASOPHILS NFR BLD AUTO: 0.4 %
BUN BLD-MCNC: 13 MG/DL (ref 7–18)
BUN/CREAT SERPL: 50 (ref 10–20)
CALCIUM BLD-MCNC: 8.5 MG/DL (ref 8.5–10.1)
CHLORIDE SERPL-SCNC: 105 MMOL/L (ref 98–112)
CO2 SERPL-SCNC: 32 MMOL/L (ref 21–32)
CREAT BLD-MCNC: 0.26 MG/DL
DEPRECATED RDW RBC AUTO: 49.6 FL (ref 35.1–46.3)
EOSINOPHIL # BLD AUTO: 0.16 X10(3) UL (ref 0–0.7)
EOSINOPHIL NFR BLD AUTO: 1.7 %
ERYTHROCYTE [DISTWIDTH] IN BLOOD BY AUTOMATED COUNT: 17.6 % (ref 11–15)
GFR SERPLBLD BASED ON 1.73 SQ M-ARVRAT: 112 ML/MIN/1.73M2 (ref 60–?)
GLUCOSE BLD-MCNC: 109 MG/DL (ref 70–99)
HCT VFR BLD AUTO: 39.8 %
HGB BLD-MCNC: 12.6 G/DL
IMM GRANULOCYTES # BLD AUTO: 0.05 X10(3) UL (ref 0–1)
IMM GRANULOCYTES NFR BLD: 0.5 %
LYMPHOCYTES # BLD AUTO: 2.44 X10(3) UL (ref 1–4)
LYMPHOCYTES NFR BLD AUTO: 25.5 %
MAGNESIUM SERPL-MCNC: 1.9 MG/DL (ref 1.6–2.6)
MCH RBC QN AUTO: 25.4 PG (ref 26–34)
MCHC RBC AUTO-ENTMCNC: 31.7 G/DL (ref 31–37)
MCV RBC AUTO: 80.2 FL
MONOCYTES # BLD AUTO: 0.73 X10(3) UL (ref 0.1–1)
MONOCYTES NFR BLD AUTO: 7.6 %
NEUTROPHILS # BLD AUTO: 6.15 X10 (3) UL (ref 1.5–7.7)
NEUTROPHILS # BLD AUTO: 6.15 X10(3) UL (ref 1.5–7.7)
NEUTROPHILS NFR BLD AUTO: 64.3 %
OSMOLALITY SERPL CALC.SUM OF ELEC: 293 MOSM/KG (ref 275–295)
PLATELET # BLD AUTO: 281 10(3)UL (ref 150–450)
POTASSIUM SERPL-SCNC: 3.2 MMOL/L (ref 3.5–5.1)
POTASSIUM SERPL-SCNC: 4.1 MMOL/L (ref 3.5–5.1)
RBC # BLD AUTO: 4.96 X10(6)UL
SODIUM SERPL-SCNC: 141 MMOL/L (ref 136–145)
WBC # BLD AUTO: 9.6 X10(3) UL (ref 4–11)

## 2022-08-16 PROCEDURE — 85025 COMPLETE CBC W/AUTO DIFF WBC: CPT | Performed by: INTERNAL MEDICINE

## 2022-08-16 PROCEDURE — 84132 ASSAY OF SERUM POTASSIUM: CPT | Performed by: HOSPITALIST

## 2022-08-16 PROCEDURE — 80048 BASIC METABOLIC PNL TOTAL CA: CPT | Performed by: INTERNAL MEDICINE

## 2022-08-16 PROCEDURE — 83735 ASSAY OF MAGNESIUM: CPT | Performed by: INTERNAL MEDICINE

## 2022-08-16 RX ORDER — POTASSIUM CHLORIDE 20 MEQ/1
40 TABLET, EXTENDED RELEASE ORAL EVERY 4 HOURS
Status: COMPLETED | OUTPATIENT
Start: 2022-08-16 | End: 2022-08-16

## 2022-08-16 NOTE — PLAN OF CARE
Patient has safety precautions in place bed in the lowest position, bed alarm on, and call light within reach. Continue to monitor patient with intentional nursing rounds. Keeping May[place] in place till closer to discharge- Per MD   Problem: Patient Centered Care  Goal: Patient preferences are identified and integrated in the patient's plan of care  Description: Interventions:  - What would you like us to know as we care for you?  From home with   - Provide timely, complete, and accurate information to patient/family  - Incorporate patient and family knowledge, values, beliefs, and cultural backgrounds into the planning and delivery of care  - Encourage patient/family to participate in care and decision-making at the level they choose  - Honor patient and family perspectives and choices  Outcome: Progressing     Problem: MUSCULOSKELETAL  Goal: Maintain proper body alignment to prevent postural asymmetries  Description: Interventions:  - Support and protect proper body alignment using appropriate developmental positioning devices   - Provide supportive boundaries  - Instruct learner in use of splints, slings, braces, or positioning devices and any necessary precautions  - Promote hand-to-mouth and ability to self calm  - Expose to a variety of positions to prevent development of fixed postural patterns  - Promote flexed body  - Consult OT/PT as ordered  Outcome: Progressing  Goal: 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  Description: Interventions:  - Support and protect affected body parts per physician/APN orders  - Instruct learner in use of positioning devices and any necessary precautions  - Consult OT/PT as ordered  Outcome: Progressing     Problem: SKIN INTEGRITY  Goal: Skin integrity remains intact  Description: Interventions:  - Assess for areas of redness and/or skin breakdown  - Assess vascular sites hourly  - Change oxygen saturation probe minimum once per shift  - Provide humidity as ordered and per policy  - If on oxygen support, assess nasal septum area for skin breakdown and replace protective barrier if needed  - Change diapers as needed  - Minimize the use of adhesives  Outcome: Progressing  Goal: Incision/wound heals without complications  Description: Interventions:  - Assess wound bed/incision and surrounding skin tissue  - Collaborate with physician/APN and implement wound/incision site care and dressing changes as ordered  - Position infant to avoid placing pressure on wound  - Enterostomal/Wound therapy consult as indicated for ostomies/wounds/G-tubes  Outcome: Progressing     Problem: PAIN - ADULT  Goal: Verbalizes/displays adequate comfort level or patient's stated pain goal  Description: INTERVENTIONS:  - Encourage pt to monitor pain and request assistance  - Assess pain using appropriate pain scale  - Administer analgesics based on type and severity of pain and evaluate response  - Implement non-pharmacological measures as appropriate and evaluate response  - Consider cultural and social influences on pain and pain management  - Manage/alleviate anxiety  - Utilize distraction and/or relaxation techniques  - Monitor for opioid side effects  - Notify MD/LIP if interventions unsuccessful or patient reports new pain  - Anticipate increased pain with activity and pre-medicate as appropriate  Outcome: Progressing     Problem: RISK FOR INFECTION - ADULT  Goal: Absence of fever/infection during anticipated neutropenic period  Description: INTERVENTIONS  - Monitor WBC  - Administer growth factors as ordered  - Implement neutropenic guidelines  Outcome: Progressing     Problem: SAFETY ADULT - FALL  Goal: Free from fall injury  Description: INTERVENTIONS:  - Assess pt frequently for physical needs  - Identify cognitive and physical deficits and behaviors that affect risk of falls.   - [place] fall precautions as indicated by assessment.  - Educate pt/family on patient safety including physical limitations  - Instruct pt to call for assistance with activity based on assessment  - Modify environment to reduce risk of injury  - Provide assistive devices as appropriate  - Consider OT/PT consult to assist with strengthening/mobility  - Encourage toileting schedule  Outcome: Progressing     Problem: DISCHARGE PLANNING  Goal: Discharge to home or other facility with appropriate resources  Description: INTERVENTIONS:  - Identify barriers to discharge w/pt and caregiver  - Include patient/family/discharge partner in discharge planning  - Arrange for needed discharge resources and transportation as appropriate  - Identify discharge learning needs (meds, wound care, etc)  - Arrange for interpreters to assist at discharge as needed  - Consider post-discharge preferences of patient/family/discharge partner  - Complete POLST form as appropriate  - Assess patient's ability to be responsible for managing their own health  - Refer to Case Management Department for coordinating discharge planning if the patient needs post-hospital services based on physician/LIP order or complex needs related to functional status, cognitive ability or social support system  Outcome: Progressing     Problem: RESPIRATORY - ADULT  Goal: Achieves optimal ventilation and oxygenation  Description: INTERVENTIONS:  - Assess for changes in respiratory status  - Assess for changes in mentation and behavior  - Position to facilitate oxygenation and minimize respiratory effort  - Oxygen supplementation based on oxygen saturation or ABGs  - Provide Smoking Cessation handout, if applicable  - Encourage broncho-pulmonary hygiene including cough, deep breathe, Incentive Spirometry  - Assess the need for suctioning and perform as needed  - Assess and instruct to report SOB or any respiratory difficulty  - Respiratory Therapy support as indicated  - Manage/alleviate anxiety  - Monitor for signs/symptoms of CO2 retention  Outcome: Progressing     Problem: GASTROINTESTINAL - ADULT  Goal: Minimal or absence of nausea and vomiting  Description: INTERVENTIONS:  - Maintain adequate hydration with IV or PO as ordered and tolerated  - Nasogastric tube to low intermittent suction as ordered  - Evaluate effectiveness of ordered antiemetic medications  - Provide nonpharmacologic comfort measures as appropriate  - Advance diet as tolerated, if ordered  - Obtain nutritional consult as needed  - Evaluate fluid balance  Outcome: Progressing  Goal: Maintains or returns to baseline bowel function  Description: INTERVENTIONS:  - Assess bowel function  - Maintain adequate hydration with IV or PO as ordered and tolerated  - Evaluate effectiveness of GI medications  - Encourage mobilization and activity  - Obtain nutritional consult as needed  - Establish a toileting routine/schedule  - Consider collaborating with pharmacy to review patient's medication profile  Outcome: Progressing     Problem: SKIN/TISSUE INTEGRITY - ADULT  Goal: Skin integrity remains intact  Description: INTERVENTIONS  - Assess and document risk factors for pressure ulcer development  - Assess and document skin integrity  - Monitor for areas of redness and/or skin breakdown  - Initiate interventions, skin care algorithm/standards of care as needed  Outcome: Progressing  Goal: Incision(s), wounds(s) or drain site(s) healing without S/S of infection  Description: INTERVENTIONS:  - Assess and document risk factors for pressure ulcer development  - Assess and document skin integrity  - Assess and document dressing/incision, wound bed, drain sites and surrounding tissue  - Implement wound care per orders  - Initiate isolation precautions as appropriate  - Initiate Pressure Ulcer prevention bundle as indicated  Outcome: Progressing     Problem: Patient/Family Goals  Goal: Patient/Family Long Term Goal  Description: Patient's Long Term Goal: Discharge from hospital    Interventions:  - Monitor vital signs  - Monitor appropriate labs  - Wound care  - Pain management as needed  - Administer medications per order  - Follow MD orders  - Diagnostics per order  - Update / inform patient and family on plan of care  - Discharge planning  - See additional Care Plan goals for specific interventions  Outcome: Progressing  Goal: Patient/Family Short Term Goal  Description: Patient's Short Term Goal: Feel better    Interventions:   - Monitor vital signs  - Monitor appropriate labs  - Wound care  - Pain management as needed  - Administer medications per order  - Follow MD orders  - Diagnostics per order  - Update / inform patient and family on plan of care  - See additional Care Plan goals for specific interventions  Outcome: Progressing

## 2022-08-16 NOTE — PLAN OF CARE
Pt in stable condition, no acute changes, no complications noted. MD on call made aware of Telemetry calls due to AIVR to V. Tach, no new orders as of now. Continuous telemetry monitoring in place. Pt has AM labs. Currently, on IV Meropenem Q 8 hrs, IV Vancomycin Q 18 hrs., and PO Vancomycin Q 6 hrs. Frequent rounding on pt, fall & safety precautions in place. Reposition Q 2 hrs. Bed alarms on, call light placed within reach. Will continue to monitor for any new changes. Problem: Patient Centered Care  Goal: Patient preferences are identified and integrated in the patient's plan of care  Description: Interventions:  - What would you like us to know as we care for you?  From home with   - Provide timely, complete, and accurate information to patient/family  - Incorporate patient and family knowledge, values, beliefs, and cultural backgrounds into the planning and delivery of care  - Encourage patient/family to participate in care and decision-making at the level they choose  - Honor patient and family perspectives and choices  Outcome: Progressing     Problem: MUSCULOSKELETAL  Goal: Maintain proper body alignment to prevent postural asymmetries  Description: Interventions:  - Support and protect proper body alignment using appropriate developmental positioning devices   - Provide supportive boundaries  - Instruct learner in use of splints, slings, braces, or positioning devices and any necessary precautions  - Promote hand-to-mouth and ability to self calm  - Expose to a variety of positions to prevent development of fixed postural patterns  - Promote flexed body  - Consult OT/PT as ordered  Outcome: Progressing  Goal: 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  Description: Interventions:  - Support and protect affected body parts per physician/APN orders  - Instruct learner in use of positioning devices and any necessary precautions  - Consult OT/PT as ordered  Outcome: Progressing     Problem: SKIN INTEGRITY  Goal: Incision/wound heals without complications  Description: Interventions:  - Assess wound bed/incision and surrounding skin tissue  - Collaborate with physician/APN and implement wound/incision site care and dressing changes as ordered  - Position infant to avoid placing pressure on wound  - Enterostomal/Wound therapy consult as indicated for ostomies/wounds/G-tubes  Outcome: Progressing     Problem: PAIN - ADULT  Goal: Verbalizes/displays adequate comfort level or patient's stated pain goal  Description: INTERVENTIONS:  - Encourage pt to monitor pain and request assistance  - Assess pain using appropriate pain scale  - Administer analgesics based on type and severity of pain and evaluate response  - Implement non-pharmacological measures as appropriate and evaluate response  - Consider cultural and social influences on pain and pain management  - Manage/alleviate anxiety  - Utilize distraction and/or relaxation techniques  - Monitor for opioid side effects  - Notify MD/LIP if interventions unsuccessful or patient reports new pain  - Anticipate increased pain with activity and pre-medicate as appropriate  Outcome: Progressing     Problem: RISK FOR INFECTION - ADULT  Goal: Absence of fever/infection during anticipated neutropenic period  Description: INTERVENTIONS  - Monitor WBC  - Administer growth factors as ordered  - Implement neutropenic guidelines  Outcome: Progressing     Problem: SAFETY ADULT - FALL  Goal: Free from fall injury  Description: INTERVENTIONS:  - Assess pt frequently for physical needs  - Identify cognitive and physical deficits and behaviors that affect risk of falls.   - [place] fall precautions as indicated by assessment.  - Educate pt/family on patient safety including physical limitations  - Instruct pt to call for assistance with activity based on assessment  - Modify environment to reduce risk of injury  - Provide assistive devices as appropriate  - Consider OT/PT consult to assist with strengthening/mobility  - Encourage toileting schedule  Outcome: Progressing     Problem: DISCHARGE PLANNING  Goal: Discharge to home or other facility with appropriate resources  Description: INTERVENTIONS:  - Identify barriers to discharge w/pt and caregiver  - Include patient/family/discharge partner in discharge planning  - Arrange for needed discharge resources and transportation as appropriate  - Identify discharge learning needs (meds, wound care, etc)  - Arrange for interpreters to assist at discharge as needed  - Consider post-discharge preferences of patient/family/discharge partner  - Complete POLST form as appropriate  - Assess patient's ability to be responsible for managing their own health  - Refer to Case Management Department for coordinating discharge planning if the patient needs post-hospital services based on physician/LIP order or complex needs related to functional status, cognitive ability or social support system  Outcome: Progressing     Problem: RESPIRATORY - ADULT  Goal: Achieves optimal ventilation and oxygenation  Description: INTERVENTIONS:  - Assess for changes in respiratory status  - Assess for changes in mentation and behavior  - Position to facilitate oxygenation and minimize respiratory effort  - Oxygen supplementation based on oxygen saturation or ABGs  - Provide Smoking Cessation handout, if applicable  - Encourage broncho-pulmonary hygiene including cough, deep breathe, Incentive Spirometry  - Assess the need for suctioning and perform as needed  - Assess and instruct to report SOB or any respiratory difficulty  - Respiratory Therapy support as indicated  - Manage/alleviate anxiety  - Monitor for signs/symptoms of CO2 retention  Outcome: Progressing     Problem: GASTROINTESTINAL - ADULT  Goal: Minimal or absence of nausea and vomiting  Description: INTERVENTIONS:  - Maintain adequate hydration with IV or PO as ordered and tolerated  - Nasogastric tube to low intermittent suction as ordered  - Evaluate effectiveness of ordered antiemetic medications  - Provide nonpharmacologic comfort measures as appropriate  - Advance diet as tolerated, if ordered  - Obtain nutritional consult as needed  - Evaluate fluid balance  Outcome: Progressing  Goal: Maintains or returns to baseline bowel function  Description: INTERVENTIONS:  - Assess bowel function  - Maintain adequate hydration with IV or PO as ordered and tolerated  - Evaluate effectiveness of GI medications  - Encourage mobilization and activity  - Obtain nutritional consult as needed  - Establish a toileting routine/schedule  - Consider collaborating with pharmacy to review patient's medication profile  Outcome: Progressing     Problem: SKIN/TISSUE INTEGRITY - ADULT  Goal: Incision(s), wounds(s) or drain site(s) healing without S/S of infection  Description: INTERVENTIONS:  - Assess and document risk factors for pressure ulcer development  - Assess and document skin integrity  - Assess and document dressing/incision, wound bed, drain sites and surrounding tissue  - Implement wound care per orders  - Initiate isolation precautions as appropriate  - Initiate Pressure Ulcer prevention bundle as indicated  Outcome: Progressing     Problem: Patient/Family Goals  Goal: Patient/Family Long Term Goal  Description: Patient's Long Term Goal: Discharge from hospital    Interventions:  - Monitor vital signs  - Monitor appropriate labs  - Wound care  - Pain management as needed  - Administer medications per order  - Follow MD orders  - Diagnostics per order  - Update / inform patient and family on plan of care  - Discharge planning  - See additional Care Plan goals for specific interventions  Outcome: Progressing  Goal: Patient/Family Short Term Goal  Description: Patient's Short Term Goal: Feel better    Interventions:   - Monitor vital signs  - Monitor appropriate labs  - Wound care  - Pain management as needed  - Administer medications per order  - Follow MD orders  - Diagnostics per order  - Update / inform patient and family on plan of care  - See additional Care Plan goals for specific interventions  Outcome: Progressing

## 2022-08-16 NOTE — CM/SW NOTE
Spoke with patient's spouse Elise Cano, discussed SNF options. Elise Cano and patient have agreed to [facility]. Reservation made via [employer], updates sent.     Jermaine Briseno, [address]

## 2022-08-17 ENCOUNTER — APPOINTMENT (OUTPATIENT)
Dept: GENERAL RADIOLOGY | Facility: HOSPITAL | Age: 77
End: 2022-08-17
Attending: HOSPITALIST
Payer: MEDICARE

## 2022-08-17 LAB — DIGOXIN SERPL-MCNC: 0.74 NG/ML (ref 0.8–2)

## 2022-08-17 PROCEDURE — 71045 X-RAY EXAM CHEST 1 VIEW: CPT | Performed by: HOSPITALIST

## 2022-08-17 PROCEDURE — 99214 OFFICE O/P EST MOD 30 MIN: CPT

## 2022-08-17 PROCEDURE — 97535 SELF CARE MNGMENT TRAINING: CPT

## 2022-08-17 PROCEDURE — 80162 ASSAY OF DIGOXIN TOTAL: CPT | Performed by: INTERNAL MEDICINE

## 2022-08-17 RX ORDER — DIPHENHYDRAMINE HCL 25 MG
25 CAPSULE ORAL ONCE
Status: COMPLETED | OUTPATIENT
Start: 2022-08-17 | End: 2022-08-17

## 2022-08-17 NOTE — PROGRESS NOTES
Per RN, pt's HR dropped to 43. RN states pt was asymptomatic. Advised RN to hold evening dose of BBif HR <60 and hold am dose of digoxin for HR <60.

## 2022-08-17 NOTE — PHYSICAL THERAPY NOTE
PT PM note: pt working with OT then with Wound care for dressing changes unable for PT treatment at this time. Pt will re attempt 8/19/2022 as pt availability and scheduling allows. W/XC bound per PLOF. Pt is on track for ASIA with PT, OT to regain maximal PLOF as medical progress allows.

## 2022-08-17 NOTE — PLAN OF CARE
Patient has safety precautions in place bed in the lowest position, bed alarm on, and call light within reach. Continue to monitor patient with intentional nursing rounds. Keeping Mayorga in place till closer to discharge- Per MD   Problem: Patient Centered Care  Goal: Patient preferences are identified and integrated in the patient's plan of care  Description: Interventions:  - What would you like us to know as we care for you?  From home with   - Provide timely, complete, and accurate information to patient/family  - Incorporate patient and family knowledge, values, beliefs, and cultural backgrounds into the planning and delivery of care  - Encourage patient/family to participate in care and decision-making at the level they choose  - Honor patient and family perspectives and choices  Outcome: Progressing     Problem: MUSCULOSKELETAL  Goal: Maintain proper body alignment to prevent postural asymmetries  Description: Interventions:  - Support and protect proper body alignment using appropriate developmental positioning devices   - Provide supportive boundaries  - Instruct learner in use of splints, slings, braces, or positioning devices and any necessary precautions  - Promote hand-to-mouth and ability to self calm  - Expose to a variety of positions to prevent development of fixed postural patterns  - Promote flexed body  - Consult OT/PT as ordered  Outcome: Progressing  Goal: 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  Description: Interventions:  - Support and protect affected body parts per physician/APN orders  - Instruct learner in use of positioning devices and any necessary precautions  - Consult OT/PT as ordered  Outcome: Progressing     Problem: SKIN INTEGRITY  Goal: Skin integrity remains intact  Description: Interventions:  - Assess for areas of redness and/or skin breakdown  - Assess vascular sites hourly  - Change oxygen saturation probe minimum once per shift  - Provide humidity as ordered and per policy  - If on oxygen support, assess nasal septum area for skin breakdown and replace protective barrier if needed  - Change diapers as needed  - Minimize the use of adhesives  Outcome: Progressing  Goal: Incision/wound heals without complications  Description: Interventions:  - Assess wound bed/incision and surrounding skin tissue  - Collaborate with physician/APN and implement wound/incision site care and dressing changes as ordered  - Position infant to avoid placing pressure on wound  - Enterostomal/Wound therapy consult as indicated for ostomies/wounds/G-tubes  Outcome: Progressing     Problem: PAIN - ADULT  Goal: Verbalizes/displays adequate comfort level or patient's stated pain goal  Description: INTERVENTIONS:  - Encourage pt to monitor pain and request assistance  - Assess pain using appropriate pain scale  - Administer analgesics based on type and severity of pain and evaluate response  - Implement non-pharmacological measures as appropriate and evaluate response  - Consider cultural and social influences on pain and pain management  - Manage/alleviate anxiety  - Utilize distraction and/or relaxation techniques  - Monitor for opioid side effects  - Notify MD/LIP if interventions unsuccessful or patient reports new pain  - Anticipate increased pain with activity and pre-medicate as appropriate  Outcome: Progressing     Problem: RISK FOR INFECTION - ADULT  Goal: Absence of fever/infection during anticipated neutropenic period  Description: INTERVENTIONS  - Monitor WBC  - Administer growth factors as ordered  - Implement neutropenic guidelines  Outcome: Progressing     Problem: SAFETY ADULT - FALL  Goal: Free from fall injury  Description: INTERVENTIONS:  - Assess pt frequently for physical needs  - Identify cognitive and physical deficits and behaviors that affect risk of falls.   - [place] fall precautions as indicated by assessment.  - Educate pt/family on patient safety including physical limitations  - Instruct pt to call for assistance with activity based on assessment  - Modify environment to reduce risk of injury  - Provide assistive devices as appropriate  - Consider OT/PT consult to assist with strengthening/mobility  - Encourage toileting schedule  Outcome: Progressing     Problem: DISCHARGE PLANNING  Goal: Discharge to home or other facility with appropriate resources  Description: INTERVENTIONS:  - Identify barriers to discharge w/pt and caregiver  - Include patient/family/discharge partner in discharge planning  - Arrange for needed discharge resources and transportation as appropriate  - Identify discharge learning needs (meds, wound care, etc)  - Arrange for interpreters to assist at discharge as needed  - Consider post-discharge preferences of patient/family/discharge partner  - Complete POLST form as appropriate  - Assess patient's ability to be responsible for managing their own health  - Refer to Case Management Department for coordinating discharge planning if the patient needs post-hospital services based on physician/LIP order or complex needs related to functional status, cognitive ability or social support system  Outcome: Progressing     Problem: RESPIRATORY - ADULT  Goal: Achieves optimal ventilation and oxygenation  Description: INTERVENTIONS:  - Assess for changes in respiratory status  - Assess for changes in mentation and behavior  - Position to facilitate oxygenation and minimize respiratory effort  - Oxygen supplementation based on oxygen saturation or ABGs  - Provide Smoking Cessation handout, if applicable  - Encourage broncho-pulmonary hygiene including cough, deep breathe, Incentive Spirometry  - Assess the need for suctioning and perform as needed  - Assess and instruct to report SOB or any respiratory difficulty  - Respiratory Therapy support as indicated  - Manage/alleviate anxiety  - Monitor for signs/symptoms of CO2 retention  Outcome: Progressing     Problem: GASTROINTESTINAL - ADULT  Goal: Minimal or absence of nausea and vomiting  Description: INTERVENTIONS:  - Maintain adequate hydration with IV or PO as ordered and tolerated  - Nasogastric tube to low intermittent suction as ordered  - Evaluate effectiveness of ordered antiemetic medications  - Provide nonpharmacologic comfort measures as appropriate  - Advance diet as tolerated, if ordered  - Obtain nutritional consult as needed  - Evaluate fluid balance  Outcome: Progressing  Goal: Maintains or returns to baseline bowel function  Description: INTERVENTIONS:  - Assess bowel function  - Maintain adequate hydration with IV or PO as ordered and tolerated  - Evaluate effectiveness of GI medications  - Encourage mobilization and activity  - Obtain nutritional consult as needed  - Establish a toileting routine/schedule  - Consider collaborating with pharmacy to review patient's medication profile  Outcome: Progressing     Problem: SKIN/TISSUE INTEGRITY - ADULT  Goal: Skin integrity remains intact  Description: INTERVENTIONS  - Assess and document risk factors for pressure ulcer development  - Assess and document skin integrity  - Monitor for areas of redness and/or skin breakdown  - Initiate interventions, skin care algorithm/standards of care as needed  Outcome: Progressing  Goal: Incision(s), wounds(s) or drain site(s) healing without S/S of infection  Description: INTERVENTIONS:  - Assess and document risk factors for pressure ulcer development  - Assess and document skin integrity  - Assess and document dressing/incision, wound bed, drain sites and surrounding tissue  - Implement wound care per orders  - Initiate isolation precautions as appropriate  - Initiate Pressure Ulcer prevention bundle as indicated  Outcome: Progressing     Problem: Patient/Family Goals  Goal: Patient/Family Long Term Goal  Description: Patient's Long Term Goal: Discharge from hospital    Interventions:  - Monitor vital signs  - Monitor appropriate labs  - Wound care  - Pain management as needed  - Administer medications per order  - Follow MD orders  - Diagnostics per order  - Update / inform patient and family on plan of care  - Discharge planning  - See additional Care Plan goals for specific interventions  Outcome: Progressing  Goal: Patient/Family Short Term Goal  Description: Patient's Short Term Goal: Feel better    Interventions:   - Monitor vital signs  - Monitor appropriate labs  - Wound care  - Pain management as needed  - Administer medications per order  - Follow MD orders  - Diagnostics per order  - Update / inform patient and family on plan of care  - See additional Care Plan goals for specific interventions  Outcome: Progressing

## 2022-08-17 NOTE — PROGRESS NOTES
08/17/22 1456   Wound 08/08/22 2 Pressure Injury Ischium Left   Date First Assessed: 08/08/22   Present on Hospital Admission: Yes   Wound Number (Wound Clinic Only): 2  Primary Wound Type: Pressure Injury  Location: Ischium  Wound Location Orientation: Left   Wound Image    Site Assessment Fragile;Granulation tissue; Moist;Painful;Red   Drainage Amount Moderate   Drainage Description Serosanguineous   Treatments Cleansed;Site Care   Dressing 4x4s; Aquacel Foam  (damp Dakins soln, gauze on top )   Dressing Changed Changed   Dressing Status Clean;Dry; Intact;Dressing Changed   Wound Length (cm) 2.6 cm   Wound Width (cm) 3 cm   Wound Surface Area (cm^2) 7.8 cm^2   Wound Depth (cm) 2.5 cm   Wound Volume (cm^3) 19.5 cm^3   Wound Healing % -550   Ann-Marie-wound Assessment Fragile;Pink;Maceration; White   Wound Granulation Tissue Red   Wound Odor None   Exposed Structure Bone   Undermining 10 to 6 o'clock- 3cm   Pressure Injury Stage 4   State of Healing Early/partial granulation   Wound 08/08/22 Pressure Injury Ankle Left;Lateral   Date First Assessed/Time First Assessed: 08/08/22 2300   Present on Hospital Admission: Yes  Primary Wound Type: Pressure Injury  Location: Ankle  Wound Location Orientation: Left;Lateral   Wound Image    Site Assessment Dry;Fragile;Pale;Pink  (healing)   Drainage Amount Scant   Drainage Description Serous   Treatments Cleansed;Site Care   Dressing Aquacel Foam   Dressing Changed Changed   Dressing Status Clean;Dry; Intact;Dressing Changed   Wound Length (cm) 0.5 cm   Wound Width (cm) 0.5 cm   Wound Surface Area (cm^2) 0.25 cm^2   Wound Depth (cm) 0 cm   Wound Volume (cm^3) 0 cm^3   Ann-Marie-wound Assessment Intact;Fragile; Maceration;Pink   Wound Odor None   Tunneling no   Undermining no   Pressure Injury Stage 2  (healing stage 2)   State of Healing Early/partial granulation   Wound 08/08/22 Pressure Injury Foot Right;Plantar   Date First Assessed: 08/08/22   Present on Hospital Admission: Yes  Primary Wound Type: Pressure Injury  Location: Foot  Wound Location Orientation: Right;Plantar   Wound Image    Site Assessment Dry;Fragile;Pink   Drainage Amount None   Treatments Cleansed   Dressing Aquacel Foam   Dressing Changed Changed   Dressing Status Clean;Dry; Intact   Wound Length (cm) 0.4 cm   Wound Width (cm) 0.8 cm   Wound Surface Area (cm^2) 0.32 cm^2   Wound Depth (cm) 0 cm   Wound Volume (cm^3) 0 cm^3   Wound Healing % 100   Non-staged Wound Description Not applicable   Ann-Marie-wound Assessment Dry; Intact;Fragile;Pink   Wound Odor None   Tunneling no   Undermining no   Pressure Injury Stage 2  (healing stage 2)   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 Placed by External Staff?: Yes  Location: LUQ  Appliance Size: cut to fit   Stomal Appliance Intact   Stomal Assessment Red   Peristomal Assessment CALI   Wound Follow Up   Follow up needed Yes     Wound Care Services  Follow up on the pt's wounds. Covid Isolation maintained, the pt. is awake on the 1st step air mattress. She is agreeable for the dressing changes, see the above assessment. All dressing were changed. The pt. was repositioned in bed, pillow is on the left side. Heels are intact and elevated off the bed. Assisted the pt. with ordering her lunch. Call light in reach.

## 2022-08-18 LAB
ANION GAP SERPL CALC-SCNC: 6 MMOL/L (ref 0–18)
BUN BLD-MCNC: 17 MG/DL (ref 7–18)
BUN/CREAT SERPL: 60.7 (ref 10–20)
CALCIUM BLD-MCNC: 8.7 MG/DL (ref 8.5–10.1)
CHLORIDE SERPL-SCNC: 105 MMOL/L (ref 98–112)
CO2 SERPL-SCNC: 31 MMOL/L (ref 21–32)
CREAT BLD-MCNC: 0.28 MG/DL
GFR SERPLBLD BASED ON 1.73 SQ M-ARVRAT: 110 ML/MIN/1.73M2 (ref 60–?)
GLUCOSE BLD-MCNC: 68 MG/DL (ref 70–99)
MAGNESIUM SERPL-MCNC: 2 MG/DL (ref 1.6–2.6)
OSMOLALITY SERPL CALC.SUM OF ELEC: 294 MOSM/KG (ref 275–295)
POTASSIUM SERPL-SCNC: 4 MMOL/L (ref 3.5–5.1)
SODIUM SERPL-SCNC: 142 MMOL/L (ref 136–145)
VANCOMYCIN TROUGH SERPL-MCNC: 9.4 UG/ML (ref 10–20)

## 2022-08-18 PROCEDURE — 83735 ASSAY OF MAGNESIUM: CPT

## 2022-08-18 PROCEDURE — 80202 ASSAY OF VANCOMYCIN: CPT | Performed by: HOSPITALIST

## 2022-08-18 PROCEDURE — 80048 BASIC METABOLIC PNL TOTAL CA: CPT

## 2022-08-18 RX ORDER — DIPHENHYDRAMINE HYDROCHLORIDE, ZINC ACETATE 2; .1 G/100G; G/100G
1 CREAM TOPICAL 3 TIMES DAILY PRN
Status: DISCONTINUED | OUTPATIENT
Start: 2022-08-18 | End: 2022-08-19

## 2022-08-18 NOTE — CM/SW NOTE
Clinical updates uploaded to [facility] via [employer]. SHERRELL/CM to remain available for support and/or discharge planning.      NADIA Shannon, [facility] Work   KTR:#[id number]

## 2022-08-18 NOTE — CM/SW NOTE
08/18/22 1300   Discharge disposition   Expected discharge disposition [facility] Provider [address]   Discharge transportation [facility]  (5pm per Stephany Dixon)   Santiago @ [facility] approved 5 pm discharge. RN to inform pt's family of discharge. RN to call report to [facility] at [phone].     NADIA Lion, [facility]   FZZ:#[id number]

## 2022-08-18 NOTE — CM/SW NOTE
SW informed pt is ready for discharge. 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 SW uploaded new referral for placement. SHERRELL informed pt's spouse of above. Plan: DC to SNF pending facility able to accept pt with CRE. Need choice. SHERRELL/RACHID to remain available for support and/or discharge planning.      NADIA Nguyen, [facility]  [employer] Work   EZO:#[id number]

## 2022-08-18 NOTE — PLAN OF CARE
Problem: Patient Centered Care  Goal: Patient preferences are identified and integrated in the patient's plan of care  Description: Interventions:  - What would you like us to know as we care for you?  From home with   - Provide timely, complete, and accurate information to patient/family  - Incorporate patient and family knowledge, values, beliefs, and cultural backgrounds into the planning and delivery of care  - Encourage patient/family to participate in care and decision-making at the level they choose  - Honor patient and family perspectives and choices  Outcome: Progressing     Problem: MUSCULOSKELETAL  Goal: Maintain proper body alignment to prevent postural asymmetries  Description: Interventions:  - Support and protect proper body alignment using appropriate developmental positioning devices   - Provide supportive boundaries  - Instruct learner in use of splints, slings, braces, or positioning devices and any necessary precautions  - Promote hand-to-mouth and ability to self calm  - Expose to a variety of positions to prevent development of fixed postural patterns  - Promote flexed body  - Consult OT/PT as ordered  Outcome: Progressing  Goal: 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  Description: Interventions:  - Support and protect affected body parts per physician/APN orders  - Instruct learner in use of positioning devices and any necessary precautions  - Consult OT/PT as ordered  Outcome: Progressing     Problem: SKIN INTEGRITY  Goal: Skin integrity remains intact  Description: Interventions:  - Assess for areas of redness and/or skin breakdown  - Assess vascular sites hourly  - Change oxygen saturation probe minimum once per shift  - Provide humidity as ordered and per policy  - If on oxygen support, assess nasal septum area for skin breakdown and replace protective barrier if needed  - Change diapers as needed  - Minimize the use of adhesives  Outcome: Progressing  Goal: Incision/wound heals without complications  Description: Interventions:  - Assess wound bed/incision and surrounding skin tissue  - Collaborate with physician/APN and implement wound/incision site care and dressing changes as ordered  - Position infant to avoid placing pressure on wound  - Enterostomal/Wound therapy consult as indicated for ostomies/wounds/G-tubes  Outcome: Progressing     Problem: PAIN - ADULT  Goal: Verbalizes/displays adequate comfort level or patient's stated pain goal  Description: INTERVENTIONS:  - Encourage pt to monitor pain and request assistance  - Assess pain using appropriate pain scale  - Administer analgesics based on type and severity of pain and evaluate response  - Implement non-pharmacological measures as appropriate and evaluate response  - Consider cultural and social influences on pain and pain management  - Manage/alleviate anxiety  - Utilize distraction and/or relaxation techniques  - Monitor for opioid side effects  - Notify MD/LIP if interventions unsuccessful or patient reports new pain  - Anticipate increased pain with activity and pre-medicate as appropriate  Outcome: Progressing     Problem: RISK FOR INFECTION - ADULT  Goal: Absence of fever/infection during anticipated neutropenic period  Description: INTERVENTIONS  - Monitor WBC  - Administer growth factors as ordered  - Implement neutropenic guidelines  Outcome: Progressing     Problem: SAFETY ADULT - FALL  Goal: Free from fall injury  Description: INTERVENTIONS:  - Assess pt frequently for physical needs  - Identify cognitive and physical deficits and behaviors that affect risk of falls.   - [place] fall precautions as indicated by assessment.  - Educate pt/family on patient safety including physical limitations  - Instruct pt to call for assistance with activity based on assessment  - Modify environment to reduce risk of injury  - Provide assistive devices as appropriate  - Consider OT/PT consult to assist with strengthening/mobility  - Encourage toileting schedule  Outcome: Progressing     Problem: DISCHARGE PLANNING  Goal: Discharge to home or other facility with appropriate resources  Description: INTERVENTIONS:  - Identify barriers to discharge w/pt and caregiver  - Include patient/family/discharge partner in discharge planning  - Arrange for needed discharge resources and transportation as appropriate  - Identify discharge learning needs (meds, wound care, etc)  - Arrange for interpreters to assist at discharge as needed  - Consider post-discharge preferences of patient/family/discharge partner  - Complete POLST form as appropriate  - Assess patient's ability to be responsible for managing their own health  - Refer to Case Management Department for coordinating discharge planning if the patient needs post-hospital services based on physician/LIP order or complex needs related to functional status, cognitive ability or social support system  Outcome: Progressing     Problem: RESPIRATORY - ADULT  Goal: Achieves optimal ventilation and oxygenation  Description: INTERVENTIONS:  - Assess for changes in respiratory status  - Assess for changes in mentation and behavior  - Position to facilitate oxygenation and minimize respiratory effort  - Oxygen supplementation based on oxygen saturation or ABGs  - Provide Smoking Cessation handout, if applicable  - Encourage broncho-pulmonary hygiene including cough, deep breathe, Incentive Spirometry  - Assess the need for suctioning and perform as needed  - Assess and instruct to report SOB or any respiratory difficulty  - Respiratory Therapy support as indicated  - Manage/alleviate anxiety  - Monitor for signs/symptoms of CO2 retention  Outcome: Progressing     Problem: GASTROINTESTINAL - ADULT  Goal: Minimal or absence of nausea and vomiting  Description: INTERVENTIONS:  - Maintain adequate hydration with IV or PO as ordered and tolerated  - Nasogastric tube to low intermittent suction as ordered  - Evaluate effectiveness of ordered antiemetic medications  - Provide nonpharmacologic comfort measures as appropriate  - Advance diet as tolerated, if ordered  - Obtain nutritional consult as needed  - Evaluate fluid balance  Outcome: Progressing  Goal: Maintains or returns to baseline bowel function  Description: INTERVENTIONS:  - Assess bowel function  - Maintain adequate hydration with IV or PO as ordered and tolerated  - Evaluate effectiveness of GI medications  - Encourage mobilization and activity  - Obtain nutritional consult as needed  - Establish a toileting routine/schedule  - Consider collaborating with pharmacy to review patient's medication profile  Outcome: Progressing     Problem: SKIN/TISSUE INTEGRITY - ADULT  Goal: Skin integrity remains intact  Description: INTERVENTIONS  - Assess and document risk factors for pressure ulcer development  - Assess and document skin integrity  - Monitor for areas of redness and/or skin breakdown  - Initiate interventions, skin care algorithm/standards of care as needed  Outcome: Progressing  Goal: Incision(s), wounds(s) or drain site(s) healing without S/S of infection  Description: INTERVENTIONS:  - Assess and document risk factors for pressure ulcer development  - Assess and document skin integrity  - Assess and document dressing/incision, wound bed, drain sites and surrounding tissue  - Implement wound care per orders  - Initiate isolation precautions as appropriate  - Initiate Pressure Ulcer prevention bundle as indicated  Outcome: Progressing     Problem: Patient/Family Goals  Goal: Patient/Family Long Term Goal  Description: Patient's Long Term Goal: Discharge from hospital    Interventions:  - Monitor vital signs  - Monitor appropriate labs  - Wound care  - Pain management as needed  - Administer medications per order  - Follow MD orders  - Diagnostics per order  - Update / inform patient and family on plan of care  - Discharge planning  - See additional Care Plan goals for specific interventions  Outcome: Progressing  Goal: Patient/Family Short Term Goal  Description: Patient's Short Term Goal: Feel better    Interventions:   - Monitor vital signs  - Monitor appropriate labs  - Wound care  - Pain management as needed  - Administer medications per order  - Follow MD orders  - Diagnostics per order  - Update / inform patient and family on plan of care  - See additional Care Plan goals for specific interventions  Outcome: Progressing   Pt is alert x4. C/o SOB at the beginning of shift, as well as back itchiness that she states is chronic, MD Molina made aware, with orders received and carried out. MD also notified of CHXR results. No new orders. Bed alarm on. Call light within reach. Wound care rendered. Intentional rounding maintained.

## 2022-08-18 NOTE — PROGRESS NOTES
[facility] Dosing Service    Follow-up Pharmacokinetic Consult for Vancomycin Dosing     Chel Kaye is a 76year old patient who is being treated for  SSTI & OM . Patient is on day 10 of vancomycin and is currently receiving 1000 mg Q 18 hours. Labs:  Lab Results   Component Value Date    CREATSERUM 0.28 08/18/2022      CrCl:  Estimated Creatinine Clearance: 140.7 mL/min (A) (based on SCr of 0.28 mg/dL (L)). Levels:  trough: 9.4 mcg/mL    Based on the above:    1. Continue Vancomycin at 1000 mg IVPB Q 18 hours based on pharmacokinetics and renal function. 2.  Goal trough is 10-15 mcg/mL unless otherwise noted by ordering provider. Trough slightly below 10. Based on age, renal function and duration of therapy, likely to accumulate. 3.  Pharmacy will order SCr as clinically indicated while on vancomycin to assess renal function. 4. Pharmacy will follow and monitor renal function, toxicity and efficacy. We appreciate the opportunity to assist in the care of this patient.     Angel Juarez PharmD, BCPS  8/18/2022  2:45 PM  [address]: [phone]

## 2022-08-19 VITALS
WEIGHT: 129.5 LBS | SYSTOLIC BLOOD PRESSURE: 129 MMHG | RESPIRATION RATE: 18 BRPM | HEART RATE: 97 BPM | OXYGEN SATURATION: 96 % | TEMPERATURE: 98 F | HEIGHT: 64 IN | DIASTOLIC BLOOD PRESSURE: 57 MMHG | BODY MASS INDEX: 22.11 KG/M2

## 2022-08-19 RX ORDER — SODIUM HYPOCHLORITE 1.25 MG/ML
SOLUTION TOPICAL
Qty: 1 EACH | Refills: 0 | Status: SHIPPED | OUTPATIENT
Start: 2022-08-19

## 2022-08-19 RX ORDER — VANCOMYCIN HYDROCHLORIDE 125 MG/1
CAPSULE ORAL
Qty: 123 CAPSULE | Refills: 0 | Status: SHIPPED | OUTPATIENT
Start: 2022-08-19 | End: 2022-10-16

## 2022-08-19 NOTE — CM/SW NOTE
Department  notified of request for Infusion , aidin referrals started. Assigned CM/SW to follow up with pt/family on further discharge planning.      Leigha VALLECILLO [facility]

## 2022-08-19 NOTE — PLAN OF CARE
Problem: Patient Centered Care  Goal: Patient preferences are identified and integrated in the patient's plan of care  Description: Interventions:  - What would you like us to know as we care for you?  From home with   - Provide timely, complete, and accurate information to patient/family  - Incorporate patient and family knowledge, values, beliefs, and cultural backgrounds into the planning and delivery of care  - Encourage patient/family to participate in care and decision-making at the level they choose  - Honor patient and family perspectives and choices  Outcome: Progressing     Problem: MUSCULOSKELETAL  Goal: Maintain proper body alignment to prevent postural asymmetries  Description: Interventions:  - Support and protect proper body alignment using appropriate developmental positioning devices   - Provide supportive boundaries  - Instruct learner in use of splints, slings, braces, or positioning devices and any necessary precautions  - Promote hand-to-mouth and ability to self calm  - Expose to a variety of positions to prevent development of fixed postural patterns  - Promote flexed body  - Consult OT/PT as ordered  Outcome: Progressing  Goal: 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  Description: Interventions:  - Support and protect affected body parts per physician/APN orders  - Instruct learner in use of positioning devices and any necessary precautions  - Consult OT/PT as ordered  Outcome: Progressing     Problem: SKIN INTEGRITY  Goal: Skin integrity remains intact  Description: Interventions:  - Assess for areas of redness and/or skin breakdown  - Assess vascular sites hourly  - Change oxygen saturation probe minimum once per shift  - Provide humidity as ordered and per policy  - If on oxygen support, assess nasal septum area for skin breakdown and replace protective barrier if needed  - Change diapers as needed  - Minimize the use of adhesives  Outcome: Progressing  Goal: Incision/wound heals without complications  Description: Interventions:  - Assess wound bed/incision and surrounding skin tissue  - Collaborate with physician/APN and implement wound/incision site care and dressing changes as ordered  - Position infant to avoid placing pressure on wound  - Enterostomal/Wound therapy consult as indicated for ostomies/wounds/G-tubes  Outcome: Progressing     Problem: PAIN - ADULT  Goal: Verbalizes/displays adequate comfort level or patient's stated pain goal  Description: INTERVENTIONS:  - Encourage pt to monitor pain and request assistance  - Assess pain using appropriate pain scale  - Administer analgesics based on type and severity of pain and evaluate response  - Implement non-pharmacological measures as appropriate and evaluate response  - Consider cultural and social influences on pain and pain management  - Manage/alleviate anxiety  - Utilize distraction and/or relaxation techniques  - Monitor for opioid side effects  - Notify MD/LIP if interventions unsuccessful or patient reports new pain  - Anticipate increased pain with activity and pre-medicate as appropriate  Outcome: Progressing     Problem: RISK FOR INFECTION - ADULT  Goal: Absence of fever/infection during anticipated neutropenic period  Description: INTERVENTIONS  - Monitor WBC  - Administer growth factors as ordered  - Implement neutropenic guidelines  Outcome: Progressing     Problem: SAFETY ADULT - FALL  Goal: Free from fall injury  Description: INTERVENTIONS:  - Assess pt frequently for physical needs  - Identify cognitive and physical deficits and behaviors that affect risk of falls.   - [place] fall precautions as indicated by assessment.  - Educate pt/family on patient safety including physical limitations  - Instruct pt to call for assistance with activity based on assessment  - Modify environment to reduce risk of injury  - Provide assistive devices as appropriate  - Consider OT/PT consult to assist with strengthening/mobility  - Encourage toileting schedule  Outcome: Progressing     Problem: DISCHARGE PLANNING  Goal: Discharge to home or other facility with appropriate resources  Description: INTERVENTIONS:  - Identify barriers to discharge w/pt and caregiver  - Include patient/family/discharge partner in discharge planning  - Arrange for needed discharge resources and transportation as appropriate  - Identify discharge learning needs (meds, wound care, etc)  - Arrange for interpreters to assist at discharge as needed  - Consider post-discharge preferences of patient/family/discharge partner  - Complete POLST form as appropriate  - Assess patient's ability to be responsible for managing their own health  - Refer to Case Management Department for coordinating discharge planning if the patient needs post-hospital services based on physician/LIP order or complex needs related to functional status, cognitive ability or social support system  Outcome: Progressing     Problem: RESPIRATORY - ADULT  Goal: Achieves optimal ventilation and oxygenation  Description: INTERVENTIONS:  - Assess for changes in respiratory status  - Assess for changes in mentation and behavior  - Position to facilitate oxygenation and minimize respiratory effort  - Oxygen supplementation based on oxygen saturation or ABGs  - Provide Smoking Cessation handout, if applicable  - Encourage broncho-pulmonary hygiene including cough, deep breathe, Incentive Spirometry  - Assess the need for suctioning and perform as needed  - Assess and instruct to report SOB or any respiratory difficulty  - Respiratory Therapy support as indicated  - Manage/alleviate anxiety  - Monitor for signs/symptoms of CO2 retention  Outcome: Progressing     Problem: GASTROINTESTINAL - ADULT  Goal: Minimal or absence of nausea and vomiting  Description: INTERVENTIONS:  - Maintain adequate hydration with IV or PO as ordered and tolerated  - Nasogastric tube to low intermittent suction as ordered  - Evaluate effectiveness of ordered antiemetic medications  - Provide nonpharmacologic comfort measures as appropriate  - Advance diet as tolerated, if ordered  - Obtain nutritional consult as needed  - Evaluate fluid balance  Outcome: Progressing  Goal: Maintains or returns to baseline bowel function  Description: INTERVENTIONS:  - Assess bowel function  - Maintain adequate hydration with IV or PO as ordered and tolerated  - Evaluate effectiveness of GI medications  - Encourage mobilization and activity  - Obtain nutritional consult as needed  - Establish a toileting routine/schedule  - Consider collaborating with pharmacy to review patient's medication profile  Outcome: Progressing     Problem: SKIN/TISSUE INTEGRITY - ADULT  Goal: Skin integrity remains intact  Description: INTERVENTIONS  - Assess and document risk factors for pressure ulcer development  - Assess and document skin integrity  - Monitor for areas of redness and/or skin breakdown  - Initiate interventions, skin care algorithm/standards of care as needed  Outcome: Progressing  Goal: Incision(s), wounds(s) or drain site(s) healing without S/S of infection  Description: INTERVENTIONS:  - Assess and document risk factors for pressure ulcer development  - Assess and document skin integrity  - Assess and document dressing/incision, wound bed, drain sites and surrounding tissue  - Implement wound care per orders  - Initiate isolation precautions as appropriate  - Initiate Pressure Ulcer prevention bundle as indicated  Outcome: Progressing     Problem: Patient/Family Goals  Goal: Patient/Family Long Term Goal  Description: Patient's Long Term Goal: Discharge from hospital    Interventions:  - Monitor vital signs  - Monitor appropriate labs  - Wound care  - Pain management as needed  - Administer medications per order  - Follow MD orders  - Diagnostics per order  - Update / inform patient and family on plan of care  - Discharge planning  - See additional Care Plan goals for specific interventions  Outcome: Progressing  Goal: Patient/Family Short Term Goal  Description: Patient's Short Term Goal: Feel better    Interventions:   - Monitor vital signs  - Monitor appropriate labs  - Wound care  - Pain management as needed  - Administer medications per order  - Follow MD orders  - Diagnostics per order  - Update / inform patient and family on plan of care  - See additional Care Plan goals for specific interventions  Outcome: Progressing   Ischium wound dressing changed. IV antibiotics continued. C/o itchiness, cream applied per MAR. Bed alarm on. Call light within reach. Pending facility for discharge.

## 2022-08-19 NOTE — CM/SW NOTE
DC held yesterday d/t [facility] unable to accommodate CRE. New referrals sent. SW called spouse to discuss alternative placement. Spouse is agreeable to [address]. SHERRELL confirmed with Sherrell/liaisonat at [facility] that pt is clinically accepted and her isolations needs are met. Pt will have an isolation bed available after 4 PM. SHERRELL confirmed with Sherrell DC time. Spouse aware. SHERRELL discussed plan w RN. PLAN: DC to [address] today 8/19 @ 4PM [facility], pcs done  Report # [phone]    SW remains available for support and/or discharge planning. Please do not hesitate to call/chat SW if further DC needs arise.        Diana Tejada MSW, [facility], [place]   Ext [phone]

## 2022-08-19 NOTE — PLAN OF CARE
No acute changes. Patient appropriate for discharge per MD. PIV removed, midline in place for long term IV abx. All belongings sent with patient. Patient and spouse notified of discharge. Report given to Corry Partida at [facility]. Problem: Patient Centered Care  Goal: Patient preferences are identified and integrated in the patient's plan of care  Description: Interventions:  - What would you like us to know as we care for you?  From home with   - Provide timely, complete, and accurate information to patient/family  - Incorporate patient and family knowledge, values, beliefs, and cultural backgrounds into the planning and delivery of care  - Encourage patient/family to participate in care and decision-making at the level they choose  - Honor patient and family perspectives and choices  Outcome: Adequate for Discharge     Problem: MUSCULOSKELETAL  Goal: Maintain proper body alignment to prevent postural asymmetries  Description: Interventions:  - Support and protect proper body alignment using appropriate developmental positioning devices   - Provide supportive boundaries  - Instruct learner in use of splints, slings, braces, or positioning devices and any necessary precautions  - Promote hand-to-mouth and ability to self calm  - Expose to a variety of positions to prevent development of fixed postural patterns  - Promote flexed body  - Consult OT/PT as ordered  Outcome: Adequate for Discharge  Goal: 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  Description: Interventions:  - Support and protect affected body parts per physician/APN orders  - Instruct learner in use of positioning devices and any necessary precautions  - Consult OT/PT as ordered  Outcome: Adequate for Discharge     Problem: SKIN INTEGRITY  Goal: Skin integrity remains intact  Description: Interventions:  - Assess for areas of redness and/or skin breakdown  - Assess vascular sites hourly  - Change oxygen saturation probe minimum once per shift  - Provide humidity as ordered and per policy  - If on oxygen support, assess nasal septum area for skin breakdown and replace protective barrier if needed  - Change diapers as needed  - Minimize the use of adhesives  Outcome: Adequate for Discharge  Goal: Incision/wound heals without complications  Description: Interventions:  - Assess wound bed/incision and surrounding skin tissue  - Collaborate with physician/APN and implement wound/incision site care and dressing changes as ordered  - Position infant to avoid placing pressure on wound  - Enterostomal/Wound therapy consult as indicated for ostomies/wounds/G-tubes  Outcome: Adequate for Discharge     Problem: PAIN - ADULT  Goal: Verbalizes/displays adequate comfort level or patient's stated pain goal  Description: INTERVENTIONS:  - Encourage pt to monitor pain and request assistance  - Assess pain using appropriate pain scale  - Administer analgesics based on type and severity of pain and evaluate response  - Implement non-pharmacological measures as appropriate and evaluate response  - Consider cultural and social influences on pain and pain management  - Manage/alleviate anxiety  - Utilize distraction and/or relaxation techniques  - Monitor for opioid side effects  - Notify MD/LIP if interventions unsuccessful or patient reports new pain  - Anticipate increased pain with activity and pre-medicate as appropriate  Outcome: Adequate for Discharge     Problem: RISK FOR INFECTION - ADULT  Goal: Absence of fever/infection during anticipated neutropenic period  Description: INTERVENTIONS  - Monitor WBC  - Administer growth factors as ordered  - Implement neutropenic guidelines  Outcome: Adequate for Discharge     Problem: SAFETY ADULT - FALL  Goal: Free from fall injury  Description: INTERVENTIONS:  - Assess pt frequently for physical needs  - Identify cognitive and physical deficits and behaviors that affect risk of falls.   - [place]ior fall precautions as indicated by assessment.  - Educate pt/family on patient safety including physical limitations  - Instruct pt to call for assistance with activity based on assessment  - Modify environment to reduce risk of injury  - Provide assistive devices as appropriate  - Consider OT/PT consult to assist with strengthening/mobility  - Encourage toileting schedule  Outcome: Adequate for Discharge     Problem: DISCHARGE PLANNING  Goal: Discharge to home or other facility with appropriate resources  Description: INTERVENTIONS:  - Identify barriers to discharge w/pt and caregiver  - Include patient/family/discharge partner in discharge planning  - Arrange for needed discharge resources and transportation as appropriate  - Identify discharge learning needs (meds, wound care, etc)  - Arrange for interpreters to assist at discharge as needed  - Consider post-discharge preferences of patient/family/discharge partner  - Complete POLST form as appropriate  - Assess patient's ability to be responsible for managing their own health  - Refer to Case Management Department for coordinating discharge planning if the patient needs post-hospital services based on physician/LIP order or complex needs related to functional status, cognitive ability or social support system  Outcome: Adequate for Discharge     Problem: RESPIRATORY - ADULT  Goal: Achieves optimal ventilation and oxygenation  Description: INTERVENTIONS:  - Assess for changes in respiratory status  - Assess for changes in mentation and behavior  - Position to facilitate oxygenation and minimize respiratory effort  - Oxygen supplementation based on oxygen saturation or ABGs  - Provide Smoking Cessation handout, if applicable  - Encourage broncho-pulmonary hygiene including cough, deep breathe, Incentive Spirometry  - Assess the need for suctioning and perform as needed  - Assess and instruct to report SOB or any respiratory difficulty  - Respiratory Therapy support as indicated  - Manage/alleviate anxiety  - Monitor for signs/symptoms of CO2 retention  Outcome: Adequate for Discharge     Problem: GASTROINTESTINAL - ADULT  Goal: Minimal or absence of nausea and vomiting  Description: INTERVENTIONS:  - Maintain adequate hydration with IV or PO as ordered and tolerated  - Nasogastric tube to low intermittent suction as ordered  - Evaluate effectiveness of ordered antiemetic medications  - Provide nonpharmacologic comfort measures as appropriate  - Advance diet as tolerated, if ordered  - Obtain nutritional consult as needed  - Evaluate fluid balance  Outcome: Adequate for Discharge  Goal: Maintains or returns to baseline bowel function  Description: INTERVENTIONS:  - Assess bowel function  - Maintain adequate hydration with IV or PO as ordered and tolerated  - Evaluate effectiveness of GI medications  - Encourage mobilization and activity  - Obtain nutritional consult as needed  - Establish a toileting routine/schedule  - Consider collaborating with pharmacy to review patient's medication profile  Outcome: Adequate for Discharge     Problem: SKIN/TISSUE INTEGRITY - ADULT  Goal: Skin integrity remains intact  Description: INTERVENTIONS  - Assess and document risk factors for pressure ulcer development  - Assess and document skin integrity  - Monitor for areas of redness and/or skin breakdown  - Initiate interventions, skin care algorithm/standards of care as needed  Outcome: Adequate for Discharge  Goal: Incision(s), wounds(s) or drain site(s) healing without S/S of infection  Description: INTERVENTIONS:  - Assess and document risk factors for pressure ulcer development  - Assess and document skin integrity  - Assess and document dressing/incision, wound bed, drain sites and surrounding tissue  - Implement wound care per orders  - Initiate isolation precautions as appropriate  - Initiate Pressure Ulcer prevention bundle as indicated  Outcome: Adequate for Discharge     Problem: Patient/Family Goals  Goal: Patient/Family Long Term Goal  Description: Patient's Long Term Goal: Discharge from hospital    Interventions:  - Monitor vital signs  - Monitor appropriate labs  - Wound care  - Pain management as needed  - Administer medications per order  - Follow MD orders  - Diagnostics per order  - Update / inform patient and family on plan of care  - Discharge planning  - See additional Care Plan goals for specific interventions  Outcome: Adequate for Discharge  Goal: Patient/Family Short Term Goal  Description: Patient's Short Term Goal: Feel better    Interventions:   - Monitor vital signs  - Monitor appropriate labs  - Wound care  - Pain management as needed  - Administer medications per order  - Follow MD orders  - Diagnostics per order  - Update / inform patient and family on plan of care  - See additional Care Plan goals for specific interventions  Outcome: Adequate for Discharge

## 2022-08-19 NOTE — PLAN OF CARE
Wound dressings changed. Iv vanco and merrem given, picc intact and draws blood.       Problem: MUSCULOSKELETAL  Goal: Maintain proper body alignment to prevent postural asymmetries  Description: Interventions:  - Support and protect proper body alignment using appropriate developmental positioning devices   - Provide supportive boundaries  - Instruct learner in use of splints, slings, braces, or positioning devices and any necessary precautions  - Promote hand-to-mouth and ability to self calm  - Expose to a variety of positions to prevent development of fixed postural patterns  - Promote flexed body  - Consult OT/PT as ordered  Outcome: Progressing     Problem: SKIN INTEGRITY  Goal: Incision/wound heals without complications  Description: Interventions:  - Assess wound bed/incision and surrounding skin tissue  - Collaborate with physician/APN and implement wound/incision site care and dressing changes as ordered  - Position infant to avoid placing pressure on wound  - Enterostomal/Wound therapy consult as indicated for ostomies/wounds/G-tubes  Outcome: Progressing     Problem: PAIN - ADULT  Goal: Verbalizes/displays adequate comfort level or patient's stated pain goal  Description: INTERVENTIONS:  - Encourage pt to monitor pain and request assistance  - Assess pain using appropriate pain scale  - Administer analgesics based on type and severity of pain and evaluate response  - Implement non-pharmacological measures as appropriate and evaluate response  - Consider cultural and social influences on pain and pain management  - Manage/alleviate anxiety  - Utilize distraction and/or relaxation techniques  - Monitor for opioid side effects  - Notify MD/LIP if interventions unsuccessful or patient reports new pain  - Anticipate increased pain with activity and pre-medicate as appropriate  Outcome: Progressing     Problem: RISK FOR INFECTION - ADULT  Goal: Absence of fever/infection during anticipated neutropenic period  Description: INTERVENTIONS  - Monitor WBC  - Administer growth factors as ordered  - Implement neutropenic guidelines  Outcome: Progressing     Problem: SAFETY ADULT - FALL  Goal: Free from fall injury  Description: INTERVENTIONS:  - Assess pt frequently for physical needs  - Identify cognitive and physical deficits and behaviors that affect risk of falls.   - Raccoon fall precautions as indicated by assessment.  - Educate pt/family on patient safety including physical limitations  - Instruct pt to call for assistance with activity based on assessment  - Modify environment to reduce risk of injury  - Provide assistive devices as appropriate  - Consider OT/PT consult to assist with strengthening/mobility  - Encourage toileting schedule  Outcome: Progressing     Problem: DISCHARGE PLANNING  Goal: Discharge to home or other facility with appropriate resources  Description: INTERVENTIONS:  - Identify barriers to discharge w/pt and caregiver  - Include patient/family/discharge partner in discharge planning  - Arrange for needed discharge resources and transportation as appropriate  - Identify discharge learning needs (meds, wound care, etc)  - Arrange for interpreters to assist at discharge as needed  - Consider post-discharge preferences of patient/family/discharge partner  - Complete POLST form as appropriate  - Assess patient's ability to be responsible for managing their own health  - Refer to Case Management Department for coordinating discharge planning if the patient needs post-hospital services based on physician/LIP order or complex needs related to functional status, cognitive ability or social support system  Outcome: Progressing     Problem: RESPIRATORY - ADULT  Goal: Achieves optimal ventilation and oxygenation  Description: INTERVENTIONS:  - Assess for changes in respiratory status  - Assess for changes in mentation and behavior  - Position to facilitate oxygenation and minimize respiratory effort  - Oxygen supplementation based on oxygen saturation or ABGs  - Provide Smoking Cessation handout, if applicable  - Encourage broncho-pulmonary hygiene including cough, deep breathe, Incentive Spirometry  - Assess the need for suctioning and perform as needed  - Assess and instruct to report SOB or any respiratory difficulty  - Respiratory Therapy support as indicated  - Manage/alleviate anxiety  - Monitor for signs/symptoms of CO2 retention  Outcome: Progressing     Problem: GASTROINTESTINAL - ADULT  Goal: Minimal or absence of nausea and vomiting  Description: INTERVENTIONS:  - Maintain adequate hydration with IV or PO as ordered and tolerated  - Nasogastric tube to low intermittent suction as ordered  - Evaluate effectiveness of ordered antiemetic medications  - Provide nonpharmacologic comfort measures as appropriate  - Advance diet as tolerated, if ordered  - Obtain nutritional consult as needed  - Evaluate fluid balance  Outcome: Progressing     Problem: SKIN/TISSUE INTEGRITY - ADULT  Goal: Skin integrity remains intact  Description: INTERVENTIONS  - Assess and document risk factors for pressure ulcer development  - Assess and document skin integrity  - Monitor for areas of redness and/or skin breakdown  - Initiate interventions, skin care algorithm/standards of care as needed  Outcome: Progressing  Goal: Incision(s), wounds(s) or drain site(s) healing without S/S of infection  Description: INTERVENTIONS:  - Assess and document risk factors for pressure ulcer development  - Assess and document skin integrity  - Assess and document dressing/incision, wound bed, drain sites and surrounding tissue  - Implement wound care per orders  - Initiate isolation precautions as appropriate  - Initiate Pressure Ulcer prevention bundle as indicated  Outcome: Progressing     Problem: Patient/Family Goals  Goal: Patient/Family Short Term Goal  Description: Patient's Short Term Goal: Feel better    Interventions:   - Monitor vital signs  - Monitor appropriate labs  - Wound care  - Pain management as needed  - Administer medications per order  - Follow MD orders  - Diagnostics per order  - Update / inform patient and family on plan of care  - See additional Care Plan goals for specific interventions  Outcome: Progressing

## 2022-09-19 ENCOUNTER — APPOINTMENT (OUTPATIENT)
Dept: GENERAL RADIOLOGY | Facility: HOSPITAL | Age: 77
End: 2022-09-19
Attending: EMERGENCY MEDICINE
Payer: MEDICARE

## 2022-09-19 ENCOUNTER — HOSPITAL ENCOUNTER (INPATIENT)
Facility: HOSPITAL | Age: 77
LOS: 1 days | Discharge: HOME HEALTH | End: 2022-09-21
Attending: EMERGENCY MEDICINE | Admitting: INTERNAL MEDICINE
Payer: MEDICARE

## 2022-09-19 DIAGNOSIS — J81.0 ACUTE PULMONARY EDEMA (HCC): Primary | ICD-10-CM

## 2022-09-19 LAB
ANION GAP SERPL CALC-SCNC: 9 MMOL/L (ref 0–18)
BASOPHILS # BLD AUTO: 0.06 X10(3) UL (ref 0–0.2)
BASOPHILS NFR BLD AUTO: 0.7 %
BUN BLD-MCNC: 37 MG/DL (ref 7–18)
BUN/CREAT SERPL: 86 (ref 10–20)
CALCIUM BLD-MCNC: 9 MG/DL (ref 8.5–10.1)
CHLORIDE SERPL-SCNC: 111 MMOL/L (ref 98–112)
CO2 SERPL-SCNC: 23 MMOL/L (ref 21–32)
CREAT BLD-MCNC: 0.43 MG/DL
DEPRECATED RDW RBC AUTO: 59 FL (ref 35.1–46.3)
EOSINOPHIL # BLD AUTO: 0.41 X10(3) UL (ref 0–0.7)
EOSINOPHIL NFR BLD AUTO: 4.6 %
ERYTHROCYTE [DISTWIDTH] IN BLOOD BY AUTOMATED COUNT: 19.9 % (ref 11–15)
GFR SERPLBLD BASED ON 1.73 SQ M-ARVRAT: 99 ML/MIN/1.73M2 (ref 60–?)
GLUCOSE BLD-MCNC: 107 MG/DL (ref 70–99)
HCT VFR BLD AUTO: 37 %
HGB BLD-MCNC: 11.3 G/DL
IMM GRANULOCYTES # BLD AUTO: 0.04 X10(3) UL (ref 0–1)
IMM GRANULOCYTES NFR BLD: 0.5 %
LYMPHOCYTES # BLD AUTO: 2.43 X10(3) UL (ref 1–4)
LYMPHOCYTES NFR BLD AUTO: 27.6 %
MCH RBC QN AUTO: 24.9 PG (ref 26–34)
MCHC RBC AUTO-ENTMCNC: 30.5 G/DL (ref 31–37)
MCV RBC AUTO: 81.5 FL
MONOCYTES # BLD AUTO: 0.91 X10(3) UL (ref 0.1–1)
MONOCYTES NFR BLD AUTO: 10.3 %
NEUTROPHILS # BLD AUTO: 4.97 X10 (3) UL (ref 1.5–7.7)
NEUTROPHILS # BLD AUTO: 4.97 X10(3) UL (ref 1.5–7.7)
NEUTROPHILS NFR BLD AUTO: 56.3 %
NT-PROBNP SERPL-MCNC: 2893 PG/ML (ref ?–450)
OSMOLALITY SERPL CALC.SUM OF ELEC: 305 MOSM/KG (ref 275–295)
PLATELET # BLD AUTO: 297 10(3)UL (ref 150–450)
POTASSIUM SERPL-SCNC: 3.3 MMOL/L (ref 3.5–5.1)
RBC # BLD AUTO: 4.54 X10(6)UL
SARS-COV-2 RNA RESP QL NAA+PROBE: NOT DETECTED
SODIUM SERPL-SCNC: 143 MMOL/L (ref 136–145)
TROPONIN I HIGH SENSITIVITY: 21 NG/L
TROPONIN I HIGH SENSITIVITY: 22 NG/L
WBC # BLD AUTO: 8.8 X10(3) UL (ref 4–11)

## 2022-09-19 PROCEDURE — 71045 X-RAY EXAM CHEST 1 VIEW: CPT | Performed by: EMERGENCY MEDICINE

## 2022-09-19 PROCEDURE — 85025 COMPLETE CBC W/AUTO DIFF WBC: CPT | Performed by: EMERGENCY MEDICINE

## 2022-09-19 PROCEDURE — 96374 THER/PROPH/DIAG INJ IV PUSH: CPT

## 2022-09-19 PROCEDURE — 80048 BASIC METABOLIC PNL TOTAL CA: CPT | Performed by: EMERGENCY MEDICINE

## 2022-09-19 PROCEDURE — 99285 EMERGENCY DEPT VISIT HI MDM: CPT

## 2022-09-19 PROCEDURE — 84484 ASSAY OF TROPONIN QUANT: CPT | Performed by: INTERNAL MEDICINE

## 2022-09-19 PROCEDURE — 93005 ELECTROCARDIOGRAM TRACING: CPT

## 2022-09-19 PROCEDURE — 83880 ASSAY OF NATRIURETIC PEPTIDE: CPT | Performed by: EMERGENCY MEDICINE

## 2022-09-19 PROCEDURE — 84484 ASSAY OF TROPONIN QUANT: CPT | Performed by: EMERGENCY MEDICINE

## 2022-09-19 PROCEDURE — 83735 ASSAY OF MAGNESIUM: CPT

## 2022-09-19 PROCEDURE — 93010 ELECTROCARDIOGRAM REPORT: CPT | Performed by: EMERGENCY MEDICINE

## 2022-09-19 RX ORDER — PREGABALIN 50 MG/1
50 CAPSULE ORAL 2 TIMES DAILY
Status: DISCONTINUED | OUTPATIENT
Start: 2022-09-19 | End: 2022-09-19

## 2022-09-19 RX ORDER — PANTOPRAZOLE SODIUM 40 MG/1
40 TABLET, DELAYED RELEASE ORAL
Status: DISCONTINUED | OUTPATIENT
Start: 2022-09-19 | End: 2022-09-22

## 2022-09-19 RX ORDER — BUDESONIDE 3 MG/1
9 CAPSULE, COATED PELLETS ORAL EVERY MORNING
Status: DISCONTINUED | OUTPATIENT
Start: 2022-09-19 | End: 2022-09-22

## 2022-09-19 RX ORDER — PREGABALIN 50 MG/1
50 CAPSULE ORAL 3 TIMES DAILY
Status: DISCONTINUED | OUTPATIENT
Start: 2022-09-19 | End: 2022-09-22

## 2022-09-19 RX ORDER — FLUTICASONE PROPIONATE 50 MCG
1 SPRAY, SUSPENSION (ML) NASAL DAILY
Status: DISCONTINUED | OUTPATIENT
Start: 2022-09-19 | End: 2022-09-22

## 2022-09-19 RX ORDER — MEROPENEM 500 MG/1
INJECTION, POWDER, FOR SOLUTION INTRAVENOUS EVERY 8 HOURS
COMMUNITY

## 2022-09-19 RX ORDER — ATORVASTATIN CALCIUM 40 MG/1
40 TABLET, FILM COATED ORAL NIGHTLY
Status: DISCONTINUED | OUTPATIENT
Start: 2022-09-19 | End: 2022-09-19

## 2022-09-19 RX ORDER — EZETIMIBE 10 MG/1
10 TABLET ORAL DAILY
Status: DISCONTINUED | OUTPATIENT
Start: 2022-09-19 | End: 2022-09-22

## 2022-09-19 RX ORDER — POTASSIUM CHLORIDE 20 MEQ/1
40 TABLET, EXTENDED RELEASE ORAL EVERY 4 HOURS
Status: COMPLETED | OUTPATIENT
Start: 2022-09-19 | End: 2022-09-19

## 2022-09-19 RX ORDER — VENLAFAXINE HYDROCHLORIDE 37.5 MG/1
75 CAPSULE, EXTENDED RELEASE ORAL DAILY
Status: DISCONTINUED | OUTPATIENT
Start: 2022-09-19 | End: 2022-09-22

## 2022-09-19 RX ORDER — FUROSEMIDE 10 MG/ML
20 INJECTION INTRAMUSCULAR; INTRAVENOUS
Status: DISCONTINUED | OUTPATIENT
Start: 2022-09-19 | End: 2022-09-22

## 2022-09-19 RX ORDER — FUROSEMIDE 10 MG/ML
40 INJECTION INTRAMUSCULAR; INTRAVENOUS ONCE
Status: COMPLETED | OUTPATIENT
Start: 2022-09-19 | End: 2022-09-19

## 2022-09-19 RX ORDER — ACETAMINOPHEN 325 MG/1
650 TABLET ORAL ONCE
Status: COMPLETED | OUTPATIENT
Start: 2022-09-19 | End: 2022-09-19

## 2022-09-19 RX ORDER — ALBUTEROL SULFATE 90 UG/1
2 AEROSOL, METERED RESPIRATORY (INHALATION) EVERY 4 HOURS PRN
Status: DISCONTINUED | OUTPATIENT
Start: 2022-09-19 | End: 2022-09-22

## 2022-09-19 RX ORDER — VANCOMYCIN HYDROCHLORIDE 125 MG/1
125 CAPSULE ORAL DAILY
Status: DISCONTINUED | OUTPATIENT
Start: 2022-09-19 | End: 2022-09-22

## 2022-09-19 RX ORDER — MELATONIN
325
Status: DISCONTINUED | OUTPATIENT
Start: 2022-09-19 | End: 2022-09-22

## 2022-09-19 NOTE — CM/SW NOTE
09/19/22 1500   CM/SW Referral Data   Referral Source Social Work (self-referral)   Reason for Referral Discharge planning   Informant Spouse/Significant Other   Pertinent Medical Hx   Does patient have an established PCP? Yes   Patient Info   Patient's Current Mental Status at Time of Assessment Memory Impairments   Patient's [address]   Number of Levels in Home 1   Number of Stair in Home   (4)   Patient lives with Spouse/Significant other   Patient Status Prior to Admission   Independent with ADLs and Mobility No   Pt. requires assistance with Housework;Driving;Meals; Bathing; Ambulating;Dressing;Medications; Feeding; Toileting;Finances   Services in place prior to admission Home Health Care;DME/Supplies at home; Infusion   Home Health Provider Info   [facility] (RN/PT/OT))   Type of DME/Supplies Wheelchair   Infusion Provider   (UNKNOWN)   Discharge Needs   Anticipated D/C needs To be determined     Per chart review, patient is a poor historian. SHERRELL called and spoke with patient spouse, Leonor Boother. Introduced self and role. Patient lives with spouse at ( address correct on face sheet). Per Leonor Mayer, patient is bedridden since DC from [facility]. Patient was at [facility] for a short time and then admitted to [address] [place] [address] Drive reports that patient has a wheel chair at home, does not report any other DME. Confirmed that patient is current with [facility], would like to continue services. Also confirmed that he is administering IV abx at home. Leonor Mayer is not sure what the name of the home infusion company is. Leonor Mayer reports that he would prefer patient to go to rehab if recommended, but only if patient is agreeable. Leonor Mayer states, \"It is up to her if she wants to go or not. \"    Leonor Mayer in agreement with SHERRELL sending tentative [facility] referrals for later review.      SHERRELL inquired with [facility] RN Jeri Price if they had the name of the home infusion company on file, awaiting review and response. Message sent to medical team to request PT/OT eval.     319pm  Notified by [facility] RN that patient current with [facility] for home inf- updates sent to [facility] via [facility] [place]. PASRR completed and attached to SNF referral.    PLAN: PT/OT requested.  in agreement with [facility] if recommended, however would like patient to be in agreement as well. Tentative [facility] referrals pending in [place], need to follow up with choice list when avail. Patient is current with [facility] (RN/PT/OT) ODALIS placed. Patient is current with [facility] for IV abx.       NADIA Haas, [facility]    [id number]

## 2022-09-19 NOTE — ED QUICK NOTES
Pt resting on cart no distress noted, waiting for admission. Call light within reach, pt on monitor.

## 2022-09-19 NOTE — PLAN OF CARE
Problem: Patient Centered Care  Goal: Patient preferences are identified and integrated in the patient's plan of care  Description: Interventions:  - What would you like us to know as we care for you? POA daughter Vasyl Holland). From home with .   - Provide timely, complete, and accurate information to patient/family  - Incorporate patient and family knowledge, values, beliefs, and cultural backgrounds into the planning and delivery of care  - Encourage patient/family to participate in care and decision-making at the level they choose  - Honor patient and family perspectives and choices  Outcome: Progressing     Problem: Patient/Family Goals  Goal: Patient/Family Long Term Goal  Description: Patient's Long Term Goal: Be discharged home with home health visits. Interventions:  - Work with social work and   - See additional Care Plan goals for specific interventions  Outcome: Progressing  Goal: Patient/Family Short Term Goal  Description: Patient's Short Term Goal: Reduce heart rate and pain    Interventions:   - Administer pain meds and heart rate meds when aailable  - See additional Care Plan goals for specific interventions  Outcome: Progressing     Problem: Integumentary status not within defined limits  Goal: Pt's integumentary status will be adequate for discharge  Outcome: Progressing     Problem: CARDIOVASCULAR - ADULT  Goal: Maintains optimal cardiac output and hemodynamic stability  Description: INTERVENTIONS:  - Monitor vital signs, rhythm, and trends  - Monitor for bleeding, hypotension and signs of decreased cardiac output  - Evaluate effectiveness of vasoactive medications to optimize hemodynamic stability  - Monitor arterial and/or venous puncture sites for bleeding and/or hematoma  - Assess quality of pulses, skin color and temperature  - Assess for signs of decreased coronary artery perfusion - ex.  Angina  - Evaluate fluid balance, assess for edema, trend weights  Outcome: Progressing  Goal: Absence of cardiac arrhythmias or at baseline  Description: INTERVENTIONS:  - Continuous cardiac monitoring, monitor vital signs, obtain 12 lead EKG if indicated  - Evaluate effectiveness of antiarrhythmic and heart rate control medications as ordered  - Initiate emergency measures for life threatening arrhythmias  - Monitor electrolytes and administer replacement therapy as ordered  Outcome: Progressing

## 2022-09-19 NOTE — HOME CARE LIAISON
Markel is current with [facility]. Will need a ODALIS for RN/PT/OT services before DC.

## 2022-09-19 NOTE — CM/SW NOTE
Patient is current with [facility] (RN/PT/OT) ODALIS placed.      NADIA Wall, [facility]    [id number]

## 2022-09-19 NOTE — CONSULTS
[facility]    PATIENT'S NAME: [address] [place] [address]   ATTENDING PHYSICIAN: Yonis Fuller MD   CONSULTING PHYSICIAN: Mally Curry. Brennen Benson MD   PATIENT ACCOUNT#:   [id number]    LOCATION:  [address] [place] [address] #:   [id number]       YOB: 1945  ADMISSION DATE:       09/19/2022      CONSULT DATE:  09/19/2022    REPORT OF CONSULTATION      HISTORY OF PRESENT ILLNESS:  The patient is a 68-year-old patient admitted to the hospital because of dyspnea. She was found to be in congestive heart failure. She is diuresing well. She has been admitted to the hospital twice in the last few weeks with pressure sores, which required surgical debridement. She has a history of chronic atrial fibrillation. She had severe mitral regurgitation and underwent MitraClip in 2018 and again in 2019. PAST MEDICAL HISTORY:  Chronic atrial fibrillation, history of C difficile colitis, hypertension. PAST SURGICAL HISTORY:  Colostomy. ALLERGIES:  Cymbalta, gabapentin, penicillin, shellfish, Aleve, Bactrim, tetracycline. SOCIAL HISTORY:  Nonsmoker, nondrinker. .  with her today. PHYSICAL EXAMINATION:    GENERAL:  Well-developed, thin elderly female in no acute distress. Alert and oriented x3. VITAL SIGNS:  Blood pressure 134/79; respirations 18, unlabored; pulse 106, irregular; afebrile. NECK:  Veins normal.  LUNGS:  Diminished breath sounds in bases. HEART:  S1, S2 normal.  No pathologic murmur. No S3.  ABDOMEN:  Soft, nontender. EXTREMITIES:  No peripheral edema. LABORATORY DATA:  Basic metabolic panel unremarkable. ProBNP 2893. Troponin normal x2. Hemoglobin 11.3, WBC 8.8. Chest x-ray shows bilateral effusions, chronic interstitial changes. EKG shows atrial fibrillation with a couple of PVCs. ASSESSMENT:    1. Fluid overload, uncertain cause, but likely related to recent admissions and diminished diuretic. 2.   No other acute cardiac issues at present. 3.   Chronic atrial fibrillation. 4.   History of MitraClip for mitral regurgitation. PLAN:  Diurese to dry weight. Continue other cardiac medications. Monitor renal function and weight. BUN has already started to increase. Thank you for this consultation. Dictated By Malika Vitale.  Cipriano Barahona MD  d: 09/19/2022 15:41:00  t: 09/19/2022 17:54:36  Job [id number]  NUU/

## 2022-09-19 NOTE — ED QUICK NOTES
Orders for admission, patient is aware of plan and ready to go upstairs.  Any questions, please call ED RN nitza at extension [phone]     Patient Covid vaccination status: Fully vaccinated     COVID Test Ordered in ED: None    COVID Suspicion at Admission: N/A    Running Infusions:  None    Mental Status/LOC at time of transport: A&OX3    Other pertinent information: bed bound, cowan, picc to right upper arm, colostomy  CIWA score: N/A   NIH score:  N/A

## 2022-09-19 NOTE — CONSULTS
Cardiology (consult dictated)    Assessment:  1. Fluid overload with congestive heart failure. No other acute cardiac issues. 2.  Chronic pressure sores    3. Chronic atrial fibrillation    4. Chest pressure with normal coronaries and LV function    5. Hereditary spastic paraparesis. 6.  History of mitral regurgitation. Status post MitraClip procedures 2018 and 2019. Plan:  1. Diurese to dry weight. 2.  Continue other cardiac medications. Thank you.

## 2022-09-19 NOTE — PLAN OF CARE
Pt. A&O x4. Experiencing SOB. Mayorga intact. Wound dressings changed. Wound nurse consulted (cannot visit today).  at bedside, provided updated medication list.   Problem: Patient Centered Care  Goal: Patient preferences are identified and integrated in the patient's plan of care  Description: Interventions:  - What would you like us to know as we care for you? POA daughter Victor Hugo Polo). From home with .   - Provide timely, complete, and accurate information to patient/family  - Incorporate patient and family knowledge, values, beliefs, and cultural backgrounds into the planning and delivery of care  - Encourage patient/family to participate in care and decision-making at the level they choose  - Honor patient and family perspectives and choices  Outcome: Progressing     Problem: Patient/Family Goals  Goal: Patient/Family Long Term Goal  Description: Patient's Long Term Goal: Be discharged home with home health visits.     Interventions:  - Work with social work and   - See additional Care Plan goals for specific interventions  Outcome: Progressing  Goal: Patient/Family Short Term Goal  Description: Patient's Short Term Goal: Reduce heart rate and pain    Interventions:   - Administer pain meds and heart rate meds when aailable  - See additional Care Plan goals for specific interventions  Outcome: Progressing     Problem: Integumentary status not within defined limits  Goal: Pt's integumentary status will be adequate for discharge  Outcome: Progressing     Problem: CARDIOVASCULAR - ADULT  Goal: Maintains optimal cardiac output and hemodynamic stability  Description: INTERVENTIONS:  - Monitor vital signs, rhythm, and trends  - Monitor for bleeding, hypotension and signs of decreased cardiac output  - Evaluate effectiveness of vasoactive medications to optimize hemodynamic stability  - Monitor arterial and/or venous puncture sites for bleeding and/or hematoma  - Assess quality of pulses, skin color and temperature  - Assess for signs of decreased coronary artery perfusion - ex.  Angina  - Evaluate fluid balance, assess for edema, trend weights  Outcome: Progressing  Goal: Absence of cardiac arrhythmias or at baseline  Description: INTERVENTIONS:  - Continuous cardiac monitoring, monitor vital signs, obtain 12 lead EKG if indicated  - Evaluate effectiveness of antiarrhythmic and heart rate control medications as ordered  - Initiate emergency measures for life threatening arrhythmias  - Monitor electrolytes and administer replacement therapy as ordered  Outcome: Progressing

## 2022-09-20 LAB
ANION GAP SERPL CALC-SCNC: 8 MMOL/L (ref 0–18)
BASOPHILS # BLD AUTO: 0.03 X10(3) UL (ref 0–0.2)
BASOPHILS NFR BLD AUTO: 0.4 %
BUN BLD-MCNC: 32 MG/DL (ref 7–18)
BUN/CREAT SERPL: 100 (ref 10–20)
CALCIUM BLD-MCNC: 8.7 MG/DL (ref 8.5–10.1)
CHLORIDE SERPL-SCNC: 112 MMOL/L (ref 98–112)
CO2 SERPL-SCNC: 24 MMOL/L (ref 21–32)
CREAT BLD-MCNC: 0.32 MG/DL
DEPRECATED RDW RBC AUTO: 58 FL (ref 35.1–46.3)
EOSINOPHIL # BLD AUTO: 0.28 X10(3) UL (ref 0–0.7)
EOSINOPHIL NFR BLD AUTO: 3.9 %
ERYTHROCYTE [DISTWIDTH] IN BLOOD BY AUTOMATED COUNT: 20.1 % (ref 11–15)
GFR SERPLBLD BASED ON 1.73 SQ M-ARVRAT: 106 ML/MIN/1.73M2 (ref 60–?)
GLUCOSE BLD-MCNC: 92 MG/DL (ref 70–99)
HCT VFR BLD AUTO: 34 %
HGB BLD-MCNC: 10.3 G/DL
IMM GRANULOCYTES # BLD AUTO: 0.03 X10(3) UL (ref 0–1)
IMM GRANULOCYTES NFR BLD: 0.4 %
LYMPHOCYTES # BLD AUTO: 2.07 X10(3) UL (ref 1–4)
LYMPHOCYTES NFR BLD AUTO: 28.8 %
MAGNESIUM SERPL-MCNC: 1.7 MG/DL (ref 1.6–2.6)
MCH RBC QN AUTO: 24.1 PG (ref 26–34)
MCHC RBC AUTO-ENTMCNC: 30.3 G/DL (ref 31–37)
MCV RBC AUTO: 79.4 FL
MONOCYTES # BLD AUTO: 0.91 X10(3) UL (ref 0.1–1)
MONOCYTES NFR BLD AUTO: 12.7 %
NEUTROPHILS # BLD AUTO: 3.87 X10 (3) UL (ref 1.5–7.7)
NEUTROPHILS # BLD AUTO: 3.87 X10(3) UL (ref 1.5–7.7)
NEUTROPHILS NFR BLD AUTO: 53.8 %
OSMOLALITY SERPL CALC.SUM OF ELEC: 305 MOSM/KG (ref 275–295)
PLATELET # BLD AUTO: 296 10(3)UL (ref 150–450)
POTASSIUM SERPL-SCNC: 3.8 MMOL/L (ref 3.5–5.1)
POTASSIUM SERPL-SCNC: 3.8 MMOL/L (ref 3.5–5.1)
RBC # BLD AUTO: 4.28 X10(6)UL
SODIUM SERPL-SCNC: 144 MMOL/L (ref 136–145)
WBC # BLD AUTO: 7.2 X10(3) UL (ref 4–11)

## 2022-09-20 PROCEDURE — 85025 COMPLETE CBC W/AUTO DIFF WBC: CPT | Performed by: INTERNAL MEDICINE

## 2022-09-20 PROCEDURE — 84132 ASSAY OF SERUM POTASSIUM: CPT | Performed by: INTERNAL MEDICINE

## 2022-09-20 PROCEDURE — 99213 OFFICE O/P EST LOW 20 MIN: CPT

## 2022-09-20 PROCEDURE — 83735 ASSAY OF MAGNESIUM: CPT | Performed by: INTERNAL MEDICINE

## 2022-09-20 PROCEDURE — 80048 BASIC METABOLIC PNL TOTAL CA: CPT | Performed by: INTERNAL MEDICINE

## 2022-09-20 RX ORDER — POTASSIUM CHLORIDE 20 MEQ/1
40 TABLET, EXTENDED RELEASE ORAL ONCE
Status: COMPLETED | OUTPATIENT
Start: 2022-09-20 | End: 2022-09-20

## 2022-09-20 RX ORDER — MAGNESIUM OXIDE 400 MG/1
400 TABLET ORAL ONCE
Status: COMPLETED | OUTPATIENT
Start: 2022-09-20 | End: 2022-09-20

## 2022-09-20 RX ORDER — HYDROCODONE BITARTRATE AND ACETAMINOPHEN 5; 325 MG/1; MG/1
1 TABLET ORAL EVERY 6 HOURS PRN
Status: DISCONTINUED | OUTPATIENT
Start: 2022-09-20 | End: 2022-09-22

## 2022-09-20 NOTE — CM/SW NOTE
Department  asked to send updates to [facility] referral for ASIA. Assigned SW/CM to follow up with patient/family on discharge plan.      Efra Gasca   September 20, 2022   16:29

## 2022-09-20 NOTE — PROGRESS NOTES
Patient Current with [facility].      [facility]  [facility]idential Liaison  [id number]

## 2022-09-20 NOTE — PLAN OF CARE
Patient alert and oriented x 3-4. Patient has moments of drowsiness. Vitals stable, on room air. Patient's pain controlled with po norco. Patient's drowsiness increased with norco dosage. Wound bandage change Q shift. Pt on iv Merrem q8hr. Patient has right arm picc line for abx. Patient has cowan catheter. Plan for ASIA once medically cleared. Call light within reach. Problem: Patient Centered Care  Goal: Patient preferences are identified and integrated in the patient's plan of care  Description: Interventions:  - What would you like us to know as we care for you?  From home with   - Provide timely, complete, and accurate information to patient/family  - Incorporate patient and family knowledge, values, beliefs, and cultural backgrounds into the planning and delivery of care  - Encourage patient/family to participate in care and decision-making at the level they choose  - Honor patient and family perspectives and choices  Outcome: Progressing     Problem: Patient/Family Goals  Goal: Patient/Family Long Term Goal  Description: Patient's Long Term Goal: be able to go home    Interventions:  - medication administration    - See additional Care Plan goals for specific interventions  Outcome: Progressing  Goal: Patient/Family Short Term Goal  Description: Patient's Short Term Goal: resolve pain of left hip    Interventions:   - medication administration  - See additional Care Plan goals for specific interventions  Outcome: Progressing     Problem: Integumentary status not within defined limits  Goal: Pt's integumentary status will be adequate for discharge  Outcome: Progressing     Problem: CARDIOVASCULAR - ADULT  Goal: Maintains optimal cardiac output and hemodynamic stability  Description: INTERVENTIONS:  - Monitor vital signs, rhythm, and trends  - Monitor for bleeding, hypotension and signs of decreased cardiac output  - Evaluate effectiveness of vasoactive medications to optimize hemodynamic stability  - Monitor arterial and/or venous puncture sites for bleeding and/or hematoma  - Assess quality of pulses, skin color and temperature  - Assess for signs of decreased coronary artery perfusion - ex.  Angina  - Evaluate fluid balance, assess for edema, trend weights  Outcome: Progressing  Goal: Absence of cardiac arrhythmias or at baseline  Description: INTERVENTIONS:  - Continuous cardiac monitoring, monitor vital signs, obtain 12 lead EKG if indicated  - Evaluate effectiveness of antiarrhythmic and heart rate control medications as ordered  - Initiate emergency measures for life threatening arrhythmias  - Monitor electrolytes and administer replacement therapy as ordered  Outcome: Progressing

## 2022-09-20 NOTE — PLAN OF CARE
Pt is alert and oriented x4. Drowsy at times. Pt on air mattress now. Pt turned and repositioned. Mayorga catheter care provided. Call light within reach. Plan is long-term antibiotics and ASIA VS HHC. Call light within reach. Problem: Patient Centered Care  Goal: Patient preferences are identified and integrated in the patient's plan of care  Description: Interventions:  - What would you like us to know as we care for you?  Home with   - Provide timely, complete, and accurate information to patient/family  - Incorporate patient and family knowledge, values, beliefs, and cultural backgrounds into the planning and delivery of care  - Encourage patient/family to participate in care and decision-making at the level they choose  - Honor patient and family perspectives and choices  Outcome: Progressing     Problem: Patient/Family Goals  Goal: Patient/Family Long Term Goal  Description: Patient's Long Term Goal: back to home with     Interventions:  - Follow up with PCP  -Family support  - See additional Care Plan goals for specific interventions  Outcome: Progressing  Goal: Patient/Family Short Term Goal  Description: Patient's Short Term Goal: No more shortness of breath and wound infections    Interventions:   - Long term IV antibiotics  - Wound care  - Monitor Labs and VS  - See additional Care Plan goals for specific interventions  Outcome: Progressing     Problem: Integumentary status not within defined limits  Goal: Pt's integumentary status will be adequate for discharge  Outcome: Progressing     Problem: CARDIOVASCULAR - ADULT  Goal: Maintains optimal cardiac output and hemodynamic stability  Description: INTERVENTIONS:  - Monitor vital signs, rhythm, and trends  - Monitor for bleeding, hypotension and signs of decreased cardiac output  - Evaluate effectiveness of vasoactive medications to optimize hemodynamic stability  - Monitor arterial and/or venous puncture sites for bleeding and/or hematoma  - Assess quality of pulses, skin color and temperature  - Assess for signs of decreased coronary artery perfusion - ex.  Angina  - Evaluate fluid balance, assess for edema, trend weights  Outcome: Progressing  Goal: Absence of cardiac arrhythmias or at baseline  Description: INTERVENTIONS:  - Continuous cardiac monitoring, monitor vital signs, obtain 12 lead EKG if indicated  - Evaluate effectiveness of antiarrhythmic and heart rate control medications as ordered  - Initiate emergency measures for life threatening arrhythmias  - Monitor electrolytes and administer replacement therapy as ordered  Outcome: Progressing

## 2022-09-21 VITALS
HEART RATE: 87 BPM | DIASTOLIC BLOOD PRESSURE: 67 MMHG | RESPIRATION RATE: 18 BRPM | HEIGHT: 64 IN | WEIGHT: 127 LBS | BODY MASS INDEX: 21.68 KG/M2 | OXYGEN SATURATION: 99 % | SYSTOLIC BLOOD PRESSURE: 117 MMHG | TEMPERATURE: 98 F

## 2022-09-21 LAB
ANION GAP SERPL CALC-SCNC: 7 MMOL/L (ref 0–18)
BASOPHILS # BLD AUTO: 0.04 X10(3) UL (ref 0–0.2)
BASOPHILS NFR BLD AUTO: 0.6 %
BUN BLD-MCNC: 28 MG/DL (ref 7–18)
BUN/CREAT SERPL: 84.8 (ref 10–20)
CALCIUM BLD-MCNC: 9 MG/DL (ref 8.5–10.1)
CHLORIDE SERPL-SCNC: 110 MMOL/L (ref 98–112)
CO2 SERPL-SCNC: 23 MMOL/L (ref 21–32)
CREAT BLD-MCNC: 0.33 MG/DL
DEPRECATED RDW RBC AUTO: 57.4 FL (ref 35.1–46.3)
EOSINOPHIL # BLD AUTO: 0.23 X10(3) UL (ref 0–0.7)
EOSINOPHIL NFR BLD AUTO: 3.3 %
ERYTHROCYTE [DISTWIDTH] IN BLOOD BY AUTOMATED COUNT: 19.8 % (ref 11–15)
GFR SERPLBLD BASED ON 1.73 SQ M-ARVRAT: 105 ML/MIN/1.73M2 (ref 60–?)
GLUCOSE BLD-MCNC: 97 MG/DL (ref 70–99)
HCT VFR BLD AUTO: 32.6 %
HGB BLD-MCNC: 9.9 G/DL
IMM GRANULOCYTES # BLD AUTO: 0.03 X10(3) UL (ref 0–1)
IMM GRANULOCYTES NFR BLD: 0.4 %
LYMPHOCYTES # BLD AUTO: 1.86 X10(3) UL (ref 1–4)
LYMPHOCYTES NFR BLD AUTO: 26.3 %
MAGNESIUM SERPL-MCNC: 1.9 MG/DL (ref 1.6–2.6)
MAGNESIUM SERPL-MCNC: 2 MG/DL (ref 1.6–2.6)
MCH RBC QN AUTO: 24.3 PG (ref 26–34)
MCHC RBC AUTO-ENTMCNC: 30.4 G/DL (ref 31–37)
MCV RBC AUTO: 80.1 FL
MONOCYTES # BLD AUTO: 0.83 X10(3) UL (ref 0.1–1)
MONOCYTES NFR BLD AUTO: 11.8 %
NEUTROPHILS # BLD AUTO: 4.07 X10 (3) UL (ref 1.5–7.7)
NEUTROPHILS # BLD AUTO: 4.07 X10(3) UL (ref 1.5–7.7)
NEUTROPHILS NFR BLD AUTO: 57.6 %
OSMOLALITY SERPL CALC.SUM OF ELEC: 295 MOSM/KG (ref 275–295)
PLATELET # BLD AUTO: 269 10(3)UL (ref 150–450)
POTASSIUM SERPL-SCNC: 3.6 MMOL/L (ref 3.5–5.1)
POTASSIUM SERPL-SCNC: 3.6 MMOL/L (ref 3.5–5.1)
POTASSIUM SERPL-SCNC: 4.7 MMOL/L (ref 3.5–5.1)
RBC # BLD AUTO: 4.07 X10(6)UL
SODIUM SERPL-SCNC: 140 MMOL/L (ref 136–145)
WBC # BLD AUTO: 7.1 X10(3) UL (ref 4–11)

## 2022-09-21 PROCEDURE — 84132 ASSAY OF SERUM POTASSIUM: CPT | Performed by: INTERNAL MEDICINE

## 2022-09-21 PROCEDURE — 85025 COMPLETE CBC W/AUTO DIFF WBC: CPT | Performed by: INTERNAL MEDICINE

## 2022-09-21 PROCEDURE — 80048 BASIC METABOLIC PNL TOTAL CA: CPT | Performed by: INTERNAL MEDICINE

## 2022-09-21 PROCEDURE — 83735 ASSAY OF MAGNESIUM: CPT | Performed by: INTERNAL MEDICINE

## 2022-09-21 RX ORDER — POTASSIUM CHLORIDE 20 MEQ/1
40 TABLET, EXTENDED RELEASE ORAL EVERY 4 HOURS
Status: COMPLETED | OUTPATIENT
Start: 2022-09-21 | End: 2022-09-21

## 2022-09-21 RX ORDER — DIPHENHYDRAMINE HCL 25 MG
25 CAPSULE ORAL EVERY 6 HOURS PRN
Status: DISCONTINUED | OUTPATIENT
Start: 2022-09-21 | End: 2022-09-22

## 2022-09-21 NOTE — CM/SW NOTE
09/21/22 1621   Discharge disposition   Expected discharge disposition Home-Health   Post Acute Care Provider Home  ([facility])   Home services after discharge   ([facility])   DME/Infusion Providers   ([facility])   Discharge transportation [facility]       Patient has dc orders. [facility]idential notified of discharge orders- will resume [facility] nursing tomorrow. [facility] to resume infusion services- spouse has a few bags that he can use per okay from [facility].  Additional IV antibiotics to be distributed tomorrow. Spouse notified that ambulance will come at 7:30pm after infusion. Spouse will be at home around 6:00pm tonight. Nursing aware of dc time.     Sonja NIELSENN RN [address]  RN Case Manager  [phone]

## 2022-09-21 NOTE — PLAN OF CARE
Medically cleared for discharge. Tele kept on until ambulance to arrive at 7:30pm. Discharge education completed. Midline kept in place for long term IV antibiotic. Long term cowan in place. Spouse aware. Problem: Patient Centered Care  Goal: Patient preferences are identified and integrated in the patient's plan of care  Description: Interventions:  - What would you like us to know as we care for you?  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  - Provide timely, complete, and accurate information to patient/family  - Incorporate patient and family knowledge, values, beliefs, and cultural backgrounds into the planning and delivery of care  - Encourage patient/family to participate in care and decision-making at the level they choose  - Honor patient and family perspectives and choices  9/21/2022 1601 by Travis Morley  Outcome: Completed  9/21/2022 1121 by Travis Morley  Outcome: Progressing  9/21/2022 1120 by Travis Morley  Outcome: Progressing     Problem: Patient/Family Goals  Goal: Patient/Family Long Term Goal  Description: Patient's Long Term Goal: Go home or rehab    Interventions:  - IV lasix  - See additional Care Plan goals for specific interventions  9/21/2022 1601 by Travis Morley  Outcome: Completed  9/21/2022 1121 by Travis Morley  Outcome: Progressing  9/21/2022 1120 by Travis Morley  Outcome: Progressing  Goal: Patient/Family Short Term Goal  Description: Patient's Short Term Goal: Not feel SOB    Interventions:   - IV lasix   - See additional Care Plan goals for specific interventions  9/21/2022 1601 by Travis Morley  Outcome: Completed  9/21/2022 1121 by Travis Morley  Outcome: Progressing  9/21/2022 1120 by Travis Morley  Outcome: Progressing     Problem: Integumentary status not within defined limits  Goal: Pt's integumentary status will be adequate for discharge  9/21/2022 1601 by Travis Morley  Outcome: Completed  9/21/2022 1121 by Travis Morley  Outcome: Progressing  9/21/2022 1120 by Janna Vargas  Outcome: Progressing     Problem: CARDIOVASCULAR - ADULT  Goal: Maintains optimal cardiac output and hemodynamic stability  Description: INTERVENTIONS:  - Monitor vital signs, rhythm, and trends  - Monitor for bleeding, hypotension and signs of decreased cardiac output  - Evaluate effectiveness of vasoactive medications to optimize hemodynamic stability  - Monitor arterial and/or venous puncture sites for bleeding and/or hematoma  - Assess quality of pulses, skin color and temperature  - Assess for signs of decreased coronary artery perfusion - ex.  Angina  - Evaluate fluid balance, assess for edema, trend weights  9/21/2022 1601 by Janna Vargas  Outcome: Completed  9/21/2022 1121 by Janna Vargas  Outcome: Progressing  9/21/2022 1120 by Janna Vargas  Outcome: Progressing  Goal: Absence of cardiac arrhythmias or at baseline  Description: INTERVENTIONS:  - Continuous cardiac monitoring, monitor vital signs, obtain 12 lead EKG if indicated  - Evaluate effectiveness of antiarrhythmic and heart rate control medications as ordered  - Initiate emergency measures for life threatening arrhythmias  - Monitor electrolytes and administer replacement therapy as ordered  9/21/2022 1601 by Janna Vargas  Outcome: Completed  9/21/2022 1121 by Janna Vargas  Outcome: Progressing  9/21/2022 1120 by Janna Vargas  Outcome: Progressing

## 2022-09-21 NOTE — PLAN OF CARE
Wound dressing changed , pain medication given. C/o of itchiness, lotion skin. Will continue to monitor. Problem: Integumentary status not within defined limits  Goal: Pt's integumentary status will be adequate for discharge  Outcome: Progressing     Problem: Integumentary status not within defined limits  Goal: Pt's integumentary status will be adequate for discharge  Outcome: Progressing     Problem: CARDIOVASCULAR - ADULT  Goal: Maintains optimal cardiac output and hemodynamic stability  Description: INTERVENTIONS:  - Monitor vital signs, rhythm, and trends  - Monitor for bleeding, hypotension and signs of decreased cardiac output  - Evaluate effectiveness of vasoactive medications to optimize hemodynamic stability  - Monitor arterial and/or venous puncture sites for bleeding and/or hematoma  - Assess quality of pulses, skin color and temperature  - Assess for signs of decreased coronary artery perfusion - ex.  Angina  - Evaluate fluid balance, assess for edema, trend weights  Outcome: Progressing  Goal: Absence of cardiac arrhythmias or at baseline  Description: INTERVENTIONS:  - Continuous cardiac monitoring, monitor vital signs, obtain 12 lead EKG if indicated  - Evaluate effectiveness of antiarrhythmic and heart rate control medications as ordered  - Initiate emergency measures for life threatening arrhythmias  - Monitor electrolytes and administer replacement therapy as ordered  Outcome: Progressing

## 2022-09-21 NOTE — DISCHARGE PLANNING
Patient chart reviewed for discharge: Medication Reconciliation completed, Specialist/PCP follow up listed, and disease specific Instructions/Education included in After Visit Summary. Discharge RN notified patient's RN of AVS completion. Primary RN to verify cardiology clearance for discharge.       Anna Anderson RN, Discharge Leader [id number]

## 2022-09-21 NOTE — PLAN OF CARE
Pt complaining of itching. MD aware, PRN Benadryl ordered. L buttocks wound changed. Irregular beats on Heart monitor and higher HR, OCTAVIANO Guerrier w/ cardiology notified- see orders. HR better managed. Call light within reach. Fall precautions in place. Plan: Discharge home w/ residential [facility] when medically cleared. Problem: Patient Centered Care  Goal: Patient preferences are identified and integrated in the patient's plan of care  Description: Interventions:  - What would you like us to know as we care for you?  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  - Provide timely, complete, and accurate information to patient/family  - Incorporate patient and family knowledge, values, beliefs, and cultural backgrounds into the planning and delivery of care  - Encourage patient/family to participate in care and decision-making at the level they choose  - Honor patient and family perspectives and choices  9/21/2022 1121 by Donavan Garcia  Outcome: Progressing  9/21/2022 1120 by Donavan Garcia  Outcome: Progressing     Problem: Patient/Family Goals  Goal: Patient/Family Long Term Goal  Description: Patient's Long Term Goal: Go home or rehab    Interventions:  - IV lasix  - See additional Care Plan goals for specific interventions  9/21/2022 1121 by Donavan Garcia  Outcome: Progressing  9/21/2022 1120 by Donavan Garcia  Outcome: Progressing  Goal: Patient/Family Short Term Goal  Description: Patient's Short Term Goal: Not feel SOB    Interventions:   - IV lasix   - See additional Care Plan goals for specific interventions  9/21/2022 1121 by Donavan Garcia  Outcome: Progressing  9/21/2022 1120 by Donavan Garcia  Outcome: Progressing     Problem: Integumentary status not within defined limits  Goal: Pt's integumentary status will be adequate for discharge  9/21/2022 1121 by Donavan Garcia  Outcome: Progressing  9/21/2022 1120 by Donavan Garcia  Outcome: Progressing     Problem: CARDIOVASCULAR - ADULT  Goal: Maintains optimal cardiac output and hemodynamic stability  Description: INTERVENTIONS:  - Monitor vital signs, rhythm, and trends  - Monitor for bleeding, hypotension and signs of decreased cardiac output  - Evaluate effectiveness of vasoactive medications to optimize hemodynamic stability  - Monitor arterial and/or venous puncture sites for bleeding and/or hematoma  - Assess quality of pulses, skin color and temperature  - Assess for signs of decreased coronary artery perfusion - ex.  Angina  - Evaluate fluid balance, assess for edema, trend weights  9/21/2022 1121 by Stoney Johns  Outcome: Progressing  9/21/2022 1120 by Stoney Johns  Outcome: Progressing  Goal: Absence of cardiac arrhythmias or at baseline  Description: INTERVENTIONS:  - Continuous cardiac monitoring, monitor vital signs, obtain 12 lead EKG if indicated  - Evaluate effectiveness of antiarrhythmic and heart rate control medications as ordered  - Initiate emergency measures for life threatening arrhythmias  - Monitor electrolytes and administer replacement therapy as ordered  9/21/2022 1121 by Stoney Johns  Outcome: Progressing  9/21/2022 1120 by Stoney Johns  Outcome: Progressing

## 2022-09-21 NOTE — CDS QUERY
How to Answer this Query    1.) Click \"Edit Button\" on the toolbar  2.) Type an \"X\" in the bracket for the diagnosis that applies. (You may also add additional clinical details as you feel necessary to substantiate your response). 3.) Finally click \"Sign\" to complete response. Thank you     CLINICAL DOCUMENTATION CLARIFICATION/VALIDATION FORM  Acute Respiratory Failure  Dear Dr. Gladis Cody:     Please provide additional documentation in the patient's medical record supportive of  documented diagnosis of Acute respiratory failure         (  )     Condition was evident because: ___________________________                  ( x )     Condition was ruled out and amended documentation provided in the medical record         (  )     Clinically unable to provide additional clarity regarding the diagnosis        (  )     Other (please specify)___________________________        (  )     Unknown     __________________________________________________________________   Clinical Indicators:  H&P: presents with SOB and LIRIANO. Admitted for acute CHF  Assessment:   Acute on Chronic HFpEF  Paroxysmal A. Fib    9-19 ID consult: 4. Acute on chronic respiratory failure  - Likely multifactorial  - Supportive care ongoing, feeling better      9-20 ID PN : 4.  Acute on chronic respiratory failure  - Likely multifactorial  - Supportive care ongoing, feeling better  - Currently not on O2  Cardiology consult:     Fluid overload, uncertain cause, but likely related to recent admissions and diminished diuretic  9-19 Respiratory rate 26 to 36, O2 sats  94% to 98% on room air  No O2 therapy noted. Meds include Lasix IVP 10mg BID; Albuterol INH q 4 hours prn      If you have any questions, please contact Clinical :  Lashawn Cantor RN CCDS  at  [phone]   Thank You!      THIS FORM IS A PERMANENT PART OF THE MEDICAL RECORD

## 2022-09-28 ENCOUNTER — LAB REQUISITION (OUTPATIENT)
Dept: LAB | Facility: HOSPITAL | Age: 77
End: 2022-09-28
Payer: MEDICARE

## 2022-09-28 DIAGNOSIS — M86.9 OSTEOMYELITIS, UNSPECIFIED (HCC): ICD-10-CM

## 2022-09-28 DIAGNOSIS — A41.9 SEPSIS, UNSPECIFIED ORGANISM (HCC): ICD-10-CM

## 2022-09-28 LAB
ALBUMIN SERPL-MCNC: 2.6 G/DL (ref 3.4–5)
ALBUMIN/GLOB SERPL: 1 {RATIO} (ref 1–2)
ALP LIVER SERPL-CCNC: 82 U/L
ALT SERPL-CCNC: 29 U/L
ANION GAP SERPL CALC-SCNC: 7 MMOL/L (ref 0–18)
ANION GAP SERPL CALC-SCNC: 7 MMOL/L (ref 0–18)
AST SERPL-CCNC: 18 U/L (ref 15–37)
BASOPHILS # BLD AUTO: 0.02 X10(3) UL (ref 0–0.2)
BASOPHILS NFR BLD AUTO: 0.3 %
BILIRUB SERPL-MCNC: 0.4 MG/DL (ref 0.1–2)
BUN BLD-MCNC: 20 MG/DL (ref 7–18)
BUN BLD-MCNC: 20 MG/DL (ref 7–18)
BUN/CREAT SERPL: 66.7 (ref 10–20)
BUN/CREAT SERPL: 66.7 (ref 10–20)
CALCIUM BLD-MCNC: 8.3 MG/DL (ref 8.5–10.1)
CALCIUM BLD-MCNC: 8.3 MG/DL (ref 8.5–10.1)
CHLORIDE SERPL-SCNC: 118 MMOL/L (ref 98–112)
CHLORIDE SERPL-SCNC: 118 MMOL/L (ref 98–112)
CO2 SERPL-SCNC: 18 MMOL/L (ref 21–32)
CO2 SERPL-SCNC: 18 MMOL/L (ref 21–32)
CREAT BLD-MCNC: 0.3 MG/DL
CREAT BLD-MCNC: 0.3 MG/DL
CRP SERPL-MCNC: 0.95 MG/DL (ref ?–0.3)
DEPRECATED RDW RBC AUTO: 58.3 FL (ref 35.1–46.3)
EOSINOPHIL # BLD AUTO: 0.05 X10(3) UL (ref 0–0.7)
EOSINOPHIL NFR BLD AUTO: 0.7 %
ERYTHROCYTE [DISTWIDTH] IN BLOOD BY AUTOMATED COUNT: 19.8 % (ref 11–15)
ERYTHROCYTE [SEDIMENTATION RATE] IN BLOOD: 32 MM/HR
GFR SERPLBLD BASED ON 1.73 SQ M-ARVRAT: 108 ML/MIN/1.73M2 (ref 60–?)
GFR SERPLBLD BASED ON 1.73 SQ M-ARVRAT: 108 ML/MIN/1.73M2 (ref 60–?)
GLOBULIN PLAS-MCNC: 2.6 G/DL (ref 2.8–4.4)
GLUCOSE BLD-MCNC: 140 MG/DL (ref 70–99)
GLUCOSE BLD-MCNC: 140 MG/DL (ref 70–99)
HCT VFR BLD AUTO: 35 %
HGB BLD-MCNC: 10.3 G/DL
IMM GRANULOCYTES # BLD AUTO: 0.01 X10(3) UL (ref 0–1)
IMM GRANULOCYTES NFR BLD: 0.1 %
LYMPHOCYTES # BLD AUTO: 1.45 X10(3) UL (ref 1–4)
LYMPHOCYTES NFR BLD AUTO: 21.7 %
MCH RBC QN AUTO: 23.8 PG (ref 26–34)
MCHC RBC AUTO-ENTMCNC: 29.4 G/DL (ref 31–37)
MCV RBC AUTO: 81 FL
MONOCYTES # BLD AUTO: 0.31 X10(3) UL (ref 0.1–1)
MONOCYTES NFR BLD AUTO: 4.6 %
NEUTROPHILS # BLD AUTO: 4.84 X10 (3) UL (ref 1.5–7.7)
NEUTROPHILS # BLD AUTO: 4.84 X10(3) UL (ref 1.5–7.7)
NEUTROPHILS NFR BLD AUTO: 72.6 %
OSMOLALITY SERPL CALC.SUM OF ELEC: 301 MOSM/KG (ref 275–295)
OSMOLALITY SERPL CALC.SUM OF ELEC: 301 MOSM/KG (ref 275–295)
PLATELET # BLD AUTO: 247 10(3)UL (ref 150–450)
POTASSIUM SERPL-SCNC: 4.2 MMOL/L (ref 3.5–5.1)
POTASSIUM SERPL-SCNC: 4.2 MMOL/L (ref 3.5–5.1)
PROT SERPL-MCNC: 5.2 G/DL (ref 6.4–8.2)
RBC # BLD AUTO: 4.32 X10(6)UL
SODIUM SERPL-SCNC: 143 MMOL/L (ref 136–145)
SODIUM SERPL-SCNC: 143 MMOL/L (ref 136–145)
WBC # BLD AUTO: 6.7 X10(3) UL (ref 4–11)

## 2022-09-28 PROCEDURE — 85025 COMPLETE CBC W/AUTO DIFF WBC: CPT | Performed by: INTERNAL MEDICINE

## 2022-09-28 PROCEDURE — 85652 RBC SED RATE AUTOMATED: CPT | Performed by: INTERNAL MEDICINE

## 2022-09-28 PROCEDURE — 86140 C-REACTIVE PROTEIN: CPT | Performed by: INTERNAL MEDICINE

## 2022-09-28 PROCEDURE — 80053 COMPREHEN METABOLIC PANEL: CPT | Performed by: INTERNAL MEDICINE

## 2022-11-03 NOTE — PLAN OF CARE
Problem: Patient Centered Care  Goal: Patient preferences are identified and integrated in the patient's plan of care  Description: Interventions:  - What would you like us to know as we care for you?  I live at home with my  and have Gloria Arita [address] medications as ordered  - Initiate emergency measures for life threatening arrhythmias  - Monitor electrolytes and administer replacement therapy as ordered  Outcome: Progressing     Problem: GASTROINTESTINAL - ADULT  Goal: Maintains or returns to baseline phone, , clothes/none

## 2022-12-03 ENCOUNTER — HOSPITAL ENCOUNTER (EMERGENCY)
Facility: HOSPITAL | Age: 77
Discharge: HOME | End: 2022-12-04
Attending: EMERGENCY MEDICINE
Payer: OTHER MISCELLANEOUS

## 2022-12-03 ENCOUNTER — APPOINTMENT (OUTPATIENT)
Dept: CT IMAGING | Facility: HOSPITAL | Age: 77
End: 2022-12-03
Attending: EMERGENCY MEDICINE
Payer: OTHER MISCELLANEOUS

## 2022-12-03 DIAGNOSIS — R10.9 CHRONIC ABDOMINAL PAIN: Primary | ICD-10-CM

## 2022-12-03 DIAGNOSIS — G89.29 CHRONIC ABDOMINAL PAIN: Primary | ICD-10-CM

## 2022-12-03 DIAGNOSIS — N30.01 ACUTE CYSTITIS WITH HEMATURIA: ICD-10-CM

## 2022-12-03 LAB
ANION GAP SERPL CALC-SCNC: 6 MMOL/L (ref 0–18)
BASOPHILS # BLD AUTO: 0.03 X10(3) UL (ref 0–0.2)
BASOPHILS NFR BLD AUTO: 0.3 %
BILIRUB UR QL: NEGATIVE
BUN BLD-MCNC: 24 MG/DL (ref 7–18)
BUN/CREAT SERPL: 54.5 (ref 10–20)
C DIFF TOX B STL QL: NEGATIVE
CALCIUM BLD-MCNC: 8.7 MG/DL (ref 8.5–10.1)
CHLORIDE SERPL-SCNC: 107 MMOL/L (ref 98–112)
CO2 SERPL-SCNC: 20 MMOL/L (ref 21–32)
COLOR UR: YELLOW
CREAT BLD-MCNC: 0.44 MG/DL
DEPRECATED RDW RBC AUTO: 56.6 FL (ref 35.1–46.3)
EOSINOPHIL # BLD AUTO: 0.17 X10(3) UL (ref 0–0.7)
EOSINOPHIL NFR BLD AUTO: 1.5 %
ERYTHROCYTE [DISTWIDTH] IN BLOOD BY AUTOMATED COUNT: 18.6 % (ref 11–15)
GFR SERPLBLD BASED ON 1.73 SQ M-ARVRAT: 98 ML/MIN/1.73M2 (ref 60–?)
GLUCOSE BLD-MCNC: 129 MG/DL (ref 70–99)
GLUCOSE UR-MCNC: NEGATIVE MG/DL
HCT VFR BLD AUTO: 44.3 %
HGB BLD-MCNC: 13.3 G/DL
HGB UR QL STRIP.AUTO: NEGATIVE
IMM GRANULOCYTES # BLD AUTO: 0.04 X10(3) UL (ref 0–1)
IMM GRANULOCYTES NFR BLD: 0.4 %
KETONES UR-MCNC: NEGATIVE MG/DL
LYMPHOCYTES # BLD AUTO: 2.54 X10(3) UL (ref 1–4)
LYMPHOCYTES NFR BLD AUTO: 22.6 %
MCH RBC QN AUTO: 25.2 PG (ref 26–34)
MCHC RBC AUTO-ENTMCNC: 30 G/DL (ref 31–37)
MCV RBC AUTO: 84.1 FL
MONOCYTES # BLD AUTO: 0.97 X10(3) UL (ref 0.1–1)
MONOCYTES NFR BLD AUTO: 8.6 %
NEUTROPHILS # BLD AUTO: 7.51 X10 (3) UL (ref 1.5–7.7)
NEUTROPHILS # BLD AUTO: 7.51 X10(3) UL (ref 1.5–7.7)
NEUTROPHILS NFR BLD AUTO: 66.6 %
NITRITE UR QL STRIP.AUTO: NEGATIVE
OSMOLALITY SERPL CALC.SUM OF ELEC: 282 MOSM/KG (ref 275–295)
PH UR: 6 [PH] (ref 5–8)
PLATELET # BLD AUTO: 202 10(3)UL (ref 150–450)
POTASSIUM SERPL-SCNC: 3.3 MMOL/L (ref 3.5–5.1)
PROT UR-MCNC: NEGATIVE MG/DL
RBC # BLD AUTO: 5.27 X10(6)UL
SODIUM SERPL-SCNC: 133 MMOL/L (ref 136–145)
SP GR UR STRIP: 1.01 (ref 1–1.03)
UROBILINOGEN UR STRIP-ACNC: <2
VIT C UR-MCNC: 20 MG/DL
WBC # BLD AUTO: 11.3 X10(3) UL (ref 4–11)
WBC #/AREA URNS AUTO: >50 /HPF
WBC CLUMPS UR QL AUTO: PRESENT /HPF

## 2022-12-03 PROCEDURE — 99285 EMERGENCY DEPT VISIT HI MDM: CPT

## 2022-12-03 PROCEDURE — 96365 THER/PROPH/DIAG IV INF INIT: CPT

## 2022-12-03 PROCEDURE — 87086 URINE CULTURE/COLONY COUNT: CPT | Performed by: EMERGENCY MEDICINE

## 2022-12-03 PROCEDURE — 74176 CT ABD & PELVIS W/O CONTRAST: CPT | Performed by: EMERGENCY MEDICINE

## 2022-12-03 PROCEDURE — 99284 EMERGENCY DEPT VISIT MOD MDM: CPT

## 2022-12-03 PROCEDURE — 81001 URINALYSIS AUTO W/SCOPE: CPT | Performed by: EMERGENCY MEDICINE

## 2022-12-03 PROCEDURE — 80048 BASIC METABOLIC PNL TOTAL CA: CPT | Performed by: EMERGENCY MEDICINE

## 2022-12-03 PROCEDURE — 87493 C DIFF AMPLIFIED PROBE: CPT | Performed by: EMERGENCY MEDICINE

## 2022-12-03 PROCEDURE — 85025 COMPLETE CBC W/AUTO DIFF WBC: CPT | Performed by: EMERGENCY MEDICINE

## 2022-12-03 RX ORDER — CIPROFLOXACIN 500 MG/1
500 TABLET, FILM COATED ORAL 2 TIMES DAILY
Qty: 6 TABLET | Refills: 0 | Status: SHIPPED | OUTPATIENT
Start: 2022-12-03 | End: 2022-12-06

## 2022-12-03 RX ORDER — CIPROFLOXACIN 2 MG/ML
400 INJECTION, SOLUTION INTRAVENOUS ONCE
Status: COMPLETED | OUTPATIENT
Start: 2022-12-03 | End: 2022-12-04

## 2022-12-03 RX ORDER — METHYLPREDNISOLONE SODIUM SUCCINATE 125 MG/2ML
60 INJECTION, POWDER, LYOPHILIZED, FOR SOLUTION INTRAMUSCULAR; INTRAVENOUS ONCE
Status: DISCONTINUED | OUTPATIENT
Start: 2022-12-03 | End: 2022-12-03

## 2022-12-03 RX ORDER — DIPHENHYDRAMINE HYDROCHLORIDE 50 MG/ML
25 INJECTION INTRAMUSCULAR; INTRAVENOUS ONCE
Status: DISCONTINUED | OUTPATIENT
Start: 2022-12-03 | End: 2022-12-03

## 2022-12-03 NOTE — ED INITIAL ASSESSMENT (HPI)
PATIENT TO ED VIA EMS CO OF BILATERAL FLANK PAIN X FEW WEEKS WORSENING X 2 DAYS. PAIN RADIATES FORWARD.  DENIES N/V

## 2022-12-04 VITALS
HEART RATE: 80 BPM | RESPIRATION RATE: 16 BRPM | DIASTOLIC BLOOD PRESSURE: 62 MMHG | SYSTOLIC BLOOD PRESSURE: 107 MMHG | TEMPERATURE: 98 F | OXYGEN SATURATION: 98 %

## 2022-12-04 NOTE — CM/SW NOTE
PCS corrected - Judi Christina RN notified to please reprint prior to [facility]'s arrival. Judi Christina RN v/u.

## 2022-12-04 NOTE — HOSPICE RN NOTE
Pt is current with residential hospice. Spoke with CM who spoke with PCP Dr. Pamela Hamman out of [facility], he stated pt has chronic pelvic osteomyelitis and most likely that is where her pain is coming from. I spoke with pt  about this, he was unaware of this and wants pt to go for further imaging tests to see if they can find a source for the pain. Advised ER MD of this, and asked if she can please call us with the final plan at [phone].

## 2022-12-24 ENCOUNTER — HOSPITAL ENCOUNTER (OUTPATIENT)
Facility: HOSPITAL | Age: 77
Setting detail: OBSERVATION
Discharge: HOSPICE | End: 2022-12-25
Attending: EMERGENCY MEDICINE
Payer: MEDICARE

## 2022-12-24 ENCOUNTER — APPOINTMENT (OUTPATIENT)
Dept: GENERAL RADIOLOGY | Facility: HOSPITAL | Age: 77
End: 2022-12-24
Payer: MEDICARE

## 2022-12-24 DIAGNOSIS — R11.10 INTRACTABLE VOMITING: Primary | ICD-10-CM

## 2022-12-24 DIAGNOSIS — I48.91 ATRIAL FIBRILLATION WITH RAPID VENTRICULAR RESPONSE (HCC): ICD-10-CM

## 2022-12-24 DIAGNOSIS — E86.0 DEHYDRATION: ICD-10-CM

## 2022-12-24 DIAGNOSIS — E87.6 HYPOKALEMIA: ICD-10-CM

## 2022-12-24 LAB
ADENOVIRUS F 40/41 PCR: NEGATIVE
ALBUMIN SERPL-MCNC: 2.7 G/DL (ref 3.4–5)
ALBUMIN/GLOB SERPL: 0.7 {RATIO} (ref 1–2)
ALP LIVER SERPL-CCNC: 81 U/L
ALT SERPL-CCNC: 20 U/L
ANION GAP SERPL CALC-SCNC: 11 MMOL/L (ref 0–18)
AST SERPL-CCNC: 15 U/L (ref 15–37)
ASTROVIRUS PCR: NEGATIVE
BASOPHILS # BLD AUTO: 0.03 X10(3) UL (ref 0–0.2)
BASOPHILS NFR BLD AUTO: 0.3 %
BILIRUB SERPL-MCNC: 0.8 MG/DL (ref 0.1–2)
BILIRUB UR QL: NEGATIVE
BUN BLD-MCNC: 7 MG/DL (ref 7–18)
BUN/CREAT SERPL: 14.6 (ref 10–20)
C CAYETANENSIS DNA SPEC QL NAA+PROBE: NEGATIVE
C DIFF TOX B STL QL: NEGATIVE
CALCIUM BLD-MCNC: 9.5 MG/DL (ref 8.5–10.1)
CAMPY SP DNA.DIARRHEA STL QL NAA+PROBE: NEGATIVE
CHLORIDE SERPL-SCNC: 99 MMOL/L (ref 98–112)
CO2 SERPL-SCNC: 27 MMOL/L (ref 21–32)
COLOR UR: YELLOW
CREAT BLD-MCNC: 0.48 MG/DL
CRYPTOSP DNA SPEC QL NAA+PROBE: NEGATIVE
DEPRECATED RDW RBC AUTO: 45.5 FL (ref 35.1–46.3)
EAEC PAA PLAS AGGR+AATA ST NAA+NON-PRB: NEGATIVE
EC STX1+STX2 + H7 FLIC SPEC NAA+PROBE: NEGATIVE
ENTAMOEBA HISTOLYTICA PCR: NEGATIVE
EOSINOPHIL # BLD AUTO: 0.09 X10(3) UL (ref 0–0.7)
EOSINOPHIL NFR BLD AUTO: 0.8 %
EPEC EAE GENE STL QL NAA+NON-PROBE: NEGATIVE
ERYTHROCYTE [DISTWIDTH] IN BLOOD BY AUTOMATED COUNT: 16.1 % (ref 11–15)
ETEC LTA+ST1A+ST1B TOX ST NAA+NON-PROBE: NEGATIVE
GFR SERPLBLD BASED ON 1.73 SQ M-ARVRAT: 96 ML/MIN/1.73M2 (ref 60–?)
GIARDIA LAMBLIA PCR: NEGATIVE
GLOBULIN PLAS-MCNC: 3.9 G/DL (ref 2.8–4.4)
GLUCOSE BLD-MCNC: 113 MG/DL (ref 70–99)
GLUCOSE UR-MCNC: NEGATIVE MG/DL
HCT VFR BLD AUTO: 42.7 %
HGB BLD-MCNC: 13.5 G/DL
HYALINE CASTS #/AREA URNS AUTO: PRESENT /LPF
IMM GRANULOCYTES # BLD AUTO: 0.03 X10(3) UL (ref 0–1)
IMM GRANULOCYTES NFR BLD: 0.3 %
KETONES UR-MCNC: NEGATIVE MG/DL
LACTATE SERPL-SCNC: 1.1 MMOL/L (ref 0.4–2)
LYMPHOCYTES # BLD AUTO: 1.88 X10(3) UL (ref 1–4)
LYMPHOCYTES NFR BLD AUTO: 16.8 %
MAGNESIUM SERPL-MCNC: 1.1 MG/DL (ref 1.6–2.6)
MCH RBC QN AUTO: 25 PG (ref 26–34)
MCHC RBC AUTO-ENTMCNC: 31.6 G/DL (ref 31–37)
MCV RBC AUTO: 78.9 FL
MONOCYTES # BLD AUTO: 0.82 X10(3) UL (ref 0.1–1)
MONOCYTES NFR BLD AUTO: 7.3 %
NEUTROPHILS # BLD AUTO: 8.31 X10 (3) UL (ref 1.5–7.7)
NEUTROPHILS # BLD AUTO: 8.31 X10(3) UL (ref 1.5–7.7)
NEUTROPHILS NFR BLD AUTO: 74.5 %
NITRITE UR QL STRIP.AUTO: NEGATIVE
NOROVIRUS GI/GII PCR: NEGATIVE
OSMOLALITY SERPL CALC.SUM OF ELEC: 283 MOSM/KG (ref 275–295)
P SHIGELLOIDES DNA STL QL NAA+PROBE: NEGATIVE
PH UR: 5 [PH] (ref 5–8)
PLATELET # BLD AUTO: 290 10(3)UL (ref 150–450)
POTASSIUM SERPL-SCNC: 2.6 MMOL/L (ref 3.5–5.1)
POTASSIUM SERPL-SCNC: 2.9 MMOL/L (ref 3.5–5.1)
POTASSIUM SERPL-SCNC: 3.1 MMOL/L (ref 3.5–5.1)
PROT SERPL-MCNC: 6.6 G/DL (ref 6.4–8.2)
PROT UR-MCNC: NEGATIVE MG/DL
RBC # BLD AUTO: 5.41 X10(6)UL
ROTAVIRUS A PCR: NEGATIVE
SALMONELLA DNA SPEC QL NAA+PROBE: NEGATIVE
SAPOVIRUS PCR: NEGATIVE
SARS-COV-2 RNA RESP QL NAA+PROBE: NOT DETECTED
SHIGELLA SP+EIEC IPAH ST NAA+NON-PROBE: NEGATIVE
SODIUM SERPL-SCNC: 137 MMOL/L (ref 136–145)
SP GR UR STRIP: 1.01 (ref 1–1.03)
TROPONIN I HIGH SENSITIVITY: 25 NG/L
UROBILINOGEN UR STRIP-ACNC: 0.2
V CHOLERAE DNA SPEC QL NAA+PROBE: NEGATIVE
VIBRIO DNA SPEC NAA+PROBE: NEGATIVE
WBC # BLD AUTO: 11.2 X10(3) UL (ref 4–11)
YERSINIA DNA SPEC NAA+PROBE: NEGATIVE

## 2022-12-24 PROCEDURE — 80053 COMPREHEN METABOLIC PANEL: CPT | Performed by: EMERGENCY MEDICINE

## 2022-12-24 PROCEDURE — 81001 URINALYSIS AUTO W/SCOPE: CPT | Performed by: EMERGENCY MEDICINE

## 2022-12-24 PROCEDURE — 96375 TX/PRO/DX INJ NEW DRUG ADDON: CPT

## 2022-12-24 PROCEDURE — 99285 EMERGENCY DEPT VISIT HI MDM: CPT

## 2022-12-24 PROCEDURE — 87507 IADNA-DNA/RNA PROBE TQ 12-25: CPT | Performed by: INTERNAL MEDICINE

## 2022-12-24 PROCEDURE — 84132 ASSAY OF SERUM POTASSIUM: CPT | Performed by: HOSPITALIST

## 2022-12-24 PROCEDURE — 84484 ASSAY OF TROPONIN QUANT: CPT | Performed by: EMERGENCY MEDICINE

## 2022-12-24 PROCEDURE — 93005 ELECTROCARDIOGRAM TRACING: CPT

## 2022-12-24 PROCEDURE — 81015 MICROSCOPIC EXAM OF URINE: CPT | Performed by: EMERGENCY MEDICINE

## 2022-12-24 PROCEDURE — 80053 COMPREHEN METABOLIC PANEL: CPT

## 2022-12-24 PROCEDURE — 83735 ASSAY OF MAGNESIUM: CPT | Performed by: HOSPITALIST

## 2022-12-24 PROCEDURE — 85025 COMPLETE CBC W/AUTO DIFF WBC: CPT

## 2022-12-24 PROCEDURE — 93010 ELECTROCARDIOGRAM REPORT: CPT

## 2022-12-24 PROCEDURE — 87493 C DIFF AMPLIFIED PROBE: CPT | Performed by: EMERGENCY MEDICINE

## 2022-12-24 PROCEDURE — 83605 ASSAY OF LACTIC ACID: CPT | Performed by: EMERGENCY MEDICINE

## 2022-12-24 PROCEDURE — 96374 THER/PROPH/DIAG INJ IV PUSH: CPT

## 2022-12-24 PROCEDURE — 71045 X-RAY EXAM CHEST 1 VIEW: CPT

## 2022-12-24 PROCEDURE — 87040 BLOOD CULTURE FOR BACTERIA: CPT | Performed by: EMERGENCY MEDICINE

## 2022-12-24 PROCEDURE — 85025 COMPLETE CBC W/AUTO DIFF WBC: CPT | Performed by: EMERGENCY MEDICINE

## 2022-12-24 PROCEDURE — 87086 URINE CULTURE/COLONY COUNT: CPT | Performed by: EMERGENCY MEDICINE

## 2022-12-24 PROCEDURE — 96361 HYDRATE IV INFUSION ADD-ON: CPT

## 2022-12-24 PROCEDURE — 87186 SC STD MICRODIL/AGAR DIL: CPT | Performed by: EMERGENCY MEDICINE

## 2022-12-24 PROCEDURE — 92610 EVALUATE SWALLOWING FUNCTION: CPT

## 2022-12-24 PROCEDURE — 87077 CULTURE AEROBIC IDENTIFY: CPT | Performed by: EMERGENCY MEDICINE

## 2022-12-24 PROCEDURE — 82962 GLUCOSE BLOOD TEST: CPT

## 2022-12-24 PROCEDURE — 93010 ELECTROCARDIOGRAM REPORT: CPT | Performed by: INTERNAL MEDICINE

## 2022-12-24 PROCEDURE — 71045 X-RAY EXAM CHEST 1 VIEW: CPT | Performed by: EMERGENCY MEDICINE

## 2022-12-24 RX ORDER — ONDANSETRON 2 MG/ML
4 INJECTION INTRAMUSCULAR; INTRAVENOUS EVERY 6 HOURS PRN
Status: DISCONTINUED | OUTPATIENT
Start: 2022-12-24 | End: 2022-12-25

## 2022-12-24 RX ORDER — ECHINACEA PURPUREA EXTRACT 125 MG
1 TABLET ORAL AS NEEDED
COMMUNITY
End: 2022-12-25

## 2022-12-24 RX ORDER — METOCLOPRAMIDE HYDROCHLORIDE 5 MG/ML
10 INJECTION INTRAMUSCULAR; INTRAVENOUS EVERY 8 HOURS PRN
Status: DISCONTINUED | OUTPATIENT
Start: 2022-12-24 | End: 2022-12-25

## 2022-12-24 RX ORDER — TORSEMIDE 20 MG/1
20 TABLET ORAL DAILY
COMMUNITY
End: 2022-12-25

## 2022-12-24 RX ORDER — CLOTRIMAZOLE AND BETAMETHASONE DIPROPIONATE 10; .64 MG/G; MG/G
CREAM TOPICAL 2 TIMES DAILY
COMMUNITY
End: 2022-12-25

## 2022-12-24 RX ORDER — POTASSIUM CHLORIDE 1.5 G/1.77G
40 POWDER, FOR SOLUTION ORAL ONCE
Status: DISCONTINUED | OUTPATIENT
Start: 2022-12-24 | End: 2022-12-24

## 2022-12-24 RX ORDER — DILTIAZEM HYDROCHLORIDE 5 MG/ML
10 INJECTION INTRAVENOUS ONCE
Status: COMPLETED | OUTPATIENT
Start: 2022-12-24 | End: 2022-12-24

## 2022-12-24 RX ORDER — POTASSIUM CHLORIDE 20 MEQ/1
40 TABLET, EXTENDED RELEASE ORAL ONCE
Status: COMPLETED | OUTPATIENT
Start: 2022-12-24 | End: 2022-12-24

## 2022-12-24 RX ORDER — HYDROCODONE BITARTRATE AND ACETAMINOPHEN 7.5; 325 MG/1; MG/1
1 TABLET ORAL EVERY 6 HOURS PRN
COMMUNITY
End: 2022-12-25

## 2022-12-24 RX ORDER — DIPHENHYDRAMINE HCL 25 MG
25 CAPSULE ORAL EVERY 6 HOURS PRN
Status: DISCONTINUED | OUTPATIENT
Start: 2022-12-24 | End: 2022-12-25

## 2022-12-24 RX ORDER — ACETAMINOPHEN 325 MG/1
650 TABLET ORAL ONCE
Status: COMPLETED | OUTPATIENT
Start: 2022-12-24 | End: 2022-12-24

## 2022-12-24 RX ORDER — SODIUM CHLORIDE 9 MG/ML
INJECTION, SOLUTION INTRAVENOUS CONTINUOUS
Status: DISCONTINUED | OUTPATIENT
Start: 2022-12-24 | End: 2022-12-24

## 2022-12-24 RX ORDER — ATORVASTATIN CALCIUM 40 MG/1
1 TABLET, FILM COATED ORAL DAILY
COMMUNITY
Start: 2022-10-03 | End: 2022-12-25

## 2022-12-24 RX ORDER — TORSEMIDE 10 MG/1
10 TABLET ORAL AS DIRECTED
COMMUNITY
Start: 2022-10-03 | End: 2022-12-25

## 2022-12-24 RX ORDER — POTASSIUM CHLORIDE 14.9 MG/ML
20 INJECTION INTRAVENOUS ONCE
Status: COMPLETED | OUTPATIENT
Start: 2022-12-24 | End: 2022-12-24

## 2022-12-24 RX ORDER — ONDANSETRON 2 MG/ML
4 INJECTION INTRAMUSCULAR; INTRAVENOUS ONCE
Status: COMPLETED | OUTPATIENT
Start: 2022-12-24 | End: 2022-12-24

## 2022-12-24 RX ORDER — HYDROXYZINE HYDROCHLORIDE 25 MG/1
25 TABLET, FILM COATED ORAL EVERY 6 HOURS PRN
COMMUNITY

## 2022-12-24 RX ORDER — MELATONIN
DAILY
Status: DISCONTINUED | OUTPATIENT
Start: 2022-12-24 | End: 2022-12-25

## 2022-12-24 RX ORDER — ACETAMINOPHEN 500 MG
500 TABLET ORAL EVERY 4 HOURS PRN
Status: DISCONTINUED | OUTPATIENT
Start: 2022-12-24 | End: 2022-12-25

## 2022-12-24 RX ORDER — LIDOCAINE 50 MG/G
OINTMENT TOPICAL 3 TIMES DAILY PRN
COMMUNITY
End: 2022-12-25

## 2022-12-24 RX ORDER — MORPHINE SULFATE 2 MG/ML
2 INJECTION, SOLUTION INTRAMUSCULAR; INTRAVENOUS EVERY 4 HOURS PRN
Status: DISCONTINUED | OUTPATIENT
Start: 2022-12-24 | End: 2022-12-25

## 2022-12-24 RX ORDER — VANCOMYCIN HYDROCHLORIDE 125 MG/1
125 CAPSULE ORAL DAILY
Status: DISCONTINUED | OUTPATIENT
Start: 2022-12-24 | End: 2022-12-25

## 2022-12-24 RX ORDER — POTASSIUM CHLORIDE 20 MEQ/1
40 TABLET, EXTENDED RELEASE ORAL ONCE
Status: DISCONTINUED | OUTPATIENT
Start: 2022-12-24 | End: 2022-12-24

## 2022-12-24 RX ORDER — HEPARIN SODIUM 5000 [USP'U]/ML
5000 INJECTION, SOLUTION INTRAVENOUS; SUBCUTANEOUS EVERY 8 HOURS SCHEDULED
Status: DISCONTINUED | OUTPATIENT
Start: 2022-12-24 | End: 2022-12-24

## 2022-12-24 RX ORDER — SODIUM CHLORIDE 9 MG/ML
INJECTION, SOLUTION INTRAVENOUS CONTINUOUS
Status: DISCONTINUED | OUTPATIENT
Start: 2022-12-24 | End: 2022-12-25

## 2022-12-24 NOTE — PLAN OF CARE
Patient alert and oriented, on room air. Received from ER. Per documents patient is [facility] comfort care. RN spoke with residential hospice. Patient complained of itching, PO benadryl given and skin cream to help. Family updated on plan of care, present during rounds. IV morphine given for pain. Call light within reach. All safety precautions in place. Frequent rounding by staff. Problem: Patient Centered Care  Goal: Patient preferences are identified and integrated in the patient's plan of care  Description: Interventions:  - What would you like us to know as we care for you? Patient lives at home with her . Is currently receiving residential home hospice.    - Provide timely, complete, and accurate information to patient/family  - Incorporate patient and family knowledge, values, beliefs, and cultural backgrounds into the planning and delivery of care  - Encourage patient/family to participate in care and decision-making at the level they choose  - Honor patient and family perspectives and choices  Outcome: Not Progressing     Problem: Patient/Family Goals  Goal: Patient/Family Long Term Goal  Description: Patient's Long Term Goal: Discharge home     Interventions:  - Preform active range of motion   - take antibiotics to clear infection   - stop nausea/vomiting   - See additional Care Plan goals for specific interventions  Outcome: Not Progressing  Goal: Patient/Family Short Term Goal  Description: Patient's Short Term Goal:Tolerate eating     Interventions:   - Advance diet as tolerated   - Antiemetics as needed  - Tolerate medications   - See additional Care Plan goals for specific interventions  Outcome: Not Progressing

## 2022-12-24 NOTE — ED INITIAL ASSESSMENT (HPI)
A&o 3.3 patient arrives via EMS for abdominal pain/vomiting x3days. Patient states she has a colostomy that is not working right. Patient has had poor feeding/hydration. Per patient for last 3 weeks not taking any home medication. Patient also reports left sided chest pain and back pain.

## 2022-12-24 NOTE — PROGRESS NOTES
12/24/22 1356   Clinical Encounter Type   Visited With Patient not available   Referral To    Taxonomy   Intended Effects Promote a sense of peace   Interventions Explain  role        attempted to visit pt. Pt was sleeping. Milton Akhtar will attempt to visit pt again later.

## 2022-12-24 NOTE — HOSPICE RN NOTE
[facility] was notified that pt was admitted to the hospital.  Called and spoke with [facility], RN, they are keeping her for observation, probably discharge home tomorrow. RH will follow up with pt tomorrow.

## 2022-12-25 VITALS
RESPIRATION RATE: 18 BRPM | HEART RATE: 111 BPM | DIASTOLIC BLOOD PRESSURE: 53 MMHG | HEIGHT: 65 IN | SYSTOLIC BLOOD PRESSURE: 105 MMHG | WEIGHT: 112 LBS | OXYGEN SATURATION: 98 % | BODY MASS INDEX: 18.66 KG/M2 | TEMPERATURE: 99 F

## 2022-12-25 LAB
ANION GAP SERPL CALC-SCNC: 6 MMOL/L (ref 0–18)
BUN BLD-MCNC: 8 MG/DL (ref 7–18)
BUN/CREAT SERPL: 26.7 (ref 10–20)
CALCIUM BLD-MCNC: 8.4 MG/DL (ref 8.5–10.1)
CHLORIDE SERPL-SCNC: 106 MMOL/L (ref 98–112)
CO2 SERPL-SCNC: 25 MMOL/L (ref 21–32)
CREAT BLD-MCNC: 0.3 MG/DL
DEPRECATED RDW RBC AUTO: 47.8 FL (ref 35.1–46.3)
ERYTHROCYTE [DISTWIDTH] IN BLOOD BY AUTOMATED COUNT: 16.2 % (ref 11–15)
GFR SERPLBLD BASED ON 1.73 SQ M-ARVRAT: 108 ML/MIN/1.73M2 (ref 60–?)
GLUCOSE BLD-MCNC: 85 MG/DL (ref 70–99)
HCT VFR BLD AUTO: 39.7 %
HGB BLD-MCNC: 12.2 G/DL
MAGNESIUM SERPL-MCNC: 2.5 MG/DL (ref 1.6–2.6)
MCH RBC QN AUTO: 24.9 PG (ref 26–34)
MCHC RBC AUTO-ENTMCNC: 30.7 G/DL (ref 31–37)
MCV RBC AUTO: 81 FL
OSMOLALITY SERPL CALC.SUM OF ELEC: 282 MOSM/KG (ref 275–295)
PLATELET # BLD AUTO: 239 10(3)UL (ref 150–450)
POTASSIUM SERPL-SCNC: 4.3 MMOL/L (ref 3.5–5.1)
POTASSIUM SERPL-SCNC: 4.3 MMOL/L (ref 3.5–5.1)
RBC # BLD AUTO: 4.9 X10(6)UL
SODIUM SERPL-SCNC: 137 MMOL/L (ref 136–145)
WBC # BLD AUTO: 5.8 X10(3) UL (ref 4–11)

## 2022-12-25 PROCEDURE — 83735 ASSAY OF MAGNESIUM: CPT | Performed by: HOSPITALIST

## 2022-12-25 PROCEDURE — 80048 BASIC METABOLIC PNL TOTAL CA: CPT | Performed by: HOSPITALIST

## 2022-12-25 PROCEDURE — 85027 COMPLETE CBC AUTOMATED: CPT | Performed by: HOSPITALIST

## 2022-12-25 PROCEDURE — 84132 ASSAY OF SERUM POTASSIUM: CPT | Performed by: HOSPITALIST

## 2022-12-25 RX ORDER — ALBUTEROL SULFATE 90 UG/1
2 AEROSOL, METERED RESPIRATORY (INHALATION) EVERY 4 HOURS PRN
Status: DISCONTINUED | OUTPATIENT
Start: 2022-12-25 | End: 2022-12-25

## 2022-12-25 RX ORDER — DIPHENHYDRAMINE HCL 25 MG
25 CAPSULE ORAL EVERY 6 HOURS PRN
Refills: 0 | Status: SHIPPED | COMMUNITY
Start: 2022-12-25

## 2022-12-25 RX ORDER — ACETAMINOPHEN 500 MG
500 TABLET ORAL EVERY 4 HOURS PRN
Refills: 0 | Status: SHIPPED | COMMUNITY
Start: 2022-12-25

## 2022-12-25 NOTE — PHYSICAL THERAPY NOTE
PT order received . Chart reviewed . Noted pt admitted from home with hospice with w/c bound status . Discussed pt status with RN . Hospice team to see pt this AM .   PT will hold at this time. RN will inform therapy team if a therapy assessment is needed this admission. Hold at this time. Taylor Phelan

## 2022-12-25 NOTE — CM/SW NOTE
MDO for hospice: residential hospice is aware and will follow up with pt today (per chart review). @9:40AM  [facility] is current with pt. SW to sign off. Residential to continue to follow for further DC planning.  SHERRELL discussed with RN and hospice    Penny ZUNIGA, Surveyor, [place]   Ext [phone]

## 2022-12-25 NOTE — CM/SW NOTE
SHERRELL called and spoke with spouse Brandy Thomson to discuss discharge back home.  Spouse stated that he had not heard from the hospital about a discharge plan and would like to hear from the

## 2022-12-25 NOTE — PLAN OF CARE
Pt c/o slight nausea and zofran given. Discharge order received and per hospice team patient is to return to home and  is aware of this and planned time of transfer. Patient in stable condition and transported to home by [email or web address]. Problem: Patient Centered Care  Goal: Patient preferences are identified and integrated in the patient's plan of care  Description: Interventions:  - What would you like us to know as we care for you?  From home with .   - Provide timely, complete, and accurate information to patient/family  - Incorporate patient and family knowledge, values, beliefs, and cultural backgrounds into the planning and delivery of care  - Encourage patient/family to participate in care and decision-making at the level they choose  - Honor patient and family perspectives and choices  Outcome: Completed     Problem: Patient/Family Goals  Goal: Patient/Family Long Term Goal  Description: Patient's Long Term Goal: To go home    Interventions:  - Monitor vital signs and labs  - Diagnostics per order  - Follow MD orders  - Administer medications as ordered  - Update patient and family on plan of care  - Discharge planning  - See additional Care Plan goals for specific interventions  Outcome: Completed  Goal: Patient/Family Short Term Goal  Description: Patient's Short Term Goal: To feel better    Interventions:   - Monitor vital signs and labs  - Pain management as needed  - Diagnostics per order  - Wound care   - Follow MD orders  - Administer medications as ordered  - Assists with activities of daily living   - Update patient and family on plan of care  - Discharge planning  - See additional Care Plan goals for specific interventions  Outcome: Completed     Problem: GASTROINTESTINAL - ADULT  Goal: Minimal or absence of nausea and vomiting  Description: INTERVENTIONS:  - Maintain adequate hydration with IV or PO as ordered and tolerated  - Nasogastric tube to low intermittent suction as ordered  - Evaluate effectiveness of ordered antiemetic medications  - Provide nonpharmacologic comfort measures as appropriate  - Advance diet as tolerated, if ordered  - Obtain nutritional consult as needed  - Evaluate fluid balance  Outcome: Completed  Goal: Maintains adequate nutritional intake (undernourished)  Description: INTERVENTIONS:  - Monitor percentage of each meal consumed  - Identify factors contributing to decreased intake, treat as appropriate  - Assist with meals as needed  - Monitor I&O, WT and lab values  - Obtain nutritional consult as needed  - Optimize oral hygiene and moisture  - Encourage food from home; allow for food preferences  - Enhance eating environment  Outcome: Completed     Problem: SKIN/TISSUE INTEGRITY - ADULT  Goal: Incision(s), wounds(s) or drain site(s) healing without S/S of infection  Description: INTERVENTIONS:  - Assess and document risk factors for pressure ulcer development  - Assess and document skin integrity  - Assess and document dressing/incision, wound bed, drain sites and surrounding tissue  - Implement wound care per orders  - Initiate isolation precautions as appropriate  - Initiate Pressure Ulcer prevention bundle as indicated  Outcome: Completed     Problem: SAFETY ADULT - FALL  Goal: Free from fall injury  Description: INTERVENTIONS:  - Assess pt frequently for physical needs  - Identify cognitive and physical deficits and behaviors that affect risk of falls.   - [place] fall precautions as indicated by assessment.  - Educate pt/family on patient safety including physical limitations  - Instruct pt to call for assistance with activity based on assessment  - Modify environment to reduce risk of injury  - Provide assistive devices as appropriate  - Consider OT/PT consult to assist with strengthening/mobility  - Encourage toileting schedule  Outcome: Completed

## 2022-12-25 NOTE — PROGRESS NOTES
12/25/22 1022   Clinical Encounter Type   Visited With Patient   Routine Visit Introduction  (consult)   Continue Visiting Yes   [facility] Encounters   Spiritual Requests During Visit / Hospitalization [address] Patient wants communion   Patient Spiritual Encounters   Spiritual Assessment Completed Yes   Taxonomy   Intended Effects Promote a sense of peace   Methods Offer spiritual/Denominational support; Offer emotional support   Interventions [place]; Active listening     Pt sitting up in bed, eating and watching TV. Prayed and provided Pt with Communion. Advised Pt I would put her on the daily Communion list.     Pt said she was \"put in hospice\" but doesn't \"know why I'm in hospice\". I suggested Pt talk to her Dr and RN about her medical condition and ask them to explain why she's in hospice.

## 2022-12-25 NOTE — PLAN OF CARE
Problem: Patient Centered Care  Goal: Patient preferences are identified and integrated in the patient's plan of care  Description: Interventions:  - What would you like us to know as we care for you?  From home with .   - Provide timely, complete, and accurate information to patient/family  - Incorporate patient and family knowledge, values, beliefs, and cultural backgrounds into the planning and delivery of care  - Encourage patient/family to participate in care and decision-making at the level they choose  - Honor patient and family perspectives and choices  Outcome: Progressing     Problem: Patient/Family Goals  Goal: Patient/Family Long Term Goal  Description: Patient's Long Term Goal: To go home    Interventions:  - Monitor vital signs and labs  - Diagnostics per order  - Follow MD orders  - Administer medications as ordered  - Update patient and family on plan of care  - Discharge planning  - See additional Care Plan goals for specific interventions  Outcome: Progressing  Goal: Patient/Family Short Term Goal  Description: Patient's Short Term Goal: To feel better    Interventions:   - Monitor vital signs and labs  - Pain management as needed  - Diagnostics per order  - Wound care   - Follow MD orders  - Administer medications as ordered  - Assists with activities of daily living   - Update patient and family on plan of care  - Discharge planning  - See additional Care Plan goals for specific interventions  Outcome: Progressing     Problem: GASTROINTESTINAL - ADULT  Goal: Minimal or absence of nausea and vomiting  Description: INTERVENTIONS:  - Maintain adequate hydration with IV or PO as ordered and tolerated  - Nasogastric tube to low intermittent suction as ordered  - Evaluate effectiveness of ordered antiemetic medications  - Provide nonpharmacologic comfort measures as appropriate  - Advance diet as tolerated, if ordered  - Obtain nutritional consult as needed  - Evaluate fluid balance  Outcome: Progressing  Goal: Maintains adequate nutritional intake (undernourished)  Description: INTERVENTIONS:  - Monitor percentage of each meal consumed  - Identify factors contributing to decreased intake, treat as appropriate  - Assist with meals as needed  - Monitor I&O, WT and lab values  - Obtain nutritional consult as needed  - Optimize oral hygiene and moisture  - Encourage food from home; allow for food preferences  - Enhance eating environment  Outcome: Progressing     Problem: SKIN/TISSUE INTEGRITY - ADULT  Goal: Incision(s), wounds(s) or drain site(s) healing without S/S of infection  Description: INTERVENTIONS:  - Assess and document risk factors for pressure ulcer development  - Assess and document skin integrity  - Assess and document dressing/incision, wound bed, drain sites and surrounding tissue  - Implement wound care per orders  - Initiate isolation precautions as appropriate  - Initiate Pressure Ulcer prevention bundle as indicated  Outcome: Progressing     Problem: SAFETY ADULT - FALL  Goal: Free from fall injury  Description: INTERVENTIONS:  - Assess pt frequently for physical needs  - Identify cognitive and physical deficits and behaviors that affect risk of falls. - [place] fall precautions as indicated by assessment.  - Educate pt/family on patient safety including physical limitations  - Instruct pt to call for assistance with activity based on assessment  - Modify environment to reduce risk of injury  - Provide assistive devices as appropriate  - Consider OT/PT consult to assist with strengthening/mobility  - Encourage toileting schedule  Outcome: Progressing     Vital signs stable at this time. No acute changes noted at this moment. Wound care completed and tolerated well. Fall precautions in place- bed alarm on and locked in lowest position. Call light within reach. Frequent rounding by nursing staff.

## 2022-12-25 NOTE — HOSPICE RN NOTE
[facility]: met w/Pt. In bed a/o. Able to give the year and that today is Jessica Day. Pt stating she and her  have  questions about why Pt. Is on hospice. Conference call made w/ and Supervisor Dmitry from [facility].  states he is having a hard time deciding about hospice and wants to speak w/MD.  This staff spoke w/Dr. Zach Martinez, Dr Zach Martinez spoke w/ and  stated to Dr. Zach Martinez that he would like to stay with hospice. Call placed to Elise Cano (Spouse). Elise Cano stated he wants to stay with hospice and understands to call any time with questions/concerns. Discussed discharge plan w/ambulance scheduled for 5 PM .  in agreement and Dmitry (Supervisor) notified.       [address] [facility]  [phone]

## 2022-12-27 LAB
Q-T INTERVAL: 204 MS
QRS DURATION: 92 MS
QTC CALCULATION (BEZET): 285 MS
R AXIS: -44 DEGREES
T AXIS: 194 DEGREES
VENTRICULAR RATE: 118 BPM

## 2022-12-29 LAB — GLUCOSE BLDC GLUCOMTR-MCNC: 115 MG/DL (ref 70–99)

## 2023-01-01 ENCOUNTER — VIRTUAL PHONE E/M (OUTPATIENT)
Facility: CLINIC | Age: 78
End: 2023-01-01

## 2023-01-01 ENCOUNTER — TELEPHONE (OUTPATIENT)
Dept: GASTROENTEROLOGY | Facility: CLINIC | Age: 78
End: 2023-01-01

## 2023-01-01 DIAGNOSIS — R63.4 WEIGHT LOSS: ICD-10-CM

## 2023-01-01 DIAGNOSIS — R10.9 ABDOMINAL PAIN, UNSPECIFIED ABDOMINAL LOCATION: ICD-10-CM

## 2023-01-01 DIAGNOSIS — R63.0 ANOREXIA: Primary | ICD-10-CM

## 2023-01-01 PROCEDURE — 99442 PHONE E/M BY PHYS 11-20 MIN: CPT | Performed by: INTERNAL MEDICINE

## 2023-01-05 NOTE — TELEPHONE ENCOUNTER
I spoke to Celena Hernandez the patient's hospice nurse, Sherif Garrido and Meño Osborn. She has not been taking prednisone nor budesonide. She has multiple somatic complaints and stool output in the ostomy is loose. Sherif Garrido certainly could have C. difficile or a flare of her microscopic colitis. Adrenal insufficiency could be playing a role as well. I am recommending that she take 10 mg of prednisone tonight and 10 mg tomorrow morning. I will contact them tomorrow for an update.

## 2023-01-05 NOTE — TELEPHONE ENCOUNTER
I spoke to Sukh Dean. Unfortunately Nathen Damon' condition deteriorated after our phone call yesterday evening. She became progressively more short of breath and unresponsive which continues today. Hospice has been assisting with patient comfort including supplemental oxygen. Sukh Dean feels that her condition is imminently terminal.  I offered my support and condolences. I have asked Sukh Dean to contact me if I can be of assistance.

## 2023-01-06 NOTE — TELEPHONE ENCOUNTER
Brigitte Cruz from hospice is calling to inform dr. Roscoe Wallace that patient passed early this morning.

## 2023-02-03 NOTE — HOME CARE LIAISON
MET WITH PATIENT AT BEDSIDE TO DISCUSS [address]. PATIENT HAS HAD [facility] IN THE PAST AND IS FAMILIAR WITH SERVICES PROVIDED. PATIENT LIVES AT HOME WITH  WHO CAN ASSIST WITH CARE WHEN NEEDED. PATIENT USES A WHEELCHAIR AND DOES NOT AMBULATE.      HOMEBO Statement Selected

## 2023-03-01 NOTE — ED PROVIDER NOTES
Patient Seen in: [facility] AND [facility] Emergency Department      History   Patient presents with:  Abdomen/Flank Pain    Stated Complaint:     HPI/Subjective:   HPI    49-year-old female with chronic medical problems here after being recently discharged from Pt reports she was sick yesterday.   Went to an urgent care  in [place]  Due to fever 102, headache and very sore throat.   Son had strep last week. Slept with her all week  Was tested for covid, strep, and influenza.     Positive for influenza A. .     Was told they did not see any pus pockets in the back of her throat.   States today, she does see some pus in the back of throat.   States she does feel better today.   No fever today. Head not pounding.   Throat still hurting, but better than yesterday.   States it almost feel like something is stuck in the back of her throat.     Has been drinking a lot of fluids, warm liquids, and soups.     Pt is asking if flu can cause the white pus pockets in the back of the throat? Or if she should be restested for strep?   Symptoms are improving         colitis   • OTHER DISEASES     uti   • Pneumonia, organism unspecified(486)    • Reflux    • Self-catheterizes urinary bladder 1/11/2016   • Ulcerative colitis ([facility] [address]    • Valvular disease     mitral valve prolapse- 3 clips placed              Past Surgical Procedure: CERVICAL EPIDURAL;  Surgeon: Sarai Parada MD;  Location: [address] [place] [address]   • PATIENT DOCUMENTED NOT TO HAVE EXPERIENCED ANY OF THE FOLLOWING EVENTS N/A 1/5/2015    Procedure: ESOPHAGOGASTRODUODENOSCOPY W/ DILATION;  Surgeon:  Meghan [08/14/21 0332]   /77   Pulse 108   Resp 20   Temp 97.8 °F (36.6 °C)   Temp src Oral   SpO2 96 %   O2 Device None (Room air)       Current:BP (!) 123/91   Pulse 103   Temp 97.8 °F (36.6 °C) (Oral)   Resp 25   Ht 162.6 cm (5' 4\")   Wt 59 kg   SpO2 95 Clarity Urine Cloudy (*)     Protein Urine 30  (*)     Urobilinogen Urine 4.0 (*)     Nitrite Urine Positive (*)     Leukocyte Esterase Urine Large (*)     WBC Urine >50 (*)     Bacteria Urine 1+ (*)     Squamous Epi.  Cells Few (*)     Transitional Cell M.D.  This report has been electronically signed and verified by the Radiologist whose name is printed above. DD:  08/14/2021/DT:  08/14/2021           MDM      Patient's urine will be treated.   Based on prior culture she will be placed on Cipro given o

## 2023-10-03 NOTE — TELEPHONE ENCOUNTER
----- Message from Margie Walker MD sent at 10/2/2023  7:33 PM CDT -----  Regarding: schedule fu appt  Hello,     Could someone please schedule a follow-up appointment to have the patient see me in 1 week? Ok to use same day :)     Sincerely,     Margie Walker MD         Yes, Dr. Nguyễn Barrios does this

## 2024-01-29 NOTE — TELEPHONE ENCOUNTER
Current Outpatient Prescriptions:  BUDESONIDE 3 MG Oral Cap DR Particles TAKE 2 CAPSULES (6MG) DAILY Disp: 180 capsule Rfl: 0     Refill
Dr. Linda Rao- please see below communication from pt with update and advise for sooner appt. , if you think pt needs to be seen sooner; thanks!
Dr. Ting Huizar- please see below refill request which was last refilled on 10/11/16; you last saw pt for office visit on 7/18/16 and she has upcoming appt booked for 2/21/17; thanks!
ERx sent to taper to 3 mg daily.  Please inform
I agree with triage advise and appt date give. I still want to reduce the budesonide to 3 mg daily, to reduce systemic side effects. Please inform pt.
Pt is contacted and is made aware of new rx for Budesonide that was sent to [address]; pt verbalized understanding of this, but she states that she is not doing well with regards to having to go to bathroom \"constantly\"; she states that stoo
Pt is contacted and made aware of below communication from Dr. Sophie Ray about which pt verbalized understanding/agreement with plan.
no

## 2024-03-11 NOTE — PLAN OF CARE
Problem: Patient Centered Care  Goal: Patient preferences are identified and integrated in the patient's plan of care  Description: Interventions:  - What would you like us to know as we care for you?  From home with   - Provide timely, complete, and accurate information to patient/family  - Incorporate patient and family knowledge, values, beliefs, and cultural backgrounds into the planning and delivery of care  - Encourage patient/family to participate in care and decision-making at the level they choose  - Honor patient and family perspectives and choices  Outcome: Progressing     Problem: MUSCULOSKELETAL  Goal: Maintain proper body alignment to prevent postural asymmetries  Description: Interventions:  - Support and protect proper body alignment using appropriate developmental positioning devices   - Provide supportive boundaries  - Instruct learner in use of splints, slings, braces, or positioning devices and any necessary precautions  - Promote hand-to-mouth and ability to self calm  - Expose to a variety of positions to prevent development of fixed postural patterns  - Promote flexed body  - Consult OT/PT as ordered  Outcome: Progressing  Goal: 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  Description: Interventions:  - Support and protect affected body parts per physician/APN orders  - Instruct learner in use of positioning devices and any necessary precautions  - Consult OT/PT as ordered  Outcome: Progressing     Problem: SKIN INTEGRITY  Goal: Skin integrity remains intact  Description: Interventions:  - Assess for areas of redness and/or skin breakdown  - Assess vascular sites hourly  - Change oxygen saturation probe minimum once per shift  - Provide humidity as ordered and per policy  - If on oxygen support, assess nasal septum area for skin breakdown and replace protective barrier if needed  - Change diapers as needed  - Minimize the use of adhesives  Outcome: Progressing  Goal: Incision/wound Pt referred to ENT for hearing eval   heals without complications  Description: Interventions:  - Assess wound bed/incision and surrounding skin tissue  - Collaborate with physician/APN and implement wound/incision site care and dressing changes as ordered  - Position infant to avoid placing pressure on wound  - Enterostomal/Wound therapy consult as indicated for ostomies/wounds/G-tubes  Outcome: Progressing     Problem: PAIN - ADULT  Goal: Verbalizes/displays adequate comfort level or patient's stated pain goal  Description: INTERVENTIONS:  - Encourage pt to monitor pain and request assistance  - Assess pain using appropriate pain scale  - Administer analgesics based on type and severity of pain and evaluate response  - Implement non-pharmacological measures as appropriate and evaluate response  - Consider cultural and social influences on pain and pain management  - Manage/alleviate anxiety  - Utilize distraction and/or relaxation techniques  - Monitor for opioid side effects  - Notify MD/LIP if interventions unsuccessful or patient reports new pain  - Anticipate increased pain with activity and pre-medicate as appropriate  Outcome: Progressing     Problem: RISK FOR INFECTION - ADULT  Goal: Absence of fever/infection during anticipated neutropenic period  Description: INTERVENTIONS  - Monitor WBC  - Administer growth factors as ordered  - Implement neutropenic guidelines  Outcome: Progressing     Problem: SAFETY ADULT - FALL  Goal: Free from fall injury  Description: INTERVENTIONS:  - Assess pt frequently for physical needs  - Identify cognitive and physical deficits and behaviors that affect risk of falls.   - [place] fall precautions as indicated by assessment.  - Educate pt/family on patient safety including physical limitations  - Instruct pt to call for assistance with activity based on assessment  - Modify environment to reduce risk of injury  - Provide assistive devices as appropriate  - Consider OT/PT consult to assist with strengthening/mobility  - Encourage toileting schedule  Outcome: Progressing     Problem: DISCHARGE PLANNING  Goal: Discharge to home or other facility with appropriate resources  Description: INTERVENTIONS:  - Identify barriers to discharge w/pt and caregiver  - Include patient/family/discharge partner in discharge planning  - Arrange for needed discharge resources and transportation as appropriate  - Identify discharge learning needs (meds, wound care, etc)  - Arrange for interpreters to assist at discharge as needed  - Consider post-discharge preferences of patient/family/discharge partner  - Complete POLST form as appropriate  - Assess patient's ability to be responsible for managing their own health  - Refer to Case Management Department for coordinating discharge planning if the patient needs post-hospital services based on physician/LIP order or complex needs related to functional status, cognitive ability or social support system  Outcome: Progressing     Problem: RESPIRATORY - ADULT  Goal: Achieves optimal ventilation and oxygenation  Description: INTERVENTIONS:  - Assess for changes in respiratory status  - Assess for changes in mentation and behavior  - Position to facilitate oxygenation and minimize respiratory effort  - Oxygen supplementation based on oxygen saturation or ABGs  - Provide Smoking Cessation handout, if applicable  - Encourage broncho-pulmonary hygiene including cough, deep breathe, Incentive Spirometry  - Assess the need for suctioning and perform as needed  - Assess and instruct to report SOB or any respiratory difficulty  - Respiratory Therapy support as indicated  - Manage/alleviate anxiety  - Monitor for signs/symptoms of CO2 retention  Outcome: Progressing     Problem: GASTROINTESTINAL - ADULT  Goal: Minimal or absence of nausea and vomiting  Description: INTERVENTIONS:  - Maintain adequate hydration with IV or PO as ordered and tolerated  - Nasogastric tube to low intermittent suction as ordered  - Evaluate effectiveness of ordered antiemetic medications  - Provide nonpharmacologic comfort measures as appropriate  - Advance diet as tolerated, if ordered  - Obtain nutritional consult as needed  - Evaluate fluid balance  Outcome: Progressing  Goal: Maintains or returns to baseline bowel function  Description: INTERVENTIONS:  - Assess bowel function  - Maintain adequate hydration with IV or PO as ordered and tolerated  - Evaluate effectiveness of GI medications  - Encourage mobilization and activity  - Obtain nutritional consult as needed  - Establish a toileting routine/schedule  - Consider collaborating with pharmacy to review patient's medication profile  Outcome: Progressing     Problem: SKIN/TISSUE INTEGRITY - ADULT  Goal: Skin integrity remains intact  Description: INTERVENTIONS  - Assess and document risk factors for pressure ulcer development  - Assess and document skin integrity  - Monitor for areas of redness and/or skin breakdown  - Initiate interventions, skin care algorithm/standards of care as needed  Outcome: Progressing  Goal: Incision(s), wounds(s) or drain site(s) healing without S/S of infection  Description: INTERVENTIONS:  - Assess and document risk factors for pressure ulcer development  - Assess and document skin integrity  - Assess and document dressing/incision, wound bed, drain sites and surrounding tissue  - Implement wound care per orders  - Initiate isolation precautions as appropriate  - Initiate Pressure Ulcer prevention bundle as indicated  Outcome: Progressing     Problem: Patient/Family Goals  Goal: Patient/Family Long Term Goal  Description: Patient's Long Term Goal: Discharge from hospital    Interventions:  - Monitor vital signs  - Monitor appropriate labs  - Wound care  - Pain management as needed  - Administer medications per order  - Follow MD orders  - Diagnostics per order  - Update / inform patient and family on plan of care  - Discharge planning  - See additional Care Plan goals for specific interventions  Outcome: Progressing  Goal: Patient/Family Short Term Goal  Description: Patient's Short Term Goal: Feel better    Interventions:   - Monitor vital signs  - Monitor appropriate labs  - Wound care  - Pain management as needed  - Administer medications per order  - Follow MD orders  - Diagnostics per order  - Update / inform patient and family on plan of care  - See additional Care Plan goals for specific interventions  Outcome: Progressing

## 2024-12-18 NOTE — TELEPHONE ENCOUNTER
Asking for rx for bags until Monday - she is having bad diarrhea today and is on her last bag
Calling for RN with Pos test result - NA on RN line
Cole Salinas-    I am unsure about how to go about this? Do you place an order? Please advise!
Dr. Ben Foster-    Patient resulted positive for C. Dif test as listed below:     Please advise on tx and next steps.     CDIFFICILE TOXIGENIC PCR (OPT)  Order: [id number]  Collected:  2/16/2021  3:30 PM Status:  Final result Dx:  Diarrhea, unspecified type  Spe
I called the patient and she reports constant diarrhea for about one week She says that she did not call sooner, because she had several ostomy bags left, but did not realize the diarrhea would progress and continue throughout the week.      She is aware th
I spoke to the patient's . The stools remain watery. The C. difficile toxin assay was positive. I am recommending that we resume the vancomycin at 125 mg 4 times daily for a total of 14 days. The patient will require a pulsed taper.   I have aske
I would be happy to send in a prescription for ostomy bags to wherever the patient obtains them. Also if she is having diarrhea we should repeat the C. difficile toxin assay. Is she still taking #2 budesonide capsules daily?
Ostomy supply prescription sent to the pharmacy. C. difficile order entered.
Pt is returning the nurses call. Pt. States that she uses [place] 1 piece drainable ostomy pouch viewing option and soft convex filter + one measuring guide 1 1/2 inch.
(4) rarely moist

## (undated) DIAGNOSIS — E87.6 HYPOKALEMIA: ICD-10-CM

## (undated) DIAGNOSIS — I50.32 CHRONIC HEART FAILURE WITH PRESERVED EJECTION FRACTION (HCC): Primary | ICD-10-CM

## (undated) DIAGNOSIS — Z86.718 HISTORY OF DVT (DEEP VEIN THROMBOSIS): ICD-10-CM

## (undated) DIAGNOSIS — R13.12 OROPHARYNGEAL DYSPHAGIA: ICD-10-CM

## (undated) DIAGNOSIS — J90 PLEURAL EFFUSION, BILATERAL: ICD-10-CM

## (undated) DIAGNOSIS — R19.7 DIARRHEA, UNSPECIFIED TYPE: Primary | ICD-10-CM

## (undated) DIAGNOSIS — N30.00 ACUTE CYSTITIS WITHOUT HEMATURIA: ICD-10-CM

## (undated) DIAGNOSIS — J18.9 PNEUMONIA OF BOTH LOWER LOBES DUE TO INFECTIOUS ORGANISM: ICD-10-CM

## (undated) DIAGNOSIS — M86.10 OTHER ACUTE OSTEOMYELITIS, UNSPECIFIED SITE (HCC): Primary | ICD-10-CM

## (undated) DIAGNOSIS — T07.XXXA MULTIPLE WOUNDS: ICD-10-CM

## (undated) DEVICE — KIT CLEAN ENDOKIT 1.1OZ GOWNX2

## (undated) DEVICE — CONMED SCOPE SAVER BITE BLOCK, 20X27 MM: Brand: SCOPE SAVER

## (undated) DEVICE — 6 ML SYRINGE LUER-LOCK TIP: Brand: MONOJECT

## (undated) DEVICE — STEERABLE GUIDE CATHETER: Brand: STEERABLE GUIDE CATHETER

## (undated) DEVICE — MEDI-VAC NON-CONDUCTIVE SUCTION TUBING 6MM X 1.8M (6FT.) L: Brand: CARDINAL HEALTH

## (undated) DEVICE — KIT ENDO ORCAPOD 160/180/190

## (undated) DEVICE — SOL  .9 1000ML BTL

## (undated) DEVICE — 35 ML SYRINGE REGULAR TIP: Brand: MONOJECT

## (undated) DEVICE — SOL  .9 3000ML

## (undated) DEVICE — Device: Brand: DEFENDO AIR/WATER/SUCTION AND BIOPSY VALVE

## (undated) DEVICE — LINE MNTR ADLT SET O2 INTMD

## (undated) DEVICE — NEEDLE CONTRAST INTERJECT 25G

## (undated) DEVICE — Device: Brand: CUSTOM PROCEDURE KIT

## (undated) DEVICE — FORCEP RADIAL JAW 4

## (undated) DEVICE — CYSTO PACK: Brand: MEDLINE INDUSTRIES, INC.

## (undated) DEVICE — 3 ML SYRINGE LUER-LOCK TIP: Brand: MONOJECT

## (undated) DEVICE — Device

## (undated) DEVICE — BAG DRAIN INFECTION CNTRL 2000

## (undated) DEVICE — ENCORE® LATEX ACCLAIM SIZE 8, STERILE LATEX POWDER-FREE SURGICAL GLOVE: Brand: ENCORE

## (undated) DEVICE — SNARE ENDOSCOPIC 10MM ROUND

## (undated) DEVICE — SOLO HYBRID WIRE 35 FLEX STR

## (undated) DEVICE — REM POLYHESIVE ADULT PATIENT RETURN ELECTRODE: Brand: VALLEYLAB

## (undated) DEVICE — SNARE OPTMZ PLPCTM TRP

## (undated) DEVICE — ISOVUE 300 10X100ML VIAL

## (undated) NOTE — MR AVS SNAPSHOT
[facility]  [address] [place] [address] [phone]               Thank you for choosing us for your health care visit with Courtney Patel MD.  We are glad to serve you and happy to provide you with this summary of yo Instructions and Information about Your Health    1. Cholestyramine as ordered    2. Schedule an appointment with Dr. Piyush Evans at [facility], consider colostomy or other options    3. Finish current course of budesonide, then stop.        Allergie Commonly known as:  PRINIVIL,ZESTRIL           Loperamide HCl 2 MG Caps   Commonly known as:  IMODIUM           mesalamine 400 MG Cpdr   Take 2 capsules (800 mg total) by mouth 3 (three) times daily before meals.    Commonly known as:  DELZICOL           Me

## (undated) NOTE — LETTER
Patient Name: Ursula Jacinto  YOB: 1945          MRN number:  [id number]  Date:  10/12/2021  Referring Physician: Froilan Puentes    ADULT VIDEOFLUOROSCOPIC SWALLOWING STUDY:    Referring Physician: Anni      Radiologist:  lumbar spinal stenosis   • OTHER DISEASES     duplicated left renal collecting system   • OTHER DISEASES     uterine fibroids   • OTHER DISEASES     ulcerative colitis   • OTHER DISEASES     uti   • Pneumonia, organism unspecified(486)    • Reflux    • retention remained. Esophageal phase:   Adequate flow of bolus through upper esophagus     Penetration Aspiration Scale: 5/8. Material entered the airway, contacted the vocal cords and no attempt was made to eject.     Overall Impression: Mild oropharyn were provided. Agreement/Understanding verbalized and all questions answered to their apparent satisfaction. INTERDISCIPLINARY COMMUNICATION  Reviewed results with Radiologist; agreement verbalized.         Patient/Family/Caregiver was advised of thes

## (undated) NOTE — ED AVS SNAPSHOT
Rosemary Salcedo   MRN: [id number]    Department:  [facility] Emergency Department   Date of Visit:  9/12/2017           Disclosure     Insurance plans vary and the physician(s) referred by the ER may not be covered by your plan.  Please cont CARE PHYSICIAN AT ONCE OR RETURN IMMEDIATELY TO THE EMERGENCY DEPARTMENT. If you have been prescribed any medication(s), please fill your prescription right away and begin taking the medication(s) as directed.   If you believe that any of the medications

## (undated) NOTE — IP AVS SNAPSHOT
[address]  [address] [facility] [phone]                Discharge Summary   [facility]26/[facility]           Admission Information        Provider Department    1/26/2017 Jamia Sofia MD [facility] Take 1 capsule (75 mg total) by mouth 2 (two) times daily. Abundiogabby Horton., Lesgiselle Nap                              predniSONE 20 MG Tabs   Last time this was given:  40 mg on 1/28/2017  8:35 AM   Commonly known as:  Andre Vanegas   Next dose due:   Tomorrow - [address], [phone], [phone]  [facility] [place] [address]     Phone:  [phone]    - Oseltamivir Phosphate 75 MG Caps      Please  your prescriptions at the location direct WBC RBC Hemoglobin Hematocrit MCV MCH MCHC RDW Platelet MPV    (01/85/34)  7.2 (01/26/17)  4.76 (01/26/17)  12.5 (01/26/17)  38.3 (01/26/17)  80.5 (01/26/17)  26.2 (L) (01/26/17)  32.6 -- (01/26/17)  167 (01/26/17)  9.1    (12/23/16)  4.9 (12/23/16)  4.36 All belongings returned to patient at discharge Pt's bedside belongings    Medications Sent Home None to return    Medications Returned:           Additional Information       We are concerned for your overall well being:    - If you are a smoker or have s Cholesterol Lowering Medications     ATORVASTATIN CALCIUM OR       Use: Lower cholesterol, protect your heart   Most common side effects: Dizziness, constipation, abnormal liver function   What to report to your healthcare team:  Dizziness, muscle aches, c Use: To treat inflammation, to prevent or treat allergic reactions, chemotherapy related nausea, used as part of some chemo protocols   Most common side effects:  Increased blood sugar, high blood pressure, fluid retention, mood changes, stomach upset, head

## (undated) NOTE — IP AVS SNAPSHOT
[facility]            (For Outpatient Use Only) Initial Admit Date: 3/5/2022   Inpt/Obs Admit Date: Inpt: 3/5/22 / Obs: N/A   Discharge Date:    Hospital Acct:  [de-identified]   MRN: [de-identified]   CSN: [id number]   CEID: [id number]        ENCOUNTER  Patient Class: Inpatient Admitting Provider: Lorenzo Whitten DO Unit: [address] [place] [address] 5SW/SE   Hospital Service: Medical Attending Provider: Tres Montano DO   Bed: 533-A   Visit Type:   Referring Physician: No ref. provider found Billing Flag:    Admit Diagnosis: Dyspepsia [R10.13]      PATIENT  Legal Name:   Gerber Ruiz   Legal Sex: Female  Gender ID: Female             Pref Name:    PCP:  Steven Colin 2484: [phone]   Address:  [address] [place] [address] : 1945 (76 yrs) Mobile: [phone]         City/[facility]/Zip: [facility] [address] Marital:  Language: [address] [place] [address] Drive: IDRI ([facility])s: [id number] Mandaen: [address] Not Coleen Patel*     Race: White Ethnicity: Non  Or [address] [place] [address]   Name Relationship Legal Guardian? Home Phone Work Phone Mobile Phone   1.  Camilo Edwards  2. adolfo lopez Spouse  Daughter    [phone]       GUARANTOR  Guarantor: ERIC EDWARDS : 1945 Home Phone: [phone]   Address: [address] [place] [address]  Sex: Female Work Phone:    City/[facility]/Zip: [facility] [place] [address]   Rel. to Patient: Self Guarantor ID: [id number]   GUARANTOR EMPLOYER   Employer:  Status: RETIRED     COVERAGE  PRIMARY INSURANCE   Payor: MEDICARE Plan: MEDICARE PART A&B   Group Number:  Insurance Type: INDEMNITY   Subscriber Name: Elvi Mack : 1945   Subscriber ID: [id number] Pt Rel to Subscriber: Self   SECONDARY INSURANCE   Payor: [place] PPO Plan: BLUE CROSS [facility] PPO   Group Number: [id number] Type: [address] Name: Elvi Mack : 1945   Subscriber ID: [id number] Pt Rel to Subscriber: SELF   TERTIARY INSURANCE   Payor:  Plan:    Group Number:  Insurance Type:    Subscriber Name:  Subscriber :    Subscriber ID:  Pt Rel to Subscriber:    Hospital Account Financial Class: Medicare    March 10, 2022

## (undated) NOTE — MR AVS SNAPSHOT
[address] Beb[address] Tamayo [phone]               Thank you for choosing us for your health care visit with [facility] Wound Nurse.   We are glad to serve you and happy to provide you with Periorbital dermatitis 24 hours into course    Peanuts Itching                   Current Medications          This list is accurate as of: 1/4/17 10:32 AM.  Always use your most recent med list.                aspirin 325 MG Tabs   1 TABLET DAILY Commonly known as:  EFFEXOR-XR                   MyChart                  Visit [facility] online at  [email or web address]

## (undated) NOTE — LETTER
Date: 12/6/2021  Patient name: Nathan Dunlap  YOB: 1945  Medical Record Number: [id number]  Coverage: Payor: MEDICARE / Plan: MEDICARE PART A&B / Product Type: No Product type /   Insurance ID: [id number]  Patient Address: [facility] [address] Clean;Dry;Fragile;Painful   Closure Not approximated   Drainage Amount Small   Drainage Description Serosanguineous   Treatments Saline/Wound    Dressing Changed Changed   Wound Length (cm) 7 cm   Wound Width (cm) 4 cm   Wound Surface Area (cm^2) 28 Site Assessment Clean;Dry;Moist;Painful   Closure Not approximated   Drainage Amount Moderate   Drainage Description Serosanguineous   Treatments Cleansed; Saline/Wound ;Compression;Honey Gel   Dressing Changed Changed   Wound Length (cm) 4.4 cm Left;Lower; Posterior (Active)   Placement Date: 12/06/21   Location: Leg  Wound Location Orientation: Left; Lower; Posterior      Assessments 12/6/2021  1:20 PM   Response to Treatment Well tolerated   Compression Layers Multilayer   Compression Product Type

## (undated) NOTE — Clinical Note
I saw Mariah today. She is weak but eating better, no diarrhea or abd pain. She was switched to bumex 1 mg daily for HF, near euvolemic today. Heart rate is elevated 's on lower dose toprol to 25 mg daily from 50 mg bid due to low bp.  /60 tod

## (undated) NOTE — MR AVS SNAPSHOT
[address]                Thank you for choosing us for your health care visit with [facility] Wound Nurse.   We are glad to serve you and happy to provide you with Atorvastatin Calcium 40 MG Tabs   Take 40 mg by mouth daily.    Commonly known as:  LIPITOR           Budesonide 3 MG Cpep   TAKE 2 CAPSULES (6MG) DAILY   Commonly known as:  ENTOCORT EC           diphenoxylate-atropine 2.5-0.025 MG Tabs   Take 2.5

## (undated) NOTE — ED AVS SNAPSHOT
Rainer Phelps   MRN: [id number]    Department:  [facility] Emergency Department   Date of Visit:  1/5/2018           Disclosure     Insurance plans vary and the physician(s) referred by the ER may not be covered by your plan.  Please conta within the next three months to obtain basic health screening including reassessment of your blood pressure.     IF THERE IS ANY CHANGE OR WORSENING OF YOUR CONDITION, CALL YOUR PRIMARY CARE PHYSICIAN AT ONCE OR RETURN IMMEDIATELY TO THE EMERGENCY DEPARTMEN

## (undated) NOTE — LETTER
Patient Name: Giovanna Skaggs  : 1945  MRN: [id number]  Patient Address: [address] [place] [id number]      Coronavirus Disease 2019 (COVID-19)     [facility] is committed to the safety and well-being of our patients, george carefully. If your symptoms get worse, call your healthcare provider immediately. 3. Get rest and stay hydrated.    4. If you have a medical appointment, call the healthcare provider ahead of time and tell them that you have or may have COVID-19.  5. For m of fever-reducing medications; and  · Improvement in respiratory symptoms (e.g., cough, shortness of breath); and  · At least 10 days have passed since symptoms first appeared OR if asymptomatic patient or date of symptom onset is unclear then use 10 days donors must:    · Have had a confirmed diagnosis of COVID-19  · Be symptom-free for at least 14 days*    *Some people will be required to have a repeat COVID-19 test in order to be eligible to donate.  If you’re instructed by Wm that a repeat test is r random. Researchers are trying to identify similarities between people with a Post-COVID condition to better understand if there are risk factors. How do I prevent a Post-COVID condition?   The best way to prevent the long-term symptoms of COVID-19 is

## (undated) NOTE — LETTER
[address] [place] [address]      Authorization for Surgical Operation and Procedure     Date:___________                                                                                                         Time:__________  1. I hereby authorize* Surgery not found *, my physician and his/her assistants (if applicable), which may include medical students, residents, and/or fellows, to perform the following surgical operation/ procedure and administer such anesthesia as may be determined necessary by my physician:  Operation/Procedure name (s)   on Marito Romano   2. I recognize that during the surgical operation/procedure, unforeseen conditions may necessitate additional or different procedures than those listed above. I, therefore, further authorize and request that the above-named surgeon, assistants, or designees perform such procedures as are, in their judgment, necessary and desirable. 3.   My surgeon/physician has discussed prior to my surgery the potential benefits, risks and side effects of this procedure; the likelihood of achieving goals; and potential problems that might occur during recuperation. They also discussed reasonable alternatives to the procedure, including risks, benefits, and side effects related to the alternatives and risks related to not receiving this procedure. I have had all my questions answered and I acknowledge that no guarantee has been made as to the result that may be obtained. 4.   Should the need arise during my operation or immediate post-operative period, I also consent to the administration of blood and/or blood products. Further, I understand that despite careful testing and screening of blood or blood products by collecting agencies, I may still be subject to ill effects as a result of receiving a blood transfusion and/or blood products.   The following are some, but not all, of the potential risks that can occur: fever and allergic reactions, hemolytic reactions, transmission of diseases such as Hepatitis, AIDS and Cytomegalovirus (CMV) and fluid overload. In the event that I wish to have an autologous transfusion of my own blood, or a directed donor transfusion. I will discuss this with my physician. 5.   I authorize the use of any specimen, organs, tissues, body parts or foreign objects that may be removed from my body during the operation/procedure for diagnosis, research or teaching purposes and their subsequent disposal by hospital authorities. I also authorize the release of specimen test results and/or written reports to my treating physician on the hospital medical staff or other referring or consulting physicians involved in my care, at the discretion of the Pathologist or my treating physician. 6.   I consent to the photographing or videotaping of the operations or procedures to be performed, including appropriate portions of my body for medical, scientific, or educational purposes, provided my identity is not revealed by the pictures or by descriptive texts accompanying them. If the procedure has been photographed/videotaped, the surgeon will obtain the original picture, image, videotape or CD. The hospital will not be responsible for storage, release or maintenance of the picture, image, tape or CD.    7.   I consent to the presence of a  or observers in the operating room as deemed necessary by my physician or their designees. 8.   I recognize that in the event my procedure results in extended X-Ray/fluoroscopy time, I may develop a skin reaction. 9. If I have a Do Not Attempt Resuscitation (DNAR) order in place, that status will be suspended while in the operating room, procedural suite, and during the recovery period unless otherwise explicitly stated by me (or a person authorized to consent on my behalf).  The surgeon or my attending physician will determine when the applicable recovery period ends for purposes of reinstating the DNAR order. 10. Patients having a sterilization procedure: I understand that if the procedure is successful the results will be permanent and it will therefore be impossible for me to inseminate, conceive, or bear children. I also understand that the procedure is intended to result in sterility, although the result has not been guaranteed. 11. I acknowledge that my physician has explained sedation/analgesia administration to me including the risk and benefits I consent to the administration of sedation/analgesia as may be necessary or desirable in the judgment of my physician. I CERTIFY THAT I HAVE READ AND FULLY UNDERSTAND THE ABOVE CONSENT TO OPERATION and/or OTHER PROCEDURE.    _________________________________________  __________________________________  Signature of Patient     Signature of Responsible Person         ___________________________________         Printed Name of Responsible Person           _________________________________                  Relationship to Patient  _________________________________________  ______________________________  Signature of Witness          Date  Time    STATEMENT OF PHYSICIAN My signature below affirms that prior to the time of the procedure; I have explained to the patient and/or his/her legal representative, the risks and benefits involved in the proposed treatment and any reasonable alternative to the proposed treatment. I have also explained the risks and benefits involved in refusal of the proposed treatment and alternatives to the proposed treatment and have answered the patient's questions. If I have a significant financial interest in a co-management agreement or a significant financial interest in any product or implant, or other significant relationship used in this procedure/surgery, I have disclosed this and had a discussion with my patient. _______________________________________________________________ _____________________________  Rosio Sawyerop leann Physician)                                                                                         (Date)                                   (Time)        Patient Name: Ghanshyam Beal    : 1945   Printed: 8/10/2022      Medical Record #: [id number]                                              Page 1 of 1

## (undated) NOTE — IP AVS SNAPSHOT
Patient Demographics     Address  [place]  [address] Phone  [phone] [facility]) *Preferred*  [phone] [facility])      Emergency Contact(s)     Name Relation Home Work Emmer-Complacytracey Spouse [phone] Pauline Lucas, Alesia Soler MD In 1 week.     Specialties:  Internal Medicine, PEDIATRICS  Contact information:  Darnell Ingram [address]  [address] [phone]                  Your medication list      TAKE these medications       Instructions A Commonly known as:  DELTASONE  Next dose due: Tomorrow       TAKE 1 TABLET BY MOUTH DAILY   Vonna Apgar, MD         pregabalin 50 MG Caps  Commonly known as:  LYRICA  Next dose due:   Ton      Take 1 capsule (50 mg total) by mouth 2 [id number] Metoprolol Succinate ER (Toprol XL) 24 hr tab 50 mg 05/21/19 0633 Given      [id number] Normal Saline Flush 0.9 % injection 10 mL 05/20/19 2121 Given      [id number] Pantoprazole Sodium (PROTONIX) EC tab 40 mg 05/21/19 0633 Given      [id number] Va Ordering provider:  Lexa Holland MD  05/20/19 0885 Resulting lab:  [facility]    Specimen Information    Type Source Collected On   Blood — 05/21/19 0681          Components    Component Value Reference Range Flag Lab   Glucose 74 70 - 99 mg/dL — [facility] .0 150.0 - 450.0 10(3)uL — [place] [facility]            Testing Performed By     The [facility] Name Director Address Valid Date Range    [address] Lab [facility] Marlon Flavors. Raul Bolton M.D. [facility]. [address]  [facility] [address] 04/29/14 1047 - Pr Influenza B PCR: Negative     Parainfluenza 1 PCR: Negative     Parainlfuenza 2 PCR Negative     Parainlfuenza 3 PCR Negative     Parainlfuenza 4 PCR Negative     Resp Syncytial Virus PCR Negative     Bordetella Pertussis PCR Negative     Chlamydia pneum -hx mitral valve clip fall 2018  -likely worsened with a-fib rvr likely triggered by fever  -lasix dose given, cont bid, further dosing per cardiology  -plan was for possible repeat clip of MV       #Afib with RVR- improved  -cardizem gtt[LA.1], wean as ab respiratory failure improved with diuresis and fevers who presents from SNF with a c/co productive cough, fatigue and nausea. States cough since prior to hospital discharge consists of dark brown to pink colored sputum. Does not think it is anna blood.  Al • BACK SURGERY      spinal fusion L1-5   • CAUDAL N/A 3/26/2019    Performed by Arian Dean MD at [address] [facility] 10/10/2017    Performed by Arian Dean MD at [address] [facility] 12 encounter [facility] HOSPITAL Encounter).       Soc Hx  Social History    Tobacco Use      Smoking status: Former Smoker      Smokeless tobacco: Never Used      Tobacco comment: pt smoked until 23 yrs old    Alcohol use: No      Alcohol/week: 0.0 oz       Fam Hx  Fam Diagnostic Data:    CBC/Chem    Recent Labs   Lab 05/15/19  0610 05/16/19  0556 05/17/19  1558   RBC 3.80 3.74* 4.37   HGB 9.9* 9.8* 11.4*   HCT 33.0* 32.1* 36.9   MCV 86.8 85.8 84.4   MCH 26.1 26.2 26.1   MCHC 30.0* 30.5* 30.9*   RDW 18.6* 18.4* 18.6*   N CONCLUSION: There are findings compatible with CHF and/or increase fluid volume status of the patient. Stable cardiomegaly and pulmonary vascular congestion. Stable left pleural effusion.   Patchy perihilar and basal pulmonary opacities likely representin -RVP pending  -PCT pending  -f/u blood and urine cx  -per ID on last admit, workup would be initiated for viral etiology if persisiting fevers    Acute on chronic diastolic HF/severe MR  -presently not hypoxic, CXR with CHF and pbnp elevated  -hx mitral va • BACK SURGERY      spinal fusion L1-5   • CAUDAL N/A 3/26/2019    Performed by Arian Dean MD at [address] [facility] [address] 10/10/2017    Performed by Arian Dean MD at [address] [facility] 12 encounter [facility] HOSPITAL Encounter).       Soc Hx  Social History    Tobacco Use      Smoking status: Former Smoker      Smokeless tobacco: Never Used      Tobacco comment: pt smoked until 23 yrs old    Alcohol use: No      Alcohol/week: 0.0 oz       Fam Hx  Fam low.  She did, however, have active urine sediment once again. She was started on IV zosyn and continues on her usual p.o. Vancomycin now at TID dosing. We are asked to see and follow once again.     History:  Past Medical History:   Diagnosis Date   • An • CERVICAL EPIDURAL N/A 12/8/2014    Performed by Arian Dean MD at [address] N/A 10/27/2014    Performed by Arian Dean MD at [address]   • COLONOSCOPY  7/11/08 7/11/08 at [address] [facility] and Aleve [Naprelan]        RASH  Aspirin                     Comment:Bleeding issues, but pt on ASA daily.   Bactrim                 RASH    Comment:Periorbital dermatitis 24 hours into course    Medications:    Current Facility-Administered Medications:   • ADD-vantage, 3.375 g, Intravenous, Q8H    Review of Systems:    Constitutional:  Fevers. HEENT:  No visual changes, oral ulcers, sore throat, difficulty swallowing. Respiratory: SOB, cough.    Cardiovascular: Negative for chest pain, palpitations, irreg Throat:  Oropharynx clear, MMs moist.   Neck: Trachea ML, no masses. Lungs: CTA b/l no rhonchi, rales, wheezes. Chest wall: No tenderness or deformity. Heart: Regular rate and rhythm, normal S1S2, no murmurs. Abdomen: Soft, NT/ND.   Bowel sounds pr MT.1 - Karin Manuel DO on 5/18/2019  9:12 AM                     D/C Summary    No notes of this type exist for this encounter.      Imaging Results (HF patients)    Chest X-Ray Results (HF patients only)    Xr Chest Pa + Lat Chest (cpt=71046)    R Presenting Problem: Afib w/ RVR, pneumonia, abdominal pain  Reason for Therapy: Mobility Dysfunction and Discharge Planning    PHYSICAL THERAPY ASSESSMENT     Patient is a 66year old female admitted 5/17/2019 for Afib w/ RVR, pneumonia, abdominal pain.  Pt education; Endurance; Coordination;Balance training  Rehab Potential : Fair  Frequency (Obs): 3x/week       PHYSICAL THERAPY MEDICAL/SOCIAL HISTORY     History related to current admission: Per H&P: \"Katelin Edwards is a(n) 66year old female with DVT s cataracts   • OTHER DISEASES     secondary poycythemia   • OTHER DISEASES     lumbar spinal stenosis   • OTHER DISEASES     duplicated left renal collecting system   • OTHER DISEASES     uterine fibroids   • OTHER DISEASES     ulcerative colitis   • OTHER Patient Owned Equipment: (w/c, kathya lift, slide board)       Prior Level of [place]: Pt lives with  who is her caregiver. She needs help with ADLs. She can do her own grooming, feeing, and sponge bathing with set up.  She self-caths and has Gait Assistance: Not tested           Stoop/Curb Assistance: Not tested       Bed Mobility: Max A x 2    Transfers: NT    Exercise/Education Provided:  Bed mobility    Patient End of Session: In bed;Needs met;Call light within reach;RN aware of session/fin Patient is a[AO.3 66year old[AO.3] female admitted 5/17/2019[AO.1] for nausea, vomiting, and coughing[AO.2]. In this OT evaluation patient presents with the following impairments:[AO.1] weakness, impaired balance, generalized pain[AO.2].   These deficits training; Endurance training;Patient/Family education;Patient/Family training;Equipment eval/education[AO.3]       OCCUPATIONAL THERAPY MEDICAL/SOCIAL HISTORY     Problem List[AO.1]  Principal Problem:    Atrial fibrillation with rapid ventricular response spinal fusion L1-5   • CAUDAL N/A 3/26/2019    Performed by Bi Cox MD at [address] [facility] N/A 10/10/2017    Performed by Bi Cox MD at [address] [place] [address] N/A 12/8/2014    Performe SUBJECTIVE[AO.1]  Patient is pleasant and cooperative[AO.2]     OCCUPATIONAL THERAPY EXAMINATION      OBJECTIVE[AO.1]  Precautions: Bed/chair alarm(spastic paraplegia)  Fall Risk: High fall risk[AO.3]    PAIN ASSESSMENT[AO.1]  Rating: Unable to rate  Locat Bathing:[AO.1] mod a[AO.2]   Toileting:[AO.1] max a[AO.2]   Upper Extremity Dressing:[AO.1] max a[AO.2]   Lower Extremity Dressing:[AO.1] max a[AO.2]     Education Provided:[AO.1] Educated patient in role of OT and POC[AO.2]  Patient End of Session: Up in

## (undated) NOTE — LETTER
Date: 1/19/2022  Patient name: Blaire Gardner  YOB: 1945  Medical Record Number: [id number]  Coverage: Payor: MEDICARE / Plan: MEDICARE PART A&B / Product Type: No Product type /   Insurance ID: [id number]  Patient Address: [address] [place] [address] Carlos [facility]St[facility] BlountDO jose     - Dressing type:     Other (Moisture Barrier)   12/06/21 1434 OP WOUND DRESSING Routine Completed Severa Finger, APRN     - Dressing:    xeroform gauze     - Additional Wound Dressing Information:    border gauze     - Cleansing:    Cleanse Orientation: Left; Lower      No assessment data to display       Inactive Orders   Date Order Priority Status Authorizing Provider   12/06/21 1434 OP WOUND DRESSING Routine Completed MYLES Lagunas     - Dressing:    Honey gel     - Dressing:    Jerome Lee Ann-Marie-wound Assessment Clean;Bleeding;Fragile; Moist;Painful;Red Clean;Dry; Intact   Wound Granulation Tissue Red Pink   Wound Bed Granulation (%) — 30 %   Wound Bed Epithelium (%) — 0 %   Wound Bed Slough (%) — 50 %   Wound Bed Eschar (%) — 30 %   Wound Od Other ( Xeroform) Daily Routine Discontinued Leonel Hernandez, DO     - Dressing type:     Other ( Xeroform)       Wound 01/19/22 14 Pressure Injury Buttocks Left (Active)   Date First Assessed: 01/19/22    Wound Number (Wound Clinic Only): 14  Primary Wound (Comment which one) (Active)   Date First Assessed: 01/19/22    Wound Number (Wound Clinic Only): 16  Primary Wound Type: Traumatic  Location: Toe (Comment which one)  Wound Description (Comments): between great toe and 2nd      Assessments 1/19/2022  1:38 Wound Number:Left plantar foot:    Wound Cleaning and Dressings:  Showering directions: May shower with protection  Wound cleansing:  Clean with soap and water  Wound cleaning frequency: three times per week  Wound product: open to air reduce pressure

## (undated) NOTE — IP AVS SNAPSHOT
[facility]            (For Outpatient Use Only) Initial Admit Date: 2022   Inpt/Obs Admit Date: Inpt: 22 / Obs: N/A   Discharge Date:    Onondaga Cornea:  [de-identified]   MRN: [de-identified]   CSN: [id number]   CEID: [id number]        ENCOUNTER  Patient Class: Inpatient Admitting Provider: Андрей Estevez MD Unit: [address] [place] [address]/SE   Hospital Service: Medical Attending Provider: Андрей Estevez MD   Bed: 537-A   Visit Type:   Referring Physician: No ref. provider found Billing Flag:    Admit Diagnosis: Clostridium difficile diarrhea [A04.72]      PATIENT  Legal Name:   Ingrid Bradford   Legal Sex: Female  Gender ID: Female             Pref Name:    PCP:  Steven Inman 2484: [phone]   Address:  [address] [place] [address] : 1945 (77 yrs) Mobile: [phone]         City/[facility]/Zip: [facility] [address] Marital:  Language: [address]: Lesa Vang: [id number] Yazdanism: [address] Not Milady Beagle*     Race: White Ethnicity: Non  Or [address] [place] [address] Street   Name Relationship Legal Guardian? Home Phone Work Phone Mobile Phone   1.  Camilo Edwards  2. adolfo lopez Spouse  Daughter    [phone]       GUARANTOR  Guarantor: ERIC EDWARDS : 1945 Home Phone: [phone]   Address: [address] [place] [address]  Sex: Female Work Phone:    City/State/Zip: [facility] [address]   Rel. to Patient: Self Guarantor ID: [id number]   GUARANTOR EMPLOYER   Employer:  Status: RETIRED     COVERAGE  PRIMARY INSURANCE   Payor: MEDICARE Plan: MEDICARE PART A&B   Group Number:  Insurance Type: INDEMNITY   Subscriber Name: Jenise Ruffin : 1945   Subscriber ID: [id number] Pt Rel to Subscriber: Self   SECONDARY INSURANCE   Payor: [place] P[place] Plan: BLUE CROSS [facility] PPO   Group Number: [id number] Type: [address] Name: Jenise Ruffin : 1945   Subscriber ID: [id number] Pt Rel to Subscriber: SELF   TERTIARY INSURANCE   Payor:  Plan:    Group Number:  Insurance Type:    Subscriber Name:  Subscriber :    Subscriber ID:  Pt Rel to Subscriber:    Hospital Account Financial Class: Medicare    2022

## (undated) NOTE — IP AVS SNAPSHOT
[facility]            (For Outpatient Use Only) Initial Admit Date: 2022   Inpt/Obs Admit Date: Inpt: N/A / Obs: 22   Discharge Date:    Hospital Acct:  [de-identified]   MRN: [de-identified]   CSN: [id number]   CEID: [id number]        ENCOUNTER  Patient Class: Observation Admitting Provider: No admitting provider for patient encounter. Unit: [facility] Service: Medical Attending Provider: Juany Gallardo MD   Bed: 523-A   Visit Type:   Referring Physician: No ref. provider found Billing Flag:    Admit Diagnosis: Dehydration [E86.0]      PATIENT  Legal Name:   Mike Simmons   Legal Sex: Female  Gender ID: Female             Pref Name:    PCP:  Steven Scruggs 2484: [phone]   Address:  [address] [place] [address] : 1945 (77 yrs) Mobile: [phone]         City/[facility]/Zip: [address] [place] [address] Marital:  Language: Alexsandra Shield: Mackenzie Mason: [id number] Taoist: [address] Not [place] Alicia*     Race: White Ethnicity: Non  Or [address] [place] [address] Street   Name Relationship Legal Guardian? Home Phone Work Phone Mobile Phone   1.  Camilo Edwards  2. adolfo lopez Spouse  Daughter    [phone]       GUARANTOR  Guarantor: ERIC EDWARDS : 1945 Home Phone: [phone]   Address: [address] [place] [address]  Sex: Female Work Phone:    City/State/Zip: [address] [place] [address]   Rel. to Patient: Self Guarantor ID: [id number]   GUARANTOR EMPLOYER   Employer:  Status: RETIRED     COVERAGE  PRIMARY INSURANCE   Payor: MEDICARE Plan: MEDICARE PART A&B   Group Number:  Insurance Type: INDEMNITY   Subscriber Name: Yessenia Griffin : 1945   Subscriber ID: [id number] Pt Rel to Subscriber: Self   SECONDARY INSURANCE   [place] Plan: BLUE CROSS FEP PPO   Group Number: [id number] Type: [address] Name: Yessenia Griffin YOB: 1945   Subscriber ID: [id number] Pt Rel to Subscriber: SELF TERTIARY INSURANCE   Payor:  Plan:    Group Number:  Insurance Type:    Subscriber Name:  Subscriber :    Subscriber ID:  Pt Rel to Subscriber:    Hospital Account Financial Class: Medicare    2022

## (undated) NOTE — IP AVS SNAPSHOT
[facility]            (For Outpatient Use Only) Initial Admit Date: 2022   Inpt/Obs Admit Date: Inpt: 22 / Obs: 22   Discharge Date:    Consuelo Red:  [de-identified]   MRN: [de-identified]   CSN: [id number]   CEID: [id number]        ENCOUNTER  Patient Class: Inpatient Admitting Provider: Chidi Rodriguez MD Unit: [address] [place] [address]   Hospital Service: Cardiac Telemetry Attending Provider: Chidi Rodriguez MD   Bed: 339-A   Visit Type:   Referring Physician: No ref. provider found Billing Flag:    Admit Diagnosis: Acute pulmonary edema [facility]) [J81.0]      PATIENT  Legal Name:   Raquel Loving   Legal Sex: Female  Gender ID: Female             Pref Name:    PCP:  Steven Dodson 2484: [phone]   Address:  [address] [place] [address] : 1945 (77 yrs) Mobile: [phone]         City/State/Zip: [place] [facility] [address] Marital:  Language: Alinda Gentleman: Ana : [id number] Sikhism: [address] Not Asha Nephew*     Race: White Ethnicity: Non  Or [address] [place] [address] Street   Name Relationship Legal Guardian? Home Phone Work Phone Mobile Phone   1.  Camilo Edwards  2. adolfo lopez Spouse  Daughter    [phone]       GUARANTOR  Guarantor: ERIC EDWARDS : 1945 Home Phone: [phone]   Address: [address] [place] [address]  Sex: Female Work Phone:    City/State/Zip: [place] [address]   Rel. to Patient: Self Guarantor ID: [id number]   GUARANTOR EMPLOYER   Employer:  Status: RETIRED     COVERAGE  PRIMARY INSURANCE   Payor: MEDICARE Plan: MEDICARE PART A&B   Group Number:  Insurance Type: INDEMNITY   Subscriber Name: Chris Davidson : 1945   Subscriber ID: [id number] Pt Rel to Subscriber: Self   SECONDARY INSURANCE   Payor: [place] P[place] Plan: [place] [facility] PPO   Group Number: [id number] Type: [address] Name: Chriskaiden Chaidezpearl : 1945   Subscriber ID: [id number] Pt Rel to Subscriber: SELF   TERTIARY INSURANCE   Payor:  Plan:    Group Number:  Insurance Type:    Subscriber Name:  Subscriber :    Subscriber ID:  Pt Rel to Subscriber:    Hospital Account Financial Class: Medicare    2022

## (undated) NOTE — IP AVS SNAPSHOT
[facility]            (For Outpatient Use Only) Initial Admit Date: 8/12/2017   Inpt/Obs Admit Date: Inpt: 8/12/17 / Obs: N/A   Discharge Date:    Salma Colon:  [de-identified]   MRN: [de-identified]   CSN: [id number]        ENCOUNTER  Patient Class Subscriber Name:  Iliana Chow :    Subscriber ID:  Pt Rel to Subscriber:    Hospital Account Financial Class: Medicare    2017

## (undated) NOTE — IP AVS SNAPSHOT
[facility]            (For Outpatient Use Only) Initial Admit Date: 4/13/2019   Inpt/Obs Admit Date: Inpt: 4/14/19 / Obs: N/A   Discharge Date:    Devan Pouch:  [de-identified]   MRN: [de-identified]   CSN: [id number]   CEID: [id number]        SANGEETA Subscriber Name:  Andrea Anna :    Subscriber ID:  Pt Rel to Subscriber:    Hospital Account Financial Class: Medicare    2019

## (undated) NOTE — LETTER
Date: 10/25/2021  Patient name: Rainer Phelps  YOB: 1945  Medical Record Number: [id number]  Coverage: Payor: MEDICARE / Plan: MEDICARE PART A&B / Product Type: No Product type   Insurance ID: [id number]  Patient Address: [address] [place] [address] WOUND DRESSING Routine Completed MYLES Hayes     - Cleansing:    Cleanse with normal saline or wound cleanser     - Dressing:    xeroform gauze     - Dressing:    Bordered foam     - Additional Wound Dressing Information:    Apply to all wounds Apply to all wounds     - Frequency:    Change dressing two times per week       Wound 09/27/21 3 Elbow Left (Active)   Date First Assessed: 09/27/21    Wound Number (Wound Clinic Only): 3  Location: Elbow  Wound Location Orientation: Left      Assessments (Barrier/Moisturizer/Ointment)   Dressing Changed Changed   Dressing Status Dressing Changed   Wound Length (cm) 0.9 cm   Wound Width (cm) 0.4 cm   Wound Surface Area (cm^2) 0.36 cm^2   Wound Depth (cm) 0.1 cm   Wound Volume (cm^3) 0.036 cm^3   Non-staged Dressing Status Dressing Changed   Wound Length (cm) 1.7 cm   Wound Width (cm) 0.7 cm   Wound Surface Area (cm^2) 1.19 cm^2   Wound Depth (cm) 0.1 cm   Wound Volume (cm^3) 0.119 cm^3   Ann-Marie-wound Assessment Clean;Dry; Intact   Wound Bed Granulation (%) 10

## (undated) NOTE — Clinical Note
I saw Donna Ford for HF /afib follow up today. Her bp runs low today 94/50 , ranging /50-60's at home with occasional lightheadedness and fatigue in am after meds. She is near euvolemic on lasix 40 mg daily, afib rate controlled in 80's.  I switched her

## (undated) NOTE — IP AVS SNAPSHOT
[facility]            (For Outpatient Use Only) Initial Admit Date: 8/14/2021   Inpt/Obs Admit Date: Inpt: 8/14/21 / Obs: N/A   Discharge Date:    Jovon Pang:  [de-identified]   MRN: [de-identified]   CSN: [id number]   CEID: [id number]        CPE Rel to Subscriber: SELF   TERTIARY INSURANCE   Payor:  Plan:    Group Number:  Insurance Type:    Subscriber Name:  Subscriber :    Subscriber ID:  Pt Rel to Subscriber:    Hospital Account Financial Class: Medicare    2021

## (undated) NOTE — IP AVS SNAPSHOT
Patient Demographics     Address  [place]  [address] Phone  [phone] [facility]) *Preferred*  [phone] [facility])      Emergency Contact(s)     Name Relation Home Work Joshuacitlalitracey Spouse [phone] Philipp Crawley MD In 1 week. Specialty:  NEPHROLOGY  Contact information:  [address] [address]  [address] [place] [address] In 1 month.     Specialties:  INFECTIOUS DISEASES, Take 1 tablet (25 mg total) by mouth 2x Daily(Beta Blocker). Agatha Romero MD         Pantoprazole Sodium 40 MG Tbec  Commonly known as:  PROTONIX  Start taking on:  4/24/2019  Next dose due:   Tomorrow 4/24 before breakfast      Take 1 tablet (40 mg Order ID Medication Name Action Time Action Reason Comments    [id number] Cefuroxime Axetil (CEFTIN) tab 500 mg 04/22/19 2127 Given      [id number] Cefuroxime Axetil (CEFTIN) tab 500 mg 04/23/19 0843 Given      [id number] Metoprolol Succinate ER (Toprol XL) COMP METABOLIC PANEL (14) [[id number]] (Abnormal)  Resulted: 04/23/19 0702, Result status: Final result   Ordering provider:  Klarissa Dumont MD  04/22/19 2301 Resulting lab:  [facility]    Specimen Information    Type Source Collected On   Blood — 04/23/19 06 Component Value Reference Range Flag Lab   Phosphorus 2.8 2.5 - 4.9 mg/dL — [facility]            CBC WITH DIFFERENTIAL WITH PLATELET [[id number]]  Resulted: 04/23/19 1018, Result status: Final result   Ordering provider:  Hamilton Kelly MD  04/22/19 0058 Res [address] [facility] [address]  [facility]  [place] [address] 04/29/14 1047 - Present            Microbiology Results (All)     Procedure Component Value Units Date/Time    Blood Culture FREQ X 2 [[id number]] Collected:  04/ Narrative:       NOTE: A Positive test result does not necessarily indicate the presence of viable organisms. NOTE: This assay has not been specifically evaluated in pediatric population.   Children under the age of 2 have a high asymptomatic carrier rate 10/31/18, hereditary spastic paraplegia (wheel chair bound, baclofen pulm and straight cath at baseline) and GERD who presented to the ED after mechanical fall from wheelchair while reaching for a broom with pain in[HP.1] back and LLE found to have a UTI. [ -no change in ostomy output[HP.1] still formed on exam,[HP.5] follows with surgery at [facility]  -vanc ppx with abx[HP. 1]     #[HP.2]Lumbar Fusion/Lumbar Radiculopathy  -follows with Dr. Jamel Caruso as OP for pain management  -s/p caudal steroid injection on 3/26    [place] filter in place   • DEPRESSION    • Depression 1/11/2016   • Dyspnea 6/7/2018   • Esophageal reflux    • GERD    • GOUT    • Hearing impairment    • Hereditary spastic paraplegia (HCC)    • High blood pressure    • High cholesterol    • History Performed by Ryan Reynolds MD at [address] [facility] ENDOSCOPY   • ESOPHAGOGASTRODUODENOSCOPY, POSSIBLE DILATION, POSSIBLE BIOSPY, POSSIBLE POLYPECTOMY N/A 1/5/2015    Performed by Usman Kennedy MD at [facility]   • EYE CATARACT WITH INTRAOCULAR L Comprehensive ROS reviewed and negative except for what's stated above.      OBJECTIVE:  /53 (BP Location: Right arm)   Pulse 94   Temp 98.9 °F (37.2 °C) (Oral)   Resp 20   Ht 162.6 cm (5' 4\")   Wt 130 lb (59 kg)   SpO2 96%   BMI 22.31 kg/m²     GEN: measuring 16 x 7 x 16 mm, with NO vascularity, favoring a small liquefying hematoma. NO readily drainable components are detected. Differential considerations would include infected soft tissue hematoma. Follow-up is recommended. [HP.1]        Electronicall -PT/OT (wheelchair bound at baseline)  -norco prn[HP.3]  -restart home lyrica[HP. 2]    #U[HP.1]rosepsis[HP. 2]  #Leukocytosis/lactic acidosis:  -BC in process, CXR without consoliations[HP.1], UA +for UTI with urine culture in process  -intermittent straigh Dispo: pending clinical course    Outpatient records or previous hospital records reviewed. Further recommendations pending patient's clinical course.   [facility] hospitalist to continue to follow patient while in house    Patient and/or patient's family give legs, back, shoulders and knees   • OSTEOPENIA    • Other and unspecified hyperlipidemia    • OTHER DISEASES     spastic paraparesis   • OTHER DISEASES     cataracts   • OTHER DISEASES     secondary poycythemia   • OTHER DISEASES     lumbar spinal stenosi ALL:    Cymbalta [Duloxetin*    ITCHING, SWELLING    Comment:Throat swelling and difficulty breathing  Gabapentin              RASH  Gnp Iodides Decolor*        Comment:Iodine based dyes  Cant swallow or breath             And hives             Tolerated d EXT: no edema    Diagnostic Data:    CBC/Chem    Recent Labs   Lab 04/13/19 2338   RBC 5.63*   HGB 15.0   HCT 47.5   MCV 84.4   MCH 26.6   MCHC 31.6   RDW 15.0   NEPRELIM 13.17*   WBC 16.4*   .0         Recent Labs   Lab 04/13/19  2338 04/14/19  06 Author:  Lisandro Winston MD Service:  Hospitalist Author Type:  Physician    Filed:  4/14/2019 12:10 PM Date of Service:  4/14/2019  7:52 AM Status:  Signed    :  Lisandro Winston MD (Physician)    Related Notes:  Addendum by Lisandro Winston MD (Physician) fi -seen in [facility], s/p [address] noted to be 16 x 7 x 16 mm[HP.1]   -appears to be resolving CTM[HP.3]    #Hypotension:  -hold home metoprolol, given additional 500 cc NS[HP.2]    #Severe MR s/p [facility] Clip:  -last TTE 10/2108 with mild to moderate MR, normal EF[HP.1] 10/31/18, hereditary spastic paraplegia (wheel chair bound, baclofen pulm and straight cath at baseline) and GERD who presented to the ED after mechanical fall from wheelchair while reaching for a broom with pain in[HP.1] back and LLE.[HP.2]    In the ED p Performed by Jethro Malik MD at [address] N/A 10/10/2017    Performed by Jethro Malik MD at [address] [facility] N/A 12/8/2014    Performed by Jethro Malik MD at [facility] Smoking status: Former Smoker      Smokeless tobacco: Never Used      Tobacco comment: pt smoked until 23 yrs old    Alcohol use: No      Alcohol/week: 0.0 oz       Fam Hx  Family History   Problem Relation Age of Onset   • Cancer Father    • Heart Dis BILT 1.1 04/13/2019    TP 7.5 04/13/2019    AST 27 04/13/2019    ALT 31 04/13/2019     04/13/2019       No results for input(s): TROP in the last 168 hours.     Additional Diagnostics: ECG:[HP.1] a fib[HP.2]    CXR: image personally reviewed[HP.1] n Faisal Land is a a(n) 74year old female being seen at your request regarding sepsis due to c.diff, UTI. Patient is well known to our service with a h/o recurrent infection issues.   She presented to the hospital 3 days ago with a fall at home and ulcerative colitis   • OTHER DISEASES     uti   • Pneumonia, organism unspecified(486)    • Reflux    • Self-catheterizes urinary bladder 1/11/2016   • Ulcerative colitis ([facility]    • Valvular disease     mitral valve prolapse   • Visual impairment     read • Other (Other) Other       reports that she has quit smoking. She has never used smokeless tobacco. She reports that she does not drink alcohol or use drugs.     Allergies:    Cymbalta [Duloxetin*    ITCHING, SWELLING    Comment:Throat swelling and difficu •  HYDROcodone-acetaminophen (NORCO) 5-325 MG per tab 1 tablet, 1 tablet, Oral, Q6H PRN  •  ferrous sulfate EC tab 325 mg, 325 mg, Oral, Daily with breakfast  •  predniSONE (DELTASONE) tab 5 mg, 5 mg, Oral, Daily  •  pregabalin (LYRICA) cap 50 mg, 50 mg, O 04/16/19 0900 (!) 84/50 — — — — — —   04/16/19 0845 92/51 — — 63 24 — —   04/16/19 0815 (!) 82/56 — — 75 24 — —   04/16/19 0800 (!) 70/51 — — 74 24 — —   04/16/19 0730 (!) 67/48 — — 98 24 96 % —   04/16/19 0614 (!) 79/47 — — 90 20 94 % —   04/16/19 0508 Savi Mendoza - Continue IV ceftraixone for now but hope to streamline quickly in light of her c.diff issue    3. Leukocytosis due to the above  - At 16K, will trend    4.   H/o hereditary spastic paraplegia  - s/p mechanical fall, WC bound  - Hematoma on arm - supporti Philip Bonilla MD Sonographer: Addis Jones  CC: Pepe Mujica MD  ------------------------------------------------------------------- Study Conclusions 1. Left ventricle: The cavity size was normal. Wall thickness was    increased in a pattern of mild LVH.  Systoli no regional wall motion abnormalities. Doppler parameters are consistent with abnormal left ventricular relaxation (grade 1 diastolic dysfunction). Aortic valve:  Not well visualized. Trileaflet; normal thickened, mildly calcified leaflets. Doppler:    Georgie Garrido ratio, ED              0.92         0.81       ---------  LV end-diastolic volume,         55    ml     ---------- 46 - 106  2-p  LV end-systolic volume,          27    ml     ---------- 14 - 42  2-p  LV ejection fraction,    (L)     52    %      --------- ---------- ---------  RV pressure, S, DP               43    mm Hg  34         ---------   Pulmonic valve                   Value        11/13/2018 Reference  Pulmonic peak gradient,          13    mm Hg  ---------- ---------  S  Pulmonic regurg arom , aarom and prom with stretching as tolerated. Pt encouraged to perform ue arom as tolerated, lifting her head up as able. pt easily fatigues and requires rest between ex.    .  The patient's[TRUDY.1] Approx Degree of Impairment: 86.62%[TRUDY.2] has been calc -   Moving from lying on back to sitting on the side of the bed?: A Lot[TRUDY.2]   How much help from another person does the patient currently need. ..[TRUDY.1]   -   Moving to and from a bed to a chair (including a wheelchair)?: Total   -   Need to walk in hosp TRUDY.1 - Jamin Mayfield, PT on 4/22/2019 11:34 AM  TRUDY. 2 - Jamin Mayfield, PT on 4/22/2019 11:35 AM                        Occupational Therapy Notes (last 72 hours) (Notes from 4/20/2019  5:17 PM through 4/23/2019  5:17 PM)      Sarah Chavez to decrease lever and provide more proximal support for hand to mouth activities; while this worked fair with haritha stabilizing and holding phone to call spouse, she has limited muscle strength/endurance for repetitive hand to mouth for self feeding; pat -   Taking care of personal grooming such as brushing teeth?: A Lot  -   Eating meals?: A Lot[AA. 2]    AM-PAC Score:[AA.1]  Score: 11  Approx Degree of Impairment: 70.42%  Standardized Score (AM-PAC Scale): 29.04  CMS Modifier (G-Code): CL[AA. 2]    FUNCTIO INFLUENZA 11/20/07     INFLUENZA 11/14/06     Influenza Virus Vaccine, H1N1 02/18/10     Pneumococcal (Prevnar 13) 11/04/16     Pneumococcal (Prevnar 13) 09/01/16     Pneumovax 23 02/18/10     TDAP 10/02/18     Tb Intradermal Test 05/15/97       Future Ap

## (undated) NOTE — LETTER
Date: 9/27/2021  Patient name: Simona Bravo  YOB: 1945  Medical Record Number: [id number]  Coverage: Payor: MEDICARE / Plan: MEDICARE PART A&B / Product Type: *No Product type   Insurance ID: [id number]  Patient Address: [facility] [address] Amount Small   Drainage Description Sanguineous   Treatments Cleansed;  Saline/Wound    Dressing Xeroform   Dressing Changed New   Dressing Status Clean; Dry; Intact   Wound Length (cm) 3 cm   Wound Width (cm) 0.6 cm   Wound Surface Area (cm^2) 1.8 c Amount Small   Drainage Description Serosanguineous   Treatments Cleansed;  Saline/Wound    Dressing Xeroform   Dressing Changed New   Dressing Status Clean; Dry; Intact   Wound Length (cm) 1 cm   Wound Width (cm) 1.5 cm   Wound Surface Area (cm^2) 1

## (undated) NOTE — LETTER
Date: 1/5/2022  Patient name: Marito Romano  YOB: 1945  Medical Record Number: [id number]  Coverage: Payor: MEDICARE / Plan: MEDICARE PART A&B / Product Type: No Product type /   Insurance ID: [id number]  Patient Address: [address] [place] [address] Cleanse with soap and water     - Frequency:    Change dressing two times per week       Wound 12/06/21 11 Pressure Injury Leg Left; Lower; Posterior (Active)   Date First Assessed: 12/06/21    Wound Number (Wound Clinic Only): 11  Primary Wound Type: Pressu Drainage Amount Small —   Drainage Description Serosanguineous —   Treatments Saline/Wound ;Compression —   Dressing Xeroform —   Dressing Changed Changed —   Dressing Status Dressing Changed;Removed —   Wound Length (cm) 1 cm 1.5 cm   Wound Width 10cm;Stockinette 3in   Dressing Applied Yes   Compression Wrap Location Toes to Knee   Compression Wrap Status Dressing Changed       No Linked orders to display       Wound 12/21/21 Pressure Injury Sacrum Mid (Active)   Date First Assessed/Time First Asse Surface Area (cm^2) 8.36 cm^2 —   Wound Depth (cm) 0.3 cm —   Wound Volume (cm^3) 2.508 cm^3 —   Non-staged Wound Description Full thickness —   Ann-Marie-wound Assessment Clean;Bleeding;Fragile; Moist;Painful;Red —   Wound Granulation Tissue Red —   Wound Odor product: Honey gel, Hydrofera ready and Other border foam  Dressing change frequency:  3 x per week        Follow Up:  No follow-ups on file.

## (undated) NOTE — IP AVS SNAPSHOT
[facility]            (For Outpatient Use Only) Initial Admit Date: 2022   Inpt/Obs Admit Date: Inpt: 22 / Obs: N/A   Discharge Date:    Hospital Acct:  [de-identified]   MRN: [de-identified]   CSN: [id number]   CEID: [id number]        ENCOUNTER  Patient Class: Inpatient Admitting Provider: Morales Abdi DO Unit: [address] [place] [address]/SW/SE   Hospital Service: Medical Attending Provider: Aysha Gonzales DO   Bed: 452-A   Visit Type:   Referring Physician: No ref. provider found Billing Flag:    Admit Diagnosis: Infected skin ulcer with necrosis [facility] [P81.733, L08.9]      PATIENT  Legal Name:   Milton Allison   Legal Sex: Female  Gender ID: Female             Pref Name:    PCP:  Steven Peña 2484: [phone]   Address:  [address] [place] [address] : 1945 (76 yrs) Mobile: [phone]         City/State/Zip: [facility] [place] [address] Marital:  Language: [place]: Erin Hiss: [id number] Episcopal: [address] Not Sophronia Beavers*     Race: White Ethnicity: Non  Or [address] [place] [address] Street   Name Relationship Legal Guardian? Home Phone Work Phone Mobile Phone   1.  Camilo Edwards  2. jessicaadolfo Spouse  Daughter    [phone]       GUARANTOR  Guarantor: ERIC EDWARDS : 1945 Home Phone: [phone]   Address: [address] [place] [address]  Sex: Female Work Phone:    City/State/Zip: [facility] [place] [address]   Rel. to Patient: Self Guarantor ID: [id number]   GUARANTOR EMPLOYER   Employer:  Status: RETIRED     COVERAGE  PRIMARY INSURANCE   Payor: MEDICARE Plan: MEDICARE PART A&B   Group Number:  Insurance Type: INDEMNITY   Subscriber Name: Bee Shukla : 1945   Subscriber ID: [id number] Pt Rel to Subscriber: Self   SECONDARY INSURANCE   Payor: [place] PPO Plan: [employer] CROSS [facility] PPO   Group Number: [id number] Type: [address] Name: Bee Shukla : 1945   Subscriber ID: [id number] Pt Rel to Subscriber: SELF   TERTIARY INSURANCE   Payor:  Plan:    Group Number:  Insurance Type:    Subscriber Name:  Subscriber :    Subscriber ID:  Pt Rel to Subscriber:    Hospital Account Financial Class: Medicare    2022

## (undated) NOTE — Clinical Note
Hi,  Just wanted to check in with you about this patient. I hope you agree with my recommendations and that I didn't miss anything. Thank you!   [facility]

## (undated) NOTE — LETTER
[facility], [place]  Authorization for Invasive Procedures  1.  I hereby authorize         , my physician and whomever may be designated as the doctor's assistant, to perform the following operation and/or procedure:  Computed potential risks that can occur: fever and allergic reactions, hemolytic reactions, transmission of disease such as hepatitis, AIDS, cytomegalovirus (CMV), and flluid overload.  In the event that I wish to have autologous transfusions of my own blood, or a d judgment of my physician.      Signature of Patient:  ________________________________________________ Date: _________Time: _________    Responsible person in case of minor or unconscious: _____________________________Relationship: ____________     Witness

## (undated) NOTE — LETTER
[address] [place], [place]      Authorization for Surgical Operation and Procedure     Date:___________                                                                                                         Time:__________  1. I hereby Henry Crenshaw MD, my physician and his/her assistants (if applicable), which may include medical students, residents, and/or fellows, to perform the following surgical operation/ procedure and administer such anesthesia as may be determined necessary by my physician:  Operation/Procedure name (s) ESOPHAGOGASTRODUODENOSCOPY (EGD)  on [facility]   2. I recognize that during the surgical operation/procedure, unforeseen conditions may necessitate additional or different procedures than those listed above. I, therefore, further authorize and request that the above-named surgeon, assistants, or designees perform such procedures as are, in their judgment, necessary and desirable. 3.   My surgeon/physician has discussed prior to my surgery the potential benefits, risks and side effects of this procedure; the likelihood of achieving goals; and potential problems that might occur during recuperation. They also discussed reasonable alternatives to the procedure, including risks, benefits, and side effects related to the alternatives and risks related to not receiving this procedure. I have had all my questions answered and I acknowledge that no guarantee has been made as to the result that may be obtained. 4.   Should the need arise during my operation or immediate post-operative period, I also consent to the administration of blood and/or blood products. Further, I understand that despite careful testing and screening of blood or blood products by collecting agencies, I may still be subject to ill effects as a result of receiving a blood transfusion and/or blood products.   The following are some, but not all, of the potential risks that can occur: fever and allergic reactions, hemolytic reactions, transmission of diseases such as Hepatitis, AIDS and Cytomegalovirus (CMV) and fluid overload. In the event that I wish to have an autologous transfusion of my own blood, or a directed donor transfusion. I will discuss this with my physician. 5.   I authorize the use of any specimen, organs, tissues, body parts or foreign objects that may be removed from my body during the operation/procedure for diagnosis, research or teaching purposes and their subsequent disposal by hospital authorities. I also authorize the release of specimen test results and/or written reports to my treating physician on the hospital medical staff or other referring or consulting physicians involved in my care, at the discretion of the Pathologist or my treating physician. 6.   I consent to the photographing or videotaping of the operations or procedures to be performed, including appropriate portions of my body for medical, scientific, or educational purposes, provided my identity is not revealed by the pictures or by descriptive texts accompanying them. If the procedure has been photographed/videotaped, the surgeon will obtain the original picture, image, videotape or CD. The hospital will not be responsible for storage, release or maintenance of the picture, image, tape or CD.    7.   I consent to the presence of a  or observers in the operating room as deemed necessary by my physician or their designees. 8.   I recognize that in the event my procedure results in extended X-Ray/fluoroscopy time, I may develop a skin reaction. 9. If I have a Do Not Attempt Resuscitation (DNAR) order in place, that status will be suspended while in the operating room, procedural suite, and during the recovery period unless otherwise explicitly stated by me (or a person authorized to consent on my behalf).  The surgeon or my attending physician will determine when the applicable recovery period ends for purposes of reinstating the DNAR order. 10. Patients having a sterilization procedure: I understand that if the procedure is successful the results will be permanent and it will therefore be impossible for me to inseminate, conceive, or bear children. I also understand that the procedure is intended to result in sterility, although the result has not been guaranteed. 11. I acknowledge that my physician has explained sedation/analgesia administration to me including the risk and benefits I consent to the administration of sedation/analgesia as may be necessary or desirable in the judgment of my physician. I CERTIFY THAT I HAVE READ AND FULLY UNDERSTAND THE ABOVE CONSENT TO OPERATION and/or OTHER PROCEDURE.    _________________________________________  __________________________________  Signature of Patient     Signature of Responsible Person         ___________________________________         Printed Name of Responsible Person           _________________________________                  Relationship to Patient  _________________________________________  ______________________________  Signature of Witness          Date  Time    STATEMENT OF PHYSICIAN My signature below affirms that prior to the time of the procedure; I have explained to the patient and/or his/her legal representative, the risks and benefits involved in the proposed treatment and any reasonable alternative to the proposed treatment. I have also explained the risks and benefits involved in refusal of the proposed treatment and alternatives to the proposed treatment and have answered the patient's questions. If I have a significant financial interest in a co-management agreement or a significant financial interest in any product or implant, or other significant relationship used in this procedure/surgery, I have disclosed this and had a discussion with my patient. _______________________________________________________________ _____________________________  Xiomara vAiles Physician)                                                                                         (Date)                                   (Time)        Patient Name: Mary Lawson    : 1945   Printed: 3/7/2022      Medical Record #: [id number]                                              Page 1

## (undated) NOTE — LETTER
[facility]  Administration of Anesthesia  1.  Josh Mobley, or _________________________________ acting on her behalf, (Patient) (Dependent/Representative) request to receive anesthesia for my pending procedure/operation/treatment spinal bleeding, seizure, cardiac arrest and death. 7. AWARENESS: I understand that it is possible (but unlikely) to have explicit memory of events from the operating room while under general anesthesia.   8. ELECTROCONVULSIVE THERAPY PATIENTS: This consen signature below affirms that prior to the time of the procedure, I have explained to the patient and/or his/her guardian, the risks and benefits of undergoing anesthesia, as well as any reasonable alternatives.     __________________________________________

## (undated) NOTE — Clinical Note
I saw Mrs Med Davila today in clinic. She was complaining of tenderness and pain to left posterior calf. It was red, warm, and swollen. I sent her for us to r/o DVT. She reports compliance with xarelto. You see her tomorrow.  She is euvolemic on exam. Being

## (undated) NOTE — LETTER
[facility], [place]  Authorization for Invasive Procedures  1. I hereby authorize Dr. Kati Martin , my physician and whomever may be designated as the doctor's assistant, to perform the following operation and/or procedure:  Colonoscopy on Iris Mas at [facility].    2. My physician has explained to me the nature and purpose of the operation or other procedure, possible alternative methods of treatment, the risks involved and the possibility of complications to me. I understand the probable consequences of declining the recommended procedure and the alternative methods of treatment. I acknowledge that no guarantee has been made as to the result that may be obtained. 3. I recognize that during the course of this operation or other procedure, unforeseen conditions may necessitate additional or different procedures than those listed above. I, therefore, further authorize and request that the above-named physician, his/her physician assistants, or designees perform such procedures as are, in his/her professional opinion, necessary and desirable. If I have a Do Not Attempt Resuscitation (DNAR) order in place, that status will be suspended while in the operating room, procedural suite, and during the recovery period unless otherwise explicitly stated by me (or a person authorized to consent on my behalf). The surgeon or my attending physician will determine when the applicable recovery period ends for purposes of reinstating the DNAR order. 4. Should the need arise during my operation or immediate post-operative period; I also consent to the administration of blood and/or blood products.  Further, I understand that despite careful testing and screening of blood and blood products, I may still be subject to ill effects as a result of recieving a blood transfusion an/or blood producst. The following are some, but not all, of the potential risks that can occur: fever and allergic reactions, hemolytic reactions, transmission of disease such as hepatitis, AIDS, cytomegalovirus (CMV), and flluid overload. In the event that I wish to have autologous transfusions of my own blood, or a directed donor transfusion, I will discuss this with my physician. 5. I consent to the photographing of the operations or procedures to be performed for the purposes of advancing medicine, science, and/or education, provided my identity is not revealed. If the procedure has been videotaped, the physician/surgeon will obtain the original videotape. The hospital will not be responsible for storage or maintenance of this tape. 6. I consent to the presence of a  or observer as deemed necessary by my physician or his designee. 7. Any tissues or organs removed in the operation or other procedure may be disposed of by and at the discretion of [facility].    8. I understand that the physician and his/her physician assistants may not be employees or agents of [facility], [facility], nor [facility], but are independent medical practitioners who have been permitted to use its facilities for the care and treatment of their patients. 9. Patients having a sterilization procedure: I understand that if the procedure is successful the results will be permanent and it will therefore be impossible for me to inseminate, conceive or bear children. I also understand that the procedure is intended to result in sterility, although the result has not been guaranteed. 10. I CERTIFY THAT I HAVE READ AND FULLY UNDERSTAND THE ABOVE CONSENT TO OPERATION and/or OTHER PROCEDURE. 11. I acknowledge that my physician has explained sedation/analgesia administration to me including the risks and benefits. I consent to the administration of sedation/analgesia as may be necessary or desirable in the judgment of my physician. Signature of Patient:  ________________________________________________ Date: _________Time: _________    Responsible person in case of minor or unconscious: _____________________________Relationship: ____________     Witness Signature: ____________________________________________ Date: __________ Time: ___________    Statement of Physician  My signature below affirms that prior to the time of the procedure, I have explained to the patient and/or her legal representative, the risks and benefits involved in the proposed treatment and any reasonable alternative to the proposed treatment. I have also explained the risks and benefits involved in the refusal of the proposed treatment and have answered the patient's questions. If I have a significant financial interest in this procedure/surgery, I have disclosed this and had a discussion with my patient.     Signature of Physician:   ________________________________________Date: _________Time:_______ Patient Name: Haze Sacks  : 1945   Printed: February 15, 2022    Medical Record #: [id number]

## (undated) NOTE — IP AVS SNAPSHOT
[facility]            (For Outpatient Use Only) Initial Admit Date: 11/9/2017   Inpt/Obs Admit Date: Inpt: 11/9/17 / Obs: N/A   Discharge Date:    Shantel Shekhar:  [de-identified]   MRN: [de-identified]   CSN: [id number]        ENCOUNTER  Patient Class Subscriber ID:  Pt Rel to Subscriber:    Hospital Account Financial Class: Medicare    November 14, 2017

## (undated) NOTE — IP AVS SNAPSHOT
Patient Demographics     Address  [place]  [address] Phone  [phone] [facility]) *Preferred*  [phone] [facility])      Emergency Contact(s)     Name Relation Home Work MaxwellMaltracey Spouse [phone] Take 1 tablet (5 mg total) by mouth 2 (two) times daily. Uriel Cheatham MD         aspirin 81 MG Tbec  Next dose due: Tomorrow 5/17      Take 1 tablet (81 mg total) by mouth daily.    MYLES Lund         atorvastatin 40 MG Tabs  Commonl Take 2.5 mL (125 mg total) by mouth every 6 (six) hours for 14 days, THEN 2.5 mL (125 mg total) 3 (three) times daily for 14 days, THEN 2.5 mL (125 mg total) 2 (two) times daily for 14 days, THEN 2.5 mL (125 mg total) daily for 14 days.   Stop taking on: [id number] apixaban (ELIQUIS) tab 5 mg 05/15/19 2158 Given      [id number] apixaban (ELIQUIS) tab 5 mg 05/16/19 0928 Given      [id number] aspirin EC tab 81 mg 05/15/19 1329 Given      [id number] aspirin EC tab 81 mg 05/16/19 0928 Given      [id number] atorvas Creatinine 0.49 0.55 - 1.02 mg/dL  [facility]   BUN/CREA Ratio 34.7 10.0 - 20.0 H [facility]   Calcium, Total 8.3 8.5 - 10.1 mg/dL  [facility]   Calculated Osmolality 290 275 - 295 mOsm/kg — [facility]   GFR, Non- 95 >=60 [facility] Specimen:  Other from Nasopharyngeal swab      Adenovirus PCR: Negative     Coronavirus 229E PCR: Negative     Coronavirus Hku1 PCR: Negative     Coronavirus Nl63 PCR: Negative     Coronavirus Oc43 PCR: Negative     Metapneumovirus PCR: Negative     Rhino s/p mitral clip on 10/31/18, hereditary spastic paraplegia (wheel chair bound, baclofen pump[AW.1] - dose decreased recently -[AW.3] and straight cath at baseline) and GERD who comes to ER from rehab at [facility]    She was recently hospitalized here 4/13 • Cataract    • Colitis    • Congestive heart disease ([facility] [address]    • Coronary atherosclerosis    • Deep vein thrombosis (HCC)     jamal filter in place   • DEPRESSION    • Depression 1/11/2016   • Dyspnea 6/7/2018   • Esophageal reflux    • GERD    • GOUT • CYSTOURETHROSCOPY  1/9/09    in office   • ESOPHAGOGASTRODUODENOSCOPY (EGD) N/A 12/24/2016    Performed by Irena Farley MD at [address] [place] [address] ENDOSCOPY   • ESOPHAGOGASTRODUODENOSCOPY, POSSIBLE DILATION, POSSIBLE BIOSPY, POSSIBLE POLYPECTOMY N/A 1/5/2015 Review of Systems:   P[AW. 1]ATIENT CANNOT PROVIDE DUE TO MEDICAL CONDITION[AW.3]    Physical Exam:   Vital Signs:[AW.1]  Blood pressure 105/57, pulse 88, temperature (!) 101 °F (38.3 °C), temperature source Oral, resp.  rate 26, height 160 cm (5' 3\"), weig WBC 7.4   .0       Recent Labs   Lab 05/12/19  1124   *   BUN 10   CREATSERUM 0.56   GFRAA 105   GFRNAA 91   CA 8.3*      K 3.8      CO2 24.0         Xr Chest Pa + Lat Chest (cpt=71046)    Result Date: 5/12/2019  CONCLUSION:  1.  C -[AW.3] hereditary spastic paraplegia (wheel chair bound[AW.1] x15 years[AW.3], baclofen pump[AW.1] - dose decreased recently -[AW.3] and straight cath at baseline)[AW.1]  - DNR status but OK with vasoactive meds and NonInvasive Ventilation/BiPAP if needed -pending clinical course, will consider palliative medicine consult after discussing more with [AW.6]    Addendum -    Pct 0.17, BNP 2641, UA negative    resp viral panel negative    Continue abx overnight, de-escalate as able.   CT AP pending[AW.7] Hosp Day #[AW.1] 0[AW.2] PCP[AW.1] Tyesha Levin MD[AW.2]     Date:  5/12/2019  Date of Admission:  5/12/2019    History provided by:[AW.1] chart review, , +/- patient[AW.3]     Chief Complaint:[AW.1]   Patient presents with:  Dyspnea TIMO S fairly sleepy in ER and hard to get a history from her.  at bedside helps with history.[AW.3]    Temp in ER to 101. Wbc 7.4, lactic acid 2.3. Blood cultures sent.   Initial , bp 119/75, rr 43[AW.1]    cxr personally reviewed and it has small spinal fusion L1-5   • CAUDAL N/A 3/26/2019    Performed by Simon Ivey MD at [facility] N/A 10/10/2017    Performed by Simon Ivey MD at [address] [place] [address] N/A 12/8/2014    Performe Cymbalta [Duloxetin*    ITCHING, SWELLING    Comment:Throat swelling and difficulty breathing  Gabapentin              RASH  Gnp Iodides Decolor*        Comment:Iodine based dyes  Cant swallow or breath             And hives             Tolerated dye for C CA 8.3 (L) 05/12/2019    ALB 2.3 (L) 04/23/2019    ALKPHO 163 (H) 04/23/2019    BILT 1.0 04/23/2019    TP 5.0 (L) 04/23/2019    AST 68 (H) 04/23/2019     (H) 04/23/2019    PTT 35.4 (H) 05/12/2019    INR 1.54 (H) 05/12/2019    T4F 0.88 02/10/2009 viral resp infection   -other (on vanc taper for c difficile) - no increased stool / ostomy output per , stool in ostomy currently formed    - acute on chronic[AW.3] CHFpEF (ef 55-60%)[AW.1]  -[AW.3] HL[AW.1]  -[AW.3] non obs CAD[AW.1]  -[AW.3] Afib -40 IV lasix given in ER for congestion on cxr / possible CHF. Add on BNP. Cardiology consult  -concern there is also concurrent sepsis with fever to 101.   May need to bolus prn[AW.2]  -hold statin and zetia for now, until lfts return  -hold bblocker for 1. IP consult to Infectious Disease Once [[id number]] ordered by Jeet Casper MD at [address] [place] [address] Infectious Disease  Report of Consultation    Eloisa Cuadra Patient Status:  Inpatient    1945 MRN • MITRAL VALVE PROLAPSE    • Neuropathy     bilateral legs   • Osteoarthritis     legs, back, shoulders and knees   • OSTEOPENIA    • Other and unspecified hyperlipidemia    • OTHER DISEASES     spastic paraparesis   • OTHER DISEASES     cataracts   • OTHE • OTHER SURGICAL HISTORY  8-24-12    cysto Dr. Paris Ray   • TONSILLECTOMY       Family History   Problem Relation Age of Onset   • Cancer Father    • Heart Disorder Father    • Eye Problems Father         glaucoma   • DVT/VTE Father    • Diabetes Mother    • Pretty Chaney •  Normal Saline Flush 0.9 % injection 3 mL, 3 mL, Intravenous, PRN  •  acetaminophen (TYLENOL) tab 650 mg, 650 mg, Oral, Q6H PRN  •  PEG 3350 (MIRALAX) powder packet 17 g, 17 g, Oral, Daily PRN  •  ondansetron HCl (ZOFRAN) injection 4 mg, 4 mg, Intravenou 05/13/19 0400 96/58 96.7 °F (35.9 °C) Temporal 84 17 97 % —   05/13/19 0200 103/53 — — 87 22 100 % —   05/13/19 0010 101/60 — — 100 21 98 % —   05/13/19 0000 (!) 89/58 98.4 °F (36.9 °C) Temporal 83 (!) 27 99 % —   05/12/19 2200 101/69 — — 90 (!) 27 98 % — GLU 69 05/13/2019    CA 8.1 05/13/2019    ALB 2.3 05/12/2019    ALKPHO 190 05/12/2019    BILT 1.1 05/12/2019    TP 5.2 05/12/2019    AST 23 05/12/2019    ALT 42 05/12/2019        Cultures:   Negative thus far    Radiology:  CXR negative    Assessment and :  Edu Sequeira MD (Physician)       General Medicine Discharge Summary     Patient ID:  Ursula Jacinto  66year old  5/4/1945    Admit date: 5/12/2019    Discharge date and time: 05/16/19    Attending Physician: Edu Sequeira MD She was brought to ER today from [facility] due to dyspnea. RR of 40, o2 sat of 89%. Patient's xarelto was held due to nose bleed last evening.       She has not been doing well at rehab per , but today was acutely worse.   Fever started today and she see - ID consulted, stopped empiric abx     #Epistaxis  -likely from xarelto.  Discussed risk / benefits of Clayyovany Bitter her history of clots / hypercoag state and afib, she will need to continue [facility]  -stop prn if recurrent bleeding or significant bleeding  - car atorvastatin 40 MG Tabs  Commonly known as:  LIPITOR     ezetimibe 10 MG Tabs  Commonly known as:  ZETIA     ferrous sulfate 325 (65 FE) MG Tbec     HYDROcodone-acetaminophen 5-325 MG Tabs  Commonly known as:  NORCO  Take 1 tablet by mouth every 6 (six) ho Attribution Parr    GV. 1 - Zoie Knowles MD on 5/16/2019 11:33 AM                     Imaging Results (HF patients)    Chest X-Ray Results (HF patients only)    Xr Chest Pa + Lat Chest (cpt=71046)    Result Date: 5/12/2019  PROCEDURE: XR CHEST PA + LA Ordering MD: Dior Garcia MD  Interpreting physician:       Gilma Acosta MD Sonographer: Jennifer Anderson  CC: Dior Garcia MD  ------------------------------------------------------------------- Study Conclusions 1. Left ventricle:  The cavity size was no estimated ejection fraction was 55-60%. Wall motion was normal; there were no regional wall motion abnormalities. Doppler parameters are consistent with abnormal left ventricular relaxation (grade 1 diastolic dysfunction).  Aortic valve:  Not well visualize thickness, ED      (H)     1.2   cm     1.3        0.6 - 0.9  IVS/LV PW ratio, ED              0.92         0.81       ---------  LV end-diastolic volume,         55    ml     ---------- 46 - 106  2-p  LV end-systolic volume,          27    ml     -------- 11/13/2018 Reference  RV ID, ED, PLAX                  3.3   cm     ---------- ---------  RV pressure, S, DP               43    mm Hg  34         ---------   Pulmonic valve                   Value        11/13/2018 Reference  Pulmonic peak gradient, primarily using mechanical lift for transfers. [TL.3] Patient received supine in bed; agreeable to participate in therapy on this date. [TL.1] Therapist educated pt on POC and benefits of mobilization. Patient's primary complaint this session is a cough.    B • Anxiety state, unspecified    • BACK PAIN     scoliosis   • Back problem    • C. difficile colitis 5/12   • Cataract    • Colitis    • Congestive heart disease ([facility] [address]    • Coronary atherosclerosis    • Deep vein thrombosis (HCC)     jamal filter in p 7/11/08 at [facility] and negative for polyps, UC was quiet   • COLONOSCOPY,DIAGNOSTIC  5/12    normal, random colon bx showed findings c/w lymphocytic colitis   • CYSTOURETHROSCOPY  1/9/09    in office   • ESOPHAGOGASTRODUODENOSCOPY (EGD) N/A 12/24/2016    Perf Static Sitting: Fair -  Dynamic Sitting: Poor +  Static Standing: Not tested  Dynamic Standing: Not tested[TL. 2]    NEUROLOGICAL FINDINGS[TL.1]  Neurological Findings: Sensation(large focalized swelling at R lateral leg- improved per pt)           Sensatio Patient Goal Patient's self-stated goal is:[TL.1] return to PLOF[TL.3]   Goal #1 Patient is able to demonstrate supine - sit EOB @ level:[TL.1] moderate assistance[TL. 3]     Goal #1   Current Status    Goal #2 Patient is able to demonstrate transfers[TL.1] :  Gaudencio Engel OT (Occupational Therapist)       OCCUPATIONAL THERAPY EVALUATION - INPATIENT     Room Number: 404/350-M  Evaluation Date: 5/13/2019  Type of Evaluation: Initial  Presenting Problem: acute respiratory failure    Physician Order: IP mobility completed with Max A x2;   Mod A x1 for seated balance at EOB- x~5 minutes; patient educated on benefits of OOB activities and is agreeable to sit up in chair- used overhead lift to t/f patient to chair; VSS throughout on 4L NC- patient tolerated a secondary poycythemia   • OTHER DISEASES     lumbar spinal stenosis   • OTHER DISEASES     duplicated left renal collecting system   • OTHER DISEASES     uterine fibroids   • OTHER DISEASES     ulcerative colitis   • OTHER DISEASES     uti   • Pneumonia, kathya lift for functional t/f; w/c ambulating    SUBJECTIVE  I remember working with physical therapy last time I was here     OCCUPATIONAL THERAPY EXAMINATION      OBJECTIVE  Precautions: Bed/chair alarm;Limb alert - left;Limb alert - right(spastic parapl aware of session/findings; All patient questions and concerns addressed    OT Goals  Patients self stated goal is: to get stronger     Patient will tolerated seated EOB x8 minutes with SBA   Comment:     Patient will complete self feeding Mod I   Comment:

## (undated) NOTE — IP AVS SNAPSHOT
[facility]            (For Outpatient Use Only) Initial Admit Date: 2022   Inpt/Obs Admit Date: Inpt: 22 / Obs: N/A   Discharge Date:    Hospital Acct:  [de-identified]   MRN: [de-identified]   CSN: [id number]   CEID: [id number]        ENCOUNTER  Patient Class: Inpatient Admitting Provider: Yung Allen DO Unit: [address] [place], [place] [address]W/SE   Hospital Service: Medical Attending Provider: Yary Mccloud MD   Bed: 277-T   Visit Type:   Referring Physician: No ref. provider found Billing Flag:    Admit Diagnosis: Bacteremia [R78.81]      PATIENT  Legal Name:   Neal Phlegm   Legal Sex: Female  Gender ID: Female             Pref Name:    PCP:  Steven Gross 2484: [phone]   Address:  [address] [place] [address] : 1945 (77 yrs) Mobile: [phone]         City/[facility]/Zip: [place] [address] Marital:  Language: Ishan Ego: Cristine Herronico: [id number] [place]: [address] [place]*     Race: White Ethnicity: Non  Or [address] [place] [address] Street   Name Relationship Legal Guardian? Home Phone Work Phone Mobile Phone   1.  Camilo Edwards  2. [email or web address] Spouse  Daughter    [phone]       GUARANTOR  Guarantor: ERIC EDWARDS : 1945 Home Phone: [phone]   Address: [address] [place] [address]  Sex: Female Work Phone:    City/[facility]/Zip: [place] [address]   Rel. to Patient: Self Guarantor ID: [id number]   GUARANTOR EMPLOYER   Employer:  Status: RETIRED     COVERAGE  PRIMARY INSURANCE   Payor: MEDICARE Plan: MEDICARE PART A&B   Group Number:  Insurance Type: INDEMNITY   Subscriber Name: Jez Dominique : 1945   Subscriber ID: [id number] Pt Rel to Subscriber: Self   SECONDARY INSURANCE   Payor: [place] PPO Plan: [employer] [facility] PPO   Group Number: [id number] Type: [id number] Name: Jez Dominique : 1945   Subscriber ID: [id number] Pt Rel to Subscriber: SELF   TERTIARY INSURANCE   Payor:  Plan:    Group Number:  Insurance Type:    Subscriber Name:  Subscriber :    Subscriber ID:  Pt Rel to Subscriber:    Hospital Account Financial Class: Medicare    May 25, 2022

## (undated) NOTE — MR AVS SNAPSHOT
[address] [place] [address]  [phone]               Thank you for choosing us for your health care visit with [address] [place] [address].   We are glad to serve you and happy to provide yo Gabapentin Rash    Gnp Iodides Decolorized     Iodine based dyes  Cant swallow or breath  And hives  Tolerated dye for CT with premed at [facility]P AT [facility] 8/14/12    Shellfish Anaphylaxis    Tetracycline Base Rash    Aleve [Naprelan] Rash    Aspirin     Bleeding issues Commonly known as:  PRAVACHOL           predniSONE 5 MG Tabs   TAKE 1 TABLET BY MOUTH DAILY   Commonly known as:  DELTASONE           spironolactone 25 MG Tabs   TAKE 1 TABLET BY MOUTH DAILY   Commonly known as:  ALDACTONE           Venlafaxine HCl ER 76 M

## (undated) NOTE — ED AVS SNAPSHOT
Rosemary Salcedo   MRN: [id number]    Department:  [facility] Emergency Department   Date of Visit:  4/4/2019           Disclosure     Insurance plans vary and the physician(s) referred by the ER may not be covered by your plan.  Please conta within the next three months to obtain basic health screening including reassessment of your blood pressure.     IF THERE IS ANY CHANGE OR WORSENING OF YOUR CONDITION, CALL YOUR PRIMARY CARE PHYSICIAN AT ONCE OR RETURN IMMEDIATELY TO THE EMERGENCY DEPARTMEN

## (undated) NOTE — IP AVS SNAPSHOT
[facility]            (For Outpatient Use Only) Initial Admit Date: 6/19/2020   Inpt/Obs Admit Date: Inpt: 6/19/20 / Obs: N/A   Discharge Date:    Skip Tanya:  [de-identified]   MRN: [de-identified]   CSN: [id number]   CEID: [id number]        [facility] Group Number:  Insurance Type:    Subscriber Name:  Subscriber :    Subscriber ID:  Pt Rel to Subscriber:    Hospital Account Financial Class: Medicare    2020

## (undated) NOTE — MR AVS SNAPSHOT
[address] [facility] [address] [facility]  [phone]               Thank you for choosing us for your health care visit [address]   We are glad to serve you and happy to provide yo Bleeding issues, but pt on ASA daily.     Bactrim Rash    Periorbital dermatitis 24 hours into course    Peanuts Itching                   Current Medications          This list is accurate as of: 1/24/17 11:59 PM.  Always use your most recent med list. Venlafaxine HCl ER 75 MG Cp24   TAKE 1 CAPSULE BY MOUTH EVERY DAY.    Commonly known as:  EFFEXOR-XR                   MyChart                  Visit [facility] online at  [email or web address]

## (undated) NOTE — ED AVS SNAPSHOT
Ursula Jacinto   MRN: [id number]    Department:  [facility] Emergency Department   Date of Visit:  10/8/2018           Disclosure     Insurance plans vary and the physician(s) referred by the ER may not be covered by your plan.  Please cont within the next three months to obtain basic health screening including reassessment of your blood pressure.     IF THERE IS ANY CHANGE OR WORSENING OF YOUR CONDITION, CALL YOUR PRIMARY CARE PHYSICIAN AT ONCE OR RETURN IMMEDIATELY TO THE EMERGENCY DEPARTMEN

## (undated) NOTE — LETTER
[facility]  Administration of Anesthesia  1. Octavio Wahl, or _________________________________ acting on her behalf, (Patient) (Dependent/Representative) request to receive anesthesia for my pending procedure/operation/treatment. A physician (anesthesiologist) alone or an anesthesiologist working with a nurse anesthetist may administer my anesthesia. 2. I understand that my anesthesiologist is not an employee or agent of the hospital, but is an independent medical practitioner who has been permitted to use its facilities for the care and treatment of his/her patients. 3. I acknowledge that a physician from [facility] or their designate(s), recommended anesthesia for me using her/his medical judgment. The type(s) of anesthesia I may receive include:                a) General Anesthesia, b) Spinal/Epidural Anesthesia, c) Regional Anesthesia or d) Monitored Anesthesia Care. 4. If my spinal, regional or monitored anesthesia care (local) is not satisfactory for my comfort, or if my medical condition requires, I consent to the administration of general anesthesia. 5. I am aware that the practice of anesthesiology is not an exact science and that some foreseeable risks or consequences may occur. Some common risks/consequences include sore throat and hoarseness, nausea and vomiting, muscle soreness, backache, damage to the mouth/teeth/vocal cords and eye injury. I understand that more rare but serious potential risks of anesthesia include blood pressure changes, drug reactions, cardiac arrest, brain damage, paralysis or death. These risks apply to whether I have general, spinal/epidural, regional or monitored anesthesia care. 6. OBSTETRIC PATIENTS: Specific risks/consequences of spinal/epidural anesthesia may include itching, low blood pressure, difficulty urinating, slowing of the baby's heart rate and headache.  Rare risks include infections, high spinal block, spinal bleeding, seizure, cardiac arrest and death. 7. AWARENESS: I understand that it is possible (but unlikely) to have explicit memory of events from the operating room while under general anesthesia. 8. ELECTROCONVULSIVE THERAPY PATIENTS: This consent serves for all treatments in a single course of therapy. 9. I understand that I must inform my anesthesiologist when I last ate and/or drank to minimize the risk of anesthesia. 10. If I am pregnant, or may pregnant, I understand that elective surgery should be postponed until after the baby is born. Anesthetics cross the placenta and may temporarily anesthetize the baby. Although fetal complications of anesthesia during pregnancy are rare, they may include birth defects, premature labor, brain damage and death. 11. I certify that I informed the anesthesiologist, to the best of my ability, about medication I take including blood thinners, anticoagulants, herbal remedies, narcotics and recreational drugs (e.g. cocaine, marijuana, PCP). Failure to inform my anesthesiologist about these medicines may increase my risk of anesthetic complications. The nature and purpose of my anesthetic management was explained to me. I had the opportunity to ask questions and the answers and information provided meet my satisfaction.   I retain the right to withdraw this consent at any time prior to the administration of said anesthetic.    ___________________________________________________           _____________________________________________________  Patient Signature                                                                                      Witness Signature                ___________________________________________________           _____________________________________________________  Date/Time                                                                                               Responsible person in case of minor/ unconscious pt /Relationship    My signature below affirms that prior to the time of the procedure, I have explained to the patient and/or his/her guardian, the risks and benefits of undergoing anesthesia, as well as any reasonable alternatives.     ___________________________________________________            _____________________________________________________  Physician Signature                            Date/Time  Patient Name: Pryor Pallas     : 1945     Printed: 2022      Medical Record #: [id number]                              Page 1 of 1    ----------ANESTHESIA CONSENT----------

## (undated) NOTE — MR AVS SNAPSHOT
[address] [place] [address]  [phone]               Thank you for choosing us for your health care visit with [address] [place] [address].   We are glad to serve you and happy to provide yo Inhale 2 puffs into the lungs every 6 (six) hours as needed for Wheezing or Shortness of Breath. aspirin 325 MG Tabs   1 TABLET DAILY           Atorvastatin Calcium 40 MG Tabs   Take 40 mg by mouth daily.    Commonly known as:  LIPITOR           B

## (undated) NOTE — Clinical Note
I saw Shree Zraia today after heart failure and gastritis admission 1 week ago. She diuresed 9 pounds with IV diuretics and back on Lasix 40 mg daily. Decrease shortness of breath no significant swelling.   She has been having loose liquid stools and her colo

## (undated) NOTE — LETTER
[facility]  Administration of Anesthesia  1. Gopi Crawford, or _________________________________ acting on her behalf, (Patient) (Dependent/Representative) request to receive anesthesia for my pending procedure/operation/treatment. A physician (anesthesiologist) alone or an anesthesiologist working with a nurse anesthetist may administer my anesthesia. 2. I understand that my anesthesiologist is not an employee or agent of the hospital, but is an independent medical practitioner who has been permitted to use its facilities for the care and treatment of his/her patients. 3. I acknowledge that a physician from [facility] or their designate(s), recommended anesthesia for me using her/his medical judgment. The type(s) of anesthesia I may receive include:                a) General Anesthesia, b) Spinal/Epidural Anesthesia, c) Regional Anesthesia or d) Monitored Anesthesia Care. 4. If my spinal, regional or monitored anesthesia care (local) is not satisfactory for my comfort, or if my medical condition requires, I consent to the administration of general anesthesia. 5. I am aware that the practice of anesthesiology is not an exact science and that some foreseeable risks or consequences may occur. Some common risks/consequences include sore throat and hoarseness, nausea and vomiting, muscle soreness, backache, damage to the mouth/teeth/vocal cords and eye injury. I understand that more rare but serious potential risks of anesthesia include blood pressure changes, drug reactions, cardiac arrest, brain damage, paralysis or death. These risks apply to whether I have general, spinal/epidural, regional or monitored anesthesia care. 6. OBSTETRIC PATIENTS: Specific risks/consequences of spinal/epidural anesthesia may include itching, low blood pressure, difficulty urinating, slowing of the baby's heart rate and headache.  Rare risks include infections, high spinal block, spinal bleeding, seizure, cardiac arrest and death. 7. AWARENESS: I understand that it is possible (but unlikely) to have explicit memory of events from the operating room while under general anesthesia. 8. ELECTROCONVULSIVE THERAPY PATIENTS: This consent serves for all treatments in a single course of therapy. 9. I understand that I must inform my anesthesiologist when I last ate and/or drank to minimize the risk of anesthesia. 10. If I am pregnant, or may pregnant, I understand that elective surgery should be postponed until after the baby is born. Anesthetics cross the placenta and may temporarily anesthetize the baby. Although fetal complications of anesthesia during pregnancy are rare, they may include birth defects, premature labor, brain damage and death. 11. I certify that I informed the anesthesiologist, to the best of my ability, about medication I take including blood thinners, anticoagulants, herbal remedies, narcotics and recreational drugs (e.g. cocaine, marijuana, PCP). Failure to inform my anesthesiologist about these medicines may increase my risk of anesthetic complications. The nature and purpose of my anesthetic management was explained to me. I had the opportunity to ask questions and the answers and information provided meet my satisfaction.   I retain the right to withdraw this consent at any time prior to the administration of said anesthetic.    ___________________________________________________           _____________________________________________________  Patient Signature                                                                                      Witness Signature                ___________________________________________________           _____________________________________________________  Date/Time                                                                                               Responsible person in case of minor/ unconscious pt /Relationship    My signature below affirms that prior to the time of the procedure, I have explained to the patient and/or his/her guardian, the risks and benefits of undergoing anesthesia, as well as any reasonable alternatives.     ___________________________________________________            _____________________________________________________  Physician Signature                            Date/Time  Patient Name: Jasbir Najera     : 1945     Printed: 3/7/2022      Medical Record #: [id number]                              Page 1 of 1    ----------ANESTHESIA CONSENT----------

## (undated) NOTE — IP AVS SNAPSHOT
[facility]            (For Outpatient Use Only) Initial Admit Date: 2022   Inpt/Obs Admit Date: Inpt: 22 / Obs: N/A   Discharge Date:    Shireen Garner:  [de-identified]   MRN: [de-identified]   CSN: [id number]   CEID: [id number]        ENCOUNTER  Patient Class: Inpatient Admitting Provider: Rd Ordaz MD Unit: [address] [place], [place] [address]W/SE   Hospital Service: Medical Attending Provider: Filiberto Ribeiro MD   Bed: 555-A   Visit Type:   Referring Physician: No ref. provider found Billing Flag:    Admit Diagnosis: Hypokalemia [E87.6]      PATIENT  Legal Name:   Trevon Pastor   Legal Sex: Female  Gender ID: Female             Pref Name:    PCP:  Steven Dhaliwal 2484: [phone]   Address:  [address] [place] [address] : 1945 (76 yrs) Mobile: [phone]         City/[facility]: [place] [address] Marital:  Language: Matthew Sabal: Mason Kauffman: [id number] Congregational: [address] Not Nessa Ion*     Race: White Ethnicity: Non  Or [address]   Name Relationship Legal Guardian? Home Phone Work Phone Mobile Phone   1.  Camilo Edwards  2. adolfo lopez Spouse  Daughter    [phone]       GUARANTOR  Guarantor: ERIC EDWARDS : 1945 Home Phone: [phone]   Address: [address] [place] [address]  Sex: Female Work Phone:    City/State/Zip: [address]   Rel. to Patient: Self Guarantor ID: [id number]   GUARANTOR EMPLOYER   Employer:  Status: RETIRED     COVERAGE  PRIMARY INSURANCE   Payor: MEDICARE Plan: MEDICARE PART A&B   Group Number: [id number] Insurance Type: [address] Name: Gallo Mcneal : 1945   Subscriber ID: [id number] Pt Rel to Subscriber: Self   SECONDARY INSURANCE   Payor: [place] Plan: BLUE CROSS FEP PPO   Group Number: [id number] Type: [id number] Name: Gallo Mcneal : 1945   Subscriber ID: [id number] Pt Rel to Subscriber: SELF   TERTIARY INSURANCE   Payor:  Plan:    Group Number:  Insurance Type:    Subscriber Name:  Subscriber :    Subscriber ID:  Pt Rel to Subscriber:    Hospital Account Financial Class: Medicare    2022

## (undated) NOTE — LETTER
[address] [place] [address]      Authorization for Surgical Operation and Procedure     Date:___________                                                                                                         Time:_______ hemolytic reactions, transmission of diseases such as Hepatitis, AIDS and Cytomegalovirus (CMV) and fluid overload. In the event that I wish to have an autologous transfusion of my own blood, or a directed donor transfusion.   I will discuss this with my p purposes of reinstating the DNAR order. 10. Patients having a sterilization procedure: I understand that if the procedure is successful the results will be permanent and it will therefore be impossible for me to inseminate, conceive, or bear children.   I _____________________________  (Signature of Physician)                                                                                         (Date)                                   (Time)

## (undated) NOTE — IP AVS SNAPSHOT
[facility]            (For Outpatient Use Only) Initial Admit Date: 11/13/2018   Inpt/Obs Admit Date: Inpt: 11/13/18 / Obs: N/A   Discharge Date:    Hospital Acct:  [de-identified]   MRN: [de-identified]   CSN: [id number]        ENCOUNTER  Patient Cla Subscriber ID:  Pt Rel to Subscriber:    Hospital Account Financial Class: Medicare    November 17, 2018

## (undated) NOTE — IP AVS SNAPSHOT
[facility]            (For Outpatient Use Only) Initial Admit Date: 11/3/2020   Inpt/Obs Admit Date: Inpt: 11/4/20 / Obs: N/A   Discharge Date:    Augustus Gan:  [de-identified]   MRN: [de-identified]   CSN: [id number]   CEID: [id number]        [employer] TERTIARY INSURANCE   Payor:  Plan:    Group Number:  Insurance Type:    Subscriber Name:  Subscriber :    Subscriber ID:  Pt Rel to Subscriber:    Hospital Account Financial Class: Medicare    2020

## (undated) NOTE — IP AVS SNAPSHOT
[facility]            (For Outpatient Use Only) Initial Admit Date: 7/5/2021   Inpt/Obs Admit Date: Inpt: 7/5/21 / Obs: N/A   Discharge Date:    Alicia Hernandez:  [de-identified]   MRN: [de-identified]   CSN: [id number]   CEID: [id number]        [facility] Plan:    Group Number:  Insurance Type:    Subscriber Name:  Subscriber :    Subscriber ID:  Pt Rel to Subscriber:    Hospital Account Financial Class: Medicare    2021

## (undated) NOTE — ED AVS SNAPSHOT
Rosalio Gannon   MRN: [id number]    Department:  [facility] Emergency Department   Date of Visit:  12/1/2018           Disclosure     Insurance plans vary and the physician(s) referred by the ER may not be covered by your plan.  Please cont within the next three months to obtain basic health screening including reassessment of your blood pressure.     IF THERE IS ANY CHANGE OR WORSENING OF YOUR CONDITION, CALL YOUR PRIMARY CARE PHYSICIAN AT ONCE OR RETURN IMMEDIATELY TO THE EMERGENCY DEPARTMEN

## (undated) NOTE — LETTER
Date: 6/3/2022  Patient name: Francis Barrientos  YOB: 1945  Medical Record Number: [id number]  Coverage: Payor: MEDICARE / Plan: MEDICARE PART A&B / Product Type: No Product type /   Insurance ID: [id number]  Patient Address: [address] [address]  Telephone Information:   Home Phone [phone]   Mobile [phone]       Encounter Date: 6/3/2022  Physician: MYLES Montiel  Diagnosis: Open wound of left lower extremity, initial encounter  (primary encounter diagnosis)  Ankle wound, left, initial encounter  Pressure injury of left ischium, stage 3 (HCC)  Pressure injury of left heel, unstageable ([address]  Pressure injury of sacral region, stage 2 ([address]  Wound of left ankle, initial encounter    Wound 06/03/22 1 Pressure Injury Sacrum (Active)   Date First Assessed: 06/03/22    Wound Number (Wound Clinic Only): 1  Primary Wound Type: Pressure Injury  Location: Sacrum      Assessments 6/3/2022  3:14 PM   Wound Image     Site Assessment Moist;Pink;Yellow   Drainage Amount Moderate   Drainage Description Serosanguineous   Treatments Cleansed; Saline/Wound ;Site Care   Dressing Xeroform   Dressing Changed Changed   Dressing Status Clean;Dry; Intact   Wound Length (cm) 7.5 cm   Wound Width (cm) 1.5 cm   Wound Surface Area (cm^2) 11.25 cm^2   Wound Depth (cm) 0.1 cm   Wound Volume (cm^3) 1.125 cm^3   Margins Poorly defined   Non-staged Wound Description Full thickness   Ann-Marie-wound Assessment Fragile;Pink   Wound Granulation Tissue Pink   Wound Bed Granulation (%) 80 %   Wound Bed Epithelium (%) 0 %   Wound Bed Slough (%) 20 %   Wound Bed Eschar (%) 0 %   Wound Odor None   State of Healing Early/partial granulation       No Linked orders to display       Wound 06/03/22 2 Pressure Injury Ischium Left (Active)   Date First Assessed: 06/03/22    Wound Number (Wound Clinic Only): 2  Primary Wound Type: Pressure Injury  Location: Ischium  Wound Location Orientation: Left Assessments 6/3/2022  3:14 PM   Wound Image     Site Assessment Moist;Granulation tissue;Pink   Drainage Amount Small   Drainage Description Serosanguineous   Treatments Cleansed; Saline/Wound    Dressing Changed Changed   Dressing Status Clean;Dry; Intact   Wound Length (cm) 2 cm   Wound Width (cm) 1.5 cm   Wound Surface Area (cm^2) 3 cm^2   Wound Depth (cm) 1 cm   Wound Volume (cm^3) 3 cm^3   Margins Well-defined edges   Non-staged Wound Description Full thickness   Ann-Marie-wound Assessment Pink;Fragile   Wound Granulation Tissue Pink   Wound Bed Granulation (%) 100 %   Wound Bed Epithelium (%) 0 %   Wound Bed Slough (%) 0 %   Wound Bed Eschar (%) 0 %   Wound Odor None   Tunneling 11 oclock= 3 cm 1oclock= 1 cm   Undermining 9-10 oclock= 3 cm   State of Healing Tunneling; Undermining       No Linked orders to display       Wound 06/03/22 3 Pressure Injury Ankle Lateral;Left (Active)   Date First Assessed: 06/03/22    Wound Number (Wound Clinic Only): 3  Primary Wound Type: Pressure Injury  Location: Ankle  Wound Location Orientation: Lateral;Left      Assessments 6/3/2022  3:14 PM   Wound Image     Site Assessment Moist;Pink;Yellow; Purple   Drainage Amount Small   Drainage Description Serosanguineous   Treatments Cleansed; Saline/Wound ;Site Care   Dressing Xeroform   Dressing Changed Changed   Dressing Status Clean;Dry; Intact   Wound Length (cm) 2 cm   Wound Width (cm) 1 cm   Wound Surface Area (cm^2) 2 cm^2   Wound Depth (cm) 0.1 cm   Wound Volume (cm^3) 0.2 cm^3   Margins Well-defined edges   Non-staged Wound Description Full thickness   Ann-Marie-wound Assessment Blanchable erythema   Wound Granulation Tissue Pink   Wound Bed Granulation (%) 80 %   Wound Bed Epithelium (%) 0 %   Wound Bed Slough (%) 10 %   Wound Bed Eschar (%) 10 %   Wound Odor None   State of Healing Early/partial granulation       No Linked orders to display       Wound 06/03/22 4 Pressure Injury Heel Left (Active)   Date First Assessed: 06/03/22    Wound Number (Wound Clinic Only): 4  Primary Wound Type: Pressure Injury  Location: Heel  Wound Location Orientation: Left      Assessments 6/3/2022  3:14 PM   Wound Image     Site Assessment Fragile; Purple   Drainage Amount None   Treatments Cleansed; Saline/Wound ;Site Care   Dressing Changed Changed   Dressing Status Clean;Dry; Intact   Wound Length (cm) 1.5 cm   Wound Width (cm) 2 cm   Wound Surface Area (cm^2) 3 cm^2   Wound Depth (cm) 0 cm   Wound Volume (cm^3) 0 cm^3   Margins Flat and Intact   Non-staged Wound Description Partial thickness   Ann-Marie-wound Assessment Ecchymosis   Wound Odor None   Pressure Injury Stage Unstageable       No Linked orders to display       Wound Number: All wounds    Wound Cleaning and Dressings:  Showering directions: May not shower  Wound cleansing:  Cleanse with normal saline or wound cleanser  Wound cleaning frequency: cleanse at each dressing change  Wound product: Xeroform to sacral, left lateral ankle and left lateral leg wound then secure with foam border dressings. Left ischium wound : Silver alginate dressing secure with foam border dressing. Left heel : foam border dressing. Patient to purchase EHOB offloading boot. Dressing change frequency:  Change dressing 3x per week  [facility] to change dressing 3 x per next week and patient to follow up in 2 weeks to outpatient wound clinic then [facility] to change dressing week of 6-13-22  2 x per week and outpatient to change 1 x per week. Miscellaneous/Additional Orders:  Offloading: ROHO cushion  Miscellaneous orders: Home health care nurse for wound care    Care Summary:  Care Summary: Discussed Plan of Care at [facility] with patient. Patient verbally acknowledges understanding of all instructions and all questions were answered. Follow Up:  Return in about 2 weeks (around 6/17/2022). Additional Notes:  Call Sherel Phoenix APRN at [phone] with any questions.

## (undated) NOTE — LETTER
[address], [place]  Authorization for Invasive Procedures  1.  I hereby authorize Dr. Chay Mendoza , my physician and whomever may be designated as the doctor's assistant, to perform the following operation and/or procedure:  Meera Beltran performed for the purposes of advancing medicine, science, and/or education, provided my identity is not revealed. If the procedure has been videotaped, the physician/surgeon will obtain the original videotape.  The hospital will not be responsible for stor My signature below affirms that prior to the time of the procedure, I have explained to the patient and/or her legal representative, the risks and benefits involved in the proposed treatment and any reasonable alternative to the proposed treatment.  I have

## (undated) NOTE — IP AVS SNAPSHOT
[facility]            (For Outpatient Use Only) Initial Admit Date: 5/17/2019   Inpt/Obs Admit Date: Inpt: 5/17/19 / Obs: N/A   Discharge Date:    Carlie Dorsey:  [de-identified]   MRN: [de-identified]   CSN: [id number]   CEID: [id number]        [facility] Group Number:  Insurance Type:    Subscriber Name:  Subscriber :    Subscriber ID:  Pt Rel to Subscriber:    Hospital Account Financial Class: Medicare    May 21, 2019

## (undated) NOTE — MR AVS SNAPSHOT
[address] [facility]  [phone]               Thank you for choosing us for your health care visit with [address] [place] [address].   We are glad to serve you and happy to provide yo Tetracycline Base Rash    Aleve [Naprelan] Rash    Aspirin     Bleeding issues, but pt on ASA daily.     Bactrim Rash    Periorbital dermatitis 24 hours into course    Peanuts Itching                   Current Medications          This list is accurate as

## (undated) NOTE — LETTER
[facility], [place]  Authorization for Invasive Procedures  1.  I hereby authorize Dr. Ana Cmaarillo, my physician and whomever may be designated as the doctor's assistant, to perform the following operation and/or procedure: esophagogastrod some, but not all, of the potential risks that can occur: fever and allergic reactions, hemolytic reactions, transmission of disease such as hepatitis, AIDS, cytomegalovirus (CMV), and flluid overload.  In the event that I wish to have autologous transfusio necessary or desirable in the judgment of my physician.      Signature of Patient:  ________________________________________________ Date: _________Time: _________    Responsible person in case of minor or unconscious: _____________________________Relations

## (undated) NOTE — MR AVS SNAPSHOT
[address] [facility]  [phone]               Thank you for choosing us for your health care visit with [address] [place] [address].   We are glad to serve you and happy to provide yo Budesonide 3 MG Cpep   TAKE 2 CAPSULES (6MG) DAILY   Commonly known as:  ENTOCORT EC           diphenoxylate-atropine 2.5-0.025 MG Tabs   Take 2.5 tablets by mouth daily.    Commonly known as:  LOMOTIL           lisinopril 5 MG Tabs   Take 5 mg by mouth da

## (undated) NOTE — MR AVS SNAPSHOT
[address] [place] [address]  [phone]               Thank you for choosing us for your health care visit with [address] [place] [address].   We are glad to serve you and happy to provide yo Cymbalta [Duloxetine Hcl] Itching, Swelling    Throat swelling and difficulty breathing    Gabapentin Rash    Gnp Iodides Decolorized     Iodine based dyes  Cant swallow or breath  And hives  Tolerated dye for CT with premed at [facility] 8/14/12    Shellfish An Commonly known as:  PROTONIX           predniSONE 20 MG Tabs   Take 2 tablets (40 mg total) by mouth daily with breakfast.   Commonly known as:  DELTASONE           * Notice:   This list has 2 medication(s) that are the same as other medications prescribed

## (undated) NOTE — MR AVS SNAPSHOT
[address] [facility]  [phone]               Thank you for choosing us for your health care visit with [address]   We are glad to serve you and happy to provide yo Cymbalta [Duloxetine Hcl] Itching, Swelling    Throat swelling and difficulty breathing    Gabapentin Rash    Gnp Iodides Decolorized     Iodine based dyes  Cant swallow or breath  And hives  Tolerated dye for CT with premed [facility] 8[facility]14/12    Shellfish An

## (undated) NOTE — IP AVS SNAPSHOT
Patient Demographics     Address  [place]  [address] Phone  [phone] [facility]) *Preferred*  [phone] [facility]) E-mail Address  [email or web address]      Emergency Contact(s)     Name Relation Home Work Sera  Authorizing Provider Morning Afternoon Evening As Needed   acetaminophen 325 MG Tabs  Commonly known as: TYLENOL  Next dose due: Anytime as Needed      Take 325 mg by mouth every 6 (six) hours as needed for Pain.           Albuterol Sulfate 108 (90 Base) MC (or sob , can take 3 doses then call Dr. Marquise Wen). ondansetron 4 MG Tbdp  Commonly known as: ZOFRAN-ODT  Next dose due: Anytime as Needed      Take 1 tablet (4 mg total) by mouth every 6 (six) hours as needed for Nausea.    DO Samanta London Order ID Medication Name Action Time Action Reason Comments    [id number] Budesonide (ENTOCORT EC) 24 hr cap 3 mg 07/07/21 0949 Given      [id number] Fluticasone Propionate (FLONASE) 50 MCG/ACT nasal spray 1 spray 07/07/21 0939 Given      [id number] PEG 3350 (Abnormal) Collected: 07/06/21 1701    Order Status: Completed Lab Status: Final result Updated: 07/06/21 2032    Specimen: Stool      C.  Difficile Toxin B Gene Positive    Rapid SARS-CoV-2 by PCR [[id number]]  (Normal) Collected: 07/05/21 1558    Order Fede Hernandez Coronary atherosclerosis    • Deep vein thrombosis (HCC)     jamal filter in place.  unsure of leg   • DEPRESSION    • Depression 1/11/2016   • Dyspnea 6/7/2018   • Esophageal reflux    • GERD    • GOUT    • Hearing impairment    • Hereditary spastic p DEYSI GICARIDAD & Wendy Ray NDL/CATH SPI DX/THER NJX N/A 12/8/2014    Procedure: CERVICAL EPIDURAL;  Surgeon: Leno Nelson MD;  Location: [facility]   • INJECTION, W/WO CONTRAST, DX/THERAPEUTIC SUBSTANCE, EPIDURAL/SUBARACHNOID; CERVICAL/THORA • REMOVE BLADDER STONE,>2.5CM  03/12/2020    laser litho bladder stones   • SPINE SURGERY PROCEDURE UNLISTED      x4   • TONSILLECTOMY     • TOTAL KNEE REPLACEMENT Left    • UPPER GI ENDOSCOPY,BIOPSY N/A 1/5/2015    Hiatal hernia.   Normal gastric/SB Bx, P for what's stated above.      OBJECTIVE:  /75   Pulse 94   Temp 97.8 °F (36.6 °C) (Oral)   Resp 18   Ht 5' 4\" (1.626 m)   Wt 135 lb (61.2 kg)   SpO2 96%   BMI 23.17 kg/m²    General: Alert, no acute distress  Lungs: CTA b/l    Heart: Irregular, S1, S Difficile Infection   -continue oral vancomycin[JL.1] B[JL.2]ID    Chronic diastolic CHF  - Lasix 50RQ daily   - Follows with Dr. Yimi Carroll MD  - repeat TTE, slightly more mitral regurg noted and higher right sided pressures from last admission      [facility]   Gastroenterology Consultation Note    Ursula Jacinto  Patient Status:    Inpatient  Date of Admission:         7/5/2021, Hospital day #1  Attending:   Jenni James DO  PCP:     Kari Donald MD    Reason had back surgery    • Hypercholesterolemia    • HYPERLIPIDEMIA    • Irritable bowel syndrome    • Lymphocytic colitis Colon= 5/7/12   • MENOPAUSE    • MITRAL VALVE PROLAPSE    • Neuropathy     bilateral legs;  Hereditary spastic paraplegia X 20 years    • O Location: [facility]   • INJECTION, W/WO CONTRAST, DX/THERAPEUTIC SUBSTANCE, EPIDURAL/SUBARACHNOID; CERVICAL/THORACIC N/A 12/8/2014    Procedure: CERVICAL EPIDURAL;  Surgeon: Jaspal Barclay MD;  Location: [address] [place] [address] Location: [facility]   • VALVE REPAIR       Family History   Problem Relation Age of Onset   • Cancer Father    • Heart Disorder Father    • Eye Problems Father         glaucoma   • DVT/VTE Father    • Diabetes Mother    • Diabetes Sister tab 325 mg, 325 mg, Oral, Q6H PRN  •  apixaban (ELIQUIS) tab 5 mg, 5 mg, Oral, BID  •  atorvastatin (LIPITOR) tab 40 mg, 40 mg, Oral, Nightly  •  Budesonide (ENTOCORT EC) 24 hr cap 3 mg, 3 mg, Oral, QAM  •  furosemide (LASIX) tab 40 mg, 40 mg, Oral, Daily DR Particles, Take 2 capsules (6 mg total) by mouth every morning. (Patient taking differently: Take 3 mg by mouth every morning.  ), Disp: 180 capsule, Rfl: 1  predniSONE 5 MG Oral Tab, Take 1 tablet (5 mg total) by mouth once daily. , Disp: 90 tablet, Rfl RESPIRATORY:  negative for severe shortness of breath  CARDIOVASCULAR:  negative for crushing sub-sternal chest pain  GASTROINTESTINAL:  see HPI  GENITOURINARY:  negative for dysuria or gross hematuria  INTEGUMENT/BREAST:  SKIN:  negative for jaundice   AL difficile infection in early June and stools had been responding to oral vancomycin taper currently on vancomycin 125 mg 2 times a day. Additionally she has been on budesonide 3mg daily maintenance for lymphocytic colitis.   The onset of her upper abdomina 2:27 PM                        Discharge Summary - D/C Summary      Discharge Summary signed by Hill Smith DO at 7/7/2021  2:40 PM  Version 1 of 1    Author: Hill Smith DO Service: Hospitalist Author Type: Physician    Filed: 7/7/2021  2:40 PM Date o character, which she describes clinically as constipation. KUB here with moderate stool burden, so we will start miralax daily titrated to bowel movements. Continue vancomycin oral taper as previously prescribed.  This admission she underwent flexible sigmo MCG/ACT Inhalation Aerosol Powder, Breath Activated  Inhale 2 puffs into the lungs every 4 (four) hours as needed. Fluticasone Propionate 50 MCG/ACT Nasal Suspension  1 spray by Each Nare route daily.     loratadine 10 MG Oral Tab  Take 10 mg by mouth da M-mode, complete 2D, complete spectral Doppler, and color Doppler ------------------------------------------------------------------- Rosi Hernández 76 05/04/1945 H: [id number]                                E: [id number] [facility] due to body habitus. Scanning was performed from the parasternal, apical, and subcostal acoustic windows. Study completion:  The patient tolerated the procedure well. There were no complications. Transthoracic echocardiography.  M-mode, complete 2D, comple for stenosis. Mild-moderate regurgitation. Pulmonary artery:   The right ventricular systolic pressure was increased consistent with moderate pulmonary hypertension. Right atrium:  The atrium was normal in size.  Pericardium:  There was no pericardial effus cm     1.1        0.6 - 0.9   LVOT                             Value        11/04/2020 Reference  LVOT ID, S                       2.0   cm     2.4        ---------  Stroke volume (SV), LVOT         39    ml     60         ---------  DP  Stroke index (SV/ gradient,          9     mm Hg  ---------- ---------  S  Pulmonic regurg                  9     mm Hg  ---------- ---------  gradient, ED Legend: (L)  and  (H)  jermaine values outside specified reference range.  Electronically signed       06/12/2021 17:02 Bar able to sit EOB without support, uses slideboard with assist from spouse, has Raudel Rodríguez available if needed. Pt has colostomy & self-caths PRN, .  Pt reports spouse assists with ADLs as needed (LB dressing, bathing). Sponge bathes seated in w/c or recliner. goal for DC back to home setting with spouse as primary cg. Pt required 1- 2 person assist for safe mobility this session using lift[KS. 2] for mobility to chair[KS. 4] as no w/c present in room. No family is present.     Pt reports her spouse is her p the hospital for evaluation for fecal incontinence with abd pain with change in stool in her colostomy bag and more rectal discharge.       Abdominal Pain   Laparoscopic loop sigmoid colostomy in 9/2017  Collagenous colitis, microcytic colitis  Possible fo 6/7/2018   • Esophageal reflux    • GERD    • GOUT    • Hearing impairment    • Hereditary spastic paraplegia (HCC)    • High blood pressure    • High cholesterol    • History of blood transfusion     when patient had back surgery    • Hypercholesterolemia Surgeon: Kirsty Sheikh MD;  Location: [facility]   • INJECTION, W/WO CONTRAST, DX/THERAPEUTIC SUBSTANCE, EPIDURAL/SUBARACHNOID; CERVICAL/THORACIC N/A 10/27/2014    Procedure: CERVICAL EPIDURAL;  Surgeon: Kirsty Sheikh MD;  Location: PROCEDURE UNLISTED      x4   • TONSILLECTOMY     • TOTAL KNEE REPLACEMENT Left    • UPPER GI ENDOSCOPY,BIOPSY N/A 1/5/2015    Hiatal hernia.   Normal gastric/SB Bx, Procedure: ESOPHAGOGASTRODUODENOSCOPY W/ DILATION;  Surgeon: Mayda Carr MD;  Location: over in bed (including adjusting bedclothes, sheets and blankets)?: A Lot   -   Sitting down on and standing up from a chair with arms (e.g., wheelchair, bedside commode, etc.): Unable   -   Moving from lying on back to sitting on the side of the bed?: A L KS.2 - Reginold Going, PT on 7/6/2021  1:23 PM  KS. 3 - Reginold Going, PT on 7/6/2021  1:22 PM  KS. 4 - Reginold Going, PT on 7/6/2021  2:11 PM                        Occupational Therapy Notes (last 72 hours) (Notes from 7/4/2021  3 eveline'd good static sitting balance with BUE support with Min-SBA (decreased assist with increased time); she would benefit from continued strengthening of core and BUE to facilitate increased ease with slide board t/f and scooting; patient currently requir VALVE PROLAPSE    • Neuropathy     bilateral legs;  Hereditary spastic paraplegia X 20 years    • Osteoarthritis     legs, back, shoulders and knees   • OSTEOPENIA    • Other and unspecified hyperlipidemia    • OTHER DISEASES     spastic paraparesis   • OTH N/A 12/8/2014    Procedure: CERVICAL EPIDURAL;  Surgeon: Jethro Malik MD;  Location: [facility]   • KNEE REPLACEMENT SURGERY      left  12/05   • OTHER SURGICAL HISTORY  8-24-12    cysto Dr. Treadwell Grade   • PATIENT Malachi Prior Lives With: Sp[facility]  )                      Drives: No  Patient Regularly Uses:  (w/c bound  slideboard )    Stairs in Home: one level; ramped entrance   Use of Assistive Device(s): wc    Prior Level of [place]: PTA pt with hereditary spastic TRANSFER ASSESSMENT   Functiona TF: Total A   Functional Mobility: Patient at w/c level     FUNCTIONAL ADL ASSESSMENT  Grooming: Setup  Feeding: Setup  Toileting: Cath+Colostomy  Patient End of Session: Up in chair;Needs met;Call light within reach;RN awar Priority Disciplines Outcome Interventions   Patient/Family Long Term Goal     Interdisciplinary Adequate for Discharge    Description: Patient's Long Term Goal: to go home    Interventions:  - remote tele  - cont pulse ox  - SCDs  - stool collection  - wo

## (undated) NOTE — LETTER
[address] [place] [address]  Authorization for Invasive Procedures  1. I hereby authorize Dr. Perla Macias , my physician and whomever may be designated as the doctor's assistant, to perform the following operation and/or procedure:   [place] 4. Should the need arise during my operation or immediate post-operative period; I also consent to the administration of blood and/or blood products.  Further, I understand that despite careful testing and screening of blood and blood products, I may still 9. Patients having a sterilization procedure: I understand that if the procedure is successful the results will be permanent and it will therefore be impossible for me to inseminate, conceive or bear children.  I also understand that the procedure is intend

## (undated) NOTE — IP AVS SNAPSHOT
[facility]            (For Outpatient Use Only) Initial Admit Date: 8/2/2021   Inpt/Obs Admit Date: Inpt: 8/4/21 / Obs: 08/02/21   Discharge Date:    Alicia Hernandez:  [de-identified]   MRN: [de-identified]   CSN: [id number]   [address]: [id number] TERTIARY INSURANCE   Payor:  Plan:    Group Number:  Insurance Type:    Subscriber Name:  Subscriber :    Subscriber ID:  Pt Rel to Subscriber:    Hospital Account Financial Class: Medicare    2021

## (undated) NOTE — LETTER
[address] [facility]     I agree to have a Peripherally Inserted Central Catheter (PICC) placed in my arm.    1. The PICC insertion procedure, care, maintenance, risks, benefits, and complications have been explained to me b alternatives to the PICC, including risks, benefits, and side effects related to the alternatives and risks related to not receiving this procedure.     8.  I have expressed any questions about this procedure to my physician or the PICC Proceduralist and he

## (undated) NOTE — IP AVS SNAPSHOT
[facility]            (For Outpatient Use Only) Initial Admit Date: 6/11/2021   Inpt/Obs Admit Date: Inpt: 6/11/21 / Obs: N/A   Discharge Date:    Michelle Proper:  [de-identified]   MRN: [de-identified]   CSN: [id number]   CEID: [id number]        ZNS TERTIARY INSURANCE   Payor:  Plan:    Group Number:  Insurance Type:    Subscriber Name:  Subscriber :    Subscriber ID:  Pt Rel to Subscriber:    Hospital Account Financial Class: Medicare    2021

## (undated) NOTE — IP AVS SNAPSHOT
[facility]            (For Outpatient Use Only) Initial Admit Date: 7/18/2020   Inpt/Obs Admit Date: Inpt: 7/18/20 / Obs: N/A   Discharge Date:    Alicia Hernandez:  [de-identified]   MRN: [de-identified]   CSN: [id number]   CEID: [id number]        AUY Subscriber ID: [id number] Pt Rel to Subscriber: SELF   TERTIARY INSURANCE   Payor:  Plan:    Group Number:  Insurance Type:    Subscriber Name:  Subscriber :    Subscriber ID:  Pt Rel to Subscriber:    Hospital Account Financial Class: Medicare

## (undated) NOTE — LETTER
Date: 1/5/2022  Patient name: Blaire Gardner  YOB: 1945  Medical Record Number: [id number]  Coverage: Payor: MEDICARE / Plan: MEDICARE PART A&B / Product Type: *No Product type* /   Insurance ID: [id number]  Patient Address: [address] [place] [address] Cleanse with soap and water     - Frequency:    Change dressing two times per week       Wound 12/06/21 11 Pressure Injury Leg Left; Lower; Posterior (Active)   Date First Assessed: 12/06/21    Wound Number (Wound Clinic Only): 11  Primary Wound Type: Pres —   Drainage Amount Small —   Drainage Description Serosanguineous —   Treatments Saline/Wound ;Compression —   Dressing Xeroform —   Dressing Changed Changed —   Dressing Status Dressing Changed;Removed —   Wound Length (cm) 1 cm 1.5 cm   Wound Wid Description (Comments): 2 small openings       Assessments 12/21/2021  5:00 AM 1/5/2022 11:43 AM   Wound Image      Site Assessment Pink —   Drainage Amount Small —   Drainage Description Serous —   Treatments Site Care —   Dressing Mepilex —   Dressing Ch dressing Other ( Xeroform) Daily Routine Discontinued Alberto Hernandez, DO     - Dressing type:     Other ( Xeroform)       Wound 12/21/21 Pressure Injury Ankle Left;Lateral (Active)   Date First Assessed/Time First Assessed: 12/21/21 0839   Present on Hospi

## (undated) NOTE — IP AVS SNAPSHOT
[facility]            (For Outpatient Use Only) Initial Admit Date: 12/20/2021   Inpt/Obs Admit Date: Inpt: 12/21/21 / Obs: N/A   Discharge Date:    Levell Lease:  [de-identified]   MRN: [de-identified]   CSN: [id number]   CEID: [id number]        E Subscriber: SELF   TERTIARY INSURANCE   Payor:  Plan:    Group Number:  Insurance Type:    Subscriber Name:  Subscriber :    Subscriber ID:  Pt Rel to Subscriber:    Hospital Account Financial Class: Medicare    2021

## (undated) NOTE — MR AVS SNAPSHOT
[address]                Thank you for choosing us for your health care visit with [facility] Wound Nurse.   We are glad to serve you and happy to provide you with This list is accurate as of: 2/7/17  4:36 PM.  Always use your most recent med list.                Albuterol Sulfate  (90 Base) MCG/ACT Aers   Inhale 2 puffs into the lungs every 6 (six) hours as needed for Wheezing or Shortness of Breath.

## (undated) NOTE — LETTER
[address] 6 13[facility], [address] [place] [address]    Consent for Operation  Date: __________________                                Time: _______________    1.  I authorize the performance upon Simona Bravo the following operation: Marguerite Stephens videotape. The hospitals will not be responsible for storage or maintenance of this tape. 6. For the purpose of advancing medical education, I consent to the admittance of observers to the Operating Room.   7. I authorize the use of any specimen, organs, ti When the patient is a minor or mentally incompetent to give consent:  Signature of person authorized to consent for patient: ___________________________   Relationship to patient: ____________________________________________________    Signature of Witness these medicines may increase my risk of anesthetic complications. iv. If I am allergic to anything or have had a reaction to anesthesia before. 3. I understand how the anesthesia medicine will help me (benefits).   4. I understand that with any type of an patient’s representative) and answered their questions. The patient or their representative has agreed to have anesthesia services.     _____________________________________________________________________________  Witness        Date   Time  I have wilmar

## (undated) NOTE — IP AVS SNAPSHOT
Patient Demographics     [facility] [address] Phone  [phone] [facility])      Emergency Contact(s)     Name Relation Home Work Claudia Spouse [phone]      Allergies as of 8/18/2017  Reviewed Dr. Erika Rivero  with general surgery this Wednesday August 23, 2017     Follow-up Information     Schedule an appointment as soon as possible for a visit with Kati Zhang, Kareem Bedolla MD.    Specialties:  Internal Medicine, PEDIATRICS  Why:  Call to vaibhav Next dose due:  8/19/17 With breakfast      Take 325 mg by mouth daily with breakfast.          furosemide 20 MG Tabs  Commonly known as:  LASIX  Next dose due:  8/18/17 As needed      Take 20 mg by mouth as needed.           HYDROcodone-acetaminophen 5-325 Please  your prescriptions at the location directed by your doctor or nurse    Bring a paper prescription for each of these medications  HYDROcodone-acetaminophen 5-325 MG Tabs  Ondansetron HCl 4 mg tablet  vancomycin 50 MG/ML Soln  zinc sulfate 220 [id number] vancomycin (FIRST-VANCOMYCIN 50) 50 MG/ML oral solution 125 mg 08/17/17 2251 Given      [id number] vancomycin (FIRST-VANCOMYCIN 50) 50 MG/ML oral solution 125 mg 08/18/17 0430 Given      [id number] vancomycin (FIRST-VANCOMYCIN 50) 50 MG/ML oral so Chronic Kidney Disease: GFR <60 ml/min/1.73 m2  Kidney failure: GFR <15 ml/min/1.73 m2    The accuracy of the MDRD equation is not suitable for acute renal failure patients and it is not recommended for use with pregnant women.             MAGNESIUM [[id number] Blood Culture FREQ X 2 [[id number]] Collected:  08/12/17 1811    Order Status:  Completed Lab Status:  Final result Updated:  08/17/17 1900    Specimen:  Blood from Blood,peripheral      Blood Culture Result No Growth 5 Days    Aerobic Bacterial Culture On ---------                               -----------         ------                     ED/MRSA SCREEN BY PCR-CC[[id number]]     Normal              Final result                 Please view results for these tests on the individual orders.     Emergency MRSA Urinary retention  -on straight cath regimen at home, having difficulty here  -indwelling cowan for now, resume straight cath regimen on d/c    Depression/anxiety  -cont home med    Lymphocytic colitis  -cont chronic steroids- on 5mg daily  -cont mesalamin site of sacral ulcer, no pain elsewhere. No CP, no sob. Had urine cx recently checked as o/p but was trying to avoid abx due to hx of c.diff.      For chronic diarrhea/refractory lymphocytic colitis/sacral wound, was scheduled to follow-up with a surgeon  12/24/2016: EGD N/A      Comment: Procedure: ESOPHAGOGASTRODUODENOSCOPY (EGD);                  Surgeon: Judy Harden MD;  Location:                [address] [place] [address] ENDOSCOPY  10/27/2014: Chad Olsen NDL/MAYTE SPI DX/THER HNU N/A      Comment: Procedure: Loni Tyson MD;  Location: [address] [place] [address] Drive MANAGEMENT  12/8/2014: PATIENT Lore Hinton PREOPERATIVE ORDER FOR IV ANT* N/A      Comment: Procedure: CERVICAL EPIDURAL;  Surgeon:                Loni Tyson MD;  Location:  UCERIS 9 MG Oral Tablet 24 Hr 3 mg.    Disp:  Rfl:    EZETIMIBE 10 MG Oral Tab TAKE 1 TABLET ONCE DAILY Disp: 90 tablet Rfl: 2   PREDNISONE 5 MG Oral Tab TAKE 1 TABLET BY MOUTH DAILY Disp: 90 tablet Rfl: 0   VENLAFAXINE HCL ER 75 MG Oral Capsule SR 24 Hr TA °C) (Oral)   Resp 18   Ht 162.6 cm (5' 4\")   Wt 130 lb (59 kg)   SpO2 97%   BMI 22.31 kg/m²   General:  Alert, no distress   Head:  Normocephalic, without obvious abnormality, atraumatic. Eyes:  Sclera anicteric, No conjunctival pallor, EOMs intact. GLU 93 08/13/2017   CA 8.3 08/13/2017   MG 1.6 08/13/2017         Recent Labs   Lab  08/12/17   1748  08/13/17   0555   WBC  11.5*  7.5   HGB  12.2  10.6*   MCV  78.5*  79.6*   PLT  281  233       Recent Labs   Lab  08/12/17   1748  08/13/17   0555   NA  1 and has had C. diff remotely in the past, but negative C. diff in the last year and then on this occasion, she had had recurrent bouts of diarrhea.   She had been seen in [facility] recently to be evaluated by Surgery for possible colectomy, Dr. Domingo Sequeira Zyrtec, aspirin, Zetia, ferrous sulfate b.i.d., Delzicol 800 t.i.d., multivitamin, pantoprazole, prednisone as above, vancomycin 125 q.i.d., liquid acetaminophen p.r.n., hydrocodone p.r.n., cholestyramine.       ALLERGIES:  Numerous as listed, Cymbalta, awilda 1.   Clostridium difficile infection. The patient has underlying lymphocytic colitis/collagenous colitis, has had recent colonoscopy and has acquired C. diff.   She also has chronic intermittent UTIs possibly C. diff related to intermittent antibiotic use No notes of this type exist for this encounter.          Physical Therapy Notes (last 72 hours) (Notes from 8/15/2017  4:31 PM through 8/18/2017  4:31 PM)      Physical Therapy Note signed by Eligha Rubinstein, PT at 8/85/4584 10:37 AM  Version 1 of 1    Auth Precautions:  (stage III sacral decubiti with incontinence)    WEIGHT BEARING RESTRICTION                   PAIN ASSESSMENT   Ratin  Location: sore legs and buttock  Management Techniques:  Activity promotion;Relaxation;Repositioning    BALANCE CURRENT GOALS   Goals to be met by: 4 wks  Patient Goal Patient's self-stated goal is: Return home   Goal #1 Patient is able to demonstrate supine - sit EOB @ level: moderate assistance   Goal #1   Current Status     Goal #2 Patient is able to demonstrate • Irritable bowel syndrome    • Lymphocytic colitis Colon= 5/7/12   • MENOPAUSE    • MITRAL VALVE PROLAPSE    • Neuropathy (HCC)     bilateral legs   • Osteoarthritis     legs, back, shoulders and knees   • OSTEOPENIA    • Other and unspecified hyperlipide PAIN MANAGEMENT  12/8/2014: INJECTION, W/WO CONTRAST, DX/THERAPEUTIC SUBST* N/A      Comment: Procedure: CERVICAL EPIDURAL;  Surgeon:                Simon Ivey MD;  Location: [facility] FOR                PAIN MANAGEMENT  No date: KNEE RE No date: VALVE REPAIR      Comment: mitral valve prolapse[DB. 2]    SUBJECTIVE  Pt states that she feels better when her legs get stretched. \"I like that kind of therapy. \"  Pt denies pain currently but did report that her legs hurt at night time.     Andie Performed supine<>sit x 2 sets with Mod A x 2 as pt requires total assist to move her LE's into and out of the bed. Pt sat at EOB x 2 for approx 5 minutes each. Repositioning required as pt felt that she was slipping off the bed.   Clinician provided b Type of Evaluation: Initial  Presenting Problem:  (Stage IV Sacral Wound)    Physician Order: IP Consult to Occupational Therapy  Reason for Therapy: ADL/IADL Dysfunction and Discharge Planning    OCCUPATIONAL THERAPY ASSESSMENT     Patient is a 74 year ol • Lymphocytic colitis Colon= 5/7/12   • MENOPAUSE    • MITRAL VALVE PROLAPSE    • Neuropathy (HCC)     bilateral legs   • Osteoarthritis     legs, back, shoulders and knees   • OSTEOPENIA    • Other and unspecified hyperlipidemia    • OTHER DISEASES     sp 12/8/2014: INJECTION, W/WO CONTRAST, DX/THERAPEUTIC SUBST* N/A      Comment: Procedure: CERVICAL EPIDURAL;  Surgeon:                Chaitanya Tejeda MD;  Location: [facility] FOR                PAIN MANAGEMENT  No date: KNEE REPLACEMENT SURGERY      Comment Comment: mitral valve prolapse    HOME SITUATION  Type of Home: House  Home Layout: One level  Lives With: Spouse     Toilet and Equipment: Standard height toilet;Grab bar  Shower/Tub and Equipment: Tub-shower combo;Grab bar;Tub transfer bench;Hand-hel -   Toileting, which includes using toilet, bedpan or urinal? : A Lot  -   Putting on and taking off regular upper body clothing?: A Lot  -   Taking care of personal grooming such as brushing teeth?: A Little  -   Eating meals?: A Little    AM-PAC Score: Immunizations     Name Date      DT 05/25/05     Depo-Medrol 40mg Inj 07/20/10     INFLUENZA 11/04/16     INFLUENZA 10/15/15     INFLUENZA 10/24/13     INFLUENZA 09/16/11     INFLUENZA 11/19/09     INFLUENZA 10/20/08     INFLUENZA 11/20/07     Influenza Vi

## (undated) NOTE — LETTER
Patient Name: Nba Tee  YOB: 1945          MRN number:  [id number]  Date:  10/12/2021  Referring Physician: David Stearns    ADULT VIDEOFLUOROSCOPIC SWALLOWING STUDY:    Referring Physician: Anni      Radiologist:  lumbar spinal stenosis   • OTHER DISEASES     duplicated left renal collecting system   • OTHER DISEASES     uterine fibroids   • OTHER DISEASES     ulcerative colitis   • OTHER DISEASES     uti   • Pneumonia, organism unspecified(486)    • Reflux    • retention remained. Esophageal phase:   Adequate flow of bolus through upper esophagus     Penetration Aspiration Scale: 5/8. Material entered the airway, contacted the vocal cords and no attempt was made to eject.     Overall Impression: Mild oropharyn were provided. Agreement/Understanding verbalized and all questions answered to their apparent satisfaction. INTERDISCIPLINARY COMMUNICATION  Reviewed results with Radiologist; agreement verbalized.         Patient/Family/Caregiver was advised of thes

## (undated) NOTE — LETTER
[address], [place]  Authorization for Invasive Procedures  1. I hereby authorize Dr. Lucy Denis , my physician and whomever may be designated as the doctor's assistant, to perform the following operation and/or procedure:  Excisional Debridement of Left Ischium on Melanie Evans at [facility].    2. My physician has explained to me the nature and purpose of the operation or other procedure, possible alternative methods of treatment, the risks involved and the possibility of complications to me. I understand the probable consequences of declining the recommended procedure and the alternative methods of treatment. I acknowledge that no guarantee has been made as to the result that may be obtained. 3. I recognize that during the course of this operation or other procedure, unforeseen conditions may necessitate additional or different procedures than those listed above. I, therefore, further authorize and request that the above-named physician, his/her physician assistants, or designees perform such procedures as are, in his/her professional opinion, necessary and desirable. If I have a Do Not Attempt Resuscitation (DNAR) order in place, that status will be suspended while in the operating room, procedural suite, and during the recovery period unless otherwise explicitly stated by me (or a person authorized to consent on my behalf). The surgeon or my attending physician will determine when the applicable recovery period ends for purposes of reinstating the DNAR order. 4. Should the need arise during my operation or immediate post-operative period; I also consent to the administration of blood and/or blood products.  Further, I understand that despite careful testing and screening of blood and blood products, I may still be subject to ill effects as a result of recieving a blood transfusion an/or blood producst. The following are some, but not all, of the potential risks that can occur: fever and allergic reactions, hemolytic reactions, transmission of disease such as hepatitis, AIDS, cytomegalovirus (CMV), and flluid overload. In the event that I wish to have autologous transfusions of my own blood, or a directed donor transfusion, I will discuss this with my physician. 5. I consent to the photographing of the operations or procedures to be performed for the purposes of advancing medicine, science, and/or education, provided my identity is not revealed. If the procedure has been videotaped, the physician/surgeon will obtain the original videotape. The [facility] [place] will not be responsible for storage or maintenance of this tape. 6. I consent to the presence of a  or observer as deemed necessary by my physician or his designee. 7. Any tissues or organs removed in the operation or other procedure may be disposed of by and at the discretion of [facility].    8. I understand that the physician and his/her physician assistants may not be employees or agents of [facility], [facility], nor [facility], but are independent medical practitioners who have been permitted to use its facilities for the care and treatment of their patients. 9. Patients having a sterilization procedure: I understand that if the procedure is successful the results will be permanent and it will therefore be impossible for me to inseminate, conceive or bear children. I also understand that the procedure is intended to result in sterility, although the result has not been guaranteed. 10. I CERTIFY THAT I HAVE READ AND FULLY UNDERSTAND THE ABOVE CONSENT TO OPERATION and/or OTHER PROCEDURE. 11. I acknowledge that my physician has explained sedation/analgesia administration to me including the risks and benefits.  I consent to the administration of sedation/analgesia as may be necessary or desirable in the judgment of my physician. Signature of Patient:  ________________________________________________ Date: _________Time: _________    Responsible person in case of minor or unconscious: _____________________________Relationship: ____________     Witness Signature: ____________________________________________ Date: __________ Time: ___________    Statement of Physician  My signature below affirms that prior to the time of the procedure, I have explained to the patient and/or her legal representative, the risks and benefits involved in the proposed treatment and any reasonable alternative to the proposed treatment. I have also explained the risks and benefits involved in the refusal of the proposed treatment and have answered the patient's questions. If I have a significant financial interest in this procedure/surgery, I have disclosed this and had a discussion with my patient.     Signature of Physician:   ________________________________________Date: _________Time:_______ Patient Name: Alexandra Bermudez  : 1945   Printed: August 10, 2022    Medical Record #: [id number]